# Patient Record
Sex: MALE | Race: BLACK OR AFRICAN AMERICAN | NOT HISPANIC OR LATINO | Employment: OTHER | URBAN - METROPOLITAN AREA
[De-identification: names, ages, dates, MRNs, and addresses within clinical notes are randomized per-mention and may not be internally consistent; named-entity substitution may affect disease eponyms.]

---

## 2017-05-30 ENCOUNTER — HISTORICAL (OUTPATIENT)
Dept: ADMINISTRATIVE | Facility: HOSPITAL | Age: 57
End: 2017-05-30

## 2017-06-13 ENCOUNTER — HISTORICAL (OUTPATIENT)
Dept: ADMINISTRATIVE | Facility: HOSPITAL | Age: 57
End: 2017-06-13

## 2017-11-09 ENCOUNTER — HOSPITAL ENCOUNTER (OUTPATIENT)
Dept: MEDSURG UNIT | Facility: HOSPITAL | Age: 57
End: 2017-11-10
Attending: INTERNAL MEDICINE | Admitting: INTERNAL MEDICINE

## 2017-11-28 ENCOUNTER — HISTORICAL (OUTPATIENT)
Dept: RADIOLOGY | Facility: HOSPITAL | Age: 57
End: 2017-11-28

## 2018-01-08 ENCOUNTER — HISTORICAL (OUTPATIENT)
Dept: ADMINISTRATIVE | Facility: HOSPITAL | Age: 58
End: 2018-01-08

## 2018-02-19 ENCOUNTER — HISTORICAL (OUTPATIENT)
Dept: ADMINISTRATIVE | Facility: HOSPITAL | Age: 58
End: 2018-02-19

## 2018-03-01 ENCOUNTER — HISTORICAL (OUTPATIENT)
Dept: ADMINISTRATIVE | Facility: HOSPITAL | Age: 58
End: 2018-03-01

## 2018-04-06 ENCOUNTER — HISTORICAL (OUTPATIENT)
Dept: ADMINISTRATIVE | Facility: HOSPITAL | Age: 58
End: 2018-04-06

## 2018-04-17 ENCOUNTER — HISTORICAL (OUTPATIENT)
Dept: ADMINISTRATIVE | Facility: HOSPITAL | Age: 58
End: 2018-04-17

## 2018-04-24 ENCOUNTER — HISTORICAL (OUTPATIENT)
Dept: ADMINISTRATIVE | Facility: HOSPITAL | Age: 58
End: 2018-04-24

## 2018-05-29 ENCOUNTER — HISTORICAL (OUTPATIENT)
Dept: ADMINISTRATIVE | Facility: HOSPITAL | Age: 58
End: 2018-05-29

## 2018-05-31 ENCOUNTER — HISTORICAL (OUTPATIENT)
Dept: ADMINISTRATIVE | Facility: HOSPITAL | Age: 58
End: 2018-05-31

## 2018-08-20 ENCOUNTER — HISTORICAL (OUTPATIENT)
Dept: ADMINISTRATIVE | Facility: HOSPITAL | Age: 58
End: 2018-08-20

## 2018-09-05 ENCOUNTER — HISTORICAL (OUTPATIENT)
Dept: ADMINISTRATIVE | Facility: HOSPITAL | Age: 58
End: 2018-09-05

## 2018-12-05 ENCOUNTER — HISTORICAL (OUTPATIENT)
Dept: ADMINISTRATIVE | Facility: HOSPITAL | Age: 58
End: 2018-12-05

## 2018-12-05 LAB
ABS NEUT (OLG): 6.27 X10(3)/MCL (ref 2.1–9.2)
ALBUMIN SERPL-MCNC: 3.6 GM/DL (ref 3.4–5)
ALBUMIN/GLOB SERPL: 0.7 RATIO (ref 1.1–2)
ALP SERPL-CCNC: 71 UNIT/L (ref 46–116)
ALT SERPL-CCNC: 8 UNIT/L (ref 12–78)
ANISOCYTOSIS BLD QL SMEAR: ABNORMAL
AST SERPL-CCNC: 24 UNIT/L (ref 15–37)
BILIRUB SERPL-MCNC: 0.6 MG/DL (ref 0.2–1)
BILIRUBIN DIRECT+TOT PNL SERPL-MCNC: 0.16 MG/DL (ref 0–0.2)
BILIRUBIN DIRECT+TOT PNL SERPL-MCNC: 0.44 MG/DL (ref 0–0.8)
BNP BLD-MCNC: ABNORMAL PG/ML (ref 0–125)
BUN SERPL-MCNC: 18 MG/DL (ref 7–18)
CALCIUM SERPL-MCNC: 9.1 MG/DL (ref 8.5–10.1)
CHLORIDE SERPL-SCNC: 103 MMOL/L (ref 98–107)
CO2 SERPL-SCNC: 26.6 MMOL/L (ref 21–32)
CREAT SERPL-MCNC: 0.94 MG/DL (ref 0.6–1.3)
EOSINOPHIL NFR BLD MANUAL: 1 % (ref 0–8)
ERYTHROCYTE [DISTWIDTH] IN BLOOD BY AUTOMATED COUNT: 15.8 % (ref 11.5–17)
FLUAV AG NPH QL IA: NEGATIVE
FLUBV AG NPH QL IA: NEGATIVE
GLOBULIN SER-MCNC: 5.2 GM/DL (ref 2.4–3.5)
GLUCOSE SERPL-MCNC: 106 MG/DL (ref 74–106)
HCT VFR BLD AUTO: 39.3 % (ref 42–52)
HGB BLD-MCNC: 12.2 GM/DL (ref 14–18)
LYMPHOCYTES NFR BLD MANUAL: 14 % (ref 13–40)
MCH RBC QN AUTO: 30 PG (ref 27–31)
MCHC RBC AUTO-ENTMCNC: 31 GM/DL (ref 33–36)
MCV RBC AUTO: 96.8 FL (ref 80–94)
MONOCYTES NFR BLD MANUAL: 3 % (ref 2–11)
MYELOCYTES NFR BLD MANUAL: 1 %
NEUTROPHILS NFR BLD MANUAL: 81 % (ref 47–80)
OVALOCYTES BLD QL SMEAR: ABNORMAL
PLATELET # BLD AUTO: 274 X10(3)/MCL (ref 130–400)
PLATELET # BLD EST: NORMAL 10*3/UL
PMV BLD AUTO: 10.3 FL (ref 9.4–12.4)
POIKILOCYTOSIS BLD QL SMEAR: ABNORMAL
POTASSIUM SERPL-SCNC: 4.1 MMOL/L (ref 3.5–5.1)
PROT SERPL-MCNC: 8.8 GM/DL (ref 6.4–8.2)
RBC # BLD AUTO: 4.06 X10(6)/MCL (ref 4.7–6.1)
RBC MORPH BLD: ABNORMAL
SODIUM SERPL-SCNC: 140 MMOL/L (ref 136–145)
WBC # SPEC AUTO: 8.4 X10(3)/MCL (ref 4.5–11.5)

## 2018-12-26 ENCOUNTER — HISTORICAL (OUTPATIENT)
Dept: ADMINISTRATIVE | Facility: HOSPITAL | Age: 58
End: 2018-12-26

## 2018-12-26 LAB
ABS NEUT (OLG): 4.49 X10(3)/MCL (ref 2.1–9.2)
ALBUMIN SERPL-MCNC: 3.4 GM/DL (ref 3.4–5)
ALBUMIN/GLOB SERPL: 1 RATIO (ref 1–2)
ALP SERPL-CCNC: 81 UNIT/L (ref 45–117)
ALT SERPL-CCNC: 13 UNIT/L (ref 12–78)
AMPHET UR QL SCN: NEGATIVE
APPEARANCE, UA: CLEAR
APTT PPP: 34.9 SECOND(S) (ref 23.3–37)
AST SERPL-CCNC: 18 UNIT/L (ref 15–37)
BACTERIA #/AREA URNS AUTO: ABNORMAL /[HPF]
BARBITURATE SCN PRESENT UR: NEGATIVE
BASOPHILS # BLD AUTO: 0.01 X10(3)/MCL
BASOPHILS NFR BLD AUTO: 0 %
BENZODIAZ UR QL SCN: NEGATIVE
BILIRUB SERPL-MCNC: 0.6 MG/DL (ref 0.2–1)
BILIRUB UR QL STRIP: NEGATIVE
BILIRUBIN DIRECT+TOT PNL SERPL-MCNC: 0.2 MG/DL
BILIRUBIN DIRECT+TOT PNL SERPL-MCNC: 0.4 MG/DL
BUN SERPL-MCNC: 23 MG/DL (ref 7–18)
CALCIUM SERPL-MCNC: 8.6 MG/DL (ref 8.5–10.1)
CANNABINOIDS UR QL SCN: NEGATIVE
CD3+CD4+ CELLS # SPEC: 306 UNIT/L (ref 589–1505)
CD3+CD4+ CELLS NFR BLD: 26.6 % (ref 31–59)
CHLORIDE SERPL-SCNC: 109 MMOL/L (ref 98–107)
CK MB SERPL-MCNC: 2 NG/ML (ref 1–3.6)
CK SERPL-CCNC: 95 UNIT/L (ref 39–308)
CO2 SERPL-SCNC: 26 MMOL/L (ref 21–32)
COCAINE UR QL SCN: POSITIVE
COLOR UR: NORMAL
CREAT SERPL-MCNC: 1 MG/DL (ref 0.6–1.3)
EOSINOPHIL # BLD AUTO: 0.12 X10(3)/MCL
EOSINOPHIL NFR BLD AUTO: 2 %
ERYTHROCYTE [DISTWIDTH] IN BLOOD BY AUTOMATED COUNT: 16.3 % (ref 11.5–14.5)
FERRITIN SERPL-MCNC: 60.8 NG/ML (ref 26–388)
GLOBULIN SER-MCNC: 4.3 GM/ML (ref 2.3–3.5)
GLUCOSE (UA): NORMAL
GLUCOSE SERPL-MCNC: 119 MG/DL (ref 74–106)
HCT VFR BLD AUTO: 33.3 % (ref 40–51)
HGB BLD-MCNC: 10.2 GM/DL (ref 13.5–17.5)
HGB UR QL STRIP: NEGATIVE
HYALINE CASTS #/AREA URNS LPF: ABNORMAL /[LPF]
IMM GRANULOCYTES # BLD AUTO: 0.02 10*3/UL
IMM GRANULOCYTES NFR BLD AUTO: 0 %
INR PPP: 1.19 (ref 0.9–1.2)
IRON SATN MFR SERPL: 7 % (ref 15–50)
IRON SERPL-MCNC: 25 MCG/DL (ref 65–175)
KETONES UR QL STRIP: NEGATIVE
LEUKOCYTE ESTERASE UR QL STRIP: NEGATIVE
LYMPHOCYTES # BLD AUTO: 1.12 X10(3)/MCL
LYMPHOCYTES # BLD AUTO: 1152 UNIT/L (ref 1260–5520)
LYMPHOCYTES NFR BLD AUTO: 18 % (ref 13–40)
LYMPHOCYTES NFR LN MANUAL: 18 % (ref 28–48)
LYMPHOMA - T-CELL MARKERS SPEC-IMP: ABNORMAL
MAGNESIUM SERPL-MCNC: 2.2 MG/DL (ref 1.8–2.4)
MAGNESIUM SERPL-MCNC: 2.4 MG/DL (ref 1.8–2.4)
MCH RBC QN AUTO: 29.3 PG (ref 26–34)
MCHC RBC AUTO-ENTMCNC: 30.6 GM/DL (ref 31–37)
MCV RBC AUTO: 95.7 FL (ref 80–100)
MONOCYTES # BLD AUTO: 0.61 X10(3)/MCL
MONOCYTES NFR BLD AUTO: 10 % (ref 4–12)
NEUTROPHILS # BLD AUTO: 4.49 X10(3)/MCL
NEUTROPHILS NFR BLD AUTO: 70 X10(3)/MCL
NITRITE UR QL STRIP: NEGATIVE
OPIATES UR QL SCN: NEGATIVE
PCP UR QL: NEGATIVE
PH UR STRIP.AUTO: 6 [PH] (ref 5–8)
PH UR STRIP: 6 [PH] (ref 4.5–8)
PHOSPHATE SERPL-MCNC: 3 MG/DL (ref 2.5–4.9)
PHOSPHATE SERPL-MCNC: 3.2 MG/DL (ref 2.5–4.9)
PLATELET # BLD AUTO: 249 X10(3)/MCL (ref 130–400)
PMV BLD AUTO: 10.2 FL (ref 7.4–10.4)
POTASSIUM SERPL-SCNC: 3.7 MMOL/L (ref 3.5–5.1)
PROT SERPL-MCNC: 7.7 GM/DL (ref 6.4–8.2)
PROT UR QL STRIP: NEGATIVE
PROTHROMBIN TIME: 15 SECOND(S) (ref 11.9–14.4)
RBC # BLD AUTO: 3.48 X10(6)/MCL (ref 4.5–5.9)
RBC #/AREA URNS AUTO: ABNORMAL /[HPF]
SODIUM SERPL-SCNC: 141 MMOL/L (ref 136–145)
SP GR UR STRIP: 1.01 (ref 1–1.03)
SQUAMOUS #/AREA URNS LPF: ABNORMAL /[LPF]
TEMPERATURE, URINE (OHS): 22.2 DEGC (ref 20–25)
TIBC SERPL-MCNC: 359 MCG/DL (ref 250–450)
TRANSFERRIN SERPL-MCNC: 282 MG/DL (ref 200–360)
TROPONIN I SERPL-MCNC: 0.09 NG/ML (ref 0–0.05)
TROPONIN I SERPL-MCNC: 0.1 NG/ML (ref 0–0.05)
TSH SERPL-ACNC: 0.88 MIU/L (ref 0.36–3.74)
UROBILINOGEN UR STRIP-ACNC: NORMAL
WBC # BLD AUTO: 6400 /MM3 (ref 4500–11500)
WBC # SPEC AUTO: 6.4 X10(3)/MCL (ref 4.5–11)
WBC #/AREA URNS AUTO: ABNORMAL /HPF

## 2018-12-27 LAB
ABS NEUT (OLG): 5.38 X10(3)/MCL (ref 2.1–9.2)
BASOPHILS # BLD AUTO: 0.02 X10(3)/MCL
BASOPHILS NFR BLD AUTO: 0 %
BUN SERPL-MCNC: 20 MG/DL (ref 7–18)
CALCIUM SERPL-MCNC: 8.6 MG/DL (ref 8.5–10.1)
CHLORIDE SERPL-SCNC: 109 MMOL/L (ref 98–107)
CO2 SERPL-SCNC: 26 MMOL/L (ref 21–32)
CREAT SERPL-MCNC: 1.1 MG/DL (ref 0.6–1.3)
CREAT/UREA NIT SERPL: 18
EOSINOPHIL # BLD AUTO: 0.18 X10(3)/MCL
EOSINOPHIL NFR BLD AUTO: 2 %
ERYTHROCYTE [DISTWIDTH] IN BLOOD BY AUTOMATED COUNT: 16 % (ref 11.5–14.5)
GLUCOSE SERPL-MCNC: 110 MG/DL (ref 74–106)
HCT VFR BLD AUTO: 34.2 % (ref 40–51)
HGB BLD-MCNC: 10.5 GM/DL (ref 13.5–17.5)
IMM GRANULOCYTES # BLD AUTO: 0.02 10*3/UL
IMM GRANULOCYTES NFR BLD AUTO: 0 %
LYMPHOCYTES # BLD AUTO: 1.22 X10(3)/MCL
LYMPHOCYTES NFR BLD AUTO: 16 % (ref 13–40)
MAGNESIUM SERPL-MCNC: 2.3 MG/DL (ref 1.8–2.4)
MCH RBC QN AUTO: 29.2 PG (ref 26–34)
MCHC RBC AUTO-ENTMCNC: 30.7 GM/DL (ref 31–37)
MCV RBC AUTO: 95.3 FL (ref 80–100)
MONOCYTES # BLD AUTO: 0.67 X10(3)/MCL
MONOCYTES NFR BLD AUTO: 9 % (ref 4–12)
NEUTROPHILS # BLD AUTO: 5.38 X10(3)/MCL
NEUTROPHILS NFR BLD AUTO: 72 X10(3)/MCL
PHOSPHATE SERPL-MCNC: 3.2 MG/DL (ref 2.5–4.9)
PLATELET # BLD AUTO: 248 X10(3)/MCL (ref 130–400)
PMV BLD AUTO: 10.5 FL (ref 7.4–10.4)
POTASSIUM SERPL-SCNC: 3.8 MMOL/L (ref 3.5–5.1)
RBC # BLD AUTO: 3.59 X10(6)/MCL (ref 4.5–5.9)
SODIUM SERPL-SCNC: 141 MMOL/L (ref 136–145)
TROPONIN I SERPL-MCNC: 0.19 NG/ML (ref 0–0.05)
WBC # SPEC AUTO: 7.5 X10(3)/MCL (ref 4.5–11)

## 2018-12-28 LAB
ABS NEUT (OLG): 4.77 X10(3)/MCL (ref 2.1–9.2)
BASOPHILS # BLD AUTO: 0.03 X10(3)/MCL
BASOPHILS NFR BLD AUTO: 0 %
BUN SERPL-MCNC: 19 MG/DL (ref 7–18)
CALCIUM SERPL-MCNC: 8.2 MG/DL (ref 8.5–10.1)
CHLORIDE SERPL-SCNC: 107 MMOL/L (ref 98–107)
CO2 SERPL-SCNC: 26 MMOL/L (ref 21–32)
CREAT SERPL-MCNC: 1.1 MG/DL (ref 0.6–1.3)
CREAT/UREA NIT SERPL: 17
EOSINOPHIL # BLD AUTO: 0.34 X10(3)/MCL
EOSINOPHIL NFR BLD AUTO: 5 %
ERYTHROCYTE [DISTWIDTH] IN BLOOD BY AUTOMATED COUNT: 16.1 % (ref 11.5–14.5)
GLUCOSE SERPL-MCNC: 98 MG/DL (ref 74–106)
HCT VFR BLD AUTO: 33 % (ref 40–51)
HGB BLD-MCNC: 10.2 GM/DL (ref 13.5–17.5)
IMM GRANULOCYTES # BLD AUTO: 0.02 10*3/UL
IMM GRANULOCYTES NFR BLD AUTO: 0 %
LYMPHOCYTES # BLD AUTO: 1.35 X10(3)/MCL
LYMPHOCYTES NFR BLD AUTO: 19 % (ref 13–40)
MAGNESIUM SERPL-MCNC: 2.1 MG/DL (ref 1.8–2.4)
MCH RBC QN AUTO: 29.1 PG (ref 26–34)
MCHC RBC AUTO-ENTMCNC: 30.9 GM/DL (ref 31–37)
MCV RBC AUTO: 94 FL (ref 80–100)
MONOCYTES # BLD AUTO: 0.64 X10(3)/MCL
MONOCYTES NFR BLD AUTO: 9 % (ref 4–12)
NEUTROPHILS # BLD AUTO: 4.77 X10(3)/MCL
NEUTROPHILS NFR BLD AUTO: 67 X10(3)/MCL
PHOSPHATE SERPL-MCNC: 3.7 MG/DL (ref 2.5–4.9)
PLATELET # BLD AUTO: 247 X10(3)/MCL (ref 130–400)
PMV BLD AUTO: 10.5 FL (ref 7.4–10.4)
POTASSIUM SERPL-SCNC: 3.5 MMOL/L (ref 3.5–5.1)
RBC # BLD AUTO: 3.51 X10(6)/MCL (ref 4.5–5.9)
SODIUM SERPL-SCNC: 142 MMOL/L (ref 136–145)
TROPONIN I SERPL-MCNC: 1.78 NG/ML (ref 0–0.05)
WBC # SPEC AUTO: 7.2 X10(3)/MCL (ref 4.5–11)

## 2019-02-03 ENCOUNTER — HISTORICAL (OUTPATIENT)
Dept: ADMINISTRATIVE | Facility: HOSPITAL | Age: 59
End: 2019-02-03

## 2019-02-09 ENCOUNTER — HISTORICAL (OUTPATIENT)
Dept: ADMINISTRATIVE | Facility: HOSPITAL | Age: 59
End: 2019-02-09

## 2019-03-18 ENCOUNTER — HISTORICAL (OUTPATIENT)
Dept: ADMINISTRATIVE | Facility: HOSPITAL | Age: 59
End: 2019-03-18

## 2019-04-02 ENCOUNTER — HISTORICAL (OUTPATIENT)
Dept: ADMINISTRATIVE | Facility: HOSPITAL | Age: 59
End: 2019-04-02

## 2019-04-21 ENCOUNTER — HISTORICAL (OUTPATIENT)
Dept: ADMINISTRATIVE | Facility: HOSPITAL | Age: 59
End: 2019-04-21

## 2019-09-06 ENCOUNTER — HISTORICAL (OUTPATIENT)
Dept: RADIOLOGY | Facility: HOSPITAL | Age: 59
End: 2019-09-06

## 2019-12-17 ENCOUNTER — HISTORICAL (OUTPATIENT)
Dept: ADMINISTRATIVE | Facility: HOSPITAL | Age: 59
End: 2019-12-17

## 2020-11-04 ENCOUNTER — HISTORICAL (OUTPATIENT)
Dept: ADMINISTRATIVE | Facility: HOSPITAL | Age: 60
End: 2020-11-04

## 2020-12-13 ENCOUNTER — HISTORICAL (OUTPATIENT)
Dept: ADMINISTRATIVE | Facility: HOSPITAL | Age: 60
End: 2020-12-13

## 2021-11-27 ENCOUNTER — HISTORICAL (OUTPATIENT)
Dept: ADMINISTRATIVE | Facility: HOSPITAL | Age: 61
End: 2021-11-27

## 2021-11-27 LAB
ABS NEUT (OLG): 3.36 X10(3)/MCL (ref 2.1–9.2)
ALBUMIN SERPL-MCNC: 3.2 GM/DL (ref 3.4–4.8)
ALBUMIN/GLOB SERPL: 0.7 RATIO (ref 1.1–2)
ALP SERPL-CCNC: 59 UNIT/L (ref 40–150)
ALT SERPL-CCNC: 12 UNIT/L (ref 0–55)
AMPHET UR QL SCN: NEGATIVE
AST SERPL-CCNC: 32 UNIT/L (ref 5–34)
BARBITURATE SCN PRESENT UR: NEGATIVE
BASOPHILS # BLD AUTO: 0 X10(3)/MCL (ref 0–0.2)
BASOPHILS NFR BLD AUTO: 0 %
BENZODIAZ UR QL SCN: NEGATIVE
BILIRUB SERPL-MCNC: 0.5 MG/DL
BILIRUBIN DIRECT+TOT PNL SERPL-MCNC: 0.2 MG/DL (ref 0–0.5)
BILIRUBIN DIRECT+TOT PNL SERPL-MCNC: 0.3 MG/DL (ref 0–0.8)
BNP BLD-MCNC: 3046.6 PG/ML
BUN SERPL-MCNC: 11.4 MG/DL (ref 8.4–25.7)
BUN SERPL-MCNC: 12.1 MG/DL (ref 8.4–25.7)
CALCIUM SERPL-MCNC: 10 MG/DL (ref 8.7–10.5)
CALCIUM SERPL-MCNC: 8.9 MG/DL (ref 8.7–10.5)
CANNABINOIDS UR QL SCN: NEGATIVE
CD3+CD4+ CELLS # SPEC: 138 UNIT/L (ref 589–1505)
CD3+CD4+ CELLS NFR BLD: 8.8 % (ref 31–59)
CHLORIDE SERPL-SCNC: 104 MMOL/L (ref 98–107)
CHLORIDE SERPL-SCNC: 108 MMOL/L (ref 98–107)
CO2 SERPL-SCNC: 24 MMOL/L (ref 23–31)
CO2 SERPL-SCNC: 24 MMOL/L (ref 23–31)
COCAINE UR QL SCN: POSITIVE
CREAT SERPL-MCNC: 0.96 MG/DL (ref 0.73–1.18)
CREAT SERPL-MCNC: 1.06 MG/DL (ref 0.73–1.18)
CREAT/UREA NIT SERPL: 11
D DIMER PPP IA.FEU-MCNC: 0.96 MCG/ML FEU
EOSINOPHIL # BLD AUTO: 0.1 X10(3)/MCL (ref 0–0.9)
EOSINOPHIL NFR BLD AUTO: 2 %
ERYTHROCYTE [DISTWIDTH] IN BLOOD BY AUTOMATED COUNT: 13.7 % (ref 11.5–14.5)
FENTANYL UR QL SCN: NEGATIVE
GLOBULIN SER-MCNC: 4.9 GM/DL (ref 2.4–3.5)
GLUCOSE SERPL-MCNC: 153 MG/DL (ref 82–115)
GLUCOSE SERPL-MCNC: 87 MG/DL (ref 82–115)
HCT VFR BLD AUTO: 43.4 % (ref 40–51)
HGB BLD-MCNC: 13.6 GM/DL (ref 13.5–17.5)
IMM GRANULOCYTES # BLD AUTO: 0.01 10*3/UL
IMM GRANULOCYTES NFR BLD AUTO: 0 %
LYMPHOCYTES # BLD AUTO: 1.7 X10(3)/MCL (ref 0.6–4.6)
LYMPHOCYTES # BLD AUTO: 1568 UNIT/L (ref 1260–5520)
LYMPHOCYTES NFR BLD AUTO: 29 %
LYMPHOCYTES NFR LN MANUAL: 28 % (ref 28–48)
LYMPHOMA - T-CELL MARKERS SPEC-IMP: ABNORMAL
MAGNESIUM SERPL-MCNC: 1.9 MG/DL (ref 1.6–2.6)
MCH RBC QN AUTO: 30.5 PG (ref 26–34)
MCHC RBC AUTO-ENTMCNC: 31.3 GM/DL (ref 31–37)
MCV RBC AUTO: 97.3 FL (ref 80–100)
MDMA UR QL SCN: NEGATIVE
MONOCYTES # BLD AUTO: 0.7 X10(3)/MCL (ref 0.1–1.3)
MONOCYTES NFR BLD AUTO: 11 %
NEUTROPHILS # BLD AUTO: 3.36 X10(3)/MCL (ref 2.1–9.2)
NEUTROPHILS NFR BLD AUTO: 57 %
NRBC BLD AUTO-RTO: 0 % (ref 0–0.2)
OPIATES UR QL SCN: NEGATIVE
PCP UR QL: NEGATIVE
PH UR STRIP.AUTO: 7 [PH] (ref 3–11)
PLATELET # BLD AUTO: 210 X10(3)/MCL (ref 130–400)
PMV BLD AUTO: 10.3 FL (ref 7.4–10.4)
POTASSIUM SERPL-SCNC: 3.9 MMOL/L (ref 3.5–5.1)
POTASSIUM SERPL-SCNC: 4.2 MMOL/L (ref 3.5–5.1)
PROT SERPL-MCNC: 8.1 GM/DL (ref 5.8–7.6)
RBC # BLD AUTO: 4.46 X10(6)/MCL (ref 4.5–5.9)
SARS-COV-2 AG RESP QL IA.RAPID: NEGATIVE
SODIUM SERPL-SCNC: 137 MMOL/L (ref 136–145)
SODIUM SERPL-SCNC: 138 MMOL/L (ref 136–145)
TROPONIN I SERPL-MCNC: 0.36 NG/ML (ref 0–0.04)
TROPONIN I SERPL-MCNC: 0.37 NG/ML (ref 0–0.04)
WBC # BLD AUTO: 5600 /MM3 (ref 4500–11500)
WBC # SPEC AUTO: 5.9 X10(3)/MCL (ref 4.5–11)

## 2021-11-28 LAB
ABS NEUT (OLG): 3.06 X10(3)/MCL (ref 2.1–9.2)
ALBUMIN SERPL-MCNC: 3 GM/DL (ref 3.4–4.8)
ALBUMIN/GLOB SERPL: 0.6 RATIO (ref 1.1–2)
ALP SERPL-CCNC: 63 UNIT/L (ref 40–150)
ALT SERPL-CCNC: 14 UNIT/L (ref 0–55)
AST SERPL-CCNC: 23 UNIT/L (ref 5–34)
BASOPHILS # BLD AUTO: 0 X10(3)/MCL (ref 0–0.2)
BASOPHILS NFR BLD AUTO: 0 %
BILIRUB SERPL-MCNC: 0.5 MG/DL
BILIRUBIN DIRECT+TOT PNL SERPL-MCNC: 0.2 MG/DL (ref 0–0.5)
BILIRUBIN DIRECT+TOT PNL SERPL-MCNC: 0.3 MG/DL (ref 0–0.8)
BUN SERPL-MCNC: 13.2 MG/DL (ref 8.4–25.7)
BUN SERPL-MCNC: 16.2 MG/DL (ref 8.4–25.7)
CALCIUM SERPL-MCNC: 9.3 MG/DL (ref 8.7–10.5)
CALCIUM SERPL-MCNC: 9.8 MG/DL (ref 8.7–10.5)
CHLORIDE SERPL-SCNC: 105 MMOL/L (ref 98–107)
CHLORIDE SERPL-SCNC: 106 MMOL/L (ref 98–107)
CO2 SERPL-SCNC: 22 MMOL/L (ref 23–31)
CO2 SERPL-SCNC: 24 MMOL/L (ref 23–31)
CREAT SERPL-MCNC: 1 MG/DL (ref 0.73–1.18)
CREAT SERPL-MCNC: 1.12 MG/DL (ref 0.73–1.18)
CREAT/UREA NIT SERPL: 14
EOSINOPHIL # BLD AUTO: 0.2 X10(3)/MCL (ref 0–0.9)
EOSINOPHIL NFR BLD AUTO: 3 %
ERYTHROCYTE [DISTWIDTH] IN BLOOD BY AUTOMATED COUNT: 13.5 % (ref 11.5–14.5)
EST CREAT CLEARANCE SER (OHS): 73.5 ML/MIN
GLOBULIN SER-MCNC: 4.9 GM/DL (ref 2.4–3.5)
GLUCOSE SERPL-MCNC: 104 MG/DL (ref 82–115)
GLUCOSE SERPL-MCNC: 88 MG/DL (ref 82–115)
HCT VFR BLD AUTO: 43.4 % (ref 40–51)
HGB BLD-MCNC: 14.1 GM/DL (ref 13.5–17.5)
IMM GRANULOCYTES # BLD AUTO: 0.01 10*3/UL
IMM GRANULOCYTES NFR BLD AUTO: 0 %
LYMPHOCYTES # BLD AUTO: 1.6 X10(3)/MCL (ref 0.6–4.6)
LYMPHOCYTES NFR BLD AUTO: 28 %
MAGNESIUM SERPL-MCNC: 1.9 MG/DL (ref 1.6–2.6)
MAGNESIUM SERPL-MCNC: 2.4 MG/DL (ref 1.6–2.6)
MCH RBC QN AUTO: 30.7 PG (ref 26–34)
MCHC RBC AUTO-ENTMCNC: 32.5 GM/DL (ref 31–37)
MCV RBC AUTO: 94.3 FL (ref 80–100)
MONOCYTES # BLD AUTO: 0.7 X10(3)/MCL (ref 0.1–1.3)
MONOCYTES NFR BLD AUTO: 13 %
NEUTROPHILS # BLD AUTO: 3.06 X10(3)/MCL (ref 2.1–9.2)
NEUTROPHILS NFR BLD AUTO: 55 %
NRBC BLD AUTO-RTO: 0 % (ref 0–0.2)
PLATELET # BLD AUTO: 223 X10(3)/MCL (ref 130–400)
PMV BLD AUTO: 10.2 FL (ref 7.4–10.4)
POTASSIUM SERPL-SCNC: 3.4 MMOL/L (ref 3.5–5.1)
POTASSIUM SERPL-SCNC: 4.8 MMOL/L (ref 3.5–5.1)
PROT SERPL-MCNC: 7.9 GM/DL (ref 5.8–7.6)
RBC # BLD AUTO: 4.6 X10(6)/MCL (ref 4.5–5.9)
SODIUM SERPL-SCNC: 136 MMOL/L (ref 136–145)
SODIUM SERPL-SCNC: 138 MMOL/L (ref 136–145)
TROPONIN I SERPL-MCNC: 0.22 NG/ML (ref 0–0.04)
WBC # SPEC AUTO: 5.6 X10(3)/MCL (ref 4.5–11)

## 2021-11-29 LAB
ABS NEUT (OLG): 3.34 X10(3)/MCL (ref 2.1–9.2)
BASOPHILS # BLD AUTO: 0 X10(3)/MCL (ref 0–0.2)
BASOPHILS NFR BLD AUTO: 1 %
EOSINOPHIL # BLD AUTO: 0.2 X10(3)/MCL (ref 0–0.9)
EOSINOPHIL NFR BLD AUTO: 3 %
ERYTHROCYTE [DISTWIDTH] IN BLOOD BY AUTOMATED COUNT: 13.6 % (ref 11.5–14.5)
HBV SURFACE AB SER-ACNC: 0 MIU/ML
HBV SURFACE AB SERPL IA-ACNC: NONREACTIVE M[IU]/ML
HBV SURFACE AG SERPL QL IA: REACTIVE
HBV SURFACE AG SERPLBLD QL IA.RAPID: ABNORMAL
HCT VFR BLD AUTO: 45.8 % (ref 40–51)
HGB BLD-MCNC: 14.9 GM/DL (ref 13.5–17.5)
IMM GRANULOCYTES # BLD AUTO: 0.01 10*3/UL
IMM GRANULOCYTES NFR BLD AUTO: 0 %
LYMPHOCYTES # BLD AUTO: 1.6 X10(3)/MCL (ref 0.6–4.6)
LYMPHOCYTES NFR BLD AUTO: 27 %
MCH RBC QN AUTO: 30.5 PG (ref 26–34)
MCHC RBC AUTO-ENTMCNC: 32.5 GM/DL (ref 31–37)
MCV RBC AUTO: 93.9 FL (ref 80–100)
MONOCYTES # BLD AUTO: 0.9 X10(3)/MCL (ref 0.1–1.3)
MONOCYTES NFR BLD AUTO: 14 %
NEUTROPHILS # BLD AUTO: 3.34 X10(3)/MCL (ref 2.1–9.2)
NEUTROPHILS NFR BLD AUTO: 55 %
NRBC BLD AUTO-RTO: 0 % (ref 0–0.2)
PLATELET # BLD AUTO: 218 X10(3)/MCL (ref 130–400)
PMV BLD AUTO: 10.7 FL (ref 7.4–10.4)
RBC # BLD AUTO: 4.88 X10(6)/MCL (ref 4.5–5.9)
WBC # SPEC AUTO: 6.1 X10(3)/MCL (ref 4.5–11)

## 2021-11-30 LAB
ABS NEUT (OLG): 3.72 X10(3)/MCL (ref 2.1–9.2)
BASOPHILS # BLD AUTO: 0 X10(3)/MCL (ref 0–0.2)
BASOPHILS NFR BLD AUTO: 0 %
EOSINOPHIL # BLD AUTO: 0.2 X10(3)/MCL (ref 0–0.9)
EOSINOPHIL NFR BLD AUTO: 4 %
ERYTHROCYTE [DISTWIDTH] IN BLOOD BY AUTOMATED COUNT: 13.6 % (ref 11.5–14.5)
HCT VFR BLD AUTO: 44.6 % (ref 40–51)
HGB BLD-MCNC: 14.4 GM/DL (ref 13.5–17.5)
IMM GRANULOCYTES # BLD AUTO: 0.01 10*3/UL
IMM GRANULOCYTES NFR BLD AUTO: 0 %
LYMPHOCYTES # BLD AUTO: 1.6 X10(3)/MCL (ref 0.6–4.6)
LYMPHOCYTES NFR BLD AUTO: 26 %
MCH RBC QN AUTO: 30.8 PG (ref 26–34)
MCHC RBC AUTO-ENTMCNC: 32.3 GM/DL (ref 31–37)
MCV RBC AUTO: 95.3 FL (ref 80–100)
MONOCYTES # BLD AUTO: 0.8 X10(3)/MCL (ref 0.1–1.3)
MONOCYTES NFR BLD AUTO: 12 %
NEUTROPHILS # BLD AUTO: 3.72 X10(3)/MCL (ref 2.1–9.2)
NEUTROPHILS NFR BLD AUTO: 58 %
NRBC BLD AUTO-RTO: 0 % (ref 0–0.2)
PLATELET # BLD AUTO: 221 X10(3)/MCL (ref 130–400)
PMV BLD AUTO: 10.3 FL (ref 7.4–10.4)
RBC # BLD AUTO: 4.68 X10(6)/MCL (ref 4.5–5.9)
WBC # SPEC AUTO: 6.4 X10(3)/MCL (ref 4.5–11)

## 2022-03-15 ENCOUNTER — HISTORICAL (OUTPATIENT)
Dept: ADMINISTRATIVE | Facility: HOSPITAL | Age: 62
End: 2022-03-15

## 2022-04-07 ENCOUNTER — HISTORICAL (OUTPATIENT)
Dept: ADMINISTRATIVE | Facility: HOSPITAL | Age: 62
End: 2022-04-07

## 2022-04-23 VITALS
BODY MASS INDEX: 28.7 KG/M2 | DIASTOLIC BLOOD PRESSURE: 69 MMHG | WEIGHT: 205 LBS | HEIGHT: 71 IN | OXYGEN SATURATION: 100 % | SYSTOLIC BLOOD PRESSURE: 102 MMHG

## 2022-04-30 NOTE — H&P
Patient:   Jason Perdue             MRN: [id number]            FIN: [id number]               Age:   58 years     Sex:  Male     :  1960   Associated Diagnoses:   None   Author:   Oleg Kendall DO      Chief Complaint   2018 17:29 CDT      In via ambulance for abd pain, awake and alert severe pain to lower abd deneis vomiting today states history of lymphoma andon chemo      History of Present Illness   57 yo M presents w/ diffuse abdominal pain x 1 day.  He said it was initially in the hypogastric but now in epigastric region. He admits one small BM today with hard stool. There are no aggrivating/alleviating factors. His pain is improved since arrival. He has been tolerating PO well, even ate lunch today. He denies all other complaints as per ROS. He does have a significant medical history as of Dec this past yr with multiple diagnoses of HIV, Diffuse large B-cell lymphoma, bleeding duodenal ulcer, and CAD s/p stenting. His current pain is similar to that which led to the aforementioned medical diagnoses.  He underwent CT for the same which revealed retroperitoneal lymphadenopathy.  He also had unworked up anemia, for these reasons he underwent EGD and CT scope.  Biopsies taken at that time were consistent with diffuse B-cell lymphoma and H. pylori infection.  He has since undergone 6 rounds of R-CHOP and is seen by oncology here [facility].  He has follow-up PET/CT for therapeutic response planned this Thursday [facility].  He has oncologic follow-up scheduled with [facility] next week.  He has taken all prescribed medicines today. Further history is obtained from chart review.  He is well followed by cardiology and ID clinics, recently seen beginning of this month with scheduled follow-up planned.      Review of Systems   Constitutional: no fevers, night sweats, chills or fatigue  HEENT: no acute vision changes or photophobia, no hearing loss  CVS: no chest pain, palpitations, or orthopnea  Resp: no SOB,  cough, or wheezing  GI: + abd pain, no nausea, vomiting or diarrhea  : no dysuria, urinary incontinence  Musculoskeletal: no joint stiffness, pain, swelling or weakness  Skin: no rashes, lesions  Neuro: no headaches, seizures, numbness or syncope  Psych: no depression or anxiety          Health Status   Allergies:    Allergic Reactions (Selected)  Severity Not Documented  ACE inhibitors- Angioedema.,    Allergies (1) Active Reaction  ACE inhibitors Angioedema     Problem list:    Active Problems (11)  Acute disease or injury-related malnutrition   Atrial fibrillation   Back pain   CAD - Coronary artery disease   Constipation   constipationAnxiety   HIV   Hyperlipidemia   Hypertension   Lymphoma   Tobacco user         Histories   Past Medical History:    Resolved  Kidney stone ([id number]):  Resolved.  Hypertension ([id number]):  Resolved.  HTN (hypertension) ([id number]):  Resolved.  Atrial fibrillation (62917649):  Resolved.  HIV (75415429):  Resolved.   Family History:    Primary malignant neoplasm of colon  Brother  Primary malignant neoplasm of lung  Father  Stroke  Sister  Hypertension.  Mother  Sister    Father  Mother     Social History        Social & Psychosocial Habits    Alcohol  2013 Risk Assessment: Denies Alcohol Use    2017  Use: Past    Type: Beer    Comment: over 20 years ag. - 2017 11:28 - Ida Williamson    Employment/School  2018  Status: Unemployed    Home/Environment  2018  Lives with: Siblings    Living situation: Home/Independent    Home equipment: Walker/Cane    Alcohol abuse in household: No    Substance abuse in household: No    Smoker in household: No    Feels unsafe at home: No    Safe place to go: Yes    Family/Friends available to help: Yes    Concern for family members at home: No    Major illness in household: No    Nutrition/Health  2018  Type of diet: Regular    Sleeping concerns:  Yes    Feels highly stressed: No    Substance Abuse  04/18/2013 Risk Assessment: Denies Substance Abuse    03/12/2018  Use: Past    Type: crack    Comment: 2 days - 11/18/2017 18:14 - Ramy SMA, Janessa DOBBINS; patient states quit in Jan. 2018 - 03/12/2018 14:20 - Alex MILES, Fior    Tobacco  09/27/2015 Risk Assessment: High Risk    12/26/2017  Use: Former smoker    Type: Cigarettes    Tobacco use per day: 20  .        Physical Examination      Vital Signs (last 24 hrs)_____  Last Charted___________  Temp Oral     36.8 DegC  (MAY 29 17:29)  Heart Rate Peripheral   67 bpm  (MAY 29 17:33)  Resp Rate         24 br/min  (MAY 29 17:33)  SBP      105 mmHg  (MAY 29 17:33)  DBP      73 mmHg  (MAY 29 17:33)  SpO2      100 %  (MAY 29 17:33)     General: AAOx3, NAD, alert and cooperative  HEENT: PERRLA, EOMI, normal conjunctiva  Neck: no LAD, no JVD, supple, no masses or thyromegaly  CVS: S1/S2 nml, RRR,+ IV systolic murmur  Resp: CTA B/L, no rhonchi, rales, or wheezing  GI: Not distended, not tender, BS+, no guarding  : no CVA tenderness  Skin: not jaundiced, warm, no rashes, diffuse small bite? lesions  Musculoskeletal: normal ROM, no joint tenderness, normal muscular development  Extremities: no peripheral edema, peripheral pulses intact  Neuro: CN II-XII grossly intact, strength and sensation symmetric and intact throughout, no focal neurological deficits       Health Maintenance      Review / Management        Results review:  All Results   5/29/2018 19:30 CDT      UA Appear                 Hazy                             UA Color                  YELLOW                             UA Spec Grav              1.028                             UA Bili                   0.5 mg/dL                             UA pH                     8.0                             UA Urobilinogen           3 mg/dL                             UA Blood                  Negative                             UA Glucose                Normal                              UA Ketones                Trace                             UA Protein                70 mg/dL                             UA Nitrite                Negative                             UA Leuk Est               Negative                             UA WBC Interp             3-5 /HPF                             UA RBC Interp             0-2                             UA Mucous                 Moderate                             UA Bact Interp            None Seen                             UA Squam Epi Interp       >100                             UA Hyal Cast Interp       0-2                             U Temp                    21.0 DegC                             U pH                      8.0                             U Amph Scr                Negative                             U Fiona Scr                Negative                             U Benzodia Scr            Negative                             U Cannab Scr              Negative                             U Cocaine Scr             Positive                             U Opiate Scr              Negative                             U Phencyclidine Scr       Negative                             Continuous Visual Observation             N/A                             Patient Location          Patient's room                             Patient Visitors          None                             Patient Safety Measures in Use            All items within reach                             Patient Position          Semi-Lockett's                             Elimination Assistance Offered            Independent    5/29/2018 18:30 CDT      WBC                       3.8 x10(3)/mcL  LOW                             RBC                       3.00 x10(6)/mcL  LOW                             Hgb                       9.2 gm/dL  LOW                             Hct                       29.2 %  LOW                             Platelet                   205 x10(3)/mcL                             MCV                       97.3 fL                             MCH                       30.7 pg                             MCHC                      31.5 gm/dL                             RDW                       15.0 %  HI                             MPV                       11.2 fL  HI                             Abs Neut                  2.40 x10(3)/mcL                             Segs Man                  61 %                             Band Man                  6 %                             Lymph Man                 9.0 %  LOW                             Monocyte Man              11 %  HI                             Eos Man                   6 %                             Basophil Man              2 %  HI                             Quinton Man                  3 %  HI                             Myelo Man                 1 %  NA                             Abn Lymph Man             1 %  HI                             Hypochrom                 1+                             Platelet Est              Adequate                             Anisocyte                 1+                             Poik                      1+                             Microcyte                 1+                             Polychrom                 1+                             RBC Morph                 Abnormal                             Schistocyte               1+                             Ovalocytes                1+                             PT                        13.5 second(s)                             INR                       1.10                             Sodium Lvl                143 mmol/L                             Potassium Lvl             3.7 mmol/L                             Chloride                  106 mmol/L                             CO2                       27 mmol/L                             Calcium Lvl               9.1 mg/dL                              Glucose Lvl               125 mg/dL  HI                             BUN                       13 mg/dL                             Creatinine                0.90 mg/dL                             eGFR-AA                   >105 mL/min                             eGFR-CLYDE                  92 mL/min                             Bili Total                0.2 mg/dL                             Bili Direct               <0.1 mg/dL                             Bili Indirect             unable to calc mg/dL                             AST                       24 unit/L                             ALT                       16 unit/L                             Alk Phos                  62 unit/L                             Total Protein             7.2 gm/dL                             Albumin Lvl               3.3 gm/dL  LOW                             Globulin                  3.90 gm/mL  HI                             A/G Ratio                 1 ratio                             Lipase Lvl                45 unit/L  LOW                             Total CK                  38 unit/L  LOW                             CK MB                     <1.0 ng/mL                             Continuous Visual Observation             N/A                             Patient Location          Patient's room                             Patient Visitors          None                             Patient Safety Measures in Use            All items within reach                             Patient Position          Semi-Lockett's                             Elimination Assistance Offered            Independent  .    Radiology results   Rad Results (ST)   Accession: [id number]  Order: CT Abdomen and Pelvis W Contrast  Report Dt/Tm: 05/29/2018 20:52  Report:      CT abdomen pelvis with contrast     Technique: Images of the abdomen and pelvis were obtained with  contrast.     Total DLP: 459.02 mGy cm      Indication: Abdominal  pain.     Comparison: CT abdomen pelvis 1/10/2018.     Findings:   The bilateral lung bases are clear. The heart is normal in size.     There are multiple hypodensities in the liver which are too small to  contrast, but likely represent simple cysts. These are unchanged from  the prior exam. The gallbladder is contracted. The spleen, pancreas,  and adrenal glands are normal. There is a simple cyst in the right  kidney. There is no hydronephrosis or nephrolithiasis.     The stomach and proximal small bowel are decompressed. There is short  segment of ileum which is dilated with fecalization. There is no  pneumatosis or perforation. The appendix is normal. The colon is  normal. The urinary bladder is normal. There is no pelvic or  retroperitoneal lymphadenopathy. There is no lytic or blastic osseous  lesion.     Impression:  Dilated short segment of ileum consistent with partial small bowel  obstruction versus ileus.            Impression and Plan   Abdominal pain  Diffuse large B-cell lymphoma  HIV disease  Anemia, iron deficiency  Atrial fibrillation  CAD s/p stenting  HTN  HLD  Cocaine use    Admit to telemetry for observation given his recent cardiac history.  He could be having recurrent lymphoma related pain versus constipation related.  His repeat imaging with complete response during chemotherapy argues against the former. Tonight's imaging does reveal significant stool burden throughout large and small bowel; labs/imaging not suggestive of infection. Will provide supportive care with analgesics, IVF, and bowel regimen.  We'll continue current home medications for other chronic medical conditions as listed in MAR with the exception of metoprolol given his cocaine positive UDS. Will notify ID & Oncology services of pt admit.

## 2022-04-30 NOTE — ED PROVIDER NOTES
Patient:   Jason Perude             MRN: [id number]            FIN: [id number]               Age:   58 years     Sex:  Male     :  1960   Associated Diagnoses:   Right ureteral stone; HOLLIS (acute kidney injury); Uncontrolled hypertension   Author:   Steven CHAWLA, Ibrahima WHIPPLE      Basic Information   Time seen: Immediately upon arrival.   History source: Patient.   Arrival mode: Private vehicle.   History limitation: None.   Additional information: Chief Complaint from Nursing Triage Note : Chief Complaint   2018 15:53 CDT       Chief Complaint           rt lower abd/groin pain, constant since 1400 hrs, vss, 100 fentanyl adm en route, 2 heart stents and hiv +.  .      History of Present Illness   The patient presents with abdominal pain.  The onset was 3  hours ago.  The course/duration of symptoms is constant.  The character of symptoms is achy.  The degree at onset was minimal.  The Location of pain at onset was right, lower and abdominal.  The degree at present is severe.  The Location of pain at present is right, lower and abdominal.  Radiating pain: none. The exacerbating factor is none.  The relieving factor is none.  Therapy today: emergency medical services and Fentanyl 100 mcg.  Risk factors consist of immunocompromised patient.  Associated symptoms: nausea, diarrhea, denies vomiting, denies fever and denies chills.  Additional history: sexual history: non-contributory.        Review of Systems   Constitutional symptoms:  No fever, no chills, no sweats, no weakness.    Skin symptoms:  No rash, no lesion.    Eye symptoms:  No recent vision problems,    Respiratory symptoms:  No shortness of breath, no orthopnea, no cough, no sputum production.    Cardiovascular symptoms:  No chest pain, no palpitations, no tachycardia, no syncope, no diaphoresis, no peripheral edema.    Gastrointestinal symptoms:  Negative except as documented in HPI, no vomiting, no diarrhea, no constipation, no rectal  bleeding.    Genitourinary symptoms:  No dysuria, no hematuria, no testicular pain.    Musculoskeletal symptoms:  No back pain, no Joint pain.    Neurologic symptoms:  No headache, no dizziness.              Additional review of systems information: All other systems reviewed and otherwise negative.      Health Status   Allergies:    Allergic Reactions (Selected)  Severity Not Documented  ACE inhibitors- Angioedema.,    Allergies (1) Active Reaction  ACE inhibitors Angioedema  .   Medications:  (Selected)   Inpatient Medications  Ordered  Heparin Flush 100 U/mL - 5 mL: 500 units, form: Injection, IV Push, Once-chemo, first dose 02/21/18 9:03:00 CST, stop date 02/21/18 9:03:00 CST, Days 1  Heparin Flush 100 U/mL - 5 mL: 500 units, form: Injection, IV Push, Once-chemo, first dose 04/26/18 9:44:00 CDT, stop date 04/26/18 9:44:00 CDT, Days 1  Lactated Ringers Infusion 1,000 mL: 1,000 mL, form: Infusion, IV, Once-NOW, Infuse over: 2 hr, first dose 09/05/18 16:04:00 CDT  Readi-Cat 2 (barium sulfate oral suspension): 450 mL, form: Susp, Oral, Once, first dose 09/05/18 16:05:00 CDT, stop date 09/05/18 16:05:00 CDT, STAT, Oral contrast administration for CT abdomen & pelvic exams.  Administer first bottle 1.5 hours prior to exam.  Administer second bottle 45 mins pr...  dexamethasone (for IVPB): 10 mg, IV Piggyback, Once-chemo, Infuse over: 20 minute(s), first dose 02/21/18 8:43:00 CST, stop date 02/21/18 8:43:00 CST, Days 1  morphine 2 mg/mL injectable solution: 4 mg, form: Soln, IV, Once-NOW, first dose 09/05/18 16:04:00 CDT, stop date 09/05/18 16:04:00 CDT, STAT  Future  Bicillin L-A 1,200,000 units/2 mL intramuscular suspension: 2.4 millionunits, form: Injection, IM, Once-Unscheduled, first dose 02/15/18 7:00:00 CST, Future Order  Prescriptions  Prescribed  Cardizem  mg/24 hours oral CAPsule, extended release: 360 mg = 1 cap(s), Oral, Daily, # 30 cap(s), 6 Refill(s), Pharmacy: [facility], [facility].  Descovy  200 mg-25 mg oral tablet: 1 tab(s), Oral, Daily, # 30 tab(s), 3 Refill(s), Pharmacy: [facility]  Ensure Plus: Ensure Plus, See Instructions, 3 cans oral daily  Chocolate & Strawberry flavors, # 96 EA, 3 Refill(s), Pharmacy: [facility]  Plavix 75 mg oral tablet: 75 mg = 1 tab(s), Oral, Daily, # 90 tab(s), 11 Refill(s)  Tivicay 50 mg oral tablet: 50 mg = 1 tab(s), Oral, Daily, # 30 tab(s), 3 Refill(s), Pharmacy: [facility]  aspirin 81 mg oral tablet, CHEWABLE: 81 mg = 1 tab(s), Oral, Daily, # 30 tab(s), 0 Refill(s)  atorvastatin 40 mg oral tablet: 40 mg = 1 tab(s), Oral, Daily, # 90 tab(s), 11 Refill(s)  metoprolol tartrate 50 mg oral tab: 50 mg = 1 tab(s), Oral, BID, # 180 tab(s), 11 Refill(s)  nitroglycerin 0.4 mg sublingual TAB: 0.4 mg = 1 tab(s), SL, q5min, PRN PRN for chest pain, # 1 bottle(s), 3 Refill(s)  Documented Medications  Documented  HYDROCODON-APAP 5-325:   HYDROXYZINE STACEY 50 MG CAP:   PREDNISONE 20 MG TAB:   docusate-senna 50 mg-8.6 mg oral tablet: 2 tab(s), Oral, Once a day (at bedtime), 0 Refill(s).      Past Medical/ Family/ Social History   Medical history:    Resolved  Kidney stone ([id number]):  Resolved.  Hypertension ([id number]):  Resolved.  HTN (hypertension) ([id number]):  Resolved.  Atrial fibrillation ([id number]):  Resolved.  HIV ([id number]):  Resolved..   Surgical history:    Catheter Insertion Mediport (None) on 3/5/2018 at 57 Years.  Comments:  3/5/2018 08:16 - Allie SAM, Dasia R.  auto-populated from documented surgical case  Biopsy Bone Marrow Aspiration (.) on 12/21/2017 at 57 Years.  Comments:  12/22/2017 13:03 - Juan MILES, Pao CAMEJO.  auto-populated from documented surgical case  Esophagogastroduodenoscopy on 12/15/2017 at 57 Years.  Comments:  12/15/2017 11:22 - Shashi SAM, Praveena CAMACHO  auto-populated from documented surgical case  Biopsy Gastrointestinal on 12/15/2017 at 57 Years.  Comments:  12/15/2017  11:22 - Praveena Danielle RN  auto-populated from documented surgical case  Colonoscopy on 12/15/2017 at 57 Years.  Comments:  12/15/2017 11:22 - Praveena Danielle RN.  auto-populated from documented surgical case  Colonoscopy ([id number]) on 12/15/2017 at 57 Years.  Comments:  3/20/2018 13:11 - Nay Hopkins MD  Performed by Dr. Mcadams while inpatient at [facility] in Dec. 2017. Next one due in 5 years per operative report.  Fracture ([id number]) in  at 20 Years.  left wrist repair.  cardiac stents placed..   Family history:    Primary malignant neoplasm of colon  Brother  Primary malignant neoplasm of lung  Father  Stroke  Sister  Hypertension.  Mother  Sister    Father  Mother  .   Social history: Alcohol use: Denies, Tobacco use: Regularly, Drug use: Denies.   Problem list:    Active Problems (11)  Acute disease or injury-related malnutrition   Atrial fibrillation   Back pain   CAD - Coronary artery disease   Constipation   constipationAnxiety   HIV   Hyperlipidemia   Hypertension   Lymphoma   Tobacco user   .      Physical Examination               Vital Signs   Vital Signs   2018 15:53 CDT       Temperature Oral          36.6 DegC                             Temperature Oral (calculated)             97.88 DegF                             Peripheral Pulse Rate     58 bpm  LOW                             Respiratory Rate          18 br/min                             SpO2                      100 %                             Oxygen Therapy            Room air                             Systolic Blood Pressure   170 mmHg  HI                             Diastolic Blood Pressure  101 mmHg  HI  .      Vital Signs (last 24 hrs)_____  Last Charted___________  Temp Oral     36.6 DegC  (SEP 05 15:53)  Heart Rate Peripheral   L 58bpm  (SEP 05 15:53)  Resp Rate         18 br/min  (SEP 05 15:53)  SBP      H 170mmHg  (SEP 05 15:53)  DBP      H 101mmHg  (SEP 05 15:53)  SpO2      100 %  (SEP 05  15:53)  Weight      76 kg  (SEP 05 15:53)  Height      180 cm  (SEP 05 15:53)  BMI      23.46  (SEP 05 15:53)  .   Measurements   9/5/2018 15:53 CDT       Weight Dosing             76 kg                             Weight Measured           76 kg                             Weight Measured and Calculated in Lbs     167.55 lb                             Height/Length Dosing      180 cm                             Height/Length Measured    180 cm                             Body Mass Index Measured  23.46 kg/m2  .   Basic Oxygen Information   9/5/2018 15:53 CDT       SpO2                      100 %                             Oxygen Therapy            Room air  .   General:  Alert, mild distress, anxious.    Skin:  Warm, dry, pink, intact, no pallor.    Head:  Normocephalic.   Neck:  Supple, no JVD.    Eye:  Pupils are equal, round and reactive to light, normal conjunctiva.    Ears, nose, mouth and throat:  Oral mucosa moist, no pharyngeal erythema or exudate.    Cardiovascular:  Regular rate and rhythm, No murmur, Normal peripheral perfusion, No edema.    Respiratory:  Lungs are clear to auscultation, respirations are non-labored, breath sounds are equal, Symmetrical chest wall expansion.    Gastrointestinal:  Soft, Non distended, Normal bowel sounds, No organomegaly, Tenderness: Moderate, right upper quadrant, right lower quadrant, Guarding: Minimal, voluntary, right lower quadrant, Rebound: Negative, Signs: McBurney's, Rovsing's, Psoas negative, Jain's negative.    Back:  Nontender, Normal range of motion.    Musculoskeletal:  Normal ROM, normal strength, no swelling.    Neurological:  Alert and oriented to person, place, time, and situation, No focal neurological deficit observed, normal motor observed, normal speech observed, normal coordination observed.    Psychiatric:  Cooperative, appropriate mood & affect.       Medical Decision Making   Results review:  Lab results : Lab View   9/5/2018 15:52 CDT       UA  Spec Grav              1.010                             UA pH                     8.0                             UA Nitrite                Negative                             UA Leuk Est               Negative                             UA RBC Interp             0-2                             UA Bact Interp            None Seen                             UA Squam Epi Interp                                    UA Amorph PO4             Few  , Lab results : Lab View   9/5/2018 16:33 CDT       CO2                       26 mmol/L                             Glucose Lvl               106 mg/dL                             BUN                       19 mg/dL  HI                             Creatinine                2.00 mg/dL  HI                             Alk Phos                  159 unit/L  HI                             WBC                       10.0 x10(3)/mcL                             Hgb                       13.7 gm/dL                             Hct                       43.1 %                             Platelet                  268 x10(3)/mcL  ,    No qualifying data available.    Radiology results:  Rad Results (ST)  < 12 hrs   Accession: [id number]  Order: CT Abdomen and Pelvis W/O Contrast  Report Dt/Tm: 09/05/2018 18:05  Report:   Clinical History:  Abdominal Pain     Technique:  Multidetector non-contrast axial CT images of the abdomen and pelvis  were obtained with coronal and sagittal reconstructions.      Automatic exposure control was utilized to reduce the patient's  radiation dose.     Comparison:  May 29, 2018     Findings:     01. HEPATOBILIARY: No focal hepatic lesion is identified, however  evaluation is limited secondary to lack of IV contrast. The  gallbladder is normal.      02. SPLEEN: Normal     03. PANCREAS: No focal masses or ductal dilatation.     04. ADRENALS: No adrenal nodules.     05. KIDNEYS: 6 mm right UVJ stone is noted with hydroureter and  mild-to-moderate  hydronephrosis. There is fluid surrounding the right  kidney anteriorly (series 2 image 40). Findings may be related to  inflammation, however renal pelvis perforation is not entirely  excluded.The left kidney demonstrates no stone, hydronephrosis, or  hydroureter. No focal mass identified.     06. LYMPHADENOPATHY/RETROPERITONEUM: There is no retroperitoneal  lymphadenopathy. The abdominal aorta is normal in course and caliber.      07. BOWEL: No acute bowel related abnormalities. No evidence of  appendiceal inflammation.     08. PELVIC VISCERA: The bladder wall is prominent. Cystitis is not  excluded. Correlate with urinalysis.     09. PELVIC LYMPH NODES: No lymphadenopathy.     10. PERITONEUM/ABDOMINAL WALL: No ascites or implant.     11. SKELETAL: No aggressive appearing lytic/blastic lesion. No acute  fractures, subluxations or dislocations.     12. LUNG BASES: The visualized lungs are unremarkable.     Impression  6 mm right UVJ stone is noted with hydroureter and mild-to-moderate  hydronephrosis. There is fluid surrounding the right kidney anteriorly  (series 2 image 40). Findings may be related to inflammation, however  renal pelvis perforation is not entirely excluded.     The bladder wall is prominent. Cystitis is not excluded. Correlate  with urinalysis.      .      Reexamination/ Reevaluation   Vital signs   Basic Oxygen Information   9/5/2018 15:53 CDT       SpO2                      100 %                             Oxygen Therapy            Room air        Impression and Plan   Diagnosis   Right ureteral stone ([id number] N20.1)   HOLLIS (acute kidney injury) (EBL99-FS N17.9)   Uncontrolled hypertension (EDN30-DP I10)   Plan   Condition: Stable.    Disposition: Transfer to other location: Time: 9/5/2018 18:35:00, Facility [facility]: [facility], Accepted by: Dr. Garcia, Pt Jason Cruzuton have a diagnosis of Rt obstructive ureteral stone with suspected renal pelvis perforation, HOLLIS, U-HTN requires evaluation  and management by urology services service. At this facility we do not have urology service capability for which will need to transfer to a facility with capability and capacity. Pt was informed about his condition and the recommendation of transfer for specialty care, Pt understood and agrees with transfer recommendation. This case was discuss with Dr. Garcia at [facility] in [place] who accepted the patient for transfer and assures capacity and capability for this emergency department case. Pt will be transfer by ([facility]) by (Ground Transportation) using (BLS Unit). Treatment in route will be NS at 200 ml/hr, cardiac monitor.  The risk of transfer are Motor Vehicle Accident, Respiratory Failure, Cardiac Dysrhythmias,  or Death.  The benefits of the transfer are adequate evaluation and management by a specialist in the area of urology.  At this time Pt is stable for transfer. .    Counseled: Patient, Regarding diagnosis, Regarding diagnostic results, Regarding treatment plan, Patient indicated understanding of instructions.

## 2022-04-30 NOTE — ED PROVIDER NOTES
Patient:   Jason Perdue             MRN: [id number]            FIN: [id number]               Age:   59 years     Sex:  Male     :  1960   Associated Diagnoses:   Chronic CHF; Pleural effusion on right   Author:   Jay Bazan MD      Basic Information   Time seen: Date & time 2019 04:51:00.   History source: Patient.   Arrival mode: Private vehicle.   History limitation: None.   Additional information: Chief Complaint from Nursing Triage Note : Chief Complaint   2019 4:45 CDT       Chief Complaint           cough/SOB x 3 days.  .   Provider/Visit info:   Time Seen:  Jay Bazan MD / 2019 04:43  .   History of Present Illness   The patient presents with difficulty breathing and cough.  The onset was 3  days ago.  The course/duration of symptoms is constant.  Degree at onset moderate.  Degree at present moderate.  The Exacerbating factors is none.  The Relieving factors is rest.  Risk factors consist of congestive heart failure and hypertension.  Prior episodes: occasional.  Therapy today: diuretic.  Associated symptoms: cough, denies chest pain, denies fever, denies nausea and denies vomiting.        Review of Systems   Constitutional symptoms:  No fever, no chills, no sweats, no weakness, no fatigue.    Skin symptoms:  No rash,    Eye symptoms:  No recent vision problems,    ENMT symptoms:  No sore throat,    Respiratory symptoms:  Negative except as documented in HPI.   Cardiovascular symptoms:  Negative except as documented in HPI.   Gastrointestinal symptoms:  No abdominal pain, no nausea, no vomiting.    Genitourinary symptoms:  No dysuria,    Musculoskeletal symptoms:  No back pain, no Muscle pain.    Neurologic symptoms:  No headache, no dizziness.              Additional review of systems information: All other systems reviewed and otherwise negative.      Health Status   Allergies:    Allergic Reactions (Selected)  Severity Not Documented  ACE inhibitors-  Angioedema.  Nitroglycerin Transdermal System- Itching.,    Allergies (2) Active Reaction  ACE inhibitors Angioedema  Nitroglycerin Transdermal System Itching.   Medications:  (Selected)   Inpatient Medications  Ordered  Heparin Flush 100 U/mL - 5 mL: 500 units, form: Injection, IV Push, Once-chemo, first dose 02/21/18 9:03:00 CST, stop date 02/21/18 9:03:00 CST, Days 1  Heparin Flush 100 U/mL - 5 mL: 500 units, form: Injection, IV Push, Once-chemo, first dose 04/26/18 9:44:00 CDT, stop date 04/26/18 9:44:00 CDT, Days 1  aspirin 81 mg oral tablet, CHEWABLE: 324 mg, form: Tab-Chew, Oral, Once, first dose 04/02/19 3:14:00 CDT, stop date 04/02/19 3:14:00 CDT, STAT, 4 chew tab = 5 grain ASA  dexamethasone (for IVPB): 10 mg, IV Piggyback, Once-chemo, Infuse over: 20 minute(s), first dose 02/21/18 8:43:00 CST, stop date 02/21/18 8:43:00 CST, Days 1  Prescriptions  Prescribed  Coreg 6.25 mg oral tablet: 6.25 mg = 1 tab(s), Oral, BID, # 60 tab(s), 3 Refill(s), Pharmacy: [facility] [employer], [place].  Descovy 200 mg-25 mg oral tablet: 1 tab(s), Oral, Daily, X 30 day(s), # 30 tab(s), 3 Refill(s), Pharmacy: [facility] [employer], [facility].  Lasix 20 mg oral tablet: 40 mg = 2 tab(s), Oral, Daily, # 60 tab(s), 4 Refill(s), Pharmacy: [facility] [employer], [facility].  Plavix 75 mg oral tablet: 75 mg = 1 tab(s), Oral, Daily, # 90 tab(s), 11 Refill(s)  Tivicay 50 mg oral tablet: 50 mg = 1 tab(s), Oral, Daily, X 30 day(s), # 30 tab(s), 3 Refill(s), [facility], [facility].  aspirin 81 mg oral tablet, CHEWABLE: 81 mg = 1 tab(s), Oral, Daily, # 30 tab(s), 6 Refill(s)  atorvastatin 20 mg oral tablet: 40 mg = 2 tab(s), Oral, Daily, # 60 tab(s), 0 Refill(s), Pharmacy: [facility], [facility].  spironolactone 25 mg oral tablet: 25 mg = 1 tab(s), Oral, Daily, # 30 tab(s), 3 Refill(s), Pharmacy: [facility], [facility].  Documented Medications  Documented  Vitamin C 500 mg oral tablet: 500 mg = 1 tab(s), Oral, Daily, # 30  tab(s), 0 Refill(s)  docusate-senna 50 mg-8.6 mg oral tablet: 2 tab(s), Oral, Once a day (at bedtime), 0 Refill(s).      Past Medical/ Family/ Social History   Medical history:    Resolved  Kidney stone ([id number]):  Resolved.  Hypertension ([id number]):  Resolved.  HTN (hypertension) ([id number]):  Resolved.  Atrial fibrillation ([id number]):  Resolved.  HIV ([id number]):  Resolved..   Surgical history:    Catheter Insertion Mediport (None) on 3/5/2018 at 57 Years.  Comments:  3/5/2018 08:16 - Dasia Kelley RN  auto-populated from documented surgical case  Biopsy Bone Marrow Aspiration (.) on 2017 at 57 Years.  Comments:  2017 13:03 - Pao Martinez LPN  auto-populated from documented surgical case  Esophagogastroduodenoscopy on 12/15/2017 at 57 Years.  Comments:  12/15/2017 11:22 - Praveena Danielle RN  auto-populated from documented surgical case  Biopsy Gastrointestinal on 12/15/2017 at 57 Years.  Comments:  12/15/2017 11:22 - Praveena Danielle RN  auto-populated from documented surgical case  Colonoscopy on 12/15/2017 at 57 Years.  Comments:  12/15/2017 11:22 - Praveena Danielle RN  auto-populated from documented surgical case  Colonoscopy ([id number]) on 12/15/2017 at 57 Years.  Comments:  3/20/2018 13:11 - Nay Hopkins MD  Performed by Dr. Mcadams while inpatient at [facility] in Dec. 2017. Next one due in 5 years per operative report.  Fracture ([id number]) in 1981 at 20 Years.  left wrist repair.  cardiac stents placed..   Family history:    Primary malignant neoplasm of colon  Brother  Primary malignant neoplasm of lung  Father  Stroke  Sister  Hypertension.  Mother  Sister    Father  Mother  .   Social history:    Social & Psychosocial Habits    Alcohol  2013 Risk Assessment: Denies Alcohol Use    2019  Use: Past    Type: Beer    Comment: over 20 years ag. - 2017 11:28 - Ida Williamson; quit over 20  years ago - 03/20/2019 14:52 - Jose MILESJo    04/21/2019  Use: Past    Employment/School  03/20/2018  Status: Unemployed    Home/Environment  03/20/2019  Lives with: Alone    Living situation: Home/Independent    Home equipment: Walker/Cane    Alcohol abuse in household: No    Substance abuse in household: No    Smoker in household: No    Feels unsafe at home: No    Safe place to go: Yes    Family/Friends available to help: Yes    Concern for family members at home: No    Major illness in household: No    Nutrition/Health  03/20/2018  Type of diet: Regular    Sleeping concerns: Yes    Feels highly stressed: No    Substance Abuse  04/18/2013 Risk Assessment: Denies Substance Abuse    03/20/2019  Use: Past    Type: Cocaine    Comment: REJI - 03/18/2019 13:40 - Roger Contreras RN    04/21/2019  Use: Current    Type: Cocaine    Frequency: 1-2 times per month    Tobacco  09/27/2015 Risk Assessment: High Risk    03/20/2019  Use: 10 or more cigarettes (1/    Type: Cigarettes    Patient Wants Consult For Cessation Counseling No    Ready to change: No    Smokeless tobacco use: Never    04/02/2019  Use: Former smoker, quit more    Patient Wants Consult For Cessation Counseling No    04/21/2019  Use: Former smoker, quit more    Type: Cigarettes    Patient Wants Consult For Cessation Counseling No.   Problem list:    Active Problems (13)  Acute disease or injury-related malnutrition   Atrial fibrillation   Back pain   CAD - Coronary artery disease   Constipation   constipationAnxiety   HIV   HIV disease   Hyperlipidemia   Hypertension   Impaired mobility   Lymphoma   Tobacco user .      Physical Examination               Vital Signs      Vital Signs (last 24 hrs)_____  Last Charted___________  Temp Oral     36.3 DegC  (APR 21 04:45)  Heart Rate Peripheral   91 bpm  (APR 21 04:45)  Resp Rate         18 br/min  (APR 21 04:45)  SBP      127 mmHg  (APR 21 04:45)  DBP      86 mmHg  (APR 21  04:45)  SpO2      100 %  (APR 21 04:45)  Weight      88.1 kg  (APR 21 04:45)  Height      180 cm  (APR 21 04:45)  BMI      27.19  (APR 21 04:45).   General:  Alert.   Skin:  Warm, dry.    Head:  Normocephalic.   Neck:  Supple, trachea midline, no tenderness, no JVD.    Eye:  Pupils are equal, round and reactive to light, extraocular movements are intact.    Ears, nose, mouth and throat:  Oral mucosa moist.   Cardiovascular:  Regular rate and rhythm, No murmur, Normal peripheral perfusion, No edema.    Respiratory:  Respirations are non-labored, breath sounds are equal, Symmetrical chest wall expansion, Breath sounds: Left, posterior, base(s), crackles present, Breath sounds: Right, posterior, middle lobe, base(s), diminished.    Chest wall:  No tenderness, right mediport.    Back:  Normal range of motion.   Musculoskeletal:  Normal ROM, no deformity.    Gastrointestinal:  Soft, Nontender, Non distended, Normal bowel sounds, No organomegaly.    Neurological:  Alert and oriented to person, place, time, and situation, No focal neurological deficit observed.       Medical Decision Making   Documents reviewed:  Emergency department nurses' notes.   Electrocardiogram:  Time 4/21/2019 04:54:00, rate 93, no ectopy, normal SD & QRS intervals, EP Interp, The Axis is rightward.  , STT segments Non specific changes.    Results review:  Lab results : Lab View   4/21/2019 5:10 CDT       Sodium Lvl                139 mmol/L                             Potassium Lvl             4.8 mmol/L                             Chloride                  111 mmol/L  HI                             CO2                       22 mmol/L                             Calcium Lvl               8.6 mg/dL                             Glucose Lvl               96 mg/dL                             BUN                       22 mg/dL  HI                             Creatinine                1.10 mg/dL                             eGFR-AA                   88  mL/min  LOW                             eGFR-CLYDE                  73 mL/min  LOW                             Bili Total                1.2 mg/dL  HI                             Bili Direct               0.4 mg/dL  HI                             Bili Indirect             0.8 mg/dL                             AST                       38 unit/L  HI                             ALT                       18 unit/L                             Alk Phos                  149 unit/L  HI                             Total Protein             8.1 gm/dL                             Albumin Lvl               3.3 gm/dL  LOW                             Globulin                  4.80 gm/mL  HI                             A/G Ratio                 0.7 ratio  LOW                             NT pro BNP.               6,622 pg/mL  HI                             Total CK                  98 unit/L                             CK MB                     2.1 ng/mL                             Troponin-I                0.042 ng/mL                             WBC                       7.7 x10(3)/mcL                             RBC                       4.27 x10(6)/mcL  LOW                             Hgb                       12.3 gm/dL  LOW                             Hct                       38.9 %  LOW                             Platelet                  244 x10(3)/mcL                             MCV                       91.1 fL                             MCH                       28.8 pg                             MCHC                      31.6 gm/dL                             RDW                       18.1 %  HI                             MPV                       10.9 fL  HI                             Abs Neut                  4.90 x10(3)/mcL                             Neutro Auto               64 x10(3)/mcL  NA                             Lymph Auto                20 %                             Mono Auto                 10 %                              Eos Auto                  6  NA                             Abs Eos                   0.48 x10(3)/mcL  NA                             Basophil Auto             0  NA                             Abs Neutro                4.90 x10(3)/mcL  NA                             Abs Lymph                 1.50 x10(3)/mcL  NA                             Abs Mono                  0.73 x10(3)/mcL  NA                             Abs Baso                  0.03 x10(3)/mcL  NA                             IG%                       0 %  NA                             IG#                       0.0200  NA  .   Radiology results:  04/21/2019 6:00; Beni CHAWLA, Jay CONTRERAS; Positive; moderate right pleural effusion.   Notes:  interval development of right pleural effusion.  will consult medicine team for admission..      Reexamination/ Reevaluation   Vital signs   results included from flowsheet : Vital Signs   4/21/2019 4:45 CDT       Temperature Oral          36.3 DegC                             Temperature Oral (calculated)             97.34 DegF                             Peripheral Pulse Rate     91 bpm                             Respiratory Rate          18 br/min                             SpO2                      100 %                             Oxygen Therapy            Room air                             Systolic Blood Pressure   127 mmHg                             Diastolic Blood Pressure  86 mmHg     Course: progressing as expected.   Pain status: decreased.   Assessment: exam unchanged.      Impression and Plan   Diagnosis   Chronic CHF ([id number] I50.9)   Pleural effusion on right ([id number])      Calls-Consults   -  4/21/2019 06:01:00 , Santo CHAWLA, Levar , consult.    Plan   Condition: Stable.    Disposition: Admit time  4/21/2019 06:01:00, Place in Observation Telemetry Unit.

## 2022-04-30 NOTE — ED PROVIDER NOTES
Patient:   Jason Perdue             MRN: [id number]            FIN: [id number]               Age:   59 years     Sex:  Male     :  1960   Associated Diagnoses:   None   Author:   Yaima Guzman      Basic Information   Time seen: Date & time 2019 01:15:00.   History source: Patient, medical records.   Arrival mode: Private vehicle, walking.   History limitation: None.   Additional information: Chief Complaint from Nursing Triage Note : Chief Complaint   2019 0:16 CDT        Chief Complaint           pt here for  cough, congestion and swollen feet x 2 weeks  .      History of Present Illness   The patient presents with cough and SOB.  The onset was 2-3 weeks ago.  The course/duration of symptoms is cough has been constant and fluctuating in intensity, SOB has been intermittent.  The degree at present is moderate.  There are exacerbating factors including exertion and lying flat.  The relieving factor is none.  Risk factors consist of hypertension, coronary artery disease, immunocompromised patient, recent hospitalization, HIV, recent pneumonia and CHF.  Prior episodes: frequent and multiple ED visits.  Therapy today: prescription medications and see nurses notes.  Associated symptoms: shortness of breath, productive cough clear, denies chest pain, denies nasal congestion, denies rhinorrhea, denies sore throat, denies ear ache, denies facial pain and denies headache.  Additional history: Pt is a 59-year-old male with PMHx of HTN, HLD, Afib not anticoagulated due to GI bleeding in , CHF with an EF of 20% in 2018, CAD s/p stents x2 in the left circumflex in 2017, HIV (last CD4 count was 306 in 2018), diffuse B-cell lymphoma in the setting of AIDS, and substance abuse; presents tonight with worsening cough over the past 2-3 weeks; states cough is only bothersome at night when he lays down; reports productive cough with clear to white sputum. Also notes intermittent SOB - no SOB  currently, though pt noted to be tachypnic. Pt denies any fever, chills, CP, wheezing, HA, dizziness, weakness, congestion, sore throat, sinus pain/pressure, ear pain. Pt also admitted on 3/18/19 for similar complaints, d/c'd home next day. Pt also reports some mild RLQ abd pain with dysuria for the past 2 days. No increased urinary frequency, N/V/D, hematuria, penile discharge, testicular pain or swelling.    .        Review of Systems   Constitutional symptoms:  No fever, no chills, no weakness, no fatigue.    Skin symptoms:  No rash, no breakdown.    Eye symptoms:  Vision unchanged, No pain,    ENMT symptoms:  No ear pain, no sore throat, no nasal congestion, no sinus pain.    Respiratory symptoms:  Shortness of breath, cough, no hemoptysis, no stridor, no wheezing, Sputum production: Clear, green.    Cardiovascular symptoms:  No chest pain, no palpitations.    Gastrointestinal symptoms:  Abdominal pain, no nausea, no vomiting.    Genitourinary symptoms:  Dysuria, no hematuria, no discharge, no testicular pain.    Musculoskeletal symptoms:  No bodyaches, No back pain,    Neurologic symptoms:  No headache, no dizziness, no numbness, no tingling, no weakness.       Health Status   Allergies:    Allergic Reactions (All)  Severity Not Documented  ACE inhibitors- Angioedema.  Nitroglycerin Transdermal System- Itching.  Canceled/Inactive Reactions (All)  No Known Allergies  No Known Medication Allergies,    Allergies (2) Active Reaction  ACE inhibitors Angioedema  Nitroglycerin Transdermal System Itching.   Medications:  (Selected)   Inpatient Medications  Ordered  Heparin Flush 100 U/mL - 5 mL: 500 units, form: Injection, IV Push, Once-chemo, first dose 02/21/18 9:03:00 CST, stop date 02/21/18 9:03:00 CST, Days 1  Heparin Flush 100 U/mL - 5 mL: 500 units, form: Injection, IV Push, Once-chemo, first dose 04/26/18 9:44:00 CDT, stop date 04/26/18 9:44:00 CDT, Days 1  Solumedrol IV push / IM: 125 mg, form: Injection, IM,  Once, first dose 04/02/19 1:20:00 CDT, stop date 04/02/19 1:20:00 CDT, STAT  albuterol 3 mg- ipratropium 0.5 mg/3 mL inhalation NEB solution: 3 mL, form: Soln, NEB, q15min, Order duration: 3 dose(s), first dose 04/02/19 1:20:00 CDT, stop date 04/02/19 2:04:00 CDT  dexamethasone (for IVPB): 10 mg, IV Piggyback, Once-chemo, Infuse over: 20 minute(s), first dose 02/21/18 8:43:00 CST, stop date 02/21/18 8:43:00 CST, Days 1  Prescriptions  Prescribed  Coreg 6.25 mg oral tablet: 6.25 mg = 1 tab(s), Oral, BID, # 60 tab(s), 3 Refill(s), Pharmacy: [facility], [facility].  Descovy 200 mg-25 mg oral tablet: 1 tab(s), Oral, Daily, X 30 day(s), # 30 tab(s), 3 Refill(s), Pharmacy: [facility], [facility].  Lasix 20 mg oral tablet: 40 mg = 2 tab(s), Oral, Daily, # 60 tab(s), 4 Refill(s), Pharmacy: [facility], [facility].  Plavix 75 mg oral tablet: 75 mg = 1 tab(s), Oral, Daily, # 90 tab(s), 11 Refill(s)  Tivicay 50 mg oral tablet: 50 mg = 1 tab(s), Oral, Daily, X 30 day(s), # 30 tab(s), 3 Refill(s), Pharmacy: [facility], [place].  aspirin 81 mg oral tablet, CHEWABLE: 81 mg = 1 tab(s), Oral, Daily, # 30 tab(s), 6 Refill(s)  atorvastatin 40 mg oral tablet: 40 mg = 1 tab(s), Oral, Daily, # 90 tab(s), 11 Refill(s)  spironolactone 25 mg oral tablet: 25 mg = 1 tab(s), Oral, Daily, # 30 tab(s), 3 Refill(s), Pharmacy: [facility], [facility].  Documented Medications  Documented  Vitamin C 500 mg oral tablet: 500 mg = 1 tab(s), Oral, Daily, # 30 tab(s), 0 Refill(s)  docusate-senna 50 mg-8.6 mg oral tablet: 2 tab(s), Oral, Once a day (at bedtime), 0 Refill(s).      Past Medical/ Family/ Social History   Medical history:    Resolved  Kidney stone ([id number]):  Resolved.  Hypertension (37096824):  Resolved.  HTN (hypertension) ([id number]):  Resolved.  Atrial fibrillation (15883688):  Resolved.  HIV ([id number]):  Resolved., Reviewed as documented in chart.    Surgical history:    Catheter Insertion Mediport (None) on 3/5/2018 at 57 Years.  Comments:  3/5/2018 08:16 - Allie SAM, Dasia WALKER  auto-populated from documented surgical case  Biopsy Bone Marrow Aspiration (.) on 2017 at 57 Years.  Comments:  2017 13:03 - Pao Martinez LPN  auto-populated from documented surgical case  Esophagogastroduodenoscopy on 12/15/2017 at 57 Years.  Comments:  12/15/2017 11:22 - Praveena Danielle RN  auto-populated from documented surgical case  Biopsy Gastrointestinal on 12/15/2017 at 57 Years.  Comments:  12/15/2017 11:22 - Praveena Danielle RN  auto-populated from documented surgical case  Colonoscopy on 12/15/2017 at 57 Years.  Comments:  12/15/2017 11:22 - Praveena Danielle RN  auto-populated from documented surgical case  Colonoscopy ([id number]) on 12/15/2017 at 57 Years.  Comments:  3/20/2018 13:11 - Nay Hopkins MD  Performed by Dr. Mcadams while inpatient at [facility] in Dec. 2017. Next one due in 5 years per operative report.  Fracture ([id number]) in 1981 at 20 Years.  left wrist repair.  cardiac stents placed., Reviewed as documented in chart.   Family history:    Primary malignant neoplasm of colon  Brother  Primary malignant neoplasm of lung  Father  Stroke  Sister  Hypertension.  Mother  Sister    Father  Mother  , Reviewed as documented in chart.   Social history:    Social & Psychosocial Habits    Alcohol  2013 Risk Assessment: Denies Alcohol Use    2019  Use: Past    Type: Beer    Comment: over 20 years ag. - 2017 11:28 - Ida Williamson; quit over 20 years ago - 2019 14:52 - Jo Garcia LPN    Employment/School  2018  Status: Unemployed    Home/Environment  2019  Lives with: Alone    Living situation: Home/Independent    Home equipment: Walker/Cane    Alcohol abuse in household: No    Substance abuse in household: No    Smoker in household: No    Feels unsafe at home: No    Safe  place to go: Yes    Family/Friends available to help: Yes    Concern for family members at home: No    Major illness in household: No    Nutrition/Health  03/20/2018  Type of diet: Regular    Sleeping concerns: Yes    Feels highly stressed: No    Substance Abuse  04/18/2013 Risk Assessment: Denies Substance Abuse    03/20/2019  Use: Past    Type: Cocaine    Comment: REJI - 03/18/2019 13:40 - Diane SAM, Noa' Jeff    Tobacco  09/27/2015 Risk Assessment: High Risk    03/20/2019  Use: 10 or more cigarettes (1/    Type: Cigarettes    Patient Wants Consult For Cessation Counseling No    Ready to change: No    Smokeless tobacco use: Never    04/02/2019  Use: Former smoker, quit more    Patient Wants Consult For Cessation Counseling No, Reviewed as documented in chart.   Problem list:    Active Problems (13)  Acute disease or injury-related malnutrition   Atrial fibrillation   Back pain   CAD - Coronary artery disease   Constipation   constipationAnxiety   HIV   HIV disease   Hyperlipidemia   Hypertension   Impaired mobility   Lymphoma   Tobacco user .      Physical Examination               Vital Signs   Vital Signs   4/2/2019 1:00 CDT        Peripheral Pulse Rate     93 bpm                             Respiratory Rate          40 br/min  HI                             SpO2                      99 %    4/2/2019 0:30 CDT        Peripheral Pulse Rate     98 bpm                             Respiratory Rate          26 br/min  HI                             Oxygen Therapy            Room air    4/2/2019 0:16 CDT        Temperature Oral          36.6 DegC                             Temperature Oral (calculated)             97.88 DegF                             Peripheral Pulse Rate     95 bpm                             Respiratory Rate          18 br/min                             SpO2                      100 %                             Oxygen Therapy            Room air                             Systolic Blood  Pressure   113 mmHg                             Diastolic Blood Pressure  87 mmHg  .      Vital Signs (last 24 hrs)_____  Last Charted___________  Temp Oral     36.6 DegC  (APR 02 00:16)  Heart Rate Peripheral   93 bpm  (APR 02 01:00)  Resp Rate         H 40br/min  (APR 02 01:00)  SBP      113 mmHg  (APR 02 00:16)  DBP      87 mmHg  (APR 02 00:16)  SpO2      99 %  (APR 02 01:00).   Basic Oxygen Information   4/2/2019 1:00 CDT        SpO2                      99 %    4/2/2019 0:30 CDT        Oxygen Therapy            Room air    4/2/2019 0:16 CDT        SpO2                      100 %                             Oxygen Therapy            Room air  .   General:  Alert, no acute distress, Non toxic appearing, though appears as if he is not feeling well.    Skin:  Warm, dry, intact, no pallor, no rash, normal for ethnicity.    Head:  Normocephalic, atraumatic.    Neck:  Supple, trachea midline.    Eye:  Extraocular movements are intact, normal conjunctiva, vision unchanged.    Ears, nose, mouth and throat:  Tympanic membranes clear, oral mucosa moist, Nose: Bilateral nares, normal, Sinus: Bilateral, frontal, maxillary, normal, no tenderness, Throat: mild pharyngeal erythema, no exudates, no petechiae, no tonsillar hypertrophy. Uvula midline.    Cardiovascular:  Regular rate and rhythm, No murmur, Normal peripheral perfusion, Edema: Bilateral, lower extremity, pedal, 1+, not pitting.    Respiratory:  Symmetrical chest wall expansion, Respirations: Tachypneic, no respiratory distress, though mildly labored respirations, Breath sounds: breath sounds are coarse, there is mild expiratory wheezing in the bilateral lung fields as well as crackles on the left , Cough: Moderate, Retractions: Mild, intercostal retractions.    Gastrointestinal:  Soft, Non distended, Normal bowel sounds, No organomegaly, Tenderness: Mild, right lower quadrant, Guarding: Negative, Rebound: Negative, Signs: McBurney's negative.    Back:  Nontender,  No CVA tenderness.    Musculoskeletal:  Normal ROM, normal strength.    Neurological:  Alert and oriented to person, place, time, and situation, No focal neurological deficit observed, CN II-XII intact, normal motor observed, normal speech observed.    Lymphatics:  No lymphadenopathy.   Psychiatric:  Cooperative, appropriate mood & affect.       Medical Decision Making   Differential Diagnosis:  Upper respiratory infection, viral syndrome, bronchitis, PNA, CHF exacerbation, opportunistic infection, UTI.    Documents reviewed:  Emergency department nurses' notes, prior records.    Orders  Launch Order Profile (Selected)   Inpatient Orders  Ordered  Aerosol Treatment: 19 1:20:00 CDT, 19 2:04:00 CDT, [id number]  Aerosol Treatment: 19 1:35:00 CDT, 19 1:35:00 CDT, [id number]  Cardiac Monitorin19 1:29:00 CDT, Constant Order  EKG Adult 12 Lead: 19 1:29:00 CDT, Stat, Once, 19 1:29:00 CDT, Ambulatory, Patient Has IV, Standard Precautions, -1, -1, 19 1:29:00 CDT  Peripheral IV Insertion: 19 1:38:00 CDT  Saline Lock Insert: 19 1:38:00 CDT, Stop date 19 1:38:00 CDT  albuterol 3 mg- ipratropium 0.5 mg/3 mL inhalation NEB solution: 3 mL, form: Soln, NEB, q15min, Order duration: 3 dose(s), first dose 19 1:20:00 CDT, stop date 19 2:04:00 CDT  Ordered (Exam Ordered)  XR Chest 2 Views: Stat, 19 1:20:00 CDT, Cough, None, Ambulatory, Rad Type, Not Scheduled, 19 1:20:00 CDT  Ordered (In-Lab)  BNP-Pro: Stat collect, 19 1:20:00 CDT, Blood, Stop date 19 1:20:00 CDT, Lab Collect, 19 1:20:00 CDT  CK MB: Stat collect, 19 1:29:00 CDT, Blood, Stop date 19 1:29:00 CDT, Lab Collect, 19 1:29:00 CDT  CK: Stat collect, 19 1:29:00 CDT, Blood, Stop date 19 1:29:00 CDT, Lab Collect, 19 1:29:00 CDT  CMP: Stat collect, 19 1:19:00 CDT, Blood, Once, Stop date 19 1:20:00 CDT, Lab Collect, Print  Label By Order Location, 04/02/19 1:19:00 CDT  Troponin-I: Stat collect, 04/02/19 1:29:00 CDT, Blood, Stop date 04/02/19 1:29:00 CDT, Lab Collect, 04/02/19 1:29:00 CDT  Completed  Automated Diff: Stat collect, 04/02/19 1:25:51 CDT, Blood, Collected, Stop date 04/02/19 1:25:00 CDT, Lab Collect, 04/02/19 1:19:00 CDT  CBC w/ Auto Diff: Stat collect, 04/02/19 1:19:00 CDT, Blood, Stop date 04/02/19 1:20:00 CDT, Lab Collect, 04/02/19 1:19:00 CDT  DuoNeb: 9 mL, form: Soln, NEB, Once, first dose 04/02/19 1:35:00 CDT, stop date 04/02/19 1:35:00 CDT, STAT  Solumedrol IV push / IM: 125 mg, form: Injection, IV Push, Now, first dose 04/02/19 1:40:00 CDT, stop date 04/02/19 1:40:00 CDT, STAT  Discontinued  Solumedrol IV push / IM: 125 mg, form: Injection, IM, Once, first dose 04/02/19 1:20:00 CDT, stop date 04/02/19 1:20:00 CDT, STAT.      Impression and Plan   Impression   Plan   Disposition: Patient care transitioned to: Time: 4/2/2019 02:00:00, Beni CHAWLA, Jay CONTRERAS, pending lab and imaging results, pending breathing tx.    Prescriptions   Orders

## 2022-04-30 NOTE — ED PROVIDER NOTES
"   Patient:   Jason Perdue             MRN: [id number]            FIN: [id number]               Age:   58 years     Sex:  Male     :  1960   Associated Diagnoses:   CHF exacerbation; Pneumonia involving right lung; Non-ST elevation MI (NSTEMI)   Author:   Jefferson Bustos MD      Basic Information   Time seen: Date & time 2018 07:19:00.   History source: Patient.   Arrival mode: Ambulance.   History limitation: None.   Additional information: Chief Complaint from Nursing Triage Note : Chief Complaint   2018 7:18 CST      Chief Complaint           Pt presents dry cough yesterday and reports SOB this AM. Was recently treated for Bronchitis. Bilateral leg swelling since monday.  .      History of Present Illness   The patient presents with difficulty breathing.  The onset was "this morning".  The course/duration of symptoms is constant.  Degree at onset moderate.  Degree at present mild.  The Exacerbating factors is lying flat.  The Relieving factors is none.  Risk factors consist of hypertension.  Associated symptoms: cough, Bilateral Leg Edema, denies chest pain, denies fever, denies chills, denies nausea, denies vomiting, denies abdominal pain and denies back pain.  Additional history: Patient complaining of dry cough and lower leg edema x2 days. States he woke up with SOB this morning.        Review of Systems   Constitutional symptoms:  No fever, no chills.    Skin symptoms:  No rash,    Eye symptoms:  Vision unchanged.   ENMT symptoms:  No nasal congestion,    Respiratory symptoms:  Shortness of breath, cough.    Cardiovascular symptoms:  Peripheral edema, No chest pain,    Gastrointestinal symptoms:  No abdominal pain,    Genitourinary symptoms:  No dysuria,    Musculoskeletal symptoms:  No back pain,    Neurologic symptoms:  No headache,    Endocrine symptoms:  No polyuria,    Allergy/immunologic symptoms:  No recurrent infections,       Health Status   Allergies:    Allergic Reactions " (Selected)  Severity Not Documented  ACE inhibitors- Angioedema..   Medications: Per nurse's notes.      Past Medical/ Family/ Social History   Medical history: Reviewed as documented in chart.   Surgical history: Reviewed as documented in chart.   Social history: Reviewed as documented in chart.      Physical Examination               Vital Signs   Vital Signs   12/26/2018 7:18 CST      Temperature Oral          36.7 DegC                             Temperature Oral (calculated)             98.06 DegF                             Peripheral Pulse Rate     92 bpm                             Respiratory Rate          20 br/min                             SpO2                      99 %                             Oxygen Therapy            Room air                             Systolic Blood Pressure   135 mmHg                             Diastolic Blood Pressure  108 mmHg  HI  .   General:  Alert, no acute distress.    Skin:  Warm, dry, intact, no rash.    Head:  Normocephalic, atraumatic.    Neck:  Supple, trachea midline, JVD present.    Eye:  Extraocular movements are intact.   Ears, nose, mouth and throat:  Oral mucosa moist.   Cardiovascular:  Regular rate and rhythm, Normal peripheral perfusion, Edema: Bilateral, lower extremity, 2+, pitting.    Respiratory:  Respirations are non-labored, Symmetrical chest wall expansion, Breath sounds: Crackles in bases bilaterally, Coarse breath sounds in RML/RLL.    Chest wall:  No tenderness.   Musculoskeletal:  Normal ROM, normal strength.    Gastrointestinal:  Soft, Nontender, Non distended, Normal bowel sounds, No organomegaly.    Neurological:  Alert and oriented to person, place, time, and situation, No focal neurological deficit observed, normal speech observed.    Psychiatric:  Cooperative, appropriate mood & affect.       Medical Decision Making   Documents reviewed:  Emergency department nurses' notes, emergency department records, prior records.    Orders  Launch  Order Profile (Selected)   Inpatient Orders  Ordered (In-Lab)  Blood Culture: 12/26/18 8:53:09 CST, STAT collect, BLOOD, AC R, Collected, Stop date 12/26/18 8:53:00 CST  Blood Culture: 12/26/18 9:02:42 CST, STAT collect, BLOOD, AC L, Collected, Stop date 12/26/18 9:02:00 CST  Completed  Lasix: 40 mg, form: Injection, IV Push, Once-NOW, first dose 12/26/18 8:32:00 CST, stop date 12/26/18 8:32:00 CST, STAT  levofloxacin IVPB ( Levaquin ): 750 mg, form: Infusion, IV Piggyback, Once-NOW, Infuse over: 1.5 hr, first dose 12/26/18 8:39:00 CST, stop date 12/26/18 8:39:00 CST, STAT  .   Electrocardiogram:  Time 12/26/2018 07:36:00, rate 86, EP Interp, Sinus rhythm, No axis deviation, T-wave inversions in V3-V6, No STEMI.    Results review:  Lab results : Lab View   12/26/2018 7:35 CST      Sodium Lvl                141 mmol/L                             Potassium Lvl             3.7 mmol/L                             Chloride                  109 mmol/L  HI                             CO2                       26 mmol/L                             Calcium Lvl               8.6 mg/dL                             Glucose Lvl               119 mg/dL  HI                             BUN                       23 mg/dL  HI                             Creatinine                1.00 mg/dL                             eGFR-AA                   99 mL/min                             eGFR-CLYDE                  82 mL/min  LOW                             Bili Total                0.6 mg/dL                             Bili Direct               0.2 mg/dL                             Bili Indirect             0.4 mg/dL                             AST                       18 unit/L                             ALT                       13 unit/L                             Alk Phos                  81 unit/L                             Total Protein             7.7 gm/dL                             Albumin Lvl               3.4 gm/dL                              Globulin                  4.30 gm/mL  HI                             A/G Ratio                 1 ratio                             NT pro BNP.               9,620 pg/mL  HI                             Total CK                  95 unit/L                             CK MB                     2.0 ng/mL                             Troponin-I                0.096 ng/mL  HI                             PT                        15.0 second(s)  HI                             INR                       1.19                             PTT                       34.9 second(s)                             WBC                       6.4 x10(3)/mcL                             RBC                       3.48 x10(6)/mcL  LOW                             Hgb                       10.2 gm/dL  LOW                             Hct                       33.3 %  LOW                             Platelet                  249 x10(3)/mcL                             MCV                       95.7 fL                             MCH                       29.3 pg                             MCHC                      30.6 gm/dL  LOW                             RDW                       16.3 %  HI                             MPV                       10.2 fL                             Abs Neut                  4.49 x10(3)/mcL                             Neutro Auto               70 x10(3)/mcL  NA                             Lymph Auto                18 %                             Mono Auto                 10 %                             Eos Auto                  2  NA                             Abs Eos                   0.12 x10(3)/mcL  NA                             Basophil Auto             0  NA                             Abs Neutro                4.49 x10(3)/mcL  NA                             Abs Lymph                 1.12 x10(3)/mcL  NA                             Abs Mono                  0.61 x10(3)/mcL  NA                              Abs Baso                  0.01 x10(3)/mcL  NA                             IG%                       0 %  NA                             IG#                       0.0200  NA    .     CXR  FINDINGS:  Right chest IJ port tip projects over the SVC.  Patchy right middle and lower lobe airspace opacities.  Otherwise no focal consolidations, pleural effusions or pneumothoraces.  Enlarged cardiac silhouette without overt decompensation. Tortuous thoracic aorta.  No acute bony pathology.  Soft tissues within normal limits.       IMPRESSION:  New right middle and lower lobe airspace opacities may be infectious/inflammatory. Consider follow-up chest radiograph 6-8 weeks post therapy to document clearing and exclude underlying pathology.      Reexamination/ Reevaluation   Time: 12/26/2018 08:15:00 .   Vital signs   results included from flowsheet : Vital Signs   12/26/2018 8:22 CST      Peripheral Pulse Rate     88 bpm                             Heart Rate Monitored      88 bpm                             Respiratory Rate          17 br/min                             SpO2                      98 %                             Oxygen Therapy            Room air                             Systolic Blood Pressure   135 mmHg                             Diastolic Blood Pressure  109 mmHg  HI                             Mean Arterial Pressure, Cuff              118 mmHg     Course: progressing as expected.   Assessment: exam unchanged.   Notes: Patient with no history of CHF. Last Echo in February 2018 w/ EF of 72%..      Procedure   Critical care note   Total time: 30 minutes spent engaged in work directly related to patient care and/ or available for direct patient care.   Critical condition(s) addressed for impending deterioration include: respiratory, cardiovascular.   Management: bedside assessment, supervision of care, Interpretation (chest x-ray, electrocardiogram, blood pressure), Interventions hemodynamic  management.   Performed by: self.      Impression and Plan   Diagnosis   CHF exacerbation (YYG24-LB I50.9)   Pneumonia involving right lung (ZQQ75-LM J18.9)   Non-ST elevation MI (NSTEMI) (AVL51-QA I21.4)      Calls-Consults   -  12/26/2018 08:28:00 , Reagan CHAWLA, HEIDE Vasquez On-Call, consult, Will come evaluate patient in ER.    Plan   Disposition: Admit to Inpatient Telemetry Unit.    Counseled: Patient, Regarding diagnosis, Regarding diagnostic results, Regarding treatment plan, Patient indicated understanding of instructions.

## 2022-04-30 NOTE — CONSULTS
"   Patient:   Jason Perdue             MRN: [id number]            FIN: [id number]               Age:   58 years     Sex:  Male     :  1960   Associated Diagnoses:   None   Author:   Jaja CHAWLA, Vitor WHIPPLE      Basic Information   Source of history:  Self.       Chief Complaint   Chest pain, Radiates to left neck      History of Present Illness   This is a 58-year-old male known to [facility] by [facility] cardiology.  He presents to the ER with complaints of chest pain with radiation to the neck, headache, and "my legs were hurting". Symptoms occured for 1 hour last night starting at 2230. His chest was tender to touch. He fell asleep without issues and this morning when he woke up he "felt great" so went to work. At work, at 1000, he again started having similar symptoms with legs hurting, HA, and chest pains which he said radiated into his left neck for 3-4 minutes, but have otherwise been present in the center of his chest without relief. He came to the ER at 1100 and continues to have chest pain at 3/10. He states that his chest wall is tender. He denies SOB. Denies palpitations. He completed chemo for lymphoma in . [facility] is been consulted for cardiac evaluation regarding chest pain.      Review of Systems   Constitutional:  Weakness, No fever.    Eye:  No recent visual problem, No blurring.    Ear/Nose/Mouth/Throat:  No decreased hearing, No nasal congestion.    Respiratory:  No shortness of breath, No cough.    Cardiovascular:  Chest pain, No palpitations.    Gastrointestinal:  No nausea, No vomiting.    Genitourinary:  No dysuria, No hematuria.    Hematology/Lymphatics:  No bruising tendency, No bleeding tendency.    Endocrine:  No excessive thirst, No excessive hunger.    Musculoskeletal:  No claudication, No trauma.    Integumentary:  No rash, No skin lesion.    Neurologic:  Alert and oriented X4, Headache.    Psychiatric:  No anxiety, No depression.    All other systems are negative      Health Status   Current " medications:    No qualifying data available        Histories   Past Medical History:    Resolved  Kidney stone ([id number]):  Resolved.  Hypertension (99816045):  Resolved.  HTN (hypertension) ([id number]):  Resolved.  Atrial fibrillation (62575053):  Resolved.  HIV (81645314):  Resolved., CAD s/p PCI for NSTEMI (2017), cocaine abuse hx   Family History:    Primary malignant neoplasm of colon  Brother  Primary malignant neoplasm of lung  Father  Stroke  Sister  Hypertension.  Mother  Sister    Father  Mother     Procedure history:    cardiac stents placed.   Social History        Social & Psychosocial Habits    Alcohol  2013 Risk Assessment: Denies Alcohol Use    2017  Use: Past    Type: Beer    Comment: over 20 years ag. - 2017 11:28 - Ida Williamson    Employment/School  2018  Status: Unemployed    Home/Environment  2018  Lives with: Siblings    Living situation: Home/Independent    Home equipment: Walker/Cane    Alcohol abuse in household: No    Substance abuse in household: No    Smoker in household: No    Feels unsafe at home: No    Safe place to go: Yes    Family/Friends available to help: Yes    Concern for family members at home: No    Major illness in household: No    Nutrition/Health  2018  Type of diet: Regular    Sleeping concerns: Yes    Feels highly stressed: No    Substance Abuse  2013 Risk Assessment: Denies Substance Abuse    2018  Use: Past    Type: crack    Comment: 2 days - 2017 18:14 - Janessa Mccann RN; patient states quit in 2018 - 2018 14:20 - Maldonado Johnson LPNetta    2018  Use: Current    Type: Cocaine    Comment: used 2 days ago - 2018 00:06 - Mya Hamm RN    Tobacco  2015 Risk Assessment: High Risk    2017  Use: Former smoker    Type: Cigarettes    Tobacco use per day: 20  .        Physical Examination   General:  Alert and  oriented, No acute distress.    Eye:  Extraocular movements are intact, Normal conjunctiva.    HENT:  Normocephalic, Oral mucosa is moist.    Neck:  Supple, No jugular venous distention, normal ROM.    Respiratory:  Lungs are clear to auscultation, Respirations are non-labored.    Cardiovascular:  Normal rate, Regular rhythm, No murmur, No edema.    Gastrointestinal:  Non-distended.       Vital Signs (last 24 hrs)_____  Last Charted___________  Temp Oral     36.8 DegC  (AUG 20 11:04)  Heart Rate Peripheral   60 bpm  (AUG 20 15:25)  Resp Rate         20 br/min  (AUG 20 15:35)  SBP      112 mmHg  (AUG 20 15:25)  DBP      72 mmHg  (AUG 20 15:25)  SpO2      98 %  (AUG 20 15:35)     Musculoskeletal:  No swelling, No deformity.    Integumentary:  Intact, No rash.    Neurologic:  Alert, Oriented.    Psychiatric:  Cooperative, Appropriate mood & affect.       Review / Management   Results review:                     AUG 20 11:22                     \   L 11.1    /             5.1  _______  295                     /   L 34.8    \.    Laboratory Results   Last 5 Days Lab Results : PowerNote Discrete Results   8/20/2018 13:56 CDT      POC Troponin              0.04 ng/mL    8/20/2018 11:23 CDT      POC Troponin              0.04 ng/mL    8/20/2018 11:22 CDT      WBC                       5.1 x10(3)/mcL                             RBC                       3.72 x10(6)/mcL  LOW                             Hgb                       11.1 gm/dL  LOW                             Hct                       34.8 %  LOW                             Platelet                  295 x10(3)/mcL                             MCV                       93.5 fL                             MCH                       29.8 pg                             MCHC                      31.9 gm/dL  LOW                             RDW                       14.5 %                             MPV                       9.5 fL                             Abs Neut                   2.58 x10(3)/mcL                             Neutro Auto               51 %  NA                             Lymph Auto                23 %  NA                             Mono Auto                 12 %  NA                             Eos Auto                  12 %  NA                             Abs Eos                   0.6 x10(3)/mcL                             Basophil Auto             1 %  NA                             Abs Neutro                2.58 x10(3)/mcL                             Abs Lymph                 1.2 x10(3)/mcL                             Abs Mono                  0.6 x10(3)/mcL                             Abs Baso                  0.0 x10(3)/mcL                             IG%                       2.400 %  HI                             IG#                       0.1200 %  HI                             PT                        10.3 second(s)                             INR                       0.98  LOW                             Sodium Lvl                141 mmol/L                             Potassium Lvl             4.0 mmol/L                             Chloride                  103 mmol/L                             CO2                       26.3 mmol/L                             Calcium Lvl               8.8 mg/dL                             Magnesium Lvl             2.3 mg/dL                             Glucose Lvl               108 mg/dL  HI                             BUN                       17.1 mg/dL                             Creatinine                1.21 mg/dL                             eGFR-AA                   >60 mL/min/1.73 m2  NA                             eGFR-CLYDE                  >60 mL/min/1.73 m2  NA                             Bili Total                0.2 mg/dL                             Bili Direct               0.04 mg/dL                             Bili Indirect             0.16 mg/dL                             AST                       23  unit/L                             ALT                       14 unit/L                             Alk Phos                  85 unit/L                             Total Protein             7.6 gm/dL                             Albumin Lvl               3.40 gm/dL                             Globulin                  4.20 gm/dL  HI                             A/G Ratio                 0.8 ratio  LOW                             Total CK                  78 unit/L                             CK MB                     1.4 ng/mL        Radiology results   Rad Results (ST)   Accession: [id number]  Order: XR Chest 1 View  Report Dt/Tm: 08/20/2018 11:54  Report:   PROCEDURE  XR Chest 1 View     INDICATION  Chest Pain     Comparison: May 29, 2018     FINDINGS  Lines/tubes/devices: Right chest wall port is in stable position.     The cardiomediastinal silhouette and central pulmonary vasculature are  unremarkable for AP projection.   The trachea is midline. There is no lobar consolidation, pleural  effusion, or pneumothorax.   No acute osseous abnormality is identified.     IMPRESSION  No acute or new focal cardiopulmonary process.      , Rad Results Last 7 days   Accession: [id number]  Order: XR Chest 1 View  Report Dt/Tm: 08/20/2018 11:54  Report:   PROCEDURE  XR Chest 1 View     INDICATION  Chest Pain     Comparison: May 29, 2018     FINDINGS  Lines/tubes/devices: Right chest wall port is in stable position.     The cardiomediastinal silhouette and central pulmonary vasculature are  unremarkable for AP projection.   The trachea is midline. There is no lobar consolidation, pleural  effusion, or pneumothorax.   No acute osseous abnormality is identified.     IMPRESSION  No acute or new focal cardiopulmonary process.      , Rad Results Last 10 Days   Accession: [id number]  Order: XR Chest 1 View  Report Dt/Tm: 08/20/2018 11:54  Report:   PROCEDURE  XR Chest 1 View     INDICATION  Chest Pain     Comparison: May 29,  2018     FINDINGS  Lines/tubes/devices: Right chest wall port is in stable position.     The cardiomediastinal silhouette and central pulmonary vasculature are  unremarkable for AP projection.   The trachea is midline. There is no lobar consolidation, pleural  effusion, or pneumothorax.   No acute osseous abnormality is identified.     IMPRESSION  No acute or new focal cardiopulmonary process.         Condition:  Stable.      Echo February 2018  EF 70%  Mild MR         Impression and Plan   Atypcal chest Pain   Abnormal ECG  HTN  CAD s/p PCI  Diffuse large B-cell lymphoma - chemo completed  HIV disease  AFIB -currently NSR   hx of Cocaine use     Recs:   Resume home medications including clopidogrel, aspirin, atorvastatin and beta blocker.  ACS has been excluded by cardiac enzymes. Initial ECG shows lateral TW inversions. TW have been flat previously. No repeat ECG available. Recommend repeating ECG and if stable then likely no acute intervention indicated especially given atypical, reprodicible chest pain.  Recommend urine drug screen given recent h/o cocaine use.  PE should be considered given h/o malignancy although exam and vitals not consistent.  If repeat ECG unchanged, then recommend f/u with cardiology at [facility] and consideration needs to be made for non-cardiac etiology of chest pain (musculoskeletal most likely given reproducibility).

## 2022-04-30 NOTE — ED PROVIDER NOTES
Patient:   Jason Perdue             MRN: [id number]            FIN: [id number]               Age:   57 years     Sex:  Male     :  1960   Associated Diagnoses:   Tendinitis of left rotator cuff   Author:   Julian Roche MD      Basic Information   Time seen: Date & time 2017 17:35:00, Immediately upon arrival.   History source: Patient.   Arrival mode: Private vehicle.   History limitation: None.   Additional information: Chief Complaint from Nursing Triage Note : Chief Complaint   2017 17:17 CDT      Chief Complaint           left arm pain started monday when he woke up shoulder and upper arm hurts more when he moves or touches it  .      History of Present Illness   The patient presents with left, shoulder pain, h/o left shoulder pain in past.  The onset was 2  days ago.  The course/duration of symptoms is constant.  Type of injury: none.  The character of symptoms is pain.  The degree of pain is moderate.  The degree of swelling is none.  The exacerbating factor is movement.  The relieving factor is none.  Risk factors consist of none.  Prior episodes: none.  Therapy today: none.  Associated symptoms: none.        Review of Systems   Constitutional symptoms:  Negative except as documented in HPI.   Skin symptoms:  Negative except as documented in HPI.   Eye symptoms:  Negative except as documented in HPI.   ENMT symptoms:  Negative except as documented in HPI.   Respiratory symptoms:  Negative except as documented in HPI.   Cardiovascular symptoms:  Negative except as documented in HPI.   Gastrointestinal symptoms:  Negative except as documented in HPI.   Musculoskeletal symptoms:  Negative except as documented in HPI.   Neurologic symptoms:  Negative except as documented in HPI.             Additional review of systems information: All other systems reviewed and otherwise negative.      Health Status   Allergies:    Allergic Reactions (Selected)  Severity Not Documented  ACE inhibitors-  Angioedema.,    Allergies (1) Active Reaction  ACE inhibitors Angioedema  .   Medications:  (Selected)   Prescriptions  Prescribed  amlodipine 5 mg oral tablet: 5 mg = 1 tab(s), Oral, Daily, # 30 tab(s), 1 Refill(s)  Documented Medications  Documented  CEPHALEXIN 500 MG CAPSULE: .      Past Medical/ Family/ Social History   Medical history:    Active  HTN (hypertension) ([id number])  Resolved  Kidney stone ([id number]):  Resolved..   Surgical history:    Fracture (SNOMED CT [id number]) in 1981 at 21 Years..   Family history:    No family history items have been selected or recorded..   Social history: Not significant.      Physical Examination               Vital Signs   Vital Signs   5/30/2017 17:17 CDT      Temperature Oral          37.0 DegC                             Peripheral Pulse Rate     94 bpm                             Respiratory Rate          20 br/min                             SpO2                      100 %                             Oxygen Therapy            Room air                             Systolic Blood Pressure   165 mmHg  HI                             Diastolic Blood Pressure  96 mmHg  HI  .   Measurements   5/30/2017 17:17 CDT      Weight Dosing             78 kg                             Weight Measured           78 kg                             Weight Measured and Calculated in Lbs     171.96 lb  .   Basic Oxygen Information   5/30/2017 17:17 CDT      SpO2                      100 %                             Oxygen Therapy            Room air  .   General:  Alert, no acute distress.    Skin:  Warm, dry, pink.    Head:  Normocephalic, atraumatic.    Neck:  Supple.   Eye:  Pupils are equal, round and reactive to light, extraocular movements are intact.    Cardiovascular:  Regular rate and rhythm, No murmur.    Respiratory:  Lungs are clear to auscultation.   Musculoskeletal:  Normal ROM, normal strength, Proximal upper extremity:  Left, shoulder, aligned, tenderness, range of motion (on abduction, on internal rotation, restricted by pain), no swelling, no erythema, no ecchymosis.    Chest wall   Gastrointestinal:  Non distended.   Neurological:  Alert and oriented to person, place, time, and situation, No focal neurological deficit observed, CN II-XII intact, normal sensory observed, normal motor observed, normal speech observed, normal coordination observed.    Psychiatric:  Cooperative.      Medical Decision Making   Radiology results:  X-ray, L shoulder, reveals no acute disease process.       Impression and Plan   Diagnosis   Tendinitis of left rotator cuff ([id number] M75.82)   Plan   Condition: Stable.    Disposition: Medically cleared, Discharged: Time  5/30/2017 18:05:00, to home.    Prescriptions: Launch prescriptions   Pharmacy:  nabumetone 750 mg oral tablet (Prescribe): 750 mg = 1 tab(s), Oral, BID, PRN PRN pain, X 10 day(s), # 20 tab(s), 0 Refill(s).    Patient was given the following educational materials: Shoulder Pain.    Follow up with: No No PCP; Anytime the conditions worsen, return to clinic or go to ED; Follow up with an Othopedic Surgeon , only if needed.    Counseled: Patient, Regarding diagnosis, Regarding diagnostic results, Regarding treatment plan, Regarding prescription, Patient indicated understanding of instructions.

## 2022-04-30 NOTE — ED PROVIDER NOTES
Patient:   Jason Perdue             MRN: [id number]            FIN: [id number]               Age:   60 years     Sex:  Male     :  1960   Associated Diagnoses:   None   Author:   Mercedez CHAWLA, Ralf Mckeon      Basic Information   Time seen: Date & time 2020 10:08:00.   History source: Patient, EMS.   Arrival mode: Ambulance.   History limitation: None.   Additional information: Chief Complaint from Nursing Triage Note : Chief Complaint   2020 9:27 CST      Chief Complaint           PT WEARING MASK W CO WEAKNESS, CHILLS W FEVER, BILAT LOWER LEG PAIN AND SWELLING X 2 DAYS.  DENIES SOB NO COUGH OR CP REPORTED.  PT HX OF LYMPHOMA, HIV AND CAD.  EKG OBTAINED.  .      History of Present Illness   The patient presents with lower extremity pain.  He has an underlying history of lymphoma in remission, HIV well controlled with medications, tobacco use, actual fibrillation, coronary disease, hypertension, others as listed.  Has been in stable state of health recently.  Right upper chest Mediport, not used or flushed in the last 3 years.  Onset overnight right greater than left lower leg pain, redness, tenderness, swelling, associated with chills and fever.  No cough, dyspnea, chest pain, urinary complaints, nausea, vomiting, diarrhea, abdominal pain, or other specific complaints.  Noted febrile on arrival..        Review of Systems   Constitutional symptoms:  Fever, chills, No weakness,    Skin symptoms:  Negative except as documented in HPI, R > L LE redness/ tenderness as outlined., no rash, no breakdown.    Eye symptoms:  Negative except as documented in HPI, no recent vision problems, no pain.    ENMT symptoms:  Negative except as documented in HPI, no ear pain, no sore throat.    Respiratory symptoms:  Negative except as documented in HPI, no shortness of breath, no cough, no wheezing.    Cardiovascular symptoms:  Negative except as documented in HPI, no chest pain, no palpitations, no syncope.     Gastrointestinal symptoms:  Negative except as documented in HPI, no abdominal pain, no nausea, no vomiting, no diarrhea.    Genitourinary symptoms:  Negative except as documented in HPI, no dysuria, no hematuria.    Musculoskeletal symptoms:  Negative except as documented in HPI, no Muscle pain, no Joint pain.    Neurologic symptoms:  Negative except as documented in HPI, no altered level of consciousness, no weakness.    Psychiatric symptoms:  Negative except as documented in HPI, no anxiety, no depression.    Endocrine symptoms:  Negative except as documented in HPI, no polyuria, no polydipsia.    Hematologic/Lymphatic symptoms:  Negative except as documented in HPI, bleeding tendency negative, bruising tendency negative.    Allergy/immunologic symptoms:  Negative except as documented in HPI, no recurrent infections, no impaired immunity.              Additional review of systems information: All other systems reviewed and otherwise negative, All systems reviewed as documented in chart.      Health Status   Allergies:    Allergic Reactions (Selected)  Severity Not Documented  ACE inhibitors- Angioedema.  Nitroglycerin Transdermal System- Itching.,    Allergies (2) Active Reaction  ACE inhibitors Angioedema  Nitroglycerin Transdermal System Itching  .   Medications:  (Selected)   Inpatient Medications  Ordered  Heparin Flush 100 U/mL - 5 mL: 500 units, form: Injection, IV Push, Once-chemo, first dose 02/21/18 9:03:00 CST, stop date 02/21/18 9:03:00 CST, Days 1  Heparin Flush 100 U/mL - 5 mL: 500 units, form: Injection, IV Push, Once-chemo, first dose 04/26/18 9:44:00 CDT, stop date 04/26/18 9:44:00 CDT, Days 1  aspirin 81 mg oral tablet, CHEWABLE: 324 mg, form: Tab-Chew, Oral, Once, first dose 04/02/19 3:14:00 CDT, stop date 04/02/19 3:14:00 CDT, STAT, 4 chew tab = 5 grain ASA  dexamethasone (for IVPB): 10 mg, IV Piggyback, Once-chemo, Infuse over: 20 minute(s), first dose 02/21/18 8:43:00 CST, stop date  02/21/18 8:43:00 CST, Days 1  Prescriptions  Prescribed  Bactrim  mg-160 mg oral tablet: 1 tab(s), Oral, Daily, # 30 tab(s), 2 Refill(s), [facility], cm, Height/Length Dosing, 11/18/20 12:41:00 CST, 88.2, kg, Weight Dosing, 11/18/20 12:41:00 CST  Biktarvy oral tablet: 1 tab(s), Oral, Daily, # 30 tab(s), 2 Refill(s), [place] [facility] [place], cm, Height/Length Dosing, 11/18/20 12:41:00 CST, 88.2, kg, Weight Dosing, 11/18/20 12:41:00 CST  Blood Pressure Cuff: Blood Pressure Cuff, See Instructions, Blood pressure cuff  HTN I10  Length of need: 99 years, # 1 units, 0 Refill(s)  Eliquis 5 mg oral tablet: 5 mg = 1 tab(s), Oral, BID, # 60 tab(s), 11 Refill(s), Pharmacy: [facility] 180.34, cm, Height/Length Dosing, 12/30/19 20:51:00 CST, 83.3, kg, Weight Dosing, 12/30/19 20:51:00 CST  Lasix 20 mg oral tablet: 60 mg = 3 tab(s), Oral, Daily, # 90 tab(s), 6 Refill(s), Pharmacy: [facility] 180.34, cm, Height/Length Dosing, 12/30/19 20:51:00 CST, 83.3, kg, Weight Dosing, 12/30/19 20:51:00 CST  Lotrisone 1%-0.05% topical cream: 1 almaz, TOP, BID, # 45 gm, 1 Refill(s), Pharmacy: [facility] 180, cm, Height/Length Dosing, 09/17/20 13:25:00 CDT, 87.3, kg, Weight Dosing, 09/17/20 13:25:00 CDT  Vitamin D3 2000 intl units (50 mcg) oral capsule: 2,000 IntUnit = 1 cap(s), Oral, Daily, # 90 cap(s), 3 Refill(s), [facility] [facility] [place], cm, Height/Length Dosing, 11/18/20 12:41:00 CST, 88.2, kg, Weight Dosing, 11/18/20 12:41:00 CST  aspirin 81 mg oral Delayed Release (EC) tablet: 81 mg = 1 tab(s), Oral, Daily, # 90 tab(s), 5 Refill(s), Pharmacy: [facility] [employer] 180.34, cm, Height/Length Dosing, 12/30/19 20:51:00 CST, 83.3, kg, Weight Dosing, 12/30/19 20:51:00 CST  atorvastatin 40 mg oral tablet: 40 mg = 1 tab(s), Oral, At Bedtime, # 30 tab(s), 6 Refill(s), Pharmacy: [facility] 180.34, cm,  Height/Length Dosing, 12/30/19 20:51:00 CST, 83.3, kg, Weight Dosing, 12/30/19 20:51:00 CST  carvedilol 12.5 mg oral tablet: 12.5 mg = 1 tab(s), Oral, BID, # 60 tab(s), 6 Refill(s), Pharmacy: [facility] 180.34, cm, Height/Length Dosing, 12/30/19 20:51:00 CST, 83.3, kg, Weight Dosing, 12/30/19 20:51:00 CST  hydrOXYzine hydrochloride 25 mg oral tablet: 25 mg = 1 tab(s), Oral, TID, PRN PRN for itching, # 90 tab(s), 1 Refill(s), Pharmacy: [facility] 180, cm, Height/Length Dosing, 09/17/20 13:25:00 CDT, 87.3, kg, Weight Dosing, 09/17/20 13:25:00 CDT  losartan 25 mg oral tablet: 25 mg = 1 tab(s), Oral, Daily, # 30 tab(s), 5 Refill(s), Pharmacy: [facility] 180.34, cm, Height/Length Dosing, 12/30/19 20:51:00 CST, 83.3, kg, Weight Dosing, 12/30/19 20:51:00 CST  spironolactone 25 mg oral tablet: 25 mg = 1 tab(s), Oral, Daily, # 30 tab(s), 6 Refill(s), Pharmacy: [facility] 180.34, cm, Height/Length Dosing, 12/30/19 20:51:00 CST, 83.3, kg, Weight Dosing, 12/30/19 20:51:00 CST  Documented Medications  Documented  amLODIPine 5 mg oral tablet: 5 mg = 1 tab(s), Oral, Daily.      Past Medical/ Family/ Social History   Medical history:    Resolved  Kidney stone ([id number]):  Resolved.  Hypertension (75740025):  Resolved.  HTN (hypertension) ([id number]):  Resolved.  Atrial fibrillation (44115507):  Resolved.  HIV (21149105):  Resolved.  HIV (50548234):  Resolved.  Lymphoma (81482976):  Resolved..   Surgical history:    Catheter Insertion Mediport (None) on 3/5/2018 at 57 Years.  Comments:  3/5/2018 8:16 CST - Allie SAM, Dasia WALKER  auto-populated from documented surgical case  Biopsy Bone Marrow Aspiration (.) on 12/21/2017 at 57 Years.  Comments:  12/22/2017 13:03 BERNARD - Pao Martinez LPN  auto-populated from documented surgical case  Esophagogastroduodenoscopy on 12/15/2017 at 57  Years.  Comments:  12/15/2017 11:22 Praveena Marr RN  auto-populated from documented surgical case  Biopsy Gastrointestinal on 12/15/2017 at 57 Years.  Comments:  12/15/2017 11:22 Praveena Marr RN  auto-populated from documented surgical case  Colonoscopy on 12/15/2017 at 57 Years.  Comments:  12/15/2017 11:22 Praveena Marr RN  auto-populated from documented surgical case  Colonoscopy ([id number]) on 12/15/2017 at 57 Years.  Comments:  3/20/2018 13:11 Nay Hitchcock MD  Performed by Dr. Mcadams while inpatient at [facility] in Dec. 2017. Next one due in 5 years per operative report.  Fracture ([id number]) in  at 20 Years.  left wrist repair.  cardiac stents placed..   Family history:    Primary malignant neoplasm of colon  Brother  Primary malignant neoplasm of lung  Father  Stroke  Sister  Hypertension.  Mother  Sister    Father  Mother  .   Social history:    Social & Psychosocial Habits    Alcohol  2013 Risk Assessment: Denies Alcohol Use    10/27/2020  Use: Past    Type: Liquor    Frequency: Daily    Employment/School  2020  Status: Disabled    Highest education: High school    Exercise  2020  Duration (average number of minutes): 30    Times per week: Daily    Exercise type: Walking    Home/Environment  2020  Lives with: Alone    Living situation: Home/Independent    Nutrition/Health  2020  Home Diet Regular    Appetite Good    Sexual  2019  What is your current gender identity? (Check all that apply) Identifies as male    Substance Use  2013 Risk Assessment: Denies Substance Abuse    2020  Type: Cocaine    Frequency: 1-2 times per month    Tobacco  2015 Risk Assessment: High Risk    2020  Use: 4 or less cigarettes(less    Patient Wants Consult For Cessation Counseling N/A    2020  Use: 4 or less cigarettes(less    Patient Wants Consult For Cessation Counseling No    Abuse/Neglect  2020   SHX Any signs of abuse or neglect No    Feels unsafe at home: No    Safe place to go: Yes    11/28/2020  SHX Any signs of abuse or neglect No    12/13/2020  SHX Any signs of abuse or neglect No    Feels unsafe at home: No    Safe place to go: Yes    Spiritual/Cultural  09/09/2020  Jewish Preference Judaism  .   Problem list:    Active Problems (13)  Acute disease or injury-related malnutrition   Atrial fibrillation   Back pain   CAD - Coronary artery disease   Constipation   constipationAnxiety   HIV disease   Hyperlipidemia   Hypertension   Impaired mobility   Impaired mobility   Lymphoma   Tobacco user   .      Physical Examination               Vital Signs   Vital Signs   12/13/2020 9:45 CST      Peripheral Pulse Rate     83 bpm                             Respiratory Rate          20 br/min                             SpO2                      99 %                             Oxygen Therapy            Room air                             Systolic Blood Pressure   112 mmHg                             Diastolic Blood Pressure  66 mmHg                             Mean Arterial Pressure, Cuff              81 mmHg    12/13/2020 9:27 CST      Temperature Oral          38.6 DegC  HI                             Temperature Oral (calculated)             101.48 DegF                             Peripheral Pulse Rate     86 bpm                             Respiratory Rate          18 br/min                             SpO2                      99 %                             Oxygen Therapy            Room air                             Systolic Blood Pressure   100 mmHg                             Diastolic Blood Pressure  71 mmHg  .      Vital Signs (last 24 hrs)_____  Last Charted___________  Temp Oral     H 38.6DegC  (DEC 13 09:27)  Heart Rate Peripheral   83 bpm  (DEC 13 09:45)  Resp Rate         20 br/min  (DEC 13 09:45)  SBP      112 mmHg  (DEC 13 09:45)  DBP      66 mmHg  (DEC 13 09:45)  SpO2      99 %  (DEC 13  09:45)  Height      180 cm  (DEC 13 09:27)  .   Measurements   12/13/2020 9:27 CST      Weight Dosing             90 kg                             Weight Measured and Calculated in Lbs     198.41 lb                             Weight Estimated          90 kg                             Height/Length Dosing      180 cm                             Height/Length Measured    180 cm  .   Basic Oxygen Information   12/13/2020 9:45 CST      SpO2                      99 %                             Oxygen Therapy            Room air    12/13/2020 9:27 CST      SpO2                      99 %                             Oxygen Therapy            Room air  .   General:  Alert, moderate distress, Febrile/ chills.    Skin:  Warm, dry, normal for ethnicity, Moderate confluent area of cellulitis involves much of the right lower leg, and does not involve foot, no lymphangitis.  Extends over most of the anterior surface of the lower leg and to a lesser degree on the medial and lateral surfaces, not circumferential or posterior.  A lesser degree of cellulitis is in a similar location on the left anterior lower leg.  Both are outlined in ink..    Head:  Normocephalic, atraumatic.    Neck:  Supple, trachea midline, no tenderness.    Eye:  Pupils are equal, round and reactive to light, extraocular movements are intact, normal conjunctiva.    Ears, nose, mouth and throat:  Oral mucosa moist, no pharyngeal erythema or exudate.    Cardiovascular:  Regular rate and rhythm, No murmur, Normal peripheral perfusion, No edema.    Respiratory:  Lungs are clear to auscultation, respirations are non-labored, breath sounds are equal, Symmetrical chest wall expansion.    Chest wall:  No tenderness, No deformity, Right upper chest MediPort site looks good, no sign of infection..    Back:  Nontender, Normal range of motion, Normal alignment.    Musculoskeletal:  Normal ROM, normal strength, no tenderness, no swelling, no deformity.     Gastrointestinal:  Soft, Nontender, Non distended, Normal bowel sounds.    Neurological:  Alert and oriented to person, place, time, and situation, No focal neurological deficit observed, CN II-XII intact, normal motor observed, normal speech observed.    Lymphatics:  No lymphadenopathy.   Psychiatric:  Cooperative, appropriate mood & affect, normal judgment.       Medical Decision Making   Documents reviewed:  Emergency department nurses' notes, emergency department records, prior records.    Orders  Launch Orders   Cardiology:  EKG (Order): 12/13/2020 10:16 CST, Stretcher, Patient Has IV, Standard Precautions, NOW, -1, -1, 12/13/2020 10:16 CST, Launch Orders   Laboratory:  Urine Culture and Sensitivity (Order): Stat collect, 12/13/2020 10:18 CST, Urine, Clean Catch, Nurse collect  Urinalysis with Micro UHC (Order): Stat collect, Urine, 12/13/2020 10:18 CST, Nurse collect  Blood Culture (Order): 12/13/2020 10:18 CST, Stat collect, Blood  Blood Culture (Order): 12/13/2020 10:18 CST, Stat collect, Blood  Flu A & B PCR (Order): Stat collect, Nasopharyngeal Swab, 12/13/2020 10:17 CST, Nurse collect, Print Label By Order Location  CBC w/ Auto Diff (Order): Now collect, 12/13/2020 10:17 CST, Blood, Lab Collect, 12/13/2020 10:17 CST  CMP (Order): Stat collect, 12/13/2020 10:17 CST, Blood, Lab Collect, 12/13/2020 10:17 CST  COVID-19 IDnow (Order): Stat collect, Nasopharyngeal Swab, 12/13/2020 10:17 CST, Nurse collect  Patient Care:  Saline Lock Insert (Order): 12/13/2020 10:18 CST  Pharmacy:  ibuprofen 600 mg oral tablet (Order): 600 mg, Oral, Once, first dose 12/13/2020 10:19 CST, stop date 12/13/2020 10:19 CST, STAT  acetaminophen (Order): 1,000 mg, form: Tab, Oral, Once, first dose 12/13/2020 10:19 CST, stop date 12/13/2020 10:19 CST, STAT  NS (0.9% Sodium Chloride) Infusion 1,000 mL (Order): 1,000 mL, 1,000 mL, IV, 150 mL/hr, start date 12/13/2020 10:19 CST, 2.1, m2  Ancef (Order): 1 gm, form: Infusion, IV Piggyback,  q8hr, Infuse over: 30 minute(s), Order duration: 3 dose(s), first dose 12/13/2020 10:18 CST, stop date 12/14/2020 10:17 CST, STAT  Radiology:  CXR 1 View (Order): Stat, 12/13/2020 10:19 CST, Dyspnea, None, Stretcher, Patient Has IV?, Rad Type, Not Scheduled.    Electrocardiogram:  Time 12/13/2020 09:28:00, rate 84, normal sinus rhythm, No ST-T changes, no ectopy, normal [place] & QRS intervals, EP Interp, Normal sinus rhythm at 84 bpm, low voltage QRS, old anteroseptal infarct, no acute change.    Results review:  Lab results : Lab View   12/13/2020 10:55 CST     WBC                       15.9 x10(3)/mcL  HI                             RBC                       4.27 x10(6)/mcL  LOW                             Hgb                       13.5 gm/dL                             Hct                       42.7 %                             Platelet                  211 x10(3)/mcL                             MCV                       100.0 fL                             MCH                       31.6 pg                             MCHC                      31.6 gm/dL                             RDW                       14.8 %  HI                             MPV                       9.3 fL                             Abs Neut                  12.86 x10(3)/mcL  HI                             Neutro Auto               81 %  NA                             Lymph Auto                12 %  NA                             Mono Auto                 6 %  NA                             Eos Auto                  1 %  NA                             Abs Eos                   0.1 x10(3)/mcL                             Basophil Auto             0 %  NA                             Abs Neutro                12.86 x10(3)/mcL  HI                             Abs Lymph                 1.9 x10(3)/mcL                             Abs Mono                  1.0 x10(3)/mcL                             Abs Baso                  0.0 x10(3)/mcL                              IG%                       0 %  NA                             IG#                       0.060  NA    12/13/2020 10:27 CST     UA Appear                 Clear                             UA Color                  YELLOW                             UA Spec Grav              1.009                             UA Bili                   Negative                             UA pH                     7.5                             UA Urobilinogen           4 mg/dL                             UA Blood                  Negative                             UA Glucose                Negative                             UA Ketones                Negative                             UA Protein                Negative                             UA Nitrite                Negative                             UA Leuk Est               Negative                             UA WBC Interp             0-2 /HPF                             UA RBC Interp             0-2 /HPF                             UA Bact Interp            None Seen /HPF                             UA Squam Epi Interp       2-20 /LPF                             UA Hyal Cast Interp       0-2 /LPF                             Influ A PCR               Negative                             Influ B PCR               Negative                             SARS-CoV-2 PCR            Not Detected  .      Reexamination/ Reevaluation   Time: 12/13/2020 11:58:00 .   Vital signs   results included from flowsheet : Vital Signs   12/13/2020 9:45 CST      Peripheral Pulse Rate     83 bpm                             Respiratory Rate          20 br/min                             SpO2                      99 %                             Oxygen Therapy            Room air                             Systolic Blood Pressure   112 mmHg                             Diastolic Blood Pressure  66 mmHg                             Mean Arterial Pressure, Cuff              81 mmHg      Course: improving.   Pain status: decreased.   Assessment: exam improved.   Notes: Stable/ counseled.      Impression and Plan      Calls-Consults   -  12/13/2020 11:59:00 , Discussed with Int. Med. - to see and admit.    Plan   Disposition: Admit time  12/13/2020 11:59:00, Place in Observation Unit, Int. Med..

## 2022-04-30 NOTE — CONSULTS
Patient:   Jason Perdue             MRN: [id number]            FIN: [id number]               Age:   57 years     Sex:  Male     :  1960   Associated Diagnoses:   None   Author:   Debora Villaseñor      Seen by ID.  Full consult to follow.    IMPRESSION:  1.  AIDS, newly diagnosed  2.  Lymphoma  3.  SBO  4.  PCM    PLAN:  Patient reports he was diagnosed with HIV approximately 3 years ago, however was lost to follow-up.  Currently presented with abdominal pain-found to have a bowel obstruction.  Recent CD4 count noted.  Patient was initiated on HAART per ID at [facility] with Descovy and Tivicay-continue.  Continue OI prophylaxis with daily Bactrim.  [place] plan for chemotherapy tomorrow noted.  Discussed plan with patient.  Thank you for the consultation.

## 2022-04-30 NOTE — ED PROVIDER NOTES
"   Patient:   Jason Perdue             MRN: [id number]            FIN: [id number]               Age:   57 years     Sex:  Male     :  1960   Associated Diagnoses:   Atrial fibrillation with rapid ventricular response   Author:   Ralf Benoit      Basic Information   Time seen: Date & time 2017 10:52:00.   History source: Patient.   Arrival mode: Private vehicle.   History limitation: None.   Additional information: Chief Complaint from Nursing Triage Note : Chief Complaint   2017 10:16 CST      Chief Complaint           pt. reports to the ed c/o of "intermittent" episodes of lightheadedness....vss, nadn  .      History of Present Illness   The patient presents with dizziness.  The onset was 2  days ago.  The course/duration of symptoms is fluctuating in intensity.  The character of symptoms is lightheaded.  The degree at present is minimal.  The relieving factor is none.  Risk factors consist of hypertension.  Prior episodes: occasional.  Therapy today: none.  Associated symptoms: none.  Additional history: Pt reports feeling "dizzy" for last few days. Denies chest pain, SOB, weakness, dizziness, fever, abdominal pain, nausea/vomiting. Denies striking head or LOC. .        Review of Systems   Constitutional symptoms:  Negative except as documented in HPI.   Skin symptoms:  Negative except as documented in HPI.   Eye symptoms:  Negative except as documented in HPI.   ENMT symptoms:  Negative except as documented in HPI.   Respiratory symptoms:  Negative except as documented in HPI.   Cardiovascular symptoms:  Negative except as documented in HPI.   Gastrointestinal symptoms:  Negative except as documented in HPI.   Genitourinary symptoms:  Negative except as documented in HPI.   Musculoskeletal symptoms:  Negative except as documented in HPI.   Neurologic symptoms:  Negative except as documented in HPI.   Psychiatric symptoms:  Negative except as documented in HPI.   Endocrine symptoms:  " Negative except as documented in HPI.   Hematologic/Lymphatic symptoms:  Negative except as documented in HPI.   Allergy/immunologic symptoms:  Negative except as documented in HPI.      Health Status   Allergies:    Allergic Reactions (Selected)  Severity Not Documented  ACE inhibitors- Angioedema..   Medications:  (Selected)   Prescriptions  Prescribed  amlodipine 5 mg oral tablet: 5 mg = 1 tab(s), Oral, Daily, # 30 tab(s), 1 Refill(s)  indomethacin 50 mg oral capsule: 50 mg = 1 cap(s), Oral, TID, PRN PRN for pain, # 15 cap(s), 0 Refill(s), per nurse's notes.   Immunizations: Per nurse's notes.      Past Medical/ Family/ Social History   Medical history:    Active  HTN (hypertension) ([id number])  Resolved  Kidney stone ([id number]):  Resolved.  Hypertension ([id number]):  Resolved., Reviewed as documented in chart.   Surgical history:    Fracture ([id number]) in 1981 at 21 Years.  left wrist repair., Reviewed as documented in chart.   Family history:    No family history items have been selected or recorded., Reviewed as documented in chart.   Social history: Alcohol use: Denies, Tobacco use: Regularly, Drug use: Denies.      Physical Examination               Vital Signs   Vital Signs   11/9/2017 10:16 CST      Temperature Oral          36.4 DegC                             Peripheral Pulse Rate     76 bpm                             Respiratory Rate          18 br/min                             SpO2                      100 %                             Oxygen Therapy            Room air                             Systolic Blood Pressure   109 mmHg                             Diastolic Blood Pressure  74 mmHg  .   Measurements   11/9/2017 10:16 CST      Weight Dosing             74.2 kg                             Weight Measured           74.2 kg                             Weight Measured and Calculated in Lbs     163.58 lb                              Height/Length Dosing      180.34 cm                             Height/Length Measured    180.34 cm                             Body Mass Index Measured  22.81 kg/m2  .   Basic Oxygen Information   11/9/2017 10:16 CST      SpO2                      100 %                             Oxygen Therapy            Room air  .   General:  Alert, mild distress.    Skin:  Warm, dry, pink, intact.    Head:  Normocephalic, atraumatic.    Neck:  Supple, trachea midline, no tenderness.    Eye:  Pupils are equal, round and reactive to light, extraocular movements are intact, normal conjunctiva, vision unchanged.    Ears, nose, mouth and throat:  Tympanic membranes clear, oral mucosa moist, no pharyngeal erythema or exudate.    Cardiovascular:  Regular rate and rhythm, No murmur, Normal peripheral perfusion, No edema, Tachycardia.    Respiratory:  Lungs are clear to auscultation, respirations are non-labored, breath sounds are equal, Symmetrical chest wall expansion.    Chest wall:  No tenderness, No deformity.    Back:  Nontender, Normal range of motion, Normal alignment, No costovertebral angle tenderness,    Musculoskeletal:  Normal ROM, normal strength, no tenderness, no swelling.    Gastrointestinal:  Soft, Nontender, Non distended, Normal bowel sounds, Guarding: Negative, Rebound: Negative, Organomegaly: Negative, Mass: Negative, Signs: McBurney's negative, Jain's negative.    Genitourinary:  No tenderness, no discharge, normal external genitalia, no lesions.    Neurological:  Alert and oriented to person, place, time, and situation, No focal neurological deficit observed, CN II-XII intact, normal sensory observed, normal motor observed, normal speech observed, normal coordination observed.    Lymphatics:  No lymphadenopathy.   Psychiatric:  Cooperative, appropriate mood & affect, normal judgment.       Medical Decision Making   Differential Diagnosis:  Dizziness.   Electrocardiogram:  Time 11/9/2017 11:03:00, rate 134,  Atrial fibrillation with rapid ventricular response, septal myocardial infarction.    Results review:  All Results   11/9/2017 11:41 CST      PT                        13.0 second(s)                             INR                       1.00                             PTT                       34.3 second(s)                             Total CK                  36 unit/L  LOW                             CK MB                     1.0 ng/mL    11/9/2017 11:01 CST      POC Troponin              0.02 ng/mL    11/9/2017 10:51 CST      WBC                       6.1 x10(3)/mcL                             RBC                       4.20 x10(6)/mcL  LOW                             Hgb                       12.2 gm/dL  LOW                             Hct                       38.4 %  LOW                             Platelet                  362 x10(3)/mcL                             MCV                       91.4 fL                             MCH                       29.0 pg                             MCHC                      31.8 gm/dL                             RDW                       13.5 %                             MPV                       9.8 fL                             Abs Neut                  4.19 x10(3)/mcL                             Neutro Auto               68 x10(3)/mcL  NA                             Lymph Auto                18 %                             Mono Auto                 10 %                             Eos Auto                  2 %                             Abs Eos                   0.10 x10(3)/mcL  NA                             Basophil Auto             0 %                             Abs Neutro                4.19 x10(3)/mcL  NA                             Abs Lymph                 1.13 x10(3)/mcL  NA                             Abs Mono                  0.59 x10(3)/mcL  NA                             Abs Baso                  0.03 x10(3)/mcL  NA                             IG%                        1 %  NA                             IG#                       0.0800  NA                             Sodium Lvl                138 mmol/L                             Potassium Lvl             4.2 mmol/L                             Chloride                  103 mmol/L                             CO2                       29 mmol/L                             Calcium Lvl               9.1 mg/dL                             Glucose Lvl               88 mg/dL                             BUN                       18 mg/dL                             Creatinine                1.30 mg/dL                             eGFR-AA                   73 mL/min  LOW                             eGFR-CLYDE                  61 mL/min  LOW  .   Radiology results:  Radiologist's interpretation: : Diagnostic Radiology   11/9/2017 12:36 CST      XR Chest 2 Views            (In Progress) .      Reexamination/ Reevaluation   Time: 11/9/2017 12:51:00 .   Course: improving.   Pain status: decreased.   Notes: Pt reassessed at this time. Rate down to 80-90's after completion of IVF. Pt reports resolution of dizziness of symptoms. Continues to deny chest pain or SOB. Discussed pt status with Dr. Styles, ED physician. Will contact [facility] IM On Call for further evaluation/admission of pt. .      Impression and Plan   Diagnosis   Atrial fibrillation with rapid ventricular response (CKG12-WN I48.91)      Calls-Consults   -  11/9/2017 12:47:00 , Cornelius CHAWLA, Kimberley Morris, IM, Discussed pt status with IM On Call Team 2. Pt to be seen in ED. .    Plan   Disposition: Admit time  11/9/2017 12:51:00, Kimberley Shields MD, Patient care transitioned to: Time: 11/9/2017 12:51:00, Wyatt Styles MD    Counseled: Patient, Regarding diagnosis, Regarding diagnostic results, Regarding treatment plan, Patient indicated understanding of instructions.

## 2022-04-30 NOTE — DISCHARGE SUMMARY
Patient:   Jason Perdue             MRN: [id number]            FIN: [id number]               Age:   58 years     Sex:  Male     :  1960   Associated Diagnoses:   None   Author:   Demetria Stokes MD      Discharge Summary  Admit Date: 2018  Discharge Date: 2018  Admit diagnosis:   Community-acquired pneumonia  NSTEMI  Discharge diagnosis:   Non-ST elevation (NSTEMI) myocardial infarction (I21.4)   Congestive heart failure with reduced ejection fraction  Abnormal levels of other serum enzymes (R74.8)   Community-acquired pneumonia  Human immunodeficiency virus    PMHx: HIV, h/o A fib, hyperlipidemia, hypertension, CAD s/p stents x2 to LCA ()  Allergies: ACE inhibitors (Angioedema)  Consultants: Cardiology, Physical and Occupational therapy  Attending: Dr. MORRO Hopkins MD  Team 3    Hospital Course  Admission info: 58-year-old -American male with significant past medical history of hypertension, hyperlipidemia, history of atrial fibrillation currently in sinus rhythm not on anticoagulation secondary to GI bleeding in , CAD status post stents x2 in the left circumflex in 2017, HIV (last CD4 count was 150 in 2018) and diffuse B-cell lymphoma in the setting of AIDS presented to the ED with worsening shortness of breath, bilateral lower extremity edema, and nonproductive dry cough for approximately 2 weeks ago. States that he was seen in the ED in [place] on  for similar symptoms and diagnosed with bronchitis/pneumonia and elevated BNP at greater than 10,000. Given Lasix 60mg daily for 2 days, which didn't provide much relief for his lower extremity edema, proAir HFA 90mcg/inh 2puff PRN wheezing, and Levaquin 500mg x 10 days. Patient completed his antibiotic therapy and continues to have a dry nonproductive cough. Denies any fever, chills, or respiratory sputum. His lower bilateral extremity swelling started worsening on Monday morning and is associated with dyspnea on  "exertion. Yesterday he was only able to walk approximately 150ft before becoming winded. His baseline is over 500ft. Patient also endorsed PND since Monday as morning but denies orthopnea. Denies any chest pain or palpitations.  Brief hospital course:    12/27/2018: Afebrile overnight, vital stable.  No acute complaints of chest pain, N/V/D.  Informed patient about need for nuclear stress test to further evaluate his recent diagnosis of heart failure as well as surgery consultation   for "gallbladder" pain.    12/28/2018: Seen and examined by staff, Dr. MORRO Hopkins this morning. Afebrile overnight, vital stable.  No acute complaints of chest pain, edema, N/V/D. Awaiting results of lexiscan from this morning. Explained to patient need to keep NPO until after the scan is read just in case he needs a heart cath today. Otherwise, feels better.      Patient overall stable for discharge home with follow-up in ID clinic and cardiology clinic regarding new-onset of congestive heart failure.  Lexiscan completed on 12/28/2018 showed no acute ischemia; cardiology recommended medical management at this time. Discharged on recommended beta blocker and spironolactone.  Patient was also found to have mild right upper quadrant tenderness however no surgical intervention required at this time.  Surgery recommend follow-up outpatient once heart failure is under control.  Discuss diagnosis and medications with patient prior to discharge home.      Physical Exam:   Vital Signs   12/28/2018 8:00 CST      Temperature Oral          36.5 DegC                             Temperature Oral (calculated)             97.70 DegF                             Peripheral Pulse Rate     75 bpm                             Respiratory Rate          16 br/min                             SpO2                      96 %                             Oxygen Therapy            Room air                             Systolic Blood Pressure   111 mmHg                  "            Diastolic Blood Pressure  79 mmHg                             Mean Arterial Pressure, Cuff              90 mmHg     General: AAO x3, well-developed, in no acute distress, sitting up in bed watching TV  Eye: PERRLA, EOMI, no scleral icterus  Respiratory: equal breath sounds B/L, clear to auscultation B/L, positive decreased breath sounds on the left mid/upper lobe, no wheezing, no rhonchi noted, no crackles noted  Cardiovascular: RRR without murmurs, gallops or rubs, 2+ peripheral pulses, no edema  Gastrointestinal: non-distended, +BS, soft, mild RUQ TTP, without masses to palpation  Musculoskeletal: normal active and passive ROM without pain, no edema  Integumentary: no rashes or skin lesions present, warm, dry, and normal tone for race  Neurologic: moving all four extremities spontaneously, speech clear and coherent    Procedures: ECHO and lexiscan-please see full medical chart for report    Discharge Plan  Discharge condition: Stable    Discharge medications:   Prescribed  amoxicillin-clavulanate (amoxicillin-clavulanate 875 mg-125 mg oral tablet) 1 tab(s), Oral, q12hr  carvedilol (Coreg 3.125 mg oral tablet) 3.125 mg, Oral, BID  doxycycline (doxycycline hyclate 100 mg oral tablet) 100 mg, Oral, BID  ferrous sulfate (ferrous sulfate 300 mg/5 mL oral liquid) 300 mg, Oral, BID  spironolactone (spironolactone 25 mg oral tablet) 25 mg, Oral, Daily  Continue  Misc Prescription (Ensure Plus) See Instructions  albuterol (ProAir HFA 90 mcg/inh inhalation aerosol with adapter) 2 puff(s), INH, q4hr, PRN for wheezing  aspirin (aspirin 81 mg oral tablet, CHEWABLE) 81 mg, Oral, Daily  atorvastatin (atorvastatin 40 mg oral tablet) 40 mg, Oral, Daily  clopidogrel (Plavix 75 mg oral tablet) 75 mg, Oral, Daily  docusate-senna (docusate-senna 50 mg-8.6 mg oral tablet) 2 tab(s), Oral, Once a day (at bedtime)  dolutegravir (Tivicay 50 mg oral tablet) 50 mg, Oral, Daily  emtricitabine-tenofovir (Descovy 200 mg-25 mg oral  tablet) 1 tab(s), Oral, Daily  furosemide (Lasix 20 mg oral tablet) 60 mg, Oral, Daily  hydrOXYzine (HYDROXYZINE STACEY 50 MG CAP)  ketorolac (KETOROLAC 10 MG TABLET) 10 mg, Oral, q6hr  ondansetron (ONDANSETRON 8 MG TBDP)  phenazopyridine (PHENAZOPYRIDINE 100 MG TAB)  Discontinue  acetaminophen-HYDROcodone (HYDROCODON APAP 10 325)  acetaminophen-HYDROcodone (HYDROCODON-APAP 5-325)  diltiazem (Cardizem  mg/24 hours oral CAPsule, extended release) 360 mg, Oral, Daily  levoFLOXacin (LEVOFLOXACIN 500 MG TABS) 500 mg, Oral, Daily  metoprolol (metoprolol tartrate 50 mg oral tab) 50 mg, Oral, BID  nitroglycerin (nitroglycerin 0.4 mg sublingual TAB) 0.4 mg, SL, q5min, PRN for chest pain  predniSONE (PREDNISONE 20 MG TAB)    Discharge instructions:   Diet: Recommend cardiac diet  Activity: No restrictions as tolerated    Follow-up plan:   [facility] ID clinic with Dr. Federico Hopkins, on 02/13/2019 at 1415, Keep scheduled appointment  Anytime the conditions worsen, return to clinic or go to ED  [facility] - Cardiology Clinic, within 3 to 5 days  Patient discharged to: Home    Demetria Stokes MD  [facility] resident, -1

## 2022-04-30 NOTE — DISCHARGE SUMMARY
Patient:   Jason Perdue             MRN: [id number]            FIN: [id number]               Age:   57 years     Sex:  Male     :  1960   Associated Diagnoses:   None   Author:   Dany Matthews MD      DISCHARGE SUMMARY:     Admission date: 2017   Discharge date: 11/10/2017  Admission diagnosis: New-onset atrial fibrillation   Discharge diagnosis: Same     Service: Telemetry     Referring physician: Emergency department     Admitting physician: Dilcia Campbell MD    Resident physicians: Dany POWELL, Anders ORTEZI, Noemí Nunez HOII, Aneta Willingham HOIII    Consults: None     HPI:  58 yo AAM with PMH of HTN presented to ED c/o dizziness. Patient reports sililar dizziness spell on Monday which resolved spontaneously after resting. Patient states that he felt dizzy while at work this morning, associated with heart racing and generalized weakness. Symptoms resolved upon arriving to hospital. In ED, EKG revealed afib. On monitor, HR ranges from  irregular pattern, other vital signs are normal. Labs are wnl.     Hospital course:  Patient was admited to telemetry for observation. HR is controlled with metoprolol 25mg BID, ranges in the 90s. Patient had no dizziness spell during hospitalization, was stable to discharge.     Procedures:  None     Imaging:   Order: XR Chest 2 Views  Report Dt/Tm: 2017 12:52  EXAM: XR Chest 2 Views     INDICATION: Dizziness     TECHNIQUE: Frontal and lateral views of the chest are obtained.     COMPARISON: 2017     IMPRESSION:   1. No acute cardiopulmonary process identified.  2. New focal prominence of the right hilar markings, which may be  artifactual. Short-term follow-up evaluation is suggested to ensure  resolution.  3. Chronic obstructive pulmonary changes.      Physical Examination: See today's Progress Note    Disposition:   Discharged to home  Regular diet  Resume normal activity as tolerated.     Discharged Medications:    Prescribed  aspirin (aspirin 81 mg oral tablet, CHEWABLE) 81 mg, Oral, Daily  metoprolol (metoprolol tartrate 25 mg oral tab) 25 mg, Oral, BID  nicotine (nicotine 21 mg/24 hr transdermal film, extended release), TD, Daily, PRN smoking cessation    Discontinue  amLODIPine (amlodipine 5 mg oral tablet) 5 mg, Oral, Daily    Instructions:   Patient was instructed to return to ED if condition persists or worsens     Follow up:   Dany Matthews, within 1 to 2 weeks  [facility] - Cardiology Clinic, within 1 to 2 weeks  Anytime the conditions worsen, return to clinic or go to ED

## 2022-04-30 NOTE — ED PROVIDER NOTES
"   Patient:   Jason Perdue             MRN: [id number]            FIN: [id number]               Age:   59 years     Sex:  Male     :  1960   Associated Diagnoses:   Cocaine abuse; Acute combined systolic (congestive) and diastolic (congestive) heart failure; SOB - Shortness of breath   Author:   Lina Spann MD      Basic Information   Time seen: Immediately upon arrival.   History source: Patient.   Arrival mode: Private vehicle.   History limitation: None.   Additional information: Chief Complaint from Nursing Triage Note : Chief Complaint   2019 23:51 CST     Chief Complaint           sob onset 3 days ago, reports worse on tonight. also c/o lle pain onset after ambulance picked him up.  .   Provider/Visit info:   Time Seen:  Lina Spann MD / 2019 00:03  .   History of Present Illness   The patient is a 59-year-old male with past medical history of HIV, chronic constipation, dyslipidemia, atrial fibrillation, chronic low back pain, lipoma, hypertension, and medical noncompliance who presents to the emergency department complaining of a three-day history of shortness of breath.  Patient states that ordinarily, he is able to conduct the activities of daily living without difficulty.  Over the past few days, he has been dyspneic with moderate exertion.  He also reports an associated dry cough.  The symptoms are worsened with smoking.  He states that he has been "out of all my medications" for the last several months.  He denies recent travel, lower show any edema, chest pain, hemoptysis, or weight loss.      Review of Systems   Constitutional symptoms:  No fever, no chills, no sweats, no fatigue.    Skin symptoms:  Rash, no abrasions, no burns, no petechiae.    Eye symptoms:  No pain,    ENMT symptoms:  No sore throat,    Respiratory symptoms:  Shortness of breath, cough, no orthopnea, no hemoptysis, no stridor, no wheezing.    Cardiovascular symptoms:  No chest pain,    Gastrointestinal " symptoms:  No abdominal pain, no nausea, no vomiting, no diarrhea, no constipation.    Genitourinary symptoms:  No hematuria,    Musculoskeletal symptoms:  No Joint pain,    Neurologic symptoms:  No headache, no dizziness.    Psychiatric symptoms:  No anxiety, no depression.              Additional review of systems information: All other systems reviewed and otherwise negative.      Health Status   Allergies:    Allergic Reactions (Selected)  Severity Not Documented  ACE inhibitors- Angioedema.  Nitroglycerin Transdermal System- Itching.,    Allergies (2) Active Reaction  ACE inhibitors Angioedema  Nitroglycerin Transdermal System Itching  .   Medications:  (Selected)   Inpatient Medications  Ordered  Heparin Flush 100 U/mL - 5 mL: 500 units, form: Injection, IV Push, Once-chemo, first dose 02/21/18 9:03:00 CST, stop date 02/21/18 9:03:00 CST, Days 1  Heparin Flush 100 U/mL - 5 mL: 500 units, form: Injection, IV Push, Once-chemo, first dose 04/26/18 9:44:00 CDT, stop date 04/26/18 9:44:00 CDT, Days 1  aspirin 81 mg oral tablet, CHEWABLE: 324 mg, form: Tab-Chew, Oral, Once, first dose 04/02/19 3:14:00 CDT, stop date 04/02/19 3:14:00 CDT, STAT, 4 chew tab = 5 grain ASA  dexamethasone (for IVPB): 10 mg, IV Piggyback, Once-chemo, Infuse over: 20 minute(s), first dose 02/21/18 8:43:00 CST, stop date 02/21/18 8:43:00 CST, Days 1  Prescriptions  Prescribed  Eliquis 5 mg oral tablet: 5 mg = 1 tab(s), Oral, BID, # 60 tab(s), 11 Refill(s), [facility] [place]  Lasix 20 mg oral tablet: 60 mg = 3 tab(s), Oral, Daily, # 90 tab(s), 3 Refill(s), Pharmacy: [facility] [employer] [facility], [place].  Plavix 75 mg oral tablet: 75 mg = 1 tab(s), Oral, Daily, # 90 tab(s), 11 Refill(s)  amlodipine 5 mg oral tablet: See Instructions, TAKE 1 TABLET BY MOUTH DAILY, # 30 tab(s), 1 Refill(s), eRx: [facility].  atorvastatin 40 mg oral tablet: 40 mg = 1 tab(s), Oral, At Bedtime, # 30 tab(s), 0 Refill(s), Pharmacy[place]  [place], [facility].  carvedilol 12.5 mg oral tablet: See Instructions, TAKE 1 TABLET BY MOUTH TWICE A DAY (FOR BLOOD PRESSURE), # 60 tab(s), 3 Refill(s), eRx: [place], [facility].  spironolactone 25 mg oral tablet: 25 mg = 1 tab(s), Oral, Daily, # 30 tab(s), 3 Refill(s), Pharmacy: [facility]  Documented Medications  Documented  Descovy 200 mg-25 mg oral tablet: 1 tab(s), Oral, Daily  Tivicay 50 mg oral tablet: 50 mg = 1 tab(s), Oral, Daily  Vitamin C 500 mg oral tablet: 500 mg = 1 tab(s), Oral, Daily, # 30 tab(s), 0 Refill(s)  alfuzosin 10 mg oral tablet, extended release: 10 mg = 1 tab(s), Oral, Daily  docusate-senna 50 mg-8.6 mg oral tablet: 2 tab(s), Oral, Once a day (at bedtime), 0 Refill(s)  ferrous sulfate 220 mg/5 mL (44 mg elemental iron) oral elixir:   prednisONE 20 mg oral tablet: 20 mg = 1 tab(s), Oral, BID.   Immunizations: Unknown.      Past Medical/ Family/ Social History   Medical history:    Resolved  Kidney stone ([id number]):  Resolved.  Hypertension (73472912):  Resolved.  HTN (hypertension) ([id number]):  Resolved.  Atrial fibrillation (61371211):  Resolved.  HIV (51584501):  Resolved..   Surgical history:    Catheter Insertion Mediport (None) on 3/5/2018 at 57 Years.  Comments:  3/5/2018 8:16 BERNARD - Dasia Kelley RN  auto-populated from documented surgical case  Biopsy Bone Marrow Aspiration (.) on 12/21/2017 at 57 Years.  Comments:  12/22/2017 13:03 Pao Drummond LPN  auto-populated from documented surgical case  Esophagogastroduodenoscopy on 12/15/2017 at 57 Years.  Comments:  12/15/2017 11:22 BERNARD Danielle RN, Praveena MAJANO.  auto-populated from documented surgical case  Biopsy Gastrointestinal on 12/15/2017 at 57 Years.  Comments:  12/15/2017 11:22 BERNARD Danielle RN, Praveena MAJANO.  auto-populated from documented surgical case  Colonoscopy on 12/15/2017 at 57 Years.  Comments:  12/15/2017 11:22  CST - Shashi SAM, Praveena CAMACHO  auto-populated from documented surgical case  Colonoscopy ([id number]) on 12/15/2017 at 57 Years.  Comments:  3/20/2018 13:11 JAVIER - Nay Hopkins MD  Performed by Dr. Mcadams while inpatient at [facility] in Dec. 2017. Next one due in 5 years per operative report.  Fracture ([id number]) in 1981 at 20 Years.  left wrist repair.  cardiac stents placed..   Family history:    Primary malignant neoplasm of colon  Brother  Primary malignant neoplasm of lung  Father  Stroke  Sister  Hypertension.  Mother  Sister    Father  Mother  .   Social history:    Social & Psychosocial Habits    Alcohol  2013 Risk Assessment: Denies Alcohol Use    2019  Use: Past    Type: Beer    Comment: over 20 years ag. - 2017 11:28 - Ida Williamson; quit over 20 years ago - 2019 14:52 - Jo Garcia LPN    2019  Use: Past    2019  Use: Past    Employment/School  2018  Status: Unemployed    Home/Environment  2019  Lives with: Alone    Living situation: Home/Independent    Home equipment: Walker/Cane    Alcohol abuse in household: No    Substance abuse in household: No    Smoker in household: No    Feels unsafe at home: No    Safe place to go: Yes    Family/Friends available to help: Yes    Concern for family members at home: No    Major illness in household: No    Nutrition/Health  2018  Type of diet: Regular    Sleeping concerns: Yes    Feels highly stressed: No    Sexual  2019  What is your current gender identity? (Check all that apply) Identifies as male    Substance Use  2013 Risk Assessment: Denies Substance Abuse    2019  Use: Past    Type: Cocaine    Comment: DENIES - 2019 13:40 - Roger Contreras RN    2019  Use: Current    Type: Cocaine    Frequency: 1-2 times per month    2019  Type: Cocaine    Frequency: 1-2 times per month    2019  Use: Current    Type: Cocaine    Frequency: 1-2  times per month    Tobacco  09/27/2015 Risk Assessment: High Risk    05/23/2019  Use: 4 or less cigarettes(less    Type: Cigarettes    Patient Wants Consult For Cessation Counseling No    07/05/2019  Use: 4 or less cigarettes(less    Type: Cigarettes    Patient Wants Consult For Cessation Counseling No    12/16/2019  Use: Former smoker, quit more    Patient Wants Consult For Cessation Counseling N/A    Abuse/Neglect  07/05/2019  SHX Any signs of abuse or neglect No    12/16/2019  SHX Any signs of abuse or neglect No    Feels unsafe at home: No  .   Problem list:    Active Problems (14)  Acute disease or injury-related malnutrition   Atrial fibrillation   Back pain   CAD - Coronary artery disease   Constipation   constipationAnxiety   HIV   HIV disease   Hyperlipidemia   Hypertension   Impaired mobility   Impaired mobility   Lymphoma   Tobacco user   .      Physical Examination               Vital Signs   Vital Signs   12/17/2019 0:18 CST      Peripheral Pulse Rate     100 bpm                             Respiratory Rate          33 br/min  HI                             SpO2                      98 %    12/16/2019 23:51 CST     Temperature Oral          36.7 DegC                             Temperature Oral (calculated)             98.06 DegF                             Peripheral Pulse Rate     95 bpm                             Respiratory Rate          20 br/min                             SpO2                      99 %                             Systolic Blood Pressure   147 mmHg  HI                             Diastolic Blood Pressure  106 mmHg  HI  .      Vital Signs (last 24 hrs)_____  Last Charted___________  Temp Oral     36.7 DegC  (DEC 16 23:51)  Heart Rate Peripheral   100 bpm  (DEC 17 00:18)  Resp Rate         H 33br/min  (DEC 17 00:18)  SBP      H 147mmHg  (DEC 16 23:51)  DBP      H 106mmHg  (DEC 16 23:51)  SpO2      98 %  (DEC 17 00:18)  Weight      88.9 kg  (DEC 16 23:51)  Height      180 cm  (DEC  16 23:51)  BMI      27.44  (DEC 16 23:51)  .   Measurements   12/16/2019 23:51 CST     Weight Dosing             88.9 kg                             Weight Measured           88.9 kg                             Weight Measured and Calculated in Lbs     195.99 lb                             Height/Length Dosing      180 cm                             Height/Length Measured    180 cm                             Body Mass Index Measured  27.44 kg/m2  .   Basic Oxygen Information   12/17/2019 0:18 CST      SpO2                      98 %    12/16/2019 23:51 CST     SpO2                      99 %  .   General:  Alert, no acute distress, Thin, unkempt, covered in glitter..    Skin:  Warm, pink, intact, Chronic appearing maculopapular rash noted to trunk, back, and all extremities.  No erythema, induration, or purulent drainage..    Head:  Normocephalic, atraumatic.    Neck:  Supple, trachea midline, no tenderness.    Eye:  Pupils are equal, round and reactive to light.   Ears, nose, mouth and throat:  Oral mucosa moist, no pharyngeal erythema or exudate.    Cardiovascular:  Regular rate and rhythm.   Respiratory:  Lungs are clear to auscultation.   Chest wall:  No tenderness, No deformity.    Back:  Normal range of motion, Tender to palpation over L3 through L5 with paraspinal muscular tenderness positive straight leg testing on the left..    Musculoskeletal:  No deformity.   Gastrointestinal:  Soft, Nontender, Non distended, Normal bowel sounds.    Genitourinary:  No tenderness.   Neurological:  Alert and oriented to person, place, time, and situation, CN II-XII intact, normal motor observed, Strength 5/5 bilaterally. Cerebellar: Finger to nose is intact. Sensation: Intact to light touch. , Gait intact.  No saddle anesthesia..    Lymphatics:  No lymphadenopathy.   Psychiatric:  Cooperative, appropriate mood & affect.       Medical Decision Making   Differential Diagnosis:  Pneumonia, bronchitis, congestive heart failure,  chronic obstructive pulmonary disease, asthma, pulmonary edema, pleural effusion.    Rationale:  The patient is a 59-year-old male with past medical history of HIV, constipation, dyslipidemia, A. fib, chronic back pain, lymphoma, and hypertension who presents to the emergency department with acute congestive heart failure exacerbation.   The patient will be admitted to rule out acute coronary syndrome and for gentle diuresis..   Documents reviewed:  Emergency department nurses' notes, emergency department records, prior records.    Electrocardiogram:  Time 12/17/2019 23:00:00, rate 92, normal sinus rhythm, No ST-T changes, normal [place] & QRS intervals, EP Interp.    Results review:  Lab results : [facility]   12/17/2019 6:53 CST      PT                        15.2 second(s)  HI                             INR                       1.21  HI    12/17/2019 3:00 CST      U pH                      5.5                             UA Appear                 Clear                             UA Color                  YELLOW                             UA Spec Grav              1.031  HI                             UA Bili                   0.5 mg/dL                             UA pH                     5.5                             UA Urobilinogen           2 mg/dL                             UA Blood                  Negative                             UA Glucose                Negative                             UA Ketones                Trace                             UA Protein                50 mg/dL                             UA Nitrite                Negative                             UA Leuk Est               Negative                             UA WBC Interp             0-2 /HPF                             UA RBC Interp             0-2 /HPF                             UA Bact Interp            None Seen /HPF                             UA Squam Epi Interp        /LPF                             UA Hyal  Cast Interp       3-5 /LPF                             U Temp                    24.0 DegC                             U Amph Scr                Negative                             U Fiona Scr                Negative                             U Benzodia Scr            Negative                             U Cannab Scr              Negative                             U Cocaine Scr             Positive                             U Opiate Scr              Negative                             U Phencyclidine Scr       Negative    12/17/2019 2:10 CST      Sodium Lvl                141 mmol/L                             Potassium Lvl             3.8 mmol/L                             Chloride                  110 mmol/L  HI                             CO2                       23 mmol/L                             Calcium Lvl               8.4 mg/dL  LOW                             Glucose Lvl               97 mg/dL                             BUN                       19 mg/dL  HI                             Creatinine                1.00 mg/dL                             eGFR-AA                   99 mL/min                             eGFR-CLYDE                  82 mL/min  LOW                             Bili Total                0.7 mg/dL                             Bili Direct               0.2 mg/dL                             Bili Indirect             0.5 mg/dL                             AST                       41 unit/L  HI                             ALT                       23 unit/L                             Alk Phos                  102 unit/L                             Total Protein             8.4 gm/dL  HI                             Albumin Lvl               2.8 gm/dL  LOW                             Globulin                  5.60 gm/mL  HI                             A/G Ratio                 0.5 ratio  LOW                             NT pro BNP.               13,128 pg/mL  HI                              Lipase Lvl                95 unit/L                             Total CK                  77 unit/L                             Troponin-I                0.054 ng/mL  HI                             WBC                       5.7 x10(3)/mcL                             RBC                       3.75 x10(6)/mcL  LOW                             Hgb                       11.4 gm/dL  LOW                             Hct                       36.4 %  LOW                             Platelet                  165 x10(3)/mcL                             MCV                       97.1 fL                             MCH                       30.4 pg                             MCHC                      31.3 gm/dL                             RDW                       14.4 %                             MPV                       11.1 fL  HI                             Abs Neut                  3.56 x10(3)/mcL                             Segs Man                  70 %                             Lymph Man                 16.0 %  LOW                             Monocyte Man              13 %  HI                             Eos Man                   1 %                             Basophil Man              0 %                             Hypochrom                 1+                             Platelet Est              Adequate                             Anisocyte                 1+                             Polychrom                 1+                             RBC Morph                 Abnormal  ,    No qualifying data available.    Radiology results:  Moderate pulmonary edema noted., I reviewed and interpreted the chest x-ray myself..       Reexamination/ Reevaluation   Vital signs   Basic Oxygen Information   12/17/2019 0:18 CST      SpO2                      98 %    12/16/2019 23:51 CST     SpO2                      99 %        Impression and Plan   Diagnosis   Cocaine abuse (YQI19-TT F14.10)   Acute combined systolic  (congestive) and diastolic (congestive) heart failure (VWY25-EM I50.41)   Cocaine abuse ([id number] F14.10)   SOB - Shortness of breath (PNED [id number])   Plan   Condition: Stable.    Disposition: Admit to Inpatient Telemetry Unit.    Counseled: Patient, Regarding diagnosis, Regarding diagnostic results, Regarding treatment plan, Regarding prescription, Patient indicated understanding of instructions.

## 2022-04-30 NOTE — ED PROVIDER NOTES
Patient:   Jason Perdue             MRN: [id number]            FIN: [id number]               Age:   58 years     Sex:  Male     :  1960   Associated Diagnoses:   Cough; HIV disease; History of CHF (congestive heart failure)   Author:   Shannan CHAWLA, Herbert      Basic Information   Addendum: Time of addendum:: 2/3/2019 07:00:00 , Assumed care from: Jefferson Bustos MD, Time  2/3/2019 07:00:00, Pertinent history: Follow up on lab results.     I, Dr. Campbell assuming care of patient at 0700. I performed my own history and physical.    59y/o BM with history of HIV, HTN, CAD presents with cough, onset 4 weeks ago. Pt also reports sob at times. Denies CP, ha, fever ,chills, dizziness, weakness, abdominal pain, N/V/D.        Medical Decision Making   Documents reviewed:  Emergency department nurses' notes, emergency department records, prior records.    Results review:  Lab results : Lab View   2/3/2019 6:50 CST        Sodium Lvl                142 mmol/L                             Potassium Lvl             4.2 mmol/L                             Chloride                  111 mmol/L  HI                             CO2                       24 mmol/L                             Calcium Lvl               8.9 mg/dL                             Glucose Lvl               108 mg/dL  HI                             BUN                       18 mg/dL                             Creatinine                0.90 mg/dL                             eGFR-AA                   >105 mL/min                             eGFR-CLYDE                  92 mL/min                             Bili Total                0.7 mg/dL                             Bili Direct               0.2 mg/dL                             Bili Indirect             0.5 mg/dL                             AST                       19 unit/L                             ALT                       12 unit/L                             Alk Phos                  74 unit/L                              Total Protein             7.6 gm/dL                             Albumin Lvl               3.4 gm/dL                             Globulin                  4.20 gm/mL  HI                             A/G Ratio                 0.8 ratio  LOW                             NT pro BNP.               8,797 pg/mL  HI                             Total CK                  51 unit/L                             CK MB                     1.4 ng/mL                             Troponin-I                0.051 ng/mL  HI                             WBC                       7.9 x10(3)/mcL                             RBC                       4.04 x10(6)/mcL  LOW                             Hgb                       11.9 gm/dL  LOW                             Hct                       38.4 %  LOW                             Platelet                  233 x10(3)/mcL                             MCV                       95.0 fL                             MCH                       29.5 pg                             MCHC                      31.0 gm/dL                             RDW                       15.9 %  HI                             MPV                       10.1 fL                             Abs Neut                  5.55 x10(3)/mcL                             Neutro Auto               70 x10(3)/mcL  NA                             Lymph Auto                19 %                             Mono Auto                 8 %                             Eos Auto                  2  NA                             Abs Eos                   0.14  NA                             Basophil Auto             0  NA                             Abs Neutro                5.55 x10(3)/mcL  NA                             Abs Lymph                 1.53 x10(3)/mcL  NA                             Abs Mono                  0.60 x10(3)/mcL  NA                             Abs Baso                  0.04 x10(3)/mcL  NA                              IG%                       0 %  NA                             IG#                       0.0300  NA  .   Radiology results:  Rad Results (ST)  < 12 hrs   Accession: [id number]  Order: XR Chest 2 Views  Report Dt/Tm: 02/03/2019 08:37  Report:   Indication: Cough, shortness of breath, chest pain     FINDINGS: Compared to 12/28/2018. Heart size is normal and lungs are  clear bilaterally. Pulmonary vasculature is normal. There is been  interval resolution of a small right pleural effusion. Pulmonary  vasculature is normal. A Mediport is again seen entering the right  internal jugular vein.     IMPRESSION:  1. No evidence of acute disease.  2. Resolution of a small right pleural effusion since 12/28/2018.    .   Notes:    Reviewing documentation-   02/2018- ECHO-EF 70%  12/2018- ECHO-EF 20%  12/28/18- Nuclear Medicine Scan- shows no new ischemia.    Pt was admitted for CAP in 12/2018-  troponins elevated to 1.78-cardiology was consulted and recommended outpatient workup.     On today labs- troponin elevated minimally. pt with no active CP- currently no physical symptoms of acute CHF.  .      Physical Examination               Vital Signs   Vital Signs   2/3/2019 8:00 CST        Peripheral Pulse Rate     93 bpm                             Respiratory Rate          26 br/min  HI                             SpO2                      98 %                             Oxygen Therapy            Room air                             Systolic Blood Pressure   124 mmHg                             Diastolic Blood Pressure  90 mmHg    2/3/2019 6:13 CST        Temperature Oral          36.5 DegC                             Temperature Oral (calculated)             97.70 DegF                             Peripheral Pulse Rate     94 bpm                             Respiratory Rate          20 br/min                             SpO2                      98 %                             Oxygen Therapy            Room air                              Systolic Blood Pressure   133 mmHg                             Diastolic Blood Pressure  103 mmHg  HI  .   General:  Alert, no acute distress.    Skin:  Normal for ethnicity.   Head:  Normocephalic.   Neck:  Supple, trachea midline, no tenderness.    Eye:  Pupils are equal, round and reactive to light, extraocular movements are intact, normal conjunctiva.    Cardiovascular:  Regular rate and rhythm, Normal peripheral perfusion, No edema.    Respiratory:  Lungs are clear to auscultation, respirations are non-labored, breath sounds are equal.    Gastrointestinal:  Soft, Nontender, Non distended, Normal bowel sounds.    Neurological:  Alert and oriented to person, place, time, and situation, No focal neurological deficit observed.    Psychiatric:  Cooperative.      Reexamination/ Reevaluation   Time: 2/3/2019 09:30:00 .   Vital signs   results included from flowsheet : Vital Signs   2/3/2019 9:41 CST        Temperature Oral          37.1 DegC                             Temperature Oral (calculated)             98.78 DegF                             Peripheral Pulse Rate     96 bpm                             Respiratory Rate          16 br/min                             SpO2                      100 %                             Oxygen Therapy            Room air                             Systolic Blood Pressure   147 mmHg  HI                             Diastolic Blood Pressure  92 mmHg  HI                             Mean Arterial Pressure, Cuff              110 mmHg    2/3/2019 9:30 CST        Peripheral Pulse Rate     89 bpm                             Respiratory Rate          31 br/min  HI                             SpO2                      97 %                             Oxygen Therapy            Room air                             Systolic Blood Pressure   115 mmHg                             Diastolic Blood Pressure  100 mmHg  HI    2/3/2019 8:00 CST        Peripheral Pulse Rate     93  bpm                             Respiratory Rate          26 br/min  HI                             SpO2                      98 %                             Oxygen Therapy            Room air                             Systolic Blood Pressure   124 mmHg                             Diastolic Blood Pressure  90 mmHg    2/3/2019 6:13 CST        Temperature Oral          36.5 DegC                             Temperature Oral (calculated)             97.70 DegF                             Peripheral Pulse Rate     94 bpm                             Respiratory Rate          20 br/min                             SpO2                      98 %                             Oxygen Therapy            Room air                             Systolic Blood Pressure   133 mmHg                             Diastolic Blood Pressure  103 mmHg  HI     Course: improving.   Pain status: decreased.   Assessment: exam improved, Pt NAD, VSS, pt not ill or toxic appearing, pt with no acute abdomen, no neuro defecits, no active CP or SOB. The patient is resting comfortably and in non acute distress. I personally discussed his test results and treatment plan. Specific conditions for return to the emergency department and importance of follow up with his primary care provided or the physician listed on the discharge instructions were also addressed with patient.  Patient voices understanding and agrees to the plan discussed. All patients questions have been answered at this time. He has remained hemodynamically stable throughout entire stay in ED and is stable for discharge home..    .      Impression and Plan   Diagnosis   Cough (ECJ46-XP R05)   HIV disease (YRC30-CJ B20)   History of CHF (congestive heart failure) (FUB86-PR Z86.79)   Plan   Condition: Stable.    Disposition: Medically cleared, Discharged: Time  2/3/2019 09:36:00, to home, Time 2/3/2019 09:36:00.    Prescriptions: Launch Meds List (Selected)    Prescriptions  Prescribed  Guaiatussin AC 10 mg-100 mg/5 mL oral syrup: 10 mL, Oral, q6hr, PRN PRN for cough, not to exceed 12 teaspoons/day, # 240 mL, 0 Refill(s).    Patient was given the following educational materials: Cough, Adult.    Follow up with: ; Anytime the conditions worsen, return to clinic or go to ED.    Counseled: Patient, Regarding diagnosis, Regarding diagnostic results, Regarding treatment plan, Regarding prescription.

## 2022-04-30 NOTE — ED PROVIDER NOTES
Patient:   Jason Perdue             MRN: [id number]            FIN: [id number]               Age:   58 years     Sex:  Male     :  1960   Associated Diagnoses:   Community acquired pneumonia; Bilateral pleural effusion; HIV disease   Author:   Steven CHAWLA, Ibrahima WHIPPLE      Basic Information   Time seen: Immediately upon arrival.   History source: Patient.   Arrival mode: Private vehicle.   History limitation: None.   Additional information: Chief Complaint from Nursing Triage Note : Chief Complaint   2019 14:33 CST       Chief Complaint           brought in by EMS, SOB, productive cough and weakness x 2 days, dx with pneumonia 1 month ago, also c/o intermittent lower leg swelling  .      History of Present Illness   The patient presents with difficulty breathing and cough.  The course/duration of symptoms is constant and worsening.  Degree at onset mild.  Degree at present moderate.  The Exacerbating factors is none.  The Relieving factors is none.  Risk factors consist of coronary artery disease and HIV.  Prior episodes: occasional.  Therapy today: none.  Associated symptoms: cough, weight gain, denies chest pain, denies fever, denies vomiting and denies hemoptysis.        Review of Systems   Constitutional symptoms:  No fever, no chills, no sweats, no weakness.    Skin symptoms:  No rash, no lesion.    Eye symptoms:  No recent vision problems,    Respiratory symptoms:  Negative except as documented in HPI, No orthopnea,    Cardiovascular symptoms:  Peripheral edema, no chest pain, no palpitations, no tachycardia, no syncope, no diaphoresis.    Gastrointestinal symptoms:  No abdominal pain, no nausea, no vomiting, no diarrhea, no constipation, no rectal bleeding.    Genitourinary symptoms:  No dysuria, no hematuria, no testicular pain.    Musculoskeletal symptoms:  No back pain, no Joint pain.    Neurologic symptoms:  No headache, no dizziness.              Additional review of systems  information: All other systems reviewed and otherwise negative.      Health Status   Allergies:    Allergic Reactions (Selected)  Severity Not Documented  ACE inhibitors- Angioedema.,    Allergies (1) Active Reaction  ACE inhibitors Angioedema.   Medications:  (Selected)   Inpatient Medications  Ordered  Heparin Flush 100 U/mL - 5 mL: 500 units, form: Injection, IV Push, Once-chemo, first dose 02/21/18 9:03:00 CST, stop date 02/21/18 9:03:00 CST, Days 1  Heparin Flush 100 U/mL - 5 mL: 500 units, form: Injection, IV Push, Once-chemo, first dose 04/26/18 9:44:00 CDT, stop date 04/26/18 9:44:00 CDT, Days 1  dexamethasone (for IVPB): 10 mg, IV Piggyback, Once-chemo, Infuse over: 20 minute(s), first dose 02/21/18 8:43:00 CST, stop date 02/21/18 8:43:00 CST, Days 1  Future  Bicillin L-A 1,200,000 units/2 mL intramuscular suspension: 2.4 millionunits, form: Injection, IM, Once-Unscheduled, first dose 02/15/18 7:00:00 CST, Future Order  Prescriptions  Prescribed  Coreg 3.125 mg oral tablet: 3.125 mg = 1 tab(s), Oral, BID, # 60 tab(s), 2 Refill(s), Pharmacy: [facility], [employer].  Descovy 200 mg-25 mg oral tablet: 1 tab(s), Oral, Daily, X 30 day(s), # 30 tab(s), 3 Refill(s), Pharmacy: [facility], [employer].  Ensure Plus: Ensure Plus, See Instructions, 3 cans oral daily  Chocolate & Strawberry flavors, # 96 EA, 3 Refill(s), [facility]  Guaiatussin AC 10 mg-100 mg/5 mL oral syrup: 10 mL, Oral, q6hr, PRN PRN for cough, not to exceed 12 teaspoons/day, # 240 mL, 0 Refill(s)  Lasix 20 mg oral tablet: 40 mg = 2 tab(s), Oral, Daily, # 60 tab(s), 2 Refill(s), Pharmacy: [facility], [facility].  Plavix 75 mg oral tablet: 75 mg = 1 tab(s), Oral, Daily, # 90 tab(s), 11 Refill(s)  ProAir HFA 90 mcg/inh inhalation aerosol with adapter: 2 puff(s), INH, q4hr, PRN PRN for wheezing, # 1 EA, 0 Refill(s), Pharmacy: [facility], [facility].  Tivicay 50 mg oral tablet: 50 mg = 1 tab(s), Oral, Daily, X 30  day(s), # 30 tab(s), 3 Refill(s), Pharmacy: [facility], [employer].  aspirin 81 mg oral tablet, CHEWABLE: 81 mg = 1 tab(s), Oral, Daily, # 30 tab(s), 0 Refill(s)  atorvastatin 40 mg oral tablet: 40 mg = 1 tab(s), Oral, Daily, # 90 tab(s), 11 Refill(s)  ferrous sulfate 300 mg/5 mL oral liquid: 300 mg = 5 mL, Oral, BID, X 30 day(s), # 300 mL, 2 Refill(s), [facility]  spironolactone 25 mg oral tablet: 25 mg = 1 tab(s), Oral, Daily, X 30 day(s), # 30 tab(s), 2 Refill(s), [facility]  Documented Medications  Documented  HYDROXYZINE STACEY 50 MG CAP:   KETOROLAC 10 MG TABLET: 10 mg = 1 tab(s), Oral, q6hr  ONDANSETRON 8 MG TBDP:   PHENAZOPYRIDINE 100 MG TAB:   docusate-senna 50 mg-8.6 mg oral tablet: 2 tab(s), Oral, Once a day (at bedtime), 0 Refill(s).      Past Medical/ Family/ Social History   Medical history:    Resolved  Kidney stone ([id number]):  Resolved.  Hypertension (27142849):  Resolved.  HTN (hypertension) ([id number]):  Resolved.  Atrial fibrillation (61810387):  Resolved.  HIV ([id number]):  Resolved..   Surgical history:    Catheter Insertion Mediport (None) on 3/5/2018 at 57 Years.  Comments:  3/5/2018 08:16 - Dasia Kelley RN  auto-populated from documented surgical case  Biopsy Bone Marrow Aspiration (.) on 12/21/2017 at 57 Years.  Comments:  12/22/2017 13:03 - Pao Martinez LPN  auto-populated from documented surgical case  Esophagogastroduodenoscopy on 12/15/2017 at 57 Years.  Comments:  12/15/2017 11:Daryl - Praveena Danielle RN.  auto-populated from documented surgical case  Biopsy Gastrointestinal on 12/15/2017 at 57 Years.  Comments:  12/15/2017 11:Daryl - Praveena Danielle RN.  auto-populated from documented surgical case  Colonoscopy on 12/15/2017 at 57 Years.  Comments:  12/15/2017 11:Daryl - Praveena Danielle RN.  auto-populated from documented surgical case  Colonoscopy  ([id number]) on 12/15/2017 at 57 Years.  Comments:  3/20/2018 13:11 - Nay Hopkins MD  Performed by Dr. Mcadams while inpatient at [facility] in Dec. 2017. Next one due in 5 years per operative report.  Fracture ([id number]) in 1981 at 20 Years.  left wrist repair.  cardiac stents placed..   Family history:    Primary malignant neoplasm of colon  Brother  Primary malignant neoplasm of lung  Father  Stroke  Sister  Hypertension.  Mother  Sister    Father  Mother  .   Social history: Alcohol use: Denies, Tobacco use: Occasionally, Drug use: Denies.   Problem list:    Active Problems (13)  Acute disease or injury-related malnutrition   Atrial fibrillation   Back pain   CAD - Coronary artery disease   Constipation   constipationAnxiety   HIV   HIV disease   Hyperlipidemia   Hypertension   Impaired mobility   Lymphoma   Tobacco user .      Physical Examination               Vital Signs   Vital Signs   2019 14:33 CST       Temperature Oral          36.4 DegC                             Temperature Oral (calculated)             97.52 DegF                             Peripheral Pulse Rate     99 bpm                             Respiratory Rate          18 br/min                             SpO2                      99 %                             Oxygen Therapy            Room air                             Systolic Blood Pressure   126 mmHg                             Diastolic Blood Pressure  105 mmHg  HI  .      Vital Signs (last 24 hrs)_____  Last Charted___________  Temp Oral     36.4 DegC  ( 14:33)  Heart Rate Peripheral   99 bpm  ( 14:33)  Resp Rate         18 br/min  ( 14:33)  SBP      126 mmHg  ( 14:33)  DBP      H 105mmHg  ( 14:33)  SpO2      99 %  ( 14:33).   Basic Oxygen Information   2019 14:33 CST       SpO2                      99 %                             Oxygen Therapy            Room air  .   General:  Alert, mild distress.    Skin:  Warm, dry, pink,  intact, no pallor, no rash.    Head:  Normocephalic, atraumatic.    Neck:  Supple, trachea midline, no tenderness, no JVD, no carotid bruit.    Eye:  Pupils are equal, round and reactive to light, extraocular movements are intact, normal conjunctiva.    Ears, nose, mouth and throat:  Oral mucosa moist, no pharyngeal erythema or exudate.    Cardiovascular:  Regular rate and rhythm, No murmur, Normal peripheral perfusion, No edema.    Respiratory:  Lungs are clear to auscultation, breath sounds are equal, Symmetrical chest wall expansion, Respirations: Tachypneic, respiratory distress mild.    Gastrointestinal:  Soft, Nontender, Non distended, Normal bowel sounds, No organomegaly.    Back:  Nontender, Normal range of motion.    Musculoskeletal:  Normal ROM, normal strength, no swelling.    Neurological:  Alert and oriented to person, place, time, and situation, No focal neurological deficit observed, normal motor observed, normal speech observed, normal coordination observed.    Psychiatric:  Cooperative, appropriate mood & affect.       Medical Decision Making   Rationale:  Pt finished outpatient levaquin Tx for pneumonia but no improvement. CXR and CT worsening. Will admit fr outpatient treatment failure.   Documents reviewed:  Emergency department nurses' notes, emergency department records, prior records.    Electrocardiogram:  Time 2/9/2019 14:38:00, rate 104, no ectopy, normal OR & QRS intervals, EP Interp, The Rhythm is sinus tachycardia.  , The Axis is normal.  , STT segments Non specific changes.    Results review:  Lab results : Lab View   2/9/2019 15:03 CST       WBC                       7.8 x10(3)/mcL                             Hgb                       12.0 gm/dL  LOW                             Hct                       37.9 %  LOW                             Platelet                  219 x10(3)/mcL                             Neutro Auto               65 x10(3)/mcL  NA                              Lymph Auto                24 %                             Mono Auto                 9 %  , Lab results : Lab View   2/9/2019 15:03 CST       Sodium Lvl                140 mmol/L                             Potassium Lvl             4.3 mmol/L                             CO2                       23 mmol/L                             Calcium Lvl               8.5 mg/dL                             Glucose Lvl               88 mg/dL                             BUN                       20 mg/dL  HI                             Creatinine                1.00 mg/dL                             Bili Total                0.6 mg/dL                             AST                       22 unit/L                             ALT                       19 unit/L                             Alk Phos                  88 unit/L                             NT pro BNP.               8,466 pg/mL  HI                             Total CK                  87 unit/L                             CK MB                     1.8 ng/mL                             Troponin-I                0.049 ng/mL                             WBC                       7.8 x10(3)/mcL                             Hgb                       12.0 gm/dL  LOW                             Hct                       37.9 %  LOW                             Platelet                  219 x10(3)/mcL                             Neutro Auto               65 x10(3)/mcL  NA                             Lymph Auto                24 %                             Mono Auto                 9 %  , Lab results : Lab View   2/9/2019 15:03 CST       LDH                       277 unit/L  HI  ,    No qualifying data available.    Radiology results:  Rad Results (ST)  < 12 hrs   Accession: [id number]  Order: XR Chest 2 Views  Report Dt/Tm: 02/09/2019 15:31  Report:   Clinical History  Chest Pain     Technique  2 views of the chest.     Comparison  February 3, 2019     Findings  Right lower  lobe opacification. Right-sided Mediport remains in place.  The cardiomediastinal silhouette is unchanged. No pneumothorax. No  acute osseous abnormality.        Impression  Right lower lobe opacification. Findings concerning for pneumonia.  Recommend follow to resolution.    .      Impression and Plan   Diagnosis   Community acquired pneumonia (MYS61-XA J18.9)   Bilateral pleural effusion (DNT55-BB J90)   HIV disease (LBB59-GY B20)      Calls-Consults   -  2/9/2019 16:52:00 , Medicine, consult.    Plan   Condition: Stable.    Disposition: Admit time  2/9/2019 16:52:00, Admit to Inpatient Unit.    Counseled: Patient, Regarding diagnosis, Regarding diagnostic results, Regarding treatment plan, Patient indicated understanding of instructions.

## 2022-04-30 NOTE — CONSULTS
"   Patient:   Jason Perdue             MRN: [id number]            FIN: [id number]               Age:   57 years     Sex:  Male     :  1960   Associated Diagnoses:   None   Author:   Miladys Barker      Chief Complaint   We have been consulted by Dr. Michelle to evaluate this patient for anemia, +FOBT.      History of Present Illness   Mr. Perdue is a 57 year old AAM who has been seen by our group in the recent past.  He has a past medical history of HIV/AIDS, large B cell lymphoma, a fib, HTN, and recent cardiac stent placement.  He presented to the ED c/o abdominal pain.  He describes a "few month" history of abdominal pain that worsened prior to presentation.  His pain is in his mid abdomen and is worse after eating.  He denies n/v.  He is feeling better this morning and was able to tolerate solid food for breakfast.  He has loose stools, about 5-6 BMs yesterday but none so far today, that are green in color.  He denies black tarry stools.      He was found to be anemic on admission with a hgb of 7.1 g/dL.  He received 2 units prbc, and hgb is up to 9.6 g/dL.  He was started on a protonix drip.  CT of the abd/pelvis this admission shows progressive adenopathy, stable splenic masses, and stable left adrenal mass.    He was recently admitted to the hospital and underwent EGD and colonoscopy on 12/15/17 with Dr. Brown due to melena and abnormal CT of the GI tract.  EGD found nodular mucosa in the duodenum, and pathology was c/w diffuse large B cell lymphoma and +H. pylori.  Colonoscopy found internal hemorrhoids.  He was treated with clarithromycin, Flagyl, and Augmentin per chart notes.  He is uncertain if he completed his abx.  He has an appointment with the oncology clinic at [facility] on 18.        Health Status   Allergies:    Allergic Reactions (All)  Severity Not Documented  ACE inhibitors- Angioedema.  Canceled/Inactive Reactions (All)  No Known Allergies  No Known Medication Allergies,  "   Allergies (1) Active Reaction  ACE inhibitors Angioedema     Current medications:  (Selected)   Inpatient Medications  Ordered  Bentyl: 20 mg, form: Injection, IM, q6hr PRN for spasm, first dose 18 18:17:00 CST, 30,022  Sodium Chloride 0.9% 100 mL + Pantoprazole additive for cont. infusion 80 m mL, 100 mL, IV, 10 mL/hr, start date 18 14:48:00 CST  Zofran 2 mg/mL injectable solution: 4 mg, form: Injection, IV Push, q6hr PRN for nausea, first dose 18 17:17:00 CST, 30,022  morphine 10 mg/mL preservative-free intravenous solution: 4 mg, form: Injection, IV Push, q4hr PRN for pain, first dose 18 17:16:00 CST, 26,051  Prescriptions  Prescribed  Descovy 200 mg-25 mg oral tablet: 1 tab(s), Oral, Daily, # 30 tab(s), 3 Refill(s)  Eliquis 5 mg oral tablet: 5 mg = 1 tab(s), Oral, BID, # 60 tab(s), 0 Refill(s)  Flagyl 500 mg oral tablet: 500 mg = 1 tab(s), Oral, q12hr, X 14 day(s), # 28 tab(s), 0 Refill(s)  Megace 40 mg/mL oral suspension: 800 mg = 20 mL, Oral, Daily, # 600 mL, 0 Refill(s)  Plavix 75 mg oral tablet: 75 mg = 1 tab(s), Oral, Daily, # 30 tab(s), 0 Refill(s)  Protonix 40 mg ORAL enteric coated tablet: 40 mg = 1 tab(s), Oral, BID, # 28 tab(s), 0 Refill(s)  Tivicay 50 mg oral tablet: 50 mg = 1 tab(s), Oral, Daily, # 30 tab(s), 3 Refill(s)  acetaminophen-oxycodone 325 mg-5 mg oral tablet: 1 tab(s), Oral, BID, X 14 day(s), # 28 tab(s), 0 Refill(s)  aspirin 81 mg oral tablet, CHEWABLE: 81 mg = 1 tab(s), Oral, Daily, # 30 tab(s), 0 Refill(s)  atorvastatin 40 mg oral tablet: 40 mg = 1 tab(s), Oral, Daily, # 30 tab(s), 0 Refill(s)  clarithromycin 500 mg oral tablet: 500 mg = 1 tab(s), Oral, q12hr, X 14 day(s), # 28 tab(s), 0 Refill(s)  metoprolol tartrate 25 mg oral tab: 25 mg = 1 tab(s), Oral, BID, # 60 tab(s), 0 Refill(s)  sulfamethoxazole-trimethoprim 800 mg-160 mg oral tablet: 1 tab(s), Oral, Daily, X 30 day(s), # 30 tab(s), 1 Refill(s)  Documented Medications  Documented  levofloxacin  750 mg oral tablet: 750 mg = 1 tab(s), Oral, Once, # 1 tab(s), 0 Refill(s)      Histories   Past Medical History:    Resolved  Kidney stone ([id number]):  Resolved.  Hypertension ([id number]):  Resolved.  HTN (hypertension) ([id number]):  Resolved.  Atrial fibrillation (45107600):  Resolved.  HIV ([id number]):  Resolved.   Procedure history:    Biopsy Bone Marrow Aspiration (.) on 2017 at 57 Years.  Comments:  2017 13:03 - Pao Martinez LPN  auto-populated from documented surgical case  Esophagogastroduodenoscopy on 12/15/2017 at 57 Years.  Comments:  12/15/2017 11:22 - Praveena Danielle RN  auto-populated from documented surgical case  Biopsy Gastrointestinal on 12/15/2017 at 57 Years.  Comments:  12/15/2017 11:22 - Praveena Danielle RN  auto-populated from documented surgical case  Colonoscopy on 12/15/2017 at 57 Years.  Comments:  12/15/2017 11:22 - Praveena Danielle RN  auto-populated from documented surgical case  Fracture ([id number]) in  at 21 Years.  left wrist repair.     PMHX: HIV/AIDS, large B cell lymphoma, a fib, HTN     PSHX: EGD, colonoscopy, cardiac stent, bone marrow bx, left wrist repair     SOCIAL HX:  He quit smoking cigarettes about one month ago and also quit using crack cocaine around that time.  He denies etoh use.    FAMILY HX:  Brother  of colon cancer.      Review of Systems   Constitutional:  No fever, No chills, No sweats, No weakness.    Respiratory:  No shortness of breath, No cough, No hemoptysis, No wheezing.    Cardiovascular:  No chest pain, No palpitations, No peripheral edema, No syncope.    Gastrointestinal:  See HPI .    Musculoskeletal:  No back pain, No muscle pain, No decreased range of motion.    Integumentary:  No rash, No pruritus, No skin lesion.    Neurologic:  No confusion, No numbness, No tingling.    All other systems are negative      Physical Examination   General:  Alert and oriented,  No acute distress.       Vital Signs (last 24 hrs)_____  Last Charted___________  Temp Oral     36.6 DegC  (JAN 04 07:23)  Heart Rate Peripheral   H 136bpm  (JAN 04 07:23)  Resp Rate         18 br/min  (JAN 04 07:23)  SBP      111 mmHg  (JAN 04 07:23)  DBP      79 mmHg  (JAN 04 07:23)  SpO2      100 %  (JAN 04 07:23)     Eye:  Normal conjunctiva, Sclerae are nonicteric.    HENT:  Normocephalic.    Respiratory:  Respirations are non-labored.    Cardiovascular:  irregularly irregular .    Gastrointestinal:  Soft, mild nonspecific ttp, no rebound or guarding, +bs.    Integumentary:  Warm, Dry, Pink, Intact.    Neurologic:  Alert, Oriented.    Psychiatric:  Cooperative, Appropriate mood & affect.       Review / Management   Results review:  All Results   1/4/2018 3:28 CST        WBC                       11.4 x10(3)/mcL                             RBC                       3.28 x10(6)/mcL  LOW                             Hgb                       9.6 gm/dL  LOW                             Hct                       30.8 %  LOW                             Platelet                  528 x10(3)/mcL  HI                             MCV                       93.9 fL                             MCH                       29.3 pg                             MCHC                      31.2 gm/dL  LOW                             RDW                       17.2 %  HI                             MPV                       9.6 fL                             Abs Neut                  8.32 x10(3)/mcL                             Neutro Auto               73 %  NA                             Lymph Auto                9 %  NA                             Mono Auto                 12 %  NA                             Eos Auto                  0 %  NA                             Abs Eos                   0.0 x10(3)/mcL                             Basophil Auto             0 %  NA                             Abs Neutro                8.32  x10(3)/mcL                             Abs Lymph                 1.0 x10(3)/mcL                             Abs Mono                  1.4 x10(3)/mcL  HI                             Abs Baso                  0.0 x10(3)/mcL                             Sodium Lvl                134 mmol/L  LOW                             Potassium Lvl             5.1 mmol/L                             Chloride                  104 mmol/L                             CO2                       23.0 mmol/L                             Calcium Lvl               7.7 mg/dL  LOW                             Glucose Lvl               112 mg/dL  HI                             BUN                       22.0 mg/dL  HI                             Creatinine                0.99 mg/dL                             eGFR-AA                   >60 mL/min/1.73 m2  NA                             eGFR-CLYDE                  >60 mL/min/1.73 m2  NA    1/3/2018 11:57 CST       Lactic Acid Lvl           1.9 mmol/L    1/3/2018 11:26 CST       Bili Total                0.2 mg/dL                             Bili Direct               0.10 mg/dL                             Bili Indirect             0.10 mg/dL                             AST                       30 unit/L                             ALT                       6 unit/L  LOW                             Alk Phos                  36 unit/L  LOW                             Total Protein             5.3 gm/dL  LOW                             Albumin Lvl               1.60 gm/dL  LOW                             Globulin                  3.70 gm/dL  HI                             A/G Ratio                 0.4 ratio  LOW                             Lipase Lvl                219 unit/L  .    Radiology results   Rad Results (ST)   * Final Report *    Reason For Exam  Abdominal Pain    Radiology Report     CT Abdomen and Pelvis W Contrast     REASON FOR STUDY: Abdominal Pain     Total  mGy*cm     COMPARISON:  12/14/2017 and 10/13/2017     FINDINGS:     There are several tiny, subcentimeter cysts scattered through the  liver. There is a stable 4.1 cm mass and 2 cm mass in the spleen.  Pancreas appears normal. Gallbladder is not enlarged. There is a  stable 1.6 cm left adrenal mass. Right adrenal gland is normal. There  are small bilateral renal cysts. There is no hydronephrosis. There is  para-aortic adenopathy. Nodes measure up to 1.8 cm x 1.4 cm. These  appear slightly larger. There is also mesenteric adenopathy with nodes  measuring up to 1.7 cm x 1.7 cm. There is no bowel obstruction or  ascites. Appendix is normal.     Impression:     1. Progressive adenopathy.     2. Stable splenic masses.     3. Stable left adrenal mass.         Impression and Plan   57 year old AAM who has been seen by our group in the recent past.  He has a past medical history of HIV/AIDS, large B cell lymphoma, a fib, HTN, and recent cardiac stent placement.  He presented to the ED c/o abdominal pain.    1.  Abdominal pain  2.  Anemia  3.  +FOBT   4.  Nodular duodenal mucosa with pathology showing diffuse large B-cell lymphoma    He denies melena; stools are green in color per patient.  Suspect anemia and +FOBT likely secondary to duodenal findings of large B-cell lymphoma.   Hgb with appropriate rise after transfusion with 2 units prbc.  Hgb now 9.6 g/dL.  His abdominal pain is improved this morning, and he tolerated a solid diet for breakfast.    - Continue PPI  - Monitor H/H and transfuse as needed  - Symptomatic treatment  - Diet as tolerated  - Follow up with oncology as scheduled at [facility] on 1/11/18      Professional Services   Thank you for this consult, please call if you have any questions or concerns.

## 2022-04-30 NOTE — ED PROVIDER NOTES
Patient:   Jason Perdue             MRN: [id number]            FIN: [id number]               Age:   58 years     Sex:  Male     :  1960   Associated Diagnoses:   Bronchitis; Pneumonia; Chronic CHF   Author:   Darius Hernandez MD      Basic Information   Time seen: Date & time 2018 06:05:00.   History source: Patient.   Arrival mode: Private vehicle.   History limitation: None.   Additional information: Chief Complaint from Nursing Triage Note : Chief Complaint   2018 6:02 CST       Chief Complaint           productive cough-onset last night arrive per aasi  .      History of Present Illness   The patient presents with cough.  The onset was 1  days ago.  The course/duration of symptoms is constant.  Character dry.  The degree at onset was minimal.  The degree at present is minimal.  The exacerbating factor is none.  The relieving factor is none.  Risk factors consist of smoking.  Prior episodes: occasional.  Therapy today: none.  Associated symptoms: nasal congestion and denies shortness of breath.        Review of Systems   Constitutional symptoms:  No fever, no chills.    Respiratory symptoms:  Cough, wheezing, No shortness of breath,    Cardiovascular symptoms:  No chest pain,              Additional review of systems information: All other systems reviewed and otherwise negative.      Health Status   Allergies:    Allergic Reactions (Selected)  Severity Not Documented  ACE inhibitors- Angioedema..   Medications:  (Selected)   Inpatient Medications  Ordered  Heparin Flush 100 U/mL - 5 mL: 500 units, form: Injection, IV Push, Once-chemo, first dose 18 9:03:00 CST, stop date 18 9:03:00 CST, Days 1  Heparin Flush 100 U/mL - 5 mL: 500 units, form: Injection, IV Push, Once-chemo, first dose 18 9:44:00 CDT, stop date 18 9:44:00 CDT, Days 1  IVF Normal Saline NS Bolus 1000ml 1,000 mL: 1,000 mL, 1,000 mL, IV, Bolus, start date 18 6:13:00 CST  dexamethasone (for IVPB):  10 mg, IV Piggyback, Once-chemo, Infuse over: 20 minute(s), first dose 02/21/18 8:43:00 CST, stop date 02/21/18 8:43:00 CST, Days 1  Future  Bicillin L-A 1,200,000 units/2 mL intramuscular suspension: 2.4 millionunits, form: Injection, IM, Once-Unscheduled, first dose 02/15/18 7:00:00 CST, Future Order  Prescriptions  Prescribed  Cardizem  mg/24 hours oral CAPsule, extended release: 360 mg = 1 cap(s), Oral, Daily, # 30 cap(s), 6 Refill(s), Pharmacy: [facility], [facility].  Descovy 200 mg-25 mg oral tablet: 1 tab(s), Oral, Daily, # 30 tab(s), 3 Refill(s), Pharmacy: [facility]  Ensure Plus: Ensure Plus, See Instructions, 3 cans oral daily  Chocolate & Strawberry flavors, # 96 EA, 3 Refill(s), Pharmacy: [facility]  Plavix 75 mg oral tablet: 75 mg = 1 tab(s), Oral, Daily, # 90 tab(s), 11 Refill(s)  Tivicay 50 mg oral tablet: 50 mg = 1 tab(s), Oral, Daily, # 30 tab(s), 3 Refill(s), Pharmacy: [facility]  aspirin 81 mg oral tablet, CHEWABLE: 81 mg = 1 tab(s), Oral, Daily, # 30 tab(s), 0 Refill(s)  atorvastatin 40 mg oral tablet: 40 mg = 1 tab(s), Oral, Daily, # 90 tab(s), 11 Refill(s)  metoprolol tartrate 50 mg oral tab: 50 mg = 1 tab(s), Oral, BID, # 180 tab(s), 11 Refill(s)  nitroglycerin 0.4 mg sublingual TAB: 0.4 mg = 1 tab(s), SL, q5min, PRN PRN for chest pain, # 1 bottle(s), 3 Refill(s)  Documented Medications  Documented  HYDROCODON APAP 10 325:   HYDROCODON-APAP 5-325:   HYDROXYZINE STACEY 50 MG CAP:   KETOROLAC 10 MG TABLET: 10 mg = 1 tab(s), Oral, q6hr  ONDANSETRON 8 MG TBDP:   PHENAZOPYRIDINE 100 MG TAB:   PREDNISONE 20 MG TAB:   TAMSULOSIN HCL 0 4 MG CAPS: 0.4 mg = 1 cap(s), Oral, Daily  docusate-senna 50 mg-8.6 mg oral tablet: 2 tab(s), Oral, Once a day (at bedtime), 0 Refill(s).      Past Medical/ Family/ Social History   Medical history:    Resolved  Kidney stone ([id number]):  Resolved.  Hypertension (22190129):  Resolved.  HTN (hypertension)  ([id number]):  Resolved.  Atrial fibrillation (17897120):  Resolved.  HIV ([id number]):  Resolved..   Surgical history:    Catheter Insertion Mediport (None) on 3/5/2018 at 57 Years.  Comments:  3/5/2018 08:16 - Allie SAM, Dasia WALKER  auto-populated from documented surgical case  Biopsy Bone Marrow Aspiration (.) on 2017 at 57 Years.  Comments:  2017 13:03 - Pao Martinez LPN  auto-populated from documented surgical case  Esophagogastroduodenoscopy on 12/15/2017 at 57 Years.  Comments:  12/15/2017 11:22 - Praveena Danielle RN  auto-populated from documented surgical case  Biopsy Gastrointestinal on 12/15/2017 at 57 Years.  Comments:  12/15/2017 11:22 - Praveena Danielle RN  auto-populated from documented surgical case  Colonoscopy on 12/15/2017 at 57 Years.  Comments:  12/15/2017 11:22 - Praveena Danielle RN  auto-populated from documented surgical case  Colonoscopy ([id number]) on 12/15/2017 at 57 Years.  Comments:  3/20/2018 13:11 - Nay Hopkins MD  Performed by Dr. Mcadams while inpatient at [facility] in Dec. 2017. Next one due in 5 years per operative report.  Fracture ([id number]) in  at 20 Years.  left wrist repair.  cardiac stents placed..   Family history:    Primary malignant neoplasm of colon  Brother  Primary malignant neoplasm of lung  Father  Stroke  Sister  Hypertension.  Mother  Sister    Father  Mother  .   Social history: Reviewed as documented in chart.      Physical Examination               Vital Signs   Vital Signs   2018 8:44 CST       Peripheral Pulse Rate     96 bpm                             SpO2                      95 %                             Oxygen Therapy            Room air                             Systolic Blood Pressure   125 mmHg                             Diastolic Blood Pressure  90 mmHg                             Mean Arterial Pressure, Cuff              102 mmHg    2018 8:42 CST       Oxygen Therapy             Room air    12/5/2018 6:55 CST       Peripheral Pulse Rate     107 bpm  HI                             SpO2                      97 %                             Oxygen Therapy            Room air                             Systolic Blood Pressure   117 mmHg                             Diastolic Blood Pressure  80 mmHg    12/5/2018 6:44 CST       Peripheral Pulse Rate     105 bpm  HI                             Respiratory Rate          22 br/min                             SpO2                      97 %                             Oxygen Therapy            Room air                             Systolic Blood Pressure   122 mmHg                             Diastolic Blood Pressure  100 mmHg  HI    12/5/2018 6:02 CST       Temperature Oral          36.7 DegC                             Temperature Oral (calculated)             98.06 DegF                             Peripheral Pulse Rate     110 bpm  HI                             Respiratory Rate          24 br/min                             SpO2                      97 %                             Oxygen Therapy            Room air                             Systolic Blood Pressure   142 mmHg  HI                             Diastolic Blood Pressure  111 mmHg  HI  .   Measurements   12/5/2018 6:02 CST       Weight Estimated          80 kg  .   Basic Oxygen Information   12/5/2018 8:44 CST       SpO2                      95 %                             Oxygen Therapy            Room air    12/5/2018 8:42 CST       Oxygen Therapy            Room air    12/5/2018 6:55 CST       SpO2                      97 %                             Oxygen Therapy            Room air    12/5/2018 6:44 CST       SpO2                      97 %                             Oxygen Therapy            Room air    12/5/2018 6:02 CST       SpO2                      97 %                             Oxygen Therapy            Room air  .   General:  Alert, no acute distress.     Skin:  Warm, pink, intact, no rash.    Eye:  Pupils are equal, round and reactive to light, extraocular movements are intact, normal conjunctiva.    Ears, nose, mouth and throat:  Oral mucosa moist, no pharyngeal erythema or exudate.    Neck:  Supple, trachea midline, no tenderness, no JVD.    Cardiovascular:  Regular rate and rhythm, No murmur, Normal peripheral perfusion, No edema.    Respiratory:  Breath sounds are equal, Symmetrical chest wall expansion, Respirations: Regular, Breath sounds: Rhonchi present, wheezes present mild, Retractions: None.    Musculoskeletal:  Normal ROM, normal strength, no tenderness, no swelling, no deformity.    Chest wall   Gastrointestinal:  Soft, Nontender, Non distended, Normal bowel sounds.    Neurological:  Alert and oriented to person, place, time, and situation, No focal neurological deficit observed, CN II-XII intact, normal sensory observed, normal motor observed, normal speech observed, normal coordination observed.    Lymphatics   Psychiatric:  Cooperative, appropriate mood & affect.       Medical Decision Making   Results review:  Lab results : Lab View   12/5/2018 6:30 CST       Influ A Ag                Negative                             Influ B Ag                Negative    12/5/2018 6:13 CST       Sodium Lvl                140 mmol/L                             Potassium Lvl             4.1 mmol/L                             Chloride                  103 mmol/L                             CO2                       26.6 mmol/L                             Calcium Lvl               9.1 mg/dL                             Glucose Lvl               106 mg/dL                             BUN                       18.0 mg/dL                             Creatinine                0.94 mg/dL                             eGFR-AA                   >60 mL/min/1.73 m2  NA                             eGFR-CLYDE                  >60 mL/min/1.73 m2  NA                             Bili  Total                0.6 mg/dL                             Bili Direct               0.16 mg/dL                             Bili Indirect             0.44 mg/dL                             AST                       24 unit/L                             ALT                       8 unit/L  LOW                             Alk Phos                  71 unit/L                             Total Protein             8.8 gm/dL  HI                             Albumin Lvl               3.60 gm/dL                             Globulin                  5.20 gm/dL  HI                             A/G Ratio                 0.7 ratio  LOW                             BNP                       10,402.0 pg/mL  HI                             WBC                       8.4 x10(3)/mcL                             RBC                       4.06 x10(6)/mcL  LOW                             Hgb                       12.2 gm/dL  LOW                             Hct                       39.3 %  LOW                             Platelet                  274 x10(3)/mcL                             MCV                       96.8 fL  HI                             MCH                       30.0 pg                             MCHC                      31.0 gm/dL  LOW                             RDW                       15.8 %                             MPV                       10.3 fL                             Abs Neut                  6.27 x10(3)/mcL                             Neut Man                  81 %  HI                             Lymph Man                 14 %                             Monocyte Man              3 %                             Eos Man                   1 %                             Myelo Man                 1 %  HI                             Platelet Est              Normal                             Anisocyte                 1+                             Poik                      1+                             RBC  Morph                 Abnormal                             Ovalocytes                1+  .   Radiology results:  Rad Results (ST)  < 12 hrs   Accession: [id number]  Order: XR Chest 2 Views  Report Dt/Tm: 12/05/2018 07:53  Report:   EXAMINATION  XR Chest 2 Views     INDICATION  Cough     Comparison: 20 August, 2018     FINDINGS  Lines/tubes/devices:  Power-injectable right chest wall subcutaneous port is in unchanged  position.     The cardiomediastinal silhouette and central pulmonary vasculature are  unremarkable.  Trachea is midline. There are subtle irregular scattered nodular  opacities through the right perihilar lung, which may represent  developing pneumonia. No organized consolidation is appreciated. Left  lung field is clear. There is no pleural effusion or pneumothorax.     The visualized extrathoracic soft tissues and upper abdomen are  without focal abnormality.  No acute osseous displacement is identified.     IMPRESSION  Irregular right perihilar scattered opacities may resent developing  pneumonia.     Follow-up 2-view chest radiographs are recommended in 6-8 weeks to  ensure resolution.    .      Reexamination/ Reevaluation   Time: 12/5/2018 08:48:00 .   Vital signs   per nurse's notes   Course: improving.   Assessment: exam improved, lungs clear  bp good  no increase work of breathing  .      Impression and Plan   Diagnosis   Bronchitis (WNW88-AC J40)   Pneumonia (QOH11-IQ J18.9)   Chronic CHF (ZKL53-IU I50.9)   Plan   Condition: Improved, Stable.    Disposition: Medically cleared, Discharged: Time  12/5/2018 08:49:00, to home.    Prescriptions: Launch prescriptions   Pharmacy:  Lasix 20 mg oral tablet (Prescribe): 60 mg = 3 tab(s), Oral, Daily, X 2 day(s), # 6 tab(s), 0 Refill(s), Pharmacy: [facility]  ProAir HFA 90 mcg/inh inhalation aerosol with adapter (Prescribe): 2 puff(s), INH, q4hr, PRN PRN for wheezing, # 1 EA, 0 Refill(s), Pharmacy: [facility]  Levaquin 500 mg oral tablet (Prescribe): 500 mg = 1 tab(s), Oral, q24hr, X 10 day(s), # 10 tab(s), 0 Refill(s), Pharmacy: [facility], [facility]..    Patient was given the following educational materials: Acute Bronchitis, Adult, Heart Failure, Heart Failure, Acute Bronchitis, Adult.    Limitations: Limited activity.    Follow up with: No No PCP; Follow-up with PCP in 3 to 5 days Follow-up with PCP in 3 to 5 days.    Counseled: Patient, Regarding diagnosis, Regarding diagnostic results, Regarding treatment plan, Regarding prescription, Patient indicated understanding of instructions.

## 2022-04-30 NOTE — CONSULTS
Patient:   Jason Perdue             MRN: [id number]            FIN: [id number]               Age:   57 years     Sex:  Male     :  1960   Associated Diagnoses:   None   Author:   Edward Rene      Consultation Information   Consultation:  SBO.       Basic Information   Admit information:  58yo BM with pmhx of HIV/AIDS (now on ARTs), B-cell Lymphoma with assc'd diffuse abdominal LAD, h/o GI bleed with anemia 1 month ago. Presents to [facility] ED with worsening abdominal pain for the last week.  He was discharged from our services on  after stabilization of his previous bleed.  He did have a EGD/colonoscopy on 12/15 that was only significant for duodenal nodules. He states the pain has continued and cramping getting worse over the last week.  He has also been having profuse dark watery stools 6 - 7x a days.  He did make his first ID clinic appointment during which they started him on ARTs and ran initial HIV blood work.  He was noted to be positive for syphilis, of which I informed the patient. In the ED he is FOBT positive again and they have already ordered two units of pRBCs.  The hospitalist group was asked to admit for further work up. .    Source of history:  Self, Medical record.    Present at bedside:  Family member, Medical personnel.    Referral source:  Emergency department.    History limitation:  None.       History of Present Illness   Admit for above. Overnight, loose BM that he had been having stopped and he began vomitting what was reported to be feculent appearring. Occult blood was +. NG tube was placed. GI was consulted and prtonix dripped was started.       Review of Systems   Constitutional:  Weakness, Fatigue, Decreased activity.    Respiratory:  Negative.    Cardiovascular:  Negative.    Gastrointestinal:  Nausea, Vomiting, Diarrhea, diarrhea has resolved and no BM since last night..    Genitourinary:  Negative.    Endocrine:  Negative.    Musculoskeletal:  Negative.     Integumentary:  Negative.       Health Status   Allergies:    Allergic Reactions (Selected)  Severity Not Documented  ACE inhibitors- Angioedema.,    Allergies (1) Active Reaction  ACE inhibitors Angioedema     Problem list:    All Problems  Tobacco user / SNOMED CT 010182559 / Probable  Acute disease or injury-related malnutrition / SNOMED CT 157993419314170 / Confirmed      Histories   Past Medical History:    Resolved  Kidney stone ([id number]):  Resolved.  Hypertension (47099036):  Resolved.  HTN (hypertension) ([id number]):  Resolved.  Atrial fibrillation (84885319):  Resolved.  HIV (99780019):  Resolved.   Family History:    Stroke  Sister  Hypertension.  Mother  Sister     Procedure history:    Biopsy Bone Marrow Aspiration (.) on 12/21/2017 at 57 Years.  Comments:  12/22/2017 13:03 - Pao Martinez LPN  auto-populated from documented surgical case  Esophagogastroduodenoscopy on 12/15/2017 at 57 Years.  Comments:  12/15/2017 11:22 - Praveena Danielle RN  auto-populated from documented surgical case  Biopsy Gastrointestinal on 12/15/2017 at 57 Years.  Comments:  12/15/2017 11:22 - Praveena Danielle RN  auto-populated from documented surgical case  Colonoscopy on 12/15/2017 at 57 Years.  Comments:  12/15/2017 11:22 - Praveena Danielle RN  auto-populated from documented surgical case  Fracture ([id number]) in 1981 at 21 Years.  left wrist repair.      Physical Examination   Vital Signs   1/10/2018 4:36 CST       Temperature Oral          36.8 DegC                             Temperature Oral (calculated)             98.24 DegF                             Peripheral Pulse Rate     87 bpm                             SpO2                      100 %                             Systolic Blood Pressure   128 mmHg                             Diastolic Blood Pressure  82 mmHg                             Mean Arterial Pressure, Cuff              97 mmHg     1/9/2018 23:30 CST       Temperature Oral          36.4 DegC                             Temperature Oral (calculated)             97.52 DegF                             Peripheral Pulse Rate     71 bpm                             SpO2                      100 %                             Systolic Blood Pressure   126 mmHg                             Diastolic Blood Pressure  81 mmHg                             Mean Arterial Pressure, Cuff              96 mmHg    1/9/2018 23:00 CST       Oxygen Therapy            Room air    1/9/2018 21:37 CST       Temperature Oral          37 DegC                             Temperature Oral (calculated)             98.60 DegF                             Peripheral Pulse Rate     90 bpm                             SpO2                      99 %                             Systolic Blood Pressure   130 mmHg                             Diastolic Blood Pressure  86 mmHg                             Mean Arterial Pressure, Cuff              100 mmHg    1/9/2018 21:00 CST       Peripheral Pulse Rate     83 bpm                             Oxygen Therapy            Room air    1/9/2018 18:59 CST       Temperature Oral          36.4 DegC                             Temperature Oral (calculated)             97.52 DegF                             Peripheral Pulse Rate     89 bpm                             Systolic Blood Pressure   122 mmHg                             Diastolic Blood Pressure  84 mmHg                             Mean Arterial Pressure, Cuff              97 mmHg    1/9/2018 18:56 CST       Temperature Oral          36.4 DegC                             Temperature Oral (calculated)             97.52 DegF                             Peripheral Pulse Rate     105 bpm  HI                             Systolic Blood Pressure   127 mmHg                             Diastolic Blood Pressure  84 mmHg                             Mean Arterial Pressure, Cuff              98 mmHg     1/9/2018 18:00 CST       Heart Rate Monitored      99 bpm                             Oxygen Therapy            Room air    1/9/2018 16:00 CST       Heart Rate Monitored      68 bpm                             Oxygen Therapy            Room air    1/9/2018 15:48 CST       Temperature Oral          37.6 DegC                             Temperature Oral (calculated)             99.68 DegF                             Peripheral Pulse Rate     95 bpm                             SpO2                      100 %                             Systolic Blood Pressure   121 mmHg                             Diastolic Blood Pressure  81 mmHg                             Mean Arterial Pressure, Cuff              95 mmHg    1/9/2018 11:09 CST       Temperature Oral          36.7 DegC                             Temperature Oral (calculated)             98.06 DegF                             Peripheral Pulse Rate     87 bpm                             SpO2                      99 %                             Systolic Blood Pressure   125 mmHg                             Diastolic Blood Pressure  84 mmHg                             Mean Arterial Pressure, Cuff              98 mmHg    1/9/2018 8:00 CST        Heart Rate Monitored      65 bpm                             Oxygen Therapy            Room air    1/9/2018 7:19 CST        Temperature Oral          36.5 DegC                             Temperature Oral (calculated)             97.70 DegF                             Peripheral Pulse Rate     64 bpm                             SpO2                      100 %                             Systolic Blood Pressure   114 mmHg                             Diastolic Blood Pressure  73 mmHg                             Mean Arterial Pressure, Cuff              87 mmHg    1/9/2018 5:00 CST        Temperature Oral          36.5 DegC                             Temperature Oral (calculated)             97.70 DegF                              Peripheral Pulse Rate     66 bpm                             Respiratory Rate          18 br/min                             SpO2                      100 %                             Oxygen Therapy            Room air                             Systolic Blood Pressure   111 mmHg                             Diastolic Blood Pressure  66 mmHg    1/9/2018 2:22 CST        Heart Rate Monitored      64 bpm    1/9/2018 0:00 CST        Heart Rate Monitored      58 bpm  LOW     General:  Alert and oriented, Mild distress.    Respiratory:  Normal, Lungs are clear to auscultation.    Cardiovascular:  Normal, Normal rate.    Gastrointestinal:  Soft, tender throughout. Not distended. , Ng with dark black material. .    Musculoskeletal:  Normal.    Cognition and Speech:  Oriented, Speech clear and coherent.       Review / Management   Results review:     Labs (Last four charted values)  Glucose              84 (WENDI 10) 89 (JAN 09) 104 (JAN 08) H 110 (JAN 07) .    Laboratory Results   Today's Lab Results : PowerNote Discrete Results   1/10/2018 3:50 CST       WBC                       11.4 x10(3)/mcL                             RBC                       3.67 x10(6)/mcL  LOW                             Hgb                       10.7 gm/dL  LOW                             Hct                       33.3 %  LOW                             Platelet                  481 x10(3)/mcL  HI                             MCV                       90.7 fL                             MCH                       29.2 pg                             MCHC                      32.1 gm/dL  LOW                             RDW                       18.7 %  HI                             MPV                       9.3 fL  LOW                             Sodium Lvl                132 mmol/L  LOW                             Potassium Lvl             5.3 mmol/L  HI                             Chloride                  105 mmol/L                             CO2                        14.0 mmol/L  LOW                             Calcium Lvl               8.2 mg/dL  LOW                             Glucose Lvl               84 mg/dL                             BUN                       27.0 mg/dL  HI                             Creatinine                0.91 mg/dL                             eGFR-AA                   >60 mL/min/1.73 m2  NA                             eGFR-CLYDE                  >60 mL/min/1.73 m2  NA    1/10/2018 3:35 CST       Occult Bld Ron           Positive                             pH Gastric                2.0  NA           Impression and Plan   gastric decompression for minimum of 48 hours. Consult GI. Agree with Q6 H/H and protonix. No plans for surgery at this time. We will continue to see patient daily.

## 2022-04-30 NOTE — DISCHARGE SUMMARY
Patient:   Jason Perdue             MRN: [id number]            FIN: [id number]               Age:   59 years     Sex:  Male     :  1960   Associated Diagnoses:   None   Author:   Demetria Stokes MD      Discharge Summary  Admit Date: 2019  Discharge Date: 2019  Attending physician: Dr. Jin  Resident physician: Dr. Stokes    Admit diagnosis: Same as discharge  Discharge diagnosis:   Heart failure, unspecified (I50.9)   Cocaine abuse (F14.1)   Pleural effusion, not elsewhere classified (J90)     PMHx: CHF (last EF 20% ), CAD status post 2 stents, hyperlipidemia, hypertension, atrial fibrillation, HIV (last CD4 ) on HAART therapy, history of B-cell lymphoma in remission last 2 years  Allergies: ACE inhibitors (Angioedema)  Nitroglycerin Transdermal System (Itching)    Consultants: none    Hospital Course  Presenting complaint: 59-year-old male with past medical history of CHF (last EF 20% ), CAD status post 2 stents, hyperlipidemia, hypertension, atrial fibrillation, HIV (last CD4 ) on HAART therapy, history of B-cell lymphoma in remission last 2 years, presented to the [facility] ED with complaints of shortness of breath for the last few days. Patient was recently discharged from [facility] on 4/3/19 after admission for CHF, appropriately diuresed 2 L, diagnostic/therapeutic thoracentesis was done on the right side for a pleural effusion which was present for last 4 months. On this admission patient complains of shortness of breath on exertion NYHA class III, orthopnea, PND, mild bilateral swelling of legs, feeling bloated in the abdomen. Patient states compliance with medication, complains of cough, productive white sputum, no fever, no chills, no chest pain. On previous admission thoracentesis results revealed transudate, serosanguineous, cytology was negative for any malignant cells, likely are reactive.  Brief hospital course: During his hospital course he put out  a total of 2350 L with resolution of shortness of breath symptoms.  Still complaining of dry cough that is been ongoing for several days prior to admission.  Patient hemodynamically stable for discharge home with close follow-up in cardiology and with post [facility] internal medicine clinic; instructed to keep scheduled appointments.  Additional interventions or changes in medications recommended at this time.  Discussed extensively the importance of discontinuation of cocaine use especially with current comorbid conditions.  Imaging:     XR Chest 1 View  CLINICAL: Pleural effusion.     COMPARISON: April 21, 2019.                       FINDINGS: Cardiopericardial silhouette is within normal limits. There  is similar size right pleural effusion. Right lower lung zone  atelectasis and/or infiltrates are again similar. The left lung and  the pleural space are clear. No pulmonary edema. No pneumothorax.  Right chest implanted portacatheter terminates within the proximal  superior vena cava.      IMPRESSION:  No significant interval change.    Procedures: none    Discharge Plan  Discharge condition: Stable    Discharge medications:  Prescribed  dextromethorphan-guaiFENesin (dextromethorphan-guaifenesin 10 mg-100 mg/5 mL oral liquid) 10 mL, Oral, q4hr, PRN cough  Continue  ascorbic acid (Vitamin C 500 mg oral tablet) 500 mg, Oral, Daily  aspirin (aspirin 81 mg oral tablet, CHEWABLE) 81 mg, Oral, Daily  atorvastatin (atorvastatin 20 mg oral tablet) 40 mg, Oral, Daily  atorvastatin (atorvastatin 40 mg oral tablet)  carvedilol (Coreg 6.25 mg oral tablet) 6.25 mg, Oral, BID  carvedilol (carvedilol 3.125 mg oral tablet) 3.125 mg, Oral, BID  clopidogrel (Plavix 75 mg oral tablet) 75 mg, Oral, Daily  codeine-guaifenesin (codeine-guaifenesin 10 mg-100 mg/5 mL oral syrup)  docusate-senna (docusate-senna 50 mg-8.6 mg oral tablet) 2 tab(s), Oral, Once a day (at bedtime)  dolutegravir (Tivicay 50 mg oral tablet) 50 mg, Oral,  Daily  emtricitabine-tenofovir (Descovy 200 mg-25 mg oral tablet) 1 tab(s), Oral, Daily  ferrous sulfate (ferrous sulfate 220 mg/5 mL (44 mg elemental iron) oral elixir)  furosemide (Lasix 20 mg oral tablet) 40 mg, Oral, Daily  spironolactone (spironolactone 25 mg oral tablet) 25 mg, Oral, Daily  Discontinue  alfuzosin (alfuzosin 10 mg oral tablet, extended release) 10 mg, Oral, Daily  amoxicillin-clavulanate (amoxicillin-clavulanate 875 mg-125 mg oral tablet) 1 tab(s), Oral, BID  levoFLOXacin (levofloxacin 750 mg oral tablet) 750 mg, Oral, Daily    Discharge instructions:   Diet: Regular diet as tolerated  Activity: No restrictions as tolerated  Recommend discontinue use of cocaine and any other illicit drugs due to comorbidities.    Follow-up plan:   Report to Emergency Department if symptoms return or worsen  [facility] - Medicine Clinic, on 04/30/2019, Keep scheduled appointment  [facility] - Infectious Disease Clinic, on 05/16/2019, Keep scheduled appointment  [facility] - Cardiology Clinic, on 05/23/2019, Keep scheduled appointment    Patient discharged to: Home    Demetria Stokes MD  [facility] resident, HO-1

## 2022-04-30 NOTE — ED PROVIDER NOTES
Patient:   Jason Perdue             MRN: [id number]            FIN: [id number]               Age:   57 years     Sex:  Male     :  1960   Associated Diagnoses:   GI bleed; Symptomatic anemia; B-cell lymphoma; Platelet inhibition due to Plavix; Current use of aspirin; On continuous oral anticoagulation; HIV positive   Author:   Kayla Joseph MD      Basic Information   Time seen: Date & time 1/3/2018 11:20:00.   History source: Patient, EMS.   Arrival mode: Ambulance.   History limitation: None.   Additional information: Chief Complaint from Nursing Triage Note : Chief Complaint   1/3/2018 11:13 CST       Chief Complaint           abd cramping and diarrhea x 1 week. hx intestinal tumors  .      History of Present Illness   The patient presents with 56 y/o AA male with history of duodenal mass, CAD, lymphoma, and HIV presents to ED via EMS c/o lower abdominal cramping onset 1 week ago. Patient reports diarrhea. Patient was discharged on 2017 after presenting to the ED for SOB but was found to have guaiac positive stool and was anemic. Patient denies black/bloody stool and fever..  The onset was 1  weeks ago.  The course/duration of symptoms is worsening.  The character of symptoms is crampy.  The degree at onset was moderate.  The Location of pain at onset was lower and abdominal.  The degree at present is moderate.  The Location of pain at present is lower and abdominal.  Radiating pain: none. The exacerbating factor is none.  The relieving factor is none.  Therapy today: emergency medical services.  Risk factors consist of coronary artery disease and hx of duodenal mass.  Associated symptoms: diarrhea.        Review of Systems   Constitutional symptoms:  No fever, no chills.    Skin symptoms:  No jaundice, no rash.    Eye symptoms:  Vision unchanged, No blurred vision,    Respiratory symptoms:  No shortness of breath, no orthopnea, no cough, no sputum production.    Cardiovascular symptoms:   No chest pain, no palpitations, no diaphoresis, no peripheral edema.    Gastrointestinal symptoms:  Abdominal pain, cramping, lower  , diarrhea, denies black/bloody stool, no nausea, no vomiting, no constipation.    Genitourinary symptoms:  No dysuria, no hematuria.    Neurologic symptoms:  No headache, no dizziness.    Hematologic/Lymphatic symptoms:  Bleeding tendency negative,    Allergy/immunologic symptoms:  No impaired immunity,              Additional review of systems information: All other systems reviewed and otherwise negative.      Health Status   Allergies:    Allergic Reactions (Selected)  Severity Not Documented  ACE inhibitors- Angioedema..   Medications:  (Selected)   Prescriptions  Prescribed  Descovy 200 mg-25 mg oral tablet: 1 tab(s), Oral, Daily, # 30 tab(s), 3 Refill(s)  Eliquis 5 mg oral tablet: 5 mg = 1 tab(s), Oral, BID, # 60 tab(s), 0 Refill(s)  Flagyl 500 mg oral tablet: 500 mg = 1 tab(s), Oral, q12hr, X 14 day(s), # 28 tab(s), 0 Refill(s)  Megace 40 mg/mL oral suspension: 800 mg = 20 mL, Oral, Daily, # 600 mL, 0 Refill(s)  Plavix 75 mg oral tablet: 75 mg = 1 tab(s), Oral, Daily, # 30 tab(s), 0 Refill(s)  Protonix 40 mg ORAL enteric coated tablet: 40 mg = 1 tab(s), Oral, BID, # 28 tab(s), 0 Refill(s)  Tivicay 50 mg oral tablet: 50 mg = 1 tab(s), Oral, Daily, # 30 tab(s), 3 Refill(s)  acetaminophen-oxycodone 325 mg-5 mg oral tablet: 1 tab(s), Oral, BID, X 14 day(s), # 28 tab(s), 0 Refill(s)  aspirin 81 mg oral tablet, CHEWABLE: 81 mg = 1 tab(s), Oral, Daily, # 30 tab(s), 0 Refill(s)  atorvastatin 40 mg oral tablet: 40 mg = 1 tab(s), Oral, Daily, # 30 tab(s), 0 Refill(s)  clarithromycin 500 mg oral tablet: 500 mg = 1 tab(s), Oral, q12hr, X 14 day(s), # 28 tab(s), 0 Refill(s)  metoprolol tartrate 25 mg oral tab: 25 mg = 1 tab(s), Oral, BID, # 60 tab(s), 0 Refill(s)  sulfamethoxazole-trimethoprim 800 mg-160 mg oral tablet: 1 tab(s), Oral, Daily, X 30 day(s), # 30 tab(s), 1  Refill(s)  Documented Medications  Documented  levofloxacin 750 mg oral tablet: 750 mg = 1 tab(s), Oral, Once, # 1 tab(s), 0 Refill(s).      Past Medical/ Family/ Social History   Medical history:    Resolved  Kidney stone ([id number]):  Resolved.  Hypertension ([id number]):  Resolved.  HTN (hypertension) ([id number]):  Resolved.  HIV ([id number]):  Resolved.  Atrial fibrillation ([id number]):  Resolved., Reviewed as documented in chart.   Surgical history:    Biopsy Bone Marrow Aspiration (.) on 12/21/2017 at 57 Years.  Comments:  12/22/2017 13:03 - Pao Martinez LPN  auto-populated from documented surgical case  Esophagogastroduodenoscopy on 12/15/2017 at 57 Years.  Comments:  12/15/2017 11:22 - Praveena Danielle RN  auto-populated from documented surgical case  Biopsy Gastrointestinal on 12/15/2017 at 57 Years.  Comments:  12/15/2017 11:22 - Praveena Danielle RN  auto-populated from documented surgical case  Colonoscopy on 12/15/2017 at 57 Years.  Comments:  12/15/2017 11:22 - Praveena Danielle RN  auto-populated from documented surgical case  Fracture ([id number]) in 1981 at 21 Years.  left wrist repair., Reviewed as documented in chart.   Family history:    Stroke  Sister  Hypertension.  Mother  Sister  , Reviewed as documented in chart.   Social history: Alcohol use: Denies, Tobacco use: Regularly, Drug use: Cocaine.   Problem list:    Active Problems (1)  Tobacco user   .      Physical Examination               Vital Signs   Vital Signs   1/3/2018 11:20 CST       Peripheral Pulse Rate     87 bpm                             SpO2                      100 %                             Oxygen Therapy            Room air                             Systolic Blood Pressure   103 mmHg  (Modified)                            Diastolic Blood Pressure  76 mmHg  (Modified)                            Mean Arterial Pressure, Cuff              85 mmHg  (Modified)    1/3/2018 11:13 CST       Temperature Temporal Artery               37.9 DegC  HI                             Peripheral Pulse Rate     91 bpm                             Respiratory Rate          18 br/min                             SpO2                      99 %                             Oxygen Therapy            Room air                             Systolic Blood Pressure   131 mmHg                             Diastolic Blood Pressure  74 mmHg  .   Measurements   1/3/2018 11:13 CST       Weight Dosing             72.5 kg                             Weight Measured and Calculated in Lbs     159.83 lb                             Weight Estimated          72.5 kg                             Height/Length Dosing      181 cm                             Height/Length Estimated   181 cm                             Body Mass Index Estimated 22.13 kg/m2  .   Basic Oxygen Information   1/3/2018 11:20 CST       SpO2                      100 %                             Oxygen Therapy            Room air    1/3/2018 11:13 CST       SpO2                      99 %                             Oxygen Therapy            Room air  .   General:  Alert, no acute distress.    Skin:  Warm, dry.    Head:  Normocephalic, atraumatic.    Neck:  Supple, trachea midline.    Eye:  Normal conjunctiva.   Ears, nose, mouth and throat:  Oral mucosa moist.   Cardiovascular:  Regular rate and rhythm, No murmur, Normal peripheral perfusion, No edema.    Respiratory:  Lungs are clear to auscultation, respirations are non-labored.    Gastrointestinal:  Soft, Non distended, Normal bowel sounds, Tenderness: Generalized, lower abdominal, Guarding: Negative, Rebound: Negative, Rectal exam: Stool color black, guaiac (positive,  OK).    Neurological:  Alert and oriented to person, place, time, and situation.   Psychiatric:  Cooperative.      Medical Decision Making   Documents reviewed:  Emergency department nurses' notes, emergency  "department records.    Orders  Launch Order Profile (Selected)   Inpatient Orders  Ordered  30 Day Readmission: 01/03/18 11:15:15 CST, Stop date 01/03/18 11:15:15 CST, "This patient has had an inpatient, observation, outpatient bedded or emergency visit within the last 30 days."  Admit as Inpatient: 01/03/18 14:21:00 CST, Miguel Angel CHAWLA, Mike CONTRERAS Remote Telemetry, No  Capacity Management Bed Request: 01/03/18 14:21:41 CST, Remote Telemetry  Discharge Planning Ongoing Assessment: 01/06/18 9:00:00 CST, q3day  Fall Risk Protocol: 01/03/18 11:31:03 CST, Constant Order  Peripheral IV Insertion: 01/03/18 11:26:00 CST  Transfusion Order RBC's: 01/03/18 14:32:00 CST, Now collect, Blood, Lab Collect, Packed RBC, To Give, 2, 01/03/18, Print Label By Order Location, 01/03/18 14:32:00 CST  Type and Crossmatch 1st order: 01/03/18 14:32:00 CST, Routine collect, Blood, Lab Collect, Packed RBC, To Give, 2, 01/03/18, Print Label By Order Location, 01/03/18 14:32:00 CST  Canceled  Automated Diff: Stat collect, 01/03/18 11:26:00 CST, Blood, Collected, Once, Stop date 01/03/18 11:26:00 CST, Lab Collect, Print Label By Order Location, 01/03/18 11:26:00 CST  Completed  CBC w/ Auto Diff: Stat collect, 01/03/18 11:26:00 CST, Blood, Once, Stop date 01/03/18 11:26:00 CST, Lab Collect, Print Label By Order Location, 01/03/18 11:26:00 CST  CMP: Stat collect, 01/03/18 11:26:00 CST, Blood, Once, Stop date 01/03/18 11:26:00 CST, Lab Collect, Print Label By Order Location, 01/03/18 11:26:00 CST  CT Abdomen and Pelvis W Contrast: Stat, 01/03/18 12:28:00 CST, Abdominal Pain, None, Wheelchair, Patient Has IV?, Rad Type, Schedule this test, [facility], 01/03/18 12:28:00 CST  Estimated Glomerular Filtration Rate: Stat collect, 01/03/18 11:26:00 CST, Blood, Collected, Once, Stop date 01/03/18 11:26:00 CST, Lab Collect, Print Label By Order Location, 01/03/18 11:26:00 CST  Lactic Acid: Stat collect, 01/03/18 11:26:00 CST, Blood, Stop date 01/03/18 " 11:26:00 CST, Lab Collect, Print Label By Order Location, 01/03/18 11:26:00 CST  Lipase Level: Stat collect, 01/03/18 11:26:00 CST, Blood, Once, Stop date 01/03/18 11:26:00 CST, Lab Collect, Print Label By Order Location, 01/03/18 11:26:00 CST  Magnesium Level: Stat collect, 01/03/18 11:26:00 CST, Blood, Once, Stop date 01/03/18 11:26:00 CST, Lab Collect, Print Label By Order Location, 01/03/18 11:26:00 CST  Manual Diff: Stat collect, 01/03/18 11:26:00 CST, Blood, Collected, Once, Stop date 01/03/18 11:26:00 CST, Lab Collect, Print Label By Order Location, 01/03/18 11:26:00 CST  Zofran 2 mg/mL injectable solution: 4 mg, form: Injection, IV Push, Once, first dose 01/03/18 12:28:00 CST, stop date 01/03/18 12:28:00 CST, STAT, 24  iopamidol: form: Soln, IV, AdHoc, first dose 01/03/18 13:29:01 CST, stop date 01/03/18 13:29:01 CST, [id number]  morphine 4 mg/mL preservative-free intravenous solution: 4 mg, form: Injection, IV Push, Once, first dose 01/03/18 12:28:00 CST, stop date 01/03/18 12:28:00 CST, STAT, 24.   Results review:  Lab results : Lab View   1/3/2018 11:57 CST       Lactic Acid Lvl           1.9 mmol/L    1/3/2018 11:26 CST       Sodium Lvl                132 mmol/L  LOW                             Potassium Lvl             4.9 mmol/L                             Chloride                  104 mmol/L                             CO2                       21.0 mmol/L                             Calcium Lvl               7.7 mg/dL  LOW                             Magnesium Lvl             2.0 mg/dL                             Glucose Lvl               100 mg/dL                             BUN                       18.0 mg/dL                             Creatinine                0.86 mg/dL                             eGFR-AA                   >60 mL/min/1.73 m2  NA                             eGFR-CLYDE                  >60 mL/min/1.73 m2  NA                             Bili Total                0.2 mg/dL                              Bili Direct               0.10 mg/dL                             Bili Indirect             0.10 mg/dL                             AST                       30 unit/L                             ALT                       6 unit/L  LOW                             Alk Phos                  36 unit/L  LOW                             Total Protein             5.3 gm/dL  LOW                             Albumin Lvl               1.60 gm/dL  LOW                             Globulin                  3.70 gm/dL  HI                             A/G Ratio                 0.4 ratio  LOW                             Lipase Lvl                219 unit/L                             WBC                       11.3 x10(3)/mcL                             RBC                       2.64 x10(6)/mcL  LOW                             Hgb                       7.1 gm/dL  LOW                             Hct                       26.1 %  LOW                             Platelet                  678 x10(3)/mcL  HI                             MCV                       98.9 fL  HI                             MCH                       26.9 pg  LOW                             MCHC                      27.2 gm/dL  LOW                             RDW                       20.0 %  HI                             MPV                       9.4 fL                             Abs Neut                  8.08 x10(3)/mcL                             Neut Man                  88 %  HI                             Lymph Man                 4 %  LOW                             Monocyte Man              4 %                             Whitman Man                  1 %  HI                             Myelo Man                 2 %  HI                             Abn Lymph Man             1 %  HI                             NRBC Man                  1 %  NA                             Hypochrom                 1  NA                             Platelet Est               Increased                             Anisocyte                 2  NA                             Poik                      1  NA                             Macrocyte                 2  NA                             Polychrom                 1  NA                             Target Cell               1  NA                             Mushtaq Cells                1  , Interpretation Abnormal results  Progressively worsening macrocytic anemia compared to prior labs from recent discharge.    Radiology results:  Reviewed radiologist's report, Rad Results (ST)  < 12 hrs   Accession: [id number]  Order: CT Abdomen and Pelvis W Contrast  Report Dt/Tm: 01/03/2018 14:27  Report:      CT Abdomen and Pelvis W Contrast     REASON FOR STUDY: Abdominal Pain     Total  mGy*cm     COMPARISON: 12/14/2017 and 10/13/2017     FINDINGS:     There are several tiny, subcentimeter cysts scattered through the  liver. There is a stable 4.1 cm mass and 2 cm mass in the spleen.  Pancreas appears normal. Gallbladder is not enlarged. There is a  stable 1.6 cm left adrenal mass. Right adrenal gland is normal. There  are small bilateral renal cysts. There is no hydronephrosis. There is  para-aortic adenopathy. Nodes measure up to 1.8 cm x 1.4 cm. These  appear slightly larger. There is also mesenteric adenopathy with nodes  measuring up to 1.7 cm x 1.7 cm. There is no bowel obstruction or  ascites. Appendix is normal.     Impression:     1. Progressive adenopathy.     2. Stable splenic masses.     3. Stable left adrenal mass.      .       Reexamination/ Reevaluation   Vital signs   results included from flowsheet : Vital Signs   1/3/2018 14:00 CST       Peripheral Pulse Rate     99 bpm                             SpO2                      98 %                             Oxygen Therapy            Room air                             Systolic Blood Pressure   115 mmHg                             Diastolic Blood Pressure  84 mmHg                              Mean Arterial Pressure, Cuff              94 mmHg     Course: improving.   Pain status: decreased.   Notes: Patient was drop in H&H compared to discharge laboratory values from a couple of weeks ago.  I reviewed the records, he had a colonoscopy on that hospitalization which demonstrated no colonic bleeding; however, has multiple Small intestine B-cell lymphomatous masses which are likely causing some ongoing slow GI bleeding.  He is also on multiple anticoagulant medications including Eliquis, Plavix, and aspirin.  CT scan obtained demonstrates no acute pathology.  Will admit for blood transfusion, Protonix drip to see if we can alleviate his pain as well as whole the bleeding so he does not require ongoing transfusions. Findings and plan discussed with the patient, and he is agreeable to admission at this time.   .      Impression and Plan   Diagnosis   GI bleed (QEQ48-OC K92.2)   Platelet inhibition due to Plavix (FQL17-MD D69.59)   On continuous oral anticoagulation (PHA92-FW Z79.01)   HIV positive (MYS19-QY Z21)   Current use of aspirin (WRQ40-YH Z79.82)   Symptomatic anemia (EZG39-OM D64.9)   B-cell lymphoma (RQW20-KB C85.10)   Plan   Condition: Stable.    Disposition: Admit time  1/3/2018 14:21:00, Admit to Inpatient Telemetry Unit, Miguel Angel CHAWLA, Mike CONTRERAS, Case discussed with Vicki Johnson NP.    Counseled: Patient, Regarding diagnosis, Regarding diagnostic results, Regarding treatment plan, Patient indicated understanding of instructions.    Notes: IJose, acted solely as a scribe for and in the presence of Dr. Joseph who performed the service.  , I, Kayla Joseph, have independently performed the history, physical, medical decision making and procedures as documented above and agree with the scribe's documentation. .

## 2022-04-30 NOTE — CONSULTS
Patient:   Jason Perdue             MRN: [id number]            FIN: [id number]               Age:   57 years     Sex:  Male     :  1960   Associated Diagnoses:   AIDS; Anemia; Atrial fibrillation with rapid ventricular response; CAD (coronary artery disease); Diffuse large B-cell lymphoma; Lung mass   Author:   Lynda Swanson MD      Admit Date: 1/3/18  Consult Date: 1/10/18    CC: SBO    HPI: 56 yo BM presented to the ED at [facility] in 2017 with reports of abdominal epigastric pain, black stools and weight loss for the past 2-3 months. He had been to the ED at [facility] on 17 and 17, as well as [facility] on 17. He has a PMH of HTN, atrial fibrillation--recently diagnosed, tobacco abuse and admits to cocaine abuse. On admission, patient was found to be anemic and EKG showed Afib with RVR. Cardiology was consulted and patient was initially on cardizem drip then converted to PO.  CT A/P done w/o contrast 17 which showed new right pleural effusion, RLL atelectasis, and nodular alveolar infiltrate as well as a small pericardial effusion, thickened and edematous jejunum and proximal ileum c/w ileitis/jejunitis vs. neoplasm, worsening mesenteric and retroperitoneal adenopathy, nodular enlargement left adrenal gland measures 4X2.5cm  CT chest w/ contrast done 17 also reveal a 2.8 cm RLL mass vs. consolidation and bilateral scattered nodules.   Upper/lower GI scopes on 12/15/17 revealed ulcerative duodenal nodules concerning for malignancy. Biopsies were taken at that time and pathology confirmed diffuse large B-cell lymphoma. Biopsies of stomach antrum showed moderate active chronic gastritis with H. pylori infection. Patient also was also found to be HIV+ with CD4 count of 131.  Pulmonary was consulted and on 17 patient had bedside thoracentesis. Pleural fluid was sent for cytology and was negative.  Patient also had LHC on 17 with stent placed in the right  circumflex artery. During the procedure patient went into [facility] and had to be cardioverted. He spent the evening in the ICU on an amiodarone drip and then transferred back to floor. Oncology was consulted for lymphoma. Bone marrow biopsy was done on 12/21/17 which showed no evidence for lymphoma.   Patient was eventually discharged o 12/22/17 and had outpatient follow-up in Oncology Clinic at [facility] on 1/11/18. He also was seen by ID clinic at [facility] and started on HIV medications. However, he presented to [facility] ED again on 1/3/18 with worsening abdominal pain. He was noted to be anemic again with Hgb 7.1g/dL and FOBT positive and was given 2 units PRBC. He was also noted to be in Afib with RVR and cardiology is managing. On the evening of 1/9/18, patient began having intractable vomiting. CT abdomen done 1/9/18 showed trace of right pleural effusion and considerable improvement right lower lung lobe atelectasis combined with infiltrates, right lung visualized nodules without interval change, extensive abdomen lymphadenopathy without significant interval change, distended stomach and proximal to mid small bowel loops suggest small bowel obstruction which probably is partial. NG tube was placed and aspirate is positive for occult blood. Surgery consulted and have recommended to continue NG tube for at least 48 hours. Oncology consulted again for evaluation and GI also consulted.  Patient has had confusion off and on.  Sister in room as well as aunts and cousins.  I had a long talk with patient and family and explained situation. I explained that the lymphoma appears to have worsened and now causing bowel obstruction. I explained that chemotherapy could make him more sick in his condition but if we don't treat his lymphoma he will die from disease. He understands the risks and has agreed to proceed forward with chemotherapy. He also does not want any CPR or intubation and a DNR order has been placed.      Review of Systems    Constitutional:  Weakness, Fatigue, No fever, No chills.    Eye:  No recent visual problem.    Ear/Nose/Mouth/Throat:  No decreased hearing, No nasal congestion, No sore throat.    Respiratory:  No shortness of breath, No cough, No wheezing.    Cardiovascular:  No chest pain, No palpitations, No peripheral edema.    Gastrointestinal:  Nausea, Vomiting, Abdominal pain, No diarrhea, No constipation.    Hematology/Lymphatics:  No bruising tendency, No bleeding tendency.    Musculoskeletal:  No back pain, No joint pain.    Integumentary:  No rash, No skin lesion.    Neurologic:  Confusion, No headache.    Psychiatric:  No anxiety, No depression.    All other systems are negative      Health Status   Allergies:    Allergies (1) Active Reaction  ACE inhibitors Angioedema     Current medications:    Medications (29) Active  Scheduled: (14)  aspirin 81 mg Chew tab  81 mg 1 tab(s), Oral, Daily  chlorpromazine 25 mg Tab UD (/5mg)  25 mg 1 tab(s), Oral, TID  clopidogrel 75 mg Tab UD  75 mg 1 tab(s), Oral, Daily  cyanocobalamin 1000 mcg/ml Inj  1,000 mcg 1 mL, Subcutaneous, Daily  Descovy  200-25mg  1 tab(s), Oral, Daily  diltiazem 240 mg/24 hours CD  UD  240 mg 1 cap(s), Oral, Daily  ergocalciferol 50,000 units Cap  50,000 units 1 cap(s), Oral, qSunday  megestrol 40 mg/mL Johnna UD (10mL)  800 mg 20 mL, Oral, Daily  metoprolol tartrate 50 mg Tab UD  50 mg 1 tab(s), Oral, BID  pantoprazole 40 mg EC Tab  40 mg 1 tab(s), Oral, BID  patiromer 8.4 g Powd  8.4 gm 1 packet(s), Oral, Daily  sodium bicarbonate 650 mg Tab  1,300 mg 2 tab(s), Oral, BID  sulfamethox-trimethop 800-160mg Tab  1 tab(s), Oral, Daily  Tivicay 50 mg oral tablet  1 tab, Oral, Daily  Continuous: (3)  D5W 100 mL + diltiazem 125 mg  100 mL, IV  sodium chloride 0.45% 1,000 mL + sodium bicarbonate 150 mEq  1,000 mL, IV, 125 mL/hr  sodium chloride 0.9% 100 mL + pantoprazole  Inj 80 mg  100 mL, IV, 10 mL/hr  PRN: (12)  dicyclomine 10 mg/ml inj  20 mg 2 mL, IM,  q6hr  diltiazem 5 mg/ml Inj  5 mg 1 mL, IV Push, q1hr  labetalol 5 mg/mL 20mL vial  20 mg 4 mL, IV Push, q2hr  loperamide 2 mg Cap UD  2 mg 1 cap(s), Oral, q6hr  LORazepam 1 mg Tab UD  1 mg 1 tab(s), Oral, q12hr  metoprolol 1 mg/ml Inj-5 ml  5 mg 5 mL, IV Push, q5min  metoprolol 1 mg/ml Inj-5 ml  5 mg 5 mL, IV Push, q1hr  morphine 10 mg/ml Inj - 1 mL  4 mg 0.4 mL, IV Push, q4hr  morphine 15 mg Tab (IR)  15 mg 1 tab(s), Oral, q6hr  ondansetron 2 mg/mL inj - 2mL  4 mg 2 mL, IV Push, q6hr  promethazine 25 mg/mL Inj  12.5 mg 0.5 mL, IV, q6hr  temazepam 15 mg Cap UD  15 mg 1 cap(s), Oral, Once a day (at bedtime)        Histories   Past Medical History:    Resolved  Kidney stone ([id number]):  Resolved.  Hypertension ([id number]):  Resolved.  HTN (hypertension) ([id number]):  Resolved.  Atrial fibrillation (91003513):  Resolved.  HIV ([id number]):  Resolved.   Family History:    Stroke  Sister  Hypertension.  Mother  Sister     Procedure history:    Biopsy Bone Marrow Aspiration (.) on 12/21/2017 at 57 Years.  Comments:  12/22/2017 13:03 - Pao Martinez LPN  auto-populated from documented surgical case  Esophagogastroduodenoscopy on 12/15/2017 at 57 Years.  Comments:  12/15/2017 11:22 - Praveena Danielle RN  auto-populated from documented surgical case  Biopsy Gastrointestinal on 12/15/2017 at 57 Years.  Comments:  12/15/2017 11:22 - Praveena Danielle RN  auto-populated from documented surgical case  Colonoscopy on 12/15/2017 at 57 Years.  Comments:  12/15/2017 11:22 - Praveena Danielle RN  auto-populated from documented surgical case  Fracture ([id number]) in 1981 at 21 Years.  left wrist repair.   Social History        Social & Psychosocial Habits    Alcohol  07/12/2016  Use: Never    04/18/2013 Risk Assessment: Denies Alcohol Use    11/09/2017  Use: Past    Type: Beer    Comment: over 20 years ag. - 11/09/2017 11:28 - Ida Williamson    Substance  Abuse  03/22/2015 Risk Assessment: Denies Substance Abuse    07/12/2016  Use: Never    04/18/2013 Risk Assessment: Denies Substance Abuse    06/13/2017  Use: Never    11/18/2017  Use: Current    Type: crack    Comment: 2 days - 11/18/2017 18:14 - Ramy SAM, Janessa DOBBINS    Tobacco  09/27/2015 Risk Assessment: High Risk    12/26/2017  Use: Former smoker    Type: Cigarettes    Tobacco use per day: 20  .        Physical Examination      Vital Signs (last 24 hrs)_____  Last Charted___________  Temp Oral     36.2 DegC  (WENDI 10 11:09)  Heart Rate Peripheral   H 102bpm  (WENDI 10 11:09)  SBP      H 144mmHg  (WENDI 10 11:09)  DBP      89 mmHg  (WENDI 10 11:09)  SpO2      98 %  (WENDI 10 11:09)     General:  Alert and oriented, No acute distress, chronically ill-appearing thin black male.    Eye:  Normal conjunctiva, Vision unchanged.    HENT:  Normocephalic, Normal hearing, Oral mucosa is moist, NG tube in place.    Neck:  Supple, No jugular venous distention, No lymphadenopathy, No thyromegaly.    Respiratory:  +decreased breath sounds in bases worse on right.    Cardiovascular:  Regular rhythm, No murmur, No gallop, Tachycardia, Normal peripheral perfusion, +trace edema bilateral LE.    Gastrointestinal:  No organomegaly, +abdominal distention, hypoactive bowel sounds.    Lymphatics:  No lymphadenopathy neck, axilla, groin.    Musculoskeletal:  No deformity, generalized weakness.    Integumentary:  Warm, Intact.    Neurologic:  Alert, Oriented, No focal deficits.    Psychiatric:  Cooperative, Appropriate mood & affect.       Review / Management   Results review:     Labs (Last four charted values)  Glucose              84 (WENDI 10) 89 (JAN 09) 104 (JAN 08) H 110 (JAN 07) .    Laboratory Results   Today's Lab Results : PowerNote Discrete Results   1/10/2018 3:50 CST       WBC                       11.4 x10(3)/mcL                             RBC                       3.67 x10(6)/mcL  LOW                             Hgb                        10.7 gm/dL  LOW                             Hct                       33.3 %  LOW                             Platelet                  481 x10(3)/mcL  HI                             MCV                       90.7 fL                             MCH                       29.2 pg                             MCHC                      32.1 gm/dL  LOW                             RDW                       18.7 %  HI                             MPV                       9.3 fL  LOW                             Sodium Lvl                132 mmol/L  LOW                             Potassium Lvl             5.3 mmol/L  HI                             Chloride                  105 mmol/L                             CO2                       14.0 mmol/L  LOW                             Calcium Lvl               8.2 mg/dL  LOW                             Glucose Lvl               84 mg/dL                             BUN                       27.0 mg/dL  HI                             Creatinine                0.91 mg/dL                             eGFR-AA                   >60 mL/min/1.73 m2  NA                             eGFR-CLYDE                  >60 mL/min/1.73 m2  NA    1/10/2018 3:35 CST       Occult Bld Ron           Positive                             pH Gastric                2.0  NA           Impression and Plan   Diagnosis     AIDS (GBH42-XA B20).     Anemia (FIT20-BG D64.9).     Atrial fibrillation with rapid ventricular response (TPW66-NU I48.91).     CAD (coronary artery disease) (MEU22-HR I25.10).     Diffuse large B-cell lymphoma (KOA89-XO C83.3).     Lung mass (OLU94-UD R91.8).     Plan:  Complicated patient with AIDS, with Diffuse large B-cell lymphoma--stage IVB with multiple lymph nodes and organ involvement.  Currently with bleeding from duodenal lesion and SBO as well from lymphoma.  2DECHO on 12/13/17 which showed EF 50-55%.    Emergent treatment for lymphoma with chemotherapy recommended. Patient  understands risks involved and that he could die from treatment but he knows he will die without treatment.  Patient is a DNR.    Transfer to Oncology floor.  Place PICC.  Plan to start cycle 1 R-CHOP tomorrow. Will give Rasburicase for tumor lysis prophylaxis.  Cathy, CMP in AM.    Lynda Swanson MD

## 2022-04-30 NOTE — ED PROVIDER NOTES
Patient:   Jason Perdue             MRN: [id number]            FIN: [id number]               Age:   58 years     Sex:  Male     :  1960   Associated Diagnoses:   Unstable angina; History of CHF (congestive heart failure); Pleural effusion on right; HIV disease; Cocaine abuse   Author:   Irvin Rios MD      Basic Information   Time seen: Date & time 3/18/2019 13:42:00.   History source: Patient.   Arrival mode: Private vehicle, wheelchair.   History limitation: None.   Additional information: Chief Complaint from Nursing Triage Note : Chief Complaint   3/18/2019 13:34 CDT      Chief Complaint           SOB, STARTED THIS AM WHILE AT HOME, HX CHF AND TAKING MED AS DIRECTED, DENIES PAIN, DID HAVE PNEUMONIA LAST MONTH  .      History of Present Illness   The patient presents with respiratory problem.  The onset was 2  hours ago.  The course/duration of symptoms is constant.  Degree at onset moderate.  Degree at present moderate.  The Exacerbating factors is exertion.  The Relieving factors is none.  Risk factors consist of coronary artery disease, congestive heart failure, hypertension, pneumonia and non-compliance.  Prior episodes: occasional.  Therapy today: none.  Associated symptoms: chest pain, denies fever, denies chills, denies cough, denies nausea, denies vomiting, denies abdominal pain, denies back pain, denies weight gain and denies hemoptysis.  Additional history: Patient admits to using cocaine approximately one week ago.  States that he was just fixing his lunch today when his chest pain started.  States he felt short of breath at the same time.  He was discharged from the [facility] with a LifeVest.  He does not have that on today..     Patient is a 58-year-old male with past medical history of hypertension, hyperlipidemia, history of atrial fibrillation not anticoagulated due to GI bleeding in , CAD status post stents x2 in the left circumflex in 2017, HIV (last CD4 count was 306 in  12/2018) diffuse B-cell lymphoma in the setting of AIDS, substance abuse, CHF EF 20% in 12/2018          Review of Systems   Constitutional symptoms:  No fever,    Respiratory symptoms:  Shortness of breath, orthopnea, No cough,    Cardiovascular symptoms:  Chest pain, No palpitations,    Gastrointestinal symptoms:  No abdominal pain,    Musculoskeletal symptoms:  No back pain,              Additional review of systems information: except as documented, all other systems reviewed and negative.      Health Status   Allergies:    Allergic Reactions (Selected)  Severity Not Documented  ACE inhibitors- Angioedema..   Medications:  (Selected)   Inpatient Medications  Ordered  Heparin Flush 100 U/mL - 5 mL: 500 units, form: Injection, IV Push, Once-chemo, first dose 02/21/18 9:03:00 CST, stop date 02/21/18 9:03:00 CST, Days 1  Heparin Flush 100 U/mL - 5 mL: 500 units, form: Injection, IV Push, Once-chemo, first dose 04/26/18 9:44:00 CDT, stop date 04/26/18 9:44:00 CDT, Days 1  Nitro-Bid 2% transdermal oint: 1 in, form: Ointment, TOP, Once, first dose 03/18/19 13:33:00 CDT, stop date 03/18/19 13:33:00 CDT, STAT  dexamethasone (for IVPB): 10 mg, IV Piggyback, Once-chemo, Infuse over: 20 minute(s), first dose 02/21/18 8:43:00 CST, stop date 02/21/18 8:43:00 CST, Days 1  Prescriptions  Prescribed  Coreg 3.125 mg oral tablet: 3.125 mg = 1 tab(s), Oral, BID, # 60 tab(s), 6 Refill(s)  Descovy 200 mg-25 mg oral tablet: 1 tab(s), Oral, Daily, X 30 day(s), # 30 tab(s), 3 Refill(s), Pharmacy: [facility], [facility].  Lasix 20 mg oral tablet: 40 mg = 2 tab(s), Oral, Daily, # 60 tab(s), 6 Refill(s)  Plavix 75 mg oral tablet: 75 mg = 1 tab(s), Oral, Daily, # 90 tab(s), 11 Refill(s)  Tivicay 50 mg oral tablet: 50 mg = 1 tab(s), Oral, Daily, X 30 day(s), # 30 tab(s), 3 Refill(s), Pharmacy: [facility], [facility].  aspirin 81 mg oral tablet, CHEWABLE: 81 mg = 1 tab(s), Oral, Daily, # 30 tab(s), 6 Refill(s)  atorvastatin 40 mg  oral tablet: 40 mg = 1 tab(s), Oral, Daily, # 90 tab(s), 11 Refill(s)  ferrous sulfate 300 mg/5 mL oral liquid: 300 mg = 5 mL, Oral, BID, X 30 day(s), # 300 mL, 2 Refill(s), Pharmacy: [facility], [facility].  spironolactone 25 mg oral tablet: 25 mg = 1 tab(s), Oral, Daily, X 30 day(s), # 30 tab(s), 6 Refill(s)  Documented Medications  Documented  B-12 500 mcg sublingual tablet: 500 mcg = 1 tab(s), Oral, Once, # 100 tab(s), 0 Refill(s)  HYDROXYZINE STACEY 50 MG CAP:   PHENAZOPYRIDINE 100 MG TAB:   Vitamin C 500 mg oral tablet: 500 mg = 1 tab(s), Oral, Daily, # 30 tab(s), 0 Refill(s)  docusate-senna 50 mg-8.6 mg oral tablet: 2 tab(s), Oral, Once a day (at bedtime), 0 Refill(s).      Past Medical/ Family/ Social History   Medical history:    Resolved  Kidney stone ([id number]):  Resolved.  Hypertension ([id number]):  Resolved.  HTN (hypertension) ([id number]):  Resolved.  Atrial fibrillation ([id number]):  Resolved.  HIV ([id number]):  Resolved..   Surgical history:    Catheter Insertion Mediport (None) on 3/5/2018 at 57 Years.  Comments:  3/5/2018 08:16 - Allie SAM, Dasia WALKER  auto-populated from documented surgical case  Biopsy Bone Marrow Aspiration (.) on 12/21/2017 at 57 Years.  Comments:  12/22/2017 13:03 - Pao Martinez LPN  auto-populated from documented surgical case  Esophagogastroduodenoscopy on 12/15/2017 at 57 Years.  Comments:  12/15/2017 11:22 - Praveena Danielle RN  auto-populated from documented surgical case  Biopsy Gastrointestinal on 12/15/2017 at 57 Years.  Comments:  12/15/2017 11:22 - Praveena Danielle RN.  auto-populated from documented surgical case  Colonoscopy on 12/15/2017 at 57 Years.  Comments:  12/15/2017 11:22 - Shashi SAM, Praveena MAJANO.  auto-populated from documented surgical case  Colonoscopy ([id number]) on 12/15/2017 at 57 Years.  Comments:  3/20/2018 13:11 - Nay Hopkins MD  Performed by Dr. Mcadams while inpatient at  [facility] in Dec. 2017. Next one due in 5 years per operative report.  Fracture ([id number]) in 1981 at 20 Years.  left wrist repair.  cardiac stents placed..   Family history:    Primary malignant neoplasm of colon  Brother  Primary malignant neoplasm of lung  Father  Stroke  Sister  Hypertension.  Mother  Sister    Father  Mother  .   Social history:    Social & Psychosocial Habits    Alcohol  2013 Risk Assessment: Denies Alcohol Use    2017  Use: Past    Type: Beer    Comment: over 20 years ag. - 2017 11:28 - Ida Williamson    Employment/School  2018  Status: Unemployed    Home/Environment  2018  Lives with: Siblings    Living situation: Home/Independent    Home equipment: Walker/Cane    Alcohol abuse in household: No    Substance abuse in household: No    Smoker in household: No    Feels unsafe at home: No    Safe place to go: Yes    Family/Friends available to help: Yes    Concern for family members at home: No    Major illness in household: No    Nutrition/Health  2018  Type of diet: Regular    Sleeping concerns: Yes    Feels highly stressed: No    Substance Abuse  2013 Risk Assessment: Denies Substance Abuse    2018  Use: Past    Type: crack    Comment: 2 days - 2017 18:14 - Ramy SAM, Janessa DOBBINS; patient states quit in 2018 - 2018 14:20 - Fior Johnson LPN    2018  Use: Current    Type: Cocaine    Comment: used 2 days ago - 2018 00:06 - Mya Hamm RN    2018  Use: Current    Frequency: 1-2 times per week      Comment: DENIES - 2019 13:40 - Diane SAM, Roger Aguilar    Tobacco  2015 Risk Assessment: High Risk    2017  Use: Former smoker    Type: Cigarettes    Tobacco use per day: 20    2018  Use: Current some day smoker    2018  Use: Current every day smoker    Type: Cigarettes    2018  Use: Former smoker    Patient Wants Consult For Cessation Counseling No    2019  Use:  4 or less cigarettes(less    Patient Wants Consult For Cessation Counseling No    03/18/2019  Use: 10 or more cigarettes (1/    Patient Wants Consult For Cessation Counseling No, Reviewed as documented in chart.   Problem list: Per nurse's notes.      Physical Examination               Vital Signs   Vital Signs   3/18/2019 13:34 CDT      Temperature Oral          36.6 DegC                             Temperature Oral (calculated)             97.88 DegF                             Peripheral Pulse Rate     90 bpm                             Respiratory Rate          18 br/min                             SpO2                      99 %                             Oxygen Therapy            Room air                             Systolic Blood Pressure   116 mmHg                             Diastolic Blood Pressure  94 mmHg  HI  .   Measurements   3/18/2019 13:34 CDT      Weight Dosing             82 kg                             Weight Measured and Calculated in Lbs     180.78 lb                             Weight Estimated          82 kg  .   Basic Oxygen Information   3/18/2019 13:34 CDT      SpO2                      99 %                             Oxygen Therapy            Room air  .   General:  Alert, no acute distress.    Skin:  Warm, dry, pink.    Eye:  Pupils are equal, round and reactive to light, extraocular movements are intact, normal conjunctiva.    Cardiovascular:  Regular rate and rhythm, No murmur.    Respiratory:  Lungs are clear to auscultation, respirations are non-labored.    Gastrointestinal:  Soft, Nontender, Non distended, Normal bowel sounds.    Musculoskeletal:  Normal ROM, normal strength.    Neurological:  Alert and oriented to person, place, time, and situation, No focal neurological deficit observed.    Psychiatric:  Cooperative, appropriate mood & affect, normal judgment, non-suicidal.       Medical Decision Making   Differential Diagnosis:  Pneumonia, congestive heart failure, pulmonary  embolism, pulmonary edema, pleural effusion, acute myocardial infarction.    Documents reviewed:  Emergency department nurses' notes, emergency department records, prior records.    Orders  Launch Orders   Laboratory:  Drug Screen, Urine (Order): Stat collect, Urine, 3/18/2019 13:45 CDT, Nurse collect, Print Label By Order Location, 3/18/2019 13:45 CDT, Launch Orders   Laboratory:  POC iSTAT ER Troponin request  [facility] (Order): Blood, Timed collect, Collected, 3/18/2019 15:50 CDT by Irvin Rios MD, Nurse collect  , Launch Orders   Radiology:  CT Chest Lungs W Contrast (Order): Routine, 3/18/2019 14:52 CDT, Chest Pain, r/o PE, None, Wheelchair, Patient Has IV?, Patient on Oxygen?, Rad Type, Schedule this test  , Launch Orders   Consults:  Consult to Telehealth Cardiology (Order): 3/18/2019 16:27 CDT, Stat, Unstable Angina  , launch Order Profile (Selected)   Inpatient Orders  Ordered  Cardiac Monitorin19 13:33:00 CDT, Constant Order  EKG Adult 12 Lead: 19 13:33:00 CDT, Stat, 19 13:33:00 CDT, Wheelchair, Patient Has IV, Patient on O2, Standard Precautions, -1, -1, 19 13:33:00 CDT  Nitro-Bid 2% transdermal oint: 1 in, form: Ointment, TOP, Once, first dose 19 13:33:00 CDT, stop date 19 13:33:00 CDT, STAT  Saline Lock Insert: 19 13:33:00 CDT, Stop date 19 13:33:00 CDT  Ordered (Dispatched)  BNP-Pro: Stat collect, 19 13:33:00 CDT, Blood, Stop date 19 13:34:00 CDT, Lab Collect, 19 13:33:00 CDT  BNP: Stat collect, 19 13:33:00 CDT, Blood, Stop date 19 13:34:00 CDT, Lab Collect, 19 13:33:00 CDT  CBC w/ Auto Diff: Now collect, 19 13:33:00 CDT, Blood, Stop date 19 13:34:00 CDT, Lab Collect, 19 13:33:00 CDT  CK MB: Stat collect, 19 13:33:00 CDT, Blood, Stop date 19 13:34:00 CDT, Lab Collect, 19 13:33:00 CDT  CK: Stat collect, 19 13:33:00 CDT, Blood, Stop date 19 13:34:00 CDT, Lab Collect,  03/18/19 13:33:00 CDT  CMP: Stat collect, 03/18/19 13:33:00 CDT, Blood, Stop date 03/18/19 13:34:00 CDT, Lab Collect, 03/18/19 13:33:00 CDT  INR - Protime: Stat collect, 03/18/19 13:33:00 CDT, Blood, Stop date 03/18/19 13:34:00 CDT, Lab Collect, 03/18/19 13:33:00 CDT  Magnesium Level: Stat collect, 03/18/19 13:33:00 CDT, Blood, Stop date 03/18/19 13:34:00 CDT, Lab Collect, 03/18/19 13:33:00 CDT  PTT: Stat collect, 03/18/19 13:33:00 CDT, Blood, Stop date 03/18/19 13:34:00 CDT, Lab Collect, 03/18/19 13:33:00 CDT  Ordered (Exam Ordered)  XR Chest 1 View: Stat, 03/18/19 13:33:00 CDT, Chest Pain, None, Wheelchair, Patient Has IV?, Patient on Oxygen?, Rad Type, Not Scheduled, Not Scheduled, 03/18/19 13:33:00 CDT  Ordered (In-Lab)  POC iSTAT ER Troponin request  [facility]: Blood, Stat collect, Collected, 03/18/19 13:33:00 CDT by Gabriel CHAWLA, Irvin LIGHT, Stop date 03/18/19 13:33:00 CDT, Nurse collect.    Electrocardiogram:  Time 3/18/2019 13:31:00, rate 95, normal sinus rhythm, The Axis is leftward.  , STT segments Non specific changes, T wave Normal, Ectopy Occasional, premature atrial contractions, , premature ventricular contractions, P wave and CT interval WNL, QT interval Prolonged 0.50 seconds, QRS interval WNL, Previous EKG available New changes, compared with 2/9/2019 14:37:00, , Inferior infarct findings and prolonged QT appear to be new, Interpretation by Emergency Physician Ischemic changes.    Results review:  Lab results : Lab View   3/18/2019 16:16 CDT      POC Troponin              0.06 ng/mL    3/18/2019 15:01 CDT      U pH                      6.0                             U Amph Scr                NEG.                             U Fiona Scr                NEG.                             U Benzodia Scr            NEG.                             U Cannab Scr              NEG.                             U Cocaine Scr             POS.                             U Opiate Scr              NEG.                              U Phencyclidine Scr       NEG.    3/18/2019 13:53 CDT      POC Troponin              0.05 ng/mL    3/18/2019 13:47 CDT      Sodium Lvl                140 mmol/L                             Potassium Lvl             3.9 mmol/L                             Chloride                  103 mmol/L                             CO2                       24.7 mmol/L                             Calcium Lvl               9.3 mg/dL                             Magnesium Lvl             2.1 mg/dL                             Glucose Lvl               97 mg/dL                             BUN                       24.0 mg/dL  HI                             Creatinine                1.23 mg/dL                             eGFR-AA                   >60 mL/min/1.73 m2  NA                             eGFR-CLYDE                  >60 mL/min/1.73 m2  NA                             Bili Total                1.0 mg/dL                             Bili Direct               0.39 mg/dL  HI                             Bili Indirect             0.61 mg/dL                             AST                       29 unit/L                             ALT                       18 unit/L                             Alk Phos                  132 unit/L  HI                             Total Protein             9.0 gm/dL  HI                             Albumin Lvl               3.80 gm/dL                             Globulin                  5.20 gm/dL  HI                             A/G Ratio                 0.7 ratio  LOW                             BNP                       7,030.0 pg/mL  HI                             Total CK                  93 unit/L                             CK MB                     2.1 ng/mL                             PT                        11.9 second(s)  HI                             INR                       1.14  LOW                             PTT                       25.9 second(s)                             WBC                        6.8 x10(3)/mcL                             RBC                       4.69 x10(6)/mcL  LOW                             Hgb                       13.7 gm/dL  LOW                             Hct                       43.0 %                             Platelet                  219 x10(3)/mcL                             MCV                       91.7 fL                             MCH                       29.2 pg                             MCHC                      31.9 gm/dL  LOW                             RDW                       16.6 %                             MPV                       9.9 fL                             Abs Neut                  4.65 x10(3)/mcL                             Neutro Auto               68 %  NA                             Lymph Auto                21 %  NA                             Mono Auto                 8 %  NA                             Eos Auto                  2 %  NA                             Abs Eos                   0.2 x10(3)/mcL                             Basophil Auto             0 %  NA                             Abs Neutro                4.65 x10(3)/mcL                             Abs Lymph                 1.4 x10(3)/mcL                             Abs Mono                  0.6 x10(3)/mcL                             Abs Baso                  0.0 x10(3)/mcL     Lab results : Lab View   3/18/2019 16:16 CDT      POC Troponin              0.06 ng/mL    3/18/2019 15:01 CDT      U pH                      6.0                             U Amph Scr                NEG.                             U Fiona Scr                NEG.                             U Benzodia Scr            NEG.                             U Cannab Scr              NEG.                             U Cocaine Scr             POS.                             U Opiate Scr              NEG.                             U Phencyclidine Scr       NEG.    3/18/2019 13:53 CDT      POC  Troponin              0.05 ng/mL    3/18/2019 13:47 CDT      Sodium Lvl                140 mmol/L                             Potassium Lvl             3.9 mmol/L                             Chloride                  103 mmol/L                             CO2                       24.7 mmol/L                             Calcium Lvl               9.3 mg/dL                             Magnesium Lvl             2.1 mg/dL                             Glucose Lvl               97 mg/dL                             BUN                       24.0 mg/dL  HI                             Creatinine                1.23 mg/dL                             eGFR-AA                   >60 mL/min/1.73 m2  NA                             eGFR-CLYDE                  >60 mL/min/1.73 m2  NA                             Bili Total                1.0 mg/dL                             Bili Direct               0.39 mg/dL  HI                             Bili Indirect             0.61 mg/dL                             AST                       29 unit/L                             ALT                       18 unit/L                             Alk Phos                  132 unit/L  HI                             Total Protein             9.0 gm/dL  HI                             Albumin Lvl               3.80 gm/dL                             Globulin                  5.20 gm/dL  HI                             A/G Ratio                 0.7 ratio  LOW                             Total CK                  93 unit/L                             CK MB                     2.1 ng/mL                             PT                        11.9 second(s)  HI                             INR                       1.14  LOW                             PTT                       25.9 second(s)                             WBC                       6.8 x10(3)/mcL                             RBC                       4.69 x10(6)/mcL  LOW                             Hgb                        13.7 gm/dL  LOW                             Hct                       43.0 %                             Platelet                  219 x10(3)/mcL                             MCV                       91.7 fL                             MCH                       29.2 pg                             MCHC                      31.9 gm/dL  LOW                             RDW                       16.6 %                             MPV                       9.9 fL                             Abs Neut                  4.65 x10(3)/mcL                             Neutro Auto               68 %  NA                             Lymph Auto                21 %  NA                             Mono Auto                 8 %  NA                             Eos Auto                  2 %  NA                             Abs Eos                   0.2 x10(3)/mcL                             Basophil Auto             0 %  NA                             Abs Neutro                4.65 x10(3)/mcL                             Abs Lymph                 1.4 x10(3)/mcL                             Abs Mono                  0.6 x10(3)/mcL                             Abs Baso                  0.0 x10(3)/mcL     .   Radiology results:  Rad Results (ST)  < 12 hrs   Accession: [id number]  Order: CT Thorax W Contrast  Report Dt/Tm: 03/18/2019 15:39  Report:      History  Short of breath.     Reference  9 February 2019.     Technique  Helical acquisition through the chest with IV contrast targeting the  pulmonary arteries. Multiplanar and 3D MIP reconstructed images were  provided for review.  mGycm. Automatic exposure control,  adjustment of mA/kV or iterative reconstruction technique was used to  reduce radiation.     Findings  There is good opacification of the pulmonary arterial tree. There is  no evidence of pulmonary thromboembolism.       There is no mediastinal, hilar or axillary lymphadenopathy by size  criteria. There  is gynecomastia. Heart is enlarged. No pericardial  effusion.     There is a moderate right pleural effusion. There is compressive  atelectasis of the right lung. Left lung is clear.      There are no acute findings in the imaged upper abdomen. No acute  osseous findings. Stable heterogeneous sclerotic appearance of T11.     Impression  1. Negative for pulmonary thromboembolic disease.  2. Moderate right pleural effusion with compressive atelectasis of the  right lung.      Accession: [id number]  Order: XR Chest 1 View  Report Dt/Tm: 03/18/2019 13:59  Report:   one view of the chest     CPT 11277     HISTORY:  Chest Pain     FINDINGS:  Examination reveals mediastinal silhouette to be within normal limits  cardiac silhouette is enlarged lung fields are clear and free of gross  infiltrates atelectases or effusions there is some elevation of the  right hemidiaphragm.     MediPort is identified tip at the junction of the jugular vein and  innominate vein on the right     IMPRESSION: No active pulmonary disease     Mild cardiomegaly      .      Reexamination/ Reevaluation   Time: 3/18/2019 16:47:00 .   Vital signs   Basic Oxygen Information   3/18/2019 13:34 CDT      SpO2                      99 %                             Oxygen Therapy            Room air     Course: resolved.   Pain status: resolved.   Assessment: exam improved, Chest pain and shortness of breath resolved after 1 inch of Nitropaste applied to chest wall.  Currently awaiting on tele-health cardiology consult by [facility].  1555: Spoke with Dr. Awan with [facility], recommends we admit patient for observation and get troponin every 8 hours.  Case discussed with Dr. Peter, accepts patient for admission at this hospital. Chest pain and shortness of breath resolved after 1 inch of Nitropaste applied to chest wall .      Impression and Plan   Diagnosis   Unstable angina ([id number] I20.0)   History of CHF (congestive heart failure) (AVE52-ZT Z86.79)   Pleural effusion  on right (VBK23-WM J90)   HIV disease (XSE37-IU B20)   Cocaine abuse (BWR61-SG F14.10)   Plan   Condition: Stable.    Disposition: Medically cleared, Admit time  3/18/2019 18:04:00, Place in Observation Telemetry Unit, Brittani CHAWLA, Vitor DOBBINS    Counseled: Patient, Regarding diagnosis, Regarding diagnostic results, Regarding treatment plan, Regarding prescription, Patient indicated understanding of instructions.

## 2022-04-30 NOTE — CONSULTS
Patient:   Jason Perdue             MRN: [id number]            FIN: [id number]               Age:   57 years     Sex:  Male     :  1960   Associated Diagnoses:   None   Author:   Debora Villaseñor      CONSULTATION REPORT      CONSULTATION DATE: 1/10/2018      ATTENDING PHYSICIAN: Dr. Te Dennis    CONSULTING PHYSICIAN: Dr. Grecia Vieira      REASON FOR CONSULTATION: Infectious disease evaluation and antibiotic management of a patient with AIDS and latent syphilis      HISTORY OF PRESENT ILLNESS: He is a 57-year-old American American male with a history of AIDS diagnosed approximately 3 years ago, HAART naïve, and diffuse large B-cell lymphoma who presented to the emergency room on 1/3/2018 with complaints of abdominal cramping and diarrhea ×1 week.  Prior to admit, he was seen in the ID clinic and was initiated on HAART with Descovy and Tivicy as well as OI prophylaxis with daily Bactrim.  Recent CD4 count from  was 117/13.9% and HIV VL from the same date was 522,000 copies/mL.  CT from admit showed patient to have a small bowel obstruction.  Surgical team, GI service, and oncology are following.  No surgical plans at this time.  Oncology planning to initiate chemotherapy in the morning.      ALLERGIES: ACE inhibitors      MEDICATIONS:   Reviewed in EMR    HAART with Descovy and Tivicay.  OI prophylaxis with daily Bactrim.    Home medications:  Reviewed       PAST MEDICAL HISTORY: Atrial fibrillation, HIV, hypertension, diffuse large B-cell lymphoma, GI bleed      PAST SURGICAL HISTORY:  Left wrist repair, EGD, colonoscopy, bone marrow biopsy      SOCIAL HISTORY: Former alcohol use.  Former smoker.  Recent illicit drug use with crack cocaine.      FAMILY HISTORY: Reviewed and noncontributory      REVIEW OF SYSTEMS:   CONSTITUTIONAL: Negative  RESPIRATORY: Negative  CARDIOVASCULAR: Negative  GASTROINTESTINAL: Abdominal pain  GENITOURINARY: Negative  MUSCULOSKELETAL:  Negative  NEUROLOGIC: Negative  All other systems negative.      PHYSICAL EXAMINATION:   VITAL SIGNS: Afebrile  GENERAL: Middle-aged male, cachectic; slightly tachypneic although in NAD; does not appear toxic or septic  SKIN: no rash  HEENT: sclera non-icteric; PERRL; NGT in place  NECK: supple; no LAD  CHEST: BBS CTA, diminished in bases; nonlabored, equal expansion; no adventitious BS  CARDIOVASCULAR: RRR, S1S2; no murmur; strong, equal peripheral pulses; no edema  ABDOMEN:  Hypoactive bowel sounds; abdomen somewhat firm, rounded, moderate generalized TTP  EXTREMITIES: no cyanosis or clubbing  NEURO: AAO x4  PSYCH: Mentation and affect appropriate        LABORATORY DATA:  Sodium 132, potassium 5.3, chloride 105, calcium 8.2, BUN 27, creatinine 0.91, WBC 11.4, hemoglobin 10.7, hematocrit 33.3, platelet 481    CD4 count from 12/28/2017: 117/13.9%    HIV VL from 12/28/2017: 522,000 copies/mL    Syphilis antibody from 12/28/2017: Reactive      RADIOLOGICAL DATA: CT abdomen and pelvis with contrast from 1/10/2018 as follows: Trace of right pleural effusion and considerable improvement right lower lung lobe atelectasis combined with infiltrates. Right lung visualized nodules without interval change. Extensive abdomen lymphadenopathy without significant interval change. Please correlate clinically.  Distended stomach and proximal to mid small bowel loops suggest small bowel obstruction which probably is partial.   Right abdomen trace of free fluid.    CT abdomen and pelvis with contrast from 1/3/2018 as follows: There are several tiny, subcentimeter cysts scattered through the liver. There is a stable 4.1 cm mass and 2 cm mass in the spleen.  Pancreas appears normal. Gallbladder is not enlarged. There is a stable 1.6 cm left adrenal mass. Right adrenal gland is normal. There are small bilateral renal cysts. There is no hydronephrosis. There is para-aortic adenopathy. Nodes measure up to 1.8 cm x 1.4 cm. These appear  slightly larger. There is also mesenteric adenopathy with nodes measuring up to 1.7 cm x 1.7 cm. There is no bowel obstruction or ascites. Appendix is normal.      IMPRESSION:   1.  AIDS  2.  Lymphoma  3.  SBO  4.  PCM  5.  Anemia      PLAN:  Patient was diagnosed with HIV several years ago, however was last follow-up.  He recently was seen at [facility] in the ID clinic in initiated on HAART with Descovy and Tivicay as well as on OI prophylaxis with daily Bactrim.  Currently, patient is hospitalized s/t small bowel obstruction and lymphoma.  We will continue HAART and OI prophylaxis.  Oncology plan for chemotherapy tomorrow noted.  Guarded prognosis.  Thank you for the consultation.

## 2022-04-30 NOTE — ED PROVIDER NOTES
Patient:   Jason Perdue             MRN: [id number]            FIN: [id number]               Age:   58 years     Sex:  Male     :  1960   Associated Diagnoses:   Cough   Author:   Jefferson Bustos MD      Basic Information   Time seen: Date 2/3/2019, Immediately upon arrival.   History source: Patient.   Arrival mode: Ambulance.   History limitation: None.   Additional information: Chief Complaint from Nursing Triage Note : Chief Complaint   2/3/2019 6:13 CST        Chief Complaint           Non prod cough and intermittent SOB x1 month  .      History of Present Illness   The patient presents with cough.  The onset was 4  weeks ago.  The course/duration of symptoms is fluctuating in intensity.  Character dry.  The degree at onset was minimal.  The degree at present is moderate.  The exacerbating factor is none.  The relieving factor is none.  Therapy today: see nurses notes.  Associated symptoms: shortness of breath, denies chest pain, denies sore throat, denies nasal congestion, denies fever and denies chills.  Additional history: Patient complaining of dry cough x4 weeks with intermittent SOB. Denies any other complaints.        Review of Systems   Constitutional symptoms:  No fever, no chills.    Skin symptoms:  No rash,    Eye symptoms:  Vision unchanged.   ENMT symptoms:  No nasal congestion,    Respiratory symptoms:  Shortness of breath, cough.    Cardiovascular symptoms:  No chest pain, no palpitations.    Gastrointestinal symptoms:  No abdominal pain, no nausea, no vomiting, no diarrhea, no constipation.    Genitourinary symptoms:  No dysuria,    Musculoskeletal symptoms:  No back pain,    Neurologic symptoms:  No headache,    Endocrine symptoms:  No polyuria,    Allergy/immunologic symptoms:  No recurrent infections,       Health Status   Allergies:    Allergic Reactions (Selected)  Severity Not Documented  ACE inhibitors- Angioedema..   Medications: Per nurse's notes.      Past Medical/  Family/ Social History   Medical history: Reviewed as documented in chart.   Surgical history: Reviewed as documented in chart.   Social history: Reviewed as documented in chart.      Physical Examination               Vital Signs   Vital Signs   2/3/2019 6:13 CST        Temperature Oral          36.5 DegC                             Temperature Oral (calculated)             97.70 DegF                             Peripheral Pulse Rate     94 bpm                             Respiratory Rate          20 br/min                             SpO2                      98 %                             Oxygen Therapy            Room air                             Systolic Blood Pressure   133 mmHg                             Diastolic Blood Pressure  103 mmHg  HI  .   General:  Alert, no acute distress.    Skin:  Warm, dry, intact, no rash.    Head:  Normocephalic.   Neck:  Supple, trachea midline.    Eye:  Extraocular movements are intact, normal conjunctiva.    Ears, nose, mouth and throat:  Oral mucosa moist, no pharyngeal erythema or exudate.    Cardiovascular:  Regular rate and rhythm, Normal peripheral perfusion, No edema.    Respiratory:  Lungs are clear to auscultation, respirations are non-labored, breath sounds are equal, Symmetrical chest wall expansion.    Chest wall:  No tenderness.   Musculoskeletal:  Normal ROM, normal strength.    Gastrointestinal:  Soft, Nontender, Non distended, Normal bowel sounds, No organomegaly.    Neurological:  Alert and oriented to person, place, time, and situation, No focal neurological deficit observed, normal speech observed.    Psychiatric:  Cooperative, appropriate mood & affect.       Medical Decision Making   Documents reviewed:  Emergency department nurses' notes.   Electrocardiogram:  Time 2/3/2019 06:21:00, rate 112, EP Interp, Sinus tachycardia, Left axis deviation, LBBB.       Impression and Plan   Diagnosis   Cough ([id number]-[id number] R05)   Plan   Disposition: Patient care  transitioned to: Time: 2/3/2019 07:00:00, Shannan CHAWLA, Herbert.

## 2022-04-30 NOTE — ED PROVIDER NOTES
"   Patient:   Jason Perdue             MRN: [id number]            FIN: [id number]               Age:   57 years     Sex:  Male     :  1960   Associated Diagnoses:   Atrial fibrillation with rapid ventricular response   Author:   Wyatt Styles MD      Basic Information   Time seen: Date & time 2017 10:52:00.   History source: Patient.   Arrival mode: Private vehicle.   History limitation: None.   Additional information: Chief Complaint from Nursing Triage Note : Chief Complaint   2017 10:16 CST      Chief Complaint           pt. reports to the ed c/o of "intermittent" episodes of lightheadedness....vss, nadn  .      History of Present Illness   The patient presents with dizziness.  The onset was 2  days ago.  The course/duration of symptoms is fluctuating in intensity.  The character of symptoms is lightheaded.  The degree at present is minimal.  The relieving factor is none.  Risk factors consist of hypertension.  Prior episodes: occasional.  Therapy today: none.  Associated symptoms: none.  Additional history: Pt reports feeling "dizzy" for last few days. Denies chest pain, SOB, weakness, dizziness, fever, abdominal pain, nausea/vomiting. Denies striking head or LOC. .        Review of Systems   Constitutional symptoms:  Negative except as documented in HPI.   Skin symptoms:  Negative except as documented in HPI.   Eye symptoms:  Negative except as documented in HPI.   ENMT symptoms:  Negative except as documented in HPI.   Respiratory symptoms:  Negative except as documented in HPI.   Cardiovascular symptoms:  Negative except as documented in HPI.   Gastrointestinal symptoms:  Negative except as documented in HPI.   Genitourinary symptoms:  Negative except as documented in HPI.   Musculoskeletal symptoms:  Negative except as documented in HPI.   Neurologic symptoms:  Negative except as documented in HPI.   Psychiatric symptoms:  Negative except as documented in HPI.   Endocrine symptoms:  " Negative except as documented in HPI.   Hematologic/Lymphatic symptoms:  Negative except as documented in HPI.   Allergy/immunologic symptoms:  Negative except as documented in HPI.      Health Status   Allergies:    Allergic Reactions (Selected)  Severity Not Documented  ACE inhibitors- Angioedema.,    Allergies (1) Active Reaction  ACE inhibitors Angioedema  .   Medications:  (Selected)   Inpatient Medications  Ordered  [id number] 1,000 mL: 1,000 mL, 1,000 mL, IV, 1,000 mL/hr, start date 11/09/17 11:34:00 CST  Prescriptions  Prescribed  amlodipine 5 mg oral tablet: 5 mg = 1 tab(s), Oral, Daily, # 30 tab(s), 1 Refill(s), per nurse's notes.   Immunizations: Per nurse's notes.      Past Medical/ Family/ Social History   Medical history:    Active  HTN (hypertension) ([id number])  Resolved  Kidney stone ([id number]):  Resolved.  Hypertension ([id number]):  Resolved., Reviewed as documented in chart.   Surgical history:    Fracture (SNOMED CT [id number]) in 1981 at 21 Years.  left wrist repair., Reviewed as documented in chart.   Family history:    Stroke  Sister  Hypertension.  Mother  Sister  , Reviewed as documented in chart.   Social history:    Social & Psychosocial Habits    Alcohol  03/22/2015 Risk Assessment: Denies Alcohol Use    07/12/2016  Use: Never    04/18/2013 Risk Assessment: Denies Alcohol Use    06/13/2017  Use: Never    11/09/2017  Use: Past    Type: Beer    Comment: over 20 years ag. - 11/09/2017 11:28 - Ida Williamson    Substance Abuse  03/22/2015 Risk Assessment: Denies Substance Abuse    07/12/2016  Use: Never    04/18/2013 Risk Assessment: Denies Substance Abuse    06/13/2017  Use: Never    Tobacco  06/14/2015  Use: Current every day smoker    Type: Cigarettes    Tobacco use per day: 20 09/27/2015 Risk Assessment: High Risk    04/18/2013  Use: Current    Type: Cigarettes    Tobacco use per day: 10    11/09/2017  Use: Current every day  smoker    Type: Cigarettes    Tobacco use per day: 20  , Alcohol use: Denies, Tobacco use: Regularly, Drug use: Denies.   Problem list:    Active Problems (3)  HTN (hypertension)   Hypertension   Tobacco user   .      Physical Examination               Vital Signs   Vital Signs   11/9/2017 11:29 CST      Heart Rate Monitored      131 bpm  HI                             Respiratory Rate          20 br/min                             SpO2                      99 %                             Oxygen Therapy            Room air                             Systolic Blood Pressure   131 mmHg                             Diastolic Blood Pressure  94 mmHg  HI    11/9/2017 10:16 CST      Temperature Oral          36.4 DegC                             Peripheral Pulse Rate     76 bpm                             Respiratory Rate          18 br/min                             SpO2                      100 %                             Oxygen Therapy            Room air                             Systolic Blood Pressure   109 mmHg                             Diastolic Blood Pressure  74 mmHg  .      Vital Signs (last 24 hrs)_____  Last Charted___________  Temp Oral     36.4 DegC  (NOV 09 10:16)  Heart Rate Peripheral   76 bpm  (NOV 09 10:16)  Resp Rate         20 br/min  (NOV 09 11:29)  SBP      131 mmHg  (NOV 09 11:29)  DBP      H 94mmHg  (NOV 09 11:29)  SpO2      99 %  (NOV 09 11:29)  Weight      74.2 kg  (NOV 09 10:16)  Height      180.34 cm  (NOV 09 10:16)  BMI      22.81  (NOV 09 10:16)  .   Measurements   11/9/2017 10:16 CST      Weight Dosing             74.2 kg                             Weight Measured           74.2 kg                             Weight Measured and Calculated in Lbs     163.58 lb                             Height/Length Dosing      180.34 cm                             Height/Length Measured    180.34 cm                             Body Mass Index Measured  22.81 kg/m2  .   Basic Oxygen Information    11/9/2017 11:29 CST      SpO2                      99 %                             Oxygen Therapy            Room air    11/9/2017 10:16 CST      SpO2                      100 %                             Oxygen Therapy            Room air  .   General:  Alert, mild distress.    Skin:  Warm, dry, pink, intact.    Head:  Normocephalic, atraumatic.    Neck:  Supple, trachea midline, no tenderness.    Eye:  Pupils are equal, round and reactive to light, extraocular movements are intact, normal conjunctiva, vision unchanged.    Ears, nose, mouth and throat:  Tympanic membranes clear, oral mucosa moist, no pharyngeal erythema or exudate.    Cardiovascular:  Regular rate and rhythm, No murmur, Normal peripheral perfusion, No edema, Tachycardia.    Respiratory:  Lungs are clear to auscultation, respirations are non-labored, breath sounds are equal, Symmetrical chest wall expansion.    Chest wall:  No tenderness, No deformity.    Back:  Nontender, Normal range of motion, Normal alignment, No costovertebral angle tenderness,    Musculoskeletal:  Normal ROM, normal strength, no tenderness, no swelling.    Gastrointestinal:  Soft, Nontender, Non distended, Normal bowel sounds, Guarding: Negative, Rebound: Negative, Organomegaly: Negative, Mass: Negative, Signs: McBurney's negative, Jain's negative.    Genitourinary:  No tenderness, no discharge, normal external genitalia, no lesions.    Neurological:  Alert and oriented to person, place, time, and situation, No focal neurological deficit observed, CN II-XII intact, normal sensory observed, normal motor observed, normal speech observed, normal coordination observed.    Lymphatics:  No lymphadenopathy.   Psychiatric:  Cooperative, appropriate mood & affect, normal judgment.       Medical Decision Making   Differential Diagnosis:  Dizziness.   Electrocardiogram:  Time 11/9/2017 11:03:00, rate 134, Atrial fibrillation with rapid ventricular response, septal myocardial  infarction.    Results review:     Labs (Last four charted values)  Glucose              88 (NOV 09)   PT                   13.0 (NOV 09)   INR                  1.00 (NOV 09)   PTT                  34.3 (NOV 09) .   Radiology results:  Rad Results (ST)  < 12 hrs   Accession: [id number]  Order: XR Chest 2 Views  Report Dt/Tm: 11/09/2017 12:52  Report:      EXAM: XR Chest 2 Views     INDICATION: Dizziness     TECHNIQUE: Frontal and lateral views of the chest are obtained.     COMPARISON: 6/13/2017     FINDINGS: The cardiomediastinal silhouette is normal in size and  contour. There is mild focal prominence of the right hilar markings,  which is new from the prior exam. No pleural effusion or pneumothorax  is identified. The lungs are hyperaerated with flattening of the  diaphragm. There are mild degenerative changes along the spine.        IMPRESSION:   1. No acute cardiopulmonary process identified.  2. New focal prominence of the right hilar markings, which may be  artifactual. Short-term follow-up evaluation is suggested to ensure  resolution.  3. Chronic obstructive pulmonary changes.      .      Reexamination/ Reevaluation   Time: 11/9/2017 12:51:00 .   Vital signs   Basic Oxygen Information   11/9/2017 11:29 CST      SpO2                      99 %                             Oxygen Therapy            Room air    11/9/2017 10:16 CST      SpO2                      100 %                             Oxygen Therapy            Room air     Course: improving.   Pain status: decreased.   Notes: Pt reassessed at this time. Rate down to 80-90's after completion of IVF. Pt reports resolution of dizziness of symptoms. Continues to deny chest pain or SOB. Discussed pt status with Dr. Styles, ED physician. Will contact [facility] IM On Call for further evaluation/admission of pt. .      Impression and Plan   Diagnosis   Atrial fibrillation with rapid ventricular response ([id number] I48.91)      Calls-Consults   -  11/9/2017  12:47:00 , Cornelius CHAWLA, Kimberley Morris, IM, Discussed pt status with IM On Call Team 2. Pt to be seen in ED. .    Plan   Disposition: Admit time  11/9/2017 12:51:00, Place in Observation Telemetry Unit, Cornelius CHAWLA, Kimberley Morris, Dispositioned by: Time: 11/9/2017 12:51:00, Jensen CHAWLA, Wyatt CAMACHO    Counseled: Patient, Regarding diagnosis, Regarding diagnostic results, Regarding treatment plan, Patient indicated understanding of instructions.

## 2022-04-30 NOTE — ED PROVIDER NOTES
Patient:   Jason Perdue             MRN: [id number]            FIN: [id number]               Age:   58 years     Sex:  Male     :  1960   Associated Diagnoses:   Abdominal pain in male   Author:   Wyatt Styles MD      Basic Information   Time seen: Immediately upon arrival.   History source: Patient.   Arrival mode: Private vehicle.   History limitation: None.   Additional information: Chief Complaint from Nursing Triage Note : Chief Complaint   2018 17:29 CDT      Chief Complaint           In via ambulance for abd pain, awake and alert severe pain to lower abd deneis vomiting today states history of lymphoma andon chemo  .      History of Present Illness   The patient presents with abdominal pain and gradual worsening of abdominal pain. hx of HIV lymphoma. finished chemo 2 weeks ago.  The onset was 1  days ago and chronic.  The course/duration of symptoms is constant.  The character of symptoms is crampy.  The degree at onset was minimal.  The Location of pain at onset was bilateral, lower, abdominal and mid epigastric.  The degree at present is minimal.  The Location of pain at present is bilateral, abdominal and mid epigastric.  Radiating pain: none. The exacerbating factor is none.  The relieving factor is rest.  Therapy today: none.  Risk factors consist of hypertension.  Associated symptoms: none.  Additional history: none.        Review of Systems   Constitutional symptoms:  No fever, no weakness, no fatigue, no decreased activity.    Skin symptoms:  No jaundice, no rash, no pruritus.    Eye symptoms:  No recent vision problems,    ENMT symptoms:  No sore throat, no nasal congestion.    Respiratory symptoms:  No orthopnea, no sputum production.    Cardiovascular symptoms:  No chest pain, no palpitations, no syncope, no diaphoresis.    Gastrointestinal symptoms:  Abdominal pain, mild, epigastric, sharp, no nausea, no vomiting, no diarrhea.    Genitourinary symptoms:  No dysuria,     Musculoskeletal symptoms:  No back pain, no Muscle pain, no Joint pain.    Neurologic symptoms:  No headache, no dizziness, no altered level of consciousness.    Psychiatric symptoms:  No anxiety, no depression, no sleeping problems, no substance abuse.    Endocrine symptoms:  No polyuria, no polydipsia, no polyphagia, no hyperglycemia.    Hematologic/Lymphatic symptoms:  Bleeding tendency negative, bruising tendency negative, no petechiae, no gum bleeding.    Allergy/immunologic symptoms:  No food allergies, no recurrent infections, no impaired immunity.              Additional review of systems information: All other systems reviewed and otherwise negative.      Health Status   Allergies:    Allergic Reactions (Selected)  Severity Not Documented  ACE inhibitors- Angioedema.,    Allergies (1) Active Reaction  ACE inhibitors Angioedema  .   Medications:  (Selected)   Inpatient Medications  Ordered  Heparin Flush 100 U/mL - 5 mL: 500 units, form: Injection, IV Push, Once-chemo, first dose 02/21/18 9:03:00 CST, stop date 02/21/18 9:03:00 CST, Days 1  Heparin Flush 100 U/mL - 5 mL: 500 units, form: Injection, IV Push, Once-chemo, first dose 04/26/18 9:44:00 CDT, stop date 04/26/18 9:44:00 CDT, Days 1  dexamethasone (for IVPB): 10 mg, IV Piggyback, Once-chemo, Infuse over: 20 minute(s), first dose 02/21/18 8:43:00 CST, stop date 02/21/18 8:43:00 CST, Days 1  Future  Bicillin L-A 1,200,000 units/2 mL intramuscular suspension: 2.4 millionunits, form: Injection, IM, Once-Unscheduled, first dose 02/15/18 7:00:00 CST, Future Order  Prescriptions  Prescribed  Cardizem  mg/24 hours oral CAPsule, extended release: 360 mg = 1 cap(s), Oral, Daily, # 30 cap(s), 6 Refill(s), Pharmacy: [facility], [facility].  Descovy 200 mg-25 mg oral tablet: 1 tab(s), Oral, Daily, # 30 tab(s), 3 Refill(s), Pharmacy: [facility] 10 mg-325 mg oral tablet: 1 tab(s), Oral, q6hr, PRN PRN as needed for pain, # 50 tab(s), 0  Refill(s), Pharmacy: [facility], [facility].  Plavix 75 mg oral tablet: 75 mg = 1 tab(s), Oral, Daily, # 90 tab(s), 11 Refill(s)  Tivicay 50 mg oral tablet: 50 mg = 1 tab(s), Oral, Daily, # 30 tab(s), 3 Refill(s), Pharmacy: [facility]  aspirin 81 mg oral tablet, CHEWABLE: 81 mg = 1 tab(s), Oral, Daily, # 30 tab(s), 0 Refill(s)  atorvastatin 40 mg oral tablet: 40 mg = 1 tab(s), Oral, Daily, # 90 tab(s), 11 Refill(s)  metoprolol tartrate 50 mg oral tab: 50 mg = 1 tab(s), Oral, BID, # 180 tab(s), 11 Refill(s)  Documented Medications  Documented  DILTIAZEM  MG TAB:   HYDROCODON-APAP 5-325:   HYDROCODON-APAP 7.5-325:   HYDROXYZINE STACEY 50 MG CAP:   KETOROLAC 10 MG TABLET: 10 mg = 1 tab(s), Oral, q6hr  MORPHINE SULF ER 15 MG TAB: 15 mg = 1 tab(s), Oral, BID  POLYETHYLENE GLYCOL 3350 PO:   PREDNISONE 20 MG TAB: .      Past Medical/ Family/ Social History   Medical history:    Resolved  Kidney stone ([id number]):  Resolved.  Hypertension (03180974):  Resolved.  HTN (hypertension) ([id number]):  Resolved.  Atrial fibrillation (25796439):  Resolved.  HIV ([id number]):  Resolved., Reviewed as documented in chart.   Surgical history:    Catheter Insertion Mediport (None) on 3/5/2018 at 57 Years.  Comments:  3/5/2018 08:16 - Allie SAM, Dasia WALKER  auto-populated from documented surgical case  Biopsy Bone Marrow Aspiration (.) on 12/21/2017 at 57 Years.  Comments:  12/22/2017 13:03 - Pao Martinez LPN  auto-populated from documented surgical case  Esophagogastroduodenoscopy on 12/15/2017 at 57 Years.  Comments:  12/15/2017 11:Daryl - Praveena Danielle RN.  auto-populated from documented surgical case  Biopsy Gastrointestinal on 12/15/2017 at 57 Years.  Comments:  12/15/2017 11:Daryl - Praveena Danielle RN  auto-populated from documented surgical case  Colonoscopy on 12/15/2017 at 57 Years.  Comments:  12/15/2017 11:Praveena Randolph RN  A.  auto-populated from documented surgical case  Colonoscopy (SNOMED CT [id number]) on 12/15/2017 at 57 Years.  Comments:  3/20/2018 13:11 - Nay Hopkins MD  Performed by Dr. Mcadams while inpatient at [facility] in Dec. 2017. Next one due in 5 years per operative report.  Fracture (SNOMED CT [id number]) in  at 20 Years.  left wrist repair.  cardiac stents placed., Reviewed as documented in chart.   Family history:    Primary malignant neoplasm of colon  Brother  Primary malignant neoplasm of lung  Father  Stroke  Sister  Hypertension.  Mother  Sister    Father  Mother  , Reviewed as documented in chart.   Social history: Reviewed as documented in chart.   Problem list:    Active Problems (11)  Acute disease or injury-related malnutrition   Atrial fibrillation   Back pain   CAD - Coronary artery disease   Constipation   constipationAnxiety   HIV   Hyperlipidemia   Hypertension   Lymphoma   Tobacco user   .      Physical Examination               Vital Signs             Time:  2018 18:03:00.   Vital Signs   2018 17:29 CDT      Temperature Oral          36.8 DegC                             Temperature Oral (calculated)             98.24 DegF                             Peripheral Pulse Rate     68 bpm                             Respiratory Rate          18 br/min                             SpO2                      99 %                             Oxygen Therapy            Room air                             Systolic Blood Pressure   105 mmHg                             Diastolic Blood Pressure  73 mmHg  .      Vital Signs (last 24 hrs)_____  Last Charted___________  Temp Oral     36.8 DegC  (MAY 29 17:29)  Heart Rate Peripheral   68 bpm  (MAY 29 17:29)  Resp Rate         18 br/min  (MAY 29 17:29)  SBP      105 mmHg  (MAY 29 17:29)  DBP      73 mmHg  (MAY 29 17:29)  SpO2      99 %  (MAY 29 17:29)  .   Measurements   2018 17:29 CDT      Weight Dosing             79 kg                              Weight Measured and Calculated in Lbs     174.16 lb                             Weight Estimated          79 kg                             Height/Length Dosing      175 cm                             Height/Length Estimated   175 cm                             Body Mass Index Estimated 25.8 kg/m2  .   Basic Oxygen Information   5/29/2018 17:29 CDT      SpO2                      99 %                             Oxygen Therapy            Room air  .   General:  Alert, no acute distress.    Skin:  Warm, pink, intact, moist, no pallor, no rash, normal for ethnicity.    Head:  Normocephalic, atraumatic.    Neck:  Supple, trachea midline, no tenderness, no JVD, no carotid bruit.    Eye:  Pupils are equal, round and reactive to light, extraocular movements are intact, normal conjunctiva.    Ears, nose, mouth and throat:  Tympanic membranes clear, oral mucosa moist, no pharyngeal erythema or exudate.    Cardiovascular:  Regular rate and rhythm, No murmur, Normal peripheral perfusion, No edema.    Respiratory:  Lungs are clear to auscultation, respirations are non-labored, breath sounds are equal, Symmetrical chest wall expansion.    Chest wall:  No tenderness, No deformity.    Back:  Nontender, Normal range of motion, Normal alignment, no step-offs.    Musculoskeletal:  Normal ROM, normal strength, no tenderness, no swelling.    Gastrointestinal:  Soft, Non distended, Tenderness: diffuse tenderness through out the abdominal, Guarding: Negative, Rebound: Negative, Bowel sounds: Normal, Organomegaly: Negative.    Genitourinary   Neurological:  Alert and oriented to person, place, time, and situation, No focal neurological deficit observed, CN II-XII intact, normal sensory observed, normal motor observed.    Lymphatics:  No lymphadenopathy.   Psychiatric:  Cooperative, appropriate mood & affect, normal judgment, non-suicidal.       Medical Decision Making   Electrocardiogram:  Time 5/29/2018 18:08:00, rate 67, normal  sinus rhythm, normal HI & QRS intervals, EP Interp, inferior ischemia.    Results review:     No qualifying data available.      Reexamination/ Reevaluation   Vital signs   Basic Oxygen Information   5/29/2018 17:29 CDT      SpO2                      99 %                             Oxygen Therapy            Room air        Impression and Plan   Diagnosis   Diagnosis   Abdominal pain in male (RKM22-IS R10.9)   Plan   Condition: Improved, Stable.    Disposition: Medically cleared, Patient care transitioned to: Time: 5/29/2018 19:00:00, [place] CHAWLA, Tej FREEDMAN    Prescriptions   Patient was given the following educational materials: Pt. education, Pt. education.    Follow up with: [facility].    Counseled: Patient, Regarding diagnosis, Regarding treatment plan, Patient indicated understanding of instructions.

## 2022-04-30 NOTE — DISCHARGE SUMMARY
Patient:   Jason Perdue             MRN: [id number]            FIN: [id number]               Age:   57 years     Sex:  Male     :  1960   Associated Diagnoses:   Afib; AIDS; Anemia; B-cell lymphoma; Diffuse large B-cell lymphoma; GI bleed; HIV positive; Late latent syphilis; Neutropenia; Physical deconditioning; Protein calorie malnutrition   Author:   Vee Coulter MD      Results Review   General results   Most recent results   Discrete results only   2018 3:26 CST       WBC                       12.2 x10(3)/mcL  HI                             RBC                       3.96 x10(6)/mcL  LOW                             Hgb                       11.6 gm/dL  LOW                             Hct                       34.8 %  LOW                             Platelet                  312 x10(3)/mcL                             MCV                       87.9 fL                             MCH                       29.3 pg                             MCHC                      33.3 gm/dL                             RDW                       15.5 %                             MPV                       10.7 fL                             Sodium Lvl                131 mmol/L  LOW                             Potassium Lvl             4.4 mmol/L                             Chloride                  103 mmol/L                             CO2                       20.0 mmol/L  LOW                             Calcium Lvl               7.3 mg/dL  LOW                             Glucose Lvl               95 mg/dL                             BUN                       17.0 mg/dL                             Creatinine                0.48 mg/dL  LOW                             eGFR-AA                   >60 mL/min/1.73 m2  NA                             eGFR-CLYDE                  >60 mL/min/1.73 m2  NA           Discharge Information      Discharge Summary Information   Admitted  1/3/2018   Discharged  2018    Admitting physician     Miguel Angel CHAWLA, Mike DOBBINS     Referring physician for admission     Opal CHAWLA, Kayla SHIPLEY     Consulting physician     Sky CHAWLA, Lynda LAURA.     Salvador CHAWLA, SOLE Shirley.     Dariel CHAWLA, Vital.     Discharge diagnosis     Afib (PAO78-ZD I48.91).     AIDS (TKV16-RS B20).     Anemia (OHZ32-FT D64.9).     B-cell lymphoma (JRM08-RF C85.10).     Diffuse large B-cell lymphoma (VPV83-BJ C83.3).     GI bleed (YLS99-JQ K92.2).     HIV positive (EJF70-FH Z21).     Late latent syphilis (WXY77-DD A52.8).     Neutropenia (VBZ79-OB D70).     Physical deconditioning (NXE71-OT R53.81).     Protein calorie malnutrition (FYE88-AC E46).        Physical Examination   Vital Signs   1/23/2018 11:19 CST      Temperature Oral          36.9 DegC                             Temperature Oral (calculated)             98.42 DegF                             Peripheral Pulse Rate     78 bpm                             Respiratory Rate          18 br/min                             SpO2                      100 %                             Systolic Blood Pressure   113 mmHg                             Diastolic Blood Pressure  77 mmHg                             Mean Arterial Pressure, Cuff              89 mmHg     General:  Alert and oriented, No acute distress.    Eye:  Pupils are equal, round and reactive to light.    HENT:  Normocephalic.    Neck:  Supple.    Respiratory:  Lungs are clear to auscultation.    Cardiovascular:  Normal rate, No edema.    Gastrointestinal:  Soft, Non-tender.    Neurologic:  Alert, Oriented, No focal deficits.    Psychiatric:  Cooperative.         Hospital Course   Hospital Course   Admitted from: from emergency department.     Admitting diagnosis: Diffuse large B-cell lymphoma (GGQ55-OZ C83.3), GI bleed (UUD20-EL K92.2), HIV positive (UZO97-IV Z21), Small bowel obstruction.     Admission disposition: admit to medical bed.     Length of stay: days  20.     Medical management.       57-year-old  male with significant past medical history of atrial fibrillation, HIV, now with AIDS, latent syphilis, HTN with a complicated medical history.  Patient had been noncompliant with HAART, recently restarted.  CT abdomen/pelvis done during the hospital stay with JANICE thompson with [facility] revealed findings concerning for possible neoplasm and worsening mesenteric/retroperitoneal adenopathy.  Mid December he also had an EGD/colonoscopy which was concerning for ulcerative blood in her nodules again concerning for malignancy.  Biopsies were consistent with diffuse large B-cell lymphoma.  However bone marrow biopsy showed no evidence of lymphoma.  Patient was discharged late December with outpatient follow-up in oncology clinic.  Patient then was followed up in [facility] ID clinic and was restarted on HAART.  Patient presented to the ED again on 1/3/2018 with complaints of worsening abdominal pain, doubt stools, weight loss.  CT then showed small bowel obstruction secondary to extensive abdominopelvic lymphadenopathy.  NG tube was placed and surgery/GI was consulted.  Patient was noted to have worsening anemia following which she was transfused.  Oncology consulted and he was initiated on  R CHOP.  Repeat small bowel series on 1/12 showed persistent partial mid distal SBO.  Since his symptoms improved, patient's diet was slowly advanced after removing the NG tube.  Continued to be significantly neutropenic secondary to chemotherapy which improved.  Hematology and oncology continues to be on board.  Patient's abdominal symptoms completely resolved.  He started tolerating oral diet-regular.  Appetite improved.  Patient had bowel movements with Colace.  Still continued to have poor functional status even though there was mild improvement with physical therapy.  However still not enough functional status to restart cycle 2 chemotherapy.  Patient will be reassessed in a few weeks for cycle 2 of chemotherapy as outpatient in [facility] hematology  oncology clinic.  Continue HIV medications.  Advised about compliance.  Bactrim for prophylaxis given his CD4 count. For chronic atrial fibrillation, rate controlled on metoprolol and Cardizem.  GI recommended holding oral anticoagulation given duodenal lesion which was bleeding.  Patient received a total of 6 units of PRBC since admission.  H&H remained stable.  No evidence of active bleeding for several days.  Blood pressure remained stable on calcium channel blockers and beta blockers.  Pancytopenia and neutropenia significantly improved and is back to within normal limits.  Empiric Levaquin was discontinued.  However for severe physical deconditioning patient will need outpatient physical therapy to regain functional status.  Since medically stable, cleared by oncology he is being discharged home with home health and physical therapy to regain functional status and will be reassessed in oncology clinic in [facility] about cycle 2 chemotherapy for his B-cell lymphoma.  Patient will also need to follow-up at [facility] GI, [facility] infectious disease and primary care clinic and cardiology clinic.  GI recommended resuming antiplatelet, but not oral anticoagulation.  Recommended  PPI twice daily for 30 days followed by once daily thereafter  All TREATMENT PLANS WERE DISCUSSED WITH THE PATIENT AND PATIENT voiced  UNDERSTANDING TO EVERYTHING EXPLAINED.  FAMILY AT BEDSIDE.  ALL QUESTIONS ANSWERED.  Encouraged oral intake, advised to cooperate with physical therapy.    DC home with home health and PT         Discharge Plan   Discharge Summary Plan   Discharge Status: improved.     Discharge instructions given: to patient, to family member.     Discharge disposition: discharge to home (into the care of family member, with home health care).     Prescriptions: continue same medications, reviewed (with patient, with caregiver), written and given to patient.     Course   Improving.     Progressing as expected.     Well controlled.      Education and Follow-up   Counseled: patient.     Discharge Planning: Potassium Content of Foods, Deconditioning, Malnutrition, HIV Infection and AIDS, [facility] GI clinic GI clinic will be calling you with apt date and time. If you do not hear  by 1/29 from them please call [phone], ask for Jackie as this is who is arranging appointment; [facility] Cardiology Clinic 1/25/2018 08:00:00 Keep scheduled appointment Jan 25 at 8 am with Cardiology clinic on 7th floor; [facility] medicine clinic 3/20/2018 13:00:00 Keep scheduled appointment with your Primary care physician Nay Hopkins March 20 1:00 at Internal med at [facility]; [facility] oncology clinic 2/14/2018 08:20:00 Keep scheduled appointment; [facility] SPECIALTY CLINIC 1/31/2018 12:50:00 Inf disease, Dr Federico Hopkins will be seeing you; [facility] home health nurse with physcial therapy.     DC TIME 36 MNTS

## 2022-04-30 NOTE — H&P
Patient:   Jason Perdue             MRN: [id number]            FIN: [id number]               Age:   59 years     Sex:  Male     :  1960   Associated Diagnoses:   None   Author:   Jeff CHAWLA, Miladys DOBBINS      Basic Information   Source of history:  Self.    Referral source:  Emergency department.    History limitation:  None.       Chief Complaint   2019 23:51 CST     sob onset 3 days ago, reports worse on tonight. also c/o lle pain onset after ambulance picked him up.        History of Present Illness   59-year-old male with PMHx of HFrEF (LVEF 23% ), hypertension, hyperlipidemia, A. fib on Eliquis, CAD s/p PCI with 2 stents, HIV on ART, diffuse B-cell lymphoma in remission presented to the ED on 19 with complaints of three-day history of shortness of breath.  He stated that about 3 days ago he began to feel short of breath and thought he may be getting sick, he tried to take over-the-counter medications with no relief.  He has had similar episodes in the past, most recently in 2019 when was hospitalized for CHF exacerbation.  Of note he ran out of all of his medications about 2 weeks ago, and as such has not taken them since then.  He stated he also has two-pillow orthopnea and PND along with occasional peripheral edema.  He denied fever, chills, chest pain, abdominal pain, nausea, vomiting, dysuria.    ED Course: On presentation he was hypertensive at 147/106, HR 85, afebrile, breathing at a rate of 20 breaths per minute, and satting 99% on RA.  He received Lasix 40 mg,  mg, and one DuoNeb treatment in the ED.  CXR was obtained.  CBC and BMP are both within normal limits.  NT proBNP was measured at 13,128.  Initial troponin was 0.054.  UDS was found to be positive for cocaine.  Medicine was consulted for admission for CHF exacerbation.     FamHx: Mother - diabetes mellitus, CVA; father - , diabetes mellitus; sister ×2CVA, diabetes mellitus  PSHx: PCI with stent  placement, Mediport insertion  Social Hx: + smoking - 1 PPD, No drinking, cocaine use 3 times per month  Allergies: Ace inhibitors, nitro      Review of Systems   Constitutional:  No fever, No chills.    Respiratory:  Shortness of breath, No cough.    Cardiovascular:  No chest pain, No peripheral edema.    Gastrointestinal:  Constipation, No nausea, No vomiting, No diarrhea, No abdominal pain.    Genitourinary:  No dysuria.       Health Status   Allergies:    Allergic Reactions (Selected)  Severity Not Documented  ACE inhibitors- Angioedema.  Nitroglycerin Transdermal System- Itching.,    Allergies (2) Active Reaction  ACE inhibitors Angioedema  Nitroglycerin Transdermal System Itching        Physical Examination      Vital Signs (last 24 hrs)_____  Last Charted___________  Temp Oral     36.7 DegC  (DEC 16 23:51)  Heart Rate Peripheral   96 bpm  (DEC 17 05:00)  Resp Rate         20 br/min  (DEC 17 05:00)  SBP      118 mmHg  (DEC 17 05:00)  DBP      H 96mmHg  (DEC 17 05:00)  SpO2      97 %  (DEC 17 05:00)  Weight      88.9 kg  (DEC 16 23:51)  Height      180 cm  (DEC 16 23:51)  BMI      27.44  (DEC 16 23:51)     Measurements from flowsheet : Measurements   12/16/2019 23:51 CST     Weight Dosing             88.9 kg                             Weight Measured           88.9 kg                             Weight Measured and Calculated in Lbs     195.99 lb                             Height/Length Dosing      180 cm                             Height/Length Measured    180 cm                             Body Mass Index Measured  27.44 kg/m2     General:  No acute distress, Lying in bed mildly short of breath, speaking in full sentences.    Eye:  Extraocular movements are intact, Normal conjunctiva.    HENT:  Normocephalic.    Neck:  Supple, Non-tender, No lymphadenopathy, No thyromegaly.    Respiratory:  Lungs are clear to auscultation, Respirations are non-labored, Breath sounds are equal, Symmetrical chest wall  expansion.    Cardiovascular:  Normal rate, Regular rhythm, No murmur, No gallop, No edema.    Gastrointestinal:  Soft, Non-tender, Non-distended, Normal bowel sounds, No organomegaly.    Musculoskeletal:  No swelling, No deformity.    Integumentary:  Warm, Dry, Intact, No pallor.    Neurologic:  Alert, Oriented, No focal deficits.    Cognition and Speech:  Oriented, Speech clear and coherent.    Psychiatric:  Cooperative, Appropriate mood & affect.       Review / Management   Results review:  All Results   12/17/2019 3:00 CST      UA Appear                 Clear                             UA Color                  YELLOW                             UA Spec Grav              1.031  HI                             UA Bili                   0.5 mg/dL                             UA pH                     5.5                             UA Urobilinogen           2 mg/dL                             UA Blood                  Negative                             UA Glucose                Negative                             UA Ketones                Trace                             UA Protein                50 mg/dL                             UA Nitrite                Negative                             UA Leuk Est               Negative                             UA WBC Interp             0-2 /HPF                             UA RBC Interp             0-2 /HPF                             UA Bact Interp            None Seen /HPF                             UA Squam Epi Interp        /LPF                             UA Hyal Cast Interp       3-5 /LPF                             U Temp                    24.0 DegC                             U pH                      5.5                             U Amph Scr                Negative                             U Fiona Scr                Negative                             U Benzodia Scr            Negative                             U Cannab Scr               Negative                             U Cocaine Scr             Positive                             U Opiate Scr              Negative                             U Phencyclidine Scr       Negative    12/17/2019 2:10 CST      WBC                       5.7 x10(3)/mcL                             RBC                       3.75 x10(6)/mcL  LOW                             Hgb                       11.4 gm/dL  LOW                             Hct                       36.4 %  LOW                             Platelet                  165 x10(3)/mcL                             MCV                       97.1 fL                             MCH                       30.4 pg                             MCHC                      31.3 gm/dL                             RDW                       14.4 %                             MPV                       11.1 fL  HI                             Abs Neut                  3.56 x10(3)/mcL                             Segs Man                  70 %                             Lymph Man                 16.0 %  LOW                             Monocyte Man              13 %  HI                             Eos Man                   1 %                             Basophil Man              0 %                             Hypochrom                 1+                             Platelet Est              Adequate                             Anisocyte                 1+                             Polychrom                 1+                             RBC Morph                 Abnormal                             Sodium Lvl                141 mmol/L                             Potassium Lvl             3.8 mmol/L                             Chloride                  110 mmol/L  HI                             CO2                       23 mmol/L                             Calcium Lvl               8.4 mg/dL  LOW                             Glucose Lvl               97 mg/dL                              BUN                       19 mg/dL  HI                             Creatinine                1.00 mg/dL                             eGFR-AA                   99 mL/min                             eGFR-CLYDE                  82 mL/min  LOW                             Bili Total                0.7 mg/dL                             Bili Direct               0.2 mg/dL                             Bili Indirect             0.5 mg/dL                             AST                       41 unit/L  HI                             ALT                       23 unit/L                             Alk Phos                  102 unit/L                             Total Protein             8.4 gm/dL  HI                             Albumin Lvl               2.8 gm/dL  LOW                             Globulin                  5.60 gm/mL  HI                             A/G Ratio                 0.5 ratio  LOW                             NT pro BNP.               13,128 pg/mL  HI                             Lipase Lvl                95 unit/L                             Total CK                  77 unit/L                             Troponin-I                0.054 ng/mL  HI  .       Impression and Plan   59-year-old male with PMHx of HFrEF (LVEF 23% 4/19), hypertension, hyperlipidemia, A. fib on Eliquis, CAD s/p PCI with 2 stents, HIV on ART, diffuse B-cell lymphoma in remission admitted for CHF exacerbation    1.  CHF exacerbation  -Admit to telemetry with monitor      -Echo: LVEF 23% 4/19  -BNP: 13,128 on admission  -Troponins: 0.054 on admission  -Lasix 40mg given in ED  -Holding B-blocker due to cocaine use and ACEi due to allergy   -Lipitor 40mg daily    2. Mildly elevated cardiac troponins  -No active chest pain - low suspicion for ACS  -Troponin 0.054 on admission  -EKG WNL in ED  -Trend Nathaniel q6h to resolution    3. HIV  -Has been out of meds for 2 weeks  -CD4 count 306 12/18  -VL undetectable 12/18  -Consult ID for med  recs    4. A-Fib  -CHADSVASc score = 3  -Continue home Eliquis    5. HTN  -Continue home amlodipine and spironolactone    6. CAD s/p PCI w/ 2 stents  -Continue home Plavix    Antibiotics: None  Diet: Cardiac  DVT Prophylaxis: Eliquis  GI Prophylaxis: None  Fluids: None        Disposition: Admit to tele. Monitor UOP and fluid status. F/u Nathaniel and EKG. Holding beta blocker due to positive cocaine on UDS. Will need all home meds refilled on D/C.

## 2022-04-30 NOTE — ED PROVIDER NOTES
Patient:   Jason Perdue             MRN: [id number]            FIN: [id number]               Age:   58 years     Sex:  Male     :  1960   Associated Diagnoses:   Abdominal pain in male; Ileus   Author:   Tej Osborne MD      Basic Information   Time seen: Immediately upon arrival.   History source: Patient.   Arrival mode: Private vehicle.   History limitation: None.   Additional information: Chief Complaint from Nursing Triage Note : Chief Complaint   2018 17:29 CDT      Chief Complaint           In via ambulance for abd pain, awake and alert severe pain to lower abd deneis vomiting today states history of lymphoma andon chemo  .      History of Present Illness   The patient presents with abdominal pain and gradual worsening of abdominal pain. hx of HIV lymphoma. finished chemo 2 weeks ago.  The onset was 1  days ago and chronic.  The course/duration of symptoms is constant.  The character of symptoms is crampy.  The degree at onset was minimal.  The Location of pain at onset was bilateral, lower, abdominal and mid epigastric.  The degree at present is minimal.  The Location of pain at present is bilateral, abdominal and mid epigastric.  Radiating pain: none. The exacerbating factor is none.  The relieving factor is rest.  Therapy today: none.  Risk factors consist of hypertension.  Associated symptoms: none.  Additional history: none.        Review of Systems   Constitutional symptoms:  No fever, no weakness, no fatigue, no decreased activity.    Skin symptoms:  No jaundice, no rash, no pruritus.    Eye symptoms:  No recent vision problems,    ENMT symptoms:  No sore throat, no nasal congestion.    Respiratory symptoms:  No orthopnea, no sputum production.    Cardiovascular symptoms:  No chest pain, no palpitations, no syncope, no diaphoresis.    Gastrointestinal symptoms:  Abdominal pain, mild, epigastric, sharp, no nausea, no vomiting, no diarrhea.    Genitourinary symptoms:  No  dysuria,    Musculoskeletal symptoms:  No back pain, no Muscle pain, no Joint pain.    Neurologic symptoms:  No headache, no dizziness, no altered level of consciousness.    Psychiatric symptoms:  No anxiety, no depression, no sleeping problems, no substance abuse.    Endocrine symptoms:  No polyuria, no polydipsia, no polyphagia, no hyperglycemia.    Hematologic/Lymphatic symptoms:  Bleeding tendency negative, bruising tendency negative, no petechiae, no gum bleeding.    Allergy/immunologic symptoms:  No food allergies, no recurrent infections, no impaired immunity.              Additional review of systems information: All other systems reviewed and otherwise negative.      Health Status   Allergies:    Allergic Reactions (Selected)  Severity Not Documented  ACE inhibitors- Angioedema.,    Allergies (1) Active Reaction  ACE inhibitors Angioedema  .   Medications:  (Selected)   Inpatient Medications  Ordered  Heparin Flush 100 U/mL - 5 mL: 500 units, form: Injection, IV Push, Once-chemo, first dose 02/21/18 9:03:00 CST, stop date 02/21/18 9:03:00 CST, Days 1  Heparin Flush 100 U/mL - 5 mL: 500 units, form: Injection, IV Push, Once-chemo, first dose 04/26/18 9:44:00 CDT, stop date 04/26/18 9:44:00 CDT, Days 1  dexamethasone (for IVPB): 10 mg, IV Piggyback, Once-chemo, Infuse over: 20 minute(s), first dose 02/21/18 8:43:00 CST, stop date 02/21/18 8:43:00 CST, Days 1  Future  Bicillin L-A 1,200,000 units/2 mL intramuscular suspension: 2.4 millionunits, form: Injection, IM, Once-Unscheduled, first dose 02/15/18 7:00:00 CST, Future Order  Prescriptions  Prescribed  Cardizem  mg/24 hours oral CAPsule, extended release: 360 mg = 1 cap(s), Oral, Daily, # 30 cap(s), 6 Refill(s), Pharmacy: [email or web address], [facility].  Descovy 200 mg-25 mg oral tablet: 1 tab(s), Oral, Daily, # 30 tab(s), 3 Refill(s), Pharmacy: [facility]  [place] 10 mg-325 mg oral tablet: 1 tab(s), Oral, q6hr, PRN PRN as needed for pain, # 50  tab(s), 0 Refill(s), Pharmacy: [facility], [facility].  Plavix 75 mg oral tablet: 75 mg = 1 tab(s), Oral, Daily, # 90 tab(s), 11 Refill(s)  Tivicay 50 mg oral tablet: 50 mg = 1 tab(s), Oral, Daily, # 30 tab(s), 3 Refill(s), Pharmacy: [facility]  aspirin 81 mg oral tablet, CHEWABLE: 81 mg = 1 tab(s), Oral, Daily, # 30 tab(s), 0 Refill(s)  atorvastatin 40 mg oral tablet: 40 mg = 1 tab(s), Oral, Daily, # 90 tab(s), 11 Refill(s)  metoprolol tartrate 50 mg oral tab: 50 mg = 1 tab(s), Oral, BID, # 180 tab(s), 11 Refill(s)  Documented Medications  Documented  DILTIAZEM  MG TAB:   HYDROCODON-APAP 5-325:   HYDROCODON-APAP 7.5-325:   HYDROXYZINE STACEY 50 MG CAP:   KETOROLAC 10 MG TABLET: 10 mg = 1 tab(s), Oral, q6hr  MORPHINE SULF ER 15 MG TAB: 15 mg = 1 tab(s), Oral, BID  POLYETHYLENE GLYCOL 3350 PO:   PREDNISONE 20 MG TAB: .      Past Medical/ Family/ Social History   Medical history:    Resolved  Kidney stone ([id number]):  Resolved.  Hypertension ([id number]):  Resolved.  HTN (hypertension) ([id number]):  Resolved.  Atrial fibrillation ([id number]):  Resolved.  HIV ([id number]):  Resolved., Reviewed as documented in chart.   Surgical history:    Catheter Insertion Mediport (None) on 3/5/2018 at 57 Years.  Comments:  3/5/2018 08:16 - Dasia Kelley RN  auto-populated from documented surgical case  Biopsy Bone Marrow Aspiration (.) on 12/21/2017 at 57 Years.  Comments:  12/22/2017 13:03 - Pao Martinez LPN  auto-populated from documented surgical case  Esophagogastroduodenoscopy on 12/15/2017 at 57 Years.  Comments:  12/15/2017 11:Daryl - Praveena Danielle RN.  auto-populated from documented surgical case  Biopsy Gastrointestinal on 12/15/2017 at 57 Years.  Comments:  12/15/2017 11:Daryl - Praveena Danielle RN.  auto-populated from documented surgical case  Colonoscopy on 12/15/2017 at 57 Years.  Comments:  12/15/2017 11:Praveena Randolph RN  A.  auto-populated from documented surgical case  Colonoscopy ([id number]) on 12/15/2017 at 57 Years.  Comments:  3/20/2018 13:11 - Nay Hopkins MD  Performed by Dr. Mcadams while inpatient at [facility] in Dec. 2017. Next one due in 5 years per operative report.  Fracture ([id number]) in  at 20 Years.  left wrist repair.  cardiac stents placed., Reviewed as documented in chart.   Family history:    Primary malignant neoplasm of colon  Brother  Primary malignant neoplasm of lung  Father  Stroke  Sister  Hypertension.  Mother  Sister    Father  Mother  , Reviewed as documented in chart.   Social history: Reviewed as documented in chart.   Problem list:    Active Problems (11)  Acute disease or injury-related malnutrition   Atrial fibrillation   Back pain   CAD - Coronary artery disease   Constipation   constipationAnxiety   HIV   Hyperlipidemia   Hypertension   Lymphoma   Tobacco user   .      Physical Examination               Vital Signs             Time:  2018 18:03:00.   Vital Signs   2018 17:33 CDT      Peripheral Pulse Rate     67 bpm                             Heart Rate Monitored      67 bpm                             Respiratory Rate          24 br/min                             SpO2                      100 %                             Systolic Blood Pressure   105 mmHg                             Diastolic Blood Pressure  73 mmHg                             Mean Arterial Pressure, Cuff              84 mmHg    2018 17:29 CDT      Temperature Oral          36.8 DegC                             Temperature Oral (calculated)             98.24 DegF                             Peripheral Pulse Rate     68 bpm                             Respiratory Rate          18 br/min                             SpO2                      99 %                             Oxygen Therapy            Room air                             Systolic Blood Pressure   105 mmHg                              Diastolic Blood Pressure  73 mmHg  .      Vital Signs (last 24 hrs)_____  Last Charted___________  Temp Oral     36.8 DegC  (MAY 29 17:29)  Heart Rate Peripheral   67 bpm  (MAY 29 17:33)  Resp Rate         24 br/min  (MAY 29 17:33)  SBP      105 mmHg  (MAY 29 17:33)  DBP      73 mmHg  (MAY 29 17:33)  SpO2      100 %  (MAY 29 17:33)  .   Measurements   5/29/2018 17:29 CDT      Weight Dosing             79 kg                             Weight Measured and Calculated in Lbs     174.16 lb                             Weight Estimated          79 kg                             Height/Length Dosing      175 cm                             Height/Length Estimated   175 cm                             Body Mass Index Estimated 25.8 kg/m2  .   Basic Oxygen Information   5/29/2018 17:33 CDT      SpO2                      100 %    5/29/2018 17:29 CDT      SpO2                      99 %                             Oxygen Therapy            Room air  .   General:  Alert, no acute distress.    Skin:  Warm, pink, intact, moist, no pallor, no rash, normal for ethnicity.    Head:  Normocephalic, atraumatic.    Neck:  Supple, trachea midline, no tenderness, no JVD, no carotid bruit.    Eye:  Pupils are equal, round and reactive to light, extraocular movements are intact, normal conjunctiva.    Ears, nose, mouth and throat:  Tympanic membranes clear, oral mucosa moist, no pharyngeal erythema or exudate.    Cardiovascular:  Regular rate and rhythm, No murmur, Normal peripheral perfusion, No edema.    Respiratory:  Lungs are clear to auscultation, respirations are non-labored, breath sounds are equal, Symmetrical chest wall expansion.    Chest wall:  No tenderness, No deformity.    Back:  Nontender, Normal range of motion, Normal alignment, no step-offs.    Musculoskeletal:  Normal ROM, normal strength, no tenderness, no swelling.    Gastrointestinal:  Soft, Non distended, Tenderness: diffuse tenderness through out the abdominal,  Guarding: Negative, Rebound: Negative, Bowel sounds: Normal, Organomegaly: Negative.    Genitourinary   Neurological:  Alert and oriented to person, place, time, and situation, No focal neurological deficit observed, CN II-XII intact, normal sensory observed, normal motor observed.    Lymphatics:  No lymphadenopathy.   Psychiatric:  Cooperative, appropriate mood & affect, normal judgment, non-suicidal.       Medical Decision Making   Documents reviewed:  Emergency department nurses' notes.   Electrocardiogram:  Time 5/29/2018 18:08:00, rate 67, normal sinus rhythm, normal SD & QRS intervals, EP Interp, inferior ischemia.    Results review:  Lab results : Lab View   5/29/2018 20:04 CDT      POC Troponin              0.05 ng/mL    5/29/2018 19:30 CDT      U pH                      8.0                             UA Appear                 Hazy                             UA Color                  YELLOW                             UA Spec Grav              1.028                             UA Bili                   0.5 mg/dL                             UA pH                     8.0                             UA Urobilinogen           3 mg/dL                             UA Blood                  Negative                             UA Glucose                Normal                             UA Ketones                Trace                             UA Protein                70 mg/dL                             UA Nitrite                Negative                             UA Leuk Est               Negative                             UA WBC Interp             3-5 /HPF                             UA RBC Interp             0-2                             UA Mucous                 Moderate                             UA Bact Interp            None Seen                             UA Squam Epi Interp       >100                             UA Hyal Cast Interp       0-2                             U Temp                     21.0 DegC                             U Amph Scr                Negative                             U Fiona Scr                Negative                             U Benzodia Scr            Negative                             U Cannab Scr              Negative                             U Cocaine Scr             Positive                             U Opiate Scr              Negative                             U Phencyclidine Scr       Negative    5/29/2018 18:30 CDT      Sodium Lvl                143 mmol/L                             Potassium Lvl             3.7 mmol/L                             Chloride                  106 mmol/L                             CO2                       27 mmol/L                             Calcium Lvl               9.1 mg/dL                             Glucose Lvl               125 mg/dL  HI                             BUN                       13 mg/dL                             Creatinine                0.90 mg/dL                             eGFR-AA                   >105 mL/min                             eGFR-CLYDE                  92 mL/min                             Bili Total                0.2 mg/dL                             Bili Direct               <0.1 mg/dL                             Bili Indirect             unable to calc mg/dL                             AST                       24 unit/L                             ALT                       16 unit/L                             Alk Phos                  62 unit/L                             Total Protein             7.2 gm/dL                             Albumin Lvl               3.3 gm/dL  LOW                             Globulin                  3.90 gm/mL  HI                             A/G Ratio                 1 ratio                             Lipase Lvl                45 unit/L  LOW                             Total CK                  38 unit/L  LOW                             CK MB                      <1.0 ng/mL                             PT                        13.5 second(s)                             INR                       1.10                             WBC                       3.8 x10(3)/mcL  LOW                             RBC                       3.00 x10(6)/mcL  LOW                             Hgb                       9.2 gm/dL  LOW                             Hct                       29.2 %  LOW                             Platelet                  205 x10(3)/mcL                             MCV                       97.3 fL                             MCH                       30.7 pg                             MCHC                      31.5 gm/dL                             RDW                       15.0 %  HI                             MPV                       11.2 fL  HI                             Abs Neut                  2.40 x10(3)/mcL                             Segs Man                  61 %                             Band Man                  6 %                             Lymph Man                 9.0 %  LOW                             Monocyte Man              11 %  HI                             Eos Man                   6 %                             Basophil Man              2 %  HI                             [place] Man                  3 %  HI                             Myelo Man                 1 %  NA                             Abn Lymph Man             1 %  HI                             Hypochrom                 1+                             Platelet Est              Adequate                             Anisocyte                 1+                             Poik                      1+                             Microcyte                 1+                             Polychrom                 1+                             RBC Morph                 Abnormal                             Schistocyte               1+                             Ovalocytes                 1+  .   Chest X-Ray:  Time reported 5/29/2018 20:33:00, no acute disease process, interpretation by Emergency Physician.    Radiology results:  Rad Results (ST)  < 12 hrs   Accession: [id number]  Order: CT Abdomen and Pelvis W Contrast  Report Dt/Tm: 05/29/2018 20:52  Report:      CT abdomen pelvis with contrast     Technique: Images of the abdomen and pelvis were obtained with  contrast.     Total DLP: 459.02 mGy cm      Indication: Abdominal pain.     Comparison: CT abdomen pelvis 1/10/2018.     Findings:   The bilateral lung bases are clear. The heart is normal in size.     There are multiple hypodensities in the liver which are too small to  contrast, but likely represent simple cysts. These are unchanged from  the prior exam. The gallbladder is contracted. The spleen, pancreas,  and adrenal glands are normal. There is a simple cyst in the right  kidney. There is no hydronephrosis or nephrolithiasis.     The stomach and proximal small bowel are decompressed. There is short  segment of ileum which is dilated with fecalization. There is no  pneumatosis or perforation. The appendix is normal. The colon is  normal. The urinary bladder is normal. There is no pelvic or  retroperitoneal lymphadenopathy. There is no lytic or blastic osseous  lesion.     Impression:  Dilated short segment of ileum consistent with partial small bowel  obstruction versus ileus.      .      Reexamination/ Reevaluation   Time: 5/29/2018 21:15:00 .   Notes:     Patient currently in no distress.  There is a short segment of ileum consistent with a partial small bowel obstruction versus ileus.  Patient reports having a bowel movement earlier today.  He does have nausea but has been able to eat without significant vomiting.  On reexamination, patient still has some mild generalized tenderness.  No acute signs of infection the patient does have 6% bands on laboratory evaluation.  At this point with the patient's symptoms being mild,  recommend observation with internal medicine for abdominal pain and what appears to be more likely an ileus.  Recommend beginning with a liquid diet for nothing by mouth and slowly advancing to ensure patient does not and upper small bowel obstruction.  May also consider panculturing for evaluation due to the bandemia..      Impression and Plan   Diagnosis   Diagnosis   Abdominal pain in male (HBD84-XF R10.9)   Ileus (FYG83-BQ K56.7)      Calls-Consults   -  5/29/2018 21:16:00 , Internal Medicine, consult.    Plan   Condition: Improved, Stable.    Disposition: Medically cleared, Admit time  5/29/2018 21:10:00, Place in Observation Unit, Patient care transitioned to: Time: 5/29/2018 19:00:00, [place] CHAWLA, Tej FREEDMAN    Prescriptions   Follow up with: [facility].    Counseled: Patient, Regarding diagnosis, Regarding diagnostic results, Regarding treatment plan, Regarding prescription, Patient indicated understanding of instructions.

## 2022-04-30 NOTE — H&P
"   Patient:   Jason Perdue             MRN: [id number]            FIN: [id number]               Age:   57 years     Sex:  Male     :  1960   Associated Diagnoses:   Tobacco user; HTN (hypertension); Atrial fibrillation with rapid ventricular response   Author:   Dany Matthews MD      Chief Complaint   2017 10:16 CST      pt. reports to the ed c/o of "intermittent" episodes of lightheadedness....vss, nadn      History of Present Illness   56 yo AAM with PMH of HTN presented to ED c/o dizziness. Patient reports sililar dizziness spell on Monday which resolved spontaneously after resting. Patient states that he felt dizzy while at work this morning, associated with heart racing and generalized weakness. Symptoms resolved upon arriving to hospital.   In ED, EKG revealed afib. On monitor, HR ranges from  irregular pattern, other vital signs are normal. Labs are wnl    Allergy: ACE (angioedema)   PMH: HTN on amlodipine 5mg daily (out of medication 2 days ago)   FH: mother- HTN and DM  Social history: He lives by himself, works in a restaurant's kitchen. Smokes cigarette 1ppd x 36 years, denies alcohol or illicit drug use.          Review of Systems   Constitutional:  No fever, No chills, No weakness, No decreased activity.    Eye:  No recent visual problem, No visual disturbances.    Ear/Nose/Mouth/Throat:  No nasal congestion, No sore throat.    Respiratory:  No shortness of breath, No cough, No sputum production, No wheezing.    Cardiovascular:  No chest pain, No peripheral edema, No syncope.    Gastrointestinal:  No nausea, No vomiting, No diarrhea, No constipation.    Genitourinary:  No dysuria, No hematuria.    Musculoskeletal:  No decreased range of motion, No trauma.    Integumentary:  No rash.    Neurologic:  No abnormal balance, No confusion, No numbness, No tingling, No headache.       Health Status   Allergies:    Allergic Reactions (Selected)  Severity Not Documented  ACE inhibitors- " Angioedema.      Histories   Past Medical History:    Active  HTN (hypertension) ([id number])  Resolved  Kidney stone ([id number]):  Resolved.  Hypertension ([id number]):  Resolved.   Family History:    Stroke  Sister  Hypertension.  Mother  Sister     Procedure history:    Fracture ([id number]) in 1981 at 21 Years.  left wrist repair.      Physical Examination   Vital Signs   11/9/2017 11:29 CST      Heart Rate Monitored      131 bpm  HI                             Respiratory Rate          20 br/min                             SpO2                      99 %                             Oxygen Therapy            Room air                             Systolic Blood Pressure   131 mmHg                             Diastolic Blood Pressure  94 mmHg  HI    11/9/2017 10:16 CST      Temperature Oral          36.4 DegC                             Peripheral Pulse Rate     76 bpm                             Respiratory Rate          18 br/min                             SpO2                      100 %                             Oxygen Therapy            Room air                             Systolic Blood Pressure   109 mmHg                             Diastolic Blood Pressure  74 mmHg     General:  Alert and oriented, No acute distress.    Eye:  Pupils are equal, round and reactive to light, Normal conjunctiva, Vision unchanged.    HENT:  Normocephalic, Normal hearing, Oral mucosa is moist, No pharyngeal erythema.    Neck:  Supple, Non-tender, No jugular venous distention.    Respiratory:  Lungs are clear to auscultation, Respirations are non-labored, Breath sounds are equal, Symmetrical chest wall expansion, No chest wall tenderness.    Cardiovascular:  No murmur, Good pulses equal in all extremities, Normal peripheral perfusion, No edema, irregular with rate .    Musculoskeletal:  Normal range of motion, Normal strength, No tenderness, No swelling, No deformity.     Integumentary:  Dry, Intact.    Feet:  Normal by visual exam.    Neurologic:  Alert, Oriented, Normal motor function, No focal deficits, Cranial Nerves II-XII are grossly intact.    Cognition and Speech:  Oriented, Speech clear and coherent.    Psychiatric:  Cooperative.       Review / Management   Results review:  Lab results   11/9/2017 11:41 CST      Total CK                  36 unit/L  LOW                             CK MB                     1.0 ng/mL                             PT                        13.0 second(s)                             INR                       1.00                             PTT                       34.3 second(s)    11/9/2017 11:01 CST      POC Troponin              0.02 ng/mL    11/9/2017 10:51 CST      Sodium Lvl                138 mmol/L                             Potassium Lvl             4.2 mmol/L                             Chloride                  103 mmol/L                             CO2                       29 mmol/L                             Calcium Lvl               9.1 mg/dL                             Glucose Lvl               88 mg/dL                             BUN                       18 mg/dL                             Creatinine                1.30 mg/dL                             eGFR-AA                   73 mL/min  LOW                             eGFR-CLYDE                  61 mL/min  LOW                             WBC                       6.1 x10(3)/mcL                             RBC                       4.20 x10(6)/mcL  LOW                             Hgb                       12.2 gm/dL  LOW                             Hct                       38.4 %  LOW                             Platelet                  362 x10(3)/mcL                             MCV                       91.4 fL                             MCH                       29.0 pg                             MCHC                      31.8 gm/dL                             RDW                        13.5 %                             MPV                       9.8 fL                             Abs Neut                  4.19 x10(3)/mcL                             Neutro Auto               68 x10(3)/mcL  NA                             Lymph Auto                18 %                             Mono Auto                 10 %                             Eos Auto                  2 %                             Abs Eos                   0.10 x10(3)/mcL  NA                             Basophil Auto             0 %                             Abs Neutro                4.19 x10(3)/mcL  NA                             Abs Lymph                 1.13 x10(3)/mcL  NA                             Abs Mono                  0.59 x10(3)/mcL  NA                             Abs Baso                  0.03 x10(3)/mcL  NA                             IG%                       1 %  NA                             IG#                       0.0800  NA  .       Impression and Plan   Diagnosis     Tobacco user (SFU74-BA Z72.0).     HTN (hypertension) ([id number]-[id number] I10).     Atrial fibrillation with rapid ventricular response (YGH48-AW I48.91).       58 yo AAF admited for observation for new onset afib    1. New-onset afib  - Symptomatic afib with 2 espisode of dizziness, rate 110-140  - Initiate metoprolol 25mg BID  - Continue to monitor VS closely    2.  HTN: stable  - Discontinue amlodipine     3. Tobacco user:   - Nicotine patch     PPx: Lovenox 40mg daily     Dispo: Patient is admited to telemetry for observation for new-onset afib. Low sodium diet. Likely to discharge tomorrow

## 2022-04-30 NOTE — ED PROVIDER NOTES
Patient:   Jason Perdue             MRN: [id number]            FIN: [id number]               Age:   58 years     Sex:  Male     :  1960   Associated Diagnoses:   Atypical chest pain   Author:   Darius Hernandez MD      Basic Information   Time seen: Date & time 2018 11:06:00.   History source: Patient.   Arrival mode: Private vehicle.   History limitation: None.   Additional information: Chief Complaint from Nursing Triage Note : Chief Complaint   2018 11:04 CDT      Chief Complaint           left cw pain, radiates up into left neck area, hx stents x 2, pain started at work this am, nitro sl x 2 and  1 in nitro paste in place, was diaphoretic at work, pain improved at present  .      History of Present Illness   The patient presents with chest pain.  The onset was 1  days ago.  The course/duration of symptoms is constant.  Location: generalized. Radiating pain: none. The character of symptoms is pressure.  The degree at onset was moderate.  The degree at maximum was moderate.  The degree at present is none.  The exacerbating factor is none.  The relieving factor is none.  Risk factors consist of coronary artery disease and hypertension.  Prior episodes: angina.  Therapy today Aspirin.  Associated symptoms: diaphoresis.        Review of Systems   Constitutional symptoms:  No fever, no chills.    Respiratory symptoms:  No shortness of breath, no cough.    Cardiovascular symptoms:  Chest pain, diaphoresis.    Gastrointestinal symptoms:  No abdominal pain,    Musculoskeletal symptoms:  No back pain,              Additional review of systems information: All other systems reviewed and otherwise negative.      Health Status   Allergies:    Allergic Reactions (Selected)  Severity Not Documented  ACE inhibitors- Angioedema..   Medications:  (Selected)   Inpatient Medications  Ordered  Heparin Flush 100 U/mL - 5 mL: 500 units, form: Injection, IV Push, Once-chemo, first dose 18 9:03:00 CST, stop  date 02/21/18 9:03:00 CST, Days 1  Heparin Flush 100 U/mL - 5 mL: 500 units, form: Injection, IV Push, Once-chemo, first dose 04/26/18 9:44:00 CDT, stop date 04/26/18 9:44:00 CDT, Days 1  dexamethasone (for IVPB): 10 mg, IV Piggyback, Once-chemo, Infuse over: 20 minute(s), first dose 02/21/18 8:43:00 CST, stop date 02/21/18 8:43:00 CST, Days 1  Future  Bicillin L-A 1,200,000 units/2 mL intramuscular suspension: 2.4 millionunits, form: Injection, IM, Once-Unscheduled, first dose 02/15/18 7:00:00 CST, Future Order  Prescriptions  Prescribed  Cardizem  mg/24 hours oral CAPsule, extended release: 360 mg = 1 cap(s), Oral, Daily, # 30 cap(s), 6 Refill(s), Pharmacy: [facility].  Descovy 200 mg-25 mg oral tablet: 1 tab(s), Oral, Daily, # 30 tab(s), 3 Refill(s), Pharmacy: [facility]  Ensure Plus: Ensure Plus, See Instructions, 3 cans oral daily  Chocolate & Strawberry flavors, # 96 EA, 3 Refill(s), Pharmacy: [facility]  Plavix 75 mg oral tablet: 75 mg = 1 tab(s), Oral, Daily, # 90 tab(s), 11 Refill(s)  Tivicay 50 mg oral tablet: 50 mg = 1 tab(s), Oral, Daily, # 30 tab(s), 3 Refill(s), Pharmacy: [facility]  aspirin 81 mg oral tablet, CHEWABLE: 81 mg = 1 tab(s), Oral, Daily, # 30 tab(s), 0 Refill(s)  atorvastatin 40 mg oral tablet: 40 mg = 1 tab(s), Oral, Daily, # 90 tab(s), 11 Refill(s)  metoprolol tartrate 50 mg oral tab: 50 mg = 1 tab(s), Oral, BID, # 180 tab(s), 11 Refill(s)  Documented Medications  Documented  HYDROCODON-APAP 5-325:   HYDROXYZINE STACEY 50 MG CAP:   PREDNISONE 20 MG TAB:   docusate-senna 50 mg-8.6 mg oral tablet: 2 tab(s), Oral, Once a day (at bedtime), 0 Refill(s).      Past Medical/ Family/ Social History   Medical history:    Resolved  Kidney stone ([id number]):  Resolved.  Hypertension ([id number]):  Resolved.  HTN (hypertension) ([id number]):  Resolved.  Atrial fibrillation (71485689):  Resolved.  HIV  ([id number]):  Resolved..   Surgical history:    Catheter Insertion Mediport (None) on 3/5/2018 at 57 Years.  Comments:  3/5/2018 08:16 - Allie SAM, Dasia WALKER  auto-populated from documented surgical case  Biopsy Bone Marrow Aspiration (.) on 2017 at 57 Years.  Comments:  2017 13:03 - Juan MILES, Pao LIGHT  auto-populated from documented surgical case  Esophagogastroduodenoscopy on 12/15/2017 at 57 Years.  Comments:  12/15/2017 11:22 - Praveena Danielle RN  auto-populated from documented surgical case  Biopsy Gastrointestinal on 12/15/2017 at 57 Years.  Comments:  12/15/2017 11:22 - Praveena Danielle RN  auto-populated from documented surgical case  Colonoscopy on 12/15/2017 at 57 Years.  Comments:  12/15/2017 11:22 - Praveena Danielle RN  auto-populated from documented surgical case  Colonoscopy ([id number]) on 12/15/2017 at 57 Years.  Comments:  3/20/2018 13:11 - Nay Hopkins MD  Performed by Dr. Mcadams while inpatient at [facility] in Dec. 2017. Next one due in 5 years per operative report.  Fracture ([id number]) in  at 20 Years.  left wrist repair.  cardiac stents placed..   Family history:    Primary malignant neoplasm of colon  Brother  Primary malignant neoplasm of lung  Father  Stroke  Sister  Hypertension.  Mother  Sister    Father  Mother  .   Social history: Reviewed as documented in chart.      Physical Examination               Vital Signs   Vital Signs   2018 11:41 CDT      Systolic Blood Pressure   100 mmHg                             Diastolic Blood Pressure  68 mmHg    2018 11:04 CDT      Temperature Oral          36.8 DegC                             Temperature Oral (calculated)             98.24 DegF                             Peripheral Pulse Rate     61 bpm                             Respiratory Rate          18 br/min                             SpO2                      100 %                             Oxygen Therapy            Room air                              Systolic Blood Pressure   100 mmHg                             Diastolic Blood Pressure  68 mmHg  .   Measurements   8/20/2018 11:04 CDT      Weight Dosing             79 kg                             Weight Measured and Calculated in Lbs     174.16 lb                             Weight Estimated          79 kg  .   Basic Oxygen Information   8/20/2018 11:04 CDT      SpO2                      100 %                             Oxygen Therapy            Room air  .   General:  Alert, no acute distress, not anxious, not ill-appearing.    Skin:  Warm, pink, intact, no rash.    Eye:  Pupils are equal, round and reactive to light, extraocular movements are intact, normal conjunctiva.    Ears, nose, mouth and throat:  Oral mucosa moist, no pharyngeal erythema or exudate.    Neck:  Supple, trachea midline, no tenderness, no JVD.    Cardiovascular:  Regular rate and rhythm, No murmur, Normal peripheral perfusion, No edema.    Respiratory:  Lungs are clear to auscultation, respirations are non-labored, breath sounds are equal, Symmetrical chest wall expansion.    Chest wall:  No tenderness, No deformity.    Musculoskeletal:  Normal ROM, normal strength, no tenderness, no swelling, no deformity.    Gastrointestinal:  Soft, Nontender, Non distended, Normal bowel sounds.    Neurological:  Alert and oriented to person, place, time, and situation, No focal neurological deficit observed, CN II-XII intact.    Lymphatics   Psychiatric:  Cooperative, appropriate mood & affect.       Medical Decision Making   Electrocardiogram:  Time 8/20/2018 11:10:00, rate 61, normal sinus rhythm, No ST-T changes, Normal old inferior infarct, nonspecific ST changes.  No ST elevations, no acute EKG changes noted..    Results review:  Lab results : Lab View   8/20/2018 11:23 CDT      POC Troponin              0.04 ng/mL    8/20/2018 11:22 CDT      Sodium Lvl                141 mmol/L                             Potassium Lvl              4.0 mmol/L                             Chloride                  103 mmol/L                             CO2                       26.3 mmol/L                             Calcium Lvl               8.8 mg/dL                             Magnesium Lvl             2.3 mg/dL                             Glucose Lvl               108 mg/dL  HI                             BUN                       17.1 mg/dL                             Creatinine                1.21 mg/dL                             eGFR-AA                   >60 mL/min/1.73 m2  NA                             eGFR-CLYDE                  >60 mL/min/1.73 m2  NA                             Bili Total                0.2 mg/dL                             Bili Direct               0.04 mg/dL                             Bili Indirect             0.16 mg/dL                             AST                       23 unit/L                             ALT                       14 unit/L                             Alk Phos                  85 unit/L                             Total Protein             7.6 gm/dL                             Albumin Lvl               3.40 gm/dL                             Globulin                  4.20 gm/dL  HI                             A/G Ratio                 0.8 ratio  LOW                             Total CK                  78 unit/L                             CK MB                     1.4 ng/mL                             PT                        10.3 second(s)                             INR                       0.98  LOW                             WBC                       5.1 x10(3)/mcL                             RBC                       3.72 x10(6)/mcL  LOW                             Hgb                       11.1 gm/dL  LOW                             Hct                       34.8 %  LOW                             Platelet                  295 x10(3)/mcL                             MCV                       93.5 fL                              MCH                       29.8 pg                             MCHC                      31.9 gm/dL  LOW                             RDW                       14.5 %                             MPV                       9.5 fL                             Abs Neut                  2.58 x10(3)/mcL                             Neutro Auto               51 %  NA                             Lymph Auto                23 %  NA                             Mono Auto                 12 %  NA                             Eos Auto                  12 %  NA                             Abs Eos                   0.6 x10(3)/mcL                             Basophil Auto             1 %  NA                             Abs Neutro                2.58 x10(3)/mcL                             Abs Lymph                 1.2 x10(3)/mcL                             Abs Mono                  0.6 x10(3)/mcL                             Abs Baso                  0.0 x10(3)/mcL                             IG%                       2.400 %  HI                             IG#                       0.1200 %  HI  .   Radiology results:  Rad Results (ST)  < 12 hrs   Accession: [id number]  Order: XR Chest 1 View  Report Dt/Tm: 08/20/2018 11:54  Report:   PROCEDURE  XR Chest 1 View     INDICATION  Chest Pain     Comparison: May 29, 2018     FINDINGS  Lines/tubes/devices: Right chest wall port is in stable position.     The cardiomediastinal silhouette and central pulmonary vasculature are  unremarkable for AP projection.   The trachea is midline. There is no lobar consolidation, pleural  effusion, or pneumothorax.   No acute osseous abnormality is identified.     IMPRESSION  No acute or new focal cardiopulmonary process.      .      Reexamination/ Reevaluation   Time: 8/20/2018 17:03:00 .   Vital signs   per nurse's notes   Course: resolved.   Pain status: resolved.   Assessment: exam improved, Reviewed all labs and x-rays and  cardiology consult with patient.  Cardiologist recommend getting a d-dimer to complete the workup.  Patient is refusing a d-dimer refusing to stay any longer.  I explained the patient we should follow cardiologist recommendation to get a d-dimer rule out possible PE explained the risks of missing a PE such as death.  Patient still refuses to get d-dimer drawn away for results says he'll come back if he has any problems.  States it is not hurting anymore and he feels fine..   Interventions: Repeat EKG shows no acute changes from the 1ST EKG done upon arrival.  Read at 1700 ventricular rate of 52 sinus bradycardia no STEMI changes.      Impression and Plan   Diagnosis   Atypical chest pain (SKU73-QK R07.89)   Plan   Condition: Improved, Stable.    Disposition: Time 8/20/2018 17:05:00, Left against medical advice.    Follow up with: Federico Hopkins; Follow up with Cardiologist Call for followup appointment, In: 2  day(s).    Counseled: Patient, Regarding diagnosis, Regarding diagnostic results, Regarding treatment plan, Regarding prescription, Patient indicated understanding of instructions.    Notes: All recommendations discussed with patient per cardiology.  Patient states is taking all medicines as indicated including his Plavix of beta blocker and aspirin.  Patient states he will follow-up with his primary care doctor this week and will return to the emergency room if pain returns.  Discussed patient the need for d-dimer again patient refuses and is leaving NETTIE Al RN was present in the room at time of discussion..

## 2022-04-30 NOTE — ED PROVIDER NOTES
Patient:   Jason Perdue             MRN: [id number]            FIN: [id number]               Age:   57 years     Sex:  Male     :  1960   Associated Diagnoses:   Uncontrolled hypertension; Muscular pain in left arm    Author:   Kita Flores      Basic Information   Time seen: Date & time 2017 11:25:00, Immediately upon arrival.   History source: Patient.   Arrival mode: Private vehicle.   History limitation: None.   Additional information: Chief Complaint from Nursing Triage Note : Chief Complaint   2017 11:19 CDT      Chief Complaint           Pt in with complaints of left sided arm pain x 2 days.  Pt states that he did not injure his arm.  Was seen at [facility] and sent home on pain meds but they habe not helped.  .      History of Present Illness   The patient presents with left, arm pain.  The onset was 2  days ago.  The course/duration of symptoms is constant.  Type of injury: none.  The location where the incident occurred was unknown.  Location: Left arm. Radiating pain: Left upper. The character of symptoms is pain.  The degree of pain is minimal.  The degree of swelling is none.  The exacerbating factor is none.  The relieving factor is none.  Risk factors consist of none.  The patient's dominant hand is the right hand.  Prior episodes: none.  Therapy today: see nurses notes.  Associated symptoms: denies tingling, denies numbness, denies suspected foreign body, denies fever, denies head injury, denies neck pain and denies back pain.  Additional history:     Patient presents today c/o left arm pain that started 2 days ago. States pain starts near left shoulder and radiates down entire left arm. Pain has been constant and sometimes radiates to left upper chest near clavicle. Pain is minimally increased with movement. Patient states he has similar episode 2 weeks ago. At that time he was seen at ED in [place], had imaging done and told he had rotator cuff tendonitis. Denies  injury/trauma, neck pain, shoulder joint pain, elbow joint pain, wrist pain, focal weakness, numbness/tingling, chest pain, sob, diaphoresis..        Review of Systems   Constitutional symptoms:  No fever, no chills, no weakness.    Skin symptoms:  Negative except as documented in HPI.   Eye symptoms:  Vision unchanged.   ENMT symptoms:  Negative except as documented in HPI.   Respiratory symptoms:  No shortness of breath,    Cardiovascular symptoms:  No chest pain,    Gastrointestinal symptoms:  No abdominal pain, no nausea, no vomiting.    Genitourinary symptoms:  Negative except as documented in HPI.   Musculoskeletal symptoms:  Muscle pain, Joint pain.    Neurologic symptoms:  Negative except as documented in HPI.             Additional review of systems information: All other systems reviewed and otherwise negative.      Health Status   Allergies:    Allergic Reactions (Selected)  No Known Allergies,    Allergies (1) Active Reaction  No Known Allergies None Documented  .   Medications:  (Selected)   Prescriptions  Prescribed  Norvasc 5 mg oral tablet: 5 mg = 1 tab(s), Oral, Daily, # 30 tab(s), 0 Refill(s)  Norvasc 5 mg oral tablet: 5 mg = 1 tab(s), Oral, Daily, # 30 tab(s), 1 Refill(s), per nurse's notes.   Immunizations: Per nurse's notes.      Past Medical/ Family/ Social History   Medical history:    Resolved  Hypertension ([id number]):  Resolved., Reviewed as documented in chart.   Surgical history:    left wrist repair., Reviewed as documented in chart.   Family history:    No family history items have been selected or recorded., Reviewed as documented in chart.   Social history: Reviewed as documented in chart, Alcohol use: Denies, Tobacco use: Denies, Drug use: Denies, Occupation: Unemployed, Family/social situation: Intact family.      Physical Examination               Vital Signs             Time:  6/13/2017 11:25:00.   Vital Signs   6/13/2017 11:19 CDT      Temperature Oral          36.6 DegC                              Peripheral Pulse Rate     76 bpm                             Respiratory Rate          14 br/min                             SpO2                      100 %                             Oxygen Therapy            Room air                             Systolic Blood Pressure   191 mmHg  HI                             Diastolic Blood Pressure  113 mmHg  HI  .      Vital Signs (last 24 hrs)_____  Last Charted___________  Temp Oral     36.6 DegC  (JUN 13 11:19)  Heart Rate Peripheral   76 bpm  (JUN 13 11:19)  Resp Rate         14 br/min  (JUN 13 11:19)  SBP      H 191mmHg  (JUN 13 11:19)  DBP      H 113mmHg  (JUN 13 11:19)  SpO2      100 %  (JUN 13 11:19)  Weight      79.5 kg  (JUN 13 11:19)  .   Measurements   6/13/2017 11:19 CDT      Weight Dosing             79.5 kg                             Weight Measured           79.5 kg                             Weight Measured and Calculated in Lbs     175.27 lb                             Height/Length Dosing      180.34 cm                             Height/Length Estimated   180.34 cm  .   Basic Oxygen Information   6/13/2017 11:19 CDT      SpO2                      100 %                             Oxygen Therapy            Room air  .   General:  Alert, no acute distress.    Skin:  Warm, dry, intact, normal for ethnicity.    Head:  Normocephalic, atraumatic.    Neck:  Supple, trachea midline, no tenderness, no JVD, full range of motion.    Eye:  Extraocular movements are intact, normal conjunctiva.    Ears, nose, mouth and throat:  Oral mucosa moist.   Cardiovascular:  Regular rate and rhythm, Normal peripheral perfusion, No edema, Systolic murmur: 3 /6, at right sternal border.    Respiratory:  Lungs are clear to auscultation, respirations are non-labored, breath sounds are equal, Symmetrical chest wall expansion.    Chest wall:  No tenderness, No deformity.    Back:  Normal range of motion.   Musculoskeletal:  All other adjacent joints normal, Proximal  upper extremity: Left shoulder and elbow non-tender to palpation. No swelling, erythema, ecchymosis, or obvious deformity. Full active ROM of left shoulder/elbow joint. NVI, 2+ radial/ulnar pulses., Distal upper extremity: Left wrist/hand non-tender to palpation. No swelling, erythema, or deformity. Full active ROM. NVI, 2+ radial/ulnar pulses..    Gastrointestinal:  Soft, Nontender, Non distended.    Neurological:  Alert and oriented to person, place, time, and situation, No focal neurological deficit observed, No normal speech observed,       Medical Decision Making   Differential Diagnosis:  Fracture, sprain, strain.    Documents reviewed:  Emergency department nurses' notes, emergency department records, prior records.    Electrocardiogram:  Time 6/13/2017 11:26:00, rate 69, normal sinus rhythm, No ST-T changes, no ectopy, normal MN & QRS intervals, EP Interp, The Axis is normal.  .    Results review:  Lab results : Lab View   6/13/2017 11:45 CDT      POC Troponin              0.01 ng/mL  ,    No qualifying data available.    Chest X-Ray:  Time reported 6/13/2017 12:14:00, no acute disease process, interpretation by Emergency Physician.    Radiology results:  Rad Results (ST)  < 12 hrs   Accession: [id number]  Order: XR Chest 2 Views  Report Dt/Tm: 06/13/2017 12:29  Report:   Clinical History  Left shoulder pain     Technique  2 views of the chest.     Comparison  X-ray dated October 24, 2016     Findings  Lungs are clear with no visualized focal airspace opacity.  The trachea appears midline.  The cardiomediastinal silhouette is within normal limits.  There is no evidence of pneumothorax or pleural effusion.  Visualized abdomen, soft tissues, and osseous structures are  unremarkable.     Impression  No acute cardiopulmonary process.      .      Reexamination/ Reevaluation   Time: 6/13/2017 12:16:00 .   Vital signs   results included from flowsheet : Vital Signs   6/13/2017 11:45 CDT      Peripheral Pulse Rate      73 bpm                             Systolic Blood Pressure   166 mmHg  HI                             Diastolic Blood Pressure  99 mmHg  HI     Basic Oxygen Information   6/13/2017 11:19 CDT      SpO2                      100 %                             Oxygen Therapy            Room air     Course: well controlled.   Notes:       VSS, in NAD. Afebrile and non-toxic appearing. Resting comfortably in exam room with no complaints. Benign msk exam. Incidental murmur heard at RSB. Labs, EKG, and CXR reviewed. Discussed dx and tx plan with patient. Patient does not have PCP, will refer to medicine clinic for further evaluation/management of uncontrolled HTN and murmur. All questions have been answered. Stable for discharge..      Impression and Plan   Diagnosis   Uncontrolled hypertension ([id number] I10)   Muscular pain in left arm  (KOG61-TT M79.602)   Plan   Condition: Improved, Stable.    Disposition: Medically cleared, Discharged: Time  6/13/2017 12:19:00, to home, Time 6/13/2017 12:19:00, Dispositioned by: Time: 6/13/2017 12:19:00, Kita Flores.    Prescriptions: Launch prescriptions   Pharmacy:  diclofenac sodium 75 mg oral delayed release tablet (Prescribe): 75 mg = 1 tab(s), Oral, BID, PRN PRN as needed for pain, with food, # 30 tab(s), 0 Refill(s)  cyclobenzaprine 10 mg oral tablet (Prescribe): 10 mg = 1 tab(s), Oral, TID, PRN PRN for spasm, # 30 tab(s), 0 Refill(s).    Patient was given the following educational materials: Musculoskeletal Pain, Hypertension, Easy-to-Read.    Follow up with: ; Anytime the conditions worsen, return to clinic or go to ED; [facility] on Thursday 6/29/2017 at 12:00pm.    Counseled: Patient, Regarding diagnosis, Regarding diagnostic results, Regarding treatment plan, Regarding prescription, Patient indicated understanding of instructions.    Orders: Launch Orders   Admit/Transfer/Discharge:  Discharge (Order): Home.

## 2022-04-30 NOTE — DISCHARGE SUMMARY
Patient:   Jason Perdue             MRN: [id number]            FIN: [id number]               Age:   59 years     Sex:  Male     :  1960   Associated Diagnoses:   None   Author:   Levar Blanchard MD      Discharge Information      Discharge Summary Information   Admit/Discharge Dates   Admit Date: 2019  Discharge Date: 2019   Physicians   Attending Physician - Davin CHAWLA, Favio  Attending Physician - Davin CHAWLA, Favio  Admitting Physician - Davin CHAWLA, Favio  Primary Care Physician - No PCP, No   Discharge Diagnosis   Other forms of acute ischemic heart disease (I24.8)   Pleural effusion, not elsewhere classified (J90)   Heart failure, unspecified (I50.9)   Acute bronchitis, unspecified (J20.9)    Immunizations   No immunizations recorded for this visit.   Discharge Medications   Prescribed  atorvastatin (atorvastatin 20 mg oral tablet) 40 mg, Oral, Daily  levoFLOXacin (Levaquin 750 mg oral tablet) 750 mg, Oral, q24hr  Continue  ascorbic acid (Vitamin C 500 mg oral tablet) 500 mg, Oral, Daily  aspirin (aspirin 81 mg oral tablet, CHEWABLE) 81 mg, Oral, Daily  carvedilol (Coreg 6.25 mg oral tablet) 6.25 mg, Oral, BID  carvedilol (Coreg 6.25 mg oral tablet) 6.25 mg, Oral, BID  clopidogrel (Plavix 75 mg oral tablet) 75 mg, Oral, Daily  docusate-senna (docusate-senna 50 mg-8.6 mg oral tablet) 2 tab(s), Oral, Once a day (at bedtime)  dolutegravir (Tivicay 50 mg oral tablet) 50 mg, Oral, Daily  emtricitabine-tenofovir (Descovy 200 mg-25 mg oral tablet) 1 tab(s), Oral, Daily  furosemide (Lasix 20 mg oral tablet) 40 mg, Oral, Daily  spironolactone (spironolactone 25 mg oral tablet) 25 mg, Oral, Daily  Discontinue  aspirin (aspirin 81 mg oral tablet, CHEWABLE) 324 mg, Oral, Once  atorvastatin (atorvastatin 40 mg oral tablet) 40 mg, Oral, Daily  furosemide (Lasix 20 mg oral tablet) 20 mg, Oral, Daily      Education   Heart Failure, Easy-to-Read  Hodgkin Lymphoma, Adult  Smoking  Cessation, Tips for Success (CUSTOM)  Pleural Effusion  Discharge - 04/03/19 12:57:00 CDT, Home       Followup   [facility], on 04/30/2019   For post cormier visit  Keep up with cardiology appointment in may, on 05/23/2019   Cardiology appointment 5/23/19 at 2:45 p.m.  [facility], on 05/16/2019      Hospital Course   Hospital Course   Time Spent on Discharge: 45 minutes.     Patient is a 59-year-old -American male with past medical history of CHF reduced EF (20%), CAD status post stent 2017, hyperlipidemia, hypertension, paroxysmal atrial fibrillation, HIV on heart last CD4 306 12/18, history of B-cell lymphoma in remission came to the ER on 4/2/2019 for complaints of cough, congestion, pedal edema, shortness of breath.  Patient was previously seen in [facility] 2 weeks ago for similar complaints, discharged and told to follow-up in [facility].  Since his discharge patient has not returned to baseline, complains of shortness of breath in which a class II associated with cough.  Patient is endorsing compliance with all medication including HIV and heart failure medication.  In the ER she was noted to be tachypneic, satting 100% on room air, limits troponin at 0.055 and a BNP of 6465.  UDS was also positive for cocaine.  Patient was admitted for heart failure exacerbation.  On routine chest x-ray revealed a right-sided pleural effusion, further investigation of previous imaging patient has been having chronic pleural effusion initially bilaterally and unilaterally on right side since last 4 months, never had a diagnostic or therapeutic thoracentesis.  Patient was given 1 mg of Bumex in the ER, basic was continued while inpatient, appropriately diuresed -2.2 L.  Diagnostic/therapeutic thoracentesis was done on 4/2, initial findings consistent with transudate.  Still pending cytology, Gram stain, culture, and flow cytometry.  The day of discharge patient was hemodynamically  stable, given instructions to continue compliance of using LifeVest, heart failure medication, HIV medication, and cessation of cocaine use.  Patient was instructed to keep appointment with cardiology on 5/23, post cormier follow-up visit as well as visit to ID clinic was also scheduled in the next month.  Because of questionable groundglass opacities on CT we'll continue Levaquin for 3 days as outpatient.  Patient was stable at the time of discharge      Physical Examination   Measurements from flowsheet : Measurements   4/3/2019 6:00 CDT        Weight Measured           88.8 kg    4/2/2019 13:50 CDT       Weight Estimated          88.1 kg                             Height/Length Estimated   181.6 cm                             Usual Weight              82.5 kg                             Body Mass Index Estimated 26.71 kg/m2                             Ideal Body Weight         78.2 kg    4/2/2019 6:00 CDT        Weight Measured           88.1 kg     General:  Alert and oriented, No acute distress.    Eye:  Pupils are equal, round and reactive to light, Normal conjunctiva.    HENT:  Normocephalic, Tympanic membranes are clear.    Neck:  Supple, Non-tender.    Respiratory:  Lungs are clear to auscultation, Respirations are non-labored, Breath sounds are equal.    Cardiovascular:  Normal rate, Regular rhythm, No murmur, No gallop.    Gastrointestinal:  Soft, Non-tender, Non-distended, Normal bowel sounds, No organomegaly.    Genitourinary:  No costovertebral angle tenderness.    Musculoskeletal:  Normal range of motion, Normal strength.    Neurologic:  Alert, Oriented, Normal sensory, Normal motor function.    Psychiatric:  Cooperative, Appropriate mood & affect.       Results Review   General results   Most recent results   All   4/3/2019 7:34 CDT        WBC                       9.4 x10(3)/mcL                             RBC                       4.00 x10(6)/mcL  LOW                             Hgb                        11.4 gm/dL  LOW                             Hct                       36.6 %  LOW                             Platelet                  197 x10(3)/mcL                             MCV                       91.5 fL                             MCH                       28.5 pg                             MCHC                      31.1 gm/dL                             RDW                       17.1 %  HI                             MPV                       11.3 fL  HI                             Abs Neut                  6.79 x10(3)/mcL                             Neutro Auto               72 x10(3)/mcL  NA                             Lymph Auto                17 %                             Mono Auto                 9 %                             Eos Auto                  1  NA                             Abs Eos                   0.11 x10(3)/mcL  NA                             Basophil Auto             0  NA                             Abs Neutro                6.79 x10(3)/mcL  NA                             Abs Lymph                 1.58 x10(3)/mcL  NA                             Abs Mono                  0.84 x10(3)/mcL  NA                             Abs Baso                  0.03 x10(3)/mcL  NA                             IG%                       0 %  NA                             IG#                       0.0300  NA                             Sodium Lvl                140 mmol/L                             Potassium Lvl             3.7 mmol/L                             Chloride                  107 mmol/L                             CO2                       26 mmol/L                             Calcium Lvl               8.6 mg/dL                             Glucose Lvl               99 mg/dL                             BUN                       22 mg/dL  HI                             Creatinine                1.00 mg/dL                             eGFR-AA                   99 mL/min                              eGFR-CLYDE                  82 mL/min  LOW                             Bili Total                0.9 mg/dL                             Bili Direct               0.4 mg/dL  HI                             Bili Indirect             0.5 mg/dL                             AST                       24 unit/L                             ALT                       18 unit/L                             Alk Phos                  107 unit/L                             Total Protein             7.5 gm/dL                             Albumin Lvl               3.0 gm/dL  LOW                             Globulin                  4.50 gm/mL  HI                             A/G Ratio                 0.7 ratio  LOW    4/2/2019 14:00 CDT       Cell Cnt BF Type          Pleural fluid                             Cell Cnt Body Site        Pleural fluid R                             WBC BF                    431 /mcL  NA                             Neut % BF                 23 %  NA                             Lymph BF                  20 %  NA                             Monocyte BF               57 %  NA                             Meso BF                   7 %  NA                             RBC BF                    23,000 /mcL  NA                             Gluc BF Type              Pleural fl R                             Glucose BF                185 mg/dL  NA                             LDH BF Type               Pleural fl R                             LDH BF                    84 unit/L  NA                             Prot BF Type              Pleural fl R                             Protein BF                2.2 gm/dL  NA    4/2/2019 7:11 CDT        WBC                       7.2 x10(3)/mcL    4/2/2019 2:09 CDT        U Amph Scr                Negative                             U Fiona Scr                Negative                             U Benzodia Scr            Negative                             U Cannab Scr               Negative                             U Cocaine Scr             Positive                             U Opiate Scr              Negative                             U Phencyclidine Scr       Negative

## 2022-05-01 NOTE — HISTORICAL [facility] CERNER
This is a historical note converted from [employer]. Formatting and pictures may have been removed.  Please reference [employer] for original formatting and attached multimedia. Chief Complaint  cough/SOB x 3 days.  History of Present Illness  Patient is a 59-year-old male with past medical history of CHF (last EF 20% 4/19), CAD status post 2 stents, hyperlipidemia, hypertension, atrial fibrillation, HIV (last CD4 03/06/12/18) on HAART therapy, history of B-cell lymphoma in remission last 2 years, presented to the [facility] ED with complaints of shortness of breath for the last few days.? Patient was recently discharged from [facility] on 4/3/19 after admission for CHF,?appropriately diuresed?2 L, diagnostic/therapeutic thoracentesis was done on the right side for a pleural effusion which was present for last 4 months.? On this admission patient complains of shortness of breath on exertion NYHA class III, orthopnea, PND, mild bilateral swelling of legs, feeling bloated in the abdomen.? Patient states compliance with medication, complains of cough, productive white sputum, no fever, no chills, no chest pain.? On previous admission thoracentesis results revealed transudate, serosanguineous, cytology was negative for any malignant cells, likely are reactive.  Review of Systems  Gen: AOx3, No Fever, No Weight Changes  Heart: No chest pains, No palpitations  Lungs:?+ SOB, No wheezing  GI: No abd pain, No nausea, No vomiting, No diarrhea  : No hematuria, No dysuria  MSK: No LE swelling  Integumentary: No rash, No pruritus  Physical Exam  Vitals & Measurements  T:?36.3? ?C (Oral)? HR:?100(Peripheral)? RR:?20? BP:?139/85? SpO2:?98%? WT:?88.1?kg?  General: Alert and oriented, No acute distress.  HEENT: PERRLA, EOMI, Normal hearing, Oral mucosa moist, No pharyngeal exudates  Respiratory: Decreased breath sounds on the right lower part of lung,?no crackles, no wheeze  Cardiovascular: Normal rate, Regular rhythm, No c/m/r/g  Gastrointestinal:  Soft, NT, ND  Musculoskeletal: Normal range of motion,?trace bilateral pedal edema, No deformity,  Integumentary: Warm, Dry, Intact.  Neurologic: Alert, Oriented, No focal deficits, CN II-XII grossly intact  Psychiatric: Cooperative, Appropriate mood & affect, Normal judgment.  Assessment/Plan  CHF exacerbation  Ischemic cardiomyopathy, last EF 20%  Currently on 20 mg Lasix, 6.25 twice a day, Aldactone 25 daily, patient has allergy to ACE inhibitors  40 mg of Lasix was given in the ER, will continue with 40 mg daily and assessed diuresis  After dose of Coreg currently 3.125 twice a day, consider going up to 12.5 on discharge  Continue to monitor Is and Os  Initial troponin EKG were negative and nonspecific respectively  UDS positive for cocaine, despite?him saying he hasnt used cocaine in a month  ?  Hyperlipidemia  Continue Lipitor 40 mg  ?  Paroxysmal?Atrial fibrillation  Patient on aspirin 81 and Plavix 75  Patient not on anticoagulation because of history of GI bleed, opted to not be on anticoagulation  ?  Right sided?Pleural effusion  Previously showed transudate of, no suspicion for malignancy  Reaccumulated in the right?side  At this point will continue diuretics, await for resolution  Repeat chest x-ray in the morning, if still not resolved and patient symptomatic may benefit from therapeutic thoracentesis  Etiology however of pleural effusion still unclear at this point  ?  HIV  Last CD4 count 306 12/18, endorses compliance  On descovy, Tivicay  Continue while inpatient  Patient has follow up ID appointment in May,  ?  CAD status post PCI (2017)  No active chest pain  Continue aspirin, statin, Plavix  ?  ?  Access:?Peripheral IV  Antibiotics:?None  Diet:?Cardiac  DVT Prophylaxis:?Lovenox 40  GI Prophylaxis:?None  ?   Problem List/Past Medical History  Ongoing  Acute disease or injury-related malnutrition  Atrial fibrillation  Back pain  CAD - Coronary artery  disease  Constipation  constipationAnxiety  HIV  HIV disease  Hyperlipidemia  Hypertension  Lymphoma  Tobacco user  Historical  Atrial fibrillation  HIV  HTN (hypertension)  Hypertension  Kidney stone  Procedure/Surgical History  Drainage of Right Pleural Cavity, Percutaneous Approach (04/02/2019)  Thoracentesis, needle or catheter, aspiration of the pleural space; without imaging guidance (04/02/2019)  Drainage of Spinal Canal, Percutaneous Approach (04/24/2018)  Spinal puncture, therapeutic, for drainage of cerebrospinal fluid (by needle or catheter) (04/24/2018)  Catheter Insertion Mediport (None) (03/05/2018)  Insertion of Infusion Device into Superior Vena Cava, Percutaneous Approach (03/05/2018)  Insertion of tunneled centrally inserted central venous access device, with subcutaneous port; age 5 years or older (03/05/2018)  Transfusion of Nonautologous Red Blood Cells into Peripheral Vein, Percutaneous Approach (01/20/2018)  Transfusion of Nonautologous Red Blood Cells into Peripheral Vein, Percutaneous Approach (01/17/2018)  Transfusion of Nonautologous Red Blood Cells into Peripheral Vein, Percutaneous Approach (01/14/2018)  Insertion of Infusion Device into Superior Vena Cava, Percutaneous Approach (01/10/2018)  Ultrasonography of Superior Vena Cava, Guidance (01/10/2018)  Biopsy Bone Marrow Aspiration (.) (12/21/2017)  Extraction of Iliac Bone Marrow, Percutaneous Approach, Diagnostic (12/21/2017)  Dilation of Coronary Artery, One Artery with Two Drug-eluting Intraluminal Devices, Percutaneous Approach (12/18/2017)  Drainage of Right Pleural Cavity, Percutaneous Approach (12/18/2017)  Extirpation of Matter from Coronary Artery, One Artery, Percutaneous Approach (12/18/2017)  Biopsy Gastrointestinal (12/15/2017)  Colonoscopy (12/15/2017)  Colonoscopy (12/15/2017)  Esophagogastroduodenoscopy (12/15/2017)  Excision of Duodenum, Via Natural or Artificial Opening Endoscopic, Diagnostic (12/15/2017)  Excision of  Stomach, Pylorus, Via Natural or Artificial Opening Endoscopic, Diagnostic (12/15/2017)  Inspection of Lower Intestinal Tract, Via Natural or Artificial Opening Endoscopic (12/15/2017)  Fluoroscopy of Left Heart using Low Osmolar Contrast (12/13/2017)  Fluoroscopy of Multiple Coronary Arteries using Low Osmolar Contrast (12/13/2017)  Measurement of Cardiac Sampling and Pressure, Left Heart, Percutaneous Approach (12/13/2017)  Restoration of Cardiac Rhythm, Single (12/13/2017)  Transfusion of Nonautologous Red Blood Cells into Peripheral Vein, Percutaneous Approach (12/13/2017)  Fracture (1981)  cardiac stents placed  left wrist repair   Medications  Inpatient  aspirin 81 mg oral tablet, CHEWABLE, 324 mg= 4 tab(s), Oral, Once  aspirin 81 mg oral tablet, CHEWABLE, 81 mg= 1 tab(s), Oral, Daily  atorvastatin 20 mg oral tablet, 40 mg= 2 tab(s), Oral, Daily  Coreg, 3.125 mg= 1 tab(s), Oral, BID  Descovy 200 mg-25 mg oral tablet, 1 tab(s), Oral, Daily  dexamethasone (for IVPB)  DuoNeb 0.5 mg-2.5 mg/3 mL inhalation solution, 3 mL, NEB, q6hr Resp  Heparin Flush 100 U/mL - 5 mL, 500 units= 5 mL, IV Push, Once-chemo  Heparin Flush 100 U/mL - 5 mL, 500 units= 5 mL, IV Push, Once-chemo  Lasix, 40 mg= 4 mL, IV Push, Once  Lovenox, 40 mg= 0.4 mL, Subcutaneous, Daily  Plavix 75 mg oral tablet, 75 mg= 1 tab(s), Oral, Daily  spironolactone 25 mg oral tablet, 25 mg= 1 tab(s), Oral, Daily  Tivicay 50 mg oral tablet, 50 mg= 1 tab(s), Oral, Daily  Home  alfuzosin 10 mg oral tablet, extended release, 10 mg= 1 tab(s), Oral, Daily,? ?Not Taking, Completed Rx  amoxicillin-clavulanate 875 mg-125 mg oral tablet, 1 tab(s), Oral, BID,? ?Not Taking, Completed Rx  aspirin 81 mg oral tablet, CHEWABLE, 81 mg= 1 tab(s), Oral, Daily, 6 refills  atorvastatin 20 mg oral tablet, 40 mg= 2 tab(s), Oral, Daily  atorvastatin 40 mg oral tablet  carvedilol 3.125 mg oral tablet, 3.125 mg= 1 tab(s), Oral, BID  codeine-guaifenesin 10 mg-100 mg/5 mL oral syrup,?  ?Not Taking, Completed Rx  Coreg 6.25 mg oral tablet, 6.25 mg= 1 tab(s), Oral, BID, 3 refills  Descovy 200 mg-25 mg oral tablet, 1 tab(s), Oral, Daily, 3 refills  docusate-senna 50 mg-8.6 mg oral tablet, 2 tab(s), Oral, Once a day (at bedtime),? ?Not taking  ferrous sulfate 220 mg/5 mL (44 mg elemental iron) oral elixir  Lasix 20 mg oral tablet, 40 mg= 2 tab(s), Oral, Daily, 4 refills  levofloxacin 750 mg oral tablet, 750 mg= 1 tab(s), Oral, Daily,? ?Not Taking, Completed Rx  Plavix 75 mg oral tablet, 75 mg= 1 tab(s), Oral, Daily, 11 refills  spironolactone 25 mg oral tablet, 25 mg= 1 tab(s), Oral, Daily, 3 refills  Tivicay 50 mg oral tablet, 50 mg= 1 tab(s), Oral, Daily, 3 refills  Vitamin C 500 mg oral tablet, 500 mg= 1 tab(s), Oral, Daily  Allergies  ACE inhibitors?(Angioedema)  Nitroglycerin Transdermal System?(Itching)  Social History  Alcohol - Denies Alcohol Use, 2017  Past, 2019  Past, Beer, 2019  Employment/School  Unemployed, 2018  Home/Environment  Lives with Alone. Living situation: Home/Independent. Home equipment: Walker/Cane. Alcohol abuse in household: No. Substance abuse in household: No. Smoker in household: No. Feels unsafe at home: No. Safe place to go: Yes. Family/Friends available for support: Yes. Concern for family members at home: No. Major illness in household: No., 2019  Nutrition/Health  Regular, Sleeping concerns: Yes. Feels highly stressed: No., 2018  Substance Abuse - Denies Substance Abuse, 2017  Current, Cocaine, 1-2 times per month, 2019  Past, Cocaine, 2019  Tobacco - High Risk, 2015  Former smoker, quit more than 30 days ago, Cigarettes, No, 2019  Former smoker, quit more than 30 days ago, No, 2019  10 or more cigarettes (1/2 pack or more)/day in last 30 days, Cigarettes, No, Ready to change: No. Smokeless Tobacco Use: Never., 2019  Family History  : Mother and Father.  Hypertension.: Mother and  Sister.  Primary malignant neoplasm of colon: Brother.  Primary malignant neoplasm of lung: Father.  Stroke: Sister.  Immunizations  Vaccine Date Status Comments   influenza virus vaccine, inactivated 12/28/2018 Given Patient Not Available/Off Unit   pneumococcal 13-valent conjugate vaccine 12/28/2017 Given    influenza virus vaccine, inactivated 11/10/2017 Given        PGY 2?addendum:  Saw and examined?patient?with . ?Agree with?assessment and plan above.  ?  Mr. Perdue is a 59-year-old -American man with past history is significant for HIV infection,?CAD with PCI ?2,?CHF with EF 20%, history of lymphoma?s/p chemotherapy,?and?paroxysmal A. fib?who presented to the [facility] ED?complaining of continued shortness of breath.? He was admitted earlier this month for CHF exacerbation and was also found to have right-sided pleural effusion, which was?drained.? Patient?reported that at discharge, he still has shortness of breath with orthopnea?and mild lower extremity swelling.? The?shortness of breath?occurs mainly on exertion, not at rest.? He reports that he could not sleep?for the past few nights?mainly because due to orthopnea.? Patient reported?full compliance to his medications.? He denies fevers, chills, chest pains, or abdominal pains.? In the ED?his BNP?was 6600,?not much different than?his BNP level?at discharge.? All other labs were similar to?results from?discharge.??Chest x-ray?showing interval decrease of right sided pleural effusion?with no pneumothorax. ?Internal medicine was consulted for?CHF exacerbation.? On exam, patient was?sitting up in bed in no acute distress?breathing comfortably on room air.? Cardiac exam was?normal.? Lung exam?demonstrated?decreased breath sounds on the right lower?lung field, but no wheezes or crackles heard.? Right-sided JVD noted, but?maybe, located by?presence of?Mediport.  ?  Assessment and Plan:  Fluid overload  Systolic heart failure?with EF 20%  Coronary  artery disease s/p PCI ?2  HIV infection?on HAART therapy  History of stage IVb?diffuse large B-cell lymphoma s/p R-CHOP  ?  Patient is a 59-year-old  man with?PMH?significant for systolic heart failure, CAD,?HIV infection, and?diffuse large B-cell lymphoma stage IVb?who presented to the [facility] ED?with symptoms of?fluid overload?likely secondary to?heart failure exacerbation.? He was previously?admitted and discharged for heart failure exacerbation,.? Because his symptoms never really resolved at previous discharge,?and?actually worsened a little,?he?came back to the ED.??Physical exam noted?JVD, but no?crackles?or significant?lower extremity edema.? He does have a moderate?unilateral right?pleural effusion, although improved from previous CXR.? Patient was noted to telemetry?under observation status. ?Patient will be dialyzed with Lasix?and?his Coreg?cut to?0.5 dose.? Home medicines will also be continued.? Strict I/O placed?with daily weights.? Monitor vital signs.? Expecting patients stay?1-2 nights?for diuresis and?working with PT.  ?  ?  ?   Reviewed notes, labs and imaging if any. Discussed assessment and plan with resident and agree with all the above findings.  Continue diuresis and follow clinically. Plan for thoracentesis in case of persistent resp distress.

## 2022-05-01 NOTE — HISTORICAL [facility] CERNER
This is a historical note converted from [employer]. Formatting and pictures may have been removed.  Please reference [employer] for original formatting and attached multimedia. Chief Complaint  PT WEARING MASK W CO WEAKNESS, CHILLS W FEVER, BILAT LOWER LEG PAIN AND SWELLING X 2 DAYS. ?DENIES SOB NO COUGH OR CP REPORTED. ?PT HX OF LYMPHOMA, HIV AND CAD. ?EKG OBTAINED.  History of Present Illness  Mr. Gomez is a 60-year-old?-American male?who presented to ER with chief complaint of?redness, swelling,?pain?in his bilateral?lower extremities?started a day ago.? Patient reports that it started all of a sudden in the morning?on?12/12/2020?progressively worsened during the day,?also had fevers last night.? Denies?any recent tick bite,?nail?prick,?working in the yard?in the recent times,?exposure to sick contacts,?recent travels.? Denies any chest pain, S OB, palpitations,?pedal edema,?N/V/abdominal pain,?recent changes in bowel and bladder habits.? Denies any changes in?weight and appetite.  Past medical history:?HIV on Biktarvy?and?Bactrim?for P ESTEPHANIA prophylaxis,?last viral load?less than 20 copies,?last CD4 count?149?compliant since past 3 months.  Hepatitis?B infection?with HBV viral load?191,000,?Hbs antigen,?hepatitis B core antibody,?hep B IgM?+ on 9/2020?  HFrEF?with EF 10 to 15%?in echo on 2019With moderate MR, moderate TR and mild to moderate AS and small apical/lateral thrombus visualized.  A. fib?with well-controlled rate,?on Eliquis?5 mg twice daily for anticoagulation.  History of treated?syphilis?and gonorrhea.  Social history:?Quit smoking 6?months ago,?had a long-term smoking history of?1 PPD for over 40 years, denies any alcohol abuse, denies any illicit drug abuse.  He has history of MSM?and primarily engages in receptive anal sex,?but has not been sexually active?in the past few months.  Review of Systems  Constitutional:?+ fever, fatigue, weakness  Respiratory:?no cough, no wheezing, no shortness of  breath  Cardiovascular:?no chest pain, no palpitations, no edema  Gastrointestinal:?no nausea, vomiting, or diarrhea. No abdominal pain  Genitourinary:?no dysuria, no urinary frequency or urgency, no hematuria  Hema/Lymph:?no abnormal bruising or bleeding  Endocrine:?no heat or cold intolerance, no excessive thirst or excessive urination  Musculoskeletal:?no muscle or joint pain, no joint swelling  Integumentary:?+ skin rash or abnormal lesion  Neurologic: no headache, no dizziness, no weakness or numbness  ?  Physical Exam  Vitals & Measurements  T:?37.1? ?C (Oral)? TMIN:?37.1? ?C (Oral)? TMAX:?38.6? ?C (Oral)? HR:?84(Peripheral)? HR:?85(Monitored)? RR:?20? BP:?95/66? SpO2:?99%? WT:?90?kg? BMI:?27.78?  Gen: well-nourished, well-developed?-American male in no acute distress  HEENT: Normocephalic, atraumatic.  Neck: No JVD or carotid bruits. No thyromegaly. No lymphadenopathy.  Heart: RRR, no murmurs, gallops, clicks or rubs.  Lungs: CTAB without rales, wheezes or rhonchi. Normal work of breathing. Chest rise symmetrical on inspiration.  Abd: Soft, non-tender, non-distended and without guarding. No organomegaly. No obvious masses. Bowel sounds present.  Extremities: Radial and pedal pulses 2+ bilaterally, no LE edema.  MSK: No obvious deformities. Moves all extremities purposefully.  Neuro: Responds well to commands.  Skin: Bilateral lower extremity?redness,?swelling,?tenderness noted.? Has a diffuse?hyperpigmented rash?on all the?extremities?likely from his HIV disease.  ?  Assessment/Plan  Bilateral lower extremity cellulitis.  SIRS?2 out of 4,?fever,?leukocytosis.  Sent?blood cultures x2?in ED.  Started on vancomycin and?Rocephin?1 g daily?day 1.  Unlikely DVT,?plan to get venous?ultrasound bilateral lower extremities?tomorrow in a.m.?if not improving?or worsening?with antibiotics? also to rule out any?abscess.  HIV disease.  Last CD4 count 149, last viral load less than 20 copies?in 11/2020.  Continue  Biktarvy?and Bactrim for P ESTEPHANIA prophylaxis.  ?  ?  HOLLIS.  Likely prerenal,BUN/creatinine is?20/1.61.  To give IVF?at 150 cc/h,?stop after?2 bags.  To hold diuretics for now,?to restart in a.m.  ?  HFrEF.  CAD status post PCI?with GERDA?LCx in 2017.  ?? -Last echo?in 2019?to repeat echocardiogram in a.m.  ?? - HF Status: NYHA Class?II  ?? - LVEF: 10 to 15%?% (echo in 2019?with moderate MR,?moderate TR,?mild to moderate AS,?left apical, lateral?thrombus)  ?? - GDMT: Coreg 12.5 twice daily,?losartan 25 mg daily,?Aldactone 25 mg daily,?Lasix 60 mg daily  ?????? - Holding home: None  ?? - Diuresis: Holding Lasix and Aldactone for now due to HOLLIS, restart in a.m.  ???? -Lexiscan in 2018?with no evidence of new ischemia but however fixated?inferolateral and?apical lateral?defects.  ?? -Sodium restriction to less than 2 g a day?and?fluid restriction to less than?1.5 L/day  ?? - Strict Is/Os and daily weights.  Continue aspirin,?atorvastatin?for CAD.  Has outpatient appointment with cardiology?on?Jan 4/20/2021.  ?   A. fib.  CHADsVASc scoring:3  Rate well controlled, on Eliquis for anticoagulation to continue for now.  ?  Hepatitis B infection.  HBS antigen,?hepatitis B core antibody, hep B?IgM positive on?9/2020.  On treatment, to continue to follow?ID outpatient.  ?  History of B-cell lymphoma.  -Follows up with [facility] oncology, under remission.  History of tobacco abuse.  ?   History of?treated?syphilis and gonorrhea.  ?   History of?high risk sexual behavior.  ?   History of?polysubstance abuse.  DVT PPx:?Eliquis 5?mg twice daily  GI PPx:?None  Antibiotics:?Vancomycin and Rocephin day 1  IVF:?IVF NS at 150 cc/h, to stop?after 2 bags.  O2:?RA  Code status:?Full code  PCP:?Dr. Keara Regalado.  Dispo:?HIV positive with?bilateral lower extremity cellulitis?on?Vanco and Rocephin for now.   Allergies  ACE inhibitors?(Angioedema)  Nitroglycerin Transdermal System?(Itching)  Immunizations  Vaccine Date Status Comments   influenza virus  vaccine, inactivated 09/17/2020 Given    influenza virus vaccine, inactivated 12/17/2019 Given    influenza virus vaccine, inactivated 12/28/2018 Given Patient Not Available/Off Unit   pneumococcal 13-valent conjugate vaccine 12/28/2017 Given    influenza virus vaccine, inactivated 11/10/2017 Given    Lab Results  Labs Last 24 Hours?  ?Chemistry? Hematology/Coagulation?   Sodium Lvl:?134 mmol/L?Low (12/13/20 10:55:00) WBC:?15.9 x10(3)/mcL?High (12/13/20 10:55:00)   Potassium Lvl: 3.8 mmol/L (12/13/20 10:55:00) RBC:?4.27 x10(6)/mcL?Low (12/13/20 10:55:00)   Chloride: 99 mmol/L (12/13/20 10:55:00) Hgb: 13.5 gm/dL (12/13/20 10:55:00)   CO2: 27 mmol/L (12/13/20 10:55:00) Hct: 42.7 % (12/13/20 10:55:00)   Calcium Lvl: 9.4 mg/dL (12/13/20 10:55:00) Platelet: 211 x10(3)/mcL (12/13/20 10:55:00)   Glucose Lvl: 82 mg/dL (12/13/20 10:55:00) MCV: 100 fL (12/13/20 10:55:00)   BUN: 10 mg/dL (12/13/20 10:55:00) MCH: 31.6 pg (12/13/20 10:55:00)   Creatinine: 1.09 mg/dL (12/13/20 10:55:00) MCHC: 31.6 gm/dL (12/13/20 10:55:00)   Est Creat Clearance Ser: 77.4 mL/min (12/13/20 13:07:15) RDW:?14.8 %?High (12/13/20 10:55:00)   eGFR-AA:?89?Low (12/13/20 10:55:00) MPV: 9.3 fL (12/13/20 10:55:00)   eGFR-CLYDE:?74 mL/min/1.73 m2?Low (12/13/20 10:55:00) Abs Neut:?12.86 x10(3)/mcL?High (12/13/20 10:55:00)   Bili Total: 1 mg/dL (12/13/20 10:55:00) Neutro Auto: 81 % (12/13/20 10:55:00)   Bili Direct: 0.4 mg/dL (12/13/20 10:55:00) Lymph Auto: 12 % (12/13/20 10:55:00)   Bili Indirect: 0.6 mg/dL (12/13/20 10:55:00) Mono Auto: 6 % (12/13/20 10:55:00)   AST:?126 unit/L?High (12/13/20 10:55:00) Eos Auto: 1 % (12/13/20 10:55:00)   ALT:?80 unit/L?High (12/13/20 10:55:00) Abs Eos: 0.1 x10(3)/mcL (12/13/20 10:55:00)   Alk Phos: 108 unit/L (12/13/20 10:55:00) Basophil Auto: 0 % (12/13/20 10:55:00)   Total Protein:?9.3 gm/dL?High (12/13/20 10:55:00) Abs Neutro:?12.86 x10(3)/mcL?High (12/13/20 10:55:00)   Albumin Lvl: 3.5 gm/dL (12/13/20 10:55:00) Abs Lymph:  1.9 x10(3)/mcL (12/13/20 10:55:00)   Globulin:?5.8 gm/dL?High (12/13/20 10:55:00) Abs Mono: 1 x10(3)/mcL (12/13/20 10:55:00)   A/G Ratio:?0.6 ratio?Low (12/13/20 10:55:00) Abs Baso: 0 x10(3)/mcL (12/13/20 10:55:00)    IG%: 0 % (12/13/20 10:55:00)    IG#: 0.06 (12/13/20 10:55:00)   Diagnostic Results  Accession:?[id number]  Order:?XR Chest 1 View  Report Dt/Tm:?12/13/2020 11:36  Report:?  XR Chest 1 View  ?  REASON FOR STUDY: Dyspnea  ?  COMPARISON: Radiographs 12/30/2019  ?  FINDINGS: Right-sided Mediport with catheter tip over the lower right  brachiocephalic vein, unchanged since 2019. Normal cardiac silhouette.  The lungs are well-inflated. No consolidation identified. No  significant pleural effusion or discernible pneumothorax.  ?  IMPRESSION: No acute pulmonary process identified.  ?  ?      To hold lasix for now, to continue Aldactone 25 mg /day.   HOIII addendum  Pt seen with Resident above and agree with A/P  ?  Follows up in ID clinic for HIV, has 10-15% EF, with well-controlled A. fib on Eliquis. ?Reported to the ER today for bilateral leg swelling right more than left tender to palpation warm no drainage, having a lot of bugs/insects in his yard along with some animals as well., fever at home, no travel history, compliant with?meds??Consulted for admission for cellulitis and with risk factors of HIV.  Patient was febrile on presentation, non-tachycardic, normal blood pressure, bilateral lower extremity shows erythematous warm tender non-purulent and no crepitus to palpation on the right lower extremity from the knee down, and the left lower extremity about the shin down, good range of motion in both the pulses palpable in both  ?  -Admitting for lower extremity cellulitis right sided>left sided, is already on Bactrim for PJP prophylaxis. ?Will start Rocephin and Vanc, blood cultures ordered, along the give 2 L of fluid in total because of history of heart failure  -Repeat echocardiogram to eval HF,  previous one was done more than a year ago which showed a lateral thrombi along with depressed EF.? We are holding the losartan and aldactone int the background of some slightly low blood pressures however?restart?when appropriate, consider?replacing with Entresto upon discharge  -Rate well controlled on Coreg, continue Eliquis  -Continue?Biktarvy and Bactrim, repeat CD4 and viral load  -If her symptoms of lower extremity cellulitis dont improve consider ultrasound of the a.m. but no obvious signs of fluid collection on our exam.??  ?   I was present with the resident during the history and exam.  ???  [x ] I discussed the case with the resident and agree with the findings and plan as documented in the residents note.  [ ] I discussed the case with the resident and agree with the findings and plan as documented in the residents note except:  See progress note

## 2022-05-01 NOTE — HISTORICAL [facility]ER
This is a historical note converted from [employer]. Formatting and pictures may have been removed.  Please reference [employer] for original formatting and attached multimedia. Chief Complaint  Pt c/o shortness of breathe worse with exertion x 3 days. Pt talking freely, skin w/d. ?Hx A-fib, CHF. ?He states out of fluid pills x 2 weeks and HiV meds x 3 weeks.  Reason for Consultation  for possible ischemic workup  History of Present Illness  This is a 61 year old male with a past medical history significant of HFrEF, with recovered EF (echo 2019: EF 10-15%, 2020: 50%, moderate?aortic stenosis, aortic regurgitation, tricuspid regurgitation, diastolic dysfunction) CAD s/p GERDA to LCx (12/18/17, HIV with medication noncompliance, untreated Hep , atrial fibrillation on Eliquis, hyperlipidemia, diffuse large B cell lymphoma who presented to [facility] ED on 11/27/21 with worsening shortness of breath for the past 3? days. Reported that he had not taken his spironalactone in 3 months or lasix in 1 week. He was admitted for management of CHF exacerbation/ other comorbidities. On the day of admission, received total of 80 mg IV lasix and was restarted on oral lasix 40 mg daily the following day. BNP was elevated at 3046. Troponin on admission elevated at 0.358->0.367->0.222  ?  Cardiology consulted for possible ischemic workup. Of note, patient had GERDA to LCx in 12/2017, and a frank sc an in 2018 that was negative for new ischemia  Review of Systems  10 point review of systems reviewed and unremarkable except as listed above  Physical Exam  Vitals & Measurements  T:?36.4? ?C (Oral)? TMIN:?36.4? ?C (Oral)? TMAX:?36.9? ?C (Oral)? HR:?76(Peripheral)? RR:?18? BP:?107/74? SpO2:?95%?  General:?well-developed well-nourished in no acute distress  Eye: no scleral icterus, EOMI  HENT:?normocephalic, atraumatic  Neck: full range of motion, no thyromegaly or lymphadenopathy  Respiratory:?clear to auscultation bilaterally  Cardiovascular:?regular rate  and rhythm (+) systolic murmur at apex, gallops or rubs; no peripheral edema  Gastrointestinal:?soft, non-tender, non-distended with normal bowel sounds, without masses to palpation  Genitourinary: no CVA tenderness to palpation  Musculoskeletal:?full range of motion of all extremities/spine without limitation or discomfort  Integumentary: no rashes or skin lesions present  Neurologic:? no signs of peripheral neurological deficit, motor/sensory function intact  ?  Assessment/Plan  HFrEF  CAD s/p GERDA to left circumflex  Troponinemia likely secondary to demand ischemia  -echo in 2019 showed EF 10-15% which improved to 50% in 2020  -on initial read of repeat echo today, EF appears to have dropped again, official read to follow  -continue aspirin 81 mg daily, atorvastatin 40 mg daily, coreg 12.5 mg BID, lasix 40 mg daily,  -recommend restarting home spironolactone  -recommend stopping home ?plavix, continue aspirin and eliquis  -recommend switching losartan to Entresto 24/26  -elevation in troponin likely secondary to demand, no indication for further ischemic workup at this time  -continue to emphasize complete abstinence from cocaine use as patient is on beta blocker  ?  Cardiology attending addendum  Patient seen and examined with Dr. Berna Means.?Agree with plan as outlined above. Patients ejection fraction unfortunately dropped again?to about 25%.?Patient is ready on GDMT but?recommend switching him to Entresto 24/26?mg?twice daily.?Continue?Coreg 12.5 twice daily?and restart spironolactone?as able.?Agree with switching to PO lasix.?His elevated troponin seems to be related to demand ischemia.   Problem List/Past Medical History  Ongoing  Atrial fibrillation  Back pain  CAD - Coronary artery disease  Constipation  constipationAnxiety  HIV disease  Hyperlipidemia  Hypertension  Lymphoma  Tobacco user  Historical  Acute disease or injury-related malnutrition  Atrial fibrillation  HIV  HIV  HTN  (hypertension)  Hypertension  Kidney stone  Lymphoma  Procedure/Surgical History  Excision of Left Lower Arm Subcutaneous Tissue and Fascia, Open Approach, Diagnostic (12/17/2019)  Punch biopsy of skin (including simple closure, when performed); single lesion (12/17/2019)  Drainage of Right Pleural Cavity, Percutaneous Approach (04/02/2019)  Thoracentesis, needle or catheter, aspiration of the pleural space; without imaging guidance (04/02/2019)  Drainage of Spinal Canal, Percutaneous Approach (04/24/2018)  Spinal puncture, therapeutic, for drainage of cerebrospinal fluid (by needle or catheter) (04/24/2018)  Catheter Insertion Mediport (None) (03/05/2018)  Insertion of Infusion Device into Superior Vena Cava, Percutaneous Approach (03/05/2018)  Insertion of tunneled centrally inserted central venous access device, with subcutaneous port; age 5 years or older (03/05/2018)  Transfusion of Nonautologous Red Blood Cells into Peripheral Vein, Percutaneous Approach (01/20/2018)  Transfusion of Nonautologous Red Blood Cells into Peripheral Vein, Percutaneous Approach (01/17/2018)  Transfusion of Nonautologous Red Blood Cells into Peripheral Vein, Percutaneous Approach (01/14/2018)  Insertion of Infusion Device into Superior Vena Cava, Percutaneous Approach (01/10/2018)  Ultrasonography of Superior Vena Cava, Guidance (01/10/2018)  Biopsy Bone Marrow Aspiration (.) (12/21/2017)  Extraction of Iliac Bone Marrow, Percutaneous Approach, Diagnostic (12/21/2017)  Dilation of Coronary Artery, One Artery with Two Drug-eluting Intraluminal Devices, Percutaneous Approach (12/18/2017)  Drainage of Right Pleural Cavity, Percutaneous Approach (12/18/2017)  Extirpation of Matter from Coronary Artery, One Artery, Percutaneous Approach (12/18/2017)  Biopsy Gastrointestinal (12/15/2017)  Colonoscopy (12/15/2017)  Colonoscopy (12/15/2017)  Esophagogastroduodenoscopy (12/15/2017)  Excision of Duodenum, Via Natural or Artificial Opening  Endoscopic, Diagnostic (12/15/2017)  Excision of Stomach, Pylorus, Via Natural or Artificial Opening Endoscopic, Diagnostic (12/15/2017)  Inspection of Lower Intestinal Tract, Via Natural or Artificial Opening Endoscopic (12/15/2017)  Fluoroscopy of Left Heart using Low Osmolar Contrast (12/13/2017)  Fluoroscopy of Multiple Coronary Arteries using Low Osmolar Contrast (12/13/2017)  Measurement of Cardiac Sampling and Pressure, Left Heart, Percutaneous Approach (12/13/2017)  Restoration of Cardiac Rhythm, Single (12/13/2017)  Transfusion of Nonautologous Red Blood Cells into Peripheral Vein, Percutaneous Approach (12/13/2017)  Fracture (1981)  cardiac stents placed  left wrist repair   Medications  Inpatient  apixaban, 5 mg= 2 tab(s), Oral, BID  aspirin 81 mg oral tablet, CHEWABLE, 324 mg= 4 tab(s), Oral, Once  aspirin 81 mg oral tablet, CHEWABLE, 81 mg= 1 tab(s), Oral, Daily  atorvastatin, 40 mg= 2 tab(s), Oral, Daily  azithromycin (Zithromax) for IVPB  Bactrim DS Tab. 800 mg - 160 mg, 1 tab(s), Oral, TID  Coreg 12.5 mg oral tablet, 12.5 mg= 1 tab(s), Oral, BID  dexamethasone (for IVPB)  furosemide 20 mg oral tablet, 40 mg= 2 tab(s), Oral, Daily  Heparin Flush 100 U/mL - 5 mL, 500 units= 5 mL, IV Push, Once-chemo  Heparin Flush 100 U/mL - 5 mL, 500 units= 5 mL, IV Push, Once-chemo  losartan 25 mg oral tablet, 50 mg= 1 tab(s), Oral, Daily  Plavix 75 mg oral tablet, 75 mg= 1 tab(s), Oral, Daily  potassium chloride (KCl) ORAL liquid, 40 mEq= 2 packet(s), Oral, With Breakfast  Rocephin (for IVPB)  Home  aspirin 81 mg oral Delayed Release (EC) tablet, 81 mg= 1 tab(s), Oral, Daily, 5 refills  atorvastatin 40 mg oral tablet, 40 mg= 1 tab(s), Oral, At Bedtime, 6 refills  Biktarvy oral tablet, 1 tab(s), Oral, Daily, 3 refills,? ?Not taking: out of med  Blood Pressure Cuff, See Instructions,? ?Not Taking, Completed Rx: supplies Last Dose Date/Time Unknown  carvedilol 12.5 mg oral tablet, 12.5 mg= 1 tab(s), Oral, BID, 6  refills  Eliquis 5 mg oral tablet, 5 mg= 1 tab(s), Oral, BID, 11 refills  Lasix 20 mg oral tablet, 60 mg= 3 tab(s), Oral, Daily, 6 refills,? ?Not taking: out of med  losartan 25 mg oral tablet, 25 mg= 1 tab(s), Oral, Daily, 5 refills  Plavix 75 mg oral tablet, 75 mg= 1 tab(s), Oral, Daily, 11 refills  spironolactone 25 mg oral tablet, See Instructions  Vitamin D3 2000 intl units (50 mcg) oral capsule, 2000 IntUnit= 1 cap(s), Oral, Daily, 3 refills  Allergies  ACE inhibitors?(Angioedema)  Nitroglycerin Transdermal System?(Itching)  Social History  Abuse/Neglect  No, No, Yes, 2021  Alcohol - Denies Alcohol Use, 2013  Past, Beer, 2021  Employment/School  Disabled, Highest education level: High school., 2020  Exercise  Exercise duration: 30. Exercise frequency: 3-4 times/week. Exercise type: Walking., 2021  Home/Environment  Lives with Alone. Living situation: Home/Independent., 2020  Nutrition/Health  Regular, Good, 2021  Sexual  Gender Identity Identifies as male., 2019  Spiritual/Cultural  Yazdanism, 2020  Substance Use - Denies Substance Abuse, 2013  Current, Cocaine, 2021  Never, 2021  Tobacco - High Risk, 2015  10 or more cigarettes (1/2 pack or more)/day in last 30 days, Cigarettes, No, 43 year(s). 19 Years (Age started)., 2021  Never (less than 100 in lifetime), No, 2021  Family History  : Mother and Father.  Hypertension.: Mother and Sister.  Primary malignant neoplasm of colon: Brother.  Primary malignant neoplasm of lung: Father.  Stroke: Sister.  Immunizations  Vaccine Date Status Comments   influenza virus vaccine, inactivated 2020 Given    influenza virus vaccine, inactivated 2019 Given    influenza virus vaccine, inactivated 2018 Given Patient Not Available/Off Unit   pneumococcal 13-valent conjugate vaccine 2017 Given    influenza virus vaccine, inactivated 11/10/2017 Given

## 2022-05-01 NOTE — HISTORICAL [facility] CERNER
This is a historical note converted from [employer]. Formatting and pictures may have been removed.  Please reference [employer]ne for original formatting and attached multimedia. Chief Complaint  Pt c/o shortness of breathe worse with exertion x 3 days. Pt talking freely, skin w/d. ?Hx A-fib, CHF. ?He states out of fluid pills x 2 weeks and HiV meds x 3 weeks.  History of Present Illness  Mr. Jason Perdue is a 61 year old male with a complex past medical history of HFrEF with recovered EF?(echo in 12/2019 showed EF of 10-15%, echo in 12/2020 showed recovered EF of 50% but multiple valvular?abnormalities including moderate AS with mild AR, mild TR, diastolic dysfunction), CAD s/p GERDA to LCx in 12/18/2017, HIV with medication noncompliance, Hepatitis B not on treatment, Atrial fibrillation on eliquis, hyperlipidemia, and Diffuse large B cell lymphoma stage IVB (lost to follow up, last seen by Margie in 12/7/2020, hx of syphilis that was treated (unknown time), and cocaine abuse.?  ?  He presents to the emergency department complaining of shortness of breath for the past 3 days.? He states that he is NYHA class II at baseline and can tolerate a moderate amount of activity, but has become more short of breath over the past few days.? He reports palpitations intermittently when he becomes short of breath but denies any chest pain.? Does have a cough, but no sputum production, no hemoptysis.? Of note he has questionable compliance with his medications.? Takes his coreg, but has been out of spironolactone for about 3 months now and has been out of lasix for the past 7 days.? ? Regarding his HIV, he is noncompliant with his medications and reports that he has not been able to go to his appointments due to transportation issues.? He denies any lightheadedness, dizziness, LOC.? He decided to report to the ED.? CXR was performed which showed pulmonary vascular congestion.? He was satting well on RA but slightly labored.? BNP >3,000.?  Internal medicine was consulted for CHF exacerbation.? Troponin x 2 was stable at 0.358-0.367.? EKG appeared unchanged from previous.? He denied chest pain.? He was admitted to IM for CHF exacerbation, given 40 IV lasix in the ED and an additional 40 by IM.  ?  Past Medical History  as above  ?  Past Surgical history  insertion of mediport  Bone marrow aspiration 12/21/2017  GERDA to LCX x 2 12/18/2017  ?  Social History:  Alcohol: denies  Tobacco: 0.5-1 pack per day since age 19  Illicits: cocaine use, last use was reported to be one week ago  ?  Cardiovascular studies:  TTE 12/14/2020  Mitral Valve  Mild thickening of leaflet of mitral valve.  Trace mitral regurgitation.  Aortic Valve  Moderate aortic stenosis. Peak velocity across the aortic valve is 3.3 m/sec. Peak gradient is 43.59 mm Hg. Mean gradient is 2.1 mm Hg.  Mild aortic regurgitation present.  Tricuspid Valve  Tricuspid valve is structurally normal.  There is mild tricuspid regurgitation with estimated RVSP of 44 mmHg.  Pulmonic Valve  The pulmonic valve was not well visualized  Left Atrium  Normal size left atrium.  Left Ventricle  Left ventricular ejection fraction is measured at approximately 50 %.  Inferoapical hypokinesis.  E/A flow reversal noted. Suggestive of diastolic dysfunction.  Right Atrium  Normal right atrial size  Right Ventricle  Normal right ventricular size with preserved RV function.  Miscellaneous  IVC normal  ?  Review of Systems  CONSTITUTIONAL: (+) generalized fatigue, No weight loss, subjective fever, chills  HEENT: Eyes: No visual loss, blurred vision, double vision or yellow sclerae. Ears, Nose, Throat: No hearing loss, sneezing, congestion, runny nose or sore throat.?  SKIN: No rash or itching.?  CARDIOVASCULAR: (+) palpitations, some edema.? No chest pain, chest pressure or chest discomfort.  RESPIRATORY: (+) shortness of breath with exertion, dry cough with no sputum production  GASTROINTESTINAL: No anorexia, nausea,  vomiting or diarrhea. No abdominal pain or blood.?  GENITOURINARY: No dysuria, hematuria, or incontinence  NEUROLOGICAL: No headache, dizziness, syncope, paralysis, ataxia, numbness or tingling in the extremities. No change in bowel or bladder control.?  MUSCULOSKELETAL: No muscle, back pain, joint pain or stiffness.?  HEMATOLOGIC: No anemia, bleeding or bruising.?  LYMPHATICS: No enlarged nodes.  PSYCHIATRIC: No history of depression or anxiety.?  ENDOCRINOLOGIC: No reports of sweating, cold or heat intolerance. No polyuria or polydipsia.?  ALLERGIES: No history of asthma, hives, eczema or rhinitis.  Physical Exam  Vitals & Measurements  T:?36.5? ?C (Temporal Artery)? HR:?85(Peripheral)? HR:?85(Monitored)? RR:?24? BP:?107/75? SpO2:?94%? WT:?94.1?kg? BMI:?29.37?  Gen: He is alert and oriented x3. Well developed, well-nourished, NAD.  Head: Normocephalic  Eyes: PERRL, EOMI, no conjunctival injection or pallor  Nose: No nasal discharge  Throat: No pharyngeal inflammation, erythema, discharge, mucous membranes moist  Neck: Supple. No carotid bruits.? No lymphadenopathy or thyromegaly.  Lungs: Clear to auscultation bilaterally, no crackles heard  CV: Holosystolic murmur heard around mitral area and LUSB,?Crescendo decrescendo, unable to assess JVP due to presence of mediport in IJ  Abdomen: Soft, NT/ND, bowel sounds present.? No hepatosplenomegaly  Ext: No cyanosis/clubbing/edema, pulses 2+  Neuro:?CN II-XII grossly intact, strength 5/5 throughout, normal sensation  Psych: Flat affect, but denies suicidal or homicidal ideations  Skin: No rashes, lesions, ulceration or induration  Assessment/Plan  Orders:  Basic Metabolic Panel, Timed collect, 11/27/21 17:00:00 CST, Blood, Stop date 11/27/21 17:00:00 CST, Lab Collect, 11/27/21 17:00:00 CST  Cardiac Meal Plan, 11/27/21 14:09:00 CST, Start Meal: Next Meal  CBC w/ Auto Diff, Routine collect, 11/28/21 5:00:00 CST, Blood, q24hr (timed only), Lab Collect, 11/28/21 5:00:00  CST  Comprehensive Metabolic Panel, Routine collect, 11/28/21 5:00:00 CST, Blood, Once, Stop date 11/28/21 5:00:00 CST, Lab Collect, 11/28/21 5:00:00 CST  Magnesium Level, Timed collect, 11/27/21 17:00:00 CST, Blood, Stop date 11/27/21 17:00:00 CST, Lab Collect, 11/27/21 17:00:00 CST  Notify Provider, 11/27/21 14:09:00 CST, of patient?s room number and as needed with any acute change or worsening condition  Notify Provider, 11/27/21 14:09:00 CST, Of chest pain [AFTER STAT: 1) EKG, 2) Pulse Ox, 3) Vital Signs]  Notify Provider Vital Signs, 11/27/21 14:09:00 CST, HR > 135, HR < 55, SBP > 190, SBP < 90, RR > 28, UO < 200mL in 8hr  Place in Outpatient Observation, 11/27/21 14:09:00 CST, Telemetry with Monitor Fab CHAWLA, Chantel Moody, List 1  Up ad Ivis, 11/27/21 14:09:00 CST, Constant Order  Vital Signs, 11/27/21 14:09:00 CST, q2hr  CHF Exacerbation  CAD s/p GERDA to LCX in 12/2017  -echo in 12/2019 showed EF of 10-15%, echo in 12/2020 showed recovered EF of 50% but multiple valvular?abnormalities including moderate AS with mild AR, mild TR, diastolic dysfunction, see report above  -Last ischemic workup appears to be in 2017 during [facility]  -will repeat echocardiogram  -BNP of >3,000 upon admission, weight appears 5kg above baseline  -likely attributable to medication noncompliance as well as cocaine use  -given 40 iv lasix in the ED, given another 40 iv lasix, monitor electrolytes closely with goal K>4.0, Mg> 2.0  -Monitor for signs of ACS  ?   HIV  Repeat viral load and CD4 given being out of medication for 8 months reportedly  PJP pending, treat empirically, 20 mg/kg/day orally in 3 doses of TMP SMX based on TMP dose  Opportunistic infections not ruled out, will treat empirically for CAP and PJP  ?   Hepatitis B  -previously had positive hep b testing with significant viral load, no treatment history  -will repeat HB DNA  -consider consultation of ID/ referral to ID upon discharge, defer primary  ?   Diffuse large B cell  lymphoma  lost to follow up with oncology  ?   Disposition:Complex past medical history with poor compliance with medications.? Appears to be CHF exacerbation but additional pathology given his immunocompromised state can not be ruled out.? Ordering CT PE given elevated D dimer and possible PJP.? Treating with gentle diuresis, will recheck electrolytes twice a day.  ?   Anton Singh MD  Internal Medicine PGY3   Problem List/Past Medical History  Ongoing  Atrial fibrillation  Back pain  CAD - Coronary artery disease  Constipation  constipationAnxiety  HIV disease  Hyperlipidemia  Hypertension  Lymphoma  Tobacco user  Historical  Acute disease or injury-related malnutrition  Atrial fibrillation  HIV  HIV  HTN (hypertension)  Hypertension  Kidney stone  Lymphoma  Procedure/Surgical History  Excision of Left Lower Arm Subcutaneous Tissue and Fascia, Open Approach, Diagnostic (12/17/2019)  Punch biopsy of skin (including simple closure, when performed); single lesion (12/17/2019)  Drainage of Right Pleural Cavity, Percutaneous Approach (04/02/2019)  Thoracentesis, needle or catheter, aspiration of the pleural space; without imaging guidance (04/02/2019)  Drainage of Spinal Canal, Percutaneous Approach (04/24/2018)  Spinal puncture, therapeutic, for drainage of cerebrospinal fluid (by needle or catheter) (04/24/2018)  Catheter Insertion Mediport (None) (03/05/2018)  Insertion of Infusion Device into Superior Vena Cava, Percutaneous Approach (03/05/2018)  Insertion of tunneled centrally inserted central venous access device, with subcutaneous port; age 5 years or older (03/05/2018)  Transfusion of Nonautologous Red Blood Cells into Peripheral Vein, Percutaneous Approach (01/20/2018)  Transfusion of Nonautologous Red Blood Cells into Peripheral Vein, Percutaneous Approach (01/17/2018)  Transfusion of Nonautologous Red Blood Cells into Peripheral Vein, Percutaneous Approach (01/14/2018)  Insertion of Infusion Device into  Superior Vena Cava, Percutaneous Approach (01/10/2018)  Ultrasonography of Superior Vena Cava, Guidance (01/10/2018)  Biopsy Bone Marrow Aspiration (.) (12/21/2017)  Extraction of Iliac Bone Marrow, Percutaneous Approach, Diagnostic (12/21/2017)  Dilation of Coronary Artery, One Artery with Two Drug-eluting Intraluminal Devices, Percutaneous Approach (12/18/2017)  Drainage of Right Pleural Cavity, Percutaneous Approach (12/18/2017)  Extirpation of Matter from Coronary Artery, One Artery, Percutaneous Approach (12/18/2017)  Biopsy Gastrointestinal (12/15/2017)  Colonoscopy (12/15/2017)  Colonoscopy (12/15/2017)  Esophagogastroduodenoscopy (12/15/2017)  Excision of Duodenum, Via Natural or Artificial Opening Endoscopic, Diagnostic (12/15/2017)  Excision of Stomach, Pylorus, Via Natural or Artificial Opening Endoscopic, Diagnostic (12/15/2017)  Inspection of Lower Intestinal Tract, Via Natural or Artificial Opening Endoscopic (12/15/2017)  Fluoroscopy of Left Heart using Low Osmolar Contrast (12/13/2017)  Fluoroscopy of Multiple Coronary Arteries using Low Osmolar Contrast (12/13/2017)  Measurement of Cardiac Sampling and Pressure, Left Heart, Percutaneous Approach (12/13/2017)  Restoration of Cardiac Rhythm, Single (12/13/2017)  Transfusion of Nonautologous Red Blood Cells into Peripheral Vein, Percutaneous Approach (12/13/2017)  Fracture (1981)  cardiac stents placed  left wrist repair   Medications  Inpatient  aspirin 81 mg oral tablet, CHEWABLE, 324 mg= 4 tab(s), Oral, Once  dexamethasone (for IVPB)  Heparin Flush 100 U/mL - 5 mL, 500 units= 5 mL, IV Push, Once-chemo  Heparin Flush 100 U/mL - 5 mL, 500 units= 5 mL, IV Push, Once-chemo  Home  aspirin 81 mg oral Delayed Release (EC) tablet, 81 mg= 1 tab(s), Oral, Daily, 5 refills  atorvastatin 40 mg oral tablet, 40 mg= 1 tab(s), Oral, At Bedtime, 6 refills  Biktarvy oral tablet, 1 tab(s), Oral, Daily, 3 refills,? ?Not taking: out of med  Blood Pressure Cuff, See  Instructions,? ?Not Taking, Completed Rx: supplies Last Dose Date/Time Unknown  carvedilol 12.5 mg oral tablet, 12.5 mg= 1 tab(s), Oral, BID, 6 refills  Eliquis 5 mg oral tablet, 5 mg= 1 tab(s), Oral, BID, 11 refills  Lasix 20 mg oral tablet, 60 mg= 3 tab(s), Oral, Daily, 6 refills,? ?Not taking: out of med  losartan 25 mg oral tablet, 25 mg= 1 tab(s), Oral, Daily, 5 refills  Plavix 75 mg oral tablet, 75 mg= 1 tab(s), Oral, Daily, 11 refills  spironolactone 25 mg oral tablet, See Instructions  Vitamin D3 2000 intl units (50 mcg) oral capsule, 2000 IntUnit= 1 cap(s), Oral, Daily, 3 refills  Allergies  ACE inhibitors?(Angioedema)  Nitroglycerin Transdermal System?(Itching)  Social History  Abuse/Neglect  No, No, Yes, 2021  Alcohol - Denies Alcohol Use, 2013  Past, Beer, 2021  Employment/School  Disabled, Highest education level: High school., 2020  Exercise  Exercise duration: 30. Exercise frequency: 3-4 times/week. Exercise type: Walking., 2021  Home/Environment  Lives with Alone. Living situation: Home/Independent., 2020  Nutrition/Health  Regular, Good, 2021  Sexual  Gender Identity Identifies as male., 2019  Spiritual/Cultural  Rastafarian, 2020  Substance Use - Denies Substance Abuse, 2013  Never, 2021  Tobacco - High Risk, 2015  Never (less than 100 in lifetime), No, 2021  Family History  : Mother and Father.  Hypertension.: Mother and Sister.  Primary malignant neoplasm of colon: Brother.  Primary malignant neoplasm of lung: Father.  Stroke: Sister.  Immunizations  Vaccine Date Status Comments   influenza virus vaccine, inactivated 2020 Given    influenza virus vaccine, inactivated 2019 Given    influenza virus vaccine, inactivated 2018 Given Patient Not Available/Off Unit   pneumococcal 13-valent conjugate vaccine 2017 Given    influenza virus vaccine, inactivated 11/10/2017 Given    Lab Results  Labs  Last 24 Hours?  ?Chemistry? Hematology/Coagulation?   Sodium Lvl: 138 mmol/L (11/27/21 08:56:00) WBC: 5.9 x10(3)/mcL (11/27/21 08:56:00)   Potassium Lvl: 4.2 mmol/L (11/27/21 08:56:00) RBC:?4.46 x10(6)/mcL?Low (11/27/21 08:56:00)   Chloride:?108 mmol/L?High (11/27/21 08:56:00) Hgb: 13.6 gm/dL (11/27/21 08:56:00)   CO2: 24 mmol/L (11/27/21 08:56:00) Hct: 43.4 % (11/27/21 08:56:00)   Calcium Lvl: 8.9 mg/dL (11/27/21 08:56:00) Platelet: 210 x10(3)/mcL (11/27/21 08:56:00)   Glucose Lvl: 87 mg/dL (11/27/21 08:56:00) MCV: 97.3 fL (11/27/21 08:56:00)   BUN: 12.1 mg/dL (11/27/21 08:56:00) MCH: 30.5 pg (11/27/21 08:56:00)   Creatinine: 0.96 mg/dL (11/27/21 08:56:00) MCHC: 31.3 gm/dL (11/27/21 08:56:00)   Est Creat Clearance Ser: 85.75 mL/min (11/27/21 09:36:59) RDW: 13.7 % (11/27/21 08:56:00)   eGFR-AA: 103 (11/27/21 08:56:00) MPV: 10.3 fL (11/27/21 08:56:00)   eGFR-CLYDE:?85 mL/min/1.73 m2?Low (11/27/21 08:56:00) Abs Neut: 3.36 x10(3)/mcL (11/27/21 08:56:00)   Bili Total: 0.5 mg/dL (11/27/21 08:56:00) Neutro Auto: 57 % (11/27/21 08:56:00)   Bili Direct: 0.2 mg/dL (11/27/21 08:56:00) Lymph Auto: 29 % (11/27/21 08:56:00)   Bili Indirect: 0.3 mg/dL (11/27/21 08:56:00) Mono Auto: 11 % (11/27/21 08:56:00)   AST: 32 unit/L (11/27/21 08:56:00) Eos Auto: 2 % (11/27/21 08:56:00)   ALT: 12 unit/L (11/27/21 08:56:00) Abs Eos: 0.1 x10(3)/mcL (11/27/21 08:56:00)   Alk Phos: 59 unit/L (11/27/21 08:56:00) Basophil Auto: 0 % (11/27/21 08:56:00)   Total Protein:?8.1 gm/dL?High (11/27/21 08:56:00) Abs Neutro: 3.36 x10(3)/mcL (11/27/21 08:56:00)   Albumin Lvl:?3.2 gm/dL?Low (11/27/21 08:56:00) Abs Lymph: 1.7 x10(3)/mcL (11/27/21 08:56:00)   Globulin:?4.9 gm/dL?High (11/27/21 08:56:00) Abs Mono: 0.7 x10(3)/mcL (11/27/21 08:56:00)   A/G Ratio:?0.7 ratio?Low (11/27/21 08:56:00) Abs Baso: 0 x10(3)/mcL (11/27/21 08:56:00)   BNP:?3046.6 pg/mL?High (11/27/21 08:56:00) NRBC%: 0.0 (11/27/21 08:56:00)   Troponin-I:?0.367 ng/mL?High (11/27/21 09:49:00)  IG%: 0 % (11/27/21 08:56:00)   U pH: 7 (11/27/21 10:20:00) IG#: 0.01 (11/27/21 08:56:00)    D-Dimer:?0.96 mcg/ml FEU?High (11/27/21 09:49:00)       Reviewed with resident, agree with assessment and plan.

## 2022-05-01 NOTE — HISTORICAL [facility] CERNER
This is a historical note converted from [employer]. Formatting and pictures may have been removed.  Please reference [employer] for original formatting and attached multimedia. Chief Complaint  abd cramping and diarrhea x 1 week. hx intestinal tumors  History of Present Illness  CC: diffuse abdominal pain  ?  HPI:  56yo BM with pmhx of HIV/AIDS (now on ARTs), B-cell Lymphoma with asscd diffuse abdominal LAD, h/o GI bleed with anemia 1 month ago. Presents to [facility] ED with worsening abdominal pain for the last week.? He was discharged from our services on 12/22 after stabilization of his previous bleed.??He did have a EGD/colonoscopy on 12/15 that was only significant for duodenal nodules. He states the pain has continued and cramping getting worse over the last week.? He has also been having profuse dark watery stools 6 - 7x a days.? He did make his first ID clinic appointment during which they started him on ARTs and ran initial HIV blood work.? He was noted to be positive for syphilis, of which I informed the patient.?In the ED he is FOBT positive again and they have?already ordered two units of pRBCs. ?The hospitalist group was asked to admit for further work up.  Review of Systems  Except as documented, all other systems reviewed and negative.  Physical Exam  Vitals & Measurements  T:?37.9? ?C ?(Temporal Artery)? HR:?99?(Peripheral)? RR:?18? BP:?115/84? SpO2:?98%? WT:?72.5?kg?  General: No acute distress, Awake, Alert, Oriented x3  HEENT: PERRL, EOMI, no scleral icterus, OP clear  Respiratory: CTA bilateral, no wheezes, rales, or rhonchi  Cardiovascular: Irr Irr, no murmurs, rubs or gallops, +s1/s2  Gastrointestinal: soft, nontender, nondistended, +BS  Musculoskeletal: Normal range of motion, no pertinent deformity  Integumentary: clean, warm, dry, intact  Neurologic: CN 2 - 12 grossly intact, no acute deficit noted  Assessment/Plan  1.?Essential hypertension  -continue home meds  -no acute issues  2.?Diffuse large B  cell lymphoma  -patient followed in [facility] oncology clinic  -has not yet begun CTX  3.?Afib  -On Eliquis, ASA, and Plavix  -likely will need to de-escalate if continues bleeding or cannot find source  GI bleed  -GI consulted  -prev EGD/c-scope negative  -may consider VCA??  HIV positive  -continue home ARTs  -followed by [facility] ID  -noted +syphilis, likely late latent  ???? -will defer to clinic for tx regimen  ???? -will need one PCN shot weekly for 3 weeks  Symptomatic anemia  -transfusing 2 units pRBCs  -H/H in the morning  ?  ????DVT Prophylaxis: hold OAC, SCDs  ????Code Status: Full  ????PCP:?[facility]  ?  Time to admission greater than 71 minutes   Problem List/Past Medical History  Ongoing  Tobacco user  Historical  Atrial fibrillation  HIV  HTN (hypertension)  Hypertension  Kidney stone  Procedure/Surgical History  Biopsy Bone Marrow Aspiration (.) (12/21/2017)  Extraction of Iliac Bone Marrow, Percutaneous Approach, Diagnostic (12/21/2017)  Dilation of Coronary Artery, One Artery with Two Drug-eluting Intraluminal Devices, Percutaneous Approach (12/18/2017)  Drainage of Right Pleural Cavity, Percutaneous Approach (12/18/2017)  Extirpation of Matter from Coronary Artery, One Artery, Percutaneous Approach (12/18/2017)  Biopsy Gastrointestinal (12/15/2017)  Colonoscopy (12/15/2017)  Esophagogastroduodenoscopy (12/15/2017)  Excision of Duodenum, Via Natural or Artificial Opening Endoscopic, Diagnostic (12/15/2017)  Excision of Stomach, Pylorus, Via Natural or Artificial Opening Endoscopic, Diagnostic (12/15/2017)  Inspection of Lower Intestinal Tract, Via Natural or Artificial Opening Endoscopic (12/15/2017)  Fluoroscopy of Left Heart using Low Osmolar Contrast (12/13/2017)  Fluoroscopy of Multiple Coronary Arteries using Low Osmolar Contrast (12/13/2017)  Measurement of Cardiac Sampling and Pressure, Left Heart, Percutaneous Approach (12/13/2017)  Restoration of Cardiac Rhythm, Single (12/13/2017)  Transfusion of  Nonautologous Red Blood Cells into Peripheral Vein, Percutaneous Approach (12/13/2017)  Fracture (1981)  left wrist repair  Medications  Inpatient  Sodium Chloride 0.9% 100 mL + Pantoprazole additive for cont. infusion 80 mg  Home  acetaminophen-oxycodone 325 mg-5 mg oral tablet, 1 tab(s), Oral, BID  aspirin 81 mg oral tablet, CHEWABLE, 81 mg= 1 tab(s), Oral, Daily  atorvastatin 40 mg oral tablet, 40 mg= 1 tab(s), Oral, Daily  clarithromycin 500 mg oral tablet, 500 mg= 1 tab(s), Oral, q12hr  Descovy 200 mg-25 mg oral tablet, 1 tab(s), Oral, Daily, 3 refills  Eliquis 5 mg oral tablet, 5 mg= 1 tab(s), Oral, BID  Flagyl 500 mg oral tablet, 500 mg= 1 tab(s), Oral, q12hr  Megace 40 mg/mL oral suspension, 800 mg= 20 mL, Oral, Daily  metoprolol tartrate 25 mg oral tab, 25 mg= 1 tab(s), Oral, BID  Plavix 75 mg oral tablet, 75 mg= 1 tab(s), Oral, Daily  Protonix 40 mg ORAL enteric coated tablet, 40 mg= 1 tab(s), Oral, BID  sulfamethoxazole-trimethoprim 800 mg-160 mg oral tablet, 1 tab(s), Oral, Daily, 1 refills,? ?Investigating  Tivicay 50 mg oral tablet, 50 mg= 1 tab(s), Oral, Daily, 3 refills  Allergies  ACE inhibitors?(Angioedema)  Social History  Alcohol - Denies Alcohol Use, 01/03/2018  Past, Beer  Never  Substance Abuse - Denies Substance Abuse, 01/03/2018  Current, crack  Never  Never  Tobacco - High Risk, 01/03/2018  Former smoker, Cigarettes, 20 per day.  Family History  Hypertension.: Mother and Sister.  Stroke: Sister.  Lab Results  Test Name Test Result Date/Time   Sodium Lvl 132 mmol/L (Low) 01/03/2018 11:26 CST   Potassium Lvl 4.9 mmol/L 01/03/2018 11:26 CST   Chloride 104 mmol/L 01/03/2018 11:26 CST   CO2 21.0 mmol/L 01/03/2018 11:26 CST   Glucose Lvl 100 mg/dL 01/03/2018 11:26 CST   BUN 18.0 mg/dL 01/03/2018 11:26 CST   Creatinine 0.86 mg/dL 01/03/2018 11:26 CST   WBC 11.3 x10(3)/mcL 01/03/2018 11:26 CST   Hgb 7.1 gm/dL (Low) 01/03/2018 11:26 CST   Hct 26.1 % (Low) 01/03/2018 11:26 CST   Platelet 678  x10(3)/mcL (High) 01/03/2018 11:26 CST

## 2022-05-01 NOTE — HISTORICAL [facility] CERNER
This is a historical note converted from [employer]. Formatting and pictures may have been removed.  Please reference [employer] for original formatting and attached multimedia. Chief Complaint  Pt presents dry cough yesterday and reports SOB this AM. Was recently treated for Bronchitis. Bilateral leg swelling since monday.  Reason for Consultation  RUQ tenderness with US finding concerning for cholecystitis  History of Present Illness  Mr. Perdue is a 59 y/o M who presented with SOB, chest pain, and abdominal tenderness. He has a hx of AFIB with no anticoagulation 2/2 GI bleed, HIV, CAD s/p stent placement in 2017, HTN, HLD, B cell lymphoma s/p chemotherapy. On monday 12/24 he began having worsening shortness of breath and swelling of the legs. He continued to have worsening of this SOB and swelling through out the week. He presented to ED and was admitted for echo, lasix, and found to have a right pleural effusion. Since he has had fluid off he reports that he has been feeling better. At the time of his admission he had RUQ tenderness on exam. This prompted abdominal US with pericholecystic fluid and gallbladder wall thickening. He denies any abdominal symptoms inlcuding postprandial nausea, vomiting, any abdominal pain (unless palpated). He had one episode of diarrhea on tuesday that resolved. Today he reports he is still having SOB, but his swelling has resolved. He denies fevers, chills, abdominal pain, nausea, vomiting, inability to tolerate PO.  Review of Systems  Negative unless otherwise stated  Physical Exam  Vitals & Measurements  T:?36.6? ?C (Oral)? TMIN:?36.4? ?C (Oral)? TMAX:?36.6? ?C (Oral)? HR:?77(Peripheral)? RR:?16? BP:?138/97? SpO2:?97%? WT:?82.9?kg?  NAD, ambulating in the room  RR  Nl effort  soft, TTP in the RUQ, hepatomegaly appreciated. No rebound  well perfused  Assessment/Plan  A: Mr Perdue is a 59 y/o M with HIV, AFIB, CAD, B cell lymphoma s/p chemo, and newly diagnosed CHF with EF 20% who has  signs of gallbladder edema and pericholecystic fluid  ?  P:  No acute surgical intervention at this time. Patient is not reporting symptomatology consistent with acute cholecystitis  Imaging does show signs of gallbladder wall thickening and edema of the gallbladder, this could be secondary to hepatic congestion from newly diagnosed CHF  Patient will need interval ultrasound after resolution/optimization of the newly diagnosed CHF, and resolution of fluid overload status.  Surgery remains available should any questions arise.   Problem List/Past Medical History  Ongoing  Acute disease or injury-related malnutrition  Atrial fibrillation  Back pain  CAD - Coronary artery disease  Constipation  constipationAnxiety  HIV  Hyperlipidemia  Hypertension  Lymphoma  Tobacco user  Historical  Atrial fibrillation  HIV  HTN (hypertension)  Hypertension  Kidney stone  Procedure/Surgical History  Catheter Insertion Mediport (None) (03/05/2018)  Biopsy Bone Marrow Aspiration (.) (12/21/2017)  Biopsy Gastrointestinal (12/15/2017)  Colonoscopy (12/15/2017)  Colonoscopy (12/15/2017)  Esophagogastroduodenoscopy (12/15/2017)  Fracture (1981)  cardiac stents placed  left wrist repair   Medications  Inpatient  acetaminophen, 1000 mg= 2 tab(s), Oral, q6hr, PRN  acetaminophen, 650 mg= 2 tab(s), Oral, q6hr, PRN  aspirin 81 mg oral tablet, CHEWABLE, 81 mg= 1 tab(s), Oral, Daily  atorvastatin 20 mg oral tablet, 40 mg= 2 tab(s), Oral, Daily  Cardizem  mg/24 hours oral CAPsule, extended release, 360 mg= 3 cap(s), Oral, Daily  Descovy 200 mg-25 mg oral tablet, 1 tab(s), Oral, Daily  dexamethasone (for IVPB)  docusate-senna 50 mg-8.6 mg oral tablet, 2 tab(s), Oral, Once a day (at bedtime)  DuoNeb 0.5 mg-2.5 mg/3 mL inhalation solution, 3 mL, NEB, q4hr Resp, PRN  ferrous sulfate 300 mg/5 mL oral liquid, 300 mg= 5 mL, Oral, BID  furosemide inj. (IV Push or IM) 10 mg/mL, 40 mg= 4 mL, IV Push, BID  Heparin Flush 100 U/mL - 5 mL, 500 units= 5 mL,  IV Push, Once-chemo  Heparin Flush 100 U/mL - 5 mL, 500 units= 5 mL, IV Push, Once-chemo  Influenza Virus Vaccine, Inactivated, 0.5 mL, IM, Daily  labetalol, 20 mg= 4 mL, IV Push, q2hr, PRN  levofloxacin IVPB ( Levaquin ), 750 mg= 150 mL, IV Piggyback, q24hr  losartan 25 mg oral tablet, 25 mg= 0.5 tab(s), Oral, Daily  Lovenox, 40 mg= 0.4 mL, Subcutaneous, Daily  metoprolol tartrate 50 mg oral tab, 50 mg= 1 tab(s), Oral, BID  Phenergan, 12.5 mg= 0.5 mL, IV Push, q4hr, PRN  Plavix 75 mg oral tablet, 75 mg= 1 tab(s), Oral, Daily  Tivicay 50 mg oral tablet, 50 mg= 1 tab(s), Oral, Daily  Zofran, 4 mg= 2 mL, IV Push, q4hr, PRN  Zosyn, 3.375 gm= 50 mL, IV Piggyback, q8hr  Home  aspirin 81 mg oral tablet, CHEWABLE, 81 mg= 1 tab(s), Oral, Daily  atorvastatin 40 mg oral tablet, 40 mg= 1 tab(s), Oral, Daily, 11 refills  Cardizem  mg/24 hours oral CAPsule, extended release, 360 mg= 1 cap(s), Oral, Daily, 6 refills  Descovy 200 mg-25 mg oral tablet, 1 tab(s), Oral, Daily, 3 refills  docusate-senna 50 mg-8.6 mg oral tablet, 2 tab(s), Oral, Once a day (at bedtime)  Ensure Plus, See Instructions, 3 refills  HYDROCODON APAP 10 325,? ?Not taking  HYDROCODON-APAP 5-325,? ?Not taking  HYDROXYZINE STACEY 50 MG CAP  KETOROLAC 10 MG TABLET, 10 mg= 1 tab(s), Oral, q6hr  Lasix 20 mg oral tablet, 60 mg= 3 tab(s), Oral, Daily,? ?Not taking  LEVOFLOXACIN 500 MG TABS, 500 mg= 1 tab(s), Oral, Daily,? ?Not Taking, Completed Rx  metoprolol tartrate 50 mg oral tab, 50 mg= 1 tab(s), Oral, BID, 11 refills  nitroglycerin 0.4 mg sublingual TAB, 0.4 mg= 1 tab(s), SL, q5min, PRN, 3 refills,? ?Not taking  ONDANSETRON 8 MG TBDP  PHENAZOPYRIDINE 100 MG TAB  Plavix 75 mg oral tablet, 75 mg= 1 tab(s), Oral, Daily, 11 refills  PREDNISONE 20 MG TAB,? ?Not taking  ProAir HFA 90 mcg/inh inhalation aerosol with adapter, 2 puff(s), INH, q4hr, PRN  Tivicay 50 mg oral tablet, 50 mg= 1 tab(s), Oral, Daily, 3 refills  Allergies  ACE inhibitors?(Angioedema)  Social  History  Alcohol - Denies Alcohol Use, 2017  Past, Beer, 2017  Employment/School  Unemployed, 2018  Home/Environment  Lives with Siblings. Living situation: Home/Independent. Home equipment: Walker/Cane. Alcohol abuse in household: No. Substance abuse in household: No. Smoker in household: No. Feels unsafe at home: No. Safe place to go: Yes. Family/Friends available for support: Yes. Concern for family members at home: No. Major illness in household: No., 2018  Nutrition/Health  Regular, Sleeping concerns: Yes. Feels highly stressed: No., 2018  Substance Abuse - Denies Substance Abuse, 2017  Current, 1-2 times per week, 2018  Current, Cocaine, 2018  Past, crack, 2018  Tobacco - High Risk, 2015  Former smoker, No, 2018  Current every day smoker Use:. Cigarettes Type:., 2018  Current some day smoker Use:., 2018  Former smoker, Cigarettes, 20 per day., 2017  Family History  : Mother and Father.  Hypertension.: Mother and Sister.  Primary malignant neoplasm of colon: Brother.  Primary malignant neoplasm of lung: Father.  Stroke: Sister.  Immunizations  Vaccine Date Status   pneumococcal 13-valent conjugate vaccine 2017 Given   influenza virus vaccine, inactivated 11/10/2017 Given       Above Hx and assessment reviewed and discussed with Resident.  Agree with plan of care.

## 2022-05-01 NOTE — HISTORICAL [facility]
This is a historical note converted from [employer]. Formatting and pictures may have been removed.  Please reference [employer] for original formatting and attached multimedia. Admit Date: 12/16/2019  Discharge Date: 12/20/2019  ?  Physicians  Attending Physician - ER, Physician  Admitting Physician - ER, Physician  Consulting Physician - Sandeep CHAWLA, Federico Bruno  Primary Care Physician - No PCP, No?  ?   REFERRING PHYSICIAN:  ?   CONDITION ON DISCHARGE:?Stable  ?  FINAL DIAGNOSIS:?1:HIV disease; 2:Rash, skin; 4:Cocaine abuse; Acute combined systolic (congestive) and diastolic (congestive) heart failure  ?  ?Procedures  ????No Procedures Documented  ?  HISTORY OF PRESENT ILLNESS:?For more information, please refer to the full Admission H&P Note  ?  PHYSICAL EXAM:  Temperature????????36.6 ???(08:00)  Systolic Blood Pressure????????126 ???(08:00)  Diastolic Blood Pressure????????91 ???(08:00)  Pulse????????70 ???(08:00)  SpO2????????94 ???(08:00)  Respiratory Rate????????20 ???(08:00)  ?  ?  ?  General: AAOx3, NAD, alert and cooperative  HEENT: PERRLA, EOMI  Neck: no LAD, no JVD, supple, no masses or thyromegaly  CVS: S1/S2 nml, RRR, no murmurs, rubs or gallops, no carotid bruits  Resp: CTA b/l, no wheezing  GI: Not distended, not tender, BS+, no guarding  Skin: not jaundiced, warm, no rashes  Musculoskeletal: normal ROM, no joint tenderness, normal muscular development  Extremities: No edema, peripheral pulses intact  Neuro: CN II-XII grossly intact, strength and sensation symmetric and intact throughout, no focal neurological deficits  ?  Labs Last 24 Hours?  No qualifying data available.  ?  Hospital course:  ?  59-year-old male with PMH HFrEF (EF 10-15%, 12/2019), HTN, paroxysmal afib on eliquis, cocaine abuse, HIV, CAD s/p stenting x2, h/o b-cell lymphoma in remission, medication noncompliance who was admitted on 12/17/2019 for CHF exacerbation. Patient had reported being off medications for 2 weeks including ART and  Eliquis. His main complaint was shortness of breath and orthopnea. He was diuresed and admitted to telemetry. ID was consulted who ordered multiple labs given patient has not been seen in clinic in a very long time. ?He remained on telemetry floor during hospitalization and had great response to 40 mg Lasix IV as he put out about 3 L each day. ?ID recommendations include discharged home with Biktarvy, doxycycline for 7 days, and follow-up ID clinic in 3 weeks. ?Patient will also need to be seen in cardiology clinic within 1 month for assessment for ICD placement given poorly controlled heart failure. ?He was also noted to have LV thrombus per echocardiogram, will discharge home with Eliquis 10 mg twice a day for 5 additional days and then plan to continue Eliquis 5 mg twice a day until seen by cardiology. ?All medications were refilled and sent to pharmacy in [place]. ?He was discharged home clinically and hemodynamic stable. Instructed not to combine beta blockers and cocaine, patient reports will he will quit cocaine usage.  ?  ?  Discharge Medications  Prescribed  apixaban (apixaban 5 mg oral tablet)?10 mg, Oral, BID  ascorbic acid (Vitamin C 500 mg oral tablet)?500 mg, Oral, Daily  bictegravir/emtricitabine/tenofovir (Biktarvy oral tablet)?1 tab(s), Oral, Daily  doxycycline (doxycycline hyclate 100 mg oral capsule)?100 mg, Oral, Daily  Continue  alfuzosin (alfuzosin 10 mg oral tablet, extended release)?10 mg, Oral, Daily  amLODIPine (amlodipine 5 mg oral tablet)?See Instructions  apixaban (Eliquis 5 mg oral tablet)?5 mg, Oral, BID  ascorbic acid (Vitamin C 500 mg oral tablet)?500 mg, Oral, Daily  atorvastatin (atorvastatin 40 mg oral tablet)?40 mg, Oral, At Bedtime  carvedilol (carvedilol 12.5 mg oral tablet)?See Instructions  clopidogrel (Plavix 75 mg oral tablet)?75 mg, Oral, Daily  docusate-senna (docusate-senna 50 mg-8.6 mg oral tablet)?2 tab(s), Oral, Once a day (at bedtime)  ferrous sulfate (ferrous  sulfate 220 mg/5 mL (44 mg elemental iron) oral elixir)  furosemide (Lasix 20 mg oral tablet)?60 mg, Oral, Daily  spironolactone (spironolactone 25 mg oral tablet)?25 mg, Oral, Daily  Discontinue  dolutegravir (Tivicay 50 mg oral tablet)?50 mg, Oral, Daily  emtricitabine-tenofovir (Descovy 200 mg-25 mg oral tablet)?1 tab(s), Oral, Daily  predniSONE (prednisONE 20 mg oral tablet)?20 mg, Oral, BID  ?  DISCHARGE INSTRUCTIONS:?Patient is going home. Patient is to take all medications as prescribed. Follow up with all the clinic appointments  DIET:?Cardiac Meal Plan -- 12/17/19 5:35:00 CST, Start Meal: Next Meal  ACTIVITY:?As tolerated  FOLLOW UP APPOINTMENTS  Report to Emergency Department if symptoms return or worsen  [facility], on 01/06/2020  ????Post cormier follow up with Dr. Carl, [facility]  Francis Carl  [facility] Disease [facility], within 1 month  ????Follow up with ID clinic in 3 weeks  ?  Education provided  Heart Failure  Discharge - 12/20/19 11:09:00 CST, Home discharge after meds to beds delivered?  Time Spent on Discharge >35 Minutes  ?   I was present with the Resident during discharge day management.  ?  [ ?] I discussed the case with the Resident and agree with the discharge plan as above.  [ x?] I discussed the case with the Resident and agree with the discharge plan as above?except:  ?Follow up with Cardiology and ID as outpatient.  ?   Time spent on discharge [ >30?] minutes  ?

## 2022-05-01 NOTE — HISTORICAL [facility]
This is a historical note converted from [employer]. Formatting and pictures may have been removed.  Please reference [employer] for original formatting and attached multimedia. Admit and Discharge Dates  Admit Date: 11/27/2021  Discharge Date: 11/30/2021  Physicians  Attending Physician - Fab CHAWLA, Ariel WHIPPLE  Admitting Physician - Fab CHAWLA, Ariel WHIPPLE  Consulting Physician - Sandeep CHAWLA, Federico Bruno  Consulting Physician - Roshni CHAWLA, Skinny CONTRERAS  Primary Care physician- Christie VALDES  Discharge Diagnosis  1.?HIV disease?B20  ?  1.?Unspecified viral hepatitis B without hepatic coma?B19.10  ?  2.?Chronic hepatitis B?B18.1  ?  3.?CHF exacerbation?I50.9  ?  4.?Shortness of breath?[id number]  ?  Admission Information  Pt?is a 61 year old male with PMH of HFrEF?(EF of 50%?with moderate AS with mild AR, mild TR, diastolic dysfunction), CAD s/p GERDA to LCx in 12/18/2017, HIV with medication noncompliance, Hepatitis B not on treatment, Atrial fibrillation on Eliquis, HLD, and Diffuse large B cell lymphoma stage IVB ([facility] 12/7/2020), hx of syphilis that was treated (unknown time), and cocaine abuse.?Presented to ED for SOB 3 days.? Troponin x 2 stable at 0.358 -> 0.367.? EKG unchanged from previous.? Previous ischemic workup with stent placement in 2017.??Admitted for CHF exacerbation.?  Hospital Course  Pt was admitted for CHF exacerbation.?Echocardiogram?showed?EF 50%. BNP of >3,000 at admission.?Pt received Lasix 40 mg daily.Troponin trended down. Patient was given aspirin 81 mg daily, atorvastatin 40 mg daily, Coreg 12.5 mg BID, Eliquis?and spironolactone. Shortness of breath continually improved. Discontinued Plavix during this admission.  Pt positive HepBsAg. U/S abdomen completed while inpatient. HIV non-compliant with medications. Pt evaluated by Infectious Disease. Will discharge with outpatient follow up, Bactrim DS 21 days and Biktarvy. Pt received 3 days Ceftriaxone, Azithromycin, and Tmp-SMX  while admitted.  Significant Findings  CT Angio Chest: IMPRESSION:No pulmonary embolus. Mild emphysematous changes are seen in the lungs bilaterally. There is a 6 mm subpleural soft tissue density nodule in the right upper  lobe (series 3 image 68). There also appears to be some septal thickening in some portions of the lungs with some dense opacity at the lung bases which likely reflects atelectasis related to the effusions but with the possibility of an infectious component not excluded with the septal thickening possibly reflecting an element of congestive failure.?  ?  Time Spent on discharge  > 30 mins  Objective  Vitals & Measurements  T:?36.5? ?C (Oral)? TMIN:?36.5? ?C (Oral)? TMAX:?36.7? ?C (Oral)? HR:?67(Peripheral)? RR:?22? BP:?115/73? SpO2:?97%?  Physical Exam  General:?Alert and oriented,?no acute distress  Respiratory:?Lungs clear to auscultation bilaterally,?No increased work of breathing  Cardiovascular:?S1-S2, regular rate, regular rhythm,?2+ radial pulses,?No lower extremity edema  Gastrointestinal:?Soft, nontender, nondistended  Musculoskeletal: Appropriate movement of extremities bilaterally  Integumentary: No rashes or breakdown noted  Neurologic: No focal motor defects noted  Psych: Calm, cooperative  Patient Discharge Condition  Stable  Discharge Disposition  Discharge to home with Oncology,Cardiology, ID, and PCP follow-up   Discharge Medication Reconciliation  Prescribed  sulfamethoxazole-trimethoprim (sulfamethoxazole-trimethoprim 800 mg-160 mg oral tablet)?2 tab(s), Oral, TID  Continue  Misc Prescription (Blood Pressure Cuff)?See Instructions  apixaban (Eliquis 5 mg oral tablet)?5 mg, Oral, BID  aspirin (aspirin 81 mg oral Delayed Release (EC) tablet)?81 mg, Oral, Daily  atorvastatin (atorvastatin 40 mg oral tablet)?40 mg, Oral, At Bedtime  bictegravir/emtricitabine/tenofovir (Biktarvy oral tablet)?1 tab(s), Oral, Daily  carvedilol (carvedilol 12.5 mg oral tablet)?12.5 mg, Oral,  BID  cholecalciferol (Vitamin D3 2000 intl units (50 mcg) oral capsule)?2,000 IntUnit, Oral, Daily  furosemide (Lasix 20 mg oral tablet)?60 mg, Oral, Daily  losartan (losartan 25 mg oral tablet)?25 mg, Oral, Daily  spironolactone (spironolactone 25 mg oral tablet)?See Instructions  Discontinue  clopidogrel (Plavix 75 mg oral tablet)?75 mg, Oral, Daily  Education and Orders Provided  Heart Failure  Discharge - 11/30/21 12:22:00 CST, Home, Meds sent to [facility]?  Follow up  [facility]  ????Office will call w/follow up appointment  [facility] Oncology [facility]  ????Office will call w/follow up appointment  [facility] Infectious Disease [facility], on 12/21/2021  ????Office will call w/follow up appointment  Follow-up with PCP in 3 to 5 days  ????Follow-up with PCP in 3 to 5 days

## 2022-05-01 NOTE — HISTORICAL [facility] CERNER
This is a historical note converted from Anjelica. Formatting and pictures may have been removed.  Please reference Anjelica for original formatting and attached multimedia. Admit and Discharge Dates  Admit Date: 02/09/2019  Discharge Date: 02/11/2019  ?  Physicians  Attending Physician - Steven CHAWLA, Keely  Admitting Physician - Steven CHAWLA, Keely  Consulting Physician - Steven CHAWLA, Keely  Primary Care Physician - No PCP, No  ?  Discharge Diagnosis  Bilateral pleural effusion?J90  Community acquired pneumonia?J18.9  HIV disease?B20  SOB - Shortness of breath?[id number]  Surgical Procedures  No procedures recorded for this visit.  Immunizations  No immunizations recorded for this visit.  ?  Admission Information  Patient is a 58-year-old male with past medical history of hypertension, hyperlipidemia, history of atrial fibrillation not anticoagulated due to GI bleeding in 2017, CAD status post stents x2 in the left circumflex in 12/2017, HIV (last CD4 count was 306 in 12/2018) diffuse B-cell lymphoma in the setting of AIDS, substance abuse, CHF EF 20% in 12/2018 presented to the ED with worsening shortness of breath x 2 days, Patient endorse orthopnea, weight gain, lower extremity edema. Also endorsed cough, non productive. Denied fever, chills , chest pain. Denied abdominal pain, diarrhea. Denied black and bloody stools. Reports medication compliance. Last hospitalized pneumonia and CHF exacerbation in 12/2018. ?On presentation to ED, patient was afebrile, /105, pulse 99, 100% on RA, RR 20. Labs significant for BNP> 8000, trop 0.04, WBC 7, H/H 12/37. EKG sinus tach, no ST changes, CT consistent with consolidation and bilateral pleural effusions. Medicine consulted for admission.?  ?  Hospital Course  Pt admitted to medicine service for acute-on-chronic HFrEF and possible CAP. Pt diuresed and symptoms improved. Exacerbation likely secondary to patient not being able to get his  medications. Pt to go home today with LifeVest. Pt to be seen by ID and Cardiology later this week. Pt also to go home with 5 day course of Levaquin for possible CAP. Pt was clinically and hemodynamically stable at discharge.  Significant Findings  CT Thorax W Contrast -?02/09/2019 17:05  FINDINGS  Images were reviewed in soft tissue, lung, and bone windows. The examination is limited by respiratory motion artifact.  PULMONARY ARTERY: No central or segmental filling defect. No evidence of right heart strain.  MEDIASTINUM/ANA PAULA: The mediastinal fat is of unremarkable appearance. Prominent scattered lymph nodes are similar to the prior.  HEART: The cardiac chambers are mildly prominent size. There is reflux of contrast into the upper IVC and right hepatic vein, suggestive of cardiac dysfunction. No pericardial effusion is evident.  CENTRAL AIRWAYS: Widely patent.  LUNGS: There is redemonstrated bronchial wall thickening through the lower lung zones, as well as groundglass attenuation of the right lower lobe. Focal consolidation abutting the posterior pleura likely  is secondary to rounded atelectasis.  PLEURA: Persistent bilateral pleural effusions are noted, notably similar volume and again worse on the right. No pneumothorax is evident.  THYROID: Unremarkable.  ABDOMEN: No acute or new focal upper abdominal abnormality is appreciated. Trace ascites is similar to the prior.  THORACIC WALL: Right chest wall subcutaneous port remains in place and of similar position.  BONY THORAX: No significant interval change appreciated.  IMPRESSION  1. ?No evidence of pulmonary thromboembolism or other new intrathoracic process.  2. ?Redemonstrated moderate bilateral pleural effusions, again greater volume on the right.  3. ?Remaining findings are similar.  ?  ?  XR Chest 2 Views?-?02/09/2019 15:31  Findings  Right lower lobe opacification. Right-sided Mediport remains in place. The cardiomediastinal silhouette is unchanged. No  pneumothorax. No acute osseous abnormality.  Impression  Right lower lobe opacification. Findings concerning for pneumonia. Recommend follow to resolution.  ?  ?  Time Spent on discharge  30 minutes  Objective  Vitals & Measurements  T:?36.9? ?C (Oral)? TMIN:?36.6? ?C (Oral)? TMAX:?37? ?C (Oral)? HR:?89(Monitored)? RR:?26? BP:?112/88? SpO2:?97%?  Physical Exam  Gen: well-nourished, well-developed?59yo AAM?in no acute distress  HEENT: Normocephalic, atraumatic.  Neck: No JVD or carotid bruits. No thyromegaly. No lymphadenopathy.  Heart: RRR, no murmurs, gallops, clicks or rubs.  Lungs: CTAB without rales, wheezes or rhonchi. Normal work of breathing. Chest rise symmetrical on inspiration.  Abd: Soft, non-tender, non-distended and without guarding. No organomegaly. No obvious masses. Bowel sounds present.  Extremities: Radial and pedal pulses 2+ bilaterally, no LE edema.  MSK: No obvious deformities. Moves all extremities purposefully.  Neuro: Responds well to commands.  Skin: Warm, dry and without rashes.  Patient Discharge Condition  Clinically and hemodynamically stable at discharge.  Discharge Disposition  Home.   Discharge Medication Reconciliation  Prescribed  levoFLOXacin (Levaquin 750 mg oral tablet)?750 mg, Oral, q24hr  Continue  ascorbic acid (Vitamin C 500 mg oral tablet)?500 mg, Oral, Daily  aspirin (aspirin 81 mg oral tablet, CHEWABLE)?81 mg, Oral, Daily  aspirin (aspirin 81 mg oral tablet, CHEWABLE)?81 mg, Oral, Daily  atorvastatin (atorvastatin 40 mg oral tablet)?40 mg, Oral, Daily  atorvastatin (atorvastatin 40 mg oral tablet)?40 mg, Oral, Daily  carvedilol (Coreg 3.125 mg oral tablet)?3.125 mg, Oral, BID  carvedilol (Coreg 3.125 mg oral tablet)?3.125 mg, Oral, BID  clopidogrel (Plavix 75 mg oral tablet)?75 mg, Oral, Daily  clopidogrel (Plavix 75 mg oral tablet)?75 mg, Oral, Daily  codeine-guaifenesin (Guaiatussin AC 10 mg-100 mg/5 mL oral syrup)?10 mL, Oral, q6hr, PRN for cough  cyanocobalamin (B-12  500 mcg sublingual tablet)?500 mcg, Oral, Once  docusate-senna (docusate-senna 50 mg-8.6 mg oral tablet)?2 tab(s), Oral, Once a day (at bedtime)  dolutegravir (Tivicay 50 mg oral tablet)?50 mg, Oral, Daily  emtricitabine-tenofovir (Descovy 200 mg-25 mg oral tablet)?1 tab(s), Oral, Daily  ferrous sulfate (ferrous sulfate 300 mg/5 mL oral liquid)?300 mg, Oral, BID  furosemide (Lasix 20 mg oral tablet)?40 mg, Oral, Daily  furosemide (Lasix 20 mg oral tablet)?40 mg, Oral, Daily  hydrOXYzine (HYDROXYZINE STACEY 50 MG CAP)  phenazopyridine (PHENAZOPYRIDINE 100 MG TAB)  spironolactone (spironolactone 25 mg oral tablet)?25 mg, Oral, Daily  spironolactone (spironolactone 25 mg oral tablet)?25 mg, Oral, Daily  ?  Education and Orders Provided  Heart Failure, Easy-to-Read  Life Vest (Custom)  ?  Follow up  Anytime the conditions worsen, return to clinic or go to ED  f/u with PCP and / or IM post-[facility] clinic in 2-3 weeks  [facility] Cardiology [facility], on 02/14/2019  [facility] - Infectious Disease Clinic, on 02/13/2019  ?      Patient seen and examined this am. Reviewed history, physical findings, labs and CT of the chest.? Stable for discharge.? Patient admits to being non-compliant with ID followup.? Will arrange outpatient followup prior to discharge.

## 2022-05-01 NOTE — HISTORICAL [facility] CERNER
This is a historical note converted from Anjelica. Formatting and pictures may have been removed.  Please reference Anjelica for original formatting and attached multimedia. Chief Complaint  Pt presents dry cough yesterday and reports SOB this AM. Was recently treated for Bronchitis. Bilateral leg swelling since monday.  History of Present Illness  ? 58-year-old -American male with significant past medical history of hypertension, hyperlipidemia, history of atrial fibrillation currently in sinus rhythm?not on anticoagulation secondary to?GI bleeding?in 2017,?CAD status post?stents x2?in the left circumflex?in?12/2017,?HIV (last CD4 count was 150 in 5/2018) and?diffuse B-cell lymphoma in the setting of AIDS presented to the ED?with worsening shortness of breath, bilateral?lower extremity edema,?and nonproductive?dry cough for approximately?2 weeks ago. States that he?was seen?in the ED?in [place]?on?12/5 for similar symptoms and diagnosed with bronchitis/pneumonia and?elevated BNP?at greater than?10,000. Given Lasix 60mg daily for 2 days, which didnt provide much relief?for his lower extremity edema, proAir HFA 90mcg/inh 2puff PRN wheezing, and Levaquin 500mg x 10 days.?Patient completed his antibiotic therapy and continues to have?a dry nonproductive cough.?Denies any fever, chills, or respiratory sputum.?His lower bilateral extremity swelling started worsening on Monday morning and is associated with dyspnea on exertion.?Yesterday he was only able to walk approximately 150ft before becoming winded. His baseline is over 500ft. Patient also endorsed PND since Monday as morning but denies orthopnea. Denies any chest pain or palpitations.  ?   Surgical History: Mediport Insertion (2018), PCI s/p stents x2 (2017)  Family History: Mother - HTN, Father - Lung Cancer, Brother - Colon Cancer, Sister - HTN, CVA  Social History: Denies any alcohol abuse. Used Cocaine approximately 1 week ago, 1-2x a month. Smokes  tobacco for the last 30 years, pack per day, states that he quit approximately 2 weeks ago  Review of Systems  Constitutional:?no fever, chills, or generalized fatigue/weakness  Respiratory:?(+) non productive dry cough, no wheezing (+) shortness of breath  Cardiovascular:?no chest pain or?palpitations, no orthopnea, (+) BLE edema, (+) LOVING (+)PND  Gastrointestinal:?no abdominal pain,?nausea, vomiting or diarrhea  Genitourinary:?no dysuria, urinary frequency, urgency, or hematuria  Musculoskeletal:?no muscle or joint pain, no joint swelling  Integumentary:?no skin rash or abnormal lesion  Neurologic: no headache, no dizziness, no focal weakness or numbness  Physical Exam  Vitals & Measurements  T:?36.7? ?C (Oral)? HR:?88(Peripheral)? HR:?88(Monitored)? RR:?16? BP:?126/102? SpO2:?98%? WT:?86.0?kg? WT:?86.0?kg?  General: AAO x3,?well-developed, in no acute distress, laying comfortably in bed  Eye: PERRLA, EOMI, clear conjunctiva, eyelids normal, no scleral icterus  Respiratory:? equal breath sounds B/L, clear to auscultation B/L, (+) decreased breath sounds on the left mid/upper lobe, no wheezing, no rhonchi noted, no crackles noted  Cardiovascular:?RRR without murmurs, gallops or rubs, normal S1/S1, normal peripheral pulses  Gastrointestinal:?non-distended, normal BS, soft, (+) mild RUQ/midepigastric region, without masses to palpation  Musculoskeletal:?normal active and passive ROM without pain, (+) nonpitting lower extremity edema  Integumentary: no rashes or skin lesions present, warm, dry, and normal tone for race  Neurologic: moving all four extremities spontaneously, speech clear and coherent  Assessment/Plan  1. Community Acquired Pneumonia  - Failed Outpatient Therapy, completed Levaquin 500mg x 10 days  - Continues to have a nonproductive dry cough, will order sputum culture anyway  - Initiated Azithromycin 500mg daily, and Rocephin 1g Daily, since already had therapy with fluoroquinolone?therapy  - Received  one dose of Levaquin in the ED  - Ordered CT Thorax W W/O Contrast  - Duonebs as needed?for wheezing  - Ordered blood cultures x2, currently?afebrile and not tachycardic at this time  ?  2. ?Elevated?proBNP,?no history?of heart failure  - most likely in the setting of PNA with extensive history of CAD  - Patient appears?not to be?fluid overloaded at this time, we will not initiate?Lasix  - Received 40 mg IV Lasix once in the ED  - Ordered echocardiogram  - Last Echo on 2/2018 showed Left ventricular ejection fraction estimated at 72%, and aortic valve leaflets without stenosis, trace aortic regurgitation, mild mitral regurgitation, normal ventricular sizes, structurally normal mitral valve  ?  3. Troponinemia  -?In the setting of possible heart strain secondary to PNA and history of CAD s/p stents x2  - No acute chest pain at this time, will continue to monitor  -?Admit to telemetry floor, continuous cardiac monitoring  - Ordered Echocardiogram, Troponin scheduled at 1400  ?  4. CAD s/p stents x2 in 2017  - Asymptomatic at this time, continue to monitor  - Will continue his?aspirin, Plavix, Atorvastatin, Metoprolol  - Slightly elevated troponin on admission, will repeat level on 1400  - Consider outpatient cardiology follow-up  - Admit to telemetry floor, continuous cardiac monitoring  ?  5. History of Atrial Fibrillation  - Sinus Rhythm on admission, continuous cardiac monitoring  - denies any palpitations at this time  - continue cardizem 360mg daily  - admit to telemetry floor  ?  6. Hypertension  - normotensive, 126/102  - continue Cardizem 360mg Daily, and metoprolol 50mg BID  ?  7. HIV Disease  - reports compliance, but per records history of noncompliance  - will order CD4 count and Viral load  - continue descovy 200mg-25mg daily, and Tivicay 50mg daily  ?  8. Diffuse B-cell Lymphoma  - in the setting of AIDs, diagnosed in 2017  - per patient completed his chemotherapy on 5/2018  - still has his mediport  -  oncology following as outpatient  ?  9.? Normocytic Anemia?of unknown?etiology ?  - suspect?anemia of chronic disease?due to his HIV  - Hemoglobin on admission was 10.2 and Hematocrit of 33.3  - ordered Iron Studies, Ferritin  - stable, but will continue to monitor  ?  DVT Prophylaxis: Lovenox  ?  Dispo: Admit to telemetry with continuous cardiac monitoring. Will initiate IV antibiotic therapy for CAP. Trend Troponin. Monitor for fluid overloaded state. Awaiting Echocardiogram.   Problem List/Past Medical History  Ongoing  Atrial fibrillation  CAD - Coronary artery disease  HIV  Hyperlipidemia  Hypertension  Lymphoma  Historical  Atrial fibrillation  HIV  HTN (hypertension)  Hypertension  Kidney stone  Procedure/Surgical History  Catheter Insertion Mediport (None) (03/05/2018)  Biopsy Bone Marrow Aspiration (.) (12/21/2017)  Biopsy Gastrointestinal (12/15/2017)  Colonoscopy (12/15/2017)  Colonoscopy (12/15/2017)  Esophagogastroduodenoscopy (12/15/2017)  Fracture (1981)  cardiac stents placed  left wrist repair   Medications  Inpatient  acetaminophen, 1000 mg= 2 tab(s), Oral, q6hr, PRN  acetaminophen, 650 mg= 2 tab(s), Oral, q6hr, PRN  aspirin 81 mg oral tablet, CHEWABLE, 324 mg= 4 tab(s), Oral, Once-NOW  azithromycin 500 mg/D5W 250 mL IVPB  dexamethasone (for IVPB)  DuoNeb 0.5 mg-2.5 mg/3 mL inhalation solution, 3 mL, NEB, q4hr Resp, PRN  Heparin Flush 100 U/mL - 5 mL, 500 units= 5 mL, IV Push, Once-chemo  Heparin Flush 100 U/mL - 5 mL, 500 units= 5 mL, IV Push, Once-chemo  IVF Normal Saline NS Infusion 1,000 mL, 1000 mL, IV  labetalol, 20 mg= 4 mL, IV Push, q2hr, PRN  Lovenox, 40 mg= 0.4 mL, Subcutaneous, Daily  Phenergan, 12.5 mg= 0.5 mL, IV Push, q4hr, PRN  Rocephin 1 g injection  Zofran, 4 mg= 2 mL, IV Push, q4hr, PRN  Home  aspirin 81 mg oral tablet, CHEWABLE, 81 mg= 1 tab(s), Oral, Daily  atorvastatin 40 mg oral tablet, 40 mg= 1 tab(s), Oral, Daily, 11 refills  Cardizem  mg/24 hours oral CAPsule,  extended release, 360 mg= 1 cap(s), Oral, Daily, 6 refills  Descovy 200 mg-25 mg oral tablet, 1 tab(s), Oral, Daily, 3 refills  docusate-senna 50 mg-8.6 mg oral tablet, 2 tab(s), Oral, Once a day (at bedtime)  Ensure Plus, See Instructions, 3 refills  HYDROXYZINE STACEY 50 MG CAP  KETOROLAC 10 MG TABLET, 10 mg= 1 tab(s), Oral, q6hr  metoprolol tartrate 50 mg oral tab, 50 mg= 1 tab(s), Oral, BID, 11 refills  ONDANSETRON 8 MG TBDP  PHENAZOPYRIDINE 100 MG TAB  Plavix 75 mg oral tablet, 75 mg= 1 tab(s), Oral, Daily, 11 refills  ProAir HFA 90 mcg/inh inhalation aerosol with adapter, 2 puff(s), INH, q4hr, PRN  Tivicay 50 mg oral tablet, 50 mg= 1 tab(s), Oral, Daily, 3 refills  Allergies  ACE inhibitors?(Angioedema)  Social History  Alcohol - Denies Alcohol Use, 2017  Past, Beer, 2017  Employment/School  Unemployed, 2018  Home/Environment  Lives with Siblings. Living situation: Home/Independent. Home equipment: Walker/Cane. Alcohol abuse in household: No. Substance abuse in household: No. Smoker in household: No. Feels unsafe at home: No. Safe place to go: Yes. Family/Friends available for support: Yes. Concern for family members at home: No. Major illness in household: No., 2018  Nutrition/Health  Regular, Sleeping concerns: Yes. Feels highly stressed: No., 2018  Substance Abuse - Denies Substance Abuse, 2017  Current, 1-2 times per week, 2018  Current, Cocaine, 2018  Past, crack, 2018  Tobacco - High Risk, 2015  Former smoker, No, 2018  Current every day smoker Use:. Cigarettes Type:., 2018  Current some day smoker Use:., 2018  Former smoker, Cigarettes, 20 per day., 2017  Family History  : Mother and Father.  Hypertension.: Mother and Sister.  Primary malignant neoplasm of colon: Brother.  Primary malignant neoplasm of lung: Father.  Stroke: Sister.  Immunizations  Vaccine Date Status   pneumococcal 13-valent conjugate vaccine  12/28/2017 Given   influenza virus vaccine, inactivated 11/10/2017 Given   Lab Results  Test Name Test Result Date/Time Comments   Sodium Lvl 141 mmol/L 12/26/2018 07:35 CST    Potassium Lvl 3.7 mmol/L 12/26/2018 07:35 CST    Chloride 109 mmol/L (High) 12/26/2018 07:35 CST    CO2 26 mmol/L 12/26/2018 07:35 CST    Calcium Lvl 8.6 mg/dL 12/26/2018 07:35 CST    Magnesium Lvl 2.4 mg/dL 12/26/2018 07:35 CST    BUN 23 mg/dL (High) 12/26/2018 07:35 CST    Creatinine 1.00 mg/dL 12/26/2018 07:35 CST    Bili Total 0.6 mg/dL 12/26/2018 07:35 CST    Bili Direct 0.2 mg/dL 12/26/2018 07:35 CST    Bili Indirect 0.4 mg/dL 12/26/2018 07:35 CST    AST 18 unit/L 12/26/2018 07:35 CST    ALT 13 unit/L 12/26/2018 07:35 CST    Alk Phos 81 unit/L 12/26/2018 07:35 CST    Total Protein 7.7 gm/dL 12/26/2018 07:35 CST    Albumin Lvl 3.4 gm/dL 12/26/2018 07:35 CST    Globulin 4.30 gm/mL (High) 12/26/2018 07:35 CST    Phosphorus 3.2 mg/dL 12/26/2018 07:35 CST    NT pro BNP. 9620 pg/mL (High) 12/26/2018 07:35 CST    Iron Lvl 25 mcg/dL (Low) 12/26/2018 07:35 CST    Transferrin 282.0 mg/dL 12/26/2018 07:35 CST    TIBC 359 mcg/dL 12/26/2018 07:35 CST    Iron Sat 7.0 % (Low) 12/26/2018 07:35 CST    Ferritin Lvl 60.8 ng/mL 12/26/2018 07:35 CST    Total CK 95 unit/L 12/26/2018 07:35 CST    CK MB 2.0 ng/mL 12/26/2018 07:35 CST    Troponin-I 0.096 ng/mL (High) 12/26/2018 07:35 CST 0.5 ng/mL is the accepted discriminant level for mycardial injury rather than a statistical normal limit for the general population.   TSH 0.881 mIU/L 12/26/2018 07:35 CST    PT 15.0 second(s) (High) 12/26/2018 07:35 CST Low intensity therapy<br/>PT:&nbsp;&nbsp; 17.9-22.2 seconds<br/>INR:&nbsp; 1.5- 2.0<br/><br/>Mod. intensity therapy<br/>PT:&nbsp;&nbsp; 22.2- 30.2 SECONDS<br/>INR:&nbsp;&nbsp; 2.0-3.0<br/><br/>High intensity therapy<br/>PT:&nbsp;&nbsp; 26.3 - 34 SEC<br/>INR:&nbsp;&nbsp; 2.5-3.5   INR 1.19 12/26/2018 07:35 CST    PTT 34.9 second(s) 12/26/2018 07:35 CST Note: new  reference range   WBC 6.4 x10(3)/mcL 12/26/2018 07:35 CST    RBC 3.48 x10(6)/mcL (Low) 12/26/2018 07:35 CST    Hgb 10.2 gm/dL (Low) 12/26/2018 07:35 CST    Hct 33.3 % (Low) 12/26/2018 07:35 CST    Platelet 249 x10(3)/mcL 12/26/2018 07:35 CST    Diagnostic Results  ?  Reason For Exam  Chest Pain  ?  Radiology Report  CHEST X-RAY, PA AND LATERAL VIEWS  ?  HISTORY: Chest Pain  ?  COMPARISON: 12/5/2018.  ?  FINDINGS:  Right chest IJ port tip projects over the SVC.  ?  Patchy right middle and lower lobe airspace opacities.  Otherwise no focal consolidations, pleural effusions or  pneumothoraces.  Enlarged cardiac silhouette without overt decompensation. Tortuous  thoracic aorta.  No acute bony pathology.  Soft tissues within normal limits.  ???  IMPRESSION:  ?  New right middle and lower lobe airspace opacities may be  infectious/inflammatory. Consider follow-up chest radiograph 6-8 weeks  post therapy to document clearing and exclude underlying pathology.  ?  ?  Signature Line  Electronically Signed By: Shannan CHAWLA, Bertha Cabrera  Date/Time Signed: 12/26/2018 08:22      58-year-old male with history of hypertension, hyperlipidemia, atrial fibrillation, CAD status post PCI??2, HIV, and diffuse large B cell lymphoma in the setting of AIDS presented to the ER today complaining of worsening shortness of breath, lower eczema edema and nonproductive cough states he symptoms been going on for about 2 weeks, but no sleep worsened with past several days. ?Presented to ER recently on 12/5/18, where he was diagnosed with bronchitis and was given a supply of Levaquin. ?States that today, he began having subjective fevers and chills, and felt that he was smothering in bed. ?denies any PND, denies orthopnea. ?Is any chest pain or palpitations. ?Denies any diarrhea, abdominal pain. ?So states, he has been compliant with his HIV medications, however has not followed with infectious disease clinic in approximately 6 months.  ?  In ER, he  is satting 98% on room air protecting his airway, did have some decreased breath sounds on the left side, but did not appear to have any evidence of volume overload.  ?  Labs significant for elevated proBNP, troponin of 0.096. ?He is positive for cocaine, and CD4 count today at 306.  Chest x-ray obtained in the ER, revealed a new right middle and lower lobe opacities that may be infectious/inflammatory. ?Also had a CT scan that was performed and revealed moderate right and small left pleural effusions, with some bronchial wall thickening lower lobes.  ?  Assessment/plan:  Community acquired pneumonia  New systolic heart failure, EF 20%-likely ICMO(CAD and cocaine use)  HIV/AIDS  History of diffuse B-cell lymphoma  Severe mitral regurgitation  ?  At this point, covering patient with azithromycin and Rocephin, awaiting blood cultures??2. ?The patient had received 40 mg of Lasix in the ED, and diuresed 1.5 L. ?We have just obtained the results of the echocardiogram late this afternoon, which do reveal the patient doesnt fact have a markedly reduced EF at 20%, which is a marked change as his previous EF in February of this year was 72%. ?At of reasons why we had a marked decrease, including his history of CAD, as well as ischemic cardiomyopathy in nature secondary to patients long history of cocaine abuse. ?We are now stopping IV fluids at this time, and we are getting diuresis with Lasix 40 mg IV twice a day. ?Were also going to initiate patient on losartan as he is allergic to ace inhibitors. ?Needs a cardiology consultation in the a.m.  Continue Cardiac monitoring, as well as replete electrolytes.  ?  ?  ?   Patient also exhibited RUQ tenderness with positive Jain sign on PE. Ordered US Abdomen Limited which showed gallbladder wall diffusely thickened, Pericholecystic fluid?present, and a?positive sonographic Jain sign was reported. Thesefindings are compatible with acute cholecystitis. Will consult surgery in  the AM.  ?   Repeat Echocardiogram showed an EF of 20% likely ischemic in the setting of CAD and chronic cocaine use. Will consult Cardiology in the AM.  ?   I have performed a history and physical examination of the patient and discussed the management with the resident.  ???  [ ] I reviewed the residents note and agree with the documented findings and plan of care.  [ x] I reviewed the residents note and agree with the documented findings and plan of care except:  ?   Reviewed Mr. Campbell labs, diagnostic studies as well as impressive new depressed EF.? Does have what appears to be likely newly diagnosed ischemic cardiomyopathy and presented with amanda volume overload as well as impressive? RUQ abdominal pain from acute cholecystitis.? Agree with planned cardiology consultation as well as need for surgical evaluation of his RUQ as he does have some significant tenderness this morning a   Plan:? d/c CCB, now has become relatively euvolemic, given such optimize beta blocker.? Repeat CXR in AM.? Cardiology consultation.? Surgical Eval.? Tele.? Continue antimicrobials though changing to Zosyn + Quinolone.  ?  MD Ok  ID Staff

## 2022-05-01 NOTE — HISTORICAL [facility] CERNER
This is a historical note converted from [employer]. Formatting and pictures may have been removed.  Please reference [employer] for original formatting and attached multimedia. Chief Complaint  Pt presents dry cough yesterday and reports SOB this AM. Was recently treated for Bronchitis. Bilateral leg swelling since monday.  Reason for Consultation  CHF  History of Present Illness  This is a 58 Y O M patient with past medical history of coronary artery disease s/p PCI to LCX in 12/2017, hypertension, HIV on HAART admitted with new diagnosis of heart failure with reduced ejection fraction. Patient says he developed shortness of breath 3 days that progressively worsened. He denies any limitation to his day to day activities prior to 3 days. denies any chest pain in the recent past.  Review of Systems  Negative except HPI  Physical Exam  Vitals & Measurements  T:?36.6? ?C (Oral)? TMIN:?36.4? ?C (Oral)? TMAX:?36.6? ?C (Oral)? HR:?77(Peripheral)? RR:?16? BP:?138/97? SpO2:?97%? WT:?82.9?kg?  General: Alert, awake, oriented x3, no acute distress  HEENT: PERRL, EOMI  Neck: JVP around 6 cm H2O  Heart: S1 S2 heard, RRR, no R/G/M  Lungs: bilateral air entry positive, clear  Abdomen: soft, bowel sounds heard, non tender  Extremities: All peripheral pulses felt, no edema  Neuro: No focal deficits  Assessment/Plan  CHF exacerbation?I50.9  New diagnosis of HFrEF  Warm and dry now, appears to have diuresed well.  ACC stage C, NYHA functional class IV on admission?and class II now.  Will need ischemic work up, recommend Lexiscan tomorrow  Noted?urine drug screen that is abnormal for cocaine metabolites.?  STOP CARDIZEM, contraindicated in reduced EF  Change metoprolol to carvedilol  Start Lisinopril 5 mg daily and spironolactone 25 mg daily.  Can also benefit from bidil but this can be done as an outpatient.  Likely non ischemic considering his risk factors and substance abuse.  Non-ST elevation MI (NSTEMI)?I21.4  Troponin?elevation likely  secondary to acute heart failure.  No clinical or electrocardiographic evidence for type I MI. OK to perform Lexiscan tomorrow.  If?Lexiscan shows any reversible defects then we will consider coronary angiogram. ?   Problem List/Past Medical History  Ongoing  Acute disease or injury-related malnutrition  Atrial fibrillation  Back pain  CAD - Coronary artery disease  Constipation  constipationAnxiety  HIV  Hyperlipidemia  Hypertension  Lymphoma  Tobacco user  Historical  Atrial fibrillation  HIV  HTN (hypertension)  Hypertension  Kidney stone  Procedure/Surgical History  Catheter Insertion Mediport (None) (03/05/2018)  Biopsy Bone Marrow Aspiration (.) (12/21/2017)  Biopsy Gastrointestinal (12/15/2017)  Colonoscopy (12/15/2017)  Colonoscopy (12/15/2017)  Esophagogastroduodenoscopy (12/15/2017)  Fracture (1981)  cardiac stents placed  left wrist repair   Medications  Inpatient  acetaminophen, 1000 mg= 2 tab(s), Oral, q6hr, PRN  acetaminophen, 650 mg= 2 tab(s), Oral, q6hr, PRN  aspirin 81 mg oral tablet, CHEWABLE, 81 mg= 1 tab(s), Oral, Daily  atorvastatin 20 mg oral tablet, 40 mg= 2 tab(s), Oral, Daily  Cardizem  mg/24 hours oral CAPsule, extended release, 360 mg= 3 cap(s), Oral, Daily  Descovy 200 mg-25 mg oral tablet, 1 tab(s), Oral, Daily  dexamethasone (for IVPB)  docusate-senna 50 mg-8.6 mg oral tablet, 2 tab(s), Oral, Once a day (at bedtime)  DuoNeb 0.5 mg-2.5 mg/3 mL inhalation solution, 3 mL, NEB, q4hr Resp, PRN  ferrous sulfate 300 mg/5 mL oral liquid, 300 mg= 5 mL, Oral, BID  furosemide inj. (IV Push or IM) 10 mg/mL, 40 mg= 4 mL, IV Push, BID  Heparin Flush 100 U/mL - 5 mL, 500 units= 5 mL, IV Push, Once-chemo  Heparin Flush 100 U/mL - 5 mL, 500 units= 5 mL, IV Push, Once-chemo  Influenza Virus Vaccine, Inactivated, 0.5 mL, IM, Daily  labetalol, 20 mg= 4 mL, IV Push, q2hr, PRN  levofloxacin IVPB ( Levaquin ), 750 mg= 150 mL, IV Piggyback, q24hr  losartan 25 mg oral tablet, 25 mg= 0.5 tab(s), Oral,  Daily  Lovenox, 40 mg= 0.4 mL, Subcutaneous, Daily  metoprolol tartrate 50 mg oral tab, 50 mg= 1 tab(s), Oral, BID  Phenergan, 12.5 mg= 0.5 mL, IV Push, q4hr, PRN  Plavix 75 mg oral tablet, 75 mg= 1 tab(s), Oral, Daily  Tivicay 50 mg oral tablet, 50 mg= 1 tab(s), Oral, Daily  Zofran, 4 mg= 2 mL, IV Push, q4hr, PRN  Zosyn, 3.375 gm= 50 mL, IV Piggyback, q8hr  Home  aspirin 81 mg oral tablet, CHEWABLE, 81 mg= 1 tab(s), Oral, Daily  atorvastatin 40 mg oral tablet, 40 mg= 1 tab(s), Oral, Daily, 11 refills  Cardizem  mg/24 hours oral CAPsule, extended release, 360 mg= 1 cap(s), Oral, Daily, 6 refills  Descovy 200 mg-25 mg oral tablet, 1 tab(s), Oral, Daily, 3 refills  docusate-senna 50 mg-8.6 mg oral tablet, 2 tab(s), Oral, Once a day (at bedtime)  Ensure Plus, See Instructions, 3 refills  HYDROCODON APAP 10 325,? ?Not taking  HYDROCODON-APAP 5-325,? ?Not taking  HYDROXYZINE STACEY 50 MG CAP  KETOROLAC 10 MG TABLET, 10 mg= 1 tab(s), Oral, q6hr  Lasix 20 mg oral tablet, 60 mg= 3 tab(s), Oral, Daily,? ?Not taking  LEVOFLOXACIN 500 MG TABS, 500 mg= 1 tab(s), Oral, Daily,? ?Not Taking, Completed Rx  metoprolol tartrate 50 mg oral tab, 50 mg= 1 tab(s), Oral, BID, 11 refills  nitroglycerin 0.4 mg sublingual TAB, 0.4 mg= 1 tab(s), SL, q5min, PRN, 3 refills,? ?Not taking  ONDANSETRON 8 MG TBDP  PHENAZOPYRIDINE 100 MG TAB  Plavix 75 mg oral tablet, 75 mg= 1 tab(s), Oral, Daily, 11 refills  PREDNISONE 20 MG TAB,? ?Not taking  ProAir HFA 90 mcg/inh inhalation aerosol with adapter, 2 puff(s), INH, q4hr, PRN  Tivicay 50 mg oral tablet, 50 mg= 1 tab(s), Oral, Daily, 3 refills  Allergies  ACE inhibitors?(Angioedema)  Social History  Alcohol - Denies Alcohol Use, 07/05/2017  Past, Beer, 11/09/2017  Employment/School  Unemployed, 03/20/2018  Home/Environment  Lives with Siblings. Living situation: Home/Independent. Home equipment: Walker/Cane. Alcohol abuse in household: No. Substance abuse in household: No. Smoker in household: No.  Feels unsafe at home: No. Safe place to go: Yes. Family/Friends available for support: Yes. Concern for family members at home: No. Major illness in household: No., 2018  Nutrition/Health  Regular, Sleeping concerns: Yes. Feels highly stressed: No., 2018  Substance Abuse - Denies Substance Abuse, 2017  Current, 1-2 times per week, 2018  Current, Cocaine, 2018  Past, crack, 2018  Tobacco - High Risk, 2015  Former smoker, No, 2018  Current every day smoker Use:. Cigarettes Type:., 2018  Current some day smoker Use:., 2018  Former smoker, Cigarettes, 20 per day., 2017  Family History  : Mother and Father.  Hypertension.: Mother and Sister.  Primary malignant neoplasm of colon: Brother.  Primary malignant neoplasm of lung: Father.  Stroke: Sister.  Immunizations  Vaccine Date Status   pneumococcal 13-valent conjugate vaccine 2017 Given   influenza virus vaccine, inactivated 11/10/2017 Given       I was present with the resident during the history and exam.  ? ?  [?x ] I discussed the case with the resident and agree with the findings and plan as documented in the resident?s note.  [ ] I discussed the case with the resident and agree with the findings and plan as documented in the resident?s note except: [ ]  ?

## 2022-05-01 NOTE — HISTORICAL [facility] CERNER
This is a historical note converted from Anjel[employer]. Formatting and pictures may have been removed.  Please reference Anjelica for original formatting and attached multimedia. Chief Complaint  Pt c/o shortness of breathe worse with exertion x 3 days. Pt talking freely, skin w/d. ?Hx A-fib, CHF. ?He states out of fluid pills x 2 weeks and HiV meds x 3 weeks.  Reason for Consultation  HIV / Hep B Co-infection  History of Present Illness  Mr Gomez is a 61 yr old BM with past medical history that is significant for HIV / Hepatitis B co-infection (Last CD4 236 with percentage of 11% and VL 20; Hep B VL 4230), HFrEF, CAD with history of GERDA to LCx in 12/19/2017, Atrial fibrillation, HLD, and diffuse large B cell lymphoma who was admitted with c/o SOB x 3 days from presentation.? BNP 3046 on admit.? Pt states ran out of Aldactone and SOB just became progressively worse.? + dry cough.? He has been off ART and Bactrim prophy for the past 8 months; states the pharmacy just stopped delivering the medicine.? ID has been consulted for HIV / Hepatitis B co-infection.? Pt states now feeling a lot better; no longer SOB.? Denies fever, chills, night sweats, rash, HA, vision changes, dizziness, CP/SOB, cough, N/V/D, abdominal pain, or urinary complaints.? Pt lives alone in [place], [place].? 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 No travel / no sick contacts.? Pets - dogs and cats.? Denies ETOH use.? Smokes 1/2 ?ppd.? Admits to crack cocaine use.  Review of Systems  Full 14 point ROS performed. ?All negative, except as mentioned in HPI?  Physical Exam  Vitals & Measurements  T:?36.4? ?C (Oral)? TMIN:?36.4? ?C (Oral)? TMAX:?36.8? ?C (Oral)? HR:?76(Peripheral)? RR:?18? BP:?107/74? SpO2:?95%?  General:?Well-developed well-nourished BM in no acute distress  Eye: PERRL, clear conjunctiva, eyelids normal  HENT:?oropharynx without erythema/exudate, oropharynx and nasal mucosal surfaces moist, no thrush, + poor dentition  Neck: supple, no JVD  noted  Respiratory:?Breathing unlabored. Lungs clear to auscultation bilaterally  Cardiovascular:?regular rate and rhythm without murmurs, gallops or rubs  Gastrointestinal:?soft, non-tender, non-distended with normal bowel sounds, without masses to palpation  Genitourinary: no CVA tenderness to palpation  Musculoskeletal:?full range of motion of all extremities/spine without limitation or discomfort  Integumentary: no rashes or skin lesions present  Neurologic: cranial nerves grossly intact, no signs of peripheral neurological deficit, motor/sensory function intact  Assessment/Plan  1.?HIV disease?B20  Followed by Kiara Qureshi NP at [facility]  3/15/21 CD4 236 (11%) with VL 20; HBV VL 4230  Home Regimen: Biktarvy 1 tab po daily (noncompliance) with Bactrim DS 1 tab po daily for prophy  11/29/21 CD4 138 (8.8%) with VL pending; HBV VL pending  Ordered:  Cryptococcal Antigen,Serum, Now collect, 11/29/21 15:00:00 CST, Blood, Stop date 11/29/21 15:02:00 CST, Lab Collect, Hepatitis B  HIV disease, 11/29/21 15:00:00 CST  US Abdomen Limited, Routine, 11/30/21 7:00:00 CST, Hepatitis, None, Wheelchair, Patient Has IV?, Rad Type, Hepatitis B  HIV disease, Schedule this test, 11/30/21 7:00:00 CST  ?  2.?Chronic hepatitis B?B18.1  ?  3.?CHF exacerbation?I50.9  ?  4.?Shortness of breath?[id number]  ?  Orders:  Hepatitis B Surface Antibody, NOW collect, 11/29/21 15:25:42 CST, BLOOD, Collected, Stop date 11/29/21 15:25:00 CST, Lab Collect  Hepatitis B Surface Antigen, NOW collect, 11/29/21 15:25:42 CST, BLOOD, Collected, Stop date 11/29/21 15:25:00 CST, Lab Collect  5. History of lymphoma (followed by Dr Rodriges)  ?  ?  HIV / Hep B co-infection with medication noncompliance.? Afebrile.? No leukocytosis.? Repeat CD4 138 (8.8%) with VL pending; HBV VL pending.? ID has also ordered cryptococcal Ag and Hep B studies (Hep Be Ag, Hep Be Ab, Hep B VL and limited abdominal ultrasound in AM).?  ?   Pt  reporting clinical improvement.? Medication compliance strongly stressed with pt.? Would increase Bactrim DS to 2 tabs po TID.??Noted PJP PCR and Fungitell are pending.? If cryptococcal Ag returns negative, then will likely restart ART.? Primary team / cardiology addressing CHF / SOB issues.  ?   Will continue to follow   Problem List/Past Medical History  Ongoing  Atrial fibrillation  Back pain  CAD - Coronary artery disease  Constipation  constipationAnxiety  HIV disease  Hyperlipidemia  Hypertension  Lymphoma  Tobacco user  Historical  Acute disease or injury-related malnutrition  Atrial fibrillation  HIV  HTN (hypertension)  Hypertension  Kidney stone  Lymphoma  Procedure/Surgical History  Excision of Left Lower Arm Subcutaneous Tissue and Fascia, Open Approach, Diagnostic (12/17/2019)  Punch biopsy of skin (including simple closure, when performed); single lesion (12/17/2019)  Drainage of Right Pleural Cavity, Percutaneous Approach (04/02/2019)  Thoracentesis, needle or catheter, aspiration of the pleural space; without imaging guidance (04/02/2019)  Drainage of Spinal Canal, Percutaneous Approach (04/24/2018)  Spinal puncture, therapeutic, for drainage of cerebrospinal fluid (by needle or catheter) (04/24/2018)  Catheter Insertion Mediport (None) (03/05/2018)  Insertion of Infusion Device into Superior Vena Cava, Percutaneous Approach (03/05/2018)  Insertion of tunneled centrally inserted central venous access device, with subcutaneous port; age 5 years or older (03/05/2018)  Transfusion of Nonautologous Red Blood Cells into Peripheral Vein, Percutaneous Approach (01/20/2018)  Transfusion of Nonautologous Red Blood Cells into Peripheral Vein, Percutaneous Approach (01/17/2018)  Transfusion of Nonautologous Red Blood Cells into Peripheral Vein, Percutaneous Approach (01/14/2018)  Insertion of Infusion Device into Superior Vena Cava, Percutaneous Approach (01/10/2018)  Ultrasonography of Superior Vena Cava,  Guidance (01/10/2018)  Biopsy Bone Marrow Aspiration (.) (12/21/2017)  Extraction of Iliac Bone Marrow, Percutaneous Approach, Diagnostic (12/21/2017)  Dilation of Coronary Artery, One Artery with Two Drug-eluting Intraluminal Devices, Percutaneous Approach (12/18/2017)  Drainage of Right Pleural Cavity, Percutaneous Approach (12/18/2017)  Extirpation of Matter from Coronary Artery, One Artery, Percutaneous Approach (12/18/2017)  Biopsy Gastrointestinal (12/15/2017)  Colonoscopy (12/15/2017)  Colonoscopy (12/15/2017)  Esophagogastroduodenoscopy (12/15/2017)  Excision of Duodenum, Via Natural or Artificial Opening Endoscopic, Diagnostic (12/15/2017)  Excision of Stomach, Pylorus, Via Natural or Artificial Opening Endoscopic, Diagnostic (12/15/2017)  Inspection of Lower Intestinal Tract, Via Natural or Artificial Opening Endoscopic (12/15/2017)  Fluoroscopy of Left Heart using Low Osmolar Contrast (12/13/2017)  Fluoroscopy of Multiple Coronary Arteries using Low Osmolar Contrast (12/13/2017)  Measurement of Cardiac Sampling and Pressure, Left Heart, Percutaneous Approach (12/13/2017)  Restoration of Cardiac Rhythm, Single (12/13/2017)  Transfusion of Nonautologous Red Blood Cells into Peripheral Vein, Percutaneous Approach (12/13/2017)  Fracture (1981)  cardiac stents placed  left wrist repair   Medications  Inpatient  apixaban, 5 mg= 2 tab(s), Oral, BID  aspirin 81 mg oral tablet, CHEWABLE, 324 mg= 4 tab(s), Oral, Once  aspirin 81 mg oral tablet, CHEWABLE, 81 mg= 1 tab(s), Oral, Daily  atorvastatin, 40 mg= 2 tab(s), Oral, Daily  azithromycin (Zithromax) for IVPB  Bactrim DS Tab. 800 mg - 160 mg, 1 tab(s), Oral, TID  Coreg 12.5 mg oral tablet, 12.5 mg= 1 tab(s), Oral, BID  dexamethasone (for IVPB)  furosemide 20 mg oral tablet, 40 mg= 2 tab(s), Oral, Daily  Heparin Flush 100 U/mL - 5 mL, 500 units= 5 mL, IV Push, Once-chemo  Heparin Flush 100 U/mL - 5 mL, 500 units= 5 mL, IV Push, Once-chemo  potassium chloride (KCl)  ORAL liquid, 40 mEq= 2 packet(s), Oral, With Breakfast  Rocephin (for IVPB)  spironolactone, 25 mg= 1 tab(s), Oral, Daily  Home  aspirin 81 mg oral Delayed Release (EC) tablet, 81 mg= 1 tab(s), Oral, Daily, 5 refills  atorvastatin 40 mg oral tablet, 40 mg= 1 tab(s), Oral, At Bedtime, 6 refills  Biktarvy oral tablet, 1 tab(s), Oral, Daily, 3 refills,? ?Not taking: out of med  Blood Pressure Cuff, See Instructions,? ?Not Taking, Completed Rx: supplies Last Dose Date/Time Unknown  carvedilol 12.5 mg oral tablet, 12.5 mg= 1 tab(s), Oral, BID, 6 refills  Eliquis 5 mg oral tablet, 5 mg= 1 tab(s), Oral, BID, 11 refills  Lasix 20 mg oral tablet, 60 mg= 3 tab(s), Oral, Daily, 6 refills,? ?Not taking: out of med  losartan 25 mg oral tablet, 25 mg= 1 tab(s), Oral, Daily, 5 refills  Plavix 75 mg oral tablet, 75 mg= 1 tab(s), Oral, Daily, 11 refills  spironolactone 25 mg oral tablet, See Instructions  Vitamin D3 2000 intl units (50 mcg) oral capsule, 2000 IntUnit= 1 cap(s), Oral, Daily, 3 refills  Allergies  ACE inhibitors?(Angioedema)  Nitroglycerin Transdermal System?(Itching)  Social History  Abuse/Neglect  No, No, Yes, 11/27/2021  Alcohol - Denies Alcohol Use, 04/18/2013  Past, Beer, 04/29/2021  Employment/School  Disabled, Highest education level: High school., 09/09/2020  Exercise  Exercise duration: 30. Exercise frequency: 3-4 times/week. Exercise type: Walking., 01/28/2021  Home/Environment  Lives with Alone. Living situation: Home/Independent., 09/09/2020  Nutrition/Health  Regular, Good, 01/28/2021  Sexual  Gender Identity Identifies as male., 05/23/2019  Spiritual/Cultural  Denominational, 09/09/2020  Substance Use - Denies Substance Abuse, 04/18/2013  Current, Cocaine, 11/27/2021  Never, 04/29/2021  Tobacco - High Risk, 09/27/2015  10 or more cigarettes (1/2 pack or more)/day in last 30 days, Cigarettes, No, 43 year(s). 19 Years (Age started)., 11/27/2021  Never (less than 100 in lifetime), No, 11/27/2021  Family  History  : Mother and Father.  Hypertension.: Mother and Sister.  Primary malignant neoplasm of colon: Brother.  Primary malignant neoplasm of lung: Father.  Stroke: Sister.  Immunizations  Vaccine Date Status Comments   influenza virus vaccine, inactivated 2020 Given    influenza virus vaccine, inactivated 2019 Given    influenza virus vaccine, inactivated 2018 Given Patient Not Available/Off Unit   pneumococcal 13-valent conjugate vaccine 2017 Given    influenza virus vaccine, inactivated 11/10/2017 Given    Lab Results  Test Name Test Result Date/Time Comments   Sodium Lvl 136 mmol/L 2021 17:59 CST    Potassium Lvl 4.8 mmol/L 2021 17:59 CST    Chloride 105 mmol/L 2021 17:59 CST    CO2 24 mmol/L 2021 17:59 CST    Calcium Lvl 9.8 mg/dL 2021 17:59 CST    Magnesium Lvl 2.40 mg/dL 2021 17:59 CST    Glucose Lvl 104 mg/dL 2021 17:59 CST    BUN 16.2 mg/dL 2021 17:59 CST    Creatinine 1.12 mg/dL 2021 17:59 CST    eGFR-AA 86 (Low) 2021 17:59 CST    Bili Total 0.5 mg/dL 2021 03:47 CST    AST 23 unit/L 2021 03:47 CST    ALT 14 unit/L 2021 03:47 CST    Alk Phos 63 unit/L 2021 03:47 CST    Albumin Lvl 3.0 gm/dL (Low) 2021 03:47 CST    WBC 6.1 x10(3)/mcL 2021 04:02 CST    Hgb 14.9 gm/dL 2021 04:02 CST    Hct 45.8 % 2021 04:02 CST    Platelet 218 x10(3)/mcL 2021 04:02 CST    G6PD Qnt- unit/10(12) RBC 2020 13:45 CDT ? ? ? ? ? ? ?**Please note reference interval change**  ?  When decreased, G-6-PD, Quant. values are associated with  acute hemolytic anemia when deficient individuals are  exposed to oxidative stress, such as with certain  medications (e.g., primaquine), infection, or ingestion of  christine beans. Caution: In patients with acute hemolysis  (e.g., abnormally low RBC values), testing for G-6-PD may  be falsely normal because older erythrocytes with a higher  enzyme  deficiency have been hemolyzed. Young erythrocytes  and reticulocytes have normal or near-normal enzyme  activity. Normal values of G-6-PD may be measured for  several weeks following a hemolytic event.  Performed At:  [facility] [address] [place], [place] [id number]  Oneil Grayson MD Ph:[phone]  Performed At: _0 [facility]  [address] [id number]  Griffin Jeff MD Ph:[phone]   CD4 Pct 8.8 % (Low) 11/27/2021 16:09 CST This test was developed and its performance characteristics determined by [facility]. ?It has not been approved or cleared by the US Food and Drug  Administration. ?The FDA does not require this test to go through premarket FDA review. ?This test is used for clinical purposes. It should not be regarded as investigational or for research. ?This laboratory is certified under the Clinical Laboratory Improvement Amendments (CLIA) as qualified to perform high complexity clinical laboratory testing.   CD4 Pct 11.5 % (Low) 03/15/2021 12:07 CDT    CD4 Absolute 138 unit/L (Low) 11/27/2021 16:09 CST    CD4 Absolute 236 unit/L (Low) 03/15/2021 12:07 CDT    Hep A IgG Nonreactive 11/18/2020 13:50 CST    Hep B Core IgM Reactive (Abnormal) 09/17/2020 13:45 CDT    Hep B Core Ab Reactive (Abnormal) 09/17/2020 13:45 CDT    Hep Bs Ab Nonreactive 09/17/2020 13:45 CDT    Hep Bs Ag Reactive (Abnormal) 09/17/2020 13:45 CDT    Hbsag Confirmation Confirmed Pos (Abnormal) 09/17/2020 13:45 CDT    Hep C Ab Nonreactive 09/17/2020 13:45 CDT Interpretive Information (HCV):  ? ? ? ? ? ? ? ?Nonreactive: ? ?Antibodies to HCV not detected.  ? ? ? ? ? ? ? ?Reactive: ? ? ? ?A reactive result should be followed by nucleic acid testing for  ? ? ? ? ? ? ? ? ?HCV RNA for identifying current hepatitis C virus(HCV) infection.  ? ? ? ? ? ? ? ?Equivocal: ? ? ?Another specimen should be obtained from patient for further testing.   HDV Ab-[facility] Negative 11/18/2020 13:50 CST No  antibody to Hepatitis Delta agent was detected. Order  anti-HDV testing only when Hepatitis B virus infection has  been confirmed.  INTERPRETIVE INFORMATION: Hepatitis Delta Ab  Test developed and characteristics determined by [facility]. See Compliance Statement D: Fannect/CS  Performed by [facility], ? ? ? ? ? ? ? ? ? ? ? ? ? ?  [address] [facility] [place] [address] [phone] ? ? ? ? ? ? ? ? ?  [email or web address], Stefanie Ray MD - Lab. Director   HIV-1 RNA by PCR-LC <20 cpy/mL 12/13/2020 14:49 CST HIV-1 RNA not detected  ?  The reportable range for this assay is 20 to 10,000,000  copies HIV-1 RNA/mL.   HBV IU/mL-LC 4230 IU/mL 03/15/2021 12:07 CDT    HBV log10 IU/mL-LC 3.626 LogIU/mL 03/15/2021 12:07 CDT    Cryptococcus Ag-LC Negative 09/17/2020 13:45 CDT Performed At: [address] [id number]  Oneil Grayson MD Ph:[phone]   HIV1 RNA PCR-LC 20 cpy/mL 03/15/2021 12:07 CDT ?  The reportable range for this assay is 20 to 10,000,000  copies HIV-1 RNA/mL.   log10 HIV-1 RNA -LC 1.301 LogCopies/mL 03/15/2021 12:07 CDT    COVID-19 Rapid NEGATIVE 11/27/2021 09:58 CST ID NOW is an automated ?assay that utilizes ?isothermal nucleic acid amplification technology intended for the qualitative detection SARS-Co-@ viral nucleic acids.   Diagnostic Results  (11/27/2021 08:47 CST XR Chest 1 View)  History:  Chest pain  ?  Comparison:  13 December 2020  ?  Findings:  Portable frontal view of the chest was obtained. Stable right-sided  Port-A-Cath. Similar cardiomegaly. Mild interstitial prominence. No  dense consolidation or pneumothorax.  ?  Impression:?  Mild interstitial prominence, question some pulmonary vascular  congestion or infection.  ?   (11/27/2021 18:37 CST CT Angio Chest PE W Contrast)  INDICATION: ? Other (please specify)  COMPARISON:?  Chest radiograph earlier the same day.  Chest/abdominal pelvic CT 11/16/2020.  ?  TECHNIQUE:?  CT scan of the chest ?WITH  intravenous contrast, using PE protocol.?  The chest was scanned utilizing a multidetector helical scanner from  the lung apex through the level of the adrenal glands after  administration of intravenous contrast. Thin section reconstructions  were obtained with special concentration on the pulmonary arteries.  Coronal, sagittal and MIP reformations were obtained.  Automatic exposure control was utilized to limit radiation dose.  ?  Radiation Dose:?  Total DLP: ?1187 mGy*cm  ?  DISCUSSION:?  ?  Lines and Tubes: Port-A-Cath is seen in the right chest wall with its  tip in the superior vena cava.  Mediastinum: Within normal limits.  Heart: Moderate coronary artery calcification is seen. Mild  cardiomegaly is seen.  Aorta: Mild aortic calcification is seen in the thoracic aorta. There  appears to be aberrant right subclavian artery which is markedly  hypoplastic (series 5 image 79).  Pulmonary Arteries: No filling defects are seen in the pulmonary  arteries to suggest pulmonary embolus.  Lungs: Mild emphysematous changes are seen in the lungs bilaterally.  There is a 6 mm subpleural soft tissue density nodule in the right  upper lobe (series 3 image 68). There also appears to be some septal  thickening in some portions of the lungs with some dense opacity at  the lung bases which likely reflects atelectasis related to the  effusions but with the possibility of an infectious component not  excluded with the septal thickening possibly reflecting an element of  congestive failure.  Bony Structures:?  Spine: Mild spondylolytic changes are seen in the thoracic spine.  Abdomen: There are a few scattered tiny hypodensities in the liver  which are too small to characterize. A tiny gallbladder stone is seen  in the otherwise unremarkable appearing gallbladder (series 5 image  326). A punctate nonobstructing left renal stone is seen (series 5  image 349).  ?  IMPRESSION:  ?  1. No pulmonary embolus.  ?  2. Mild emphysematous  changes are seen in the lungs bilaterally. There  is a 6 mm subpleural soft tissue density nodule in the right upper  lobe (series 3 image 68). There also appears to be some septal  thickening in some portions of the lungs with some dense opacity at  the lung bases which likely reflects atelectasis related to the  effusions but with the possibility of an infectious component not  excluded with the septal thickening possibly reflecting an element of  congestive failure.     [1] CT Angio Chest PE W Contrast; Shannan CHAWLA, Bertha Cabrera 11/27/2021 18:37 CST   Patient seen and examined with NP. ?Agree with documented physical exam. ?Treatment plan reviewed and discussed with nurse practitioner and is reasonable and appropriate.??  ?  HIV/HBV coinfection though noncompliant with ART for the last 8-9 months.?  Infiltrates likely volume related however agree with PJP treatment (though will increase dose to 15-20mg/kg/day based on tmp component which will be 2 DS tabs TID minimum) and ensure fungitell sent as well as PJP pcr if able to produce sputum  Sending cryptococcal antigen as well, if negative, will restart Biktarvy  Echo  ?  Will send HIV/HBV studies and order US liver for AM for HCC screening  ?  Ok CHAWLA  ID Staff

## 2022-05-01 NOTE — HISTORICAL [facility] CERNER
This is a historical note converted from Anjelica. Formatting and pictures may have been removed.  Please reference Anjelica for original formatting and attached multimedia. Chief Complaint  sob onset 3 days ago, reports worse on tonight. also c/o lle pain onset after ambulance picked him up.  Reason for Consultation  HIV disease  History of Present Illness  Mr Gomez is a 59 yr old HIV positive BM who presented to the ER for c/o SOB worsening over 3 days with occasional dry, nonproductive cough.? Pt has been off ART (Descovy and Tivicay) for about 1?month.? He has not followed up in ID clinic since last year; he is followed by Dr. AKOSUA Hopkins.? Last CD4 306 and was virally suppressed on 12/26/18.? Pt admits to crack cocaine use and smokes 1/2 ppd cigarettes.? He has noticed a rash over past couple months.? Otherwise no other complaints.? Pt looks good overall.? No sick contacts reported.? Only pets are dogs/cats.? No travel.? ID has been consulted for HIV management.  Review of Systems  Constitutional: no fever, fatigue, weakness  Eye: no vision loss, eye redness, drainage, or pain, no yellowing of the eyes  ENMT: no sore throat, ear pain, sinus pain/congestion, nasal congestion/drainage  Respiratory:?occasional dry?cough, no wheezing,?+ shortness of breath  Cardiovascular: no chest pain, no palpitations, no edema  Gastrointestinal: no nausea, vomiting, or diarrhea. No abdominal pain, no christianne colored stools  Genitourinary: no dysuria, no urinary frequency or urgency, no hematuria  Hema/Lymph: no abnormal bruising or bleeding  Endocrine: no heat or cold intolerance, no excessive thirst or excessive urination  Musculoskeletal: no muscle or joint pain, no joint swelling  Integumentary: + skin rash, no abnormal lesion, no jaundice  Neurologic: no headache, no dizziness, no weakness or numbness?  Physical Exam  Vitals & Measurements  T:?36.7? ?C (Oral)? HR:?88(Monitored)? RR:?17? BP:?118/96? SpO2:?94%? WT:?88.9?kg?  BMI:?27.44?  General:?Well-developed well-nourished BM?in no acute distress  Eye: PERRLA, EOMI, clear conjunctiva, eyelids normal  HENT:?oropharynx without erythema/exudate, oropharynx and nasal mucosal surfaces moist, no maxillary/frontal sinus tenderness to palpation, no thrush  Neck: full range of motion, no thyromegaly or lymphadenopathy  Respiratory:?Breathing unlabored. Rhonchi to auscultation bilaterally  Cardiovascular:?regular rate and rhythm without murmurs, gallops or rubs  Gastrointestinal:?soft, non-tender, non-distended with normal bowel sounds, without masses to palpation  Genitourinary: no CVA tenderness to palpation  Musculoskeletal:?full range of motion of all extremities/spine without limitation or discomfort  Integumentary: cystic area to back, rash to trunk and extremities dry and circular dark in nature  Neurologic: cranial nerves grossly intact  Assessment/Plan  1.?HIV disease?B20,?HIV disease?B20  Followed by Dr AKOSUA Hopkins at [facility].? Pt with no follow up since 12/2018.  12/26/18 Last CD4 306 and was virally suppressed  Noncompliant with ART (Descovy and Tivicay)  Recommendations: Pt will need?the following tests: CD4 with HIV VL, genosure prime, PhenoSense integrase, urine Histo Ag, T Spot, Respiratory PCR per nasal swab, Fungitell, CT chest, and echo.? Would start pt on Rocephin 2 grams IV daily, along with Doxycycline 100 mg po BID.? Will continue to follow.  Ordered:  RPR, Now collect, 12/17/19 10:00:00 CST, Blood, Collected, Stop date 12/17/19 10:00:00 CST, Lab Collect, HIV disease, 12/17/19 9:50:00 CST  ?  2.?Rash, skin?R21  Punch biopsy obtained and sent for analysis  ?  3.?SOB - Shortness of breath?[id number]  12/17/19 CXR 1 view - Changes suggestive of pulmonary vascular congestion and cardiac compensation.??Cardiomegaly.??Bilateral pleural effusions.??Increased left retrocardiac density with silhouetting of the left hemidiaphragm as  above  Improved  Elevated proBNP noted  Managed per primary  Needs CT chest  ?  4.?Cocaine abuse?F14.10  Positive UDS noted  Avoid drugs and ETOH reinforced.  ?   Problem List/Past Medical History  Ongoing  Acute disease or injury-related malnutrition  Atrial fibrillation  Back pain  CAD - Coronary artery disease  Constipation  constipationAnxiety  HIV  HIV disease  Hyperlipidemia  Hypertension  Lymphoma  Tobacco user  Historical  Atrial fibrillation  HIV  HTN (hypertension)  Hypertension  Kidney stone  Procedure/Surgical History  Drainage of Right Pleural Cavity, Percutaneous Approach (04/02/2019)  Thoracentesis, needle or catheter, aspiration of the pleural space; without imaging guidance (04/02/2019)  Drainage of Spinal Canal, Percutaneous Approach (04/24/2018)  Spinal puncture, therapeutic, for drainage of cerebrospinal fluid (by needle or catheter) (04/24/2018)  Catheter Insertion Mediport (None) (03/05/2018)  Insertion of Infusion Device into Superior Vena Cava, Percutaneous Approach (03/05/2018)  Insertion of tunneled centrally inserted central venous access device, with subcutaneous port; age 5 years or older (03/05/2018)  Transfusion of Nonautologous Red Blood Cells into Peripheral Vein, Percutaneous Approach (01/20/2018)  Transfusion of Nonautologous Red Blood Cells into Peripheral Vein, Percutaneous Approach (01/17/2018)  Transfusion of Nonautologous Red Blood Cells into Peripheral Vein, Percutaneous Approach (01/14/2018)  Insertion of Infusion Device into Superior Vena Cava, Percutaneous Approach (01/10/2018)  Ultrasonography of Superior Vena Cava, Guidance (01/10/2018)  Biopsy Bone Marrow Aspiration (.) (12/21/2017)  Extraction of Iliac Bone Marrow, Percutaneous Approach, Diagnostic (12/21/2017)  Dilation of Coronary Artery, One Artery with Two Drug-eluting Intraluminal Devices, Percutaneous Approach (12/18/2017)  Drainage of Right Pleural Cavity, Percutaneous Approach (12/18/2017)  Extirpation of  Matter from Coronary Artery, One Artery, Percutaneous Approach (12/18/2017)  Biopsy Gastrointestinal (12/15/2017)  Colonoscopy (12/15/2017)  Colonoscopy (12/15/2017)  Esophagogastroduodenoscopy (12/15/2017)  Excision of Duodenum, Via Natural or Artificial Opening Endoscopic, Diagnostic (12/15/2017)  Excision of Stomach, Pylorus, Via Natural or Artificial Opening Endoscopic, Diagnostic (12/15/2017)  Inspection of Lower Intestinal Tract, Via Natural or Artificial Opening Endoscopic (12/15/2017)  Fluoroscopy of Left Heart using Low Osmolar Contrast (12/13/2017)  Fluoroscopy of Multiple Coronary Arteries using Low Osmolar Contrast (12/13/2017)  Measurement of Cardiac Sampling and Pressure, Left Heart, Percutaneous Approach (12/13/2017)  Restoration of Cardiac Rhythm, Single (12/13/2017)  Transfusion of Nonautologous Red Blood Cells into Peripheral Vein, Percutaneous Approach (12/13/2017)  Fracture (1981)  cardiac stents placed  left wrist repair   Medications  Inpatient  acetaminophen, 1000 mg= 2 tab(s), Oral, q6hr, PRN  aspirin 81 mg oral tablet, CHEWABLE, 324 mg= 4 tab(s), Oral, Once  atorvastatin 20 mg oral tablet, 40 mg= 2 tab(s), Oral, At Bedtime  dexamethasone (for IVPB)  docusate-senna 50 mg-8.6 mg oral tablet, 2 tab(s), Oral, Once a day (at bedtime)  doxycycline 100 mg intravenous injection  DuoNeb, 3 mL, NEB, As Directed, PRN  Eliquis, 5 mg= 2 tab(s), Oral, BID  Heparin Flush 100 U/mL - 5 mL, 500 units= 5 mL, IV Push, Once-chemo  Heparin Flush 100 U/mL - 5 mL, 500 units= 5 mL, IV Push, Once-chemo  Influenza Virus Vaccine, Inactivated, 0.5 mL, IM, Daily  lidocaine 1% injectable solution, 20 mL, EPI, As Directed  Plavix 75 mg oral tablet, 75 mg= 1 tab(s), Oral, Daily  Rocephin 2 g injection  spironolactone 25 mg oral tablet, 25 mg= 1 tab(s), Oral, Daily  Vitamin C 500 mg oral tablet, 500 mg= 1 tab(s), Oral, Daily  Home  alfuzosin 10 mg oral tablet, extended release, 10 mg= 1 tab(s), Oral, Daily,? ?Not Taking,  Completed Rx  amlodipine 5 mg oral tablet, See Instructions, 1 refills,? ?Not taking  atorvastatin 40 mg oral tablet, 40 mg= 1 tab(s), Oral, At Bedtime,? ?Not taking  carvedilol 12.5 mg oral tablet, See Instructions, 3 refills,? ?Not taking  Descovy 200 mg-25 mg oral tablet, 1 tab(s), Oral, Daily  docusate-senna 50 mg-8.6 mg oral tablet, 2 tab(s), Oral, Once a day (at bedtime),? ?Not taking  Eliquis 5 mg oral tablet, 5 mg= 1 tab(s), Oral, BID, 11 refills,? ?Not taking  ferrous sulfate 220 mg/5 mL (44 mg elemental iron) oral elixir,? ?Not taking  Lasix 20 mg oral tablet, 60 mg= 3 tab(s), Oral, Daily, 3 refills,? ?Not taking  Plavix 75 mg oral tablet, 75 mg= 1 tab(s), Oral, Daily, 11 refills  prednisONE 20 mg oral tablet, 20 mg= 1 tab(s), Oral, BID,? ?Not taking  spironolactone 25 mg oral tablet, 25 mg= 1 tab(s), Oral, Daily, 3 refills  Tivicay 50 mg oral tablet, 50 mg= 1 tab(s), Oral, Daily  Vitamin C 500 mg oral tablet, 500 mg= 1 tab(s), Oral, Daily  Allergies  ACE inhibitors?(Angioedema)  Nitroglycerin Transdermal System?(Itching)  Social History  Abuse/Neglect  No, 12/17/2019  No, No, 12/16/2019  No, 07/05/2019  Alcohol - Denies Alcohol Use, 04/18/2013  Past, 12/16/2019  Past, 04/21/2019  Past, Beer, 03/20/2019  Employment/School  Unemployed, 03/20/2018  Home/Environment  Lives with Alone. Living situation: Home/Independent. Home equipment: Walker/Cane. Alcohol abuse in household: No. Substance abuse in household: No. Smoker in household: No. Feels unsafe at home: No. Safe place to go: Yes. Family/Friends available for support: Yes. Concern for family members at home: No. Major illness in household: No., 03/20/2019  Nutrition/Health  Regular, Sleeping concerns: Yes. Feels highly stressed: No., 03/20/2018  Sexual  Gender Identity Identifies as male., 05/23/2019  Substance Use - Denies Substance Abuse, 04/18/2013  Current, Cocaine, 1-2 times per month, 12/16/2019  Cocaine, 1-2 times per month, 07/05/2019  Current,  Cocaine, 1-2 times per month, 2019  Past, Cocaine, 2019  Tobacco - High Risk, 2015  4 or less cigarettes(less than 1/4 pack)/day in last 30 days, No, 2019  Former smoker, quit more than 30 days ago, N/A, 2019  4 or less cigarettes(less than 1/4 pack)/day in last 30 days, Cigarettes, No, 2019  4 or less cigarettes(less than 1/4 pack)/day in last 30 days, Cigarettes, No, 2019  Family History  : Mother and Father.  Hypertension.: Mother and Sister.  Primary malignant neoplasm of colon: Brother.  Primary malignant neoplasm of lung: Father.  Stroke: Sister.  Immunizations  Vaccine Date Status Comments   influenza virus vaccine, inactivated 2019 Given    influenza virus vaccine, inactivated 2018 Given Patient Not Available/Off Unit   pneumococcal 13-valent conjugate vaccine 2017 Given    influenza virus vaccine, inactivated 11/10/2017 Given    Lab Results  Test Name Test Result Date/Time Comments   BUN 19 mg/dL (High) 2019 02:10 CST    Creatinine 1.00 mg/dL 2019 02:10 CST    eGFR-AA 99 mL/min 2019 02:10 CST    Bili Total 0.7 mg/dL 2019 02:10 CST    AST 41 unit/L (High) 2019 02:10 CST    ALT 23 unit/L 2019 02:10 CST    Alk Phos 102 unit/L 2019 02:10 CST    Albumin Lvl 2.8 gm/dL (Low) 2019 02:10 CST    NT pro BNP. 49396 pg/mL (High) 2019 02:10 CST    Total CK 78 unit/L 2019 08:49 CST    CK MB 1.5 ng/mL 2019 08:49 CST    Troponin-I 0.050 ng/mL 2019 08:49 CST 0.5 ng/mL is the accepted discriminant level for mycardial injury rather than a statistical normal limit for the general population.   Troponin-I 0.054 ng/mL (High) 2019 02:10 CST 0.5 ng/mL is the accepted discriminant level for mycardial injury rather than a statistical normal limit for the general population.   PT 15.2 second(s) (High) 2019 06:53 CST Low intensity therapy  PT:?? 17.9-22.2 seconds  INR:? 1.5-  2.0  ?  Mod. intensity therapy  PT:?? 22.2- 30.2 SECONDS  INR:?? 2.0-3.0  ?  High intensity therapy  PT:?? 26.3 - 34 SEC  INR:?? 2.5-3.5   INR 1.21 (High) 12/17/2019 06:53 CST    WBC 5.7 x10(3)/mcL 12/17/2019 02:10 CST    Hgb 11.4 gm/dL (Low) 12/17/2019 02:10 CST    Hct 36.4 % (Low) 12/17/2019 02:10 CST    Platelet 165 x10(3)/mcL 12/17/2019 02:10 CST    U Cocaine Scr POS (Abnormal) 12/17/2019 03:00 CST Cut off concentration: ?Cocaine - 300 ng/ml  ?  Results are reported as unconfirmed.  Unconfirmed screening results are to be used only for medical purposes (treatment).  Confirmatory testing is available upon request.   CD4 Pct 26.6 % (Low) 12/26/2018 07:35 CST    CD4 Absolute 306 unit/L (Low) 12/26/2018 07:35 CST    HIV-1 RNA by PCR-LC <20 cpy/mL 12/26/2018 13:53 CST HIV-1 RNA detected  ?  The reportable range for this assay is 20 to 10,000,000  copies HIV-1 RNA/mL.   Diagnostic Results  (12/17/2019 02:19 CST XR Chest 1 View)  one view of the chest  ?  CPT 34009  ?  HISTORY:  Dyspnea  ?  FINDINGS:  Examination reveals mediastinal silhouette to be within normal limits  cardiac silhouette is mildly enlarged there is increase in  interstitial and pulmonary vascular markings which may indicate some  degree pulmonary vascular congestion and cardiac compensation.  ?  There is blunting of both costophrenic angles which might be related  to bilateral pleural effusions.  ?  In addition there is increased left retrocardiac density with  silhouetting of the left hemidiaphragm although this might be related  to pleural fluid infiltrate/atelectasis cannot be completely excluded  ?  IMPRESSION: Changes suggestive of pulmonary vascular congestion and  cardiac compensation.  ?  Cardiomegaly.  ?  Bilateral pleural effusions.  ?  Increased left retrocardiac density with silhouetting of the left  hemidiaphragm as above     [1]?XR Chest 1 View; Jordan Merlos MD 12/17/2019 02:19 CST   Patient seen and examined with NP. ?Agree with  documented physical exam. ?Treatment plan reviewed and discussed with nurse practitioner and is reasonable and appropriate. ?  ?  Plan for CT thorax, adding fungitell, hiv studies, holding ART as he has been noncompliant with same as an outpatient  Punch Bx of his skin lesion performed today  Resp PCR panel  Adding Rocephin and Doxy  Added Echo  Histo Ag and Tspot  ?  MD Ok  ID Staff

## 2022-05-01 NOTE — HISTORICAL [facility] CERNER
This is a historical note converted from Anjelica. Formatting and pictures may have been removed.  Please reference Anjelica for original formatting and attached multimedia. Admit and Discharge Dates  Admit Date: 12/13/2020  Discharge Date: 12/14/2020  Physicians  Attending Physician - Kenzie CHAWLA, Julian HOLM  Admitting Physician - Kenzie CHAWLA, Julian HOLM  Primary Care Physician - Keara Regalado DO  Discharge Diagnosis  Lower Extremity Cellulitis  HIV  HFrEF  CAD s/p PCI with GERDA to LCx in 2017  A. fib on Eliquis  Hepatitis B infection  History of B-cell lymphoma  History of tobacco abuse  History of polysubstance abuse  History of treated syphilis and gonorrhea  Admission Information  This?is a 60-year-old?-American male?who presented to ER with chief complaint of?redness, swelling,?pain?in his bilateral?lower extremities?started a day ago.? Patient reports that it started all of a sudden in the morning?on?12/12/2020?progressively worsened during the day,?also had fevers last night.? Denies?any recent tick bite,?nail?prick,?working in the yard?in the recent times,?exposure to sick contacts,?recent travels.? Denies any chest pain, S OB, palpitations,?pedal edema,?N/V/abdominal pain,?recent changes in bowel and bladder habits.? Denies any changes in?weight and appetite.  Hospital Course  Patient admitted for?lower extremity cellulitis?and received?vancomycin and Rocephin from the ED.? He received 2 days of vancomycin and Rocephin?and?then transitioned to p.o. doxycycline.? Patient remained afebrile?during stay?and vital signs were stable.? Lower extremity?redness and swelling improved?overnight and continued to improve that the day today?prior to discharge.? Patient stated?pain also?became better as he was able to walk around the nurses station?with no complaints.? Patient continued on?home Biktarvy as well as Bactrim for PJP prophylaxis.? We continued all other patients heart failure medications. NIVA ultrasound of the  lower extremity showed no deep or superficial vein thrombosis in bilateral lower extremities.? Echo showed EF approximate 50% with inferior apical hypokinesis, diastolic dysfunction, moderate aortic stenosis.  Time Spent on discharge  Over 35 minutes  Objective  Vitals & Measurements  T:?36.7? ?C (Oral)? TMIN:?36.4? ?C (Oral)? TMAX:?37? ?C (Oral)? HR:?74(Peripheral)? RR:?16? BP:?100/68? SpO2:?98%?  Physical Exam  General: well-nourished, well-developed in no acute distress  HEENT: Atraumatic, normocephalic.  Neck: No JVD or carotid bruits. No lymphadenopathy.  Heart: RRR, no murmurs, gallops, clicks or rubs. S1, S2 present.?  Lungs: CTAB without rales, wheezes or rhonchi. Normal work of breathing. Chest rise symmetrical on inspiration.  Abd: Soft, non-tender, non-distended and without guarding. Bowel sounds present.  Extremities: Radial and pedal pulses 2+ bilaterally, no LE edema.  MSK: Moves all extremities purposefully.  Skin: erythema RLE> LLE, RLE erythema?appears decreased from original markings, trace edema R>L,?dorsal pedal pulses palpated b/l, sensation equal in BLE, able to move toes, hyperpigmented rash  Patient Discharge Condition  Clinically and hemodynamically stable.  Discharge Disposition  Home  Follow up with PCP, Dr Keara Regalado, in post wards clinic on?12/28/2020  Keep?scheduled appointment with PCP, Dr. Keara Regalado,?on?1/28/2020  Follow-up with?ID clinic?at a scheduled appointment  ?  Medications:  Prescribe doxycycline 100 mg twice daily?x14 days   Discharge Medication Reconciliation  Prescribed  doxycycline (doxycycline monohydrate 100 mg oral capsule)?100 mg, Oral, BID  Continue  Misc Prescription (Blood Pressure Cuff)?See Instructions  amLODIPine (amLODIPine 5 mg oral tablet)?5 mg, Oral, Daily  apixaban (Eliquis 5 mg oral tablet)?5 mg, Oral, BID  aspirin (aspirin 81 mg oral Delayed Release (EC) tablet)?81 mg, Oral, Daily  atorvastatin (atorvastatin 40 mg oral tablet)?40 mg, Oral, At  Bedtime  betamethasone-clotrimazole topical (Lotrisone 1%-0.05% topical cream)?1 almaz, TOP, BID  bictegravir/emtricitabine/tenofovir (Biktarvy oral tablet)?1 tab(s), Oral, Daily  carvedilol (carvedilol 12.5 mg oral tablet)?12.5 mg, Oral, BID  cholecalciferol (Vitamin D3 2000 intl units (50 mcg) oral capsule)?2,000 IntUnit, Oral, Daily  furosemide (Lasix 20 mg oral tablet)?60 mg, Oral, Daily  hydrOXYzine (hydrOXYzine hydrochloride 25 mg oral tablet)?25 mg, Oral, TID, PRN for itching  losartan (losartan 25 mg oral tablet)?25 mg, Oral, Daily  spironolactone (spironolactone 25 mg oral tablet)?25 mg, Oral, Daily  sulfamethoxazole-trimethoprim (Bactrim  mg-160 mg oral tablet)?1 tab(s), Oral, Daily  Education and Orders Provided  Heart Failure  Follow up  Report to Emergency Department if symptoms return or worsen  Keara Regalado DO, Attending Physician  Keep scheduled appointment  Car Seat Challenge  No Qualifying Data      I was present with the Resident during discharge day management.  ???  [ x] I discussed the case with the Resident and agree with the discharge plan as above.  [ ] I discussed the case with the Resident and agree with the discharge plan as above except:  ???  Time spent on discharge [30 ] minutes

## 2022-05-12 DIAGNOSIS — B20 HUMAN IMMUNODEFICIENCY VIRUS (HIV) DISEASE: ICD-10-CM

## 2022-05-12 DIAGNOSIS — B18.1 CHRONIC TYPE B VIRAL HEPATITIS: Primary | ICD-10-CM

## 2022-05-24 DIAGNOSIS — Z72.0 TOBACCO USER: Primary | ICD-10-CM

## 2022-05-24 PROBLEM — F19.10 SUBSTANCE ABUSE: Status: ACTIVE | Noted: 2022-05-24

## 2022-05-24 PROBLEM — C83.30 DIFFUSE LARGE B-CELL LYMPHOMA: Status: ACTIVE | Noted: 2022-05-24

## 2022-05-24 PROBLEM — B19.10 HEPATITIS B: Status: ACTIVE | Noted: 2022-05-24

## 2022-05-24 PROBLEM — B20 HIV DISEASE: Status: ACTIVE | Noted: 2022-05-24

## 2022-05-24 PROBLEM — E53.8 B12 DEFICIENCY: Status: ACTIVE | Noted: 2022-05-24

## 2022-05-25 ENCOUNTER — OFFICE VISIT (OUTPATIENT)
Dept: HEMATOLOGY/ONCOLOGY | Facility: CLINIC | Age: 62
End: 2022-05-25
Payer: MEDICAID

## 2022-05-25 ENCOUNTER — TELEPHONE (OUTPATIENT)
Dept: HEMATOLOGY/ONCOLOGY | Facility: CLINIC | Age: 62
End: 2022-05-25
Payer: MEDICAID

## 2022-05-25 VITALS
HEART RATE: 77 BPM | TEMPERATURE: 98 F | HEIGHT: 71 IN | RESPIRATION RATE: 20 BRPM | OXYGEN SATURATION: 100 % | DIASTOLIC BLOOD PRESSURE: 87 MMHG | BODY MASS INDEX: 28.98 KG/M2 | WEIGHT: 207 LBS | SYSTOLIC BLOOD PRESSURE: 125 MMHG

## 2022-05-25 DIAGNOSIS — E53.8 B12 DEFICIENCY: ICD-10-CM

## 2022-05-25 DIAGNOSIS — C83.30 DIFFUSE LARGE B-CELL LYMPHOMA, UNSPECIFIED BODY REGION: ICD-10-CM

## 2022-05-25 DIAGNOSIS — C83.30 DIFFUSE LARGE B-CELL LYMPHOMA, UNSPECIFIED BODY REGION: Primary | ICD-10-CM

## 2022-05-25 DIAGNOSIS — F19.10 SUBSTANCE ABUSE: ICD-10-CM

## 2022-05-25 DIAGNOSIS — B20 HIV DISEASE: ICD-10-CM

## 2022-05-25 PROCEDURE — 4010F PR ACE/ARB THEARPY RXD/TAKEN: ICD-10-PCS | Mod: CPTII,,, | Performed by: INTERNAL MEDICINE

## 2022-05-25 PROCEDURE — 3008F PR BODY MASS INDEX (BMI) DOCUMENTED: ICD-10-PCS | Mod: CPTII,,, | Performed by: INTERNAL MEDICINE

## 2022-05-25 PROCEDURE — 99214 OFFICE O/P EST MOD 30 MIN: CPT | Mod: PBBFAC | Performed by: INTERNAL MEDICINE

## 2022-05-25 PROCEDURE — 99214 OFFICE O/P EST MOD 30 MIN: CPT | Mod: S$PBB,,, | Performed by: INTERNAL MEDICINE

## 2022-05-25 PROCEDURE — 3074F SYST BP LT 130 MM HG: CPT | Mod: CPTII,,, | Performed by: INTERNAL MEDICINE

## 2022-05-25 PROCEDURE — 3008F BODY MASS INDEX DOCD: CPT | Mod: CPTII,,, | Performed by: INTERNAL MEDICINE

## 2022-05-25 PROCEDURE — 85025 COMPLETE CBC W/AUTO DIFF WBC: CPT | Performed by: INTERNAL MEDICINE

## 2022-05-25 PROCEDURE — 3074F PR MOST RECENT SYSTOLIC BLOOD PRESSURE < 130 MM HG: ICD-10-PCS | Mod: CPTII,,, | Performed by: INTERNAL MEDICINE

## 2022-05-25 PROCEDURE — 1159F PR MEDICATION LIST DOCUMENTED IN MEDICAL RECORD: ICD-10-PCS | Mod: CPTII,,, | Performed by: INTERNAL MEDICINE

## 2022-05-25 PROCEDURE — 1160F PR REVIEW ALL MEDS BY PRESCRIBER/CLIN PHARMACIST DOCUMENTED: ICD-10-PCS | Mod: CPTII,,, | Performed by: INTERNAL MEDICINE

## 2022-05-25 PROCEDURE — 99214 PR OFFICE/OUTPT VISIT, EST, LEVL IV, 30-39 MIN: ICD-10-PCS | Mod: S$PBB,,, | Performed by: INTERNAL MEDICINE

## 2022-05-25 PROCEDURE — 1159F MED LIST DOCD IN RCRD: CPT | Mod: CPTII,,, | Performed by: INTERNAL MEDICINE

## 2022-05-25 PROCEDURE — 1160F RVW MEDS BY RX/DR IN RCRD: CPT | Mod: CPTII,,, | Performed by: INTERNAL MEDICINE

## 2022-05-25 PROCEDURE — 4010F ACE/ARB THERAPY RXD/TAKEN: CPT | Mod: CPTII,,, | Performed by: INTERNAL MEDICINE

## 2022-05-25 PROCEDURE — 3079F DIAST BP 80-89 MM HG: CPT | Mod: CPTII,,, | Performed by: INTERNAL MEDICINE

## 2022-05-25 PROCEDURE — 80053 COMPREHEN METABOLIC PANEL: CPT | Performed by: INTERNAL MEDICINE

## 2022-05-25 PROCEDURE — 3079F PR MOST RECENT DIASTOLIC BLOOD PRESSURE 80-89 MM HG: ICD-10-PCS | Mod: CPTII,,, | Performed by: INTERNAL MEDICINE

## 2022-05-25 RX ORDER — POLYETHYLENE GLYCOL 3350
POWDER (GRAM) MISCELLANEOUS
Status: ON HOLD | COMMUNITY
End: 2023-05-05 | Stop reason: HOSPADM

## 2022-05-25 RX ORDER — CLOPIDOGREL BISULFATE 75 MG/1
75 TABLET ORAL
Status: ON HOLD | COMMUNITY
End: 2023-01-26 | Stop reason: HOSPADM

## 2022-05-25 RX ORDER — ERGOCALCIFEROL 1.25 MG/1
50000 CAPSULE ORAL
COMMUNITY
Start: 2022-04-20 | End: 2023-03-23 | Stop reason: SDUPTHER

## 2022-05-25 RX ORDER — DILTIAZEM HYDROCHLORIDE 360 MG/1
360 CAPSULE, EXTENDED RELEASE ORAL
COMMUNITY
End: 2023-03-09

## 2022-05-25 RX ORDER — MEGESTROL ACETATE 40 MG/ML
200 SUSPENSION ORAL
COMMUNITY

## 2022-05-25 RX ORDER — ASPIRIN 81 MG/1
81 TABLET ORAL
Status: ON HOLD | COMMUNITY
Start: 2021-11-30 | End: 2023-01-26 | Stop reason: HOSPADM

## 2022-05-25 RX ORDER — SPIRONOLACTONE 25 MG/1
TABLET ORAL
Status: ON HOLD | COMMUNITY
Start: 2021-11-30 | End: 2023-01-26 | Stop reason: SDUPTHER

## 2022-05-25 RX ORDER — BICTEGRAVIR SODIUM, EMTRICITABINE, AND TENOFOVIR ALAFENAMIDE FUMARATE 50; 200; 25 MG/1; MG/1; MG/1
TABLET ORAL
COMMUNITY
Start: 2021-11-30 | End: 2023-03-13 | Stop reason: SDUPTHER

## 2022-05-25 RX ORDER — SULFAMETHOXAZOLE AND TRIMETHOPRIM 800; 160 MG/1; MG/1
1 TABLET ORAL DAILY
Status: ON HOLD | COMMUNITY
Start: 2022-04-19 | End: 2023-01-26 | Stop reason: SDUPTHER

## 2022-05-25 RX ORDER — FUROSEMIDE 20 MG/1
60 TABLET ORAL
Status: ON HOLD | COMMUNITY
Start: 2021-11-30 | End: 2023-01-26 | Stop reason: SDUPTHER

## 2022-05-25 RX ORDER — CARVEDILOL 12.5 MG/1
12.5 TABLET ORAL
Status: ON HOLD | COMMUNITY
Start: 2021-11-30 | End: 2023-01-26

## 2022-05-25 RX ORDER — LOSARTAN POTASSIUM 25 MG/1
TABLET ORAL
Status: ON HOLD | COMMUNITY
Start: 2022-01-18 | End: 2023-01-26 | Stop reason: HOSPADM

## 2022-05-25 RX ORDER — MORPHINE SULFATE 15 MG/1
15 TABLET ORAL
Status: ON HOLD | COMMUNITY
End: 2023-03-23 | Stop reason: HOSPADM

## 2022-05-25 NOTE — TELEPHONE ENCOUNTER
After Visit Summary   5/1/2018    Stefanie Velazquez    MRN: [id number]           Patient Information     Date Of Birth          1942        Visit Information        Provider Department      5/1/2018 9:00 AM Sandra Morales MD [facility]        Today's Diagnoses     Lumbar radiculopathy    -  1    Spinal stenosis of lumbar region without neurogenic claudication        DDD (degenerative disc disease), lumbar          Care Instructions    Increase the gabapentin to 2 at bedtime  We can increase that if you need to    Ok to take oxycodone if you are really bad    I recommend injections, are done in [place]. They should call you.     Physical therapy           Follow-ups after your visit        Additional Services     PAIN MANAGEMENT REFERRAL       Your provider has referred you to: [facility]: [facility] -    Reason for Referral: Procedure Order TBD by pain provider - Location: lumbar injections in [place] - document symptoms in note      What is your diagnosis for the patient's pain? DDD, lumbar radiculopathy      For any questions, contact the [facility] [place] at [phone].     **ANY DIAGNOSTIC TESTS THAT ARE NOT IN EPIC SHOULD BE SENT TO THE PAIN CENTER**    REGARDING OPIOID MEDICATIONS:  The discussion of opioids management, appropriateness of therapy, and dosing will be discussed in patients being seen for evaluation.  The pain management clinics are not long-term prescribing clinics, with transition of prescribing of medications ultimately going back to the referring provider/PCP.  If prescribing is taken over at the pain clinic, it is in actively involved patients whom are appropriate for opioids, urine drug screening is completed, and long-term prescribing plan has been determined.  Therefore, we will not be automatically taking over prescribing at the patient's first visit.  Is this agreeable to you? agrees.     Please be aware that coverage  Orders for today:    Check CBC, CMP, B12 level    Follow-up in 3 months with CBC, CMP, and LDH; to follow-up with NP.   "of these services is subject to the terms and limitations of your health insurance plan.  Call member services at your health plan with any benefit or coverage questions.      Please bring the following with you to your appointment:    (1) Any X-Rays, CTs or MRIs which have been performed.  Contact the facility where they were done to arrange for  prior to your scheduled appointment.    (2) List of current medications   (3) This referral request   (4) Any documents/labs given to you for this referral            PHYSICAL THERAPY REFERRAL       *This therapy referral will be filtered to a centralized scheduling office at [facility] and the patient will receive a call to schedule an appointment at a [place] location most convenient for them. *     [facility] provides Physical Therapy evaluation and treatment and many specialty services across the [place] system.  If requesting a specialty program, please choose from the list below.    If you have not heard from the scheduling office within 2 business days, please call [phone] for all locations, with the exception of [place], please call [phone] and [facility], please call [phone]  Treatment: Evaluation & Treatment  Special Instructions/Modalities:   Special Programs: None    Please be aware that coverage of these services is subject to the terms and limitations of your health insurance plan.  Call member services at your health plan with any benefit or coverage questions.      **Note to Provider:  If you are referring outside of [place] for the therapy appointment, please list the name of the location in the \"special instructions\" above, print the referral and give to the patient to schedule the appointment.                  Your next 10 appointments already scheduled     May 03, 2018  9:00 AM CDT   [facility]    [address] [facility] [address]   [phone]           Please do not eat 10-12 hours before your appointment if you are coming in fasting for labs on lipids, cholesterol, or glucose (sugar). This does not apply to pregnant women. Water, hot tea and black coffee (with nothing added) are okay. Do not drink other fluids, diet soda or chew gum.            May 07, 2018  2:00 PM CDT   Return Visit with Terence Del Cid MD   [facility] ([facility])    [address]  [facility] [address]   [phone]            Sep 21, 2018 11:00 AM CDT   LAB with [facility] LAB   [facility]    [address] [facility] [id number] [phone]           Please do not eat 10-12 hours before your appointment if you are coming in fasting for labs on lipids, cholesterol, or glucose (sugar). This does not apply to pregnant women. Water, hot tea and black coffee (with nothing added) are okay. Do not drink other fluids, diet soda or chew gum.            Sep 28, 2018 11:00 AM CDT   Return Visit with Apolinar Martinez MD   [facility] ([facility])    [facility]  [address]  [facility] [address]   [phone]              Who to contact     If you have questions or need follow up information about today's clinic visit or your schedule please contact [facility] directly at [phone].  Normal or non-critical lab and imaging results will be communicated to you by [employer], letter or phone within 4 business days after the clinic has received the results. If you do not hear from us within 7 days, please contact the clinic through [employer] or phone. If you have a critical or abnormal lab result, we will notify you by phone as soon as possible.  Submit refill requests through [employer] or call your pharmacy and they will forward the refill request to us. Please allow 3 business  days for your refill to be completed.          Additional Information About Your Visit        The [employer] Information     [employer] gives you secure access to your electronic health record. If you see a primary care provider, you can also send messages to your care team and make appointments. If you have questions, please call your primary care clinic.  If you do not have a primary care provider, please call [phone] and they will assist you.        Care EveryWhere ID     This is your Care EveryWhere ID. This could be used by other organizations to access your [place] medical records  [id number]        Your Vitals Were     Pulse Temperature Respirations Pulse Oximetry BMI (Body Mass Index)       74 99  F (37.2  C) (Tympanic) 20 95% 35.02 kg/m2        Blood Pressure from Last 3 Encounters:   05/01/18 138/78   04/23/18 122/80   04/20/18 146/66    Weight from Last 3 Encounters:   05/01/18 193 lb (87.5 kg)   04/23/18 193 lb (87.5 kg)   04/20/18 195 lb (88.5 kg)              We Performed the Following     PAIN MANAGEMENT REFERRAL     PHYSICAL THERAPY REFERRAL          Where to get your medicines      Some of these will need a paper prescription and others can be bought over the counter.  Ask your nurse if you have questions.     Bring a paper prescription for each of these medications     oxyCODONE IR 5 MG tablet         Information about OPIOIDS     PRESCRIPTION OPIOIDS: WHAT YOU NEED TO KNOW   You have a prescription for an opioid (narcotic) pain medicine. Opioids can cause addiction. If you have a history of chemical dependency of any type, you are at a higher risk of becoming addicted to opioids. Only take this medicine after all other options have been tried. Take it for as short a time and as few doses as possible.     Do not:    Drive. If you drive while taking these medicines, you could be arrested for driving under the influence (DUI).    Operate heavy machinery    Do any other dangerous activities while taking  these medicines.     Drink any alcohol while taking these medicines.      Take with any other medicines that contain acetaminophen. Read all labels carefully. Look for the word  acetaminophen  or  Tylenol.  Ask your pharmacist if you have questions or are unsure.    Store your pills in a secure place, locked if possible. We will not replace any lost or stolen medicine. If you don t finish your medicine, please throw away (dispose) as directed by your pharmacist. The [place] [employer] has more information about safe disposal: https://www.pca.[facility].[place].us/living-green/managing-unwanted-medications    All opioids tend to cause constipation. Drink plenty of water and eat foods that have a lot of fiber, such as fruits, vegetables, prune juice, apple juice and high-fiber cereal. Take a laxative (Miralax, milk of magnesia, Colace, Senna) if you don t move your bowels at least every other day.          Primary Care Provider Office Phone # Fax #    Sandra Morales [phone]       [facility] [address] [place] [id number]        Equal Access to Services     [facility] Center[facility] : Hadii aad ku hadasho Soomaali, waaxda luqadaha, qaybta kaalmada adeegyada, waxay bettie hayestiven rivera . So [facility] [phone].    ATENCIÓN: Si habla español, tiene a waite disposición servicios gratuitos de asistencia lingüística. Llame al [phone].    We comply with applicable federal civil rights laws and [place] laws. We do not discriminate on the basis of race, color, national origin, age, disability, sex, sexual orientation, or gender identity.            Thank you!     Thank you for choosing [facility]  for your care. Our goal is always to provide you with excellent care. Hearing back from our patients is one way we can continue to improve our services. Please take a few minutes to complete the written survey that you may receive in the mail after your visit with us. Thank you!              Your Updated Medication List - Protect others around you: Learn how to safely use, store and throw away your medicines at www.disposemymeds.org.          This list is accurate as of 5/1/18  9:55 AM.  Always use your most recent med list.                   Brand Name Dispense Instructions for use Diagnosis    ACCU-CHEK MILVIA Jeana     1 device    dispense one meter    Type II or unspecified type diabetes mellitus without mention of complication, uncontrolled       acetaminophen 650 MG CR tablet    TYLENOL    60 tablet    Take 1 tablet (650 mg) by mouth 3 times daily    Bilateral cellulitis of lower leg       aspirin 81 MG tablet     30 tablet    Take 1 tablet (81 mg) by mouth daily        blood glucose monitoring lancets     1 Box    Test BG 4 times daily    Type II or unspecified type diabetes mellitus without mention of complication, uncontrolled       blood glucose monitoring test strip    ACCU-CHEK MILVIA    360 each    Use to test blood sugars 4 times daily or as directed.    Type 2 diabetes mellitus with diabetic nephropathy, with long-term current use of insulin (H)       BUTT PASTE - REGULAR    DR LOVE POOP GOCHAUNCEY BUTT PASTE FORMULA     Apply topically every hour as needed for skin protection        chlorhexidine 0.12 % solution    PERIDEX          DEPEND EASY FIT UNDERGARMENTS Misc     300 each    1 Units every 2 hours X-large    Bowel and bladder incontinence, Rectal cancer (H)       diclofenac 50 MG EC tablet    VOLTAREN    21 tablet    Take 1 tablet (50 mg) by mouth 3 times daily as needed for moderate pain    Acute left-sided low back pain with left-sided sciatica       fluocinonide 0.05 % ointment    LIDEX    60 g    Apply sparingly to affected area twice daily as needed.  Do not apply to face.    Venous stasis dermatitis of both lower extremities       fluticasone 50 MCG/ACT spray    FLONASE     1 spray        gabapentin 300 MG capsule    NEURONTIN    60 capsule    Take 1 tablet (300 mg) at bedtime,  after 4 days may go to 2 if needed    Lumbar radiculopathy       GLUCERNA OS Liqd     24 each    One can two to three times a day    Poor dentition, Poor nutrition, Type 2 diabetes mellitus with diabetic nephropathy, with long-term current use of insulin (H), Rectal cancer (H), Chronic diarrhea, Radiation therapy complication, sequela       hypromellose 0.3 % Soln ophthalmic solution    GENTEAL     2 drops        insulin aspart 100 UNIT/ML injection    NovoLOG FLEXPEN    3 mL    Inject 15 Units Subcutaneous 3 times daily (with meals)    Type 2 diabetes mellitus with diabetic nephropathy, with long-term current use of insulin (H)       insulin detemir 100 UNIT/ML injection    LEVEMIR FLEXPEN/FLEXTOUCH    3 mL    Inject 25 Units Subcutaneous At Bedtime Fill when needed    Type 2 diabetes mellitus with diabetic nephropathy, with long-term current use of insulin (H)       insulin pen needle 32G X 4 MM    BD GABRIELLA U/F    120 each    Use 4 times per day or as directed.    Type 2 diabetes mellitus with diabetic nephropathy, with long-term current use of insulin (H)       LASIX 20 MG tablet   Generic drug:  furosemide     90 tablet    Take 1 tablet (20 mg) by mouth daily    Benign essential hypertension       levothyroxine 88 MCG tablet    SYNTHROID/LEVOTHROID    90 tablet    Take 1 tablet (88 mcg) by mouth daily        loperamide 2 MG capsule    IMODIUM    90 capsule    One capsule once a day, can use a second one if needed    Rectal cancer (H), Chronic diarrhea       losartan 100 MG tablet    COZAAR    90 tablet    Take 1 tablet (100 mg) by mouth daily    Benign essential hypertension       menthol-zinc oxide 0.44-20.625 % Oint ointment    CALMOSEPTINE     Apply topically 4 times daily as needed for skin protection    Rash and nonspecific skin eruption       metoprolol succinate 50 MG 24 hr tablet    TOPROL-XL    90 tablet    Take 1 tablet (50 mg) by mouth daily    Benign essential hypertension       mineral  oil-hydrophilic petrolatum      Apply topically as needed        OMEPRAZOLE PO      Take 20 mg by mouth daily as needed        oxyCODONE IR 5 MG tablet    ROXICODONE    20 tablet    Take 1 tablet (5 mg) by mouth every 6 hours as needed for severe pain maximum 6 tablet(s) per day    Lumbar radiculopathy       Potassium Gluconate 595 MG Caps      Take 1 tablet by mouth daily    Stasis edema of both lower extremities       rOPINIRole 1 MG tablet    REQUIP    270 tablet    TAKE ONE TABLET BY MOUTH THREE TIMES DAILY    RLS (restless legs syndrome)       simvastatin 40 MG tablet    ZOCOR    90 tablet    Take 1 tablet (40 mg) by mouth daily        syringe (disposable) 1 ML Misc     1 each    1 each every 30 days With 1 inch needle for Vit B12 injection    Vitamin B 12 deficiency       triamcinolone 0.1 % cream    KENALOG    80 g    Apply sparingly to affected area two times daily as needed    Stasis dermatitis of both legs

## 2022-05-25 NOTE — PROGRESS NOTES
Problem list   Stage IVB Diffuse large B-cell lymphoma, in the setting of AIDS      Past medical history:   HIV, diagnosed 2 years back which he did not follow-up upon.   Kidney stone.  Hypertension.  Cocaine abuse.   HFrEF (EF 10-15%, 2019).  Hypertension.  Paroxysmal atrial fibrillation (on Eliquis).  Cocaine abuse.  HIV disease.  CAD, s/p coronary stents x2.     Social history:   Single  Lives with his sister in [place], [place].  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  Smoked a pack of cigarettes daily for 30 years; quit in 2018.  Used to drink heavily; quit drinking many years back.  Used to smoke crack; clean since 2018.      Family history:   Brother  from colon cancer at age 22.   Father  from lung cancer in his 60s; used to smoke.      Health maintenance:   Colonoscopy when he was hospitalized with bleeding lymphomatous duodenal ulcer in 2018, unremarkable.      History of present illness:  60-year gentleman, with history of diffuse large B-cell lymphoma, stage IVB, in the setting of AIDS, treated with R-CHOP x6, completing May 2018, with complete remission, who was lost to follow-up after 2018.       Now, referred back to us from internal medicine for oncologic follow-up.    Past oncologic history:  Was admitted to [facility] in 2017 with 2-3 month history of epigastric pain, black stools, and weight loss.  Had CT scan of abdomen and pelvis performed on 2017 showing mesenteric and retroperitoneal lymphadenopathy which has worsened since the prior study dated 10/13/2017; atelectasis and nodular alveolar infiltrate, a right-sided pleural effusion, and and right lower lobe sub-segmental atelectasis as well as a small pericardial effusion since 2017; jejunum and proximal ileum thickened and edematous; nodular enlargement of the left adrenal gland which measures 4 cm x 2.5 cm, concerning for either primary or metastatic malignancy.        Contrast-enhanced CT scan of the chest on 12/14/2017 showed bilateral lung nodules with more nodular masslike density measuring 2.8 cm of the right lower lobe; associated consolidative change with pleural effusion.       Had EGD and colonoscopy performed showing ulcerative duodenal nodules.  Biopsy showed diffuse large B-cell lymphoma.  Biopsy of the gastric antrum showed active chronic H pylori gastritis.  Had 500 cc of serous pleural fluid drained from right pleural cavity; cytology was negative for malignant or atypical cells.       He was seen in consultation by Dr. Lynda Swanson from [facility].       His CD4 count was found to be 131, quite low, and HIV serology positive, leading to the diagnosis of AIDS.  He said that he was diagnosed with HIV positive status a couple of years back, but somehow, failed to follow-up on that.       On 12/18/2017, had a heart cath done and stent placed in the right circumflex artery.  The procedure was complicated by ventricular tachycardia for which he had to be converted.  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  He did have a blood transfusion on 12/13/2017, but anemia worsened subsequently.  He also had atrial fibrillation.       Bone marrow examination for staging of lymphoma was performed on 12/21/2017, with no evidence of lymphoma.       His LDH was elevated to 639.  Serum iron was 34, low; transferring suppressed to 135; TIBC suppressed to 170; transferrin saturation suppressed to 20%; ferritin 133.2.  Folic acid level 4.2, borderline normal.  Vitamin B12 level 166, severely low.  Hemoglobin 9.2 on 12/22/2017, down from 11.6 on 12/18/2017.       Syphilis antibody reactive on 12/18/2017.  Hepatitis B surface antibody reactive.  RPR nonreactive.  Hepatitis A IgM antibody negative.  Hepatitis B core IgM antibody negative.   Hepatitis B surface antigen negative.  Hepatitis B core antibody negative.  Thus, no serologic evidence of recent or past hepatitis A, B, or C infection.  Cryptosporidium antigen in stool negative.  Giardia antigen negative.  C. difficile PCR negative.  HIV 1 RNA PCR [id number] copies per mL HIV RNA log 10 result 5.718 log copies per mL.        CT scan of the abdomen and pelvis on 01/10/2018 showed trace right pleural effusion and considerable improvement in the right lower lobe lung atelectasis combined with infiltrates; right lung visualized nodules without interval change; extensive abdominal lymphadenopathy without significant interval change; distended stomach and proximal to mid small bowel loops suggesting small bowel obstruction which probably is partial; right abdomen trace free fluid.       Small bowel series on 01/20/2018 showed findings consistent with partial mid to distal small bowel obstruction; stomach was very much dilated.       On 01/31/2018, CD4 count 435/mm³.  HIV .  2.279 log 10.  Dramatically improved since 12/28/2017 when HIV RNA load was 522,000, 5.718 log.       Received vitamin B12 1000 mcg subcutaneously daily from 01/06/2018 through 01/23/2018.       Received the first cycle of chemotherapy with R-CHOP on 01/11/2018.       Venous duplex study of both lower extremities on 02/13/2018 showed no evidence of DVT.  He presented to the emergency room with bilateral lower extremity pain for 12 hours.       Following up with Dr. Federico Hopkins for HIV management.         Admitted to [facility] on 01/03/2018 with worsening abdominal pain.  Severe anemia.  Hemoglobin 7.1.  Fecal occult blood test positive.  Transfused 2 units of packed RBCs.  Had atrial fibrillation with RVR.  01/09/2018, at intractable vomiting.  Distended stomach and proximal to mid small bowel loops suggested small bowel obstruction, probably partial, on CT.  NG tube was placed.  Bowel obstruction was thought to be from worsening  intra-abdominal lymphoma.  He opted for DNR status.  Administered first cycle of R-CHOP on 01/11/2018.  Subsequently, over many days, felt better, started even eating, and started having bowel movements.  Neulasta was given 1 day after chemotherapy, on 01/12/2018.    Initially seen in hematology/medical College consultation on 02/14/2020.     Last followed up on 06/20/2018 when he seemed to be doing well.          Interval History    10/27/2020:  60-year gentleman, with history of diffuse large B-cell lymphoma, stage IVB, in the setting of AIDS, treated with R-CHOP x6, completing May 2018, with complete remission, who was lost to follow-up after June 2018. Now, referred back to us from internal medicine for oncologic follow-up.     Overall, doing great.  Endorses no particular symptoms of concern.   Labs consistent with acute hepatitis B exposure last month (summer 2020).  He denies problems with sexual activity or intravenous drug abuse.  Denies history of blood transfusion.  He is wondering how he got acute hepatitis B infection.   HIV viral load is rising.  However, the prophylaxis compliance with antiretroviral therapy.  Apparently, has follow-up with ID in near future.   Denies weakness, fatigue, malaise, recurrent fevers or chills, drenching night sweats, anorexia, unintentional weight loss, lumps or lymphadenopathy unusual headaches, loss of consciousness, seizures, strokelike symptoms, chest pain, cough, dyspnea, abdominal pain, nausea, vomiting, hematemesis, melena, hematochezia, change in bowel habits, urinary problems, or bone pains.   ECOG 0.   Past medical history: HFrEF (EF 10-15%, 12/2019).  Hypertension.  Paroxysmal atrial fibrillation (on Eliquis).  Cocaine abuse.  HIV disease.  CAD, s/p coronary stents x2.  Interim events reviewed:   -12/28/2018: Cardiac perfusion imaging (Lexiscan): Negative for new ischemia; fixed moderate-sized inferolateral and apical lateral defect; severely reduced LVEF of  20%; medical management recommended with aggressive risk factor management   -Hospitalized 12/26/2018-12/28/2018 ([facility]): Community-acquired pneumonia.  NSTEMI.  CHF with reduced ejection fraction.  Transaminitis.  HIV disease.   -04/02/2019: Right thoracentesis: 700 cc, serosanguineous fluid (reactive; no malignancy)   -Hospitalized 04/21/2019-04/22/2019 ([facility]): Congestive heart failure.  Cocaine abuse.  Pleural effusion.   -Hospitalized 12/16/2019-12/20/2019 ([facility]): HIV disease, skin rash, cocaine abuse, acute combined systolic and diastolic congestive heart failure.  Admitted with shortness of breath.  Diuresed.  Was noncompliant with ID follow-up for a long time.  Discharged home with Biktarvy, etc.  Echocardiogram showed LV thrombus; discharged on Eliquis.   -12/17/2019: Left upper extremity skin, punch biopsy: Superficial deep mixed perivascular dermatitis   -12/17/2019: CT chest without contrast (pleural effusion): Moderate right and small left dependent pleural effusions with suggested airspace opacity with volume loss/atelectasis and cardiomegaly   -09/17/2020: CD4 count 109/mm³  -09/17/2020: Hepatitis B core IgM reactive; hepatitis B core antibody reactive; hepatitis B surface antibody nonreactive; hepatitis B surface antigen reactive (confirmed positive).  Hepatitis A IgM negative.  Hepatitis C antibody negative.   -09/17/2020: HIV-1 RNA ,000 copies per mL (85,800 on 12/17/2019)    12/07/2020:   -11/04/2020: Limited abdominal ultrasound (history of otitis B): Questionable 3.1 cm structure involving the right kidney, most likely a normal anatomic variant; 5 mm adherent stone versus polyp in gallbladder   -11/9/2020: ID follow-up for HIV disease (on Biktarvy); history of hepatitis B   -1116 520: Surveillance CT C/A/P: Interval decrease in size of lymph nodes in left axilla since 12/17/2019; no new lymphadenopathy in C/A/P; hepatic cysts; tiny 3 mm nodule in the right lung   -10/27/2020: Sodium 132.   Creatinine 1.23.  AST 73.  ALT 57.  , normal.  B12 level 673, normal.  CBC unremarkable.   -11/18/2020: CBC unremarkable.  Hemoglobin 13.5.  AST 73.  HIV viral load <20 copies per mL.  Hepatitis B viral load 191,000 IU/mL.  Hepatitis D virus antibody negative.  Syphilis antibody positive.  CD4 count 149/mm³.  Rectal swab positive for gonorrhea.   Presents for follow-up visit.  Feeling great.  Endorses no symptoms of concern whatsoever.  No weakness, fatigue, malaise, fevers, night sweats, anorexia, abdominal pain, nausea, vomiting, lumps or lymphadenopathy, chest pain, cough, dyspnea, bleeding or bruising in any form, unusual headaches, focal neurological symptoms, visual disturbances, etc.  Started taking vitamin B12 tablets recently.    05/25/2022:  -12/14/2020: TTE: LVEF 50%  -11/29/2021: ZAKIA: LVEF 20-25%; calculated aortic valve area of 0.5 m²; consider AVR  -Hospitalized 11/27/2021-11/30/2021 ([facility]): CHF exacerbation; albuterol HIV medications for 8 months; CD4 count 138/mm³  -History of hepatitis B NIL-history of hepatitis B and HIV coinfection with medication noncompliance  -On Biktarvy under the direction of ID  -04/20/2022: Anal swab: Atypical squamous cells of undetermined significance; high risk HPV positive; high risk HPV 16, 18/45 negative  -03/15/2022: Labs reviewed; CMP without acute abnormality; CBC without acute abnormality  -03/17/2022: CD4 204.2/L; syphilis antibody positive; hepatitis B core IgM reactive; hepatitis B surface antigen reactive; hepatitis B surface antibody negative; hepatitis C antibody negative; CMV antibody positive; toxoplasma IgG antibody <3; HIV RNA quantitative 44 copies per mL  Presents for follow-up visit.  Looks and feels great.  Denies weakness, fatigue, recurrent fevers or chills, drenching night sweats, anorexia, unintentional weight loss, chest pain, cough, dyspnea, hemoptysis, unusual headaches, focal neurological symptoms, abdominal pain, nausea, vomiting, GI  bleeding, any urinary problems, myalgias, bone pains, etc..  Continues to smoke a pack of cigarettes daily; has been smoking for more than 40 years.  Smokes cocaine once a month.  No alcohol.  Strongly advised to quit tobacco and cocaine.  Also advised to remain compliant with medical instructions.  Occasional numbness and tingling in hands.  Occasional swelling in legs.  No calf pain.      Review of Systems:  12 point review of systems done in full with pertinent positives as described in interval history. Remainder of review of systems unremarkable.               Physical Examination:    VITAL SIGNS:  Reviewed.   GENERAL:  In no apparent distress.     HEAD:  No signs of head trauma.   EYES:  Pupils are equal.  Extraocular motions intact.     EARS:  Hearing grossly intact.   MOUTH:  Oropharynx is normal.    NECK:  No adenopathy, no JVD.      CHEST:  Chest with clear breath sounds bilaterally.  No wheezes, rales, or rhonchi.     CARDIAC:  Regular rate and rhythm.  S1 and S2, without murmurs, gallops, or rubs.   VASCULAR:  No Edema.  Peripheral pulses normal and equal in all extremities.   ABDOMEN:  Soft, without detectable tenderness.  No sign of distention.  No   rebound or guarding, and no masses palpated.   Bowel Sounds normal.   MUSCULOSKELETAL:  Good range of motion of all major joints. Extremities without clubbing, cyanosis or edema.     NEUROLOGIC EXAM:  Grossly nonfocal.  Normal gait.  Fully alert.   PSYCHIATRIC:  Mood normal.   SKIN:  No rash or lesions.  10/27/2020: No palpable lymphadenopathy or hepatosplenomegaly.  Lungs clear.  Heart sounds normal.  Abdomen benign.  05/25/2022:  Looks and feels great.  In no acute discomfort.  Abdomen nontender.  No palpable organomegaly.  Lungs clear.  No lymphadenopathy.        Assessment:  # Diffuse large B-cell lymphoma:  -diagnosed on endoscopic biopsy of ulcerated duodenal nodules (12/15/2017)  -stage IVB   -Presentation: [facility]: 01/2018: Upper GI bleeding, melena,  epigastric pain, weight loss, leading to the diagnosis of ARDS, diffuse large B-cell lymphoma, anemia, and CAD   -Mesenteric and retroperitoneal lymphadenopathy; nodular alveolar infiltrate of right lung; nodular enlargement of left adrenal gland; bilateral lung nodules with a 2.8 cm more nodular masslike density of the right lower lobe on CT scans (lymphoma versus opportunistic infection secondary to AIDS)   --> Stage IVB   -Bone marrow not involved with lymphoma   -Initial LDH elevated to 639   -He was not interested in fertility preservation   -First cycle of R-CHOP as inpatient (01/11/2018) when hospitalized with bowel obstruction, thought to be due to worsening intra-abdominal lymphomatous masses/adenopathy, prompting initiation of chemotherapy   -R-CHOP x6 (01/11/2018-05/17/2018)   -Complete response post R-CHOP x4 (PET/CT: 04/06/2018)   -Staging brain MRI (04/17/2018): No evidence of lymphomatous meningitis   -Lumbar puncture (04/24/2018): No cellular elements present for evaluation   -Complete response post R-CHOP x6 (PET/CT: 05/31/2018; Liz 1)   -Radiation oncology consultation (07/03/2018): Adjuvant RT not deemed necessary because of non-bulky nature of intra-abdominal disease which had improved significantly with chemotherapy   -Surveillance CT C/A/P (11/16/2020): No evidence of lymphoma       #AIDS, diagnosed during hospitalization in December 2017   (HIV was apparently diagnosed in 2015 but he never followed up on that)   CD4 count 131/mm³  HIV 1 RNA PCR: 522,000 copies per mL   Subsequent improvement with HAART   -09/17/2020: CD4 count 109/mm³  -09/17/2020: HIV-1 RNA ,000 copies per mL (was 85,800 on 12/17/2019)   -09/2020: Labs consistent with acute hepatitis B exposure (he does not know how)   -11/18/2020: HIV viral load <20 copies per mL.  Hepatitis B viral load 191,000 IU/mL.  Rectal swab positive for gonorrhea.  -history of hepatitis B and HIV coinfection with medication  noncompliance  -On Biktarvy under the direction of ID  -04/20/2022: Anal swab: Atypical squamous cells of undetermined significance; high risk HPV positive; high risk HPV 16, 18/45 negative  -03/17/2022:  CD4 204 mm3:  syphilis antibody positive; hepatitis B core IgM reactive; hepatitis B surface antigen reactive; hepatitis B surface antibody negative; hepatitis C antibody negative; CMV antibody positive; toxoplasma IgG antibody <3; HIV RNA quantitative 44 copies per mL       #History of anemia, multifactorial, secondary to bleeding from ulcerated duodenal nodules (DLBCL), chronic inflammatory state from underlying AIDS, probably functional iron deficiency, and severe vitamin B12 deficiency diagnosed 12/2017       #Severe vitamin B12 deficiency, diagnosed July 2017   -Intrinsic factor antibody negative   -Received >2 weeks of daily parenteral B12 therapy between January 6-23' 17 when inpatient at [facility]   -10/27/2020: B12 level 673       #History of CAD, coronary artery stent in December 2017; ventricular tachycardia, cardioverted; atrial fibrillation      Plan:  -history of noncompliance  -was lost to follow-up between 06/20/2020-10/27/2020  -subsequently, lost since 12/07/2020, until 05/25/2022    Surveillance for diffuse large B-cell lymphoma:   (Completed chemotherapy 05/2018)   -History and physical and labs every 3-6 months for 5 years (05/2018-05/2023), and then annually or as clinically indicated;  -CT scans of chest/abdomen/pelvis with contrast no more often than every 6 months for 2 years (05/2018-05/2020) after completion of treatment, then only as clinically indicated.    After 05/2020, no need of routine surveillance imaging studies.    History of B12 deficiency; resolved with oral B12 therapy; continues to take B12 orally     -Follow-up with ID for management of AIDS, syphilis, gonorrhea, and hepatitis B     -Follow-up with cardiology for CAD, status post coronary artery stents in January' 18      Follow-up in 3 months (end of August), with CBC, CMP, and LDH; earlier, if any concerns (to visit with NP)   Imaging studies only if any suspicious symptoms or signs of concern at any time    Today, check CBC, CMP, and B12 level.     Above discussed with him.  All questions answered.    Discussed labs and scans performed in the interim.  Compliance with medical instructions encouraged.   He understands and agrees this plan.    Addendum:  -05/25/2022:  CBC reviewed; hemoglobin 14.0, WBC 7.9, platelets 255 K, differential count normal   -----------------------      Surveillance for diffuse large B-cell lymphoma:   (Completed chemotherapy 05/2018)    History and physical and labs every 3-6 months for 5 years (05/2018-05/2023), and then annually or as clinically indicated;   CT scans of chest/abdomen/pelvis with contrast no more often than every 6 months for 2 years (05/2018-05/2020) after completion of treatment, then only as clinically indicated.

## 2022-09-11 ENCOUNTER — HOSPITAL ENCOUNTER (EMERGENCY)
Facility: HOSPITAL | Age: 62
Discharge: HOME | End: 2022-09-11
Attending: STUDENT IN AN ORGANIZED HEALTH CARE EDUCATION/TRAINING PROGRAM
Payer: MEDICAID

## 2022-09-11 VITALS
BODY MASS INDEX: 31.5 KG/M2 | WEIGHT: 225 LBS | SYSTOLIC BLOOD PRESSURE: 109 MMHG | DIASTOLIC BLOOD PRESSURE: 70 MMHG | HEART RATE: 72 BPM | TEMPERATURE: 98 F | RESPIRATION RATE: 16 BRPM | OXYGEN SATURATION: 99 %

## 2022-09-11 DIAGNOSIS — R10.13 EPIGASTRIC PAIN: Primary | ICD-10-CM

## 2022-09-11 LAB
ALBUMIN SERPL-MCNC: 3.8 GM/DL (ref 3.4–4.8)
ALBUMIN/GLOB SERPL: 0.9 RATIO (ref 1.1–2)
ALP SERPL-CCNC: 74 UNIT/L (ref 40–150)
ALT SERPL-CCNC: 28 UNIT/L (ref 0–55)
APPEARANCE UR: CLEAR
AST SERPL-CCNC: 28 UNIT/L (ref 5–34)
BACTERIA #/AREA URNS AUTO: ABNORMAL /HPF
BASOPHILS # BLD AUTO: 0.03 X10(3)/MCL (ref 0–0.2)
BASOPHILS NFR BLD AUTO: 0.3 %
BILIRUB UR QL STRIP.AUTO: NEGATIVE MG/DL
BILIRUBIN DIRECT+TOT PNL SERPL-MCNC: 0.3 MG/DL
BUN SERPL-MCNC: 18.7 MG/DL (ref 8.4–25.7)
CALCIUM SERPL-MCNC: 9.5 MG/DL (ref 8.8–10)
CHLORIDE SERPL-SCNC: 101 MMOL/L (ref 98–107)
CO2 SERPL-SCNC: 24 MMOL/L (ref 23–31)
COLOR UR AUTO: YELLOW
CREAT SERPL-MCNC: 1.17 MG/DL (ref 0.73–1.18)
EOSINOPHIL # BLD AUTO: 0.18 X10(3)/MCL (ref 0–0.9)
EOSINOPHIL NFR BLD AUTO: 1.7 %
ERYTHROCYTE [DISTWIDTH] IN BLOOD BY AUTOMATED COUNT: 13.8 % (ref 11.5–17)
GFR SERPLBLD CREATININE-BSD FMLA CKD-EPI: >60 MLS/MIN/1.73/M2
GLOBULIN SER-MCNC: 4.4 GM/DL (ref 2.4–3.5)
GLUCOSE SERPL-MCNC: 119 MG/DL (ref 82–115)
GLUCOSE UR QL STRIP.AUTO: NORMAL MG/DL
HCT VFR BLD AUTO: 49.9 % (ref 42–52)
HGB BLD-MCNC: 16.4 GM/DL (ref 14–18)
HYALINE CASTS #/AREA URNS LPF: ABNORMAL /LPF
IMM GRANULOCYTES # BLD AUTO: 0.04 X10(3)/MCL (ref 0–0.04)
IMM GRANULOCYTES NFR BLD AUTO: 0.4 %
KETONES UR QL STRIP.AUTO: NEGATIVE MG/DL
LACTATE SERPL-SCNC: 2.1 MMOL/L (ref 0.5–2.2)
LEUKOCYTE ESTERASE UR QL STRIP.AUTO: NEGATIVE UNIT/L
LIPASE SERPL-CCNC: 21 U/L
LYMPHOCYTES # BLD AUTO: 1.46 X10(3)/MCL (ref 0.6–4.6)
LYMPHOCYTES NFR BLD AUTO: 13.6 %
MCH RBC QN AUTO: 32.3 PG (ref 27–31)
MCHC RBC AUTO-ENTMCNC: 32.9 MG/DL (ref 33–36)
MCV RBC AUTO: 98.4 FL (ref 80–94)
MONOCYTES # BLD AUTO: 0.76 X10(3)/MCL (ref 0.1–1.3)
MONOCYTES NFR BLD AUTO: 7.1 %
MUCOUS THREADS URNS QL MICRO: ABNORMAL /LPF
NEUTROPHILS # BLD AUTO: 8.3 X10(3)/MCL (ref 2.1–9.2)
NEUTROPHILS NFR BLD AUTO: 76.9 %
NITRITE UR QL STRIP.AUTO: NEGATIVE
NRBC BLD AUTO-RTO: 0 %
PH UR STRIP.AUTO: 7 [PH]
PLATELET # BLD AUTO: 223 X10(3)/MCL (ref 130–400)
PMV BLD AUTO: 10.4 FL (ref 7.4–10.4)
POTASSIUM SERPL-SCNC: 4.7 MMOL/L (ref 3.5–5.1)
PROT SERPL-MCNC: 8.2 GM/DL (ref 5.8–7.6)
PROT UR QL STRIP.AUTO: ABNORMAL MG/DL
RBC # BLD AUTO: 5.07 X10(6)/MCL (ref 4.7–6.1)
RBC #/AREA URNS AUTO: ABNORMAL /HPF
RBC UR QL AUTO: NEGATIVE UNIT/L
SODIUM SERPL-SCNC: 136 MMOL/L (ref 136–145)
SP GR UR STRIP.AUTO: 1.03
SQUAMOUS #/AREA URNS LPF: ABNORMAL /HPF
UROBILINOGEN UR STRIP-ACNC: ABNORMAL MG/DL
WBC # SPEC AUTO: 10.8 X10(3)/MCL (ref 4.5–11.5)
WBC #/AREA URNS AUTO: ABNORMAL /HPF

## 2022-09-11 PROCEDURE — 96374 THER/PROPH/DIAG INJ IV PUSH: CPT

## 2022-09-11 PROCEDURE — 83605 ASSAY OF LACTIC ACID: CPT | Performed by: STUDENT IN AN ORGANIZED HEALTH CARE EDUCATION/TRAINING PROGRAM

## 2022-09-11 PROCEDURE — 99285 EMERGENCY DEPT VISIT HI MDM: CPT | Mod: 25

## 2022-09-11 PROCEDURE — 85025 COMPLETE CBC W/AUTO DIFF WBC: CPT | Performed by: STUDENT IN AN ORGANIZED HEALTH CARE EDUCATION/TRAINING PROGRAM

## 2022-09-11 PROCEDURE — 63600175 PHARM REV CODE 636 W HCPCS: Performed by: STUDENT IN AN ORGANIZED HEALTH CARE EDUCATION/TRAINING PROGRAM

## 2022-09-11 PROCEDURE — 93005 ELECTROCARDIOGRAM TRACING: CPT

## 2022-09-11 PROCEDURE — 81001 URINALYSIS AUTO W/SCOPE: CPT | Performed by: STUDENT IN AN ORGANIZED HEALTH CARE EDUCATION/TRAINING PROGRAM

## 2022-09-11 PROCEDURE — 80053 COMPREHEN METABOLIC PANEL: CPT | Performed by: STUDENT IN AN ORGANIZED HEALTH CARE EDUCATION/TRAINING PROGRAM

## 2022-09-11 PROCEDURE — 36415 COLL VENOUS BLD VENIPUNCTURE: CPT | Performed by: STUDENT IN AN ORGANIZED HEALTH CARE EDUCATION/TRAINING PROGRAM

## 2022-09-11 PROCEDURE — 83690 ASSAY OF LIPASE: CPT | Performed by: STUDENT IN AN ORGANIZED HEALTH CARE EDUCATION/TRAINING PROGRAM

## 2022-09-11 RX ORDER — SUCRALFATE 1 G/1
1 TABLET ORAL 2 TIMES DAILY
Qty: 60 TABLET | Refills: 0 | Status: SHIPPED | OUTPATIENT
Start: 2022-09-11 | End: 2022-10-11

## 2022-09-11 RX ORDER — KETOROLAC TROMETHAMINE 30 MG/ML
15 INJECTION, SOLUTION INTRAMUSCULAR; INTRAVENOUS
Status: COMPLETED | OUTPATIENT
Start: 2022-09-11 | End: 2022-09-11

## 2022-09-11 RX ORDER — FAMOTIDINE 20 MG/1
20 TABLET, FILM COATED ORAL 2 TIMES DAILY
Qty: 60 TABLET | Refills: 0 | Status: ON HOLD | OUTPATIENT
Start: 2022-09-11 | End: 2023-04-13 | Stop reason: CLARIF

## 2022-09-11 RX ADMIN — KETOROLAC TROMETHAMINE 15 MG: 30 INJECTION, SOLUTION INTRAMUSCULAR; INTRAVENOUS at 03:09

## 2022-09-12 NOTE — ED PROVIDER NOTES
Encounter Date: 9/11/2022       History     Chief Complaint   Patient presents with    Abdominal Pain     Pt. Arrived to Ed via [facility] with reports of abd pain x 1 day. States he has occasional nausea but denies vomiting. Denies any constipation or diarrhea.      62-year-old male presents to ED for abdominal pain.  States primarily in the epigastric region however it is intermittently diffuse all over.  has not had this before. not associated with food.  To me denies any nausea or vomiting.  reports normal recent urination and bowel output.  No flank discomfort or pressure.  No fevers or chills, no trauma. has no other complaints or concerns at this time.    Review of patient's allergies indicates:   Allergen Reactions    Ace inhibitors      Other reaction(s): Angioedema    Nitroglycerin Itching     No past medical history on file.  No past surgical history on file.  No family history on file.  Social History     Tobacco Use    Smoking status: Never    Smokeless tobacco: Never   Substance Use Topics    Alcohol use: Not Currently    Drug use: Never     Review of Systems   Constitutional:  Negative for chills and fever.   HENT:  Negative for congestion, rhinorrhea and sore throat.    Eyes:  Negative for pain, discharge and itching.   Respiratory:  Negative for chest tightness and shortness of breath.    Cardiovascular:  Negative for chest pain and palpitations.   Gastrointestinal:  Positive for abdominal pain. Negative for abdominal distention, anal bleeding, blood in stool, constipation, diarrhea, nausea, rectal pain and vomiting.   Genitourinary:  Negative for dysuria and hematuria.   Musculoskeletal:  Negative for myalgias and neck pain.   Skin:  Negative for color change and rash.   Neurological:  Negative for dizziness, weakness and headaches.   Psychiatric/Behavioral:  Negative for confusion. The patient is not hyperactive.      Physical Exam     Initial Vitals [09/11/22 0218]   BP Pulse Resp Temp SpO2    130/80 70 16 97.5 °F (36.4 °C) 100 %      MAP       --         Physical Exam    Constitutional: He appears well-developed and well-nourished. He is not diaphoretic. No distress.   HENT:   Head: Normocephalic and atraumatic.   Eyes: Conjunctivae and EOM are normal. Pupils are equal, round, and reactive to light.   Neck: Neck supple. No tracheal deviation present.   Normal range of motion.  Cardiovascular:  Normal rate, regular rhythm and normal heart sounds.           Pulmonary/Chest: Breath sounds normal. No respiratory distress.   Abdominal: Abdomen is soft. There is abdominal tenderness in the epigastric area.   No right CVA tenderness.  No left CVA tenderness. There is no rebound, no guarding, no tenderness at McBurney's point and negative Jain's sign. negative Rovsing's sign  Musculoskeletal:         General: No tenderness. Normal range of motion.      Cervical back: Normal range of motion and neck supple.     Neurological: He is alert and oriented to person, place, and time. He has normal strength. GCS score is 15. GCS eye subscore is 4. GCS verbal subscore is 5. GCS motor subscore is 6.   Skin: Skin is warm and dry. Capillary refill takes less than 2 seconds. No rash noted.   Psychiatric: He has a normal mood and affect. His behavior is normal. Judgment and thought content normal.       ED Course   Procedures  Labs Reviewed   COMPREHENSIVE METABOLIC PANEL - Abnormal; Notable for the following components:       Result Value    Glucose Level 119 (*)     Protein Total 8.2 (*)     Globulin 4.4 (*)     Albumin/Globulin Ratio 0.9 (*)     All other components within normal limits   URINALYSIS, REFLEX TO URINE CULTURE - Abnormal; Notable for the following components:    Protein, UA Trace (*)     Urobilinogen, UA 2+ (*)     Mucous, UA Trace (*)     All other components within normal limits   CBC WITH DIFFERENTIAL - Abnormal; Notable for the following components:    MCV 98.4 (*)     MCH 32.3 (*)     MCHC 32.9 (*)      All other components within normal limits   LIPASE - Normal   LACTIC ACID, PLASMA - Normal   CBC W/ AUTO DIFFERENTIAL    Narrative:     The following orders were created for panel order CBC W/ AUTO DIFFERENTIAL.  Procedure                               Abnormality         Status                     ---------                               -----------         ------                     CBC with Differential[[id number]]        Abnormal            Final result                 Please view results for these tests on the individual orders.   EXTRA TUBES    Narrative:     The following orders were created for panel order EXTRA TUBES.  Procedure                               Abnormality         Status                     ---------                               -----------         ------                     Gold Top Hold[[id number]]                                    In process                   Please view results for these tests on the individual orders.   GOLD TOP HOLD     EKG Readings: (Independently Interpreted)   Initial Reading: No STEMI. Rhythm: Normal Sinus Rhythm. Ectopy: No Ectopy. Conduction: Normal. Axis: Left Axis Deviation. Clinical Impression: Normal Sinus Rhythm   ECG Results              EKG 12-lead (Final result)  Result time 09/14/22 19:10:35      Final result by Interface, Lab In [facility] (09/14/22 19:10:35)                   Narrative:    Test Reason : R10.9,    Vent. Rate : 071 BPM     Atrial Rate : 071 BPM     P-R Int : 190 ms          QRS Dur : 086 ms      QT Int : 404 ms       P-R-T Axes : 058 -50 030 degrees     QTc Int : 439 ms    Normal sinus rhythm  Biatrial enlargement  Left axis deviation  Inferior infarct ,age undetermined  Cannot rule out Anterior infarct ,age undetermined  Abnormal ECG  No previous ECGs available  Confirmed by Skinny Barakat MD ([id number]) on 9/14/2022 7:10:27 PM    Referred By: [email or web address]   SELF           Confirmed By:Skinny Barakat MD                                     EKG  12-LEAD (Final result)  Result time 09/16/22 10:43:07      Final result by Unknown User (09/16/22 10:43:07)                                      Imaging Results              CT Abdomen Pelvis  Without Contrast (Final result)  Result time 09/11/22 07:37:57      Final result by Romulo Ayala MD (09/11/22 07:37:57)                   Impression:    Impression:    No acute intraabdominal or pelvic solid organ or bowel pathology identified. Details and other findings as discussed above.    No significant discrepancy with overnight report.      Electronically signed by: Romulo Ayala  Date:    09/11/2022  Time:    07:37               Narrative:      Technique:CT of the abdomen and pelvis was performed with axial images as well as sagittal and coronal reconstruction images without intravenous contrast.    Comparison: September 5, 2018    Clinical History:Abdominal pain, acute, nonlocalized.    Dosage Information:Automated Exposure Control was utilized.    Findings:    Lines and Tubes:None.    Thorax:    Lungs:There is mild nonspecific dependent change at the lung bases.    Abdomen:    Abdominal Wall:There subtle uncomplicated umbilical hernia which contains mesenteric fat.    Liver:A 6.1 mm low-attenuating nodule is seen in hepatic segment Bhanu (image 11 series 2). This probably represents a small hepatic cyst.    Within limitations of noncontrast, no acute findings of the liver, pancreas and the spleen identified.    Biliary System:No intrahepatic or extrahepatic biliary duct dilatation is seen.    Gallbladder:The gallbladder appears unremarkable with no stones wall thickening or pericholecystic inflammatory changes or fluid.    Adrenals:The adrenal glands appear unremarkable.    Kidneys:The kidneys appear unremarkable with no stones or hydronephrosis.    Aorta:There is minimal calcification of the abdominal aorta and its branches.    Bowel:    Esophagus:There is a moderate sized hiatal hernia.    Stomach:The stomach  appears distended. Clinical correlation is suggested to rule out gastroparesis.    Duodenum:Unremarkable appearing duodenum.    Small Bowel:The small bowel appears unremarkable.    Colon:There is moderate stool in the colon which could reflect an element of constipation.    Appendix:The appendix appears unremarkable and is seen on image 67, Series 2 through image 69, Series 2?.    Peritoneum:No intraperitoneal free air or ascites is seen.    Pelvis:    Bladder:The bladder appears unremarkable.    Male:    Prostate gland:The prostate gland appears unremarkable.    Inguinal Findings:    Inguinal Hernia:Incidental note is made of small uncomplicated mesenteric fat containing right inguinal hernia.    Bony structures:    Dorsal Spine:There is moderate spondylosis of the visualized dorsal spine. A grade 1 posterior listhesis of L5 over S1 is noted.    Bony Pelvis:The visualized bony structures of the pelvis appear unremarkable.                        Preliminary result by Romulo Ayala MD (09/11/22 05:13:15)                   Narrative:    START OF REPORT:  Technique: CT of the abdomen and pelvis was performed with axial images as well as sagittal and coronal reconstruction images without intravenous contrast.    Comparison: None available.    Clinical History: Abdominal pain, acute, nonlocalized.    Dosage Information: Automated Exposure Control was utilized.    Findings:  Lines and Tubes: None.  Thorax:  Lungs: There is mild nonspecific dependent change at the lung bases.  Heart: The heart size is within normal limits.  Abdomen:  Abdominal Wall: There subtle uncomplicated umbilical hernia which contains mesenteric fat.  Liver: A 6.1 mm low-attenuating nodule is seen in hepatic segment Bhanu (image 11 series 2). This probably represents a small hepatic cyst. The liver otherwise appears unremarkable.  Biliary System: No intrahepatic or extrahepatic biliary duct dilatation is seen.  Gallbladder: The gallbladder appears  unremarkable with no stones wall thickening or pericholecystic inflammatory changes or fluid.  Pancreas: The pancreas appears unremarkable.  Spleen: The spleen appears unremarkable.  Adrenals: The adrenal glands appear unremarkable.  Kidneys: The kidneys appear unremarkable with no stones cysts masses or hydronephrosis.  Aorta: There is minimal calcification of the abdominal aorta and its branches.  IVC: Unremarkable.  Bowel:  Esophagus: There is a moderate sized hiatal hernia.  Stomach: The stomach appears distended. Clinical correlation is suggested to rule out gastroparesis.  Duodenum: Unremarkable appearing duodenum.  Small Bowel: The small bowel appears unremarkable.  Colon: There is moderate stool in the colon which could reflect an element of constipation.  Appendix: The appendix appears unremarkable and is seen on âImage 67, Series 2â through âImage 69, Series 2â.  Peritoneum: No intraperitoneal free air or ascites is seen.    Pelvis:  Bladder: The bladder appears unremarkable.  Male:  Prostate gland: The prostate gland appears unremarkable.  Inguinal Findings:  Inguinal Hernia: Incidental note is made of small uncomplicated mesenteric fat containing right inguinal hernia.    Bony structures:  Dorsal Spine: There is moderate spondylosis of the visualized dorsal spine. A grade 1 posterior listhesis of L5 over S1 is noted.  Bony Pelvis: The visualized bony structures of the pelvis appear unremarkable.      Impression:  1. No acute intraabdominal or pelvic solid organ or bowel pathology identified. Details and other findings as discussed above.                          Preliminary result by Saad Murguia MD (09/11/22 05:13:15)                   Narrative:    START OF REPORT:  Technique: CT of the abdomen and pelvis was performed with axial images as well as sagittal and coronal reconstruction images without intravenous contrast.    Comparison: None available.    Clinical History: Abdominal pain,  acute, nonlocalized.    Dosage Information: Automated Exposure Control was utilized.    Findings:  Lines and Tubes: None.  Thorax:  Lungs: There is mild nonspecific dependent change at the lung bases.  Heart: The heart size is within normal limits.  Abdomen:  Abdominal Wall: There subtle uncomplicated umbilical hernia which contains mesenteric fat.  Liver: A 6.1 mm low-attenuating nodule is seen in hepatic segment Bhanu (image 11 series 2). This probably represents a small hepatic cyst. The liver otherwise appears unremarkable.  Biliary System: No intrahepatic or extrahepatic biliary duct dilatation is seen.  Gallbladder: The gallbladder appears unremarkable with no stones wall thickening or pericholecystic inflammatory changes or fluid.  Pancreas: The pancreas appears unremarkable.  Spleen: The spleen appears unremarkable.  Adrenals: The adrenal glands appear unremarkable.  Kidneys: The kidneys appear unremarkable with no stones cysts masses or hydronephrosis.  Aorta: There is minimal calcification of the abdominal aorta and its branches.  IVC: Unremarkable.  Bowel:  Esophagus: There is a moderate sized hiatal hernia.  Stomach: The stomach appears distended. Clinical correlation is suggested to rule out gastroparesis.  Duodenum: Unremarkable appearing duodenum.  Small Bowel: The small bowel appears unremarkable.  Colon: There is moderate stool in the colon which could reflect an element of constipation.  Appendix: The appendix appears unremarkable and is seen on âImage 67, Series 2â through âImage 69, Series 2â.  Peritoneum: No intraperitoneal free air or ascites is seen.    Pelvis:  Bladder: The bladder appears unremarkable.  Male:  Prostate gland: The prostate gland appears unremarkable.  Inguinal Findings:  Inguinal Hernia: Incidental note is made of small uncomplicated mesenteric fat containing right inguinal hernia.    Bony structures:  Dorsal Spine: There is moderate spondylosis of the visualized dorsal  spine. A grade 1 posterior listhesis of L5 over S1 is noted.  Bony Pelvis: The visualized bony structures of the pelvis appear unremarkable.      Impression:  1. No acute intraabdominal or pelvic solid organ or bowel pathology identified. Details and other findings as discussed above.                                         Medications   ketorolac injection 15 mg (15 mg Intravenous Given 9/11/22 0329)     Medical Decision Making:   Clinical Tests:   Lab Tests: Reviewed and Ordered  Radiological Study: Reviewed and Ordered  Medical Tests: Reviewed and Ordered  ED Management:  62-year-old male presents to ED for abdominal pain.  States primarily in the epigastric region but also diffuse all over.  No nausea vomiting, no fever, no trauma, no association with food.  On arrival he appeared intermittently uncomfortable secondary to abdominal pain.  Worsen in the epigastric region.  No rebound guarding or rigidity.  no CVA tenderness.  Negative Jain's negative McBurney's.  Vitals overall grossly stable.  He was provided pain medicine in department with significant relief.  His laboratory analysis was not convincing.  No significant leukocytosis, negative lactic and lipase, etc.  Secondary to his age and initial presentation scanned his abdomen that did not reveal any acute significant findings.  There was a delay in obtaining the CT read.  Upon final re-evaluation patient was sleeping comfortably yet awoke easily.  He was informed of all findings and grateful for care.  I did prescribe patient reflux medication coverage and recommended close outpatient follow-up as well as strict return precautions.  Voiced understanding and ultimately stable for discharge. (D[facility]pti)             ED Course as of 09/27/22 0044   Sun Sep 11, 2022   0600 Significant delay in CT read. Images didn't cross over [RZ]      ED Course User Index  [RZ] Zana Tatum MD             Clinical Impression:   Final diagnoses:  [R10.13] Epigastric pain  (Primary)        ED Disposition Condition    Discharge Stable          ED Prescriptions       Medication Sig Dispense Start Date End Date Auth. Provider    famotidine (PEPCID) 20 MG tablet Take 1 tablet (20 mg total) by mouth 2 (two) times daily. 60 tablet 9/11/2022 10/11/2022 Zana Tatum MD    sucralfate (CARAFATE) 1 gram tablet Take 1 tablet (1 g total) by mouth 2 (two) times daily. 60 tablet 9/11/2022 10/11/2022 Zana Tatum MD          Follow-up Information       Follow up With Specialties Details Why Contact Info    [facility] - Emergency Dept Emergency Medicine  As needed, If symptoms worsen [address] [facility] [id number] [phone]    PCP  Schedule an appointment as soon as possible for a visit in 3 days               Zana Tatum MD  09/27/22 0048

## 2023-01-03 ENCOUNTER — HOSPITAL ENCOUNTER (EMERGENCY)
Facility: HOSPITAL | Age: 63
Discharge: HOME | End: 2023-01-03
Attending: INTERNAL MEDICINE
Payer: MEDICAID

## 2023-01-03 VITALS
OXYGEN SATURATION: 95 % | TEMPERATURE: 99 F | DIASTOLIC BLOOD PRESSURE: 82 MMHG | SYSTOLIC BLOOD PRESSURE: 116 MMHG | HEIGHT: 71 IN | WEIGHT: 200 LBS | HEART RATE: 82 BPM | BODY MASS INDEX: 28 KG/M2 | RESPIRATION RATE: 18 BRPM

## 2023-01-03 DIAGNOSIS — R00.2 PALPITATION: ICD-10-CM

## 2023-01-03 DIAGNOSIS — R05.9 COUGH: ICD-10-CM

## 2023-01-03 DIAGNOSIS — I50.9 ACUTE ON CHRONIC CONGESTIVE HEART FAILURE, UNSPECIFIED HEART FAILURE TYPE: Primary | ICD-10-CM

## 2023-01-03 DIAGNOSIS — R00.0 RAPID HEART BEAT: ICD-10-CM

## 2023-01-03 LAB
ALBUMIN SERPL-MCNC: 3.2 G/DL (ref 3.4–4.8)
ALBUMIN/GLOB SERPL: 0.6 RATIO (ref 1.1–2)
ALP SERPL-CCNC: 52 UNIT/L (ref 40–150)
ALT SERPL-CCNC: 16 UNIT/L (ref 0–55)
AST SERPL-CCNC: 27 UNIT/L (ref 5–34)
BASOPHILS # BLD AUTO: 0.02 X10(3)/MCL (ref 0–0.2)
BASOPHILS NFR BLD AUTO: 0.3 %
BILIRUBIN DIRECT+TOT PNL SERPL-MCNC: 1.1 MG/DL
BNP BLD-MCNC: 2133.3 PG/ML
BUN SERPL-MCNC: 11.2 MG/DL (ref 8.4–25.7)
CALCIUM SERPL-MCNC: 9.3 MG/DL (ref 8.8–10)
CHLORIDE SERPL-SCNC: 108 MMOL/L (ref 98–107)
CO2 SERPL-SCNC: 22 MMOL/L (ref 23–31)
CREAT SERPL-MCNC: 0.96 MG/DL (ref 0.73–1.18)
D DIMER PPP IA.FEU-MCNC: 0.6 UG/ML FEU (ref 0–0.5)
EOSINOPHIL # BLD AUTO: 0.08 X10(3)/MCL (ref 0–0.9)
EOSINOPHIL NFR BLD AUTO: 1 %
ERYTHROCYTE [DISTWIDTH] IN BLOOD BY AUTOMATED COUNT: 13.8 % (ref 11.6–14.4)
FLUAV AG UPPER RESP QL IA.RAPID: NOT DETECTED
FLUBV AG UPPER RESP QL IA.RAPID: NOT DETECTED
GFR SERPLBLD CREATININE-BSD FMLA CKD-EPI: 90 MLS/MIN/1.73/M2
GLOBULIN SER-MCNC: 5.2 GM/DL (ref 2.4–3.5)
GLUCOSE SERPL-MCNC: 91 MG/DL (ref 82–115)
HCT VFR BLD AUTO: 48.7 % (ref 42–52)
HGB BLD-MCNC: 15.1 GM/DL (ref 14–18)
IMM GRANULOCYTES # BLD AUTO: 0.01 X10(3)/MCL (ref 0–0.04)
IMM GRANULOCYTES NFR BLD AUTO: 0.1 %
LYMPHOCYTES # BLD AUTO: 1.46 X10(3)/MCL (ref 0.6–4.6)
LYMPHOCYTES NFR BLD AUTO: 18.8 %
MCH RBC QN AUTO: 30.3 PG
MCHC RBC AUTO-ENTMCNC: 31 MG/DL (ref 33–36)
MCV RBC AUTO: 97.8 FL (ref 80–94)
MONOCYTES # BLD AUTO: 0.88 X10(3)/MCL (ref 0.1–1.3)
MONOCYTES NFR BLD AUTO: 11.3 %
NEUTROPHILS # BLD AUTO: 5.33 X10(3)/MCL (ref 2.1–9.2)
NEUTROPHILS NFR BLD AUTO: 68.5 %
NRBC BLD AUTO-RTO: 0 % (ref 0–1)
PLATELET # BLD AUTO: 179 X10(3)/MCL (ref 140–371)
PMV BLD AUTO: 12.2 FL (ref 9.4–12.4)
POTASSIUM SERPL-SCNC: 4.2 MMOL/L (ref 3.5–5.1)
PROT SERPL-MCNC: 8.4 GM/DL (ref 5.8–7.6)
RBC # BLD AUTO: 4.98 X10(6)/MCL (ref 4.7–6.1)
SARS-COV-2 RNA RESP QL NAA+PROBE: NOT DETECTED
SODIUM SERPL-SCNC: 138 MMOL/L (ref 136–145)
TROPONIN I SERPL-MCNC: 0.05 NG/ML (ref 0–0.04)
WBC # SPEC AUTO: 7.8 X10(3)/MCL (ref 4.5–11.5)

## 2023-01-03 PROCEDURE — 84484 ASSAY OF TROPONIN QUANT: CPT | Performed by: INTERNAL MEDICINE

## 2023-01-03 PROCEDURE — 80053 COMPREHEN METABOLIC PANEL: CPT | Performed by: INTERNAL MEDICINE

## 2023-01-03 PROCEDURE — 0240U COVID/FLU A&B PCR: CPT | Performed by: INTERNAL MEDICINE

## 2023-01-03 PROCEDURE — 25000003 PHARM REV CODE 250: Performed by: INTERNAL MEDICINE

## 2023-01-03 PROCEDURE — 83880 ASSAY OF NATRIURETIC PEPTIDE: CPT | Performed by: INTERNAL MEDICINE

## 2023-01-03 PROCEDURE — 85379 FIBRIN DEGRADATION QUANT: CPT | Performed by: INTERNAL MEDICINE

## 2023-01-03 PROCEDURE — 85025 COMPLETE CBC W/AUTO DIFF WBC: CPT | Performed by: INTERNAL MEDICINE

## 2023-01-03 PROCEDURE — 99285 EMERGENCY DEPT VISIT HI MDM: CPT | Mod: 25

## 2023-01-03 PROCEDURE — 93005 ELECTROCARDIOGRAM TRACING: CPT

## 2023-01-03 RX ORDER — FUROSEMIDE 20 MG/1
20 TABLET ORAL
Status: COMPLETED | OUTPATIENT
Start: 2023-01-03 | End: 2023-01-03

## 2023-01-03 RX ADMIN — FUROSEMIDE 20 MG: 20 TABLET ORAL at 04:01

## 2023-01-03 NOTE — ED PROVIDER NOTES
Encounter Date: 1/3/2023       History     Chief Complaint   Patient presents with    Palpitations    Cough     Pt in /[facility] W CO PAL, COUGH AND UPPER BACK PAIN X 2 DAYS.  PT HX OF HIV/CHF DENIES SOB, NO FEVER REPORTED. EKG OBTAINED.      Presents with cough and palpitations for few days. States Hx of HIV and HTN. Denies fever or sick contacts. Cough is dry.    The history is provided by the patient and the EMS personnel.   Review of patient's allergies indicates:   Allergen Reactions    Ace inhibitors      Other reaction(s): Angioedema    Nitroglycerin Itching     Past Medical History:   Diagnosis Date    Cancer     CHF (congestive heart failure)     Human immunodeficiency virus (HIV) disease     Hypertension      No past surgical history on file.  No family history on file.  Social History     Tobacco Use    Smoking status: Every Day     Types: Cigarettes    Smokeless tobacco: Never   Substance Use Topics    Alcohol use: Not Currently    Drug use: Never     Review of Systems   Constitutional:  Negative for fever.   HENT:  Negative for sore throat.    Respiratory:  Positive for cough. Negative for shortness of breath.    Cardiovascular:  Positive for palpitations. Negative for chest pain.   Gastrointestinal:  Negative for nausea.   Genitourinary:  Negative for dysuria.   Musculoskeletal:  Negative for back pain.   Skin:  Negative for rash.   Neurological:  Negative for weakness.   Hematological:  Does not bruise/bleed easily.   All other systems reviewed and are negative.    Physical Exam     Initial Vitals [01/03/23 1304]   BP Pulse Resp Temp SpO2   116/82 82 18 99.3 °F (37.4 °C) 95 %      MAP       --         Physical Exam    Nursing note and vitals reviewed.  Constitutional: He appears well-developed and well-nourished. No distress.   HENT:   Head: Normocephalic and atraumatic.   Eyes: Conjunctivae and EOM are normal. Pupils are equal, round, and reactive to light.   Neck: Neck supple.   Normal range of  motion.  Cardiovascular:  Normal rate, regular rhythm, normal heart sounds and intact distal pulses.           Pulmonary/Chest: Breath sounds normal. No respiratory distress.   Abdominal: Abdomen is soft. Bowel sounds are normal. He exhibits no distension. There is no abdominal tenderness. There is no rebound and no guarding.   Musculoskeletal:         General: No edema. Normal range of motion.      Cervical back: Normal range of motion and neck supple.     Neurological: He is alert and oriented to person, place, and time. He has normal strength. GCS score is 15. GCS eye subscore is 4. GCS verbal subscore is 5. GCS motor subscore is 6.   Skin: Skin is warm and dry. No rash noted.   Psychiatric: His behavior is normal.       ED Course   Procedures  Labs Reviewed   COMPREHENSIVE METABOLIC PANEL - Abnormal; Notable for the following components:       Result Value    Chloride 108 (*)     Carbon Dioxide 22 (*)     Protein Total 8.4 (*)     Albumin Level 3.2 (*)     Globulin 5.2 (*)     Albumin/Globulin Ratio 0.6 (*)     All other components within normal limits   B-TYPE NATRIURETIC PEPTIDE - Abnormal; Notable for the following components:    Natriuretic Peptide 2,133.3 (*)     All other components within normal limits   TROPONIN I - Abnormal; Notable for the following components:    Troponin-I 0.046 (*)     All other components within normal limits   D DIMER, QUANTITATIVE - Abnormal; Notable for the following components:    D-Dimer 0.60 (*)     All other components within normal limits   CBC WITH DIFFERENTIAL - Abnormal; Notable for the following components:    MCV 97.8 (*)     MCHC 31.0 (*)     All other components within normal limits   COVID/FLU A&B PCR - Normal    Narrative:     The Xpert Xpress SARS-CoV-2/FLU/RSV plus is a rapid, multiplexed real-time PCR test intended for the simultaneous qualitative detection and differentiation of SARS-CoV-2, Influenza A, Influenza B, and respiratory syncytial virus (RSV) viral  RNA in either nasopharyngeal swab or nasal swab specimens.         CBC W/ AUTO DIFFERENTIAL    Narrative:     The following orders were created for panel order CBC auto differential.  Procedure                               Abnormality         Status                     ---------                               -----------         ------                     CBC with Differential[[id number]]        Abnormal            Final result                 Please view results for these tests on the individual orders.   EXTRA TUBES    Narrative:     The following orders were created for panel order EXTRA TUBES.  Procedure                               Abnormality         Status                     ---------                               -----------         ------                     Lavender Top Hold[[id number]]                                In process                   Please view results for these tests on the individual orders.   LAVENDER TOP HOLD   TROPONIN I     EKG Readings: (Independently Interpreted)   Initial Reading: No STEMI. Rhythm: Normal Sinus Rhythm. Heart Rate: 82. Ectopy: No Ectopy. Conduction: Normal. ST Segments: Normal ST Segments. T Waves Flipped: AVL. Axis: Left Axis Deviation. Other Findings: Prolonged QT Interval. Clinical Impression: Normal Sinus Rhythm   ECG Results              EKG 12-lead (Rapid Heart Beat / Palpitations) Age > 50 (In process)  Result time 01/03/23 13:31:41      In process by Interface, Lab In [facility] (01/03/23 13:31:41)                   Narrative:    Test Reason : R00.0,    Vent. Rate : 083 BPM     Atrial Rate : 083 BPM     P-R Int : 180 ms          QRS Dur : 108 ms      QT Int : 412 ms       P-R-T Axes : 060 -39 055 degrees     QTc Int : 484 ms    Normal sinus rhythm  Left axis deviation  Inferior infarct (cited on or before 11-SEP-2022)  Anteroseptal infarct (cited on or before 11-SEP-2022)  Abnormal ECG  When compared with ECG of 11-SEP-2022 03:22,  QRS duration has  increased  Questionable change in initial forces of Anterior-septal leads  Nonspecific T wave abnormality, improved in Lateral leads    Referred By: [email or web address]   SELF           Confirmed By:                                   Imaging Results              X-Ray Chest PA And Lateral (Final result)  Result time 01/03/23 14:07:55      Final result by Sangeeta Wadsworth MD (01/03/23 14:07:55)                   Impression:      Enlarged cardiac silhouette with pulmonary vascular congestion.  Basilar opacities may be related to interstitial edema, atelectasis or atypical infection      Electronically signed by: Sangeeta Wadsworth  Date:    01/03/2023  Time:    14:07               Narrative:    EXAMINATION:  XR CHEST PA AND LATERAL    CLINICAL HISTORY:  Cough, unspecified    TECHNIQUE:  Two views of the chest    COMPARISON:  03/15/2022    FINDINGS:  LINES AND TUBES: Right IJ joss catheter tip projects over the SVC.    MEDIASTINUM AND ANA PAULA: Cardiac silhouette is enlarged.    LUNGS: Lung volumes are low with associated atelectatic change.  Vascular congestion.  Bibasilar opacities.    PLEURA:No pleural effusion. No pneumothorax.    BONES: No acute osseous abnormality.                                       Medications   furosemide tablet 20 mg (20 mg Oral Given 1/3/23 1641)                              Clinical Impression:   Final diagnoses:  [R00.0] Rapid heart beat  [R00.2] Palpitation  [R05.9] Cough  [I50.9] Acute on chronic congestive heart failure, unspecified heart failure type (Primary)        ED Disposition Condition    Eloped Stable                Ibrahima Wright MD  01/03/23 1935

## 2023-01-24 ENCOUNTER — HOSPITAL ENCOUNTER (OUTPATIENT)
Facility: HOSPITAL | Age: 63
Discharge: HOME | End: 2023-01-26
Attending: FAMILY MEDICINE | Admitting: STUDENT IN AN ORGANIZED HEALTH CARE EDUCATION/TRAINING PROGRAM
Payer: MEDICAID

## 2023-01-24 DIAGNOSIS — I35.0 AORTIC VALVE STENOSIS, ETIOLOGY OF CARDIAC VALVE DISEASE UNSPECIFIED: ICD-10-CM

## 2023-01-24 DIAGNOSIS — F19.10 SUBSTANCE ABUSE: ICD-10-CM

## 2023-01-24 DIAGNOSIS — J18.9 COMMUNITY ACQUIRED PNEUMONIA, UNSPECIFIED LATERALITY: ICD-10-CM

## 2023-01-24 DIAGNOSIS — I50.9 ACUTE ON CHRONIC CONGESTIVE HEART FAILURE, UNSPECIFIED HEART FAILURE TYPE: Primary | ICD-10-CM

## 2023-01-24 DIAGNOSIS — B20 HIV DISEASE: ICD-10-CM

## 2023-01-24 DIAGNOSIS — R07.9 CHEST PAIN: ICD-10-CM

## 2023-01-24 LAB
ALBUMIN SERPL-MCNC: 3.3 G/DL (ref 3.4–4.8)
ALBUMIN/GLOB SERPL: 0.6 RATIO (ref 1.1–2)
ALP SERPL-CCNC: 57 UNIT/L (ref 40–150)
ALT SERPL-CCNC: 17 UNIT/L (ref 0–55)
AMPHET UR QL SCN: NEGATIVE
AORTIC ROOT ANNULUS: 2.8 CM
AST SERPL-CCNC: 33 UNIT/L (ref 5–34)
AV INDEX (PROSTH): 0.14
AV MEAN GRADIENT: 26 MMHG
AV PEAK GRADIENT: 38 MMHG
AV VALVE AREA: 0.48 CM2
AV VELOCITY RATIO: 0.15
BARBITURATE SCN PRESENT UR: NEGATIVE
BASOPHILS # BLD AUTO: 0.03 X10(3)/MCL (ref 0–0.2)
BASOPHILS NFR BLD AUTO: 0.4 %
BENZODIAZ UR QL SCN: NEGATIVE
BILIRUBIN DIRECT+TOT PNL SERPL-MCNC: 1.2 MG/DL
BNP BLD-MCNC: 4309.1 PG/ML
BSA FOR ECHO PROCEDURE: 2.17 M2
BUN SERPL-MCNC: 14.8 MG/DL (ref 8.4–25.7)
CALCIUM SERPL-MCNC: 9.1 MG/DL (ref 8.8–10)
CANNABINOIDS UR QL SCN: NEGATIVE
CHLORIDE SERPL-SCNC: 106 MMOL/L (ref 98–107)
CHOLEST SERPL-MCNC: 168 MG/DL
CHOLEST/HDLC SERPL: 6 {RATIO} (ref 0–5)
CK MB SERPL-MCNC: 1 NG/ML
CK SERPL-CCNC: 245 U/L (ref 30–200)
CO2 SERPL-SCNC: 22 MMOL/L (ref 23–31)
COCAINE UR QL SCN: POSITIVE
CREAT SERPL-MCNC: 1.14 MG/DL (ref 0.73–1.18)
CV ECHO LV RWT: 0.29 CM
DOP CALC AO PEAK VEL: 3.09 M/S
DOP CALC AO VTI: 61.3 CM
DOP CALC LVOT AREA: 3.5 CM2
DOP CALC LVOT DIAMETER: 2.1 CM
DOP CALC LVOT PEAK VEL: 0.47 M/S
DOP CALC LVOT STROKE VOLUME: 29.43 CM3
DOP CALC MV VTI: 19.8 CM
DOP CALCLVOT PEAK VEL VTI: 8.5 CM
E WAVE DECELERATION TIME: 80.23 MSEC
E/A RATIO: 1.92
E/E' RATIO: 21.64 M/S
ECHO LV POSTERIOR WALL: 0.85 CM (ref 0.6–1.1)
EJECTION FRACTION: 12 %
EOSINOPHIL # BLD AUTO: 0.28 X10(3)/MCL (ref 0–0.9)
EOSINOPHIL NFR BLD AUTO: 3.5 %
ERYTHROCYTE [DISTWIDTH] IN BLOOD BY AUTOMATED COUNT: 13.6 % (ref 11.5–17)
FENTANYL UR QL SCN: NEGATIVE
FLUAV AG UPPER RESP QL IA.RAPID: NOT DETECTED
FLUBV AG UPPER RESP QL IA.RAPID: NOT DETECTED
FRACTIONAL SHORTENING: 4 % (ref 28–44)
GFR SERPLBLD CREATININE-BSD FMLA CKD-EPI: >60 MLS/MIN/1.73/M2
GLOBULIN SER-MCNC: 5.1 GM/DL (ref 2.4–3.5)
GLUCOSE SERPL-MCNC: 100 MG/DL (ref 82–115)
HCT VFR BLD AUTO: 41.2 % (ref 42–52)
HDLC SERPL-MCNC: 28 MG/DL (ref 35–60)
HGB BLD-MCNC: 12.8 GM/DL (ref 14–18)
IMM GRANULOCYTES # BLD AUTO: 0.02 X10(3)/MCL (ref 0–0.04)
IMM GRANULOCYTES NFR BLD AUTO: 0.2 %
INTERVENTRICULAR SEPTUM: 0.9 CM (ref 0.6–1.1)
LACTATE SERPL-SCNC: 1.9 MMOL/L (ref 0.5–2.2)
LACTATE SERPL-SCNC: 2.2 MMOL/L (ref 0.5–2.2)
LACTATE SERPL-SCNC: 2.6 MMOL/L (ref 0.5–2.2)
LACTATE SERPL-SCNC: 2.7 MMOL/L (ref 0.5–2.2)
LDLC SERPL CALC-MCNC: 120 MG/DL (ref 50–140)
LEFT ATRIUM SIZE: 3.84 CM
LEFT ATRIUM VOLUME INDEX MOD: 39.3 ML/M2
LEFT ATRIUM VOLUME MOD: 84 CM3
LEFT INTERNAL DIMENSION IN SYSTOLE: 5.64 CM (ref 2.1–4)
LEFT VENTRICLE DIASTOLIC VOLUME INDEX: 84.11 ML/M2
LEFT VENTRICLE DIASTOLIC VOLUME: 180 ML
LEFT VENTRICLE MASS INDEX: 94 G/M2
LEFT VENTRICLE SYSTOLIC VOLUME INDEX: 73.8 ML/M2
LEFT VENTRICLE SYSTOLIC VOLUME: 158 ML
LEFT VENTRICULAR INTERNAL DIMENSION IN DIASTOLE: 5.89 CM (ref 3.5–6)
LEFT VENTRICULAR MASS: 201.62 G
LV LATERAL E/E' RATIO: 17 M/S
LV SEPTAL E/E' RATIO: 29.75 M/S
LVOT MG: 0.51 MMHG
LVOT MV: 0.33 CM/S
LYMPHOCYTES # BLD AUTO: 1.5 X10(3)/MCL (ref 0.6–4.6)
LYMPHOCYTES NFR BLD AUTO: 18.7 %
MAGNESIUM SERPL-MCNC: 2.2 MG/DL (ref 1.6–2.6)
MCH RBC QN AUTO: 29.6 PG
MCHC RBC AUTO-ENTMCNC: 31.1 MG/DL (ref 33–36)
MCV RBC AUTO: 95.4 FL (ref 80–94)
MDMA UR QL SCN: NEGATIVE
MONOCYTES # BLD AUTO: 0.75 X10(3)/MCL (ref 0.1–1.3)
MONOCYTES NFR BLD AUTO: 9.3 %
MV MEAN GRADIENT: 2 MMHG
MV PEAK A VEL: 0.62 M/S
MV PEAK E VEL: 1.19 M/S
MV PEAK GRADIENT: 6 MMHG
MV STENOSIS PRESSURE HALF TIME: 23.27 MS
MV VALVE AREA BY CONTINUITY EQUATION: 1.49 CM2
MV VALVE AREA P 1/2 METHOD: 9.45 CM2
NEUTROPHILS # BLD AUTO: 5.45 X10(3)/MCL (ref 2.1–9.2)
NEUTROPHILS NFR BLD AUTO: 67.9 %
NRBC BLD AUTO-RTO: 0 %
OPIATES UR QL SCN: NEGATIVE
PCP UR QL: NEGATIVE
PH UR: 7 [PH] (ref 3–11)
PHOSPHATE SERPL-MCNC: 3.3 MG/DL (ref 2.3–4.7)
PISA TR MAX VEL: 3.24 M/S
PLATELET # BLD AUTO: 209 X10(3)/MCL (ref 130–400)
PMV BLD AUTO: 10.7 FL (ref 7.4–10.4)
POTASSIUM SERPL-SCNC: 4.1 MMOL/L (ref 3.5–5.1)
PROT SERPL-MCNC: 8.4 GM/DL (ref 5.8–7.6)
RA PRESSURE: 15 MMHG
RBC # BLD AUTO: 4.32 X10(6)/MCL (ref 4.7–6.1)
RIGHT VENTRICULAR END-DIASTOLIC DIMENSION: 3.85 CM
SARS-COV-2 RNA RESP QL NAA+PROBE: NOT DETECTED
SODIUM SERPL-SCNC: 137 MMOL/L (ref 136–145)
TDI LATERAL: 0.07 M/S
TDI SEPTAL: 0.04 M/S
TDI: 0.06 M/S
TR MAX PG: 42 MMHG
TRIGL SERPL-MCNC: 101 MG/DL (ref 34–140)
TROPONIN I SERPL-MCNC: 0.07 NG/ML (ref 0–0.04)
TROPONIN I SERPL-MCNC: 0.08 NG/ML (ref 0–0.04)
TROPONIN I SERPL-MCNC: 0.08 NG/ML (ref 0–0.04)
TROPONIN I SERPL-MCNC: 0.09 NG/ML (ref 0–0.04)
TV REST PULMONARY ARTERY PRESSURE: 57 MMHG
VLDLC SERPL CALC-MCNC: 20 MG/DL
WBC # SPEC AUTO: 8 X10(3)/MCL (ref 4.5–11.5)

## 2023-01-24 PROCEDURE — 82550 ASSAY OF CK (CPK): CPT | Performed by: FAMILY MEDICINE

## 2023-01-24 PROCEDURE — 84100 ASSAY OF PHOSPHORUS: CPT

## 2023-01-24 PROCEDURE — 93005 ELECTROCARDIOGRAM TRACING: CPT

## 2023-01-24 PROCEDURE — 99285 EMERGENCY DEPT VISIT HI MDM: CPT | Mod: 25

## 2023-01-24 PROCEDURE — 82553 CREATINE MB FRACTION: CPT | Performed by: FAMILY MEDICINE

## 2023-01-24 PROCEDURE — 25000003 PHARM REV CODE 250

## 2023-01-24 PROCEDURE — G0378 HOSPITAL OBSERVATION PER HR: HCPCS

## 2023-01-24 PROCEDURE — 80061 LIPID PANEL: CPT

## 2023-01-24 PROCEDURE — 84484 ASSAY OF TROPONIN QUANT: CPT

## 2023-01-24 PROCEDURE — 86361 T CELL ABSOLUTE COUNT: CPT

## 2023-01-24 PROCEDURE — 36415 COLL VENOUS BLD VENIPUNCTURE: CPT

## 2023-01-24 PROCEDURE — 96376 TX/PRO/DX INJ SAME DRUG ADON: CPT | Mod: 59

## 2023-01-24 PROCEDURE — 63600175 PHARM REV CODE 636 W HCPCS: Performed by: FAMILY MEDICINE

## 2023-01-24 PROCEDURE — 96375 TX/PRO/DX INJ NEW DRUG ADDON: CPT | Mod: 59

## 2023-01-24 PROCEDURE — 25000003 PHARM REV CODE 250: Performed by: FAMILY MEDICINE

## 2023-01-24 PROCEDURE — 83880 ASSAY OF NATRIURETIC PEPTIDE: CPT | Performed by: FAMILY MEDICINE

## 2023-01-24 PROCEDURE — 80053 COMPREHEN METABOLIC PANEL: CPT | Performed by: FAMILY MEDICINE

## 2023-01-24 PROCEDURE — 25000242 PHARM REV CODE 250 ALT 637 W/ HCPCS: Performed by: FAMILY MEDICINE

## 2023-01-24 PROCEDURE — 63600175 PHARM REV CODE 636 W HCPCS

## 2023-01-24 PROCEDURE — 87040 BLOOD CULTURE FOR BACTERIA: CPT | Performed by: FAMILY MEDICINE

## 2023-01-24 PROCEDURE — 96365 THER/PROPH/DIAG IV INF INIT: CPT | Mod: 59

## 2023-01-24 PROCEDURE — 0240U COVID/FLU A&B PCR: CPT | Performed by: FAMILY MEDICINE

## 2023-01-24 PROCEDURE — 84484 ASSAY OF TROPONIN QUANT: CPT | Performed by: FAMILY MEDICINE

## 2023-01-24 PROCEDURE — 83735 ASSAY OF MAGNESIUM: CPT

## 2023-01-24 PROCEDURE — 87536 HIV-1 QUANT&REVRSE TRNSCRPJ: CPT

## 2023-01-24 PROCEDURE — 94640 AIRWAY INHALATION TREATMENT: CPT

## 2023-01-24 PROCEDURE — 94761 N-INVAS EAR/PLS OXIMETRY MLT: CPT

## 2023-01-24 PROCEDURE — 80307 DRUG TEST PRSMV CHEM ANLYZR: CPT

## 2023-01-24 PROCEDURE — 83605 ASSAY OF LACTIC ACID: CPT | Performed by: FAMILY MEDICINE

## 2023-01-24 PROCEDURE — 85025 COMPLETE CBC W/AUTO DIFF WBC: CPT | Performed by: FAMILY MEDICINE

## 2023-01-24 RX ORDER — METOPROLOL TARTRATE 1 MG/ML
5 INJECTION, SOLUTION INTRAVENOUS EVERY 5 MIN PRN
Status: DISCONTINUED | OUTPATIENT
Start: 2023-01-24 | End: 2023-01-24

## 2023-01-24 RX ORDER — IBUPROFEN 200 MG
24 TABLET ORAL
Status: DISCONTINUED | OUTPATIENT
Start: 2023-01-24 | End: 2023-01-26 | Stop reason: HOSPADM

## 2023-01-24 RX ORDER — CARVEDILOL 12.5 MG/1
12.5 TABLET ORAL 2 TIMES DAILY
Status: DISCONTINUED | OUTPATIENT
Start: 2023-01-24 | End: 2023-01-24

## 2023-01-24 RX ORDER — ASPIRIN 81 MG/1
81 TABLET ORAL DAILY
Status: DISCONTINUED | OUTPATIENT
Start: 2023-01-24 | End: 2023-01-26 | Stop reason: HOSPADM

## 2023-01-24 RX ORDER — FUROSEMIDE 10 MG/ML
60 INJECTION INTRAMUSCULAR; INTRAVENOUS
Status: DISCONTINUED | OUTPATIENT
Start: 2023-01-25 | End: 2023-01-25

## 2023-01-24 RX ORDER — FUROSEMIDE 10 MG/ML
60 INJECTION INTRAMUSCULAR; INTRAVENOUS ONCE
Status: COMPLETED | OUTPATIENT
Start: 2023-01-24 | End: 2023-01-24

## 2023-01-24 RX ORDER — NALOXONE HCL 0.4 MG/ML
0.02 VIAL (ML) INJECTION
Status: DISCONTINUED | OUTPATIENT
Start: 2023-01-24 | End: 2023-01-26 | Stop reason: HOSPADM

## 2023-01-24 RX ORDER — IPRATROPIUM BROMIDE AND ALBUTEROL SULFATE 2.5; .5 MG/3ML; MG/3ML
3 SOLUTION RESPIRATORY (INHALATION)
Status: COMPLETED | OUTPATIENT
Start: 2023-01-24 | End: 2023-01-24

## 2023-01-24 RX ORDER — IBUPROFEN 200 MG
16 TABLET ORAL
Status: DISCONTINUED | OUTPATIENT
Start: 2023-01-24 | End: 2023-01-26 | Stop reason: HOSPADM

## 2023-01-24 RX ORDER — SULFAMETHOXAZOLE AND TRIMETHOPRIM 800; 160 MG/1; MG/1
1 TABLET ORAL DAILY
Status: DISCONTINUED | OUTPATIENT
Start: 2023-01-24 | End: 2023-01-26 | Stop reason: HOSPADM

## 2023-01-24 RX ORDER — GLUCAGON 1 MG
1 KIT INJECTION
Status: DISCONTINUED | OUTPATIENT
Start: 2023-01-24 | End: 2023-01-26 | Stop reason: HOSPADM

## 2023-01-24 RX ORDER — DEXTROSE MONOHYDRATE 100 MG/ML
12.5 INJECTION, SOLUTION INTRAVENOUS
Status: DISCONTINUED | OUTPATIENT
Start: 2023-01-24 | End: 2023-01-26 | Stop reason: HOSPADM

## 2023-01-24 RX ORDER — SODIUM CHLORIDE 0.9 % (FLUSH) 0.9 %
10 SYRINGE (ML) INJECTION EVERY 12 HOURS PRN
Status: DISCONTINUED | OUTPATIENT
Start: 2023-01-24 | End: 2023-01-26 | Stop reason: HOSPADM

## 2023-01-24 RX ORDER — DEXTROSE MONOHYDRATE 100 MG/ML
25 INJECTION, SOLUTION INTRAVENOUS
Status: DISCONTINUED | OUTPATIENT
Start: 2023-01-24 | End: 2023-01-26 | Stop reason: HOSPADM

## 2023-01-24 RX ORDER — FUROSEMIDE 10 MG/ML
40 INJECTION INTRAMUSCULAR; INTRAVENOUS
Status: COMPLETED | OUTPATIENT
Start: 2023-01-24 | End: 2023-01-24

## 2023-01-24 RX ORDER — CARVEDILOL 3.12 MG/1
6.25 TABLET ORAL 2 TIMES DAILY
Status: DISCONTINUED | OUTPATIENT
Start: 2023-01-24 | End: 2023-01-26 | Stop reason: HOSPADM

## 2023-01-24 RX ADMIN — CARVEDILOL 6.25 MG: 3.12 TABLET, FILM COATED ORAL at 09:01

## 2023-01-24 RX ADMIN — SULFAMETHOXAZOLE AND TRIMETHOPRIM 1 TABLET: 800; 160 TABLET ORAL at 11:01

## 2023-01-24 RX ADMIN — IPRATROPIUM BROMIDE AND ALBUTEROL SULFATE 3 ML: 2.5; .5 SOLUTION RESPIRATORY (INHALATION) at 05:01

## 2023-01-24 RX ADMIN — APIXABAN 5 MG: 2.5 TABLET, FILM COATED ORAL at 09:01

## 2023-01-24 RX ADMIN — APIXABAN 5 MG: 2.5 TABLET, FILM COATED ORAL at 11:01

## 2023-01-24 RX ADMIN — FUROSEMIDE 60 MG: 10 INJECTION INTRAMUSCULAR; INTRAVENOUS at 05:01

## 2023-01-24 RX ADMIN — CARVEDILOL 6.25 MG: 3.12 TABLET, FILM COATED ORAL at 11:01

## 2023-01-24 RX ADMIN — CEFTRIAXONE SODIUM 1 G: 1 INJECTION, POWDER, FOR SOLUTION INTRAMUSCULAR; INTRAVENOUS at 09:01

## 2023-01-24 RX ADMIN — FUROSEMIDE 40 MG: 10 INJECTION, SOLUTION INTRAMUSCULAR; INTRAVENOUS at 08:01

## 2023-01-24 RX ADMIN — ASPIRIN 81 MG: 81 TABLET, COATED ORAL at 11:01

## 2023-01-24 NOTE — CONSULTS
[facility]  Cardiology Inpatient Consultation    1/24/2023  5:08 PM    Attending Cardiologist: Dr. 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 Patient ran out of most of his heart failure medications 2 months ago. He is still doing cocaine. He states for 2 days he has been very short of breath with dyspnea. He has some appetite loss. He has only been taking lasix 60mg daily and eliquis with poor UOP. He states he has no chest pain or leg swelling or palpitations. Denies fevers or chills. He is coughing. He is normotensive with sinus tachycardia. His BNP is >4000. Troponin consistent with demand ischemia. CXR with diffuse interstitial infiltrates and a RLL opacity. Cardiology consulted for ADHF and hx of AS.     Past Medical History:  Past Medical History:   Diagnosis Date    Cancer     CHF (congestive heart failure)     Human immunodeficiency virus (HIV) disease     Hypertension        Past Surgical History:  History reviewed. No pertinent surgical history.    Family History:  History reviewed. No pertinent family history.    Social History:  Social History     Socioeconomic History    Marital status: Single   Tobacco Use    Smoking status: Every Day     Types: Cigarettes    Smokeless tobacco: Never   Substance and Sexual Activity    Alcohol use: Not Currently    Drug use: Never     Social Determinants of Health     Financial Resource Strain: Low Risk     Difficulty of Paying Living Expenses: Not very hard   Food Insecurity: No Food Insecurity    Worried About Running Out of Food in the Last Year: Never true    Ran Out of Food in the Last Year: Never true   Transportation Needs: No  Transportation Needs    Lack of Transportation (Medical): No    Lack of Transportation (Non-Medical): No   Physical Activity: Insufficiently Active    Days of Exercise per Week: 4 days    Minutes of Exercise per Session: 30 min   Stress: No Stress Concern Present    Feeling of Stress : Not at all   Social Connections: Socially Isolated    Frequency of Communication with Friends and Family: Twice a week    Frequency of Social Gatherings with Friends and Family: Once a week    Attends [employer] Services: Never    Active Member of Clubs or Organizations: No    Attends Club or Organization Meetings: Never    Marital Status: Never    Housing Stability: Low Risk     Unable to Pay for Housing in the Last Year: No    Number of Places Lived in the Last Year: 1    Unstable Housing in the Last Year: No       Allergies:   Review of patient's allergies indicates:   Allergen Reactions    Ace inhibitors      Other reaction(s): Angioedema    Nitroglycerin Itching       Hospital Medications:  Prior to Admission medications    Medication Sig Start Date End Date Taking? Authorizing Provider   apixaban (ELIQUIS) 5 mg Tab Take 5 mg by mouth. 11/30/21   Historical Provider   aspirin (ECOTRIN) 81 MG EC tablet Take 81 mg by mouth. 11/30/21   Historical Provider   acuqlspjk-txeksbkl-gcokdez ala (BIKTARVY) -25 mg (25 kg or greater) Take by mouth. 11/30/21   Historical Provider   carvediloL (COREG) 12.5 MG tablet Take 12.5 mg by mouth. 11/30/21 6/28/22  Historical Provider   clopidogreL (PLAVIX) 75 mg tablet Take 75 mg by mouth.    Historical Provider   diltiaZEM (TIAZAC) 360 MG Cs24 Take 360 mg by mouth.    Historical Provider   dolutegravir (TIVICAY) 50 mg Tab Take 50 mg by mouth.    Historical Provider   emtricitabine-tenofovir alafen (DESCOVY) 200-25 mg Tab Take 1 tablet by mouth once daily.    Historical Provider   famotidine (PEPCID) 20 MG tablet Take 1 tablet (20 mg total) by mouth 2 (two) times daily. 9/11/22 10/11/22   "Zana Tatum MD   food supplemt, lactose-reduced Liqd Take 1 Bottle by mouth.    Historical Provider   furosemide (LASIX) 20 MG tablet Take 60 mg by mouth. 11/30/21   Historical Provider   losartan (COZAAR) 25 MG tablet   See Instructions, TAKE ONE TABLET BY MOUTH DAILY, # 30 tab(s), 5 Refill(s), Pharmacy: [facility], 179, cm, Height/Length Dosing, 11/27/21 15:53:00 CST, 94.1, kg, Weight Dosing, 11/27/21 15:53:00 CST 1/18/22   Historical Provider   megestroL (MEGACE) 400 mg/10 mL (40 mg/mL) Susp Take 200 mg by mouth.    Historical Provider   morphine (MSIR) 15 MG tablet Take 15 mg by mouth.    Historical Provider   polyethylene glycol 3350,bulk, Powd by Other route.    Historical Provider   spironolactone (ALDACTONE) 25 MG tablet   See Instructions, TAKE 1 TABLET BY MOUTH DAILY, # 30 tab(s), 6 Refill(s), Pharmacy: [facility] 179, cm, Height/Length Dosing, 11/27/21 15:53:00 CST, 94.1, kg, Weight Dosing, 11/27/21 15:53:00 CST 11/30/21   Historical Provider   sulfamethoxazole-trimethoprim 800-160mg (BACTRIM DS) 800-160 mg Tab Take 1 tablet by mouth once daily. 4/19/22   Historical Provider   VITAMIN D2 1,250 mcg (50,000 unit) capsule Take 50,000 Units by mouth every 7 days. 4/20/22   Historical Provider       Review of Systems:  Up to 10 point review of systems is negative unless noted in HPI or overall note    PHYSICAL EXAM:  BP 99/78   Pulse 94   Temp 98.4 °F (36.9 °C) (Tympanic)   Resp (!) 30   Ht 5' 10.98" (1.803 m)   Wt 94.3 kg (208 lb)   SpO2 97%   BMI 29.02 kg/m²   General: Alert and oriented. No acute distress. Speaking in full sentences.   HEENT: Normocephalic. Atraumatic  Neck: Supple. JVD to jaw  Heart: RRR. S1 and S2 heard. No murmurs heard. No pitting edema BLE.  Lungs: Diffuse posterior crackles L >R. Mildly labored respirations. No supplemental O2  GI: No tenderness or distention  : No ballesteros  Skin: No venous stasis changes in BLE.   MSK: No deformities  Neuro: " Alert    CARDIOVASCULAR STUDIES:  Pertinent cardiovascular studies including labs, imaging, cath reports, echo reports, CT, cMRI, etc were independently reviewed in this patients care and reviewed below as needed.       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 He has an EF <20 with global hypokinesis and G2DD. He also has aortic stenosis which is possible low flow low gradient with prior valve area of 0.5cm2. There is a minimal troponin elevation most consistent with demand ischemia in ADHF and not acute plaque rupture.     PLAN/RECOMMENDATIONS:    - Continue aspirin 81mg daily and atorvastatin 40mg daily for LDL goal <70  - Continue eliquis 5mg BID for afib  - Continue carvedilol 6.25mg BID  - Lasix 60mg IV BID. Goal diuresis at least net negative 2-3L for today. K >4 and Mag >2  - Add entresto 24-26mg BID either tomorrow or Thursday based on volume state.   - Plan for possible inpatient dobutamine stress echo (aortic valve protocol) to evaluate low flow low gradient AS. Would avoid aggressive uptitration of carvedilol until this is done though it can be held the morning of the test if uptitrated.   - TTE read pending  - Evaluate for possible infectious causes of chest imaging findings   - Eventual discussion about ICD though uncontrolled HIV/AIDS and prior immunologic malignancy put both ICD and possible future valve replacement (if needed) at risk of infection.  - Will arrange outpatient followup at [facility]    Moy Granados MD  [employer] Cardiology Fellow

## 2023-01-24 NOTE — ED PROVIDER NOTES
Encounter Date: 1/24/2023       History     Chief Complaint   Patient presents with    Shortness of Breath     States cough with worsening SOB last 3 days.  States cough causing chest discomfort.      Cough     Patient is a 63 y/o male presenting to the ER with complaints of cough, shortness of breath, and productive sputum x 3 days and associated chest pain with coughing.  No fever or chills.  No body aches.  When symptoms did not improve, patient presented to the ER for evaluation.  Patient reports history of hypertension, CHF, HIV.  Patient currently smokes daily.    The history is provided by the patient.   Review of patient's allergies indicates:   Allergen Reactions    Ace inhibitors      Other reaction(s): Angioedema    Nitroglycerin Itching     Past Medical History:   Diagnosis Date    Cancer     CHF (congestive heart failure)     Human immunodeficiency virus (HIV) disease     Hypertension      History reviewed. No pertinent surgical history.  History reviewed. No pertinent family history.  Social History     Tobacco Use    Smoking status: Every Day     Types: Cigarettes    Smokeless tobacco: Never   Substance Use Topics    Alcohol use: Not Currently    Drug use: Never     Review of Systems   Constitutional:  Negative for chills, fatigue and fever.   HENT:  Negative for ear pain, rhinorrhea and sore throat.    Eyes:  Negative for photophobia and pain.   Respiratory:  Positive for cough and shortness of breath. Negative for wheezing.    Cardiovascular:  Positive for chest pain.   Gastrointestinal:  Negative for abdominal pain, diarrhea, nausea and vomiting.   Genitourinary:  Negative for dysuria.   Neurological:  Negative for dizziness, weakness and headaches.   All other systems reviewed and are negative.    Physical Exam     Initial Vitals [01/24/23 0442]   BP Pulse Resp Temp SpO2   119/81 109 (!) 21 98.4 °F (36.9 °C) 97 %      MAP       --         Physical Exam    Nursing note and vitals  reviewed.  Constitutional: He appears well-developed and well-nourished.   HENT:   Head: Normocephalic and atraumatic.   Eyes: EOM are normal. Pupils are equal, round, and reactive to light.   Neck: Neck supple.   Normal range of motion.  Cardiovascular:  Regular rhythm, normal heart sounds and intact distal pulses.     Exam reveals no gallop and no friction rub.       No murmur heard.  Tachycardia, regular rhythm   Pulmonary/Chest: No respiratory distress. He has wheezes. He has rhonchi. He has rales. He exhibits no tenderness.   Abdominal: Abdomen is soft. Bowel sounds are normal. He exhibits no distension. There is no abdominal tenderness.   Musculoskeletal:         General: Normal range of motion.      Cervical back: Normal range of motion and neck supple.     Neurological: He is alert and oriented to person, place, and time. He has normal strength.   Skin: Skin is warm and dry.   Psychiatric: He has a normal mood and affect. His behavior is normal. Judgment and thought content normal.       ED Course   Procedures  Labs Reviewed   COMPREHENSIVE METABOLIC PANEL - Abnormal; Notable for the following components:       Result Value    Carbon Dioxide 22 (*)     Protein Total 8.4 (*)     Albumin Level 3.3 (*)     Globulin 5.1 (*)     Albumin/Globulin Ratio 0.6 (*)     All other components within normal limits   CK - Abnormal; Notable for the following components:    Creatine Kinase 245 (*)     All other components within normal limits   TROPONIN I - Abnormal; Notable for the following components:    Troponin-I 0.075 (*)     All other components within normal limits   B-TYPE NATRIURETIC PEPTIDE - Abnormal; Notable for the following components:    Natriuretic Peptide 4,309.1 (*)     All other components within normal limits   CBC WITH DIFFERENTIAL - Abnormal; Notable for the following components:    RBC 4.32 (*)     Hgb 12.8 (*)     Hct 41.2 (*)     MCV 95.4 (*)     MCHC 31.1 (*)     MPV 10.7 (*)     All other  components within normal limits   CK-MB - Normal   BLOOD CULTURE [facility]   BLOOD CULTURE [facility]   CBC W/ AUTO DIFFERENTIAL    Narrative:     The following orders were created for panel order CBC Auto Differential.  Procedure                               Abnormality         Status                     ---------                               -----------         ------                     CBC with Differential[[id number]]        Abnormal            Final result                 Please view results for these tests on the individual orders.   URINALYSIS, REFLEX TO URINE CULTURE   COVID/FLU A&B PCR   LACTIC ACID, PLASMA     EKG Readings: (Independently Interpreted)   Initial Reading: No STEMI. Previous EKG: Compared with most recent EKG Previous EKG Date: 01/03/2023.   My Independent EKG Interpretation  01/24/2023 5:41 AM  Rate: 109 bpm  Rhythm: Sinus  Axis: Leftward  Intervals: Normal  ST Changes: Nonspecific  Impression: Normal sinus rhythm with nonspecific ST changes.       ECG Results              EKG 12-lead (In process)  Result time 01/24/23 06:35:01      In process by Interface, Lab In [facility] (01/24/23 06:35:01)                   Narrative:    Test Reason : R07.9,    Vent. Rate : 109 BPM     Atrial Rate : 109 BPM     P-R Int : 164 ms          QRS Dur : 110 ms      QT Int : 358 ms       P-R-T Axes : 055 -58 080 degrees     QTc Int : 482 ms    Sinus tachycardia with Fusion complexes  Possible Left atrial enlargement  Left axis deviation  Inferior infarct (cited on or before 11-SEP-2022)  Anteroseptal infarct (cited on or before 11-SEP-2022)  Abnormal ECG  When compared with ECG of 03-JAN-2023 13:19,  Fusion complexes are now Present    Referred By: [email or web address]   SELF           Confirmed By:                                   Imaging Results              X-Ray Chest 1 View (Final result)  Result time 01/24/23 06:21:08      Final result by Nae Burden MD (01/24/23 06:21:08)                   Impression:      Increasing  interstitial opacities in the right lower lung, may be infectious or inflammatory.      Electronically signed by: Nae Burden  Date:    01/24/2023  Time:    06:21               Narrative:    EXAMINATION:  XR CHEST 1 VIEW    CLINICAL HISTORY:  Dyspnea;    TECHNIQUE:  AP chest    COMPARISON:  Chest x-ray dated 01/03/2023    FINDINGS:  Right chest wall port catheter is unchanged.  The heart is stable in size.  There are increased interstitial opacities in the right lower lung.  There is no pleural effusion or visible pneumothorax.                        ED Interpretation by Tej Osborne MD (01/24/23 05:50:23, [facility], Emergency Medicine) Flagged as Abnormal    Right lower lobe infiltrate.                                     Medications   furosemide injection 40 mg (has no administration in time range)   cefTRIAXone (ROCEPHIN) 1 g in dextrose 5 % in water (D5W) 5 % 50 mL IVPB (MB+) (has no administration in time range)   albuterol-ipratropium 2.5 mg-0.5 mg/3 mL nebulizer solution 3 mL (3 mLs Nebulization Given 1/24/23 0523)                 ED Course as of 01/24/23 0738   Tue Jan 24, 2023   0541 WBC: 8.0 [MW]   0547 Hemoglobin(!): 12.8 [MW]   0547 Hematocrit(!): 41.2 [MW]   0547 Platelets: 209 [MW]   0548 CPK(!): 245 [MW]   0548 Sodium: 137 [MW]   0548 Potassium: 4.1 [MW]   0548 Chloride: 106 [MW]   0548 CO2(!): 22 [MW]   0548 Glucose: 100 [MW]   0548 BUN: 14.8 [MW]   0548 Creatinine: 1.14 [MW]   0548 Calcium: 9.1 [MW]   0548 PROTEIN TOTAL(!): 8.4 [MW]   0548 Albumin(!): 3.3 [MW]   0548 Globulin, Total(!): 5.1 [MW]   0641 On re-evaluation the patient, patient noted to be tachycardic and remains hypertensive.  Patient has elevated BNP concerning for CHF.  On x-ray evaluation, noted to have infiltrate in the right lower lobe.  Discussed with patient, and will admit for CHF exacerbation as well as potential right lower lobe community-acquired pneumonia.  Internal medicine consulted  for admission. [MW]      ED Course User Index  [MW] Tej Osborne MD                 Clinical Impression:   Final diagnoses:  [R07.9] Chest pain  [J18.9] Community acquired pneumonia, unspecified laterality (Primary)  [I50.9] Acute on chronic congestive heart failure, unspecified heart failure type        ED Disposition Condition    Observation Stable                Tej Osborne MD  01/24/23 0788

## 2023-01-24 NOTE — PLAN OF CARE
PGY2 ADDENDUM:      Patient was seen in conjunction with intern, agree with assessment and plan on initial IM H&P with included inclusions and addendum. Of note, the patient is a 62 y.o. male with PMH of HFrEF, VHD, Atrial Fibrillation on Eliquis, CAD s/p GERDA to LCX (2017), HIV (on Biktarvy and Bactrim), Chronic Hepatitis B, and Diffuse Large B-cell lymphoma in remission.  He presented to [facility] ED on 1/24/2023 with a primary complaint of SOB for 2-3 days. Has reduced exercise capacity. Unable to lie flat for past 2 days due to orthopnea. Has been out of all medications except for lasix and eliquis for 2 months now.     Physical Exam  Vitals and nursing note reviewed.   Constitutional:       General: He is not in acute distress.     Appearance: Normal appearance. He is not ill-appearing, toxic-appearing or diaphoretic.   HENT:      Head: Normocephalic and atraumatic.      Mouth/Throat:      Mouth: Mucous membranes are moist.      Pharynx: Oropharynx is clear.   Eyes:      Extraocular Movements: Extraocular movements intact.      Pupils: Pupils are equal, round, and reactive to light.   Cardiovascular:      Rate and Rhythm: Normal rate and regular rhythm.   Pulmonary:      Effort: Pulmonary effort is normal. No respiratory distress.      Breath sounds: Normal breath sounds. No stridor. No wheezing, rhonchi or rales.   Abdominal:      Palpations: Abdomen is soft.   Musculoskeletal:         General: No swelling.      Right lower leg: No edema.      Left lower leg: No edema.   Skin:     General: Skin is warm and dry.   Neurological:      General: No focal deficit present.      Mental Status: He is alert and oriented to person, place, and time.        Assessment & Plan:     Acute on chronic CHF  VHD (AS)  History of CAD s/p GERDA to Lcx 2017  Atrial Fibrillation on Eliquis  HIV  Hepatitis B  Diffuse large B-cell lymphoma stage IVB in remission  Cocaine user    Will get Updated ECHO  Continue Aspirin and Eliquis  On Coreg  12.5, Diltiazem 360, Aldactone 25, and losartan 25 at home. Start Coreg 6.25 for now. Resume others tomorrow  Trend Troponin with EKG q6  Lasix 60 IV BID. Strict I/O w/ daily weight.  HIV labs. Hold Biktarvy as he has not taken for 2 months likely resistant at this point. Can continue OI ppx      Michael Ramesh, DO  [employer] [facility]

## 2023-01-24 NOTE — H&P
[facility] Medicine Wards History & Physical Note     Date of Admit: 2023    Chief Complaint     Shortness of breath    Subjective:      History of Present Illness:  Jason Perdue is a 62 y.o. male who with a history of atrial fibrillation on Eliquis, CHF (21 LVEF 20-25%), HIV (Biktarvy, Tivicay, Descovy, Bactrim), HTN, CAD s/p PCI 2019, Diffuse large B cell lymphoma in remission,  who presented to [facility] ED on 2023 with a primary complaint of 2 day history of worsening SOB, LOVING, and orthopnea.    Patient's is ambulatory, 1-2 miles, and does not have any orthopnea at baseline. Over the past 2 days, he has gained 5 pounds and has been reporting orthopnea and LOVING. Can only walk 100 feet. Also reports white sputum production. Has been out of his meds for 2 months, and is currently only taking lasix 60 and eliquis. Reports non-radiating chest pain from coughing, and it is worse with coughing. Denies fevers and chills, abdominal pain, nausea/vomiting/diarrhea.     In the ED, patient was resting uncomfortably in bed. Vitals Tmax 98.4F, , /81, RR 21, 97% on RA. Physical examination showed irregularly irregular rhythm, no murmurs, rubs, or gallops appreciated, no crackles appreciated, JVD, no lower extremity edema noted. Labs significant for BNP 4300, , trop of 0.075. CXR showed increasing interstitial opacities in RLL.     Medicine consulted for admission for CHF exacerbation.     PMHx: as listed above  PSHx: Hx of kidney stones, CAD s/p PCI 2019  FH: T2DM in both parents, Stroke and HTN in mother. Brother  of colon CA 25-30 years ago  SH: Smokes 0.5 ppd for 20 years, denies alcohol usage, admits to cocaine usage. Lives alone but sister lives very close.    Past Medical History:  Past Medical History:   Diagnosis Date    Cancer     CHF (congestive heart failure)     Human immunodeficiency virus (HIV) disease     Hypertension        Past Surgical History:  History reviewed. No pertinent surgical  history.    Family History:  History reviewed. No pertinent family history.    Social History:  Social History     Tobacco Use    Smoking status: Every Day     Types: Cigarettes    Smokeless tobacco: Never   Substance Use Topics    Alcohol use: Not Currently    Drug use: Never       Allergies:  Review of patient's allergies indicates:   Allergen Reactions    Ace inhibitors      Other reaction(s): Angioedema    Nitroglycerin Itching       Home Medications:  Prior to Admission medications    Medication Sig Start Date End Date Taking? Authorizing Provider   apixaban (ELIQUIS) 5 mg Tab Take 5 mg by mouth. 11/30/21   Historical Provider   aspirin (ECOTRIN) 81 MG EC tablet Take 81 mg by mouth. 11/30/21   Historical Provider   lkmamtpll-bboncnli-okhcgsh ala (BIKTARVY) -25 mg (25 kg or greater) Take by mouth. 11/30/21   Historical Provider   carvediloL (COREG) 12.5 MG tablet Take 12.5 mg by mouth. 11/30/21 6/28/22  Historical Provider   clopidogreL (PLAVIX) 75 mg tablet Take 75 mg by mouth.    Historical Provider   diltiaZEM (TIAZAC) 360 MG Cs24 Take 360 mg by mouth.    Historical Provider   dolutegravir (TIVICAY) 50 mg Tab Take 50 mg by mouth.    Historical Provider   emtricitabine-tenofovir alafen (DESCOVY) 200-25 mg Tab Take 1 tablet by mouth once daily.    Historical Provider   famotidine (PEPCID) 20 MG tablet Take 1 tablet (20 mg total) by mouth 2 (two) times daily. 9/11/22 10/11/22  Zana Tatum MD   food supplemt, lactose-reduced Liqd Take 1 Bottle by mouth.    Historical Provider   furosemide (LASIX) 20 MG tablet Take 60 mg by mouth. 11/30/21   Historical Provider   losartan (COZAAR) 25 MG tablet   See Instructions, TAKE ONE TABLET BY MOUTH DAILY, # 30 tab(s), 5 Refill(s), Pharmacy: [facility], 179, cm, Height/Length Dosing, 11/27/21 15:53:00 CST, 94.1, kg, Weight Dosing, 11/27/21 15:53:00 CST 1/18/22   Historical Provider   megestroL (MEGACE) 400 mg/10 mL (40 mg/mL) Susp Take 200 mg by mouth.     "Historical Provider   morphine (MSIR) 15 MG tablet Take 15 mg by mouth.    Historical Provider   polyethylene glycol 3350,bulk, Powd by Other route.    Historical Provider   spironolactone (ALDACTONE) 25 MG tablet   See Instructions, TAKE 1 TABLET BY MOUTH DAILY, # 30 tab(s), 6 Refill(s), Pharmacy: [facility], 179, cm, Height/Length Dosing, 11/27/21 15:53:00 CST, 94.1, kg, Weight Dosing, 11/27/21 15:53:00 CST 11/30/21   Historical Provider   sulfamethoxazole-trimethoprim 800-160mg (BACTRIM DS) 800-160 mg Tab Take 1 tablet by mouth once daily. 4/19/22   Historical Provider   VITAMIN D2 1,250 mcg (50,000 unit) capsule Take 50,000 Units by mouth every 7 days. 4/20/22   Historical Provider         Review of Systems:  Gen: No fever, chills. + gained 5 lbs over last 2 days  Eye: No blindness or vision changes  Heart: +chest pain with coughing, no palpitations or diaphoresis  Lungs: + shortness of breath, cough with white sputum production, or wheezing  GI: No abdominal pain, nausea, vomiting, or diarrhea  : No hematuria, No dysuria  Musk: No lower extremity swelling  Integumentary: No rash or itching  Neuro: Normal speech, no focal weakness or headache       Objective:   Last 24 Hour Vital Signs:  Blood pressure 106/81, pulse 108, temperature 98.4 °F (36.9 °C), temperature source Tympanic, resp. rate (!) 30, height 5' 11" (1.803 m), weight 94.5 kg (208 lb 5.4 oz), SpO2 97 %.  Body mass index is 29.06 kg/m².    Physical Examination:    General: NAD, appearing uncomfortable, short of breath in bed.  Eye: PERRL, EOMI  HENT: Normocephalic, atraumatic, moist mucous membranes  Neck: Supple, nontender  Respiratory: CTAB, no crackles appreciated  Cardiovascular: Irregularly irregular, no murmurs, rubs, or gallops appreciated  Gastrointestinal: soft, nontender, non-distended  Musculoskeletal: Non-edematous lower extremities  Integumentary: warm, dry  Neurologic: grossly intact  Psychiatric: Appropriate mood " and affect    Laboratory:  Most Recent Data:  Admission on 01/24/2023   Component Date Value    Sodium Level 01/24/2023 137     Potassium Level 01/24/2023 4.1     Chloride 01/24/2023 106     Carbon Dioxide 01/24/2023 22 (L)     Glucose Level 01/24/2023 100     Blood Urea Nitrogen 01/24/2023 14.8     Creatinine 01/24/2023 1.14     Calcium Level Total 01/24/2023 9.1     Protein Total 01/24/2023 8.4 (H)     Albumin Level 01/24/2023 3.3 (L)     Globulin 01/24/2023 5.1 (H)     Albumin/Globulin Ratio 01/24/2023 0.6 (L)     Bilirubin Total 01/24/2023 1.2     Alkaline Phosphatase 01/24/2023 57     Alanine Aminotransferase 01/24/2023 17     Aspartate Aminotransfera* 01/24/2023 33     eGFR 01/24/2023 >60     Creatine Kinase 01/24/2023 245 (H)     Creatine Kinase MB 01/24/2023 1.0     Troponin-I 01/24/2023 0.075 (H)     Natriuretic Peptide 01/24/2023 4,309.1 (H)     WBC 01/24/2023 8.0     RBC 01/24/2023 4.32 (L)     Hgb 01/24/2023 12.8 (L)     Hct 01/24/2023 41.2 (L)     MCV 01/24/2023 95.4 (H)     MCH 01/24/2023 29.6     MCHC 01/24/2023 31.1 (L)     RDW 01/24/2023 13.6     Platelet 01/24/2023 209     MPV 01/24/2023 10.7 (H)     Neut % 01/24/2023 67.9     Lymph % 01/24/2023 18.7     Mono % 01/24/2023 9.3     Eos % 01/24/2023 3.5     Basophil % 01/24/2023 0.4     Lymph # 01/24/2023 1.50     Neut # 01/24/2023 5.45     Mono # 01/24/2023 0.75     Eos # 01/24/2023 0.28     Baso # 01/24/2023 0.03     IG# 01/24/2023 0.02     IG% 01/24/2023 0.2     NRBC% 01/24/2023 0.0     Influenza A PCR 01/24/2023 Not Detected     Influenza B PCR 01/24/2023 Not Detected     SARS-CoV-2 PCR 01/24/2023 Not Detected     Lactic Acid Level 01/24/2023 2.6 (H)     Lactic Acid Level 01/24/2023 2.7 (H)    Admission on 01/03/2023, Discharged on 01/03/2023   Component Date Value    Influenza A PCR 01/03/2023 Not Detected     Influenza B PCR 01/03/2023 Not Detected     SARS-CoV-2 PCR 01/03/2023 Not Detected     Sodium Level 01/03/2023 138     Potassium Level  01/03/2023 4.2     Chloride 01/03/2023 108 (H)     Carbon Dioxide 01/03/2023 22 (L)     Glucose Level 01/03/2023 91     Blood Urea Nitrogen 01/03/2023 11.2     Creatinine 01/03/2023 0.96     Calcium Level Total 01/03/2023 9.3     Protein Total 01/03/2023 8.4 (H)     Albumin Level 01/03/2023 3.2 (L)     Globulin 01/03/2023 5.2 (H)     Albumin/Globulin Ratio 01/03/2023 0.6 (L)     Bilirubin Total 01/03/2023 1.1     Alkaline Phosphatase 01/03/2023 52     Alanine Aminotransferase 01/03/2023 16     Aspartate Aminotransfera* 01/03/2023 27     eGFR 01/03/2023 90     Natriuretic Peptide 01/03/2023 2,133.3 (H)     Troponin-I 01/03/2023 0.046 (H)     D-Dimer 01/03/2023 0.60 (H)     WBC 01/03/2023 7.8     RBC 01/03/2023 4.98     Hgb 01/03/2023 15.1     Hct 01/03/2023 48.7     MCV 01/03/2023 97.8 (H)     MCH 01/03/2023 30.3     MCHC 01/03/2023 31.0 (L)     RDW 01/03/2023 13.8     Platelet 01/03/2023 179     MPV 01/03/2023 12.2     Neut % 01/03/2023 68.5     Lymph % 01/03/2023 18.8     Mono % 01/03/2023 11.3     Eos % 01/03/2023 1.0     Basophil % 01/03/2023 0.3     Lymph # 01/03/2023 1.46     Neut # 01/03/2023 5.33     Mono # 01/03/2023 0.88     Eos # 01/03/2023 0.08     Baso # 01/03/2023 0.02     IG# 01/03/2023 0.01     IG% 01/03/2023 0.1     NRBC% 01/03/2023 0.0        Radiology:  Imaging Results              X-Ray Chest 1 View (Final result)  Result time 01/24/23 06:21:08      Final result by Nae Burden MD (01/24/23 06:21:08)                   Impression:      Increasing interstitial opacities in the right lower lung, may be infectious or inflammatory.      Electronically signed by: Nae Burden  Date:    01/24/2023  Time:    06:21               Narrative:    EXAMINATION:  XR CHEST 1 VIEW    CLINICAL HISTORY:  Dyspnea;    TECHNIQUE:  AP chest    COMPARISON:  Chest x-ray dated 01/03/2023    FINDINGS:  Right chest wall port catheter is unchanged.  The heart is stable in size.  There are increased interstitial  opacities in the right lower lung.  There is no pleural effusion or visible pneumothorax.                        ED Interpretation by Tej Osborne MD (01/24/23 05:50:23, [facility], Emergency Medicine) Flagged as Abnormal    Right lower lobe infiltrate.                                       Assessment & Plan:     1) CHF exacerbation  -11/19/2021 echo showed LVEF 20-25%  -Has been out of medications for 2 months, only taking Lasix 60 qd PO  -Right interstitial opacity in RLL, denies fevers/chills, however has sputum production  -BNP 4300, troponins elevated at 0.075  -Repeat echocardiogram  -Initial troponin 0.046, repeat 0.075, trend troponins and EKGs, q6h  -Restarted coreg at half home dose, will restart losartan half dose tomorrow  -will get ischemic work up, consult cardiology pending echo  -Diet low sodium, fluid <1500  -Strict I/O, daily weights  -IV Lasix 60 mg BID    2) HIV  -Has not taken medications for 2 months  -On Biktarvy, Tivicay, and Descovy. bactrim for PJP prophylaxis  -Ordered HIV RNA Quant and CD4 counts, unknown status right now  -Continued home bactrim until CD4 status is known  -Will hold antiretrovirals due to unknown genotype/resistance status of HIV    3) Atrial fibrillation  -In the ED, HR ranging from 100-130s, asymptomatic  -On eliquis 5 BID  -Will be receiving coreg 6.25  -Contact medicine team if HR > 130s and sustained for >3 min with symptoms    4) CAD S/P pci in 2019  -Received stents in 2019  -Ordered lipid panel  -Will start statins as necessary  -Hold home plavix    Antibiotics: Bactrim for PJP prophylaxis  Diet: Low sodium, cardiac, < 1500mL fluid intake  DVT Prophylaxis: Eliquis  GI Prophylaxis: None  Fluids: None      Disposition: Day 0 of admission for CHF exacerbation. Will receive diuresis and restarting home medications.       David Posey, DO  [facility] Internal Medicine HO-1

## 2023-01-24 NOTE — PLAN OF CARE
01/24/23 1321   Discharge Assessment   Assessment Type Discharge Planning Assessment   Confirmed/corrected address, phone number and insurance Yes   Confirmed Demographics Correct on Facesheet   Source of Information patient   Reason For Admission Community acquired pneumonia   People in Home alone   Facility Arrived From: Home   Do you expect to return to your current living situation? Yes   Do you have help at home or someone to help you manage your care at home? No   Prior to hospitilization cognitive status: Alert/Oriented   Current cognitive status: Alert/Oriented   Equipment Currently Used at Home none   Readmission within 30 days? No   Patient currently being followed by outpatient case management? No   Do you currently have service(s) that help you manage your care at home? No   Do you take prescription medications? Yes  ([facility])   Do you have prescription coverage? Yes   Coverage Healthy Blue M/D   Do you have any problems affording any of your prescribed medications? No   Is the patient taking medications as prescribed? yes   Who is going to help you get home at discharge? M/D Transport   How do you get to doctors appointments? health plan transportation   Are you on dialysis? No   Discharge Plan A Home   DME Needed Upon Discharge  none   Discharge Plan discussed with: Patient   Discharge Barriers Identified None   Physical Activity   On average, how many days per week do you engage in moderate to strenuous exercise (like a brisk walk)? 4 days   On average, how many minutes do you engage in exercise at this level? 30 min   Financial Resource Strain   How hard is it for you to pay for the very basics like food, housing, medical care, and heating? Not very   Housing Stability   In the last 12 months, was there a time when you were not able to pay the mortgage or rent on time? N   In the last 12 months, how many places have you lived? 1   In the last 12 months, was there a time  when you did not have a steady place to sleep or slept in a shelter (including now)? N   Transportation Needs   In the past 12 months, has lack of transportation kept you from medical appointments or from getting medications? no   In the past 12 months, has lack of transportation kept you from meetings, work, or from getting things needed for daily living? No   Food Insecurity   Within the past 12 months, you worried that your food would run out before you got the money to buy more. Never true   Within the past 12 months, the food you bought just didn't last and you didn't have money to get more. Never true   Stress   Do you feel stress - tense, restless, nervous, or anxious, or unable to sleep at night because your mind is troubled all the time - these days? Not at all   Social Connections   In a typical week, how many times do you talk on the phone with family, friends, or neighbors? Twice a week   How often do you get together with friends or relatives? Once   How often do you attend [employer] or [employer] services? Never  (Synagogue)   Do you belong to any clubs or organizations such as [employer]ar[employer] groups, unions, fraternal or athletic groups, or school groups? No   How often do you attend meetings of the clubs or organizations you belong to? Never   Are you , , , , never , or living with a partner? Never marrie   Alcohol Use   Q1: How often do you have a drink containing alcohol? Never   Q2: How many drinks containing alcohol do you have on a typical day when you are drinking? None   Q3: How often do you have six or more drinks on one occasion? Never     Pt resides alone. Emergency contact: Sister, Stephani Perdue ([phone]). Pt receives SS Disability & SNAP benefits. Updated home address in [facility]. Will follow for d/c planning needs.

## 2023-01-24 NOTE — Clinical Note
Diagnosis: Community acquired pneumonia, unspecified laterality [[id number]]   Future Attending Provider: JORGE HERNANDEZ [[id number]]   Admitting Provider:: JORGE HERNANDEZ [[id number]]

## 2023-01-25 LAB
AGE: 61
ALBUMIN SERPL-MCNC: 2.9 G/DL (ref 3.4–4.8)
ALBUMIN/GLOB SERPL: 0.6 RATIO (ref 1.1–2)
ALP SERPL-CCNC: 52 UNIT/L (ref 40–150)
ALT SERPL-CCNC: 17 UNIT/L (ref 0–55)
AST SERPL-CCNC: 36 UNIT/L (ref 5–34)
AV INDEX (PROSTH): 0.15
AV MEAN GRADIENT: 33 MMHG
AV PEAK GRADIENT: 55 MMHG
AV VALVE AREA: 0.47 CM2
AV VELOCITY RATIO: 0.15
BASOPHILS # BLD AUTO: 0.03 X10(3)/MCL (ref 0–0.2)
BASOPHILS NFR BLD AUTO: 0.5 %
BILIRUBIN DIRECT+TOT PNL SERPL-MCNC: 0.8 MG/DL
BSA FOR ECHO PROCEDURE: 2.15 M2
BUN SERPL-MCNC: 18 MG/DL (ref 8.4–25.7)
CALCIUM SERPL-MCNC: 8.9 MG/DL (ref 8.8–10)
CD3+CD4+ CELLS # BLD: 19 % (ref 28–48)
CD3+CD4+ CELLS # SPEC: 234.08 UNIT/L (ref 589–1505)
CD3+CD4+ CELLS NFR BLD: 15.4 %
CHLORIDE SERPL-SCNC: 103 MMOL/L (ref 98–107)
CO2 SERPL-SCNC: 24 MMOL/L (ref 23–31)
CREAT SERPL-MCNC: 1.27 MG/DL (ref 0.73–1.18)
CV STRESS BASE HR: 77 BPM
DIASTOLIC BLOOD PRESSURE: 82 MMHG
DOP CALC AO PEAK VEL: 3.7 M/S
DOP CALC AO VTI: 84.9 CM
DOP CALC LVOT AREA: 3.2 CM2
DOP CALC LVOT DIAMETER: 2.01 CM
DOP CALC LVOT PEAK VEL: 0.57 M/S
DOP CALC LVOT STROKE VOLUME: 39.96 CM3
DOP CALCLVOT PEAK VEL VTI: 12.6 CM
EOSINOPHIL # BLD AUTO: 0.26 X10(3)/MCL (ref 0–0.9)
EOSINOPHIL NFR BLD AUTO: 4.5 %
ERYTHROCYTE [DISTWIDTH] IN BLOOD BY AUTOMATED COUNT: 13.8 % (ref 11.5–17)
GFR SERPLBLD CREATININE-BSD FMLA CKD-EPI: >60 MLS/MIN/1.73/M2
GLOBULIN SER-MCNC: 4.9 GM/DL (ref 2.4–3.5)
GLUCOSE SERPL-MCNC: 120 MG/DL (ref 82–115)
HCT VFR BLD AUTO: 38.9 % (ref 42–52)
HGB BLD-MCNC: 12.1 GM/DL (ref 14–18)
HIV1 RNA # PLAS NAA DL=20: ABNORMAL COPIES/ML
IMM GRANULOCYTES # BLD AUTO: 0.01 X10(3)/MCL (ref 0–0.04)
IMM GRANULOCYTES NFR BLD AUTO: 0.2 %
LVOT MG: 0.7 MMHG
LVOT MV: 0.38 CM/S
LYMPHOCYTES # BLD AUTO: 1.14 X10(3)/MCL (ref 0.6–4.6)
LYMPHOCYTES # BLD AUTO: 1520 X10(3)/MCL (ref 1260–5520)
LYMPHOCYTES NFR BLD AUTO: 19.5 %
LYMPHOMA - T-CELL MARKERS SPEC-IMP: ABNORMAL
MAGNESIUM SERPL-MCNC: 2.2 MG/DL (ref 1.6–2.6)
MCH RBC QN AUTO: 29.6 PG
MCHC RBC AUTO-ENTMCNC: 31.1 MG/DL (ref 33–36)
MCV RBC AUTO: 95.1 FL (ref 80–94)
MONOCYTES # BLD AUTO: 0.67 X10(3)/MCL (ref 0.1–1.3)
MONOCYTES NFR BLD AUTO: 11.5 %
NEUTROPHILS # BLD AUTO: 3.73 X10(3)/MCL (ref 2.1–9.2)
NEUTROPHILS NFR BLD AUTO: 63.8 %
NRBC BLD AUTO-RTO: 0 %
OHS CV CPX 85 PERCENT MAX PREDICTED HEART RATE MALE: 135
OHS CV CPX MAX PREDICTED HEART RATE: 159
OHS CV CPX PATIENT IS FEMALE: 0
OHS CV CPX PATIENT IS MALE: 1
OHS CV CPX PEAK DIASTOLIC BLOOD PRESSURE: 82 MMHG
OHS CV CPX PEAK HEAR RATE: 88 BPM
OHS CV CPX PEAK RATE PRESSURE PRODUCT: 9768
OHS CV CPX PEAK SYSTOLIC BLOOD PRESSURE: 111 MMHG
OHS CV CPX PERCENT MAX PREDICTED HEART RATE ACHIEVED: 55
OHS CV CPX RATE PRESSURE PRODUCT PRESENTING: 8547
PHOSPHATE SERPL-MCNC: 3 MG/DL (ref 2.3–4.7)
PLATELET # BLD AUTO: 179 X10(3)/MCL (ref 130–400)
PMV BLD AUTO: 11.1 FL (ref 7.4–10.4)
POTASSIUM SERPL-SCNC: 3.7 MMOL/L (ref 3.5–5.1)
PROT SERPL-MCNC: 7.8 GM/DL (ref 5.8–7.6)
RBC # BLD AUTO: 4.09 X10(6)/MCL (ref 4.7–6.1)
SODIUM SERPL-SCNC: 136 MMOL/L (ref 136–145)
STRESS ECHO POST EXERCISE DUR MIN: 16 MINUTES
STRESS ECHO POST EXERCISE DUR SEC: 22 SECONDS
SYSTOLIC BLOOD PRESSURE: 111 MMHG
WBC # BLD AUTO: 8000 /MM3 (ref 4500–11500)
WBC # SPEC AUTO: 5.8 X10(3)/MCL (ref 4.5–11.5)

## 2023-01-25 PROCEDURE — 80053 COMPREHEN METABOLIC PANEL: CPT

## 2023-01-25 PROCEDURE — 84100 ASSAY OF PHOSPHORUS: CPT

## 2023-01-25 PROCEDURE — 63600150 PHARM REV CODE 636

## 2023-01-25 PROCEDURE — 85025 COMPLETE CBC W/AUTO DIFF WBC: CPT

## 2023-01-25 PROCEDURE — 25000003 PHARM REV CODE 250

## 2023-01-25 PROCEDURE — 63600175 PHARM REV CODE 636 W HCPCS

## 2023-01-25 PROCEDURE — G0378 HOSPITAL OBSERVATION PER HR: HCPCS

## 2023-01-25 PROCEDURE — 36415 COLL VENOUS BLD VENIPUNCTURE: CPT

## 2023-01-25 PROCEDURE — 83735 ASSAY OF MAGNESIUM: CPT

## 2023-01-25 RX ORDER — DOBUTAMINE HYDROCHLORIDE 400 MG/100ML
20 INJECTION INTRAVENOUS CONTINUOUS
Status: DISCONTINUED | OUTPATIENT
Start: 2023-01-25 | End: 2023-01-26 | Stop reason: HOSPADM

## 2023-01-25 RX ORDER — ATROPINE SULFATE 0.4 MG/ML
0.4 INJECTION, SOLUTION ENDOTRACHEAL; INTRAMEDULLARY; INTRAMUSCULAR; INTRAVENOUS; SUBCUTANEOUS ONCE
Status: DISCONTINUED | OUTPATIENT
Start: 2023-01-25 | End: 2023-01-26 | Stop reason: HOSPADM

## 2023-01-25 RX ORDER — METOPROLOL TARTRATE 1 MG/ML
5 INJECTION, SOLUTION INTRAVENOUS
Status: DISCONTINUED | OUTPATIENT
Start: 2023-01-25 | End: 2023-01-26 | Stop reason: HOSPADM

## 2023-01-25 RX ORDER — ATROPINE SULFATE 0.4 MG/ML
0.4 INJECTION, SOLUTION ENDOTRACHEAL; INTRAMEDULLARY; INTRAMUSCULAR; INTRAVENOUS; SUBCUTANEOUS
Status: DISCONTINUED | OUTPATIENT
Start: 2023-01-25 | End: 2023-01-26 | Stop reason: HOSPADM

## 2023-01-25 RX ORDER — ATORVASTATIN CALCIUM 40 MG/1
40 TABLET, FILM COATED ORAL NIGHTLY
Status: DISCONTINUED | OUTPATIENT
Start: 2023-01-25 | End: 2023-01-26 | Stop reason: HOSPADM

## 2023-01-25 RX ORDER — POTASSIUM CHLORIDE 750 MG/1
30 CAPSULE, EXTENDED RELEASE ORAL ONCE
Status: COMPLETED | OUTPATIENT
Start: 2023-01-25 | End: 2023-01-25

## 2023-01-25 RX ORDER — FUROSEMIDE 10 MG/ML
60 INJECTION INTRAMUSCULAR; INTRAVENOUS DAILY
Status: DISCONTINUED | OUTPATIENT
Start: 2023-01-26 | End: 2023-01-26 | Stop reason: HOSPADM

## 2023-01-25 RX ORDER — DOBUTAMINE HYDROCHLORIDE 400 MG/100ML
5 INJECTION INTRAVENOUS CONTINUOUS
Status: DISCONTINUED | OUTPATIENT
Start: 2023-01-25 | End: 2023-01-26 | Stop reason: HOSPADM

## 2023-01-25 RX ADMIN — APIXABAN 5 MG: 2.5 TABLET, FILM COATED ORAL at 08:01

## 2023-01-25 RX ADMIN — POTASSIUM CHLORIDE 30 MEQ: 10 CAPSULE, COATED, EXTENDED RELEASE ORAL at 05:01

## 2023-01-25 RX ADMIN — SACUBITRIL AND VALSARTAN 1 TABLET: 24; 26 TABLET, FILM COATED ORAL at 10:01

## 2023-01-25 RX ADMIN — ASPIRIN 81 MG: 81 TABLET, COATED ORAL at 08:01

## 2023-01-25 RX ADMIN — ATORVASTATIN CALCIUM 40 MG: 40 TABLET, FILM COATED ORAL at 08:01

## 2023-01-25 RX ADMIN — SULFAMETHOXAZOLE AND TRIMETHOPRIM 1 TABLET: 800; 160 TABLET ORAL at 08:01

## 2023-01-25 RX ADMIN — FUROSEMIDE 60 MG: 10 INJECTION INTRAMUSCULAR; INTRAVENOUS at 08:01

## 2023-01-25 RX ADMIN — CARVEDILOL 6.25 MG: 3.12 TABLET, FILM COATED ORAL at 08:01

## 2023-01-25 NOTE — PROGRESS NOTES
[facility] Medicine Wards   Progress Note     Resident Team: [facility] Medicine List 1  Attending Physician: Keely Harris MD  Resident: Jonny Ramesh  Intern: Joaquim Posey [facility] Length of Stay: 1 days    Subjective:      Brief HPI:  Jason Perdue is a 62 y.o. male who with a history of atrial fibrillation on Eliquis, CHF (11/29/21 LVEF 20-25%), HIV (Biktarvy, Tivicay, Descovy, Bactrim), HTN, CAD s/p PCI 2019, Diffuse large B cell lymphoma in remission,  who presented to [facility] ED on 1/24/2023 with a primary complaint of 2 day history of worsening SOB, LOVING, and orthopnea.     Patient's is ambulatory, 1-2 miles, and does not have any orthopnea at baseline. Over the past 2 days, he has gained 5 pounds and has been reporting orthopnea and LOVING. Can only walk 100 feet. Also reports white sputum production. Has been out of his meds for 2 months, and is currently only taking lasix 60 and eliquis. Reports non-radiating chest pain from coughing, and it is worse with coughing. Denies fevers and chills, abdominal pain, nausea/vomiting/diarrhea.      In the ED, patient was resting uncomfortably in bed. Vitals Tmax 98.4F, , /81, RR 21, 97% on RA. Physical examination showed irregularly irregular rhythm, no murmurs, rubs, or gallops appreciated, no crackles appreciated, JVD, no lower extremity edema noted. Labs significant for BNP 4300, , trop of 0.075. CXR showed increasing interstitial opacities in RLL.     Interval History: NAEON. Symptoms much improved today, able to lie flat. Shortness of breath much improved. No other complaints, denies fevers/chills, chest pain/palpitations, abdominal pain, n/v/d. Cardiology consulted, will get dobutamine stress echo today.       Review of Systems:  Pertinent items are noted in HPI.     Objective:     Vital Signs (Most Recent):  Temp: 97.6 °F (36.4 °C) (01/25/23 0745)  Pulse: 85 (01/25/23 0840)  Resp: (!) 28 (01/25/23 0400)  BP: (!) 120/92 (01/25/23 1049)  SpO2: 97 %  (01/25/23 0400)   Vital Signs (24h Range):  Temp:  [97.6 °F (36.4 °C)-98 °F (36.7 °C)] 97.6 °F (36.4 °C)  Pulse:  [] 85  Resp:  [15-28] 28  SpO2:  [91 %-98 %] 97 %  BP: ()/(78-95) 120/92       Physical Examination:  General appearance: alert, appears stated age, and cooperative  Neck: no adenopathy, no carotid bruit, no JVD, supple, symmetrical, trachea midline, and thyroid not enlarged, symmetric, no tenderness/mass/nodules  Lungs: clear to auscultation bilaterally  Heart: regular rate and rhythm, S1, S2 normal, no murmur, click, rub or gallop  Abdomen: soft, non-tender; bowel sounds normal; no masses,  no organomegaly  Extremities: extremities normal, atraumatic, no cyanosis or edema  Pulses: 2+ and symmetric  Skin: Skin color, texture, turgor normal. No rashes or lesions    Laboratory:  Most Recent Data:  CBC:   Recent Labs   Lab 01/24/23  0517 01/25/23  0327   WBC 8.0 5.8   HGB 12.8* 12.1*   HCT 41.2* 38.9*    179     CMP:   Recent Labs   Lab 01/25/23  0327   CALCIUM 8.9   ALBUMIN 2.9*      K 3.7   CO2 24   BUN 18.0   CREATININE 1.27*   ALKPHOS 52   ALT 17   AST 36*   BILITOT 0.8         Microbiology Data Reviewed: no  Pertinent Findings:  Pending HIV RNA quant  Pending CD4 lymphocytes    Other Results:  EKG (my interpretation):     Radiology:  Imaging Results              X-Ray Chest 1 View (Final result)  Result time 01/24/23 06:21:08      Final result by Nae Burden MD (01/24/23 06:21:08)                   Impression:      Increasing interstitial opacities in the right lower lung, may be infectious or inflammatory.      Electronically signed by: Nae Burden  Date:    01/24/2023  Time:    06:21               Narrative:    EXAMINATION:  XR CHEST 1 VIEW    CLINICAL HISTORY:  Dyspnea;    TECHNIQUE:  AP chest    COMPARISON:  Chest x-ray dated 01/03/2023    FINDINGS:  Right chest wall port catheter is unchanged.  The heart is stable in size.  There are increased interstitial  opacities in the right lower lung.  There is no pleural effusion or visible pneumothorax.                        ED Interpretation by Tej Osborne MD (01/24/23 05:50:23, [facility], Emergency Medicine) Flagged as Abnormal    Right lower lobe infiltrate.                                    Current Medications:     Infusions:       Scheduled:   apixaban  5 mg Oral BID    aspirin  81 mg Oral Daily    atorvastatin  40 mg Oral QHS    carvediloL  6.25 mg Oral BID    [START ON 1/26/2023] furosemide (LASIX) injection  60 mg Intravenous Daily    sacubitriL-valsartan  1 tablet Oral BID    sulfamethoxazole-trimethoprim 800-160mg  1 tablet Oral Daily        PRN:  dextrose 10 % in water (D10W), dextrose 10 % in water (D10W), glucagon (human recombinant), glucose, glucose, naloxone, sodium chloride 0.9%    Antibiotics and Day Number of Therapy:  Bactrim 800, PJP prophylaxis, 1/24/23-present      Intake/Output Summary (Last 24 hours) at 1/25/2023 1121  Last data filed at 1/25/2023 0555  Gross per 24 hour   Intake --   Output 3500 ml   Net -3500 ml       Lines/Drains/Airways       None                     Assessment & Plan:     1) CHF exacerbation  -11/19/2021 echo showed LVEF 20-25%  -Has been out of medications for 2 months, only taking Lasix 60 qd PO  -Right interstitial opacity in RLL, denies fevers/chills, however has sputum production  -BNP 4300, troponins elevated at 0.075  -Repeat echocardiogram  -Trops peaked at 0.092 and subsequently decreased, no EKG changes, likely just demand ischemia.  -Continue Coreg 6.25  -will get ischemic work up, consult cardiology pending echo  -Diet low sodium, fluid <1500  -Strict I/O, daily weights, 1/25/23 UOP 3300 mL  -Due to increased creatinine, decreased Lasix 60 mg to once daily.  -Cards consulted, appreciate recommendations  -Start entresto 24/26 today  -Plan for dobutamine stress echo (aortic valve protocol) today     2) HIV  -Has not taken medications  for 2 months  -On Biktarvy, Tivicay, and Descovy. bactrim for PJP prophylaxis  -Ordered HIV RNA Quant and CD4 counts, unknown status right now  -Continued home bactrim until CD4 status is known  -Will hold antiretrovirals due to unknown genotype/resistance status of HIV     3) Atrial fibrillation  -In the ED, HR ranging from 100-130s, asymptomatic  -On eliquis 5 BID  -Will be receiving coreg 6.25  -Contact medicine team if HR > 130s and sustained for >3 min with symptoms     4) CAD S/P pci in 2019  -Received stents in 2019  -Continue atorvastatin 40 mg  -Hold home plavix     Antibiotics: Bactrim for PJP prophylaxis  Diet: Low sodium, cardiac, < 1500mL fluid intake  DVT Prophylaxis: Eliquis  GI Prophylaxis: None  Fluids: None      Disposition: Day 1 of admission for CHF exacerbation. Continuing diuresis and restarting home medications. Pending dobutamine stress echo today.

## 2023-01-25 NOTE — PROGRESS NOTES
"Inpatient Nutrition Evaluation    Admit Date: 2023   Total duration of encounter: 1 day    Nutrition Recommendation/Prescription     Continue Low Na /1500ml fluid rest diet  Pt education on diet /complete  MVI/fe  Biweekly wt  Will monitor nutrition status     Nutrition Assessment     Chart Review    Reason Seen: continuous nutrition monitoring    Malnutrition Screening Tool Results   Have you recently lost weight without trying?: No  Have you been eating poorly because of a decreased appetite?: No   MST Score: 0     Diagnosis:  CHF, HIV, Afib, CAD     Relevant Medical History: CHF, Aortic Stenosis, Afib, GIB, lyphoma, HIV     Nutrition-Related Medications: ASA, atorvastatin, furosemide, sulfamethoxazole trimethoprim     Nutrition-Related Labs:  () H/H 12.1/38.9 Gluc 120 Bun 18 Cr 1.2     Diet Order: Diet Low Sodium, 2gm Fluid - 1500mL  Oral Supplement Order: none  Appetite/Oral Intake: good/% of meals  Factors Affecting Nutritional Intake: shortness of breath  Food/Spiritism/Cultural Preferences: none reported  Food Allergies: none reported       Wound(s):   none reported     Comments    () Pt reported --SOB improved; no N/V; family assists with meal prep; appetite good ; wt fluctuates--up/down 2 fluid retention; pt on Low Na/fluid restriction diet; discussed diet tx; copy of diet provided for reference.     Anthropometrics    Height: 5' 10" (177.8 cm) Height Method: Stated  Last Weight: 93.2 kg (205 lb 7.5 oz) (23 1145) Weight Method: Bed Scale  BMI (Calculated): 29.5  BMI Classification: overweight (BMI 25-29.9)        Ideal Body Weight (IBW), Male: 166 lb     % Ideal Body Weight, Male (lb): 123.78 %                 Usual Body Weight (UBW), k.2 kg (pt reported wt fluctuates 2 fluid /hx CHF)  % Usual Body Weight: 100.21     Usual Weight Provided By: patient and EMR weight history    Wt Readings from Last 3 Encounters:   23 1145 93.2 kg (205 lb 7.5 oz)   23 0600 93.2 kg " (205 lb 7.5 oz)   01/24/23 1138 94.3 kg (208 lb)   01/24/23 0442 94.5 kg (208 lb 5.4 oz)   01/03/23 1304 90.7 kg (200 lb)   09/11/22 0218 102.1 kg (225 lb)      Weight Change(s) Since Admission:  Admit Weight: 94.5 kg (208 lb 5.4 oz) (01/24/23 0442)  Wt fluctuates with fluid     Patient Education    Education Provided: heart healthy diet  Teaching Method: explanation and printed materials  Comprehension: verbalizes understanding  Barriers to Learning: none evident  Expected Compliance: fair  Comments: All questions were answered and dietitian's contact information was provided.     Monitoring & Evaluation     Dietitian will monitor food and beverage intake and weight.  Nutrition Risk/Follow-Up: low (follow-up in 5-7 days)  Patients assigned 'low nutrition risk' status do not qualify for a full nutritional assessment but will be monitored and re-evaluated in a 5-7 day time period. Please consult if re-evaluation needed sooner.

## 2023-01-25 NOTE — NURSING NOTE
Patient came in for low-dose viability dobutamine stress test. Dobutamine 50mg in 50ml given at a starting rate of 5mcg/kg/min, then increased to 10mcg/kg/min, then finally to 20mcg/kg/min. Tolerated well. No Atropine or Metoprolol was used during this testing..

## 2023-01-26 VITALS
TEMPERATURE: 98 F | DIASTOLIC BLOOD PRESSURE: 63 MMHG | WEIGHT: 213.88 LBS | SYSTOLIC BLOOD PRESSURE: 81 MMHG | HEIGHT: 70 IN | RESPIRATION RATE: 22 BRPM | HEART RATE: 76 BPM | BODY MASS INDEX: 30.62 KG/M2 | OXYGEN SATURATION: 95 %

## 2023-01-26 PROBLEM — I50.9 ACUTE ON CHRONIC CONGESTIVE HEART FAILURE: Status: ACTIVE | Noted: 2023-01-26

## 2023-01-26 PROBLEM — R07.9 CHEST PAIN: Status: ACTIVE | Noted: 2023-01-26

## 2023-01-26 LAB
ALBUMIN SERPL-MCNC: 2.7 G/DL (ref 3.4–4.8)
ALBUMIN/GLOB SERPL: 0.6 RATIO (ref 1.1–2)
ALP SERPL-CCNC: 52 UNIT/L (ref 40–150)
ALT SERPL-CCNC: 16 UNIT/L (ref 0–55)
AST SERPL-CCNC: 27 UNIT/L (ref 5–34)
BASOPHILS # BLD AUTO: 0.04 X10(3)/MCL (ref 0–0.2)
BASOPHILS NFR BLD AUTO: 0.6 %
BILIRUBIN DIRECT+TOT PNL SERPL-MCNC: 0.6 MG/DL
BUN SERPL-MCNC: 19 MG/DL (ref 8.4–25.7)
CALCIUM SERPL-MCNC: 8.8 MG/DL (ref 8.8–10)
CHLORIDE SERPL-SCNC: 103 MMOL/L (ref 98–107)
CO2 SERPL-SCNC: 25 MMOL/L (ref 23–31)
CREAT SERPL-MCNC: 1.23 MG/DL (ref 0.73–1.18)
EOSINOPHIL # BLD AUTO: 0.38 X10(3)/MCL (ref 0–0.9)
EOSINOPHIL NFR BLD AUTO: 5.4 %
ERYTHROCYTE [DISTWIDTH] IN BLOOD BY AUTOMATED COUNT: 13.5 % (ref 11.5–17)
GFR SERPLBLD CREATININE-BSD FMLA CKD-EPI: >60 MLS/MIN/1.73/M2
GLOBULIN SER-MCNC: 4.5 GM/DL (ref 2.4–3.5)
GLUCOSE SERPL-MCNC: 111 MG/DL (ref 82–115)
HCT VFR BLD AUTO: 41.1 % (ref 42–52)
HGB BLD-MCNC: 12.9 GM/DL (ref 14–18)
IMM GRANULOCYTES # BLD AUTO: 0.02 X10(3)/MCL (ref 0–0.04)
IMM GRANULOCYTES NFR BLD AUTO: 0.3 %
LYMPHOCYTES # BLD AUTO: 1.33 X10(3)/MCL (ref 0.6–4.6)
LYMPHOCYTES NFR BLD AUTO: 18.9 %
MAGNESIUM SERPL-MCNC: 2.2 MG/DL (ref 1.6–2.6)
MCH RBC QN AUTO: 29.7 PG
MCHC RBC AUTO-ENTMCNC: 31.4 MG/DL (ref 33–36)
MCV RBC AUTO: 94.5 FL (ref 80–94)
MONOCYTES # BLD AUTO: 0.84 X10(3)/MCL (ref 0.1–1.3)
MONOCYTES NFR BLD AUTO: 11.9 %
NEUTROPHILS # BLD AUTO: 4.42 X10(3)/MCL (ref 2.1–9.2)
NEUTROPHILS NFR BLD AUTO: 62.9 %
NRBC BLD AUTO-RTO: 0 %
PHOSPHATE SERPL-MCNC: 3.8 MG/DL (ref 2.3–4.7)
PLATELET # BLD AUTO: 201 X10(3)/MCL (ref 130–400)
PMV BLD AUTO: 11.4 FL (ref 7.4–10.4)
POTASSIUM SERPL-SCNC: 3.9 MMOL/L (ref 3.5–5.1)
PROT SERPL-MCNC: 7.2 GM/DL (ref 5.8–7.6)
RBC # BLD AUTO: 4.35 X10(6)/MCL (ref 4.7–6.1)
SODIUM SERPL-SCNC: 135 MMOL/L (ref 136–145)
WBC # SPEC AUTO: 7 X10(3)/MCL (ref 4.5–11.5)

## 2023-01-26 PROCEDURE — 84100 ASSAY OF PHOSPHORUS: CPT

## 2023-01-26 PROCEDURE — 25000003 PHARM REV CODE 250

## 2023-01-26 PROCEDURE — 83735 ASSAY OF MAGNESIUM: CPT

## 2023-01-26 PROCEDURE — 80053 COMPREHEN METABOLIC PANEL: CPT

## 2023-01-26 PROCEDURE — G0378 HOSPITAL OBSERVATION PER HR: HCPCS

## 2023-01-26 PROCEDURE — 94761 N-INVAS EAR/PLS OXIMETRY MLT: CPT

## 2023-01-26 PROCEDURE — 85025 COMPLETE CBC W/AUTO DIFF WBC: CPT

## 2023-01-26 PROCEDURE — 63600175 PHARM REV CODE 636 W HCPCS

## 2023-01-26 RX ORDER — SPIRONOLACTONE 25 MG/1
25 TABLET ORAL DAILY
Qty: 90 TABLET | Refills: 3 | Status: SHIPPED | OUTPATIENT
Start: 2023-01-26 | End: 2023-03-09

## 2023-01-26 RX ORDER — SULFAMETHOXAZOLE AND TRIMETHOPRIM 800; 160 MG/1; MG/1
1 TABLET ORAL DAILY
Qty: 90 TABLET | Refills: 3 | Status: ON HOLD | OUTPATIENT
Start: 2023-01-26 | End: 2023-03-23 | Stop reason: HOSPADM

## 2023-01-26 RX ORDER — ATORVASTATIN CALCIUM 40 MG/1
40 TABLET, FILM COATED ORAL NIGHTLY
Qty: 90 TABLET | Refills: 3 | Status: SHIPPED | OUTPATIENT
Start: 2023-01-26 | End: 2024-01-26

## 2023-01-26 RX ORDER — FUROSEMIDE 20 MG/1
60 TABLET ORAL DAILY
Qty: 90 TABLET | Refills: 11 | Status: ON HOLD | OUTPATIENT
Start: 2023-01-26 | End: 2023-05-05 | Stop reason: HOSPADM

## 2023-01-26 RX ORDER — ASPIRIN 81 MG/1
81 TABLET ORAL DAILY
Qty: 90 TABLET | Refills: 3 | Status: SHIPPED | OUTPATIENT
Start: 2023-01-27 | End: 2024-01-27

## 2023-01-26 RX ORDER — CARVEDILOL 12.5 MG/1
12.5 TABLET ORAL 2 TIMES DAILY
Qty: 180 TABLET | Refills: 3 | Status: ON HOLD | OUTPATIENT
Start: 2023-01-26 | End: 2023-04-11 | Stop reason: HOSPADM

## 2023-01-26 RX ADMIN — SACUBITRIL AND VALSARTAN 1 TABLET: 24; 26 TABLET, FILM COATED ORAL at 09:01

## 2023-01-26 RX ADMIN — FUROSEMIDE 60 MG: 10 INJECTION INTRAMUSCULAR; INTRAVENOUS at 09:01

## 2023-01-26 RX ADMIN — ASPIRIN 81 MG: 81 TABLET, COATED ORAL at 09:01

## 2023-01-26 RX ADMIN — SULFAMETHOXAZOLE AND TRIMETHOPRIM 1 TABLET: 800; 160 TABLET ORAL at 09:01

## 2023-01-26 RX ADMIN — CARVEDILOL 6.25 MG: 3.12 TABLET, FILM COATED ORAL at 09:01

## 2023-01-26 RX ADMIN — APIXABAN 5 MG: 2.5 TABLET, FILM COATED ORAL at 09:01

## 2023-01-26 NOTE — PROGRESS NOTES
[facility]   Progress Note     Resident Team: [facility] List 1  Attending Physician: No att. providers found  Resident: Jonny Ramesh  Intern: Joaquim Posey [facility]   [facility] Length of Stay: 1 days    Subjective:      Brief HPI:  Jason Perdue is a 62 y.o. male who with a history of atrial fibrillation on Eliquis, CHF (11/29/21 LVEF 20-25%), HIV (Biktarvy, Tivicay, Descovy, Bactrim), HTN, CAD s/p PCI 2019, Diffuse large B cell lymphoma in remission,  who presented to [facility] ED on 1/24/2023 with a primary complaint of 2 day history of worsening SOB, LOVING, and orthopnea.     Patient's is ambulatory, 1-2 miles, and does not have any orthopnea at baseline. Over the past 2 days, he has gained 5 pounds and has been reporting orthopnea and LOVING. Can only walk 100 feet. Also reports white sputum production. Has been out of his meds for 2 months, and is currently only taking lasix 60 and eliquis. Reports non-radiating chest pain from coughing, and it is worse with coughing. Denies fevers and chills, abdominal pain, nausea/vomiting/diarrhea.      In the ED, patient was resting uncomfortably in bed. Vitals Tmax 98.4F, , /81, RR 21, 97% on RA. Physical examination showed irregularly irregular rhythm, no murmurs, rubs, or gallops appreciated, no crackles appreciated, JVD, no lower extremity edema noted. Labs significant for BNP 4300, , trop of 0.075. CXR showed increasing interstitial opacities in RLL.     Interval History: NAEON. Symptoms much improved today, able to lie flat. Shortness of breath much improved. No other complaints, denies fevers/chills, chest pain/palpitations, abdominal pain, n/v/d. Cardiology consulted, will get dobutamine stress echo today.       Review of Systems:  Pertinent items are noted in HPI.     Objective:     Vital Signs (Most Recent):  Temp: 97.9 °F (36.6 °C) (01/26/23 1145)  Pulse: 76 (01/26/23 1515)  Resp: (!) 22 (01/26/23 1515)  BP: (!) 81/63 (01/26/23 1515)  SpO2: 95  % (01/26/23 1515)   Vital Signs (24h Range):  Temp:  [97.8 °F (36.6 °C)-98.5 °F (36.9 °C)] 97.9 °F (36.6 °C)  Pulse:  [] 76  Resp:  [15-34] 22  SpO2:  [88 %-96 %] 95 %  BP: ()/(58-74) 81/63       Physical Examination:  General appearance: alert, appears stated age, and cooperative  Neck: no adenopathy, no carotid bruit, no JVD, supple, symmetrical, trachea midline, and thyroid not enlarged, symmetric, no tenderness/mass/nodules  Lungs: clear to auscultation bilaterally  Heart: regular rate and rhythm, S1, S2 normal, no murmur, click, rub or gallop  Abdomen: soft, non-tender; bowel sounds normal; no masses,  no organomegaly  Extremities: extremities normal, atraumatic, no cyanosis or edema  Pulses: 2+ and symmetric  Skin: Skin color, texture, turgor normal. No rashes or lesions    Laboratory:  Most Recent Data:  CBC:   Recent Labs   Lab 01/25/23  0327 01/26/23  0335   WBC 5.8 7.0   HGB 12.1* 12.9*   HCT 38.9* 41.1*    201     CMP:   Recent Labs   Lab 01/26/23  0335   CALCIUM 8.8   ALBUMIN 2.7*   *   K 3.9   CO2 25   BUN 19.0   CREATININE 1.23*   ALKPHOS 52   ALT 16   AST 27   BILITOT 0.6         Microbiology Data Reviewed: no  Pertinent Findings:  Pending HIV RNA quant  Pending CD4 lymphocytes    Other Results:  EKG (my interpretation):     Radiology:  Imaging Results              X-Ray Chest 1 View (Final result)  Result time 01/24/23 06:21:08      Final result by Nae Burden MD (01/24/23 06:21:08)                   Impression:      Increasing interstitial opacities in the right lower lung, may be infectious or inflammatory.      Electronically signed by: Nae Burden  Date:    01/24/2023  Time:    06:21               Narrative:    EXAMINATION:  XR CHEST 1 VIEW    CLINICAL HISTORY:  Dyspnea;    TECHNIQUE:  AP chest    COMPARISON:  Chest x-ray dated 01/03/2023    FINDINGS:  Right chest wall port catheter is unchanged.  The heart is stable in size.  There are increased interstitial  opacities in the right lower lung.  There is no pleural effusion or visible pneumothorax.                        ED Interpretation by Tej Osborne MD (01/24/23 05:50:23, [facility], Emergency Medicine) Flagged as Abnormal    Right lower lobe infiltrate.                                    Current Medications:     Infusions:       Scheduled:   apixaban  5 mg Oral BID    aspirin  81 mg Oral Daily    atorvastatin  40 mg Oral QHS    carvediloL  6.25 mg Oral BID    furosemide (LASIX) injection  60 mg Intravenous Daily    sacubitriL-valsartan  1 tablet Oral BID    sulfamethoxazole-trimethoprim 800-160mg  1 tablet Oral Daily        PRN:  dextrose 10 % in water (D10W), dextrose 10 % in water (D10W), glucagon (human recombinant), glucose, glucose, naloxone, sodium chloride 0.9%    Antibiotics and Day Number of Therapy:  Bactrim 800, PJP prophylaxis, 1/24/23-present      Intake/Output Summary (Last 24 hours) at 1/26/2023 1750  Last data filed at 1/26/2023 0509  Gross per 24 hour   Intake 1380 ml   Output 1300 ml   Net 80 ml       Lines/Drains/Airways       None                     Assessment & Plan:     CHF exacerbation  Aortic valve stenosis  -11/19/2021 echo showed LVEF 20-25%  -Has been out of medications for 2 months, only taking Lasix 60 qd PO  -Right interstitial opacity in RLL, denies fevers/chills, however has sputum production  -BNP 4300, troponins elevated at 0.075  -Trops peaked at 0.092 and subsequently decreased, no EKG changes, likely just demand ischemia.  -Continue Coreg 6.25  -Diet low sodium, fluid <1500  -Strict I/O, daily weights, 1/25/23 UOP 3300 mL  -Due to increased creatinine, decreased Lasix 60 mg to once daily.  - stress echo demonstrating EF 10%, negative for ischemia, and aortic stenosis.  - will follow outpatient with cardiology for evaluation forTAVR  -entresto 24/26 BID        HIV  -Has not taken medications for 2 months  -On Biktarvy, Tivicay, and Descovy.  bactrim for PJP prophylaxis  -Ordered HIV RNA Quant and CD4 counts, unknown status right now  -Continued home bactrim until CD4 status is known  -Will hold antiretrovirals due to unknown genotype/resistance status of HIV  - need for control prior to TAVR      Atrial fibrillation  -In the ED, HR ranging from 100-130s, asymptomatic  -On eliquis 5 BID  -Will be receiving coreg 6.25  -Contact medicine team if HR > 130s and sustained for >3 min with symptoms     CAD S/P pci in 2019  -Received stents in 2019  -Continue atorvastatin 40 mg  -Hold home plavix     Antibiotics: Bactrim for PJP prophylaxis  Diet: Low sodium, cardiac, < 1500mL fluid intake  DVT Prophylaxis: Eliquis  GI Prophylaxis: None  Fluids: None      Disposition: Day 2 of admission for CHF exacerbation.       Monet Fuller M.D.  [facility] -2

## 2023-01-27 ENCOUNTER — TELEPHONE (OUTPATIENT)
Dept: INFECTIOUS DISEASES | Facility: CLINIC | Age: 63
End: 2023-01-27
Payer: MEDICAID

## 2023-01-27 NOTE — TELEPHONE ENCOUNTER
Received referral from Dr. Fuller from for HIV management. Attempted to contact patient. No answer. Voicemail left to call the clinic back.     DANIEL Craig 04/20/2022    Patient does not need intake at this time. He just needs to be scheduled for an appt with Kiara.

## 2023-01-29 LAB
BACTERIA BLD CULT: NORMAL
BACTERIA BLD CULT: NORMAL

## 2023-03-09 ENCOUNTER — OFFICE VISIT (OUTPATIENT)
Dept: CARDIOLOGY | Facility: CLINIC | Age: 63
End: 2023-03-09
Payer: MEDICAID

## 2023-03-09 VITALS
HEIGHT: 71 IN | WEIGHT: 204.38 LBS | OXYGEN SATURATION: 100 % | RESPIRATION RATE: 20 BRPM | BODY MASS INDEX: 28.61 KG/M2 | HEART RATE: 76 BPM | SYSTOLIC BLOOD PRESSURE: 95 MMHG | DIASTOLIC BLOOD PRESSURE: 70 MMHG | TEMPERATURE: 98 F

## 2023-03-09 DIAGNOSIS — I50.9 ACUTE ON CHRONIC CONGESTIVE HEART FAILURE, UNSPECIFIED HEART FAILURE TYPE: ICD-10-CM

## 2023-03-09 DIAGNOSIS — I50.20 HFREF (HEART FAILURE WITH REDUCED EJECTION FRACTION): ICD-10-CM

## 2023-03-09 DIAGNOSIS — I35.0 SEVERE AORTIC STENOSIS: ICD-10-CM

## 2023-03-09 DIAGNOSIS — I35.0 AORTIC VALVE STENOSIS, ETIOLOGY OF CARDIAC VALVE DISEASE UNSPECIFIED: ICD-10-CM

## 2023-03-09 DIAGNOSIS — I48.0 PAROXYSMAL ATRIAL FIBRILLATION: ICD-10-CM

## 2023-03-09 DIAGNOSIS — F19.10 SUBSTANCE ABUSE: Primary | ICD-10-CM

## 2023-03-09 PROBLEM — R07.9 CHEST PAIN: Status: RESOLVED | Noted: 2023-01-26 | Resolved: 2023-03-09

## 2023-03-09 PROCEDURE — 99215 OFFICE O/P EST HI 40 MIN: CPT | Mod: PBBFAC | Performed by: INTERNAL MEDICINE

## 2023-03-09 NOTE — PATIENT INSTRUCTIONS
STOP taking spironolactone and diltiazem    START taking  sacubitril-valsartan(Entresto) 24/26mg twice a day    Get labs in 2 weeks - Entrance 2    Follow up in 2 months

## 2023-03-09 NOTE — PROGRESS NOTES
[facility]  Cariology Clinic - Follow Up     Cardiology Attending: Dr. Barakat  Date of Visit: 3/9/2023  Reason for Visit/Chief Complaint:   Chief Complaint    referral, aortic stenosis, heart disease, ; Chest Pain; Shortness of Breath          Subjective:      Jason Perdue is a 62 y.o. 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  Was recently admitted for decompensated heart failure requiring IV diuretics.  He has since had redevelopment of symptoms including LOVING and orthopnea which he says is manageable at this time.  Has not tried taking extra lasix. Patient not on aldactone or entresto as specified in the chart.  He reports last cocaine use was over 1 month ago.  He underwent an outpatient dobutamine stress echo indicating severe aortic stenosis with PV 4.02, YVON 0.52, MG 42.  His EF is 10%.    Medications:     Current Outpatient Medications   Medication Sig Dispense Refill    apixaban (ELIQUIS) 5 mg Tab Take 1 tablet (5 mg total) by mouth 2 (two) times daily. 60 tablet 11    aspirin (ECOTRIN) 81 MG EC tablet Take 1 tablet (81 mg total) by mouth once daily. 90 tablet 3    atorvastatin (LIPITOR) 40 MG tablet Take 1 tablet (40 mg total) by mouth every evening. 90 tablet 3    carvediloL (COREG) 12.5 MG tablet Take 1 tablet (12.5 mg total) by mouth 2 (two) times daily. 180 tablet 3    furosemide (LASIX) 20 MG tablet Take 3 tablets (60 mg total) by mouth once daily. 90 tablet 11    yvvulkgne-dkstiojp-vmldmpi ala (BIKTARVY) -25 mg (25 kg or greater) Take by mouth.      diltiaZEM (TIAZAC) 360 MG Cs24 Take 360 mg by mouth.      dolutegravir (TIVICAY) 50 mg Tab Take 50 mg by mouth.      emtricitabine-tenofovir alafen (DESCOVY) 200-25 mg Tab Take 1  tablet by mouth once daily.      famotidine (PEPCID) 20 MG tablet Take 1 tablet (20 mg total) by mouth 2 (two) times daily. 60 tablet 0    food supplemt, lactose-reduced Liqd Take 1 Bottle by mouth.      megestroL (MEGACE) 400 mg/10 mL (40 mg/mL) Susp Take 200 mg by mouth.      morphine (MSIR) 15 MG tablet Take 15 mg by mouth.      polyethylene glycol 3350,bulk, Powd by Other route.      sacubitriL-valsartan (ENTRESTO) 24-26 mg per tablet Take 1 tablet by mouth 2 (two) times daily. (Patient not taking: Reported on 3/9/2023) 180 tablet 3    spironolactone (ALDACTONE) 25 MG tablet Take 1 tablet (25 mg total) by mouth once daily. (Patient not taking: Reported on 3/9/2023) 90 tablet 3    sulfamethoxazole-trimethoprim 800-160mg (BACTRIM DS) 800-160 mg Tab Take 1 tablet by mouth once daily. (Patient not taking: Reported on 3/9/2023) 90 tablet 3    VITAMIN D2 1,250 mcg (50,000 unit) capsule Take 50,000 Units by mouth every 7 days.       No current facility-administered medications for this visit.       I have reviewed and updated the patient's medications, allergies, past medical history, surgical history, social history and family history as needed.    Review of Systems:     Review of Systems   Constitutional:  Negative for chills, fever and malaise/fatigue.   HENT:  Negative for hearing loss and sore throat.    Eyes:  Negative for blurred vision, pain and redness.   Respiratory:  Positive for shortness of breath. Negative for cough and wheezing.    Cardiovascular:  Positive for orthopnea and leg swelling. Negative for chest pain, palpitations and PND.   Gastrointestinal:  Negative for abdominal pain, constipation, diarrhea, nausea and vomiting.   Musculoskeletal:  Negative for back pain, joint pain and myalgias.   Neurological:  Negative for dizziness, sensory change and focal weakness.   Objective:     Wt Readings from Last 3 Encounters:   03/09/23 92.7 kg (204 lb 5.9 oz)   01/26/23 97 kg (213 lb 13.5 oz)   01/03/23  "90.7 kg (200 lb)     Temp Readings from Last 3 Encounters:   03/09/23 98.4 °F (36.9 °C) (Oral)   01/26/23 97.9 °F (36.6 °C)   01/03/23 99.3 °F (37.4 °C) (Tympanic)     BP Readings from Last 3 Encounters:   03/09/23 95/70   01/26/23 (!) 81/63   01/03/23 116/82     Pulse Readings from Last 3 Encounters:   03/09/23 76   01/26/23 76   01/03/23 82       Vitals:    03/09/23 0959   BP: 95/70   BP Location: Left arm   Patient Position: Sitting   BP Method: X-Large (Automatic)   Pulse: 76   Resp: 20   Temp: 98.4 °F (36.9 °C)   TempSrc: Oral   SpO2: 100%   Weight: 92.7 kg (204 lb 5.9 oz)   Height: 5' 11" (1.803 m)     Body mass index is 28.5 kg/m².    Physical Exam  Vitals reviewed.   Constitutional:       Appearance: Normal appearance.   HENT:      Head: Normocephalic and atraumatic.      Mouth/Throat:      Mouth: Mucous membranes are moist.      Pharynx: Oropharynx is clear. No oropharyngeal exudate.   Neck:      Vascular: No carotid bruit.   Cardiovascular:      Rate and Rhythm: Normal rate and regular rhythm.      Heart sounds: Murmur (systolic ejection mumur) heard.   Pulmonary:      Effort: No respiratory distress.      Breath sounds: Rales present. No wheezing.   Musculoskeletal:         General: No swelling.      Right lower leg: Edema (trace) present.      Left lower leg: Edema (trace) present.   Neurological:      General: No focal deficit present.      Mental Status: He is alert and oriented to person, place, and time.      Labs:   I have reviewed the following labs below:      Lab Results   Component Value Date    WBC 7.0 01/26/2023    HGB 12.9 (L) 01/26/2023    HCT 41.1 (L) 01/26/2023     01/26/2023    MCV 94.5 (H) 01/26/2023    RDW 13.5 01/26/2023     Lab Results   Component Value Date     (L) 01/26/2023    K 3.9 01/26/2023    CO2 25 01/26/2023    BUN 19.0 01/26/2023    CALCIUM 8.8 01/26/2023    MG 2.20 01/26/2023    PHOS 3.8 01/26/2023     Lab Results   Component Value Date    ALBUMIN 2.7 (L) " 01/26/2023    BILITOT 0.6 01/26/2023    AST 27 01/26/2023    ALKPHOS 52 01/26/2023    ALT 16 01/26/2023     Cholesterol Total   Date Value Ref Range Status   01/24/2023 168 <=200 mg/dL Final     HDL Cholesterol   Date Value Ref Range Status   01/24/2023 28 (L) 35 - 60 mg/dL Final     LDL Cholesterol   Date Value Ref Range Status   01/24/2023 120.00 50.00 - 140.00 mg/dL Final     Triglyceride   Date Value Ref Range Status   01/24/2023 101 34 - 140 mg/dL Final     Lab Results   Component Value Date    CREATININE 1.23 (H) 01/26/2023     Lab Results   Component Value Date    TSH 0.881 12/26/2018     Lab Results   Component Value Date    IRON 25 (L) 12/26/2018    TIBC 359 12/26/2018    FERRITIN 60.8 12/26/2018     Lab Results   Component Value Date    INR 1.19 12/26/2018    PROTIME 15.0 (H) 12/26/2018      Lab Results   Component Value Date    TROPONINI 0.082 (H) 01/24/2023    TROPONINI 0.092 (H) 01/24/2023    TROPONINI 0.079 (H) 01/24/2023    TROPONINI 0.075 (H) 01/24/2023    TROPONINI 0.046 (H) 01/03/2023    BNP 4,309.1 (H) 01/24/2023    BNP 2,133.3 (H) 01/03/2023    BNP 3,046.6 (H) 11/27/2021     Cardiovascular Studies:   I have reviewed the following studies below:      Stress TTE (1/25/23):   Patient with baseline significantly reduced ejection fraction with estimated EF 10%.  Mild improvement in contractility is noted with dobutamine (especially at dose of 20 mcg/kg/min).  The ECG portion of this study is negative for myocardial ischemia.     Dobutamine stress echocardiogram obtained for further evaluation of low flow, low gradient aortic stenosis.     - At baseline, stroke volume index (SVI) is 12.4 mL/m2, peak velocity across the aortic valve (PV) is 3.14 m/s, Mean pressure gradient across the aortic valve (MG) is 22 mmHg and aortic valve area (YVON) is 0.4 cm2.  - At dobutamine 5 mcg/kg/min, SVI is 17.2 mL/m2, PV is 3.5 m/s, MG is 29.5 mmHg and YVON is 0.44 cm2.  - At dobutamine 10 mcg/kg/min, SVI is 21 mL/m2, PV  is 3.8 m/s, MG is 35 mmHg and YVON is 0.5 cm2.  - At dobutamine 20 mcg/kg/min, SVI is 25 mL/m2, PV is 4.09 m/s, MG is 42 mmHg and YVON is 0.52 cm2.    TTE 1/24/23  The left ventricle is mildly enlarged with severely decreased systolic function.  The estimated ejection fraction is 12%.  Grade II left ventricular diastolic dysfunction.  There is severe left ventricular global hypokinesis.  Mild right ventricular enlargement with mildly reduced right ventricular systolic function.  Moderate left atrial enlargement.  Moderate right atrial enlargement.  There is severe aortic valve stenosis.  Aortic valve area is 0.48 cm2; peak velocity is 3.09 m/s; mean gradient is 26 mmHg.  Dimensionless index is 0.15 (severe).  Due to low stroke volume, pressure gradient across the aortic valve may remain low in the presence of significant aortic stenosis.  Moderate mitral regurgitation.  Mild to moderate tricuspid regurgitation.  There is moderate pulmonary hypertension.  The estimated PA systolic pressure is 57 mmHg.  IVC is dilated and collapses < 50% with inspiration.  Elevated central venous pressure (15 mmHg).      Assessment/Plan:   62 y.o. male with the following medical problems:    #HFrEF, NICM, LVEF 10%  #Severe aortic stenosis  #moderate MR  #NSTEMI in 2017 with GERDA to OM  #AIDS  #paroxysmal afib  #large B cell lymphoma  #cocaine abuse      Plan:  -continue asa and lipitor  -continue eliquis for CVA prevention  -in terms of GDMT he needs to pickup Rx for Entresto and start taking the drug, we will stop aldactone (patient not taking anyway) and stop diltiazem (unclear why patient prescribed this).  In the future we will consider changing coreg to bisoprolol to allow further GDMT drugs to be introduced in the setting of hypotension.    - continue lasix 60mg daily and take 1 extra dose per day as needed  -BMP in 2 weeks to monitor kidney function/electrolytes  -refer to CTS for severe aortic stenosis to establish care and  further evaluate  -repeat echo after 3 months of stable GDMT to assess need for defibrillator  -needs follow up with HIV provider  -strict cocaine avoidance    Return to clinic in 2 months.    Anders Freed MD  [facility] Cardiology Fellow, PGY-4  03/09/2023 10:05 AM

## 2023-03-09 NOTE — PROGRESS NOTES
Cardiology Attending    I have discussed Jason Perdue including the patient's symptoms, findings, and management plan with the cardiology fellow.  Please see the Cardiology Note for details.

## 2023-03-13 ENCOUNTER — TELEPHONE (OUTPATIENT)
Dept: INFECTIOUS DISEASES | Facility: CLINIC | Age: 63
End: 2023-03-13
Payer: MEDICAID

## 2023-03-13 ENCOUNTER — HOSPITAL ENCOUNTER (OUTPATIENT)
Facility: HOSPITAL | Age: 63
Discharge: HOME | End: 2023-03-15
Attending: FAMILY MEDICINE | Admitting: INTERNAL MEDICINE
Payer: MEDICAID

## 2023-03-13 DIAGNOSIS — R76.8 LOW CD4 CELL COUNT DETERMINED BY FLOW CYTOMETRY: ICD-10-CM

## 2023-03-13 DIAGNOSIS — J90 PLEURAL EFFUSION, RIGHT: ICD-10-CM

## 2023-03-13 DIAGNOSIS — R07.9 CHEST PAIN, UNSPECIFIED TYPE: ICD-10-CM

## 2023-03-13 DIAGNOSIS — R06.02 SOB (SHORTNESS OF BREATH): ICD-10-CM

## 2023-03-13 DIAGNOSIS — J18.9 COMMUNITY ACQUIRED PNEUMONIA OF RIGHT LOWER LOBE OF LUNG: ICD-10-CM

## 2023-03-13 DIAGNOSIS — R60.9 SWELLING: ICD-10-CM

## 2023-03-13 DIAGNOSIS — I50.9 ACUTE ON CHRONIC CONGESTIVE HEART FAILURE, UNSPECIFIED HEART FAILURE TYPE: Primary | ICD-10-CM

## 2023-03-13 DIAGNOSIS — Z79.899 HIGH RISK MEDICATION USE: ICD-10-CM

## 2023-03-13 DIAGNOSIS — B20 HUMAN IMMUNODEFICIENCY VIRUS (HIV) DISEASE: Primary | ICD-10-CM

## 2023-03-13 DIAGNOSIS — A41.9 SEPSIS, DUE TO UNSPECIFIED ORGANISM, UNSPECIFIED WHETHER ACUTE ORGAN DYSFUNCTION PRESENT: ICD-10-CM

## 2023-03-13 LAB
ALBUMIN SERPL-MCNC: 3.3 G/DL (ref 3.4–4.8)
ALBUMIN/GLOB SERPL: 0.6 RATIO (ref 1.1–2)
ALP SERPL-CCNC: 69 UNIT/L (ref 40–150)
ALT SERPL-CCNC: 17 UNIT/L (ref 0–55)
AST SERPL-CCNC: 30 UNIT/L (ref 5–34)
BASOPHILS # BLD AUTO: 0.03 X10(3)/MCL (ref 0–0.2)
BASOPHILS NFR BLD AUTO: 0.4 %
BILIRUBIN DIRECT+TOT PNL SERPL-MCNC: 1.5 MG/DL
BNP BLD-MCNC: 7621 PG/ML
BUN SERPL-MCNC: 25.5 MG/DL (ref 8.4–25.7)
CALCIUM SERPL-MCNC: 9.2 MG/DL (ref 8.8–10)
CHLORIDE SERPL-SCNC: 105 MMOL/L (ref 98–107)
CO2 SERPL-SCNC: 24 MMOL/L (ref 23–31)
CREAT SERPL-MCNC: 1.29 MG/DL (ref 0.73–1.18)
D DIMER PPP IA.FEU-MCNC: 3.87 UG/ML FEU (ref 0–0.5)
EOSINOPHIL # BLD AUTO: 0.07 X10(3)/MCL (ref 0–0.9)
EOSINOPHIL NFR BLD AUTO: 1 %
ERYTHROCYTE [DISTWIDTH] IN BLOOD BY AUTOMATED COUNT: 15.8 % (ref 11.5–17)
FLUAV AG UPPER RESP QL IA.RAPID: NOT DETECTED
FLUBV AG UPPER RESP QL IA.RAPID: NOT DETECTED
GFR SERPLBLD CREATININE-BSD FMLA CKD-EPI: >60 MLS/MIN/1.73/M2
GLOBULIN SER-MCNC: 5.3 GM/DL (ref 2.4–3.5)
GLUCOSE SERPL-MCNC: 89 MG/DL (ref 82–115)
HCT VFR BLD AUTO: 40.6 % (ref 42–52)
HGB BLD-MCNC: 12.3 G/DL (ref 14–18)
IMM GRANULOCYTES # BLD AUTO: 0.04 X10(3)/MCL (ref 0–0.04)
IMM GRANULOCYTES NFR BLD AUTO: 0.6 %
LACTATE SERPL-SCNC: 0.9 MMOL/L (ref 0.5–2.2)
LYMPHOCYTES # BLD AUTO: 1.64 X10(3)/MCL (ref 0.6–4.6)
LYMPHOCYTES NFR BLD AUTO: 23.3 %
MAGNESIUM SERPL-MCNC: 2.2 MG/DL (ref 1.6–2.6)
MCH RBC QN AUTO: 27.8 PG
MCHC RBC AUTO-ENTMCNC: 30.3 G/DL (ref 33–36)
MCV RBC AUTO: 91.6 FL (ref 80–94)
MONOCYTES # BLD AUTO: 0.7 X10(3)/MCL (ref 0.1–1.3)
MONOCYTES NFR BLD AUTO: 9.9 %
NEUTROPHILS # BLD AUTO: 4.57 X10(3)/MCL (ref 2.1–9.2)
NEUTROPHILS NFR BLD AUTO: 64.8 %
NRBC BLD AUTO-RTO: 0 %
PLATELET # BLD AUTO: 182 X10(3)/MCL (ref 130–400)
PMV BLD AUTO: 11.8 FL (ref 7.4–10.4)
POTASSIUM SERPL-SCNC: 4.3 MMOL/L (ref 3.5–5.1)
PROT SERPL-MCNC: 8.6 GM/DL (ref 5.8–7.6)
RBC # BLD AUTO: 4.43 X10(6)/MCL (ref 4.7–6.1)
SARS-COV-2 RNA RESP QL NAA+PROBE: NOT DETECTED
SODIUM SERPL-SCNC: 138 MMOL/L (ref 136–145)
TROPONIN I SERPL-MCNC: 0.04 NG/ML (ref 0–0.04)
TSH SERPL-ACNC: 2.89 UIU/ML (ref 0.35–4.94)
WBC # SPEC AUTO: 7.1 X10(3)/MCL (ref 4.5–11.5)

## 2023-03-13 PROCEDURE — 25000003 PHARM REV CODE 250: Performed by: PHYSICIAN ASSISTANT

## 2023-03-13 PROCEDURE — 86359 T CELLS TOTAL COUNT: CPT | Mod: 90

## 2023-03-13 PROCEDURE — 96376 TX/PRO/DX INJ SAME DRUG ADON: CPT

## 2023-03-13 PROCEDURE — G0378 HOSPITAL OBSERVATION PER HR: HCPCS

## 2023-03-13 PROCEDURE — 84484 ASSAY OF TROPONIN QUANT: CPT | Performed by: PHYSICIAN ASSISTANT

## 2023-03-13 PROCEDURE — 25000003 PHARM REV CODE 250

## 2023-03-13 PROCEDURE — 96367 TX/PROPH/DG ADDL SEQ IV INF: CPT

## 2023-03-13 PROCEDURE — 80053 COMPREHEN METABOLIC PANEL: CPT | Performed by: PHYSICIAN ASSISTANT

## 2023-03-13 PROCEDURE — 85025 COMPLETE CBC W/AUTO DIFF WBC: CPT | Performed by: PHYSICIAN ASSISTANT

## 2023-03-13 PROCEDURE — 63600175 PHARM REV CODE 636 W HCPCS: Performed by: PHYSICIAN ASSISTANT

## 2023-03-13 PROCEDURE — 96375 TX/PRO/DX INJ NEW DRUG ADDON: CPT

## 2023-03-13 PROCEDURE — 86360 T CELL ABSOLUTE COUNT/RATIO: CPT | Mod: 90

## 2023-03-13 PROCEDURE — 25500020 PHARM REV CODE 255

## 2023-03-13 PROCEDURE — 96365 THER/PROPH/DIAG IV INF INIT: CPT

## 2023-03-13 PROCEDURE — 89051 BODY FLUID CELL COUNT: CPT

## 2023-03-13 PROCEDURE — 99285 EMERGENCY DEPT VISIT HI MDM: CPT | Mod: 25

## 2023-03-13 PROCEDURE — 0240U COVID/FLU A&B PCR: CPT | Performed by: PHYSICIAN ASSISTANT

## 2023-03-13 PROCEDURE — 93005 ELECTROCARDIOGRAM TRACING: CPT

## 2023-03-13 PROCEDURE — 63600175 PHARM REV CODE 636 W HCPCS

## 2023-03-13 PROCEDURE — 87040 BLOOD CULTURE FOR BACTERIA: CPT | Performed by: PHYSICIAN ASSISTANT

## 2023-03-13 PROCEDURE — 85379 FIBRIN DEGRADATION QUANT: CPT | Performed by: FAMILY MEDICINE

## 2023-03-13 PROCEDURE — 84443 ASSAY THYROID STIM HORMONE: CPT | Performed by: PHYSICIAN ASSISTANT

## 2023-03-13 PROCEDURE — 83880 ASSAY OF NATRIURETIC PEPTIDE: CPT | Performed by: PHYSICIAN ASSISTANT

## 2023-03-13 PROCEDURE — 83605 ASSAY OF LACTIC ACID: CPT | Performed by: PHYSICIAN ASSISTANT

## 2023-03-13 PROCEDURE — 83735 ASSAY OF MAGNESIUM: CPT | Performed by: PHYSICIAN ASSISTANT

## 2023-03-13 RX ORDER — CARVEDILOL 12.5 MG/1
12.5 TABLET ORAL 2 TIMES DAILY
Status: DISCONTINUED | OUTPATIENT
Start: 2023-03-14 | End: 2023-03-15 | Stop reason: HOSPADM

## 2023-03-13 RX ORDER — FAMOTIDINE 20 MG/1
20 TABLET, FILM COATED ORAL 2 TIMES DAILY
Status: DISCONTINUED | OUTPATIENT
Start: 2023-03-13 | End: 2023-03-15 | Stop reason: HOSPADM

## 2023-03-13 RX ORDER — FUROSEMIDE 10 MG/ML
40 INJECTION INTRAMUSCULAR; INTRAVENOUS ONCE
Status: COMPLETED | OUTPATIENT
Start: 2023-03-13 | End: 2023-03-13

## 2023-03-13 RX ORDER — SODIUM CHLORIDE 0.9 % (FLUSH) 0.9 %
10 SYRINGE (ML) INJECTION
Status: DISCONTINUED | OUTPATIENT
Start: 2023-03-13 | End: 2023-03-15 | Stop reason: HOSPADM

## 2023-03-13 RX ORDER — FUROSEMIDE 20 MG/1
60 TABLET ORAL DAILY
Qty: 90 TABLET | Refills: 11 | OUTPATIENT
Start: 2023-03-13 | End: 2024-03-12

## 2023-03-13 RX ORDER — FUROSEMIDE 10 MG/ML
60 INJECTION INTRAMUSCULAR; INTRAVENOUS
Status: DISCONTINUED | OUTPATIENT
Start: 2023-03-14 | End: 2023-03-15 | Stop reason: HOSPADM

## 2023-03-13 RX ORDER — ATORVASTATIN CALCIUM 40 MG/1
40 TABLET, FILM COATED ORAL NIGHTLY
Qty: 90 TABLET | Refills: 3 | OUTPATIENT
Start: 2023-03-13 | End: 2024-03-12

## 2023-03-13 RX ORDER — FUROSEMIDE 10 MG/ML
40 INJECTION INTRAMUSCULAR; INTRAVENOUS
Status: COMPLETED | OUTPATIENT
Start: 2023-03-13 | End: 2023-03-13

## 2023-03-13 RX ORDER — ATORVASTATIN CALCIUM 40 MG/1
40 TABLET, FILM COATED ORAL NIGHTLY
Status: DISCONTINUED | OUTPATIENT
Start: 2023-03-13 | End: 2023-03-15 | Stop reason: HOSPADM

## 2023-03-13 RX ORDER — CARVEDILOL 12.5 MG/1
12.5 TABLET ORAL 2 TIMES DAILY
Qty: 180 TABLET | Refills: 3 | OUTPATIENT
Start: 2023-03-13 | End: 2024-03-12

## 2023-03-13 RX ORDER — ASPIRIN 81 MG/1
81 TABLET ORAL DAILY
Status: DISCONTINUED | OUTPATIENT
Start: 2023-03-14 | End: 2023-03-15 | Stop reason: HOSPADM

## 2023-03-13 RX ORDER — TALC
6 POWDER (GRAM) TOPICAL NIGHTLY PRN
Status: DISCONTINUED | OUTPATIENT
Start: 2023-03-13 | End: 2023-03-15 | Stop reason: HOSPADM

## 2023-03-13 RX ORDER — ASPIRIN 81 MG/1
81 TABLET ORAL DAILY
Qty: 90 TABLET | Refills: 3 | OUTPATIENT
Start: 2023-03-13 | End: 2024-03-12

## 2023-03-13 RX ORDER — METRONIDAZOLE 500 MG/100ML
500 INJECTION, SOLUTION INTRAVENOUS
Status: DISCONTINUED | OUTPATIENT
Start: 2023-03-14 | End: 2023-03-14

## 2023-03-13 RX ORDER — BICTEGRAVIR SODIUM, EMTRICITABINE, AND TENOFOVIR ALAFENAMIDE FUMARATE 50; 200; 25 MG/1; MG/1; MG/1
1 TABLET ORAL DAILY
Qty: 30 TABLET | Refills: 0 | Status: SHIPPED | OUTPATIENT
Start: 2023-03-13 | End: 2023-03-23 | Stop reason: SDUPTHER

## 2023-03-13 RX ADMIN — APIXABAN 10 MG: 2.5 TABLET, FILM COATED ORAL at 11:03

## 2023-03-13 RX ADMIN — PIPERACILLIN AND TAZOBACTAM 4.5 G: 4; .5 INJECTION, POWDER, LYOPHILIZED, FOR SOLUTION INTRAVENOUS; PARENTERAL at 08:03

## 2023-03-13 RX ADMIN — FUROSEMIDE 40 MG: 10 INJECTION, SOLUTION INTRAMUSCULAR; INTRAVENOUS at 08:03

## 2023-03-13 RX ADMIN — VANCOMYCIN HYDROCHLORIDE 1500 MG: 1 INJECTION, POWDER, LYOPHILIZED, FOR SOLUTION INTRAVENOUS at 09:03

## 2023-03-13 RX ADMIN — FUROSEMIDE 40 MG: 10 INJECTION, SOLUTION INTRAMUSCULAR; INTRAVENOUS at 11:03

## 2023-03-13 RX ADMIN — IOHEXOL 100 ML: 350 INJECTION, SOLUTION INTRAVENOUS at 11:03

## 2023-03-13 RX ADMIN — ATORVASTATIN CALCIUM 40 MG: 40 TABLET, FILM COATED ORAL at 11:03

## 2023-03-13 NOTE — ED PROVIDER NOTES
"Encounter Date: 3/13/2023       History     Chief Complaint   Patient presents with    Shortness of Breath     Here via [facility] with c/o SOB starting Friday, cough, weakness x 2-3 weeks, denies chest pain     63 yo M w/ PMHx significant for CHF, pAF, severe aortic stenosis, HTN, HIV, HBV, large B-cell lymphoma, polysubstance abuse & smoking presents to ED c/o 3 day hx of SOB, dry cough, orthopnea & generalized weakness. Reports SOB present both at rest & w/ exertion. Patient was seen in [facility] cardiology clinic & reports several of his meds were changed & he was unable to fill entresto. Reports compliance w/ lasix (60 mg qD) & eliquis. Denies worsening from baseline LE edema. Denies CP, palpitations, diaphoresis, presyncope, syncope, productive cough, hemoptysis, wheezing, congestion, rhinorrhea, sore throat, abdominal pain, abdominal distension, N/V/D, constipation, blood in stool, back pain, F/C, unexplained weight loss, night sweats, appetite changes, HA, dizziness, AMS. VSS on arrival, patient in NAD.    Review of patient's allergies indicates:   Allergen Reactions    Ace inhibitors      Other reaction(s): Angioedema    Nitroglycerin Itching     Past Medical History:   Diagnosis Date    Cancer     CHF (congestive heart failure)     Human immunodeficiency virus (HIV) disease     Hypertension      No past surgical history on file.  No family history on file.  Social History     Tobacco Use    Smoking status: Every Day     Packs/day: 0.25     Types: Cigarettes    Smokeless tobacco: Never   Substance Use Topics    Alcohol use: Not Currently    Drug use: Yes     Types: "Crack" cocaine     Comment: last taken 1 month     Review of Systems   All other systems reviewed and are negative.    Physical Exam     Initial Vitals [03/13/23 1642]   BP Pulse Resp Temp SpO2   115/82 80 20 98.8 °F (37.1 °C) 99 %      MAP       --         Physical Exam    Nursing note and vitals reviewed.  Constitutional: He appears well-developed and " well-nourished. He is not diaphoretic. No distress.   HENT:   Head: Normocephalic and atraumatic.   Nose: Nose normal.   Mouth/Throat: Oropharynx is clear and moist. No oropharyngeal exudate.   Eyes: Conjunctivae and EOM are normal. Pupils are equal, round, and reactive to light. No scleral icterus.   Neck: Neck supple. No thyromegaly present. No tracheal deviation present. JVD present.   Normal range of motion.  Cardiovascular:  Normal rate, regular rhythm, normal heart sounds and intact distal pulses.     Exam reveals no gallop and no friction rub.       No murmur heard.  Pulmonary/Chest: No accessory muscle usage or stridor. No tachypnea. No respiratory distress. He has no wheezes. He has no rhonchi. He has rales in the right middle field and the right lower field. He exhibits no tenderness.   Abdominal: Abdomen is soft. Bowel sounds are normal. He exhibits no distension, no fluid wave, no abdominal bruit and no pulsatile midline mass. There is no abdominal tenderness. There is no rebound and no guarding.   Musculoskeletal:         General: No tenderness. Normal range of motion.      Cervical back: Normal range of motion and neck supple.      Right lower le+ Pitting Edema present.      Left lower le+ Pitting Edema present.     Lymphadenopathy:     He has no cervical adenopathy.   Neurological: He is alert and oriented to person, place, and time. He has normal strength. No cranial nerve deficit or sensory deficit.   Skin: Skin is warm and dry. Capillary refill takes less than 2 seconds. No rash noted. No erythema. No pallor.   Psychiatric: He has a normal mood and affect.       ED Course   Procedures  Labs Reviewed   COMPREHENSIVE METABOLIC PANEL - Abnormal; Notable for the following components:       Result Value    Creatinine 1.29 (*)     Protein Total 8.6 (*)     Albumin Level 3.3 (*)     Globulin 5.3 (*)     Albumin/Globulin Ratio 0.6 (*)     All other components within normal limits   B-TYPE  NATRIURETIC PEPTIDE - Abnormal; Notable for the following components:    Natriuretic Peptide 7,621.0 (*)     All other components within normal limits   CBC WITH DIFFERENTIAL - Abnormal; Notable for the following components:    RBC 4.43 (*)     Hgb 12.3 (*)     Hct 40.6 (*)     MCHC 30.3 (*)     MPV 11.8 (*)     All other components within normal limits   MAGNESIUM - Normal   TROPONIN I - Normal   COVID/FLU A&B PCR - Normal    Narrative:     The Xpert Xpress SARS-CoV-2/FLU/RSV plus is a rapid, multiplexed real-time PCR test intended for the simultaneous qualitative detection and differentiation of SARS-CoV-2, Influenza A, Influenza B, and respiratory syncytial virus (RSV) viral RNA in either nasopharyngeal swab or nasal swab specimens.         TSH - Normal   BLOOD CULTURE [facility]LG   BLOOD CULTURE OLG   CBC W/ AUTO DIFFERENTIAL    Narrative:     The following orders were created for panel order CBC Auto Differential.  Procedure                               Abnormality         Status                     ---------                               -----------         ------                     CBC with Differential[[id number]]        Abnormal            Final result                 Please view results for these tests on the individual orders.   EXTRA TUBES    Narrative:     The following orders were created for panel order EXTRA TUBES.  Procedure                               Abnormality         Status                     ---------                               -----------         ------                     Light Blue Top Hold[[id number]]                              In process                   Please view results for these tests on the individual orders.   LIGHT BLUE TOP HOLD   LACTIC ACID, PLASMA   CD4 LYMPHOCYTES (OLG)     EKG Readings: (Independently Interpreted)   Initial Reading: No STEMI. Previous EKG: Compared with most recent EKG Previous EKG Date: 1/25/23. Rhythm: Normal Sinus Rhythm. Heart Rate: 84. Ectopy: Rare  PVCs. Conduction: LPFB. ST Segments: Normal ST Segments. Axis: Right Axis Deviation. Clinical Impression: Normal Sinus Rhythm with PVCs   ECG Results              EKG 12-lead (In process)  Result time 03/13/23 17:28:21      In process by Interface, Lab In [facility] (03/13/23 17:28:21)                   Narrative:    Test Reason : R06.02,    Vent. Rate : 084 BPM     Atrial Rate : 084 BPM     P-R Int : 198 ms          QRS Dur : 102 ms      QT Int : 420 ms       P-R-T Axes : 050 150 034 degrees     QTc Int : 496 ms    Sinus rhythm with occasional Premature ventricular complexes  Left posterior fascicular block  Anteroseptal infarct (cited on or before 11-SEP-2022)  Abnormal ECG  When compared with ECG of 25-JAN-2023 11:29,  Premature ventricular complexes are now Present  Left posterior fascicular block is now Present  Questionable change in initial forces of Septal leads    Referred By: [email or web address]   SELF           Confirmed By:                                   Imaging Results              X-Ray Chest PA And Lateral (Final result)  Result time 03/13/23 18:58:01      Final result by Sanjana Otero MD (03/13/23 18:58:01)                   Impression:      Worsening right lung infiltrate and pleural effusion      Electronically signed by: Sanjana Otero  Date:    03/13/2023  Time:    18:58               Narrative:    EXAMINATION:  XR CHEST PA AND LATERAL    CLINICAL HISTORY:  SOB, cough;    TECHNIQUE:  PA and lateral views of the chest were performed.    COMPARISON:  01/24/2023    FINDINGS:  There is a interstitial pneumonia in the right lower lobe.  Infiltrate is also seen in the right middle lobe.  There is a right-sided pleural effusion.  The heart is enlarged in size.  There is a port in the right anterior chest wall.                                       Medications   furosemide injection 40 mg (has no administration in time range)   vancomycin (VANCOCIN) 1,500 mg in sodium chloride 0.9% 250 mL IVPB (has no  administration in time range)   piperacillin-tazobactam (ZOSYN) 4.5 g in sodium chloride 0.9 % 100 mL IVPB (MB+) (has no administration in time range)     Medical Decision Making:   Clinical Tests:   Lab Tests: Ordered and Reviewed  Radiological Study: Ordered and Reviewed  Medical Tests: Ordered and Reviewed  Other:   I have discussed this case with another health care provider.       <> Summary of the Discussion: Case discussed w/ Dr. Shoemaker & he reviewed all diagnostic information & performed face-to-face evaluations at bedside. All of his recommendations followed.           ED Course as of 03/13/23 1943   Mon Mar 13, 2023   1940 IM consulted for admission for CHF exacerbation & R sided CAP in setting of decreased CD4 count. Broad-spectrum abx ordered. Patient does not currently meet SIRS criteria for sepsis, but have still ordered blood cultures & lactic acid. Will hold fluids at this time due to CHF history & signs of fluid overload. [NB]      ED Course User Index  [NB] ELLIS Heath                 Clinical Impression:   Final diagnoses:  [R06.02] SOB (shortness of breath)  [I50.9] Acute on chronic congestive heart failure, unspecified heart failure type (Primary)  [J18.9] Community acquired pneumonia of right lower lobe of lung  [R76.8] Low CD4 cell count determined by flow cytometry  [J90] Pleural effusion, right        ED Disposition Condition    Admit Stable                ELLIS Heath  03/13/23 1943

## 2023-03-13 NOTE — TELEPHONE ENCOUNTER
----- Message from Adilia Rivers sent at 3/13/2023  8:28 AM CDT -----  Regarding: Rx  SHELBY/KENNY    Pt out of Biktarvy, requesting refill.

## 2023-03-13 NOTE — TELEPHONE ENCOUNTER
Lab results 1/2023 reviewed. Will authorize one refill but he must attend scheduled appt for additional refills.

## 2023-03-13 NOTE — TELEPHONE ENCOUNTER
Last visit with Kiara Qureshi, APRN:   05/22/2022, Last visit in [facility] INFECTIOUS DISEASE: Visit date 05/22/2022    Patient's next visit in [facility] INFECTIOUS DISEASE: 3/23/2023     Last labs done with hospitalization    Please advise on med refill

## 2023-03-14 LAB
ALBUMIN SERPL-MCNC: 2.9 G/DL (ref 3.4–4.8)
ALBUMIN/GLOB SERPL: 0.7 RATIO (ref 1.1–2)
ALP SERPL-CCNC: 63 UNIT/L (ref 40–150)
ALT SERPL-CCNC: 15 UNIT/L (ref 0–55)
AMPHET UR QL SCN: NEGATIVE
AST SERPL-CCNC: 26 UNIT/L (ref 5–34)
BARBITURATE SCN PRESENT UR: NEGATIVE
BASOPHILS # BLD AUTO: 0.05 X10(3)/MCL (ref 0–0.2)
BASOPHILS NFR BLD AUTO: 0.8 %
BENZODIAZ UR QL SCN: NEGATIVE
BILIRUBIN DIRECT+TOT PNL SERPL-MCNC: 1.2 MG/DL
BUN SERPL-MCNC: 25.3 MG/DL (ref 8.4–25.7)
CALCIUM SERPL-MCNC: 8.6 MG/DL (ref 8.8–10)
CANNABINOIDS UR QL SCN: NEGATIVE
CHLORIDE SERPL-SCNC: 105 MMOL/L (ref 98–107)
CLARITY BODY FLUID (OHS): CLEAR
CO2 SERPL-SCNC: 20 MMOL/L (ref 23–31)
COCAINE UR QL SCN: POSITIVE
COLOR BODY FLUID (OHS): YELLOW
CREAT SERPL-MCNC: 1.21 MG/DL (ref 0.73–1.18)
EOSINOPHIL # BLD AUTO: 0.06 X10(3)/MCL (ref 0–0.9)
EOSINOPHIL NFR BLD AUTO: 1 %
ERYTHROCYTE [DISTWIDTH] IN BLOOD BY AUTOMATED COUNT: 15.7 % (ref 11.5–17)
FENTANYL UR QL SCN: NEGATIVE
GFR SERPLBLD CREATININE-BSD FMLA CKD-EPI: >60 MLS/MIN/1.73/M2
GLOBULIN SER-MCNC: 4.4 GM/DL (ref 2.4–3.5)
GLUCOSE SERPL-MCNC: 107 MG/DL (ref 82–115)
GRAM STN SPEC: NORMAL
GRAM STN SPEC: NORMAL
HCT VFR BLD AUTO: 35.3 % (ref 42–52)
HGB BLD-MCNC: 11.2 G/DL (ref 14–18)
IMM GRANULOCYTES # BLD AUTO: 0.02 X10(3)/MCL (ref 0–0.04)
IMM GRANULOCYTES NFR BLD AUTO: 0.3 %
LDH FLD-CCNC: 96 U/L
LDH SERPL-CCNC: 292 U/L (ref 125–220)
LYMPHOCYTE MANUAL BF (OHS): 57 %
LYMPHOCYTES # BLD AUTO: 1.36 X10(3)/MCL (ref 0.6–4.6)
LYMPHOCYTES NFR BLD AUTO: 22.2 %
MACROPHAGES, FLUID MAN COUNT (OHS): 258
MAGNESIUM SERPL-MCNC: 2.1 MG/DL (ref 1.6–2.6)
MCH RBC QN AUTO: 28.3 PG
MCHC RBC AUTO-ENTMCNC: 31.7 G/DL (ref 33–36)
MCV RBC AUTO: 89.1 FL (ref 80–94)
MDMA UR QL SCN: NEGATIVE
MESOTHELIAL CELLS, FLUID MAN COUNT (OHS): 15
MONOCYTES # BLD AUTO: 0.81 X10(3)/MCL (ref 0.1–1.3)
MONOCYTES NFR BLD AUTO: 13.2 %
MRSA PCR SCRN (OHS): NOT DETECTED
NEUTROPHILS # BLD AUTO: 3.82 X10(3)/MCL (ref 2.1–9.2)
NEUTROPHILS MAN BF (OHS): 43 %
NEUTROPHILS NFR BLD AUTO: 62.5 %
NRBC BLD AUTO-RTO: 0 %
OPIATES UR QL SCN: NEGATIVE
PCP UR QL: NEGATIVE
PH FLD: 7.66 [PH]
PH UR: 5.5 [PH] (ref 3–11)
PHOSPHATE SERPL-MCNC: 3 MG/DL (ref 2.3–4.7)
PLATELET # BLD AUTO: 183 X10(3)/MCL (ref 130–400)
PMV BLD AUTO: 11.6 FL (ref 7.4–10.4)
POCT GLUCOSE: 149 MG/DL (ref 70–110)
POTASSIUM SERPL-SCNC: 3.3 MMOL/L (ref 3.5–5.1)
PROT FLD-MCNC: 1.5 GM/DL
PROT SERPL-MCNC: 7.3 GM/DL (ref 5.8–7.6)
RBC # BLD AUTO: 3.96 X10(6)/MCL (ref 4.7–6.1)
RBC COUNT BODY FLUID (OHS): 1000 /UL
SODIUM SERPL-SCNC: 134 MMOL/L (ref 136–145)
SPECIFIC GRAVITY, URINE AUTO (.000) (OHS): 1.02 (ref 1–1.03)
WBC # FLD AUTO: 102 /UL
WBC # SPEC AUTO: 6.1 X10(3)/MCL (ref 4.5–11.5)

## 2023-03-14 PROCEDURE — 25000003 PHARM REV CODE 250

## 2023-03-14 PROCEDURE — G0378 HOSPITAL OBSERVATION PER HR: HCPCS

## 2023-03-14 PROCEDURE — 32555 ASPIRATE PLEURA W/ IMAGING: CPT

## 2023-03-14 PROCEDURE — 83986 ASSAY PH BODY FLUID NOS: CPT | Mod: 59

## 2023-03-14 PROCEDURE — 80307 DRUG TEST PRSMV CHEM ANLYZR: CPT

## 2023-03-14 PROCEDURE — 96376 TX/PRO/DX INJ SAME DRUG ADON: CPT | Mod: 59

## 2023-03-14 PROCEDURE — 87641 MR-STAPH DNA AMP PROBE: CPT

## 2023-03-14 PROCEDURE — 63600175 PHARM REV CODE 636 W HCPCS

## 2023-03-14 PROCEDURE — 96366 THER/PROPH/DIAG IV INF ADDON: CPT

## 2023-03-14 PROCEDURE — S0030 INJECTION, METRONIDAZOLE: HCPCS

## 2023-03-14 PROCEDURE — 83615 LACTATE (LD) (LDH) ENZYME: CPT

## 2023-03-14 PROCEDURE — 84157 ASSAY OF PROTEIN OTHER: CPT

## 2023-03-14 PROCEDURE — 87385 HISTOPLASMA CAPSUL AG IA: CPT | Mod: 90 | Performed by: FAMILY MEDICINE

## 2023-03-14 PROCEDURE — 87449 NOS EACH ORGANISM AG IA: CPT | Mod: 90 | Performed by: FAMILY MEDICINE

## 2023-03-14 PROCEDURE — 86612 BLASTOMYCES ANTIBODY: CPT | Mod: 90 | Performed by: FAMILY MEDICINE

## 2023-03-14 PROCEDURE — 87116 MYCOBACTERIA CULTURE: CPT | Mod: 90

## 2023-03-14 PROCEDURE — 80053 COMPREHEN METABOLIC PANEL: CPT

## 2023-03-14 PROCEDURE — 94761 N-INVAS EAR/PLS OXIMETRY MLT: CPT

## 2023-03-14 PROCEDURE — 96367 TX/PROPH/DG ADDL SEQ IV INF: CPT

## 2023-03-14 PROCEDURE — 84311 SPECTROPHOTOMETRY: CPT | Mod: 90

## 2023-03-14 PROCEDURE — 87070 CULTURE OTHR SPECIMN AEROBIC: CPT

## 2023-03-14 PROCEDURE — 83735 ASSAY OF MAGNESIUM: CPT

## 2023-03-14 PROCEDURE — 87205 SMEAR GRAM STAIN: CPT

## 2023-03-14 PROCEDURE — 84100 ASSAY OF PHOSPHORUS: CPT

## 2023-03-14 PROCEDURE — 30000890 MAYO GENERIC ORDERABLE: Mod: 90

## 2023-03-14 PROCEDURE — 85025 COMPLETE CBC W/AUTO DIFF WBC: CPT

## 2023-03-14 RX ADMIN — CARVEDILOL 12.5 MG: 12.5 TABLET, FILM COATED ORAL at 09:03

## 2023-03-14 RX ADMIN — VANCOMYCIN HYDROCHLORIDE 1000 MG: 1 INJECTION, POWDER, LYOPHILIZED, FOR SOLUTION INTRAVENOUS at 09:03

## 2023-03-14 RX ADMIN — APIXABAN 10 MG: 2.5 TABLET, FILM COATED ORAL at 09:03

## 2023-03-14 RX ADMIN — METRONIDAZOLE 500 MG: 5 INJECTION, SOLUTION INTRAVENOUS at 05:03

## 2023-03-14 RX ADMIN — ATORVASTATIN CALCIUM 40 MG: 40 TABLET, FILM COATED ORAL at 09:03

## 2023-03-14 RX ADMIN — METRONIDAZOLE 500 MG: 5 INJECTION, SOLUTION INTRAVENOUS at 01:03

## 2023-03-14 RX ADMIN — CEFEPIME 2 G: 2 INJECTION, POWDER, FOR SOLUTION INTRAVENOUS at 01:03

## 2023-03-14 RX ADMIN — FUROSEMIDE 60 MG: 10 INJECTION, SOLUTION INTRAMUSCULAR; INTRAVENOUS at 05:03

## 2023-03-14 RX ADMIN — FAMOTIDINE 20 MG: 20 TABLET, FILM COATED ORAL at 09:03

## 2023-03-14 RX ADMIN — CEFEPIME 2 G: 2 INJECTION, POWDER, FOR SOLUTION INTRAVENOUS at 04:03

## 2023-03-14 RX ADMIN — CEFEPIME 2 G: 2 INJECTION, POWDER, FOR SOLUTION INTRAVENOUS at 09:03

## 2023-03-14 RX ADMIN — BICTEGRAVIR SODIUM, EMTRICITABINE, AND TENOFOVIR ALAFENAMIDE FUMARATE 1 TABLET: 50; 200; 25 TABLET ORAL at 09:03

## 2023-03-14 RX ADMIN — ASPIRIN 81 MG: 81 TABLET, COATED ORAL at 09:03

## 2023-03-14 RX ADMIN — POTASSIUM BICARBONATE 50 MEQ: 978 TABLET, EFFERVESCENT ORAL at 09:03

## 2023-03-14 NOTE — PROGRESS NOTES
"Inpatient Nutrition Evaluation    Admit Date: 3/13/2023   Total duration of encounter: 1 day    Nutrition Recommendation/Prescription     Continue Heart Healthy diet  Monitor Weights daily     Nutrition Assessment     Chart Review    Reason Seen: continuous nutrition monitoring    Malnutrition Screening Tool Results   Have you recently lost weight without trying?: No  Have you been eating poorly because of a decreased appetite?: No   MST Score: 0     Diagnosis:  CHF exacerbation, CAP, Elevated D-Dimer, Pleural Effusion, Paroxysmal Afib, HIV    Relevant Medical History: NICM, HFrEF, CAD, NSTEMI, Severe aortic stenosis, PAF, HIV/AIDS, Large B Lymphoma, cocaine abuse    Nutrition-Related Medications: ASA, Atorvastatin, Lasix, Potassium Bicarb, Vanc    Nutrition-Related Labs:  3/14/23 -- Glu 107, Na 134 L, K 3.3 L, BUN 25.3, Cr 1.2    Diet Order: Diet heart healthy  Oral Supplement Order: none  Appetite/Oral Intake: good/% of meals  Factors Affecting Nutritional Intake: none identified  Food/Jainism/Cultural Preferences: none reported  Food Allergies: none reported       Wound(s):   skin intact     Comments    3/14/23 -- Pt reports good appetite, denies difficulty eating; wt currently elevated, 1-2+ edema present to RLE -- encouraged Low Na diet; no n/v; LBM 3/13    Anthropometrics    Height: 5' 11" (180.3 cm) Height Method: Stated  Last Weight: 95.1 kg (209 lb 10.5 oz) (03/14/23 0408) Weight Method: Bed Scale  BMI (Calculated): 29.3  BMI Classification: overweight (BMI 25-29.9)        Ideal Body Weight (IBW), Male: 172 lb     % Ideal Body Weight, Male (lb): 121.9 %                          Usual Weight Provided By: patient denies unintentional weight loss    Wt Readings from Last 3 Encounters:   03/14/23 0408 95.1 kg (209 lb 10.5 oz)   03/14/23 0055 95.1 kg (209 lb 10.5 oz)   03/13/23 1644 92.5 kg (204 lb)   03/09/23 0959 92.7 kg (204 lb 5.9 oz)   01/26/23 0509 97 kg (213 lb 13.5 oz)   01/25/23 1145 93.2 kg " (205 lb 7.5 oz)   01/25/23 0600 93.2 kg (205 lb 7.5 oz)   01/24/23 1138 94.3 kg (208 lb)   01/24/23 0442 94.5 kg (208 lb 5.4 oz)      Weight Change(s) Since Admission:  Admit Weight: 92.5 kg (204 lb) (03/13/23 1644)      Patient Education    Not applicable.    Monitoring & Evaluation     Dietitian will monitor food and beverage intake, weight change, electrolyte/renal panel, and food/nutrition knowledge skill.  Nutrition Risk/Follow-Up: low (follow-up in 5-7 days)  Patients assigned 'low nutrition risk' status do not qualify for a full nutritional assessment but will be monitored and re-evaluated in a 5-7 day time period. Please consult if re-evaluation needed sooner.

## 2023-03-14 NOTE — PLAN OF CARE
03/14/23 1354   Discharge Assessment   Assessment Type Discharge Planning Assessment   Confirmed/corrected address, phone number and insurance Yes   Confirmed Demographics Correct on Facesheet   Source of Information patient   When was your last doctors appointment?   (PCP: Earl Aguilar)   Does patient/caregiver understand observation status Yes   Communicated VIDAL with patient/caregiver Date not available/Unable to determine   Reason For Admission SOB, Right pleural effusion, CAP, sepsis   People in Home alone   Facility Arrived From: Home   Do you expect to return to your current living situation? Yes   Do you have help at home or someone to help you manage your care at home? No   Prior to hospitilization cognitive status: Alert/Oriented;No Deficits   Current cognitive status: Alert/Oriented;No Deficits   Home Layout Able to live on 1st floor   Equipment Currently Used at Home none   Readmission within 30 days? No   Patient currently being followed by outpatient case management? No   Do you currently have service(s) that help you manage your care at home? No   Do you take prescription medications? Yes  ([facility] in [place])   Do you have prescription coverage? Yes   Coverage [employer]   Do you have any problems affording any of your prescribed medications? No   Is the patient taking medications as prescribed? yes   Who is going to help you get home at discharge? Family   How do you get to doctors appointments? health plan transportation   Are you on dialysis? No   Do you take coumadin? No   Discharge Plan A Home   DME Needed Upon Discharge    (TBD)   Discharge Plan discussed with: Patient   Discharge Barriers Identified None     Emergency contact is Stephani Perdue, sister, P: [phone]. Patient receives SS disability and SNAP benefits. Will follow for DC needs.

## 2023-03-14 NOTE — H&P
History and Physical     Patient Name: Jason Perdue  MRN: [id number]  Admission Date: 3/13/2023  Hospital Length of Stay: 0 days  Code Status: Full Code  Attending Provider: Isela Mukherjee MD  Primary Care Provider: Earl Aguilar MD     Chief Complaint:   Shortness of Breath (Here via [facility] with c/o SOB starting Friday, cough, weakness x 2-3 weeks, denies chest pain)      Subjective:      Brief HPI:  Mr. Jason Perdue is a 62 y.o. 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's ED on 3/13/23 for worsening shortness of breath of 2 days. Patient is endorsing lethargy and leg swelling (RT>LT) for 2 weeks and SOB, dry cough, orthopnea and LOVING for 2 days. He states prior to 2 days ago he was able to ambulate without SOB but now is getting SOB with roughly 50 steps. He is endorsing noncompliance with his Lipitor and Biktarvy (due to running out of meds) for the last 2 months but states he has been taking the ASA, Eliquis and Coreg. Patient was recently stopped on Aldactone at his Cardiology clinic visit on 3/9/23, but was not taking this medication anyway (per chart). Cardiology has also been attempting to start patient on Entresto but patient is unable to get medication due to the pharmacy not carrying Entresto. He denies any CP, productive cough, fevers, night sweats, weight loss, urinary disturbances, nausea, vomiting, diarrhea or constipation.     Social Hx: Current smoker, 0.5 packs daily for about 3 months, prior was 2 packs daily since age of 20 (42 pack/yr hx). Denies any alcohol use or past use (per chart review, patient has hx of heavy alcohol use). Endorses chronic crack cocaine use with complete cessation for last 3 weeks.     Family Hx:  - Brother  from colon  "cancer at age 22.  - Father  from lung cancer in his 60s; used to smoke.      Past Medical History:   Diagnosis Date    Cancer     CHF (congestive heart failure)     Human immunodeficiency virus (HIV) disease     Hypertension        No past surgical history on file.    Review of patient's allergies indicates:   Allergen Reactions    Ace inhibitors      Other reaction(s): Angioedema    Nitroglycerin Itching       Current Outpatient Medications   Medication Instructions    apixaban (ELIQUIS) 5 mg, Oral, 2 times daily    aspirin (ECOTRIN) 81 mg, Oral, Daily    atorvastatin (LIPITOR) 40 mg, Oral, Nightly    sxmazbhux-lfzfjxtd-bkrcver ala (BIKTARVY) -25 mg (25 kg or greater) 1 tablet, Oral, Daily    carvediloL (COREG) 12.5 mg, Oral, 2 times daily    famotidine (PEPCID) 20 mg, Oral, 2 times daily    food supplemt, lactose-reduced Liqd 1 Bottle, Oral    furosemide (LASIX) 60 mg, Oral, Daily    megestroL (MEGACE) 200 mg, Oral    morphine (MSIR) 15 mg, Oral    polyethylene glycol 3350,bulk, Powd Other    sacubitriL-valsartan (ENTRESTO) 24-26 mg per tablet 1 tablet, Oral, 2 times daily    sulfamethoxazole-trimethoprim 800-160mg (BACTRIM DS) 800-160 mg Tab 1 tablet, Oral, Daily    VITAMIN D2 50,000 Units, Oral, Every 7 days          Social History:     Social History     Socioeconomic History    Marital status: Single   Tobacco Use    Smoking status: Every Day     Packs/day: 0.25     Types: Cigarettes    Smokeless tobacco: Never   Substance and Sexual Activity    Alcohol use: Not Currently    Drug use: Yes     Types: "Crack" cocaine     Comment: last taken 1 month     Social Determinants of Health     Financial Resource Strain: Low Risk     Difficulty of Paying Living Expenses: Not very hard   Food Insecurity: No Food Insecurity    Worried About Running Out of Food in the Last Year: Never true    Ran Out of Food in the Last Year: Never true   Transportation Needs: No Transportation Needs    Lack of Transportation " (Medical): No    Lack of Transportation (Non-Medical): No   Physical Activity: Insufficiently Active    Days of Exercise per Week: 4 days    Minutes of Exercise per Session: 30 min   Stress: No Stress Concern Present    Feeling of Stress : Not at all   Social Connections: Socially Isolated    Frequency of Communication with Friends and Family: Twice a week    Frequency of Social Gatherings with Friends and Family: Once a week    Attends Islam Services: Never    Active Member of Clubs or Organizations: No    Attends Club or Organization Meetings: Never    Marital Status: Never    Housing Stability: Low Risk     Unable to Pay for Housing in the Last Year: No    Number of Places Lived in the Last Year: 1    Unstable Housing in the Last Year: No       No family history on file.    Review of Systems:  Review of Systems   Constitutional:  Negative for chills, diaphoresis and fever.   HENT:  Negative for sore throat.    Eyes:  Negative for double vision.   Respiratory:  Positive for cough and shortness of breath. Negative for sputum production and wheezing.    Cardiovascular:  Positive for orthopnea and leg swelling. Negative for chest pain, palpitations and PND.   Gastrointestinal:  Negative for blood in stool, constipation, diarrhea, melena, nausea and vomiting.   Genitourinary:  Negative for dysuria, frequency and urgency.   Skin:  Negative for rash.   Neurological:  Negative for dizziness, seizures, loss of consciousness, weakness and headaches.        Objective:     Vital Signs:  Vital Signs (Most Recent):  Temp: 97.3 °F (36.3 °C) (03/14/23 0022)  Pulse: 89 (03/14/23 0022)  Resp: 19 (03/14/23 0022)  BP: 114/88 (03/14/23 0022)  SpO2: 98 % (03/14/23 0022)  Body mass index is 28.45 kg/m².  Weight: 92.5 kg (204 lb) Vital Signs (24h Range):  Temp:  [97.3 °F (36.3 °C)-98.8 °F (37.1 °C)] 97.3 °F (36.3 °C)  Pulse:  [80-89] 89  Resp:  [19-20] 19  SpO2:  [98 %-99 %] 98 %  BP: ()/(74-88) 114/88        Input/output:     Intake/Output Summary (Last 24 hours) at 3/14/2023 0029  Last data filed at 3/13/2023 2342  Gross per 24 hour   Intake 350 ml   Output 1450 ml   Net -1100 ml       Physical Examination:  Physical Exam  Vitals and nursing note reviewed.   Constitutional:       Appearance: Normal appearance.   HENT:      Head: Normocephalic.      Mouth/Throat:      Mouth: Mucous membranes are moist.   Eyes:      General: No scleral icterus.     Extraocular Movements: Extraocular movements intact.      Pupils: Pupils are equal, round, and reactive to light.   Cardiovascular:      Rate and Rhythm: Normal rate and regular rhythm.      Pulses: Normal pulses.      Heart sounds: Normal heart sounds.   Pulmonary:      Effort: Pulmonary effort is normal. No respiratory distress.      Breath sounds: Rales (Bibasilar crackles noted.) present. No wheezing.   Abdominal:      General: Abdomen is flat. There is no distension.      Palpations: Abdomen is soft.      Tenderness: There is no abdominal tenderness. There is no guarding.   Musculoskeletal:         General: Normal range of motion.      Cervical back: Normal range of motion.      Right lower leg: Edema (Trace edema RLE, edema noticeably worse on RLE; No erythema or pain with palpation.) present.      Left lower leg: Edema present.   Skin:     General: Skin is warm.      Capillary Refill: Capillary refill takes less than 2 seconds.      Coloration: Skin is not jaundiced or pale.      Findings: No erythema, lesion or rash.   Neurological:      General: No focal deficit present.      Mental Status: He is alert and oriented to person, place, and time. Mental status is at baseline.        Lines/Drains/Airways       None                    Laboratory:    Recent Labs   Lab 03/13/23  1807   WBC 7.1   HGB 12.3*   HCT 40.6*      MCV 91.6   RDW 15.8     Recent Labs   Lab 03/13/23  1807   TROPONINI 0.035   BNP 7,621.0*     Recent Labs   Lab 03/13/23  1807   TROPONINI  0.035   BNP 7,621.0*     No results for input(s): CHOL, HDL, LDLCALC, TRIG, CHOLHDL in the last 168 hours. Recent Labs   Lab 03/13/23  1807      K 4.3   CHLORIDE 105   CO2 24   BUN 25.5   CREATININE 1.29*   CALCIUM 9.2   MG 2.20     Recent Labs   Lab 03/13/23  1807   ALBUMIN 3.3*   BILITOT 1.5   AST 30   ALKPHOS 69   ALT 17     No results for input(s): IRON, TIBC, FERRITIN, SATURATEDIRO, CMYPWOHN92, FOLATE in the last 168 hours.  Recent Labs   Lab 03/13/23  1807   TSH 2.893              Other Results:    Current Medications:     Infusions:        Scheduled:   apixaban  10 mg Oral BID    aspirin  81 mg Oral Daily    atorvastatin  40 mg Oral QHS    qbkxiuvhy-vvrjrwtx-ervwwfx ala  1 tablet Oral Daily    carvediloL  12.5 mg Oral BID    ceFEPime (MAXIPIME) IVPB  2 g Intravenous Q8H    famotidine  20 mg Oral BID    furosemide (LASIX) injection  60 mg Intravenous Q12H    metronidazole  500 mg Intravenous Q8H         PRN:   melatonin    sodium chloride 0.9%        Microbiology Data:  Microbiology Results (last 7 days)       Procedure Component Value Units Date/Time    Gram Stain [[id number]]     Order Status: Sent Specimen: Body Fluid from Pleural Fluid     Mycobacteria and Nocardia Culture [[id number]]     Order Status: Sent Specimen: Body Fluid     Body Fluid Culture [[id number]]     Order Status: Sent Specimen: Body Fluid from Thoracentesis Fluid     Blood Culture #2 **CANNOT BE ORDERED STAT** [[id number]] Collected: 03/13/23 2100    Order Status: Sent Specimen: Blood from Antecubital, Left Updated: 03/13/23 2126    Blood Culture #1 **CANNOT BE ORDERED STAT** [[id number]] Collected: 03/13/23 2102    Order Status: Sent Specimen: Blood from Hand, Right Updated: 03/13/23 2125             Antibiotics and Day Number of Therapy:  Antibiotics (From admission, onward)      Start     Stop Route Frequency Ordered    03/14/23 0500  ceFEPIme (MAXIPIME) 2 g in sodium chloride 0.9 % 50 mL IVPB (MB+)         03/21 0459 IV Every 8  hours (non-standard times) 03/13/23 2329 03/14/23 0500  metronidazole IVPB 500 mg         -- IV Every 8 hours (non-standard times) 03/13/23 2329             Imaging:  CT Chest Without Contrast  START OF REPORT:  Technique: CT Scan of the chest was performed without intravenous contrast with direct axial as well as sagittal and, coronal, reconstruction images.    Dosage Information: Automated Exposure Control was utilized 220.37 mGy.cm.    Comparison: In correlation with the chest xray study dated â2023-03-13.    Clinical History: Sepsis; Respiratory illness, nondiagnostic xray.    Findings:  Soft Tissues: Unremarkable.  Lines and Tubes: A portachatheter is seen traversing the anterior chest wall with its tip at the distal SVC.  Axilla: A few mildly prominent lymph nodes are seen in the axilla.  Neck: The visualized soft tissues of the neck appear unremarkable.  Mediastinum: A few enlarged mediastinal lymph nodes are seen pretracheal precarinal and subcarinal spaces . The largest is in subcarinal space and measures â1.6 cmâ in least dimension. This suggests reactive lymphadenopathy. Correlate with clinical and laboratory findings as regards further evaluation and follow up.  Heart: Mild to moderate cardiomegaly is seen. Moderate coronary artery calcification is seen.  Aorta: No aortic dissection or aneurysm is seen. Mild aortic calcification is seen in the arch and descending thoracic aorta. Calcification is also seen in the aortic annulus.  Lungs: There is a moderate sized right pleural effusion and there is some associated dense opacity at the medial dependent portion of the right lung which may reflect atelectasis however there are significant areas of air bronchograms and some associated ground-glass opacities suggesting this may be an infectious process with the possibility of a neoplastic component also not entirely excluded. There is a small left pleural effusion with the left lung otherwise appearing  unremarkable.  Bony Structures:  Spine: Moderate spondylolytic changes are seen in the thoracic spine.  Ribs: No rib fractures are identified.  Abdomen: There is minimal perihepatic ascites. The pancreas is mildly atrophic.    Impression:  1. There is a moderate sized right pleural effusion and there is some associated dense opacity at the medial dependent portion of the right lung which may reflect atelectasis however there are significant areas of air bronchograms and some associated ground-glass opacities suggesting this may be an infectious process with the possibility of a neoplastic component also not entirely excluded. There is a small left pleural effusion with the left lung otherwise appearing unremarkable. Correlate with clinical and laboratory findings as regards additional evaluation and follow-up to resolution as indicated.  2. Details and other findings as discussed above.  X-Ray Chest PA And Lateral  Narrative: EXAMINATION:  XR CHEST PA AND LATERAL    CLINICAL HISTORY:  SOB, cough;    TECHNIQUE:  PA and lateral views of the chest were performed.    COMPARISON:  01/24/2023    FINDINGS:  There is a interstitial pneumonia in the right lower lobe.  Infiltrate is also seen in the right middle lobe.  There is a right-sided pleural effusion.  The heart is enlarged in size.  There is a port in the right anterior chest wall.  Impression: Worsening right lung infiltrate and pleural effusion    Electronically signed by: Sanjana Otero  Date:    03/13/2023  Time:    18:58        2D ECHO Results    No results found in the last 24 hours.       Pulmonary Functions Testing Results:    No results found for: FEV1, FVC, AIE5QRH, TLC, DLCO    Assessment & Plan:     CHF Exacerbation  CAD; Hx of NSTEMI s/p OM1 Thrombectomy + GERDA x2  HFrEF (EF 10%)  NICM  Severe Aortic Stenosis  - Patient endorsing SOB, dry cough, LOVING with about 50 steps of ambulation for 3 days, fatigue and leg swelling for 2 weeks; denies CP  - BNP  "7621, Troponin 0.035, Lactate 0.9  - Received Lasix 40 mg IV in ED, roughly 400 cc urine output in 3 hours; Ordered a second 40 mg dose for tonight, with Lasix 60 mg IV BID starting tomorrow  - Continue home meds: ASA 81 mg, Lipitor 40 mg daily, Coreg 12.5 mg BID (started tomorrow AM)  - Patient was prescribed Entresto 24-26 mg BID, but has been unsuccessful in starting med due to "pharmacy running out"  - DELLA 1/25/23 revealed EF 10%, no evidence of myocardial ischemia  - TTE 1/24/23 revealed Grade II LVDD, severe LV global hypokineses, mild RV enlargement, mod LA and RA enlargement, severe AS, mod MR, mild-mod TR and mod Pulm HTN (PA systolic 57 mmHg)    CAP  - CT Chest obtained revealed moderate sized right pleural effusion with some areas of ground-glass opacities concerning for infectious process  - SIRS 1/4 on admit (RR 20)  - Blood cultures drawn x2; Given Zosyn, Zithromax and Vanc in ED  - Started on Cefepime 2 g q8hr, Flagyl 500 mg q8hr and Vanc  - CXR s/p thoracentesis revealing resolution of pleural effusions; unclear if PNA is still of concern  - CTPA ordered (elevated D-dimer), will follow for clinical correlation    Elevated D-Dimer  - Concern by ED physician given patient has asymmetrical RLE edema; non-pitting, no pain to palpation or erythema  - D-dimer 3.87  - Ordered CPTA, will follow    Pleural Effusion, Bilateral  - Thoracentesis performed at bedside; removed roughly 700 cc, straw colored, mildly cloudy, no gross blood  - Ordered Pleural fluid studies (Cell count, Flow Cytometry, LDH, pH, ADA, protein, Mycobacteria and Nocardia culture, gram stain)    Paroxysmal Atrial Fibrillation  - Currently stable  - Patient endorsing compliance with 3 of his home meds: Eliquis 5 mg BID, Coreg 12.5 mg BID and ASA 81 mg  - Due to patient's hx of noncompliance, started Eliquis 10 mg bid for 7 days (loading dose --> return to 5 mg BID thereafter)    HIV  - CD4 count 234 1/24/23; Ordered new CD4 count  - " Endorsing noncompliance with Biktarvy for last 2-3 months due to running out of medication; emphasized to patient importance of medication compliance  - Continue Biktarvy -25 mg daily  - Next Infectious Disease apt scheduled for 3/23/23    Hx of Large B Cell Lymphoma  - Completed chemotherapy 5/2018; No need of routine surveillance imaging studies; Patient should be following up with oncologist (supposed to be in August 2022, no clinic visit notes since 5/25/22)  - Colonoscopy when he was hospitalized with bleeding lymphomatous duodenal ulcer in January 2018, unremarkable.    Hx of Cocaine Abuse  - Patient endorsing complete cessation of crack cocaine for 3 weeks  - Ordered UDS      CODE STATUS: Full Code   Access: PIV  Antibiotics: Cefepime, Vanc, Flagyl (1 dose of Vanc, Zosyn and Levaquin given in ED)  Diet: Diet heart healthy  DVT Prophylaxis: lovenox  GI Prophylaxis: none  Fluids: None      Disposition: Patient admitted on 3/13/23 for CHF exacerbation and concern for PNA. Hx of noncompliance. Endorsing noncompliance of Biktarvy and Lipitor for last 2 months. Thoracentesis performed, studies pending. CD4 pending. On broad spectrum for possible CAP. Diuresis with strict Is/Os. Further dispo planning pending.      Marco A Salgado MD  [facility], [facility]

## 2023-03-14 NOTE — PROGRESS NOTES
Pharmacokinetic Initial Assessment: IV Vancomycin    Assessment/Plan:    Initiate intravenous vancomycin with loading dose of 1500 mg once followed by a maintenance dose of vancomycin 1000mg IV every 12 hours  Desired empiric serum trough concentration is 10 to 20 mcg/mL  Draw vancomycin trough level 60 min prior to fourth dose on 03/15/23 at approximately 0800  Pharmacy will continue to follow and monitor vancomycin.      Please contact pharmacy at extension [phone] with any questions regarding this assessment.     Thank you for the consult,   Fatou Bangura       Patient brief summary:  Jason Perdue is a 62 y.o. male initiated on antimicrobial therapy with IV Vancomycin for treatment of suspected  PNA    Drug Allergies:   Review of patient's allergies indicates:   Allergen Reactions    Ace inhibitors      Other reaction(s): Angioedema    Nitroglycerin Itching       Actual Body Weight:   95.1 kg    Renal Function:   Estimated Creatinine Clearance: 70.8 mL/min (A) (based on SCr of 1.29 mg/dL (H)).,     Dialysis Method (if applicable):  N/A    CBC (last 72 hours):  Recent Labs   Lab Result Units 03/13/23  1807   WBC x10(3)/mcL 7.1   Hgb g/dL 12.3*   Hct % 40.6*   Platelet x10(3)/mcL 182   Mono % % 9.9   Eos % % 1.0   Basophil % % 0.4       Metabolic Panel (last 72 hours):  Recent Labs   Lab Result Units 03/13/23  1807   Sodium Level mmol/L 138   Potassium Level mmol/L 4.3   Chloride mmol/L 105   Carbon Dioxide mmol/L 24   Glucose Level mg/dL 89   Blood Urea Nitrogen mg/dL 25.5   Creatinine mg/dL 1.29*   Albumin Level g/dL 3.3*   Bilirubin Total mg/dL 1.5   Alkaline Phosphatase unit/L 69   Aspartate Aminotransferase unit/L 30   Alanine Aminotransferase unit/L 17   Magnesium Level mg/dL 2.20       Drug levels (last 3 results):  No results for input(s): VANCOMYCINRA, VANCORANDOM, VANCOMYCINPE, VANCOPEAK, VANCOMYCINTR, VANCOTROUGH in the last 72 hours.    Microbiologic Results:  Microbiology Results (last 7 days)        Procedure Component Value Units Date/Time    Gram Stain [[id number]]     Order Status: Sent Specimen: Body Fluid from Pleural Fluid     Mycobacteria and Nocardia Culture [[id number]]     Order Status: Sent Specimen: Body Fluid     Body Fluid Culture [[id number]]     Order Status: Sent Specimen: Body Fluid from Thoracentesis Fluid     Blood Culture #2 **CANNOT BE ORDERED STAT** [[id number]] Collected: 03/13/23 2100    Order Status: Sent Specimen: Blood from Antecubital, Left Updated: 03/13/23 2126    Blood Culture #1 **CANNOT BE ORDERED STAT** [[id number]] Collected: 03/13/23 2102    Order Status: Sent Specimen: Blood from Hand, Right Updated: 03/13/23 2125

## 2023-03-14 NOTE — PROGRESS NOTES
[facility] Wards   Progress Note     Resident Team: [facility] Medicine List 1  Attending Physician: Isela Mukherjee MD  Hospital Length of Stay: 0 days    Subjective:      Brief HPI:  Mr. Jason Perdue is a 62 y.o. 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 Patient is endorsing lethargy and leg swelling (RT>LT) for 2 weeks and SOB, dry cough, orthopnea and LOVING for 2 days. He states prior to 2 days ago he was able to ambulate without SOB but now is getting SOB with roughly 50 steps. He is endorsing noncompliance with his Lipitor and Biktarvy (due to running out of meds) for the last 2 months but states he has been taking the ASA, Eliquis and Coreg. Patient was recently stopped on Aldactone at his Cardiology clinic visit on 3/9/23, but was not taking this medication anyway (per chart). Cardiology has also been attempting to start patient on Entresto but patient is unable to get medication due to the pharmacy not carrying Entresto. He denies any CP, productive cough, fevers, night sweats, weight loss, urinary disturbances, nausea, vomiting, diarrhea or constipation.     Interval History: Thoracentesis was done overnight with 700cc of fluid removed. Transudative in nature. Improving from a respiratory standpoint.       Review of Systems:  Review of Systems   Constitutional:  Positive for malaise/fatigue. Negative for chills and fever.   HENT:  Negative for congestion and hearing loss.    Eyes:  Negative for blurred vision.   Respiratory:  Positive for cough and shortness of breath.    Cardiovascular:  Negative for leg swelling.   Gastrointestinal:  Negative for heartburn.   Musculoskeletal:  Negative for myalgias.   Neurological:  Negative for dizziness,  seizures and loss of consciousness.   Psychiatric/Behavioral:  Negative for depression and hallucinations.         Objective:     Vital Signs (Most Recent):  Temp: (!) 95.5 °F (35.3 °C) (taken in axillary; could not get oral reading, patient had cold drink) (03/14/23 0740)  Pulse: 83 (03/14/23 1117)  Resp: 20 (03/14/23 1117)  BP: 104/75 (03/14/23 1117)  SpO2: 99 % (03/14/23 1117)   Vital Signs (24h Range):  Temp:  [95.5 °F (35.3 °C)-98.8 °F (37.1 °C)] 95.5 °F (35.3 °C)  Pulse:  [74-89] 83  Resp:  [16-20] 20  SpO2:  [96 %-99 %] 99 %  BP: ()/(74-88) 104/75       Physical Examination:  Physical Exam  Vitals and nursing note reviewed.   Constitutional:       Appearance: Normal appearance.   HENT:      Head: Normocephalic.      Mouth/Throat:      Mouth: Mucous membranes are moist.   Eyes:      General: No scleral icterus.     Extraocular Movements: Extraocular movements intact.      Pupils: Pupils are equal, round, and reactive to light.   Cardiovascular:      Rate and Rhythm: Normal rate and regular rhythm.      Pulses: Normal pulses.      Heart sounds: Normal heart sounds.   Pulmonary:      Effort: Pulmonary effort is normal. No respiratory distress.      Breath sounds: improved, no rales present. No wheezing.   Abdominal:      General: Abdomen is flat. There is no distension.      Palpations: Abdomen is soft.      Tenderness: There is no abdominal tenderness. There is no guarding.   Musculoskeletal:         General: Normal range of motion.      Cervical back: Normal range of motion.      Right lower leg: Edema improved though present.      Left lower leg: Edema improved though present.   Skin:     General: Skin is warm.      Capillary Refill: Capillary refill takes less than 2 seconds.      Coloration: Skin is not jaundiced or pale.      Findings: No erythema, lesion or rash.   Neurological:      General: No focal deficit present.      Mental Status: He is alert and oriented to person, place, and time. Mental  status is at baseline.    Laboratory:  Most Recent Data:  CBC:   Lab Results   Component Value Date    WBC 6.1 03/14/2023    HGB 11.2 (L) 03/14/2023    HCT 35.3 (L) 03/14/2023     03/14/2023    MCV 89.1 03/14/2023    RDW 15.7 03/14/2023     BMP:   Lab Results   Component Value Date     (L) 03/14/2023    K 3.3 (L) 03/14/2023    CHLORIDE 105 03/14/2023    CO2 20 (L) 03/14/2023    BUN 25.3 03/14/2023    CREATININE 1.21 (H) 03/14/2023    GLUCOSE 107 03/14/2023    CALCIUM 8.6 (L) 03/14/2023     LFTs:   Lab Results   Component Value Date    ALBUMIN 2.9 (L) 03/14/2023    BILITOT 1.2 03/14/2023    BILIDIR 0.3 03/15/2022    IBILI 0.30 03/15/2022    AST 26 03/14/2023    ALKPHOS 63 03/14/2023    ALT 15 03/14/2023     Coags:   Lab Results   Component Value Date    INR 1.19 12/26/2018    PROTIME 15.0 (H) 12/26/2018    PTT 34.9 12/26/2018     FLP:   Lab Results   Component Value Date    CHOL 168 01/24/2023    HDL 28 (L) 01/24/2023    .00 01/24/2023    TRIG 101 01/24/2023     DM:   Lab Results   Component Value Date    CREATININE 1.21 (H) 03/14/2023     Thyroid:   Lab Results   Component Value Date    TSH 2.893 03/13/2023     Anemia:   Lab Results   Component Value Date    IRON 25 (L) 12/26/2018    FERRITIN 60.8 12/26/2018    TRANS 282.0 12/26/2018    TIBC 359 12/26/2018     Cardiac:   Lab Results   Component Value Date    TROPONINI 0.035 03/13/2023     (H) 01/24/2023    CPKMB 1.0 01/24/2023    BNP 7,621.0 (H) 03/13/2023     Urinalysis:   Lab Results   Component Value Date    COLORU LIGHT YELLOW 12/26/2018    PHUA 7.0 09/11/2022    NITRITE Negative 12/26/2018    KETONESU Negative 12/26/2018    UROBILINOGEN 2+ (A) 09/11/2022    WBCUA 0-5 09/11/2022       Trended Cardiac Data:  Recent Labs   Lab 03/13/23  1807   TROPONINI 0.035   BNP 7,621.0*         Microbiology Data Reviewed: yes  Pertinent Findings:  Microbiology Results (last 7 days)       Procedure Component Value Units Date/Time    Gram Stain  [[id number]] Collected: 03/14/23 0711    Order Status: Completed Specimen: Body Fluid from Pleural Fluid Updated: 03/14/23 1207     GRAM STAIN Few WBC observed      No bacteria seen    AFB Smear [[id number]] Updated: 03/14/23 0809    Order Status: Sent Specimen: Sputum, Expectorated     Mycobacteria and Nocardia Culture [[id number]] Updated: 03/14/23 0809    Order Status: Sent Specimen: Sputum, Expectorated     Body Fluid Culture [[id number]] Collected: 03/14/23 0710    Order Status: Resulted Specimen: Body Fluid from Thoracentesis Fluid Updated: 03/14/23 0710    Mycobacteria and Nocardia Culture [[id number]] Collected: 03/14/23 0710    Order Status: Sent Specimen: Body Fluid Updated: 03/14/23 0710    Blood Culture #2 **CANNOT BE ORDERED STAT** [[id number]] Collected: 03/13/23 2100    Order Status: Resulted Specimen: Blood from Antecubital, Left Updated: 03/13/23 2126    Blood Culture #1 **CANNOT BE ORDERED STAT** [[id number]] Collected: 03/13/23 2102    Order Status: Resulted Specimen: Blood from Hand, Right Updated: 03/13/23 2125                Radiology:  Imaging Results              CTA Chest Non-Coronary (PE Studies) (Final result)  Result time 03/14/23 07:46:17      Final result by Romulo Ayala MD (03/14/23 07:46:17)                   Impression:    Impression:    1. No filling defects are seen in the pulmonary arteries to suggest pulmonary embolus.    2. Bilateral small pleural effusions are again seen with mild right fissural extension and basilar compressive atelectasis. The right pleural effusion has reduced since the prior examination.    3. Mild emphysematous changes are seen in the lungs.    4. Again noted is mild ascites. A 1.4 cm right renal cortical cyst in the mid pole.    5. Mild cardiomegaly is again seen. Coronary artery calcification is seen. Note is made of reflux of contrast into the IVC suggesting an element of right heart dysfunction. Correlate with clinical and laboratory findings.    6.  Details and other findings as discussed above.    List addendum    No significant discrepancy with overnight report.      Electronically signed by: Romulo Floresahim  Date:    03/14/2023  Time:    07:46               Narrative:      Technique:CT Scan of the chest was performed with intravenous contrast with direct axial images as well as sagittal and coronal reconstruction images pulmonary embolus protocol.    Dosage Information:Automated Exposure Control was utilized 302.55 mGy.cm.    Comparison:Comparison is with noncontrast CT study dated 2023-03-13 20:05:42.    Clinical History:Pe suspected.    Findings:    Contrast:Only minimal contrast is seen in the thoracic aorta.    Soft Tissues:Bilateral gynecomastia is seen.    Lines and Tubes:Again noted is a Port-A-Cath in the right chest wall with its tip in the superior vena cava.    Neck:The thyroid gland appear unremarkable.    Mediastinum:The mediastinal structures are within normal limits.    Heart:Mild cardiomegaly is again seen. Coronary artery calcification is seen. Note is made of reflux of contrast into the IVC suggesting an element of right heart dysfunction.    Aorta:No aortic dissection or aneurysm is seen.    Pulmonary Arteries:No filling defects are seen in the pulmonary arteries to suggest pulmonary embolus.    Lungs:Mild emphysematous changes are seen in the lungs. No focal infiltrate or consolidation is seen.    Pleura:Bilateral small pleural effusions are again seen with mild right fissural extension and basilar compressive atelectasis. The right pleural effusion has reduced since the prior examination. No pneumothorax is seen.    Bony Structures:    Spine:Mild spondylolytic changes are seen in the thoracic spine. There is a hemangioma within the body of T11 vertebra.    Abdomen:Again noted is mild ascites. A 1.4 cm right renal cortical cyst in the mid pole. The rest of the visualized upper abdominal organs appear unremarkable.                         Preliminary result by Romulo Ayala MD (03/14/23 00:21:41)                   Narrative:    START OF REPORT:  Technique: CT Scan of the chest was performed with intravenous contrast with direct axial images as well as sagittal and coronal reconstruction images pulmonary embolus protocol.    Dosage Information: Automated Exposure Control was utilized 302.55 mGy.cm.    Comparison: Comparison is with noncontrast CT study dated 2023-03-13 20:05:42.    Clinical History: Pe suspected.    Findings:  Contrast: Only minimal contrast is seen in the thoracic aorta.  Soft Tissues: Bilateral gynecomastia is seen.  Lines and Tubes: Again noted is a Port-A-Cath in the right chest wall with its tip in the superior vena cava.  Neck: The thyroid gland appear unremarkable.  Mediastinum: The mediastinal structures are within normal limits.  Heart: Mild cardiomegaly is again seen. Severe coronary artery calcification is seen. Note is made of reflux of contrast into the IVC suggesting an element of right heart dysfunction.  Aorta: No aortic dissection or aneurysm is seen.  Pulmonary Arteries: No filling defects are seen in the pulmonary arteries to suggest pulmonary embolus.  Lungs: Mild emphysematous changes are seen in the lungs. No focal infiltrate or consolidation is seen.  Pleura: Bilateral small pleural effusions are again seen with mild right fissural extension and basilar compressive atelectasis. The right pleural effusion has reduced since the prior examination. No pneumothorax is seen.  Bony Structures:  Spine: Mild spondylolytic changes are seen in the thoracic spine. There is a hemangioma within the body of T11 vertebra.  Abdomen: Again noted is mild ascites. A 1.4 cm right renal cortical cyst in the mid pole. The rest of the visualized upper abdominal organs appear unremarkable.      Impression:  1. No filling defects are seen in the pulmonary arteries to suggest pulmonary embolus.  2. Bilateral small pleural effusions are  again seen with mild right fissural extension and basilar compressive atelectasis. The right pleural effusion has reduced since the prior examination.  3. Mild emphysematous changes are seen in the lungs.  4. Again noted is mild ascites. A 1.4 cm right renal cortical cyst in the mid pole.  5. Mild cardiomegaly is again seen. Severe coronary artery calcification is seen. Note is made of reflux of contrast into the IVC suggesting an element of right heart dysfunction. Correlate with clinical and laboratory findings.  6. Details and other findings as discussed above.                          Preliminary result by Saad Murguia MD (03/14/23 00:21:41)                   Narrative:    START OF REPORT:  Technique: CT Scan of the chest was performed with intravenous contrast with direct axial images as well as sagittal and coronal reconstruction images pulmonary embolus protocol.    Dosage Information: Automated Exposure Control was utilized 302.55 mGy.cm.    Comparison: Comparison is with noncontrast CT study dated 2023-03-13 20:05:42.    Clinical History: Pe suspected.    Findings:  Contrast: Only minimal contrast is seen in the thoracic aorta.  Soft Tissues: Bilateral gynecomastia is seen.  Lines and Tubes: Again noted is a Port-A-Cath in the right chest wall with its tip in the superior vena cava.  Neck: The thyroid gland appear unremarkable.  Mediastinum: The mediastinal structures are within normal limits.  Heart: Mild cardiomegaly is again seen. Severe coronary artery calcification is seen. Note is made of reflux of contrast into the IVC suggesting an element of right heart dysfunction.  Aorta: No aortic dissection or aneurysm is seen.  Pulmonary Arteries: No filling defects are seen in the pulmonary arteries to suggest pulmonary embolus.  Lungs: Mild emphysematous changes are seen in the lungs. No focal infiltrate or consolidation is seen.  Pleura: Bilateral small pleural effusions are again seen with mild right  fissural extension and basilar compressive atelectasis. The right pleural effusion has reduced since the prior examination. No pneumothorax is seen.  Bony Structures:  Spine: Mild spondylolytic changes are seen in the thoracic spine. There is a hemangioma within the body of T11 vertebra.  Abdomen: Again noted is mild ascites. A 1.4 cm right renal cortical cyst in the mid pole. The rest of the visualized upper abdominal organs appear unremarkable.      Impression:  1. No filling defects are seen in the pulmonary arteries to suggest pulmonary embolus.  2. Bilateral small pleural effusions are again seen with mild right fissural extension and basilar compressive atelectasis. The right pleural effusion has reduced since the prior examination.  3. Mild emphysematous changes are seen in the lungs.  4. Again noted is mild ascites. A 1.4 cm right renal cortical cyst in the mid pole.  5. Mild cardiomegaly is again seen. Severe coronary artery calcification is seen. Note is made of reflux of contrast into the IVC suggesting an element of right heart dysfunction. Correlate with clinical and laboratory findings.  6. Details and other findings as discussed above.                                         X-Ray Chest 1 View (Final result)  Result time 03/14/23 06:01:27      Final result by Sangeeta Wadsworth MD (03/14/23 06:01:27)                   Impression:      Improved right pleural effusion.  No appreciable pneumothorax.    Persistent right infrahilar opacity with overall improved aeration compared to prior      Electronically signed by: Sangeeta Wadsworth  Date:    03/14/2023  Time:    06:01               Narrative:    EXAMINATION:  XR CHEST 1 VIEW    CLINICAL HISTORY:  Recent thoracentesis; Heart failure, unspecified    TECHNIQUE:  Single frontal view of the chest was performed.    COMPARISON:  03/13/2023    FINDINGS:  LINES AND TUBES: Right IJ CVC tip projects over the proximal SVC.    MEDIASTINUM AND ANA PAULA: Cardiac  silhouette is enlarged.    LUNGS: Improved aeration at the right lung base with some persistent infrahilar opacity.    PLEURA:Improved right pleural effusion.  Trace residual right pleural fluid.No pneumothorax.    OTHER: No acute osseous abnormality.                                       CT Chest Without Contrast (Final result)  Result time 03/14/23 13:12:42      Final result by Sanjana Otero MD (03/14/23 13:12:42)                   Impression:    Impression:    1. There is a moderate sized right pleural effusion and there is some associated dense opacity at the medial dependent portion of the right lung which may reflect atelectasis however there are significant areas of air bronchograms and some associated ground-glass opacities suggesting this may be an infectious process with the possibility of a neoplastic component also not entirely excluded. There is a small left pleural effusion with the left lung otherwise appearing unremarkable. Correlate with clinical and laboratory findings as regards additional evaluation and follow-up to resolution as indicated.    2. Details and other findings as discussed above.    I concur with the preliminary report      Electronically signed by: Sanjana Otero  Date:    03/14/2023  Time:    13:12               Narrative:    EXAMINATION:  CT CHEST WITHOUT CONTRAST    Technique: CT Scan of the chest was performed without intravenous contrast with direct axial as well as sagittal and, coronal, reconstruction images.    Dosage Information: Automated Exposure Control was utilized 220.37 mGy.cm.    Comparison: In correlation with the chest xray study dated 2023-03-13.    Clinical History: Sepsis; Respiratory illness, nondiagnostic xray.    Findings:    Soft Tissues: Unremarkable.    Lines and Tubes: A port catheter is seen traversing the anterior chest wall with its tip at the distal SVC.    Axilla: A few mildly prominent lymph nodes are seen in the axilla.    Neck: The  visualized soft tissues of the neck appear unremarkable.    Mediastinum: A few enlarged mediastinal lymph nodes are seen pretracheal precarinal and subcarinal spaces . The largest is in subcarinal space and measures 1.6 cm in least dimension. This suggests reactive lymphadenopathy. Correlate with clinical and laboratory findings as regards further evaluation and follow up.    Heart: Mild to moderate cardiomegaly is seen. Moderate coronary artery calcification is seen.    Aorta: No aortic dissection or aneurysm is seen. Mild aortic calcification is seen in the arch and descending thoracic aorta. Calcification is also seen in the aortic annulus.    Lungs: There is a moderate sized right pleural effusion and there is some associated dense opacity at the medial dependent portion of the right lung which may reflect atelectasis however there are significant areas of air bronchograms and some associated ground-glass opacities suggesting this may be an infectious process with the possibility of a neoplastic component also not entirely excluded. There is a small left pleural effusion with the left lung otherwise appearing unremarkable.    Bony Structures:    Spine: Moderate spondylolytic changes are seen in the thoracic spine.  There is a hemangioma in a lower thoracic vertebral body with stable since multiple previous examinations    Ribs: No rib fractures are identified.    Abdomen: There is minimal perihepatic ascites. The pancreas is mildly atrophic.                        Preliminary result by Sanjana Otero MD (03/13/23 20:04:27)                   Narrative:    START OF REPORT:  Technique: CT Scan of the chest was performed without intravenous contrast with direct axial as well as sagittal and, coronal, reconstruction images.    Dosage Information: Automated Exposure Control was utilized 220.37 mGy.cm.    Comparison: In correlation with the chest xray study dated â2023-03-13.    Clinical History: Sepsis;  Respiratory illness, nondiagnostic xray.    Findings:  Soft Tissues: Unremarkable.  Lines and Tubes: A portachatheter is seen traversing the anterior chest wall with its tip at the distal SVC.  Axilla: A few mildly prominent lymph nodes are seen in the axilla.  Neck: The visualized soft tissues of the neck appear unremarkable.  Mediastinum: A few enlarged mediastinal lymph nodes are seen pretracheal precarinal and subcarinal spaces . The largest is in subcarinal space and measures â1.6 cmâ in least dimension. This suggests reactive lymphadenopathy. Correlate with clinical and laboratory findings as regards further evaluation and follow up.  Heart: Mild to moderate cardiomegaly is seen. Moderate coronary artery calcification is seen.  Aorta: No aortic dissection or aneurysm is seen. Mild aortic calcification is seen in the arch and descending thoracic aorta. Calcification is also seen in the aortic annulus.  Lungs: There is a moderate sized right pleural effusion and there is some associated dense opacity at the medial dependent portion of the right lung which may reflect atelectasis however there are significant areas of air bronchograms and some associated ground-glass opacities suggesting this may be an infectious process with the possibility of a neoplastic component also not entirely excluded. There is a small left pleural effusion with the left lung otherwise appearing unremarkable.  Bony Structures:  Spine: Moderate spondylolytic changes are seen in the thoracic spine.  Ribs: No rib fractures are identified.  Abdomen: There is minimal perihepatic ascites. The pancreas is mildly atrophic.      Impression:  1. There is a moderate sized right pleural effusion and there is some associated dense opacity at the medial dependent portion of the right lung which may reflect atelectasis however there are significant areas of air bronchograms and some associated ground-glass opacities suggesting this may be an  infectious process with the possibility of a neoplastic component also not entirely excluded. There is a small left pleural effusion with the left lung otherwise appearing unremarkable. Correlate with clinical and laboratory findings as regards additional evaluation and follow-up to resolution as indicated.  2. Details and other findings as discussed above.                          Preliminary result by Saad Murguia MD (03/13/23 20:04:27)                   Narrative:    START OF REPORT:  Technique: CT Scan of the chest was performed without intravenous contrast with direct axial as well as sagittal and, coronal, reconstruction images.    Dosage Information: Automated Exposure Control was utilized 220.37 mGy.cm.    Comparison: In correlation with the chest xray study dated â2023-03-13.    Clinical History: Sepsis; Respiratory illness, nondiagnostic xray.    Findings:  Soft Tissues: Unremarkable.  Lines and Tubes: A portachatheter is seen traversing the anterior chest wall with its tip at the distal SVC.  Axilla: A few mildly prominent lymph nodes are seen in the axilla.  Neck: The visualized soft tissues of the neck appear unremarkable.  Mediastinum: A few enlarged mediastinal lymph nodes are seen pretracheal precarinal and subcarinal spaces . The largest is in subcarinal space and measures â1.6 cmâ in least dimension. This suggests reactive lymphadenopathy. Correlate with clinical and laboratory findings as regards further evaluation and follow up.  Heart: Mild to moderate cardiomegaly is seen. Moderate coronary artery calcification is seen.  Aorta: No aortic dissection or aneurysm is seen. Mild aortic calcification is seen in the arch and descending thoracic aorta. Calcification is also seen in the aortic annulus.  Lungs: There is a moderate sized right pleural effusion and there is some associated dense opacity at the medial dependent portion of the right lung which may reflect atelectasis however there  are significant areas of air bronchograms and some associated ground-glass opacities suggesting this may be an infectious process with the possibility of a neoplastic component also not entirely excluded. There is a small left pleural effusion with the left lung otherwise appearing unremarkable.  Bony Structures:  Spine: Moderate spondylolytic changes are seen in the thoracic spine.  Ribs: No rib fractures are identified.  Abdomen: There is minimal perihepatic ascites. The pancreas is mildly atrophic.      Impression:  1. There is a moderate sized right pleural effusion and there is some associated dense opacity at the medial dependent portion of the right lung which may reflect atelectasis however there are significant areas of air bronchograms and some associated ground-glass opacities suggesting this may be an infectious process with the possibility of a neoplastic component also not entirely excluded. There is a small left pleural effusion with the left lung otherwise appearing unremarkable. Correlate with clinical and laboratory findings as regards additional evaluation and follow-up to resolution as indicated.  2. Details and other findings as discussed above.                                         X-Ray Chest PA And Lateral (Final result)  Result time 03/13/23 18:58:01      Final result by Sanjana Otero MD (03/13/23 18:58:01)                   Impression:      Worsening right lung infiltrate and pleural effusion      Electronically signed by: Sanjana Otero  Date:    03/13/2023  Time:    18:58               Narrative:    EXAMINATION:  XR CHEST PA AND LATERAL    CLINICAL HISTORY:  SOB, cough;    TECHNIQUE:  PA and lateral views of the chest were performed.    COMPARISON:  01/24/2023    FINDINGS:  There is a interstitial pneumonia in the right lower lobe.  Infiltrate is also seen in the right middle lobe.  There is a right-sided pleural effusion.  The heart is enlarged in size.  There is a port in  the right anterior chest wall.                                      Current Medications:     Infusions:       Scheduled:   apixaban  10 mg Oral BID    aspirin  81 mg Oral Daily    atorvastatin  40 mg Oral QHS    vdajteong-wepmhiit-hhwilqg ala  1 tablet Oral Daily    carvediloL  12.5 mg Oral BID    ceFEPime (MAXIPIME) IVPB  2 g Intravenous Q8H    famotidine  20 mg Oral BID    furosemide (LASIX) injection  60 mg Intravenous Q12H    metronidazole  500 mg Intravenous Q8H    potassium bicarbonate  50 mEq Oral BID    sacubitriL-valsartan  1 tablet Oral BID    vancomycin (VANCOCIN) IVPB  1,000 mg Intravenous Q12H        PRN:  melatonin, sodium chloride 0.9%    Antibiotics and Day Number of Therapy:  Antibiotics (From admission, onward)      Start     Stop Route Frequency Ordered    03/14/23 0900  vancomycin (VANCOCIN) 1,000 mg in sodium chloride 0.9% 250 mL IVPB         -- IV Every 12 hours (non-standard times) 03/14/23 0439    03/14/23 0500  ceFEPIme (MAXIPIME) 2 g in sodium chloride 0.9 % 50 mL IVPB (MB+)         03/21 0459 IV Every 8 hours (non-standard times) 03/13/23 2329          Day 2      Intake/Output Summary (Last 24 hours) at 3/14/2023 1354  Last data filed at 3/14/2023 0616  Gross per 24 hour   Intake 350 ml   Output 2250 ml   Net -1900 ml       Lines/Drains/Airways       Peripheral Intravenous Line  Duration                  Peripheral IV - Single Lumen 03/13/23 20 G Left;Posterior Hand 1 day         Peripheral IV - Single Lumen 03/13/23 2344 20 G Left;Posterior;Proximal Forearm <1 day                      Assessment & Plan:     CHF Exacerbation  CAD; Hx of NSTEMI s/p OM1 Thrombectomy + GERDA x2  HFrEF (EF 10%)  NICM  Severe Aortic Stenosis  - Patient endorsing SOB, dry cough, LOVING with about 50 steps of ambulation for 3 days, fatigue and leg swelling for 2 weeks; denies CP  - BNP 7621, Troponin 0.035, Lactate 0.9  - Received Lasix 40 mg IV in ED, roughly 400 cc urine output in 3 hours; Ordered a second 40 mg  "dose for tonight, with Lasix 60 mg IV BID   - To continue Diuresis  - Continue home meds: ASA 81 mg, Lipitor 40 mg daily, Coreg 12.5 mg BID (started tomorrow AM)  - Patient was prescribed Entresto 24-26 mg BID, but has been unsuccessful in starting med due to "pharmacy running out", will hold Entresto at this time  - DELLA 1/25/23 revealed EF 10%, no evidence of myocardial ischemia  - TTE 1/24/23 revealed Grade II LVDD, severe LV global hypokineses, mild RV enlargement, mod LA and RA enlargement, severe AS, mod MR, mild-mod TR and mod Pulm HTN (PA systolic 57 mmHg)     CAP  - CT Chest obtained revealed moderate sized right pleural effusion with some areas of ground-glass opacities concerning for infectious process  - SIRS 1/4 on admit (RR 20)  - Blood cultures drawn x2; Given Zosyn, Zithromax and Vanc in ED  - Continue Cefepime and Vancomycin     Elevated D-Dimer  - Concern by ED physician given patient has asymmetrical RLE edema; non-pitting, no pain to palpation or erythema  - D-dimer 3.87  - CT PE was negative     Pleural Effusion, Bilateral  - Thoracentesis performed at bedside; removed roughly 700 cc, straw colored, mildly cloudy, no gross blood  - Ordered Pleural fluid studies (Pending ADA, protein, Mycobacteria and Nocardia culture, gram stain)  - Transudative fluid based on Fluid,Serum LDH     Paroxysmal Atrial Fibrillation  - Currently stable  - Patient endorsing compliance with 3 of his home meds: Eliquis 5 mg BID, Coreg 12.5 mg BID and ASA 81 mg  - Due to patient's hx of noncompliance, started Eliquis 10 mg bid for 7 days (loading dose --> return to 5 mg BID thereafter)     HIV  - CD4 count 234 1/24/23; Ordered new CD4 count  - Endorsing noncompliance with Biktarvy for last 2-3 months due to running out of medication; emphasized to patient importance of medication compliance  - Continue Biktarvy -25 mg daily  - Next Infectious Disease apt scheduled for 3/23/23     Hx of Large B Cell Lymphoma  - " Completed chemotherapy 5/2018; No need of routine surveillance imaging studies; Patient should be following up with oncologist (supposed to be in August 2022, no clinic visit notes since 5/25/22)  - Colonoscopy when he was hospitalized with bleeding lymphomatous duodenal ulcer in January 2018, unremarkable.     Hx of Cocaine Abuse  - Patient endorsing complete cessation of crack cocaine for 3 weeks  - UDS: Cocaine Positive        CODE STATUS: Full Code   Access: PIV  Antibiotics: Cefepime, Vanc, (1 dose of Vanc, Zosyn and Levaquin given in ED)  Diet: Diet heart healthy  DVT Prophylaxis: lovenox  GI Prophylaxis: none  Fluids: None      Disposition: day 1 of admission for CHF exacerbation and concern for PNA. Hx of noncompliance. Continue diuresis, antibiotics. Likely not a candidate for SAVR given cocaine use. May be amenable to discharge once antibiotics stepped down    Sky Mulligan MD  [facility] PGY-1

## 2023-03-14 NOTE — NURSING
Pt arrived to the unit via wheelchair. NAD noted. Oriented to the room. Instructed to call for any assistance. Pt verbalized understanding. Call bell within reach, bed in lowest position, sr up x2. Plan of care will continue.

## 2023-03-14 NOTE — PLAN OF CARE
Problem: Adult Inpatient Plan of Care  Goal: Plan of Care Review  Outcome: Ongoing, Progressing  Goal: Patient-Specific Goal (Individualized)  Outcome: Ongoing, Progressing  Goal: Absence of Hospital-Acquired Illness or Injury  Outcome: Ongoing, Progressing  Goal: Optimal Comfort and Wellbeing  Outcome: Ongoing, Progressing  Goal: Readiness for Transition of Care  Outcome: Ongoing, Progressing     Problem: Adjustment to Illness (Heart Failure)  Goal: Optimal Coping  Outcome: Ongoing, Progressing     Problem: Cardiac Output Decreased (Heart Failure)  Goal: Optimal Cardiac Output  Outcome: Ongoing, Progressing     Problem: Dysrhythmia (Heart Failure)  Goal: Stable Heart Rate and Rhythm  Outcome: Ongoing, Progressing     Problem: Fluid Imbalance (Heart Failure)  Goal: Fluid Balance  Outcome: Ongoing, Progressing     Problem: Functional Ability Impaired (Heart Failure)  Goal: Optimal Functional Ability  Outcome: Ongoing, Progressing     Problem: Oral Intake Inadequate (Heart Failure)  Goal: Optimal Nutrition Intake  Outcome: Ongoing, Progressing     Problem: Respiratory Compromise (Heart Failure)  Goal: Effective Oxygenation and Ventilation  Outcome: Ongoing, Progressing     Problem: Sleep Disordered Breathing (Heart Failure)  Goal: Effective Breathing Pattern During Sleep  Outcome: Ongoing, Progressing     Problem: Fluid Imbalance (Pneumonia)  Goal: Fluid Balance  Outcome: Ongoing, Progressing     Problem: Infection (Pneumonia)  Goal: Resolution of Infection Signs and Symptoms  Outcome: Ongoing, Progressing     Problem: Respiratory Compromise (Pneumonia)  Goal: Effective Oxygenation and Ventilation  Outcome: Ongoing, Progressing     Problem: Infection  Goal: Absence of Infection Signs and Symptoms  Outcome: Ongoing, Progressing

## 2023-03-14 NOTE — PLAN OF CARE
Problem: Adult Inpatient Plan of Care  Goal: Plan of Care Review  Outcome: Ongoing, Progressing  Goal: Patient-Specific Goal (Individualized)  Outcome: Ongoing, Progressing  Goal: Absence of Hospital-Acquired Illness or Injury  Outcome: Ongoing, Progressing  Goal: Optimal Comfort and Wellbeing  Outcome: Ongoing, Progressing  Goal: Readiness for Transition of Care  Outcome: Ongoing, Progressing     Problem: Adjustment to Illness (Heart Failure)  Goal: Optimal Coping  Outcome: Ongoing, Progressing     Problem: Cardiac Output Decreased (Heart Failure)  Goal: Optimal Cardiac Output  Outcome: Ongoing, Progressing     Problem: Dysrhythmia (Heart Failure)  Goal: Stable Heart Rate and Rhythm  Outcome: Ongoing, Progressing     Problem: Fluid Imbalance (Heart Failure)  Goal: Fluid Balance  Outcome: Ongoing, Progressing     Problem: Functional Ability Impaired (Heart Failure)  Goal: Optimal Functional Ability  Outcome: Ongoing, Progressing     Problem: Oral Intake Inadequate (Heart Failure)  Goal: Optimal Nutrition Intake  Outcome: Ongoing, Progressing     Problem: Respiratory Compromise (Heart Failure)  Goal: Effective Oxygenation and Ventilation  Outcome: Ongoing, Progressing     Problem: Sleep Disordered Breathing (Heart Failure)  Goal: Effective Breathing Pattern During Sleep  Outcome: Ongoing, Progressing     Problem: Fluid Imbalance (Pneumonia)  Goal: Fluid Balance  Outcome: Ongoing, Progressing     Problem: Infection (Pneumonia)  Goal: Resolution of Infection Signs and Symptoms  Outcome: Ongoing, Progressing     Problem: Respiratory Compromise (Pneumonia)  Goal: Effective Oxygenation and Ventilation  Outcome: Ongoing, Progressing

## 2023-03-14 NOTE — PROCEDURES
"Thoracentesis    Date/Time: 3/14/2023 3:19 AM  Location procedure was performed: [facility] MEDICINE  Performed by: Marco A Salgado MD  Authorized by: Marco A Salgado MD   Assisting provider: Amarjit Meadows MD  Pre-operative diagnosis: Pleural Effusion  Consent Done: Yes  Consent: Written consent obtained.  Risks and benefits: risks, benefits and alternatives were discussed  Consent given by: patient  Patient understanding: patient states understanding of the procedure being performed  Patient consent: the patient's understanding of the procedure matches consent given  Procedure consent: procedure consent matches procedure scheduled  Relevant documents: relevant documents present and verified  Test results: test results available and properly labeled  Site marked: the operative site was marked  Imaging studies: imaging studies available  Required items: required blood products, implants, devices, and special equipment available  Patient identity confirmed: , MRN, name and verbally with patient  Time out: Immediately prior to procedure a "time out" was called to verify the correct patient, procedure, equipment, support staff and site/side marked as required.  Procedure purpose: diagnostic and therapeutic  Indications: pleural effusion  Preparation: Patient was prepped and draped in the usual sterile fashion.  Local anesthesia used: yes  Anesthesia: local infiltration    Anesthesia:  Local anesthesia used: yes  Local Anesthetic: lidocaine 1% without epinephrine  Anesthetic total: 10 mL    Patient sedated: no  Description of findings: Staw colored, slightly cloud, no gross blood   Preparation: skin prepped with ChloraPrep  Patient position: sitting  Ultrasound guidance: yes  Location: right posterior  Intercostal space: 7th  Puncture method: through-the-needle catheter  Needle size: 18  Catheter size: 16 gauge  Number of attempts: 1  Drainage amount: 700 ml  Drainage characteristics: serous and " cloudy  Patient tolerance: Patient tolerated the procedure well with no immediate complications  Chest x-ray performed: yes  Chest x-ray interpreted by radiologist and me.  Chest x-ray findings: normal findings  Technical procedures used: Bedside US  Complications: No  Estimated blood loss (mL): 0  Specimens: No  Implants: No  Drainage Tube: removed      Marco A Salgado MD  [employer] [facility], -1

## 2023-03-15 VITALS
DIASTOLIC BLOOD PRESSURE: 80 MMHG | OXYGEN SATURATION: 97 % | HEART RATE: 71 BPM | TEMPERATURE: 97 F | SYSTOLIC BLOOD PRESSURE: 152 MMHG | WEIGHT: 203 LBS | HEIGHT: 71 IN | RESPIRATION RATE: 18 BRPM | BODY MASS INDEX: 28.42 KG/M2

## 2023-03-15 PROBLEM — J90 PLEURAL EFFUSION, RIGHT: Status: RESOLVED | Noted: 2023-03-15 | Resolved: 2023-03-15

## 2023-03-15 PROBLEM — R06.02 SOB (SHORTNESS OF BREATH): Status: ACTIVE | Noted: 2023-03-15

## 2023-03-15 PROBLEM — R06.02 SOB (SHORTNESS OF BREATH): Status: RESOLVED | Noted: 2023-03-15 | Resolved: 2023-03-15

## 2023-03-15 PROBLEM — J90 PLEURAL EFFUSION, RIGHT: Status: ACTIVE | Noted: 2023-03-15

## 2023-03-15 PROBLEM — J18.9 COMMUNITY ACQUIRED PNEUMONIA OF RIGHT LOWER LOBE OF LUNG: Status: ACTIVE | Noted: 2023-03-15

## 2023-03-15 LAB
1,3 BETA GLUCAN SER QL: NEGATIVE
1,3 BETA GLUCAN SER-MCNC: <31 PG/ML
ALBUMIN SERPL-MCNC: 3 G/DL (ref 3.4–4.8)
ALBUMIN/GLOB SERPL: 0.6 RATIO (ref 1.1–2)
ALP SERPL-CCNC: 70 UNIT/L (ref 40–150)
ALT SERPL-CCNC: 15 UNIT/L (ref 0–55)
AST SERPL-CCNC: 32 UNIT/L (ref 5–34)
B DERMAT AB SER QL: NEGATIVE
BASOPHILS # BLD AUTO: 0.03 X10(3)/MCL (ref 0–0.2)
BASOPHILS NFR BLD AUTO: 0.5 %
BILIRUBIN DIRECT+TOT PNL SERPL-MCNC: 1.5 MG/DL
BUN SERPL-MCNC: 26.5 MG/DL (ref 8.4–25.7)
CALCIUM SERPL-MCNC: 8.9 MG/DL (ref 8.8–10)
CHLORIDE SERPL-SCNC: 103 MMOL/L (ref 98–107)
CO2 SERPL-SCNC: 25 MMOL/L (ref 23–31)
CREAT SERPL-MCNC: 1.56 MG/DL (ref 0.73–1.18)
EOSINOPHIL # BLD AUTO: 0.06 X10(3)/MCL (ref 0–0.9)
EOSINOPHIL NFR BLD AUTO: 1.1 %
ERYTHROCYTE [DISTWIDTH] IN BLOOD BY AUTOMATED COUNT: 15.9 % (ref 11.5–17)
ESTROGEN SERPL-MCNC: NORMAL PG/ML
GFR SERPLBLD CREATININE-BSD FMLA CKD-EPI: 50 MLS/MIN/1.73/M2
GLOBULIN SER-MCNC: 4.8 GM/DL (ref 2.4–3.5)
GLUCOSE SERPL-MCNC: 103 MG/DL (ref 82–115)
HCT VFR BLD AUTO: 37 % (ref 42–52)
HGB BLD-MCNC: 11.6 G/DL (ref 14–18)
IMM GRANULOCYTES # BLD AUTO: 0.03 X10(3)/MCL (ref 0–0.04)
IMM GRANULOCYTES NFR BLD AUTO: 0.5 %
INSULIN SERPL-ACNC: NORMAL U[IU]/ML
LAB AP CLINICAL INFORMATION: NORMAL
LAB AP GROSS DESCRIPTION: NORMAL
LYMPHOCYTES # BLD AUTO: 1.09 X10(3)/MCL (ref 0.6–4.6)
LYMPHOCYTES NFR BLD AUTO: 19.8 %
MAGNESIUM SERPL-MCNC: 2.2 MG/DL (ref 1.6–2.6)
MCH RBC QN AUTO: 27.6 PG
MCHC RBC AUTO-ENTMCNC: 31.4 G/DL (ref 33–36)
MCV RBC AUTO: 88.1 FL (ref 80–94)
MONOCYTES # BLD AUTO: 0.61 X10(3)/MCL (ref 0.1–1.3)
MONOCYTES NFR BLD AUTO: 11.1 %
NEUTROPHILS # BLD AUTO: 3.68 X10(3)/MCL (ref 2.1–9.2)
NEUTROPHILS NFR BLD AUTO: 67 %
NRBC BLD AUTO-RTO: 0 %
PHOSPHATE SERPL-MCNC: 3.8 MG/DL (ref 2.3–4.7)
PLATELET # BLD AUTO: 150 X10(3)/MCL (ref 130–400)
PMV BLD AUTO: 11.5 FL (ref 7.4–10.4)
POCT GLUCOSE: 140 MG/DL (ref 70–110)
POTASSIUM SERPL-SCNC: 4.4 MMOL/L (ref 3.5–5.1)
PROT SERPL-MCNC: 7.8 GM/DL (ref 5.8–7.6)
RBC # BLD AUTO: 4.2 X10(6)/MCL (ref 4.7–6.1)
SODIUM SERPL-SCNC: 135 MMOL/L (ref 136–145)
VANCOMYCIN TROUGH SERPL-MCNC: 26.6 UG/ML (ref 15–20)
WBC # SPEC AUTO: 5.5 X10(3)/MCL (ref 4.5–11.5)

## 2023-03-15 PROCEDURE — G0378 HOSPITAL OBSERVATION PER HR: HCPCS

## 2023-03-15 PROCEDURE — 83735 ASSAY OF MAGNESIUM: CPT

## 2023-03-15 PROCEDURE — 96366 THER/PROPH/DIAG IV INF ADDON: CPT

## 2023-03-15 PROCEDURE — 85025 COMPLETE CBC W/AUTO DIFF WBC: CPT

## 2023-03-15 PROCEDURE — 63600175 PHARM REV CODE 636 W HCPCS

## 2023-03-15 PROCEDURE — 96376 TX/PRO/DX INJ SAME DRUG ADON: CPT

## 2023-03-15 PROCEDURE — 25000003 PHARM REV CODE 250: Performed by: STUDENT IN AN ORGANIZED HEALTH CARE EDUCATION/TRAINING PROGRAM

## 2023-03-15 PROCEDURE — 25000003 PHARM REV CODE 250

## 2023-03-15 PROCEDURE — 80202 ASSAY OF VANCOMYCIN: CPT

## 2023-03-15 PROCEDURE — 93005 ELECTROCARDIOGRAM TRACING: CPT

## 2023-03-15 PROCEDURE — 84100 ASSAY OF PHOSPHORUS: CPT

## 2023-03-15 PROCEDURE — 80053 COMPREHEN METABOLIC PANEL: CPT

## 2023-03-15 PROCEDURE — S4991 NICOTINE PATCH NONLEGEND: HCPCS | Performed by: STUDENT IN AN ORGANIZED HEALTH CARE EDUCATION/TRAINING PROGRAM

## 2023-03-15 PROCEDURE — 94761 N-INVAS EAR/PLS OXIMETRY MLT: CPT

## 2023-03-15 PROCEDURE — 97162 PT EVAL MOD COMPLEX 30 MIN: CPT

## 2023-03-15 RX ORDER — DOXYCYCLINE HYCLATE 100 MG
100 TABLET ORAL 2 TIMES DAILY
Qty: 10 TABLET | Refills: 0 | Status: SHIPPED | OUTPATIENT
Start: 2023-03-15 | End: 2023-03-23

## 2023-03-15 RX ORDER — NICOTINE 7MG/24HR
1 PATCH, TRANSDERMAL 24 HOURS TRANSDERMAL DAILY
Status: DISCONTINUED | OUTPATIENT
Start: 2023-03-15 | End: 2023-03-15 | Stop reason: HOSPADM

## 2023-03-15 RX ADMIN — CARVEDILOL 12.5 MG: 12.5 TABLET, FILM COATED ORAL at 09:03

## 2023-03-15 RX ADMIN — NICOTINE 1 PATCH: 7 PATCH, EXTENDED RELEASE TRANSDERMAL at 09:03

## 2023-03-15 RX ADMIN — FUROSEMIDE 60 MG: 10 INJECTION, SOLUTION INTRAMUSCULAR; INTRAVENOUS at 05:03

## 2023-03-15 RX ADMIN — CEFEPIME 2 G: 2 INJECTION, POWDER, FOR SOLUTION INTRAVENOUS at 05:03

## 2023-03-15 RX ADMIN — FAMOTIDINE 20 MG: 20 TABLET, FILM COATED ORAL at 09:03

## 2023-03-15 RX ADMIN — APIXABAN 10 MG: 2.5 TABLET, FILM COATED ORAL at 09:03

## 2023-03-15 RX ADMIN — ASPIRIN 81 MG: 81 TABLET, COATED ORAL at 09:03

## 2023-03-15 RX ADMIN — BICTEGRAVIR SODIUM, EMTRICITABINE, AND TENOFOVIR ALAFENAMIDE FUMARATE 1 TABLET: 50; 200; 25 TABLET ORAL at 09:03

## 2023-03-15 NOTE — PT/OT/SLP DISCHARGE
Physical Therapy Discharge Summary    Name: Jason Perdue  MRN: [id number]   Principal Problem: Community acquired pneumonia of right lower lobe of lung     Patient Discharged from acute Physical Therapy on 03/15/23.  Please refer to prior PT noted date on 03/15/23 for functional status.     Assessment:     Patient was discharged unexpectedly.  Information required to complete an accurate discharge summary is unknown.  Refer to therapy initial evaluation and last progress note for initial and most recent functional status and goal achievement.  Recommendations made may be found in medical record.    Objective:     GOALS:   Multidisciplinary Problems       Physical Therapy Goals       Not on file              Multidisciplinary Problems (Resolved)          Problem: Physical Therapy    Goal Priority Disciplines Outcome Goal Variances Interventions   Physical Therapy Goal   (Resolved)     PT, PT/OT Met     Description: Goals to be met by: Discharge     Patient will increase functional independence with mobility by performing:    -. Supine to sit with Tate  -. Sit to supine with Tate  -. Sit to stand transfer with Modified Tate  -. Bed to chair transfer with Modified Tate using Least restrictive Assistive Device  -. Gait  x 260 feet with Modified Tate using Least restrictive Assistive Device.                          Reasons for Discontinuation of Therapy Services  Transfer to alternate level of care.      Plan:     Patient Discharged to: Home with Home Health Service.      3/15/2023

## 2023-03-15 NOTE — PT/OT/SLP EVAL
"Physical Therapy Evaluation    Patient Name:  Jason Perdue   MRN:  [id number]    Recommendations:     Discharge Recommendations: home with home health   Discharge Equipment Recommendations: walker, rolling   Barriers to discharge:  fall risk, airborn precautions     Assessment:     Jason Perdue is a 62 y.o. male admitted with a medical diagnosis of:     Patient Active Problem List   Diagnosis    Diffuse large B-cell lymphoma    HIV disease    Hepatitis B    B12 deficiency    Polysubstance abuse    Severe aortic stenosis    HFrEF (heart failure with reduced ejection fraction)    Paroxysmal atrial fibrillation      He presents with the following impairments/functional limitations: weakness, gait instability, impaired balance. Pt tolerated session well and performed session within room due to airborn precautions. Pt displayed good safety awareness with movement around room with use of RW and displayed mild increased weakness in B LE's. RN in room upon entry and took pt's vitals.    Rehab Prognosis: Good; patient would benefit from acute skilled PT services to address these deficits and reach maximum level of function.    Recent Surgery: * No surgery found *      Plan:     During this hospitalization, patient to be seen  (3-5 times per week) to address the identified rehab impairments via gait training, therapeutic activities, therapeutic exercises, neuromuscular re-education and progress toward the following goals:    Plan of Care Expires:  04/12/23    Subjective     Chief Complaint: "My legs feel a little weak."  Patient/Family Comments/goals: "to get back to walking again."  Pain/Comfort:  Pain Rating 1: 0/10  Pain Rating Post-Intervention 1: 0/10    Patients cultural, spiritual, Zoroastrianism conflicts given the current situation: no    Living Environment:  Pt reports that he lives alone in single story home with no steps to enter. He notes that his sister lives next and that his niece drives him to get groceries. "   Prior to admission, patients level of function was Modified independent.  Equipment used at home: none.  DME owned (not currently used): rolling walker.  Upon discharge, patient will have assistance from Sister and pt's niece.    Objective:     Communicated with RN prior to session.  Patient found HOB elevated with peripheral IV, telemetry  upon PT entry to room.    General Precautions: Standard, fall, contact precautions  Orthopedic Precautions:N/A   Braces: N/A  Respiratory Status: Room air    Exams:  Cognitive Exam:  Patient is oriented to Person, Place, Time, and Situation  Gross Motor Coordination:  WFL  Sensation:    -       Intact  light/touch B LE's grossly  RLE ROM: WFL  RLE Strength: Deficits: 4/5 MMT strength  LLE ROM: WFL  LLE Strength: Deficits: 4/5 MMT strength    Functional Mobility:  Bed Mobility:     Supine to Sit: modified independence  Transfers:     Sit to Stand:  stand by assistance with rolling walker  Gait: 60ft with RW in room with SBA (decreased gait speed, good safety awareness, no LOB, step through gait pattern, mild increased fatigue in B LE's at end of gait)  Balance: Sitting balance: Good +  Static and dynamic standing balance: (Fair+)    Treatment & Education:  Pt educated about sitting up on EOB and or in chair throughout the day in order to improve exercise tolerance. Pt educated about performing movement around room without assistance from RN staff for safety.    Patient left sitting edge of bed with all lines intact, call button in reach, and RN notified.    Vitals   Vitals at Rest  BP  109/83   HR  77   O2 Sat     Pain             GOALS:   Multidisciplinary Problems       Physical Therapy Goals          Problem: Physical Therapy    Goal Priority Disciplines Outcome Goal Variances Interventions   Physical Therapy Goal     PT, PT/OT Ongoing, Progressing     Description: Goals to be met by: Discharge     Patient will increase functional independence with mobility by  performing:    -. Supine to sit with Chazy  -. Sit to supine with [place]  -. Sit to stand transfer with Modified [place]  -. Bed to chair transfer with Modified [place] using Least restrictive Assistive Device  -. Gait  x 260 feet with Modified [place] using Least restrictive Assistive Device.                          History:     Past Medical History:   Diagnosis Date    Cancer     CHF (congestive heart failure)     Human immunodeficiency virus (HIV) disease     Hypertension        No past surgical history on file.    Time Tracking:     PT Received On: 03/15/23  PT Start Time: 0755     PT Stop Time: 0810  PT Total Time (min): 15 min     Billable Minutes: Evaluation 15      03/15/2023

## 2023-03-15 NOTE — PLAN OF CARE
Problem: Adult Inpatient Plan of Care  Goal: Plan of Care Review  Outcome: Ongoing, Progressing  Goal: Patient-Specific Goal (Individualized)  Outcome: Ongoing, Progressing  Goal: Absence of Hospital-Acquired Illness or Injury  Outcome: Ongoing, Progressing  Goal: Optimal Comfort and Wellbeing  Outcome: Ongoing, Progressing  Goal: Readiness for Transition of Care  Outcome: Ongoing, Progressing     Problem: Adjustment to Illness (Heart Failure)  Goal: Optimal Coping  Outcome: Ongoing, Progressing     Problem: Cardiac Output Decreased (Heart Failure)  Goal: Optimal Cardiac Output  Outcome: Ongoing, Progressing     Problem: Dysrhythmia (Heart Failure)  Goal: Stable Heart Rate and Rhythm  Outcome: Ongoing, Progressing     Problem: Fluid Imbalance (Heart Failure)  Goal: Fluid Balance  Outcome: Ongoing, Progressing     Problem: Functional Ability Impaired (Heart Failure)  Goal: Optimal Functional Ability  Outcome: Ongoing, Progressing     Problem: Oral Intake Inadequate (Heart Failure)  Goal: Optimal Nutrition Intake  Outcome: Ongoing, Progressing     Problem: Respiratory Compromise (Heart Failure)  Goal: Effective Oxygenation and Ventilation  Outcome: Ongoing, Progressing     Problem: Sleep Disordered Breathing (Heart Failure)  Goal: Effective Breathing Pattern During Sleep  Outcome: Ongoing, Progressing     Problem: Fluid Imbalance (Pneumonia)  Goal: Fluid Balance  Outcome: Ongoing, Progressing     Problem: Infection (Pneumonia)  Goal: Resolution of Infection Signs and Symptoms  Outcome: Ongoing, Progressing     Problem: Respiratory Compromise (Pneumonia)  Goal: Effective Oxygenation and Ventilation  Outcome: Ongoing, Progressing     Problem: Infection  Goal: Absence of Infection Signs and Symptoms  Outcome: Ongoing, Progressing     Problem: Fall Injury Risk  Goal: Absence of Fall and Fall-Related Injury  Outcome: Ongoing, Progressing

## 2023-03-15 NOTE — PLAN OF CARE
Problem: Adult Inpatient Plan of Care  Goal: Plan of Care Review  3/15/2023 1151 by Idalmis Pena RN  Outcome: Met  3/15/2023 1101 by Idalmis Pena RN  Outcome: Ongoing, Progressing  Goal: Patient-Specific Goal (Individualized)  3/15/2023 1151 by Idalmis Pena RN  Outcome: Met  3/15/2023 1101 by Idalmis Pena RN  Outcome: Ongoing, Progressing  Goal: Absence of Hospital-Acquired Illness or Injury  3/15/2023 1151 by Idalmis Pena RN  Outcome: Met  3/15/2023 1101 by Idalmis Pena RN  Outcome: Ongoing, Progressing  Goal: Optimal Comfort and Wellbeing  3/15/2023 1151 by Idalmis Pena RN  Outcome: Met  3/15/2023 1101 by Idalmis Pena RN  Outcome: Ongoing, Progressing  Goal: Readiness for Transition of Care  3/15/2023 1151 by Idalmis Pena RN  Outcome: Met  3/15/2023 1101 by Idalmis Pena RN  Outcome: Ongoing, Progressing     Problem: Adjustment to Illness (Heart Failure)  Goal: Optimal Coping  3/15/2023 1151 by Idalmis Pena RN  Outcome: Met  3/15/2023 1101 by Idalmis Pena RN  Outcome: Ongoing, Progressing     Problem: Cardiac Output Decreased (Heart Failure)  Goal: Optimal Cardiac Output  3/15/2023 1151 by Idalmis Pena RN  Outcome: Met  3/15/2023 1101 by Idalmis Pena RN  Outcome: Ongoing, Progressing     Problem: Dysrhythmia (Heart Failure)  Goal: Stable Heart Rate and Rhythm  3/15/2023 1151 by Idalmis Pena RN  Outcome: Met  3/15/2023 1101 by Idalmis Pena RN  Outcome: Ongoing, Progressing     Problem: Fluid Imbalance (Heart Failure)  Goal: Fluid Balance  3/15/2023 1151 by Idalmis Pena RN  Outcome: Met  3/15/2023 1101 by Idalmis Pena RN  Outcome: Ongoing, Progressing     Problem: Functional Ability Impaired (Heart Failure)  Goal: Optimal Functional Ability  3/15/2023 1151 by Idalmis Pena RN  Outcome: Met  3/15/2023 1101 by Idalmis Pena RN  Outcome: Ongoing, Progressing     Problem: Oral Intake Inadequate (Heart Failure)  Goal:  Optimal Nutrition Intake  3/15/2023 1151 by Idalmis Pena RN  Outcome: Met  3/15/2023 1101 by Idalmis Pena RN  Outcome: Ongoing, Progressing     Problem: Respiratory Compromise (Heart Failure)  Goal: Effective Oxygenation and Ventilation  3/15/2023 1151 by Idalmis Pena RN  Outcome: Met  3/15/2023 1101 by Idalmis Pena RN  Outcome: Ongoing, Progressing     Problem: Sleep Disordered Breathing (Heart Failure)  Goal: Effective Breathing Pattern During Sleep  3/15/2023 1151 by Idalmis Pena RN  Outcome: Met  3/15/2023 1101 by Idalmis Pena RN  Outcome: Ongoing, Progressing     Problem: Fluid Imbalance (Pneumonia)  Goal: Fluid Balance  3/15/2023 1151 by Idalmis Pena RN  Outcome: Met  3/15/2023 1101 by Idalmis Pena RN  Outcome: Ongoing, Progressing     Problem: Infection (Pneumonia)  Goal: Resolution of Infection Signs and Symptoms  3/15/2023 1151 by Idalmis Pena RN  Outcome: Met  3/15/2023 1101 by Idalmis Pena RN  Outcome: Ongoing, Progressing     Problem: Respiratory Compromise (Pneumonia)  Goal: Effective Oxygenation and Ventilation  3/15/2023 1151 by Idalmis Pena RN  Outcome: Met  3/15/2023 1101 by Idalmis Pena RN  Outcome: Ongoing, Progressing     Problem: Infection  Goal: Absence of Infection Signs and Symptoms  3/15/2023 1151 by Idalmis Pena RN  Outcome: Met  3/15/2023 1101 by Idalmis Pena RN  Outcome: Ongoing, Progressing     Problem: Fall Injury Risk  Goal: Absence of Fall and Fall-Related Injury  3/15/2023 1151 by Idalmis Pena RN  Outcome: Met  3/15/2023 1101 by Idalmis Pena RN  Outcome: Ongoing, Progressing     Problem: Physical Therapy  Goal: Physical Therapy Goal  Description: Goals to be met by: Discharge     Patient will increase functional independence with mobility by performing:    -. Supine to sit with Hamblen  -. Sit to supine with Hamblen  -. Sit to stand transfer with Modified Hamblen  -. Bed to chair transfer  with Modified Guthrie using Least restrictive Assistive Device  -. Gait  x 260 feet with Modified Guthrie using Least restrictive Assistive Device.     Outcome: Met

## 2023-03-16 LAB
CD3 CELLS # BLD: 398 CELLS/MCL (ref 550–2202)
CD3 CELLS NFR BLD: 72 % (ref 58–86)
CD3+CD4+ CELLS # BLD: 59 CELLS/MCL (ref 365–1437)
CD3+CD4+ CELLS NFR BLD: 11 % (ref 32–64)
CD3+CD4+ CELLS/CD3+CD8+ CLL BLD: 0.2 %
CD3+CD8+ CELLS # BLD: 331 CELLS/MCL (ref 80–846)
CD3+CD8+ CELLS NFR BLD: 60 % (ref 8–40)
CD45 CELLS # BLD: 0.55 THOU/MCL (ref 0.82–2.84)

## 2023-03-16 NOTE — DISCHARGE SUMMARY
[facility] Discharge Summary    Admitting Physician: Isela Mukherjee MD  Attending Physician: Jose Maria Narayanan MD  Date of Admit: 3/13/2023  Date of Discharge: 3/15/2023    Condition: Stable  Outcome: Condition has improved and patient is now ready for discharge.  DISPOSITION: Home or Self Care    Discharge Diagnoses     Patient Active Problem List   Diagnosis    Diffuse large B-cell lymphoma    HIV disease    Hepatitis B    B12 deficiency    Polysubstance abuse    Severe aortic stenosis    HFrEF (heart failure with reduced ejection fraction)    Paroxysmal atrial fibrillation    Community acquired pneumonia of right lower lobe of lung     Principal Problem:  Community acquired pneumonia of right lower lobe of lung    Consultants and Procedures     Consultants:  None    Procedures:   * No surgery found *     Brief Admission History      Jason Perdue is a 62 y.o. 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 Patient is endorsing lethargy and leg swelling (RT>LT) for 2 weeks and SOB, dry cough, orthopnea and LOVING for 2 days. He states prior to 2 days ago he was able to ambulate without SOB but now is getting SOB with roughly 50 steps. He is endorsing noncompliance with his Lipitor and Biktarvy (due to running out of meds) for the last 2 months but states he has been taking the ASA, Eliquis and Coreg. Patient was recently stopped on Aldactone at his Cardiology clinic visit on 3/9/23, but was not taking this medication anyway (per chart). Cardiology has also been attempting to start patient on Entresto but patient is unable to get medication due to the pharmacy not carrying Entresto. He denies any CP, productive cough, fevers, night sweats, weight  "loss, urinary disturbances, nausea, vomiting, diarrhea or constipation.    Hospital Course with Pertinent Findings     Patient was admitted for acute on chronic CHF exacerbation and RLL pneumonia. He underwent a thoracentesis removing 700 cc of transudative fluid. He reported non-adherence to medication regimen due to difficulties acquiring medications from his pharmacy. Pulmonary infectious workup was initiated due to his dry cough and hx of HIV however patient was unable to provide sputum to complete all studies. His most recent CD4 count was 234 on 1/24/23; a repeat was ordered during this admission but has yet to result. He was treated with IV antimicrobials and given IV Lasix to reduce his fluid burden. He diuresed a net 3.3 L. He reported difficulty acquiring some of his medications so was not taking his home regimen as prescribed including Entresto and Eliquis. CT PE studies were negative. He was restarted on GDMT and discharge home with 5 days of doxycycline 100 mg. Doxy along with Entresto and Eliquis were delivered to his bedside prior to discharge. He has an appointment with infectious disease on 3/23/23. He was instructed to call his PCP office and schedule a follow-up appointment within one week. Return to ED precautions were discussed at bedside.     Discharge physical exam:  Vitals  BP: (!) 152/80  Temp: 97.4 °F (36.3 °C)  Temp Source: Oral  Pulse: 71  Resp: 18  SpO2: 97 %  Height: 5' 11" (180.3 cm)  Weight: 92.1 kg (203 lb)    General: Awake, alert, & oriented to person, place & time. No acute distress  Psychiatric: Mood and affect normal  HEENT: Normocephalic, atraumatic. Moist mucous membranes.  Cardiovascular: RRR, no murmurs, rubs, or gallups.   Pulmonary: CTAB, no wheezes, crackles, rales, or rhonchi. Breathing comfortably on room air.   Abdominal: Soft, non tender, non distended. Normoactive bowel sounds.  Extremities: moves all extremities purposefully and equally. Trace LE edema " bilaterally.  Skin:  Exposed skin is warm & dry.    TIME SPENT ON DISCHARGE: 60 minutes    Discharge Medications        Medication List        START taking these medications      doxycycline 100 MG tablet  Commonly known as: VIBRA-TABS  Take 1 tablet (100 mg total) by mouth 2 (two) times daily.            CONTINUE taking these medications      apixaban 5 mg Tab  Commonly known as: ELIQUIS  Take 1 tablet (5 mg total) by mouth 2 (two) times daily.     aspirin 81 MG EC tablet  Commonly known as: ECOTRIN  Take 1 tablet (81 mg total) by mouth once daily.     atorvastatin 40 MG tablet  Commonly known as: LIPITOR  Take 1 tablet (40 mg total) by mouth every evening.     BIKTARVY -25 mg (25 kg or greater)  Generic drug: atfexmfnm-wtczhkuh-dripmmg ala  Take 1 tablet by mouth once daily.     carvediloL 12.5 MG tablet  Commonly known as: COREG  Take 1 tablet (12.5 mg total) by mouth 2 (two) times daily.     famotidine 20 MG tablet  Commonly known as: PEPCID  Take 1 tablet (20 mg total) by mouth 2 (two) times daily.     food supplemt, lactose-reduced Liqd     furosemide 20 MG tablet  Commonly known as: LASIX  Take 3 tablets (60 mg total) by mouth once daily.     megestroL 400 mg/10 mL (40 mg/mL) Susp  Commonly known as: MEGACE     polyethylene glycol 3350(bulk) Powd     sacubitriL-valsartan 24-26 mg per tablet  Commonly known as: ENTRESTO  Take 1 tablet by mouth 2 (two) times daily.     VITAMIN D2 50,000 unit Cap  Generic drug: ergocalciferol            STOP taking these medications      morphine 15 MG tablet  Commonly known as: MSIR     sulfamethoxazole-trimethoprim 800-160mg 800-160 mg Tab  Commonly known as: BACTRIM DS               Where to Get Your Medications        These medications were sent to [facility] [address] [place], [place] [address] [facility] [address]      Phone: [phone]   apixaban 5 mg Tab  doxycycline 100 MG tablet  sacubitriL-valsartan 24-26 mg per  tablet       Discharge Information:      Follow-up Information       Earl Aguilar MD. Schedule an appointment as soon as possible for a visit in 1 week(s).    Specialty: Internal Medicine  Contact information:  [address] [facility]  [facility] [address] [phone]                           - Take all medications as prescribed  - Eliquis starter pack, doxycycline 100 mg BID x5 days, and Entresto 24-26 mg delivered to bedside  - Follow up with your PCP within the next 7 days    Darius Zhou MD  [facility] - PGY-1

## 2023-03-17 LAB
CLINICAL CYTOGENETICIST REVIEW: NEGATIVE
H CAPSUL AG SER IA-MCNC: NORMAL NG/ML
MAYO GENERIC ORDERABLE RESULT: NORMAL

## 2023-03-19 LAB
BACTERIA BLD CULT: NORMAL
BACTERIA BLD CULT: NORMAL
BACTERIA FLD CULT: NORMAL

## 2023-03-20 NOTE — PROGRESS NOTES
Heart Failure Follow Up Call:  Upon discharge:      EF 10%      /80      HR 71    Discharge Medications:  START taking these medications       doxycycline 100 MG tablet  Commonly known as: VIBRA-TABS  Take 1 tablet (100 mg total) by mouth 2 (two) times daily.       CONTINUE taking these medications       apixaban 5 mg Tab  Commonly known as: ELIQUIS  Take 1 tablet (5 mg total) by mouth 2 (two) times daily.    aspirin 81 MG EC tablet  Commonly known as: ECOTRIN  Take 1 tablet (81 mg total) by mouth once daily.    atorvastatin 40 MG tablet  Commonly known as: LIPITOR  Take 1 tablet (40 mg total) by mouth every evening.    BIKTARVY -25 mg (25 kg or greater)  Generic drug: lbkuklvgz-vwnnanik-dgvqbji ala  Take 1 tablet by mouth once daily.    carvediloL 12.5 MG tablet  Commonly known as: COREG  Take 1 tablet (12.5 mg total) by mouth 2 (two) times daily.    famotidine 20 MG tablet  Commonly known as: PEPCID  Take 1 tablet (20 mg total) by mouth 2 (two) times daily.    food supplemt, lactose-reduced Liqd    furosemide 20 MG tablet  Commonly known as: LASIX  Take 3 tablets (60 mg total) by mouth once daily.    megestroL 400 mg/10 mL (40 mg/mL) Susp  Commonly known as: MEGACE    polyethylene glycol 3350(bulk) Powd    sacubitriL-valsartan 24-26 mg per tablet  Commonly known as: ENTRESTO  Take 1 tablet by mouth 2 (two) times daily.    VITAMIN D2 50,000 unit Cap  Generic drug: ergocalciferol     Discussed patients discharge medications.    Patient is taking all medications as prescribed except Biktarvy ( ID appt to access,   Famotidine, food supplement, megestrol, polyethylene glycol, vitamin D2. Patient stated  Not taking all these medications on the list anymore.   No side effects from medications were noted.    Discussed fluid pills.  Discussed with patient the importance of taking and recording daily weights. Patient stated   He does have a scale and will start weighing self daily and recording the  weight.  Instructed patient to weigh self each morning.   Instructed patient to reach out to physician or the ER if gain or loose 2-3 lbs in one day or 5lbs   In one week.   Discussed briefly how patient is doing with their heart healthy diet.  Reminded patient of upcoming appointment on  3/23/23 infection control.  Instructed if symptoms worsen to return to the emergency room.

## 2023-03-23 ENCOUNTER — PROCEDURE VISIT (OUTPATIENT)
Dept: INFECTIOUS DISEASES | Facility: CLINIC | Age: 63
End: 2023-03-23
Payer: MEDICAID

## 2023-03-23 ENCOUNTER — OFFICE VISIT (OUTPATIENT)
Dept: INFECTIOUS DISEASES | Facility: CLINIC | Age: 63
End: 2023-03-23
Payer: MEDICAID

## 2023-03-23 VITALS
DIASTOLIC BLOOD PRESSURE: 71 MMHG | HEART RATE: 82 BPM | HEIGHT: 71 IN | WEIGHT: 216 LBS | BODY MASS INDEX: 30.24 KG/M2 | TEMPERATURE: 98 F | SYSTOLIC BLOOD PRESSURE: 101 MMHG | RESPIRATION RATE: 16 BRPM

## 2023-03-23 DIAGNOSIS — B18.1 CHRONIC HEPATITIS B: Primary | ICD-10-CM

## 2023-03-23 DIAGNOSIS — B18.1 CHRONIC HEPATITIS B: ICD-10-CM

## 2023-03-23 DIAGNOSIS — E55.9 VITAMIN D DEFICIENCY: ICD-10-CM

## 2023-03-23 DIAGNOSIS — B20 HIV DISEASE: Primary | ICD-10-CM

## 2023-03-23 DIAGNOSIS — B20 HUMAN IMMUNODEFICIENCY VIRUS (HIV) DISEASE: ICD-10-CM

## 2023-03-23 LAB — T PALLIDUM AB SER QL: REACTIVE

## 2023-03-23 PROCEDURE — 99214 OFFICE O/P EST MOD 30 MIN: CPT | Mod: PBBFAC | Performed by: NURSE PRACTITIONER

## 2023-03-23 PROCEDURE — 87517 HEPATITIS B DNA QUANT: CPT | Mod: 90 | Performed by: NURSE PRACTITIONER

## 2023-03-23 PROCEDURE — 3074F PR MOST RECENT SYSTOLIC BLOOD PRESSURE < 130 MM HG: ICD-10-PCS | Mod: CPTII,,, | Performed by: NURSE PRACTITIONER

## 2023-03-23 PROCEDURE — 99214 PR OFFICE/OUTPT VISIT, EST, LEVL IV, 30-39 MIN: ICD-10-PCS | Mod: S$PBB,,, | Performed by: NURSE PRACTITIONER

## 2023-03-23 PROCEDURE — 86780 TREPONEMA PALLIDUM: CPT | Mod: 90 | Performed by: NURSE PRACTITIONER

## 2023-03-23 PROCEDURE — 1160F RVW MEDS BY RX/DR IN RCRD: CPT | Mod: CPTII,,, | Performed by: NURSE PRACTITIONER

## 2023-03-23 PROCEDURE — 3008F BODY MASS INDEX DOCD: CPT | Mod: CPTII,,, | Performed by: NURSE PRACTITIONER

## 2023-03-23 PROCEDURE — 3074F SYST BP LT 130 MM HG: CPT | Mod: CPTII,,, | Performed by: NURSE PRACTITIONER

## 2023-03-23 PROCEDURE — 4010F PR ACE/ARB THEARPY RXD/TAKEN: ICD-10-PCS | Mod: CPTII,,, | Performed by: NURSE PRACTITIONER

## 2023-03-23 PROCEDURE — 86480 TB TEST CELL IMMUN MEASURE: CPT | Mod: 90 | Performed by: NURSE PRACTITIONER

## 2023-03-23 PROCEDURE — 1160F PR REVIEW ALL MEDS BY PRESCRIBER/CLIN PHARMACIST DOCUMENTED: ICD-10-PCS | Mod: CPTII,,, | Performed by: NURSE PRACTITIONER

## 2023-03-23 PROCEDURE — 3078F PR MOST RECENT DIASTOLIC BLOOD PRESSURE < 80 MM HG: ICD-10-PCS | Mod: CPTII,,, | Performed by: NURSE PRACTITIONER

## 2023-03-23 PROCEDURE — 87536 HIV-1 QUANT&REVRSE TRNSCRPJ: CPT | Mod: 90 | Performed by: NURSE PRACTITIONER

## 2023-03-23 PROCEDURE — 3008F PR BODY MASS INDEX (BMI) DOCUMENTED: ICD-10-PCS | Mod: CPTII,,, | Performed by: NURSE PRACTITIONER

## 2023-03-23 PROCEDURE — 86592 SYPHILIS TEST NON-TREP QUAL: CPT | Performed by: NURSE PRACTITIONER

## 2023-03-23 PROCEDURE — 1159F PR MEDICATION LIST DOCUMENTED IN MEDICAL RECORD: ICD-10-PCS | Mod: CPTII,,, | Performed by: NURSE PRACTITIONER

## 2023-03-23 PROCEDURE — 86780 TREPONEMA PALLIDUM: CPT | Performed by: NURSE PRACTITIONER

## 2023-03-23 PROCEDURE — 99214 OFFICE O/P EST MOD 30 MIN: CPT | Mod: S$PBB,,, | Performed by: NURSE PRACTITIONER

## 2023-03-23 PROCEDURE — 3078F DIAST BP <80 MM HG: CPT | Mod: CPTII,,, | Performed by: NURSE PRACTITIONER

## 2023-03-23 PROCEDURE — 4010F ACE/ARB THERAPY RXD/TAKEN: CPT | Mod: CPTII,,, | Performed by: NURSE PRACTITIONER

## 2023-03-23 PROCEDURE — 36415 COLL VENOUS BLD VENIPUNCTURE: CPT | Performed by: NURSE PRACTITIONER

## 2023-03-23 PROCEDURE — 1159F MED LIST DOCD IN RCRD: CPT | Mod: CPTII,,, | Performed by: NURSE PRACTITIONER

## 2023-03-23 PROCEDURE — 91200 LIVER ELASTOGRAPHY: CPT | Mod: PBBFAC | Performed by: INTERNAL MEDICINE

## 2023-03-23 RX ORDER — ATOVAQUONE 750 MG/5ML
1500 SUSPENSION ORAL DAILY
Qty: 900 ML | Refills: 1 | Status: SHIPPED | OUTPATIENT
Start: 2023-03-23

## 2023-03-23 RX ORDER — BICTEGRAVIR SODIUM, EMTRICITABINE, AND TENOFOVIR ALAFENAMIDE FUMARATE 50; 200; 25 MG/1; MG/1; MG/1
1 TABLET ORAL DAILY
Qty: 90 TABLET | Refills: 1 | Status: SHIPPED | OUTPATIENT
Start: 2023-03-23

## 2023-03-23 RX ORDER — ERGOCALCIFEROL 1.25 MG/1
50000 CAPSULE ORAL
Qty: 12 CAPSULE | Refills: 1 | Status: ON HOLD | OUTPATIENT
Start: 2023-03-23 | End: 2023-05-05 | Stop reason: HOSPADM

## 2023-03-23 NOTE — PROGRESS NOTES
Subjective:       Patient ID: Jason Perdue is a 62 y.o. male.    Chief Complaint: Followup HIV (States in hospital recently for a lung infection and heart failure)    3/23/23  Jason is a 63 yo AAM here today for HIV/HBV return visit, last seen in IDC 4/22.  He has been admitted 1/2023 for Afib, CHF, CAD, and HTN; then again 3/23 with CHF exacerbation, effusion, and CAP.  He also has a history significant for smoking crack cocaine, states that he has not used since hospital admission 1/2023. He has been off Biktarvy for some months now, states that it was not prescribed at hospital discharges and he was told to f/u with us.  He was previously prescribed Bactrim DS for OI prophy, but has been having some renal compromise in recent months as well, will revise to atovaquone daily. Voiced understanding. Denies any sexual encounters since last visit.  Last abd imaging CT abd w/o contrast 9/22.  Will order RUQ abd u/s for HCC screening and FibroScan today. Will also plan to repeat CT of chest in approximately 3 months or so.  History significant for large B-cell lymphoma in remission as well.  All questions answered & concerns addressed.    4/20/22  Jason is a 63 yo AAM presenting today for HIV/HBV return to care visit, last seen in IDC 3/21.  He tells me that he has been taking Biktarvy daily until he ran out about 2 weeks ago.  He tolerates it well without any noted side effects.  Labs collected 3/17/22 HIV VL 44, Cd4 204.  HBV DNA not collected, will collect next labs.  He is taking Bactrim DS daily as well for OI prophy.  He states that [facility] did not ship Biktarvy, even though it was renewed 2 weeks ago.  Phoned [facility], states that his insurance was not approving the fill at that time.  Appears that he was switching from [facility] due to poor access to medication, but then it must have been filled by [facility] in the interim. Will stay will [facility] to avoid this from happening again.  Voiced understanding &  appreciation, will resume Biktarvy today. Vitamin D level 10.9.  Will initiate treatment with Ergocalciferol weekly and order DEXA for screening.  Anal pap collected last visit 3/21, resulted ASCUS with HR HPV.  Will repeat today.  Appreciates STI screenings as well. History of large B-cell lymphoma in remission.  Last seen by Onc 12/20 with 3 month f/u ordered. He tells me that transportation has been a problem in the past, but this is now resolved.  Referral sent to onc for f/u.  He also needs PCP f/u, previously seen by Dr. Regalado.  Order placed for same.  In regards to HBV infection, last FibroScan completed 3/21, resulted S1, F0/1.  Repeat today.  RUQ abd u/s 11/21 revealed steatosis, will continue to follow every 6 months.  Last colonoscopy completed inpatient at [facility] 2017, due for 5 year f/u.  Referral to [facility] GI procedure lab ordered.  Smokes 2-3 cigarettes per day, trying to quit.  Last eye exam 2021 at [facility], will call for f/u visit. Appreciates recommended vaccinations, due for Hep A #1 & PPSV 23 today.  All questions answered & concerns addressed.     3/15/21  Jason is a 61 yo BM presenting today for HIV/HBV f/u visit today.   He reports 100% adherent to Biktarvy, tolerates well with HIV viral suppression.  Will repeat HIV & HBV viral load today.  He denies any type of sexual activity since last visit & completed treatment for gonorrhea as prescribed.  Will repeat anal ct/gc today, as well as anal pap.  He is taking Bactrim DS daily as prescribed.  Remains in care with onc for lymphoma, states that he is in remission & has f/u appt later this month after a repeat scan. Last CT abd 11/20.  He appreciates Fibroscan today.  Vaccinations deferred pending Cd4 restoration.  He has a sebaceous cyst to mid back, non-draining.  Appreciates excision, will place referral.  He has cut back on cigarettes from 1 ppd to 2 cig/day & tells me that he has not done cocaine in some time now. PCP Dr. Regalado, remains in care  with her.    Review of Systems   Constitutional: Negative.    HENT: Negative.     Respiratory: Negative.     Cardiovascular: Negative.    Gastrointestinal: Negative.    Genitourinary: Negative.    Integumentary:  Negative.   Neurological: Negative.    Hematological: Negative.    Psychiatric/Behavioral: Negative.         Objective:      Physical Exam  Vitals reviewed.   Constitutional:       General: He is not in acute distress.     Appearance: Normal appearance. He is not toxic-appearing.   HENT:      Mouth/Throat:      Mouth: Mucous membranes are moist.      Pharynx: Oropharynx is clear.   Eyes:      Conjunctiva/sclera: Conjunctivae normal.   Cardiovascular:      Rate and Rhythm: Normal rate and regular rhythm.   Pulmonary:      Effort: Pulmonary effort is normal. No respiratory distress.      Breath sounds: Normal breath sounds.   Abdominal:      General: Abdomen is flat. Bowel sounds are normal.      Palpations: Abdomen is soft.   Musculoskeletal:         General: Normal range of motion.      Cervical back: Normal range of motion.   Lymphadenopathy:      Cervical: No cervical adenopathy.   Skin:     General: Skin is warm and dry.   Neurological:      General: No focal deficit present.      Mental Status: He is alert and oriented to person, place, and time. Mental status is at baseline.   Psychiatric:         Mood and Affect: Mood normal.         Behavior: Behavior normal.       Assessment:       Problem List Items Addressed This Visit          ID    HIV disease         Plan:           HIV disease  -     Ambulatory referral/consult to Infectious Disease  -     sivtbmmfq-jqvivffd-thxguzv ala (BIKTARVY) -25 mg (25 kg or greater); Take 1 tablet by mouth once daily.  Dispense: 90 tablet; Refill: 1  -     atovaquone (MEPRON) 750 mg/5 mL Susp oral liquid; Take 10 mLs (1,500 mg total) by mouth once daily.  Dispense: 900 mL; Refill: 1  -     HIV-1 RNA, Quantitative, PCR with Reflex to Genotype; Future; Expected  date: 03/23/2023  -     SYPHILIS ANTIBODY (WITH REFLEX RPR); Future; Expected date: 03/23/2023  -     Quantiferon Gold TB; Future; Expected date: 03/23/2023  Adherence and sexual health counseling done.  Use condoms for all sexual encounters.  Blood precautions.   Resume Biktarvy 1 po daily as prescribed.  Start Atovaquone 10 ml daily.   Labs today.  RTC 6 weeks with Kiara.    HIV Wellness:  Anal pap: 3/21 ASCUS HR HPV +  Oral CT/GC: 11/20 Neg  Anal CT/GC: 3/21 Neg  Urine CT/GC: 3/22 Neg  RPR: 3/22 NR  Ophth: 2021  DEXA:  Colonoscopy: 2017    Chronic hepatitis B  -     pctvmywwq-cuzokolq-vljegfv ala (BIKTARVY) -25 mg (25 kg or greater); Take 1 tablet by mouth once daily.  Dispense: 90 tablet; Refill: 1  -     Hepatitis B DNA, Quant; Future; Expected date: 03/23/2023  -     US Abdomen Limited; Future; Expected date: 04/06/2023  Resume Biktarvy 1 po daily.   Labs today.   RUQ abd u/s asap.   FibroScan today.      Vitamin D deficiency  -     VITAMIN D2 1,250 mcg (50,000 unit) capsule; Take 1 capsule (50,000 Units total) by mouth every 7 days.  Dispense: 12 capsule; Refill: 1  Vitamin D2 66777 units weekly.   Check level today.

## 2023-03-24 ENCOUNTER — HOSPITAL ENCOUNTER (OUTPATIENT)
Facility: HOSPITAL | Age: 63
Discharge: HOME | End: 2023-03-26
Attending: INTERNAL MEDICINE | Admitting: INTERNAL MEDICINE
Payer: MEDICAID

## 2023-03-24 DIAGNOSIS — R06.02 SHORTNESS OF BREATH: ICD-10-CM

## 2023-03-24 DIAGNOSIS — R07.9 CHEST PAIN: ICD-10-CM

## 2023-03-24 DIAGNOSIS — I50.9 ACUTE ON CHRONIC CONGESTIVE HEART FAILURE, UNSPECIFIED HEART FAILURE TYPE: Primary | ICD-10-CM

## 2023-03-24 DIAGNOSIS — N17.9 AKI (ACUTE KIDNEY INJURY): ICD-10-CM

## 2023-03-24 LAB
ALBUMIN SERPL-MCNC: 3 G/DL (ref 3.4–4.8)
ALBUMIN/GLOB SERPL: 0.6 RATIO (ref 1.1–2)
ALP SERPL-CCNC: 96 UNIT/L (ref 40–150)
ALT SERPL-CCNC: 16 UNIT/L (ref 0–55)
AMPHET UR QL SCN: NEGATIVE
ANION GAP SERPL CALC-SCNC: 8 MEQ/L
AST SERPL-CCNC: 38 UNIT/L (ref 5–34)
BARBITURATE SCN PRESENT UR: NEGATIVE
BASOPHILS # BLD AUTO: 0.03 X10(3)/MCL (ref 0–0.2)
BASOPHILS NFR BLD AUTO: 0.4 %
BENZODIAZ UR QL SCN: NEGATIVE
BILIRUBIN DIRECT+TOT PNL SERPL-MCNC: 1.2 MG/DL
BNP BLD-MCNC: 8996.4 PG/ML
BUN SERPL-MCNC: 37.2 MG/DL (ref 8.4–25.7)
BUN SERPL-MCNC: 37.7 MG/DL (ref 8.4–25.7)
CALCIUM SERPL-MCNC: 9.2 MG/DL (ref 8.8–10)
CALCIUM SERPL-MCNC: 9.2 MG/DL (ref 8.8–10)
CANNABINOIDS UR QL SCN: NEGATIVE
CHLORIDE SERPL-SCNC: 103 MMOL/L (ref 98–107)
CHLORIDE SERPL-SCNC: 104 MMOL/L (ref 98–107)
CO2 SERPL-SCNC: 21 MMOL/L (ref 23–31)
CO2 SERPL-SCNC: 25 MMOL/L (ref 23–31)
COCAINE UR QL SCN: POSITIVE
CREAT SERPL-MCNC: 1.5 MG/DL (ref 0.73–1.18)
CREAT SERPL-MCNC: 1.67 MG/DL (ref 0.73–1.18)
CREAT/UREA NIT SERPL: 25
CRYPTOC AG SER QL IA.RAPID: NEGATIVE
CRYPTOC AG TITR CSF IA: NORMAL {TITER}
EOSINOPHIL # BLD AUTO: 0.06 X10(3)/MCL (ref 0–0.9)
EOSINOPHIL NFR BLD AUTO: 0.9 %
ERYTHROCYTE [DISTWIDTH] IN BLOOD BY AUTOMATED COUNT: 16.1 % (ref 11.5–17)
FENTANYL UR QL SCN: NEGATIVE
GFR SERPLBLD CREATININE-BSD FMLA CKD-EPI: 46 MLS/MIN/1.73/M2
GFR SERPLBLD CREATININE-BSD FMLA CKD-EPI: 53 MLS/MIN/1.73/M2
GLOBULIN SER-MCNC: 4.8 GM/DL (ref 2.4–3.5)
GLUCOSE SERPL-MCNC: 110 MG/DL (ref 82–115)
GLUCOSE SERPL-MCNC: 137 MG/DL (ref 82–115)
HBV DNA SERPL NAA+PROBE-ACNC: ABNORMAL IU/ML
HCT VFR BLD AUTO: 33.8 % (ref 42–52)
HGB BLD-MCNC: 10.9 G/DL (ref 14–18)
IMM GRANULOCYTES # BLD AUTO: 0.02 X10(3)/MCL (ref 0–0.04)
IMM GRANULOCYTES NFR BLD AUTO: 0.3 %
LYMPHOCYTES # BLD AUTO: 1.25 X10(3)/MCL (ref 0.6–4.6)
LYMPHOCYTES NFR BLD AUTO: 18 %
MAGNESIUM SERPL-MCNC: 2 MG/DL (ref 1.6–2.6)
MCH RBC QN AUTO: 27.5 PG (ref 27–31)
MCHC RBC AUTO-ENTMCNC: 32.2 G/DL (ref 33–36)
MCV RBC AUTO: 85.4 FL (ref 80–94)
MDMA UR QL SCN: NEGATIVE
MONOCYTES # BLD AUTO: 0.77 X10(3)/MCL (ref 0.1–1.3)
MONOCYTES NFR BLD AUTO: 11.1 %
NEUTROPHILS # BLD AUTO: 4.81 X10(3)/MCL (ref 2.1–9.2)
NEUTROPHILS NFR BLD AUTO: 69.3 %
NRBC BLD AUTO-RTO: 0 %
OPIATES UR QL SCN: NEGATIVE
PCP UR QL: NEGATIVE
PH UR: 5.5 [PH] (ref 3–11)
PHOSPHATE SERPL-MCNC: 4.1 MG/DL (ref 2.3–4.7)
PLATELET # BLD AUTO: 169 X10(3)/MCL (ref 130–400)
PMV BLD AUTO: 11.6 FL (ref 7.4–10.4)
POTASSIUM SERPL-SCNC: 3.3 MMOL/L (ref 3.5–5.1)
POTASSIUM SERPL-SCNC: 4 MMOL/L (ref 3.5–5.1)
PROT SERPL-MCNC: 7.8 GM/DL (ref 5.8–7.6)
RBC # BLD AUTO: 3.96 X10(6)/MCL (ref 4.7–6.1)
RPR SER QL: NORMAL
RPR SER-TITR: NORMAL {TITER}
SODIUM SERPL-SCNC: 136 MMOL/L (ref 136–145)
SODIUM SERPL-SCNC: 136 MMOL/L (ref 136–145)
TROPONIN I SERPL-MCNC: 0.04 NG/ML (ref 0–0.04)
WBC # SPEC AUTO: 6.9 X10(3)/MCL (ref 4.5–11.5)

## 2023-03-24 PROCEDURE — G0378 HOSPITAL OBSERVATION PER HR: HCPCS

## 2023-03-24 PROCEDURE — 87899 AGENT NOS ASSAY W/OPTIC: CPT | Performed by: STUDENT IN AN ORGANIZED HEALTH CARE EDUCATION/TRAINING PROGRAM

## 2023-03-24 PROCEDURE — 87556 M.TUBERCULO DNA AMP PROBE: CPT | Performed by: STUDENT IN AN ORGANIZED HEALTH CARE EDUCATION/TRAINING PROGRAM

## 2023-03-24 PROCEDURE — 83735 ASSAY OF MAGNESIUM: CPT | Performed by: INTERNAL MEDICINE

## 2023-03-24 PROCEDURE — 63600175 PHARM REV CODE 636 W HCPCS: Performed by: INTERNAL MEDICINE

## 2023-03-24 PROCEDURE — 83880 ASSAY OF NATRIURETIC PEPTIDE: CPT | Performed by: INTERNAL MEDICINE

## 2023-03-24 PROCEDURE — 80053 COMPREHEN METABOLIC PANEL: CPT | Performed by: INTERNAL MEDICINE

## 2023-03-24 PROCEDURE — 84100 ASSAY OF PHOSPHORUS: CPT | Performed by: INTERNAL MEDICINE

## 2023-03-24 PROCEDURE — C1751 CATH, INF, PER/CENT/MIDLINE: HCPCS

## 2023-03-24 PROCEDURE — 84484 ASSAY OF TROPONIN QUANT: CPT | Performed by: INTERNAL MEDICINE

## 2023-03-24 PROCEDURE — 36573 INSJ PICC RS&I 5 YR+: CPT

## 2023-03-24 PROCEDURE — 36410 VNPNXR 3YR/> PHY/QHP DX/THER: CPT

## 2023-03-24 PROCEDURE — 99285 EMERGENCY DEPT VISIT HI MDM: CPT | Mod: 25

## 2023-03-24 PROCEDURE — 63600175 PHARM REV CODE 636 W HCPCS: Performed by: STUDENT IN AN ORGANIZED HEALTH CARE EDUCATION/TRAINING PROGRAM

## 2023-03-24 PROCEDURE — 87116 MYCOBACTERIA CULTURE: CPT | Mod: 90 | Performed by: STUDENT IN AN ORGANIZED HEALTH CARE EDUCATION/TRAINING PROGRAM

## 2023-03-24 PROCEDURE — 25000003 PHARM REV CODE 250: Performed by: STUDENT IN AN ORGANIZED HEALTH CARE EDUCATION/TRAINING PROGRAM

## 2023-03-24 PROCEDURE — 96375 TX/PRO/DX INJ NEW DRUG ADDON: CPT

## 2023-03-24 PROCEDURE — 86777 TOXOPLASMA ANTIBODY: CPT | Mod: 90 | Performed by: STUDENT IN AN ORGANIZED HEALTH CARE EDUCATION/TRAINING PROGRAM

## 2023-03-24 PROCEDURE — 87206 SMEAR FLUORESCENT/ACID STAI: CPT | Mod: 90 | Performed by: STUDENT IN AN ORGANIZED HEALTH CARE EDUCATION/TRAINING PROGRAM

## 2023-03-24 PROCEDURE — 25000003 PHARM REV CODE 250: Performed by: INTERNAL MEDICINE

## 2023-03-24 PROCEDURE — 80307 DRUG TEST PRSMV CHEM ANLYZR: CPT | Performed by: STUDENT IN AN ORGANIZED HEALTH CARE EDUCATION/TRAINING PROGRAM

## 2023-03-24 PROCEDURE — 85025 COMPLETE CBC W/AUTO DIFF WBC: CPT | Performed by: INTERNAL MEDICINE

## 2023-03-24 PROCEDURE — 86778 TOXOPLASMA ANTIBODY IGM: CPT | Mod: 90 | Performed by: STUDENT IN AN ORGANIZED HEALTH CARE EDUCATION/TRAINING PROGRAM

## 2023-03-24 PROCEDURE — 93005 ELECTROCARDIOGRAM TRACING: CPT

## 2023-03-24 PROCEDURE — A4216 STERILE WATER/SALINE, 10 ML: HCPCS | Performed by: INTERNAL MEDICINE

## 2023-03-24 PROCEDURE — 96374 THER/PROPH/DIAG INJ IV PUSH: CPT

## 2023-03-24 RX ORDER — ATOVAQUONE 750 MG/5ML
1500 SUSPENSION ORAL DAILY
Status: DISCONTINUED | OUTPATIENT
Start: 2023-03-24 | End: 2023-03-26 | Stop reason: HOSPADM

## 2023-03-24 RX ORDER — SODIUM CHLORIDE 0.9 % (FLUSH) 0.9 %
10 SYRINGE (ML) INJECTION EVERY 6 HOURS
Status: DISCONTINUED | OUTPATIENT
Start: 2023-03-24 | End: 2023-03-26 | Stop reason: HOSPADM

## 2023-03-24 RX ORDER — FUROSEMIDE 10 MG/ML
20 INJECTION INTRAMUSCULAR; INTRAVENOUS
Status: COMPLETED | OUTPATIENT
Start: 2023-03-24 | End: 2023-03-24

## 2023-03-24 RX ORDER — DEXTROSE MONOHYDRATE 100 MG/ML
12.5 INJECTION, SOLUTION INTRAVENOUS
Status: DISCONTINUED | OUTPATIENT
Start: 2023-03-24 | End: 2023-03-26 | Stop reason: HOSPADM

## 2023-03-24 RX ORDER — ATORVASTATIN CALCIUM 40 MG/1
40 TABLET, FILM COATED ORAL NIGHTLY
Status: DISCONTINUED | OUTPATIENT
Start: 2023-03-24 | End: 2023-03-26 | Stop reason: HOSPADM

## 2023-03-24 RX ORDER — GLUCAGON 1 MG
1 KIT INJECTION
Status: DISCONTINUED | OUTPATIENT
Start: 2023-03-24 | End: 2023-03-26 | Stop reason: HOSPADM

## 2023-03-24 RX ORDER — FUROSEMIDE 10 MG/ML
80 INJECTION INTRAMUSCULAR; INTRAVENOUS ONCE
Status: COMPLETED | OUTPATIENT
Start: 2023-03-24 | End: 2023-03-24

## 2023-03-24 RX ORDER — POTASSIUM CHLORIDE 20 MEQ/1
40 TABLET, EXTENDED RELEASE ORAL ONCE
Status: COMPLETED | OUTPATIENT
Start: 2023-03-24 | End: 2023-03-24

## 2023-03-24 RX ORDER — DEXTROSE MONOHYDRATE 100 MG/ML
25 INJECTION, SOLUTION INTRAVENOUS
Status: DISCONTINUED | OUTPATIENT
Start: 2023-03-24 | End: 2023-03-26 | Stop reason: HOSPADM

## 2023-03-24 RX ORDER — SODIUM CHLORIDE 0.9 % (FLUSH) 0.9 %
10 SYRINGE (ML) INJECTION EVERY 12 HOURS PRN
Status: DISCONTINUED | OUTPATIENT
Start: 2023-03-24 | End: 2023-03-26 | Stop reason: HOSPADM

## 2023-03-24 RX ORDER — FUROSEMIDE 10 MG/ML
60 INJECTION INTRAMUSCULAR; INTRAVENOUS ONCE
Status: COMPLETED | OUTPATIENT
Start: 2023-03-24 | End: 2023-03-24

## 2023-03-24 RX ORDER — SODIUM CHLORIDE 0.9 % (FLUSH) 0.9 %
10 SYRINGE (ML) INJECTION
Status: DISCONTINUED | OUTPATIENT
Start: 2023-03-24 | End: 2023-03-26 | Stop reason: HOSPADM

## 2023-03-24 RX ORDER — NALOXONE HCL 0.4 MG/ML
0.02 VIAL (ML) INJECTION
Status: DISCONTINUED | OUTPATIENT
Start: 2023-03-24 | End: 2023-03-26 | Stop reason: HOSPADM

## 2023-03-24 RX ORDER — IBUPROFEN 200 MG
16 TABLET ORAL
Status: DISCONTINUED | OUTPATIENT
Start: 2023-03-24 | End: 2023-03-26 | Stop reason: HOSPADM

## 2023-03-24 RX ORDER — IBUPROFEN 200 MG
24 TABLET ORAL
Status: DISCONTINUED | OUTPATIENT
Start: 2023-03-24 | End: 2023-03-26 | Stop reason: HOSPADM

## 2023-03-24 RX ORDER — ASPIRIN 81 MG/1
81 TABLET ORAL DAILY
Status: DISCONTINUED | OUTPATIENT
Start: 2023-03-24 | End: 2023-03-26 | Stop reason: HOSPADM

## 2023-03-24 RX ORDER — ERGOCALCIFEROL 1.25 MG/1
50000 CAPSULE ORAL
Status: DISCONTINUED | OUTPATIENT
Start: 2023-03-24 | End: 2023-03-26 | Stop reason: HOSPADM

## 2023-03-24 RX ADMIN — BICTEGRAVIR SODIUM, EMTRICITABINE, AND TENOFOVIR ALAFENAMIDE FUMARATE 1 TABLET: 50; 200; 25 TABLET ORAL at 01:03

## 2023-03-24 RX ADMIN — APIXABAN 5 MG: 2.5 TABLET, FILM COATED ORAL at 09:03

## 2023-03-24 RX ADMIN — ERGOCALCIFEROL 50000 UNITS: 1.25 CAPSULE, LIQUID FILLED ORAL at 01:03

## 2023-03-24 RX ADMIN — ATOVAQUONE 1500 MG: 750 SUSPENSION ORAL at 01:03

## 2023-03-24 RX ADMIN — POTASSIUM CHLORIDE 40 MEQ: 1500 TABLET, EXTENDED RELEASE ORAL at 09:03

## 2023-03-24 RX ADMIN — ASPIRIN 81 MG: 81 TABLET, COATED ORAL at 12:03

## 2023-03-24 RX ADMIN — Medication 10 ML: at 11:03

## 2023-03-24 RX ADMIN — ATORVASTATIN CALCIUM 40 MG: 40 TABLET, FILM COATED ORAL at 09:03

## 2023-03-24 RX ADMIN — APIXABAN 5 MG: 2.5 TABLET, FILM COATED ORAL at 12:03

## 2023-03-24 RX ADMIN — FUROSEMIDE 20 MG: 10 INJECTION, SOLUTION INTRAMUSCULAR; INTRAVENOUS at 06:03

## 2023-03-24 RX ADMIN — FUROSEMIDE 80 MG: 10 INJECTION, SOLUTION INTRAMUSCULAR; INTRAVENOUS at 10:03

## 2023-03-24 RX ADMIN — FUROSEMIDE 60 MG: 10 INJECTION, SOLUTION INTRAMUSCULAR; INTRAVENOUS at 12:03

## 2023-03-24 NOTE — ED PROVIDER NOTES
ED US Guided Misc Procedure    Date/Time: 3/24/2023 12:06 PM  Performed by: Jarod Shoemaker MD  Authorized by: Jarod Shoemaker MD     Procedure:  Ultrasound-guided peripheral venous cannulation  Indication:  Failed or difficult IV access   Left   Antecubital  Procedure:  Dynamic ultrasound guidance used, Candidate vein examined with linear probe - confirmed collapsibility, lack of pulsatility, and proper anatomic location. and Using aseptic technique, IV catheter inserted with flash of blood noted, flow of venous blood confirmed.  Catheter gauge:  20   Flushes easily and without pain. Patient tolerated procedure well.  Complications:  None  Charge?:  Yes       Jarod Shoemaker MD  03/24/23 1206       Jarod Shoemaker MD  03/24/23 1200

## 2023-03-24 NOTE — PROCEDURES
"Jason Perdue is a 62 y.o. male patient.    Temp: 97.5 °F (36.4 °C) (03/24/23 1423)  Pulse: 81 (03/24/23 1510)  Resp: 20 (03/24/23 1510)  BP: 128/78 (03/24/23 1510)  SpO2: 95 % (03/24/23 1510)  Weight: 101.2 kg (223 lb) (03/24/23 1423)  Height: 5' 11" (180.3 cm) (03/24/23 1423)    PICC  Date/Time: 3/24/2023 4:37 PM  Performed by: Marco A Sheffield RN  Consent Done: Yes  Time out: Immediately prior to procedure a time out was called to verify the correct patient, procedure, equipment, support staff and site/side marked as required  Indications: med administration  Anesthesia: local infiltration  Local anesthetic: lidocaine 1% without epinephrine  Anesthetic Total (mL): 4  Preparation: skin prepped with ChloraPrep  Skin prep agent dried: skin prep agent completely dried prior to procedure  Sterile barriers: all five maximum sterile barriers used - cap, mask, sterile gown, sterile gloves, and large sterile sheet  Hand hygiene: hand hygiene performed prior to central venous catheter insertion  Location details: right brachial  Catheter type: single lumen  Catheter size: 4 Fr  Catheter Length: 15cm    Ultrasound guidance: yes  Vessel Caliber: medium and patentNeedle advanced into vessel with real time Ultrasound guidance.  Guidewire confirmed in vessel.  Sterile sheath used.  Number of attempts: 1          Marco A Sheffield Rn  3/24/2023    "

## 2023-03-24 NOTE — ED PROVIDER NOTES
"Encounter Date: 3/24/2023       History     Chief Complaint   Patient presents with    Shortness of Breath    Edema     SOB and BLE edema      Presents with bilateral lower leg swelling and shortness of breath. States Hx of CHF, recently has medication changes. Denies fever, vomiting, chest pain or hemoptysis    The history is provided by the patient.   Review of patient's allergies indicates:   Allergen Reactions    Ace inhibitors      Other reaction(s): Angioedema    Nitroglycerin Itching     Past Medical History:   Diagnosis Date    Cancer     CHF (congestive heart failure)     Human immunodeficiency virus (HIV) disease     Hypertension      No past surgical history on file.  No family history on file.  Social History     Tobacco Use    Smoking status: Every Day     Packs/day: 0.50     Types: Cigarettes    Smokeless tobacco: Never   Substance Use Topics    Alcohol use: Not Currently    Drug use: Yes     Types: "Crack" cocaine     Comment: last taken 1 month and a half     Review of Systems   Constitutional:  Negative for fever.   HENT:  Negative for sore throat.    Respiratory:  Positive for cough and shortness of breath.    Cardiovascular:  Positive for leg swelling. Negative for chest pain.   Gastrointestinal:  Negative for nausea.   Genitourinary:  Negative for dysuria.   Musculoskeletal:  Negative for back pain.   Skin:  Negative for rash.   Neurological:  Negative for weakness.   Hematological:  Does not bruise/bleed easily.   All other systems reviewed and are negative.    Physical Exam     Initial Vitals [03/24/23 0453]   BP Pulse Resp Temp SpO2   (!) 85/67 82 20 96.8 °F (36 °C) 100 %      MAP       --         Physical Exam    Nursing note and vitals reviewed.  Constitutional: He appears well-developed and well-nourished. He appears distressed (Mild, tachypneic).   HENT:   Head: Normocephalic and atraumatic.   Mouth/Throat: Oropharynx is clear and moist.   Eyes: Conjunctivae are normal. Pupils are equal, " round, and reactive to light.   Neck: Neck supple. No thyromegaly present. No tracheal deviation present. JVD present.   Normal range of motion.  Cardiovascular:  Regular rhythm, normal heart sounds and intact distal pulses.           Pulmonary/Chest: No stridor. No respiratory distress. He has rales (Rt side).   Abdominal: Abdomen is soft. Bowel sounds are normal. He exhibits no distension. There is no abdominal tenderness. There is no rebound and no guarding.   Musculoskeletal:         General: Tenderness and edema (+ 2 pitting B/L legs) present. Normal range of motion.      Cervical back: Normal range of motion and neck supple.     Neurological: He is alert and oriented to person, place, and time. He has normal strength. GCS score is 15. GCS eye subscore is 4. GCS verbal subscore is 5. GCS motor subscore is 6.   Skin: Skin is warm and dry. No rash noted.   Psychiatric: His behavior is normal.       ED Course   Critical Care    Date/Time: 3/24/2023 6:51 AM  Performed by: Ibrahima Wright MD  Authorized by: Ibrahima Wright MD   Direct patient critical care time: 10 minutes  Additional history critical care time: 5 minutes  Ordering / reviewing critical care time: 12 minutes  Documentation critical care time: 8 minutes  Consulting other physicians critical care time: 5 minutes  Total critical care time (exclusive of procedural time) : 40 minutes  Critical care was necessary to treat or prevent imminent or life-threatening deterioration of the following conditions: cardiac failure.  Critical care was time spent personally by me on the following activities: discussions with consultants, development of treatment plan with patient or surrogate, interpretation of cardiac output measurements, evaluation of patient's response to treatment, examination of patient, obtaining history from patient or surrogate, ordering and performing treatments and interventions, ordering and review of laboratory  studies, pulse oximetry, re-evaluation of patient's condition, ordering and review of radiographic studies and review of old charts.      Labs Reviewed   COMPREHENSIVE METABOLIC PANEL - Abnormal; Notable for the following components:       Result Value    Carbon Dioxide 21 (*)     Glucose Level 137 (*)     Blood Urea Nitrogen 37.7 (*)     Creatinine 1.67 (*)     Protein Total 7.8 (*)     Albumin Level 3.0 (*)     Globulin 4.8 (*)     Albumin/Globulin Ratio 0.6 (*)     Aspartate Aminotransferase 38 (*)     All other components within normal limits   B-TYPE NATRIURETIC PEPTIDE - Abnormal; Notable for the following components:    Natriuretic Peptide 8,996.4 (*)     All other components within normal limits   CBC WITH DIFFERENTIAL - Abnormal; Notable for the following components:    RBC 3.96 (*)     Hgb 10.9 (*)     Hct 33.8 (*)     MCHC 32.2 (*)     MPV 11.6 (*)     All other components within normal limits   TROPONIN I - Normal   MAGNESIUM - Normal   PHOSPHORUS - Normal   CBC W/ AUTO DIFFERENTIAL    Narrative:     The following orders were created for panel order CBC auto differential.  Procedure                               Abnormality         Status                     ---------                               -----------         ------                     CBC with Differential[[id number]]        Abnormal            Final result                 Please view results for these tests on the individual orders.   EXTRA TUBES    Narrative:     The following orders were created for panel order EXTRA TUBES.  Procedure                               Abnormality         Status                     ---------                               -----------         ------                     Light Blue Top Hold[[id number]]                              In process                 Red Top Hold[[id number]]                                     In process                   Please view results for these tests on the individual orders.   LIGHT BLUE  TOP HOLD   RED TOP HOLD     EKG Readings: (Independently Interpreted)   Initial Reading: No STEMI. Rhythm: Normal Sinus Rhythm. Heart Rate: 76. Ectopy: No Ectopy. Conduction: Normal. ST Segments: Normal ST Segments. T Waves: Normal. Axis: Indeterminate. Q Waves: II, AVF, V1 and V2. Clinical Impression: Normal Sinus Rhythm Other Impression: New inferior Q waves as compared to prior   ECG Results              EKG 12-lead (In process)  Result time 03/24/23 06:48:54      In process by Interface, Lab In [facility] (03/24/23 06:48:54)                   Narrative:    Test Reason : R06.02,    Vent. Rate : 077 BPM     Atrial Rate : 077 BPM     P-R Int : 204 ms          QRS Dur : 110 ms      QT Int : 438 ms       P-R-T Axes : 076 251 045 degrees     QTc Int : 495 ms    Normal sinus rhythm  Inferior infarct ,age undetermined  Anterolateral infarct (cited on or before 11-SEP-2022)  Abnormal ECG  When compared with ECG of 15-MAR-2023 08:57,  Left posterior fascicular block is no longer Present  Inferior infarct is now Present  Questionable change in initial forces of Lateral leads    Referred By: [email or web address]   SELF           Confirmed By:                                   Imaging Results              X-Ray Chest 1 View (Final result)  Result time 03/24/23 06:09:03      Final result by Nae Burden MD (03/24/23 06:09:03)                   Impression:      Trace right pleural effusions with findings suggestive of mild pulmonary edema.      Electronically signed by: Nae Burden  Date:    03/24/2023  Time:    06:09               Narrative:    EXAMINATION:  XR CHEST 1 VIEW    CLINICAL HISTORY:  shortness of breath;    TECHNIQUE:  AP chest    COMPARISON:  Chest x-ray dated 03/13/2023    FINDINGS:  Right chest wall port catheter remains in place.  There is stable cardiomegaly.  There is trace right pleural effusion.  There are increased interstitial markings without focal consolidation.  There is no visible pneumothorax                                     X-Rays:   Independently Interpreted Readings:   Chest X-Ray: Cardiomegaly present.  Increased vascular markings consistent with CHF are present.   Medications   furosemide injection 20 mg (20 mg Intravenous Given 3/24/23 0643)     Medical Decision Making:   Differential Diagnosis:   Pneumonia, Asthma exacerbation, COPD exacerbation, Pericardial Effusion, Hear Failure, Pulmonary Emboli, Pleural effusion, malignancy, among others    Independently Interpreted Test(s):   I have ordered and independently interpreted X-rays - see prior notes.  I have ordered and independently interpreted EKG Reading(s) - see prior notes  Clinical Tests:   Lab Tests: Ordered  Radiological Study: Ordered  Medical Tests: Ordered                        Clinical Impression:   Final diagnoses:  [R06.02] Shortness of breath  [I50.9] Acute on chronic congestive heart failure, unspecified heart failure type (Primary)  [N17.9] HOLLIS (acute kidney injury)        ED Disposition Condition    Observation Stable                Ibrahima Wright MD  03/24/23 0652

## 2023-03-24 NOTE — H&P
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  Patient stated that he was not able to ambulate due to shortness of breath during the last couple of days.  He stated that he was noncompliant before with his medications but has been taking his medicines since his last discharge on 03/13/2023 where he was restarted on Entresto, Coreg.  He is compliant with his HIV medicines.  Patient stated that he smokes half a pack daily since he was 19 and takes cocaine.  His last cocaine use was 2 weeks ago.  Patient also has history of heavy alcohol use in the past but denies any alcohol use now.  Patient denies any orthopnea, proximal nocturnal dyspnea, chest pain, fever, chills, nausea, vomiting, abdominal pain, weakness, dizziness, lightheadedness, hematuria, hematuria, dysuria at this time.    The ED labs significant for a BNP of 8000, creatinine of 1.6 with chest x-ray showing pulmonary edema.  Received 20 units of Lasix.  Internal Medicine consulted for CHF exacerbation.    Past Medical History:  Past Medical History:   Diagnosis Date    Cancer     CHF (congestive heart failure)     Human immunodeficiency virus (HIV) disease     Hypertension        Past Surgical History:  No past surgical history on file.    Family History:  No family history on file.    Social  "History:  Social History     Tobacco Use    Smoking status: Every Day     Packs/day: 0.50     Types: Cigarettes    Smokeless tobacco: Never   Substance Use Topics    Alcohol use: Not Currently    Drug use: Yes     Types: "Crack" cocaine     Comment: last taken 1 month and a half       Allergies:  Review of patient's allergies indicates:   Allergen Reactions    Ace inhibitors      Other reaction(s): Angioedema    Nitroglycerin Itching       Home Medications:  Prior to Admission medications    Medication Sig Start Date End Date Taking? Authorizing Provider   apixaban (ELIQUIS) 5 mg Tab Take 1 tablet (5 mg total) by mouth 2 (two) times daily. 3/15/23 3/14/24  Darius Zhou MD   aspirin (ECOTRIN) 81 MG EC tablet Take 1 tablet (81 mg total) by mouth once daily. 1/27/23 1/27/24  David Posey DO   atorvastatin (LIPITOR) 40 MG tablet Take 1 tablet (40 mg total) by mouth every evening. 1/26/23 1/26/24  David Posey DO   atovaquone (MEPRON) 750 mg/5 mL Susp oral liquid Take 10 mLs (1,500 mg total) by mouth once daily. 3/23/23   SAVAGE Dockery   ouuottgpr-iqfpalke-xxrqnkn ala (BIKTARVY) -25 mg (25 kg or greater) Take 1 tablet by mouth once daily. 3/23/23   SAVGAE Dockery   carvediloL (COREG) 12.5 MG tablet Take 1 tablet (12.5 mg total) by mouth 2 (two) times daily. 1/26/23 1/26/24  David Posey DO   famotidine (PEPCID) 20 MG tablet Take 1 tablet (20 mg total) by mouth 2 (two) times daily. 9/11/22 10/11/22  Zana Tatum MD   food supplemt, lactose-reduced Liqd Take 1 Bottle by mouth.    Historical Provider   furosemide (LASIX) 20 MG tablet Take 3 tablets (60 mg total) by mouth once daily. 1/26/23 1/26/24  David Posey DO   megestroL (MEGACE) 400 mg/10 mL (40 mg/mL) Susp Take 200 mg by mouth.    Historical Provider   polyethylene glycol 3350,bulk, Powd by Other route.    Historical Provider   sacubitriL-valsartan (ENTRESTO) 24-26 mg per tablet Take 1 tablet by mouth 2 (two) times daily. 3/15/23 " "3/14/24  Darius Zhou MD   VITAMIN D2 1,250 mcg (50,000 unit) capsule Take 1 capsule (50,000 Units total) by mouth every 7 days. 3/23/23   SAVAGE Dockery         Review of Systems:  Constitutional: no fever/chills  EENT: no sore throat, ear pain, sinus pain/congestion, nasal congestion/drainage  Respiratory: no cough, no wheezing, +shortness of breath on exertion   Cardiovascular: no chest pain, no palpitations, +bl leg  edema  Gastrointestinal: no nausea, vomiting, or diarrhea. No abdominal pain  Genitourinary: no dysuria, no urinary frequency or urgency, no hematuria  Integumentary: no skin rash or abnormal lesion  Neurologic: no headache, no dizziness, no weakness or numbness          Objective:   Last 24 Hour Vital Signs:  BP  Min: 85/67  Max: 116/90  Temp  Av.8 °F (36 °C)  Min: 96.8 °F (36 °C)  Max: 96.8 °F (36 °C)  Pulse  Av.1  Min: 72  Max: 82  Resp  Av.4  Min: 10  Max: 21  SpO2  Av %  Min: 98 %  Max: 100 %  Height  Av' 11" (180.3 cm)  Min: 5' 11" (180.3 cm)  Max: 5' 11" (180.3 cm)  Weight  Av.2 kg (223 lb)  Min: 101.2 kg (223 lb)  Max: 101.2 kg (223 lb)  Body mass index is 31.1 kg/m².  No intake/output data recorded.    Physical Examination:  General: Nontoxic-appearing  man lying in bed in no acute distress  Eye: PERRL, EOMI  HENT: Normocephalic, atraumatic, moist mucous membranes  Neck: Supple, nontender,+JVD bl   Respiratory: Clear to auscultation bilaterally, no wheezes, rales, or rhonchi. Normal work of breathing  Cardiovascular: Regular rate and rhythm, systolic murmur present, rubs, or gallops. Bl peripheral edema, non pitting  2+ radial pulses  Gastrointestinal: Soft, nontender, nondistended abdomen   Musculoskeletal: Moves all extremities purposefully. No gross deformities. No calf tenderness  Integumentary: Warm, dry, intact. No rashes  Neurologic: Alert and oriented x3. Normal speech. No gross deficits  Psychiatric: Appropriate mood and affect       "     Laboratory:  Most Recent Data:  CBC:   Lab Results   Component Value Date    WBC 6.9 03/24/2023    HGB 10.9 (L) 03/24/2023    HCT 33.8 (L) 03/24/2023     03/24/2023    MCV 85.4 03/24/2023    RDW 16.1 03/24/2023     WBC Differential:   Recent Labs   Lab 03/24/23  0516   WBC 6.9   HGB 10.9*   HCT 33.8*      MCV 85.4     BMP:   Lab Results   Component Value Date     03/24/2023    K 4.0 03/24/2023    CO2 21 (L) 03/24/2023    BUN 37.7 (H) 03/24/2023    CREATININE 1.67 (H) 03/24/2023    CALCIUM 9.2 03/24/2023    MG 2.00 03/24/2023    PHOS 4.1 03/24/2023     LFTs:   Lab Results   Component Value Date    ALBUMIN 3.0 (L) 03/24/2023    BILITOT 1.2 03/24/2023    AST 38 (H) 03/24/2023    ALKPHOS 96 03/24/2023    ALT 16 03/24/2023     Coags:   Lab Results   Component Value Date    INR 1.19 12/26/2018    PROTIME 15.0 (H) 12/26/2018    PTT 34.9 12/26/2018     FLP:   Lab Results   Component Value Date    CHOL 168 01/24/2023    HDL 28 (L) 01/24/2023    TRIG 101 01/24/2023     DM:   Lab Results   Component Value Date    CREATININE 1.67 (H) 03/24/2023     Thyroid:   Lab Results   Component Value Date    TSH 2.893 03/13/2023     Anemia:   Lab Results   Component Value Date    IRON 25 (L) 12/26/2018    TIBC 359 12/26/2018    FERRITIN 60.8 12/26/2018     Cardiac:   Lab Results   Component Value Date    TROPONINI 0.045 03/24/2023    BNP 8,996.4 (H) 03/24/2023     Urinalysis:   Lab Results   Component Value Date    COLORU LIGHT YELLOW 12/26/2018    PHUA 7.0 09/11/2022    NITRITE Negative 12/26/2018    KETONESU Negative 12/26/2018    UROBILINOGEN 2+ (A) 09/11/2022    WBCUA 0-5 09/11/2022       Trended Lab Data:  Recent Labs   Lab 03/23/23  0759 03/24/23  0516   WBC  --  6.9   HGB  --  10.9*   HCT  --  33.8*   PLT  --  169   MCV  --  85.4   RDW  --  16.1    136   K 4.1 4.0   CO2 23 21*   BUN 33.4* 37.7*   CREATININE 1.46* 1.67*   ALBUMIN  --  3.0*   BILITOT  --  1.2   AST  --  38*   ALKPHOS  --  96   ALT  --  16        Trended Cardiac Data:  Recent Labs   Lab 03/24/23  0516   TROPONINI 0.045   BNP 8,996.4*       Radiology:  Imaging Results              X-Ray Chest 1 View (Final result)  Result time 03/24/23 06:09:03      Final result by Nae Burden MD (03/24/23 06:09:03)                   Impression:      Trace right pleural effusions with findings suggestive of mild pulmonary edema.      Electronically signed by: Nae Burden  Date:    03/24/2023  Time:    06:09               Narrative:    EXAMINATION:  XR CHEST 1 VIEW    CLINICAL HISTORY:  shortness of breath;    TECHNIQUE:  AP chest    COMPARISON:  Chest x-ray dated 03/13/2023    FINDINGS:  Right chest wall port catheter remains in place.  There is stable cardiomegaly.  There is trace right pleural effusion.  There are increased interstitial markings without focal consolidation.  There is no visible pneumothorax                                           Assessment & Plan:   HFrEF  CAD; Hx of NSTEMI s/p OM1 Thrombectomy + GERDA x2 in 2007  HFrEF (EF 10%)  NICM  HOLLIS likely prerenal   Severe Aortic Stenosis  - Patient endorsing SOB, dry cough, LOVING and leg swelling   - BNP  in 8000s worse than prior   - Troponin  of 0.045; Denies chest pain   - Received Lasix 20 mg IV in ED, will give another 60 units of lasix today and repeat BMP at 4pm   - Continue home meds: ASA 81 mg, Lipitor 40 mg daily  -will hold Entresto at this time give HOLLIS and soft BP  - Holding coreg given pt is a cocaine user; will check UDS and will restart if neg for cocaine   - DELLA 1/25/23 revealed EF 10%, no evidence of myocardial ischemia  - TTE 1/24/23 revealed Grade II LVDD, severe LV global hypokineses, mild RV enlargement, mod LA and RA enlargement, severe AS, mod MR, mild-mod TR and mod Pulm HTN (PA systolic 57 mmHg)  - Monitor electrolytes while on diuretics      Paroxysmal Atrial Fibrillation  - Currently stable  - continue Eliquis 5 mg BID  - Holding Coreg 12.5 mg BID    HIV  Chronic  hepatitis-B  - CD4 count 234 1/24/23; new CD4 count on 3/14/23 of 55, new VL pending; ordered toxoplasmosis antibody   - hepatitis-B virus DNA count of [id number] 3/23/23  - Continue Biktarvy -25 mg daily  -continue atovaquone daily for PCP prophylaxis  -QuantiFERON gold TB pending  -Follow with ID clinic; last seen 03/23/2023  -ultrasound abdomen and fibrous  scan ordered by the ID clinic to outpatient      Hx of Large B Cell Lymphoma  - Completed chemotherapy 5/2018; No need of routine surveillance imaging studies; Patient should be following up with oncologist (supposed to be in August 2022, no clinic visit notes since 5/25/22)  - Colonoscopy when he was hospitalized with bleeding lymphomatous duodenal ulcer in January 2018, unremarkable.     Hx of Cocaine Abuse  - Patient endorsing complete cessation of crack cocaine  - Last done 2 weeks ago reported by patient  - UDS pending       CODE STATUS:full  Access: piv  Antibiotics: None   Diet: cardiac   DVT Prophylaxis: eliquis 5 bid   GI Prophylaxis: None   Fluids: None       Disposition:  Admission for heart failure exacerbation          Lupillo Burnham DO  [employer] [facility] HO-1

## 2023-03-25 LAB
ALBUMIN SERPL-MCNC: 3 G/DL (ref 3.4–4.8)
ALBUMIN/GLOB SERPL: 0.6 RATIO (ref 1.1–2)
ALP SERPL-CCNC: 114 UNIT/L (ref 40–150)
ALT SERPL-CCNC: 16 UNIT/L (ref 0–55)
ANION GAP SERPL CALC-SCNC: 8 MEQ/L
AST SERPL-CCNC: 39 UNIT/L (ref 5–34)
BASOPHILS # BLD AUTO: 0.02 X10(3)/MCL (ref 0–0.2)
BASOPHILS NFR BLD AUTO: 0.3 %
BILIRUBIN DIRECT+TOT PNL SERPL-MCNC: 1.2 MG/DL
BUN SERPL-MCNC: 36.6 MG/DL (ref 8.4–25.7)
BUN SERPL-MCNC: 37 MG/DL (ref 8.4–25.7)
CALCIUM SERPL-MCNC: 9.3 MG/DL (ref 8.8–10)
CALCIUM SERPL-MCNC: 9.5 MG/DL (ref 8.8–10)
CHLORIDE SERPL-SCNC: 106 MMOL/L (ref 98–107)
CHLORIDE SERPL-SCNC: 107 MMOL/L (ref 98–107)
CO2 SERPL-SCNC: 21 MMOL/L (ref 23–31)
CO2 SERPL-SCNC: 23 MMOL/L (ref 23–31)
CREAT SERPL-MCNC: 1.41 MG/DL (ref 0.73–1.18)
CREAT SERPL-MCNC: 1.51 MG/DL (ref 0.73–1.18)
CREAT/UREA NIT SERPL: 26
EOSINOPHIL # BLD AUTO: 0.04 X10(3)/MCL (ref 0–0.9)
EOSINOPHIL NFR BLD AUTO: 0.7 %
ERYTHROCYTE [DISTWIDTH] IN BLOOD BY AUTOMATED COUNT: 16.2 % (ref 11.5–17)
GFR SERPLBLD CREATININE-BSD FMLA CKD-EPI: 52 MLS/MIN/1.73/M2
GFR SERPLBLD CREATININE-BSD FMLA CKD-EPI: 57 MLS/MIN/1.73/M2
GLOBULIN SER-MCNC: 4.8 GM/DL (ref 2.4–3.5)
GLUCOSE SERPL-MCNC: 141 MG/DL (ref 82–115)
GLUCOSE SERPL-MCNC: 97 MG/DL (ref 82–115)
HCT VFR BLD AUTO: 35.9 % (ref 42–52)
HGB BLD-MCNC: 11.7 G/DL (ref 14–18)
HIV1 RNA # PLAS NAA DL=20: 258 COPIES/ML
IMM GRANULOCYTES # BLD AUTO: 0.01 X10(3)/MCL (ref 0–0.04)
IMM GRANULOCYTES NFR BLD AUTO: 0.2 %
LYMPHOCYTES # BLD AUTO: 1.4 X10(3)/MCL (ref 0.6–4.6)
LYMPHOCYTES NFR BLD AUTO: 23.5 %
MAGNESIUM SERPL-MCNC: 1.9 MG/DL (ref 1.6–2.6)
MAGNESIUM SERPL-MCNC: 2.3 MG/DL (ref 1.6–2.6)
MCH RBC QN AUTO: 27.5 PG (ref 27–31)
MCHC RBC AUTO-ENTMCNC: 32.6 G/DL (ref 33–36)
MCV RBC AUTO: 84.5 FL (ref 80–94)
MONOCYTES # BLD AUTO: 0.97 X10(3)/MCL (ref 0.1–1.3)
MONOCYTES NFR BLD AUTO: 16.3 %
MTB AND RIF RESISTANCE PNL ISLT/SPM: NOT DETECTED
NEUTROPHILS # BLD AUTO: 3.52 X10(3)/MCL (ref 2.1–9.2)
NEUTROPHILS NFR BLD AUTO: 59 %
NRBC BLD AUTO-RTO: 0 %
PHOSPHATE SERPL-MCNC: 4.2 MG/DL (ref 2.3–4.7)
PLATELET # BLD AUTO: 167 X10(3)/MCL (ref 130–400)
PMV BLD AUTO: 11 FL (ref 7.4–10.4)
POTASSIUM SERPL-SCNC: 3.7 MMOL/L (ref 3.5–5.1)
POTASSIUM SERPL-SCNC: 4 MMOL/L (ref 3.5–5.1)
PROT SERPL-MCNC: 7.8 GM/DL (ref 5.8–7.6)
RBC # BLD AUTO: 4.25 X10(6)/MCL (ref 4.7–6.1)
SODIUM SERPL-SCNC: 138 MMOL/L (ref 136–145)
SODIUM SERPL-SCNC: 138 MMOL/L (ref 136–145)
WBC # SPEC AUTO: 6 X10(3)/MCL (ref 4.5–11.5)

## 2023-03-25 PROCEDURE — 25000003 PHARM REV CODE 250

## 2023-03-25 PROCEDURE — 85025 COMPLETE CBC W/AUTO DIFF WBC: CPT | Performed by: STUDENT IN AN ORGANIZED HEALTH CARE EDUCATION/TRAINING PROGRAM

## 2023-03-25 PROCEDURE — 96365 THER/PROPH/DIAG IV INF INIT: CPT

## 2023-03-25 PROCEDURE — 94761 N-INVAS EAR/PLS OXIMETRY MLT: CPT

## 2023-03-25 PROCEDURE — 97162 PT EVAL MOD COMPLEX 30 MIN: CPT

## 2023-03-25 PROCEDURE — 80053 COMPREHEN METABOLIC PANEL: CPT | Performed by: STUDENT IN AN ORGANIZED HEALTH CARE EDUCATION/TRAINING PROGRAM

## 2023-03-25 PROCEDURE — 84100 ASSAY OF PHOSPHORUS: CPT | Performed by: STUDENT IN AN ORGANIZED HEALTH CARE EDUCATION/TRAINING PROGRAM

## 2023-03-25 PROCEDURE — 96375 TX/PRO/DX INJ NEW DRUG ADDON: CPT

## 2023-03-25 PROCEDURE — 96376 TX/PRO/DX INJ SAME DRUG ADON: CPT

## 2023-03-25 PROCEDURE — 25000003 PHARM REV CODE 250: Performed by: STUDENT IN AN ORGANIZED HEALTH CARE EDUCATION/TRAINING PROGRAM

## 2023-03-25 PROCEDURE — 25000003 PHARM REV CODE 250: Performed by: INTERNAL MEDICINE

## 2023-03-25 PROCEDURE — 63600175 PHARM REV CODE 636 W HCPCS: Performed by: STUDENT IN AN ORGANIZED HEALTH CARE EDUCATION/TRAINING PROGRAM

## 2023-03-25 PROCEDURE — 83735 ASSAY OF MAGNESIUM: CPT | Performed by: STUDENT IN AN ORGANIZED HEALTH CARE EDUCATION/TRAINING PROGRAM

## 2023-03-25 PROCEDURE — A4216 STERILE WATER/SALINE, 10 ML: HCPCS | Performed by: INTERNAL MEDICINE

## 2023-03-25 PROCEDURE — 83735 ASSAY OF MAGNESIUM: CPT | Mod: 91 | Performed by: STUDENT IN AN ORGANIZED HEALTH CARE EDUCATION/TRAINING PROGRAM

## 2023-03-25 PROCEDURE — G0378 HOSPITAL OBSERVATION PER HR: HCPCS

## 2023-03-25 RX ORDER — ACETAMINOPHEN 500 MG
1000 TABLET ORAL ONCE
Status: COMPLETED | OUTPATIENT
Start: 2023-03-25 | End: 2023-03-25

## 2023-03-25 RX ORDER — BUMETANIDE 0.25 MG/ML
2 INJECTION INTRAMUSCULAR; INTRAVENOUS ONCE
Status: COMPLETED | OUTPATIENT
Start: 2023-03-25 | End: 2023-03-25

## 2023-03-25 RX ORDER — METOLAZONE 2.5 MG/1
5 TABLET ORAL DAILY
Status: DISCONTINUED | OUTPATIENT
Start: 2023-03-25 | End: 2023-03-25

## 2023-03-25 RX ORDER — METOLAZONE 2.5 MG/1
5 TABLET ORAL ONCE
Status: COMPLETED | OUTPATIENT
Start: 2023-03-25 | End: 2023-03-25

## 2023-03-25 RX ORDER — MAGNESIUM SULFATE 1 G/100ML
1 INJECTION INTRAVENOUS ONCE
Status: COMPLETED | OUTPATIENT
Start: 2023-03-25 | End: 2023-03-25

## 2023-03-25 RX ADMIN — ATOVAQUONE 1500 MG: 750 SUSPENSION ORAL at 09:03

## 2023-03-25 RX ADMIN — ASPIRIN 81 MG: 81 TABLET, COATED ORAL at 09:03

## 2023-03-25 RX ADMIN — BICTEGRAVIR SODIUM, EMTRICITABINE, AND TENOFOVIR ALAFENAMIDE FUMARATE 1 TABLET: 50; 200; 25 TABLET ORAL at 09:03

## 2023-03-25 RX ADMIN — MAGNESIUM SULFATE HEPTAHYDRATE 1 G: 1 INJECTION, SOLUTION INTRAVENOUS at 11:03

## 2023-03-25 RX ADMIN — BUMETANIDE 2 MG: 0.25 INJECTION INTRAMUSCULAR; INTRAVENOUS at 09:03

## 2023-03-25 RX ADMIN — Medication 10 ML: at 06:03

## 2023-03-25 RX ADMIN — APIXABAN 5 MG: 2.5 TABLET, FILM COATED ORAL at 09:03

## 2023-03-25 RX ADMIN — Medication 10 ML: at 11:03

## 2023-03-25 RX ADMIN — ATORVASTATIN CALCIUM 40 MG: 40 TABLET, FILM COATED ORAL at 09:03

## 2023-03-25 RX ADMIN — ACETAMINOPHEN 1000 MG: 500 TABLET ORAL at 12:03

## 2023-03-25 RX ADMIN — METOLAZONE 5 MG: 2.5 TABLET ORAL at 09:03

## 2023-03-25 NOTE — PLAN OF CARE
03/25/23 1057   Discharge Assessment   Assessment Type Discharge Planning Assessment   Confirmed/corrected address, phone number and insurance Yes   Confirmed Demographics Correct on Facesheet   Source of Information health record   When was your last doctors appointment?   (PCP: Earl Aguilar)   Communicated VIDAL with patient/caregiver Date not available/Unable to determine   Reason For Admission CHF, HOLLIS   People in Home alone   Facility Arrived From: Home   Do you expect to return to your current living situation? Yes   Do you have help at home or someone to help you manage your care at home? No   Prior to hospitilization cognitive status: Alert/Oriented;No Deficits   Current cognitive status: Alert/Oriented;No Deficits   Home Accessibility wheelchair accessible   Home Layout Able to live on 1st floor   Equipment Currently Used at Home none   Readmission within 30 days? Yes   Patient currently being followed by outpatient case management? No   Do you currently have service(s) that help you manage your care at home? No   Do you take prescription medications? Yes  [facility] [place]   Do you have prescription coverage? Yes   Coverage [employer]   Do you have any problems affording any of your prescribed medications? No   Is the patient taking medications as prescribed? yes   Who is going to help you get home at discharge? Health plan transportation   How do you get to doctors appointments? health plan transportation   Are you on dialysis? No   Do you take coumadin? No   Discharge Plan A Home   DME Needed Upon Discharge    (TBD)   Discharge Plan discussed with: Patient   Discharge Barriers Identified No family/friends to help;Substance Abuse;Transportation     Will follow for DC planning needs.

## 2023-03-25 NOTE — PLAN OF CARE
Problem: Adult Inpatient Plan of Care  Goal: Plan of Care Review  3/25/2023 0611 by Renetta South LPN  Outcome: Ongoing, Progressing  3/25/2023 0611 by Renetta South LPN  Outcome: Ongoing, Progressing  Goal: Patient-Specific Goal (Individualized)  3/25/2023 0611 by Renetta South LPN  Outcome: Ongoing, Progressing  3/25/2023 0611 by Renetta South LPN  Outcome: Ongoing, Progressing  Goal: Absence of Hospital-Acquired Illness or Injury  3/25/2023 0611 by Renetta South LPN  Outcome: Ongoing, Progressing  3/25/2023 0611 by Renetta South LPN  Outcome: Ongoing, Progressing  Goal: Optimal Comfort and Wellbeing  3/25/2023 0611 by Renetta South LPN  Outcome: Ongoing, Progressing  3/25/2023 0611 by Renetta South LPN  Outcome: Ongoing, Progressing  Goal: Readiness for Transition of Care  3/25/2023 0611 by Renetta South LPN  Outcome: Ongoing, Progressing  3/25/2023 0611 by Renetta South LPN  Outcome: Ongoing, Progressing     Problem: Fluid Imbalance (Pneumonia)  Goal: Fluid Balance  3/25/2023 0611 by Renetta South LPN  Outcome: Ongoing, Progressing  3/25/2023 0611 by Renetta South LPN  Outcome: Ongoing, Progressing     Problem: Infection (Pneumonia)  Goal: Resolution of Infection Signs and Symptoms  3/25/2023 0611 by Renetta South LPN  Outcome: Ongoing, Progressing  3/25/2023 0611 by Renetta South LPN  Outcome: Ongoing, Progressing     Problem: Respiratory Compromise (Pneumonia)  Goal: Effective Oxygenation and Ventilation  3/25/2023 0611 by Renetta South LPN  Outcome: Ongoing, Progressing  3/25/2023 0611 by Renetta South LPN  Outcome: Ongoing, Progressing     Problem: Infection  Goal: Absence of Infection Signs and Symptoms  3/25/2023 0611 by Renetta South LPN  Outcome: Ongoing, Progressing  3/25/2023 0611 by Renetta South LPN  Outcome: Ongoing, Progressing

## 2023-03-25 NOTE — PT/OT/SLP EVAL
Physical Therapy Evaluation    Patient Name:  Jason Perdue   MRN:  [id number]    Recommendations:     Discharge Recommendations: home   Discharge Equipment Recommendations: walker, rolling   Barriers to discharge:  severity of deficits    Assessment:     Jason Perdue is a 62 y.o. male admitted with a medical diagnosis of <principal problem not specified>.  He presents with the following impairments/functional limitations: weakness, impaired endurance, impaired functional mobility, gait instability, edema, impaired cardiopulmonary response to activity.    Rehab Prognosis: Good; patient would benefit from acute skilled PT services to address these deficits and reach maximum level of function.    Recent Surgery: * No surgery found *      Plan:     During this hospitalization, patient to be seen  (3-5x/wk) to address the identified rehab impairments via gait training, therapeutic activities, therapeutic exercises and progress toward the following goals:    Plan of Care Expires:  04/25/23    Subjective     Chief Complaint: SOB with exetion; weakness in B LE's  Patient/Family Comments/goals: return home to [place]  Pain/Comfort:  Pain Rating 1: 0/10  Pain Addressed 1: Nurse notified  Pain Rating Post-Intervention 1: 0/10    Patients cultural, spiritual, Roman Catholic conflicts given the current situation: no    Living Environment:  Lives alone in a single level home with no steps. Pt lives next door to his sister. He has a walk-in shower with a shower chair.  Prior to admission, patients level of function was independent.  Equipment used at home: shower chair.  DME owned (not currently used): standard walker.  Upon discharge, patient will have assistance from family and friends.    Objective:     Communicated with nurse Isabel prior to session.  Patient found supine with telemetry, peripheral IV  upon PT entry to room.    General Precautions: Standard, fall, airborne  Orthopedic Precautions:N/A   Braces: N/A  Respiratory  Status: Room air    Exams:  Cognitive Exam:  Patient is oriented to Person, Place, Time, and Situation  Fine Motor Coordination:    -       Intact  Gross Motor Coordination:  WFL  Sensation:    -       Intact  Skin Integrity/Edema:      -       Edema: Moderate with L > R  RUE ROM: WFL  RUE Strength: WFL  LUE ROM: WFL  LUE Strength: WFL  RLE ROM: WFL  RLE Strength: WFL  LLE ROM: WFL  LLE Strength: WFL    Functional Mobility:  Bed Mobility:     Rolling Left:  modified independence  Supine to Sit: modified independence  Sit to Supine: modified independence  Transfers:     Sit to Stand:  modified independence with no AD  Gait: 85' with no AD in room due to airborne restrictions (r/o TB); limited space to ambulate; pt c/o SOB and LE fatigue after 85' around room  Balance: Fair +      AM-PAC 6 CLICK MOBILITY  Total Score:24       Treatment & Education:  Bed mobility  Transfers  Gait in room    Patient left sitting edge of bed with all lines intact, call button in reach, and nurse Maame notified.    GOALS:   Multidisciplinary Problems       Physical Therapy Goals          Problem: Physical Therapy    Goal Priority Disciplines Outcome Goal Variances Interventions   Physical Therapy Goal     PT, PT/OT      Description: Goals to be met by: Discharge     Patient will increase functional independence with mobility by performin. Gait  x 260 feet with modified independence using RW                          History:     Past Medical History:   Diagnosis Date    Cancer     CHF (congestive heart failure)     Human immunodeficiency virus (HIV) disease     Hypertension        No past surgical history on file.    Time Tracking:     PT Received On: 23  PT Start Time: 1223     PT Stop Time: 1246  PT Total Time (min): 23 min     Billable Minutes: Evaluation 23 mins      2023

## 2023-03-25 NOTE — PROGRESS NOTES
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  Patient stated that he was not able to ambulate due to shortness of breath during the last couple of days.  He stated that he was noncompliant before with his medications but has been taking his medicines since his last discharge on 03/13/2023 where he was restarted on Entresto, Coreg.  He is compliant with his HIV medicines.  Patient stated that he smokes half a pack daily since he was 19 and takes cocaine.  His last cocaine use was 2 weeks ago.  Patient also has history of heavy alcohol use in the past but denies any alcohol use now.  Patient denies any orthopnea, proximal nocturnal dyspnea, chest pain, fever, chills, nausea, vomiting, abdominal pain, weakness, dizziness, lightheadedness, hematuria, hematuria, dysuria at this time.The ED labs significant for a BNP of 8000, creatinine of 1.6 with chest x-ray showing pulmonary edema.  Received 20 units of Lasix.  Internal Medicine consulted for CHF exacerbation.    Interval Hx: No acute events over night. He stated his SOB is improved. Leg swelling is  better with diuresis. Continue to monitor.     Past Medical History:  Past Medical History:   Diagnosis Date    Cancer     CHF (congestive heart failure)     Human immunodeficiency virus (HIV) disease      "Hypertension        Past Surgical History:  No past surgical history on file.    Family History:  No family history on file.    Social History:  Social History     Tobacco Use    Smoking status: Every Day     Packs/day: 0.50     Types: Cigarettes    Smokeless tobacco: Never   Substance Use Topics    Alcohol use: Not Currently    Drug use: Yes     Types: "Crack" cocaine     Comment: last taken 1 month and a half       Allergies:  Review of patient's allergies indicates:   Allergen Reactions    Ace inhibitors      Other reaction(s): Angioedema    Nitroglycerin Itching       Home Medications:  Prior to Admission medications    Medication Sig Start Date End Date Taking? Authorizing Provider   apixaban (ELIQUIS) 5 mg Tab Take 1 tablet (5 mg total) by mouth 2 (two) times daily. 3/15/23 3/14/24  Darius Zhou MD   aspirin (ECOTRIN) 81 MG EC tablet Take 1 tablet (81 mg total) by mouth once daily. 1/27/23 1/27/24  David Posey DO   atorvastatin (LIPITOR) 40 MG tablet Take 1 tablet (40 mg total) by mouth every evening. 1/26/23 1/26/24  David Posey DO   atovaquone (MEPRON) 750 mg/5 mL Susp oral liquid Take 10 mLs (1,500 mg total) by mouth once daily. 3/23/23   SAVAGE Dockery   mqbreyynn-egnfkpiw-eriqxij ala (BIKTARVY) -25 mg (25 kg or greater) Take 1 tablet by mouth once daily. 3/23/23   SAVAGE Dockery   carvediloL (COREG) 12.5 MG tablet Take 1 tablet (12.5 mg total) by mouth 2 (two) times daily. 1/26/23 1/26/24  David Posey DO   famotidine (PEPCID) 20 MG tablet Take 1 tablet (20 mg total) by mouth 2 (two) times daily. 9/11/22 10/11/22  Zana Tatum MD   food supplemt, lactose-reduced Liqd Take 1 Bottle by mouth.    Historical Provider   furosemide (LASIX) 20 MG tablet Take 3 tablets (60 mg total) by mouth once daily. 1/26/23 1/26/24  David Posey DO   megestroL (MEGACE) 400 mg/10 mL (40 mg/mL) Susp Take 200 mg by mouth.    Historical Provider   polyethylene glycol 3350,bulk, Powd by Other " "route.    Historical Provider   sacubitriL-valsartan (ENTRESTO) 24-26 mg per tablet Take 1 tablet by mouth 2 (two) times daily. 3/15/23 3/14/24  Darius Zhou MD   VITAMIN D2 1,250 mcg (50,000 unit) capsule Take 1 capsule (50,000 Units total) by mouth every 7 days. 3/23/23   SAVAGE Dockery         Review of Systems:  Constitutional: no fever/chills  EENT: no sore throat, ear pain, sinus pain/congestion, nasal congestion/drainage  Respiratory: no cough, no wheezing, no shortness of breath on exertion   Cardiovascular: no chest pain, no palpitations, +bl leg edema  Gastrointestinal: no nausea, vomiting, or diarrhea. No abdominal pain  Genitourinary: no dysuria, no urinary frequency or urgency, no hematuria  Integumentary: no skin rash or abnormal lesion  Neurologic: no headache, no dizziness, no weakness or numbness          Objective:   Last 24 Hour Vital Signs:  BP  Min: 94/71  Max: 128/78  Temp  Av.6 °F (36.4 °C)  Min: 97.4 °F (36.3 °C)  Max: 98 °F (36.7 °C)  Pulse  Av.5  Min: 72  Max: 87  Resp  Av.3  Min: 15  Max: 20  SpO2  Av.3 %  Min: 95 %  Max: 99 %  Height  Av' 11" (180.3 cm)  Min: 5' 11" (180.3 cm)  Max: 5' 11" (180.3 cm)  Weight  Av.2 kg (223 lb)  Min: 101.2 kg (223 lb)  Max: 101.2 kg (223 lb)  Body mass index is 31.1 kg/m².  I/O last 3 completed shifts:  In: -   Out: 1025 [Urine:1025]    Physical Examination:  General: Nontoxic-appearing  man lying in bed in no acute distress  Eye: PERRL, EOMI  HENT: Normocephalic, atraumatic, moist mucous membranes  Neck: Supple, nontender,+JVD bl   Respiratory: Clear to auscultation bilaterally, no wheezes, rales, or rhonchi. Normal work of breathing  Cardiovascular: Regular rate and rhythm, systolic murmur present, rubs, or gallops. Bl peripheral edema, non pitting  2+ radial pulses  Gastrointestinal: Soft, nontender, nondistended abdomen   Musculoskeletal: Moves all extremities purposefully. No gross deformities. No calf " tenderness  Integumentary: Warm, dry, intact. No rashes  Neurologic: Alert and oriented x3. Normal speech. No gross deficits  Psychiatric: Appropriate mood and affect           Laboratory:  Most Recent Data:  CBC:   Lab Results   Component Value Date    WBC 6.0 03/25/2023    HGB 11.7 (L) 03/25/2023    HCT 35.9 (L) 03/25/2023     03/25/2023    MCV 84.5 03/25/2023    RDW 16.2 03/25/2023     WBC Differential:   Recent Labs   Lab 03/24/23  0516 03/25/23  0140   WBC 6.9 6.0   HGB 10.9* 11.7*   HCT 33.8* 35.9*    167   MCV 85.4 84.5     BMP:   Lab Results   Component Value Date     03/25/2023    K 4.0 03/25/2023    CO2 21 (L) 03/25/2023    BUN 37.0 (H) 03/25/2023    CREATININE 1.51 (H) 03/25/2023    CALCIUM 9.5 03/25/2023    MG 1.90 03/25/2023    PHOS 4.2 03/25/2023     LFTs:   Lab Results   Component Value Date    ALBUMIN 3.0 (L) 03/25/2023    BILITOT 1.2 03/25/2023    AST 39 (H) 03/25/2023    ALKPHOS 114 03/25/2023    ALT 16 03/25/2023     Coags:   Lab Results   Component Value Date    INR 1.19 12/26/2018    PROTIME 15.0 (H) 12/26/2018    PTT 34.9 12/26/2018     FLP:   Lab Results   Component Value Date    CHOL 168 01/24/2023    HDL 28 (L) 01/24/2023    TRIG 101 01/24/2023     DM:   Lab Results   Component Value Date    CREATININE 1.51 (H) 03/25/2023     Thyroid:   Lab Results   Component Value Date    TSH 2.893 03/13/2023     Anemia:   Lab Results   Component Value Date    IRON 25 (L) 12/26/2018    TIBC 359 12/26/2018    FERRITIN 60.8 12/26/2018     Cardiac:   Lab Results   Component Value Date    TROPONINI 0.045 03/24/2023    BNP 8,996.4 (H) 03/24/2023     Urinalysis:   Lab Results   Component Value Date    COLORU LIGHT YELLOW 12/26/2018    PHUA 7.0 09/11/2022    NITRITE Negative 12/26/2018    KETONESU Negative 12/26/2018    UROBILINOGEN 2+ (A) 09/11/2022    WBCUA 0-5 09/11/2022       Trended Lab Data:  Recent Labs   Lab 03/24/23  0516 03/24/23  1720 03/25/23  0140   WBC 6.9  --  6.0   HGB 10.9*   --  11.7*   HCT 33.8*  --  35.9*     --  167   MCV 85.4  --  84.5   RDW 16.1  --  16.2    136 138   K 4.0 3.3* 4.0   CO2 21* 25 21*   BUN 37.7* 37.2* 37.0*   CREATININE 1.67* 1.50* 1.51*   ALBUMIN 3.0*  --  3.0*   BILITOT 1.2  --  1.2   AST 38*  --  39*   ALKPHOS 96  --  114   ALT 16  --  16       Trended Cardiac Data:  Recent Labs   Lab 03/24/23  0516   TROPONINI 0.045   BNP 8,996.4*       Radiology:  Imaging Results              X-Ray Chest 1 View (Final result)  Result time 03/24/23 06:09:03      Final result by Nae Burden MD (03/24/23 06:09:03)                   Impression:      Trace right pleural effusions with findings suggestive of mild pulmonary edema.      Electronically signed by: Nae Burden  Date:    03/24/2023  Time:    06:09               Narrative:    EXAMINATION:  XR CHEST 1 VIEW    CLINICAL HISTORY:  shortness of breath;    TECHNIQUE:  AP chest    COMPARISON:  Chest x-ray dated 03/13/2023    FINDINGS:  Right chest wall port catheter remains in place.  There is stable cardiomegaly.  There is trace right pleural effusion.  There are increased interstitial markings without focal consolidation.  There is no visible pneumothorax                                           Assessment & Plan:   HFrEF  CAD; Hx of NSTEMI s/p OM1 Thrombectomy + GERDA x2 in 2007  HFrEF (EF 10%)  NICM  HOLLIS likely prerenal   Severe Aortic Stenosis  - Patient endorsing SOB, dry cough, LOVING and leg swelling   - BNP  in 8000s this admission;  worse than prior   - Troponin  of 0.045; Denies chest pain   - Received Lasix 20 mg IV in ED; continuing diuresis with lasix 80 BID   - Continue home meds: ASA 81 mg, Lipitor 40 mg daily  -will hold Entresto at this time give HOLLIS and soft BP  - Holding coreg given pt is a cocaine user and non complaint   - DELLA 1/25/23 revealed EF 10%, no evidence of myocardial ischemia  - TTE 1/24/23 revealed Grade II LVDD, severe LV global hypokineses, mild RV enlargement, mod LA and  RA enlargement, severe AS, mod MR, mild-mod TR and mod Pulm HTN (PA systolic 57 mmHg)  - Monitor electrolytes while on diuretics      Paroxysmal Atrial Fibrillation  - Currently stable  - continue Eliquis 5 mg BID  - Holding Coreg 12.5 mg BID d/t cocaine abuse and non complaint     HIV  Chronic hepatitis-B  - CD4 count 234 1/24/23; new CD4 count on 3/14/23 of 55, new VL pending; ordered toxoplasmosis antibody   - hepatitis-B virus DNA count of [id number] 3/23/23  - Continue Biktarvy -25 mg daily  -continue atovaquone daily for PCP prophylaxis  -QuantiFERON gold TB pending  -Follow with ID clinic; last seen 03/23/2023  -ultrasound abdomen and fibrous  scan ordered by the ID clinic to outpatient      Hx of Large B Cell Lymphoma  - Completed chemotherapy 5/2018; No need of routine surveillance imaging studies; Patient should be following up with oncologist (supposed to be in August 2022, no clinic visit notes since 5/25/22)  - Colonoscopy when he was hospitalized with bleeding lymphomatous duodenal ulcer in January 2018, unremarkable.     Hx of Cocaine Abuse  - Patient endorsing complete cessation of crack cocaine  - Last done 2 weeks ago reported by patient  - UDS positive for cocaine this admission       CODE STATUS:full  Access: piv  Antibiotics: None   Diet: cardiac   DVT Prophylaxis: eliquis 5 bid   GI Prophylaxis: None   Fluids: None       Disposition:  Admission for heart failure exacerbation, Continue diuresis, Monitor electrolytes          Lupillo Burnham DO  [facility] HO-1

## 2023-03-26 VITALS
OXYGEN SATURATION: 95 % | DIASTOLIC BLOOD PRESSURE: 75 MMHG | HEIGHT: 71 IN | BODY MASS INDEX: 30.03 KG/M2 | WEIGHT: 214.5 LBS | HEART RATE: 78 BPM | TEMPERATURE: 98 F | SYSTOLIC BLOOD PRESSURE: 104 MMHG | RESPIRATION RATE: 21 BRPM

## 2023-03-26 PROBLEM — I50.9 ACUTE ON CHRONIC CONGESTIVE HEART FAILURE: Status: ACTIVE | Noted: 2023-03-26

## 2023-03-26 LAB
ALBUMIN SERPL-MCNC: 2.8 G/DL (ref 3.4–4.8)
ALBUMIN/GLOB SERPL: 0.6 RATIO (ref 1.1–2)
ALP SERPL-CCNC: 107 UNIT/L (ref 40–150)
ALT SERPL-CCNC: 15 UNIT/L (ref 0–55)
AST SERPL-CCNC: 32 UNIT/L (ref 5–34)
BASOPHILS # BLD AUTO: 0.02 X10(3)/MCL (ref 0–0.2)
BASOPHILS NFR BLD AUTO: 0.4 %
BILIRUBIN DIRECT+TOT PNL SERPL-MCNC: 1.4 MG/DL
BUN SERPL-MCNC: 37.2 MG/DL (ref 8.4–25.7)
CALCIUM SERPL-MCNC: 9.5 MG/DL (ref 8.8–10)
CHLORIDE SERPL-SCNC: 106 MMOL/L (ref 98–107)
CO2 SERPL-SCNC: 23 MMOL/L (ref 23–31)
CREAT SERPL-MCNC: 1.36 MG/DL (ref 0.73–1.18)
EOSINOPHIL # BLD AUTO: 0.05 X10(3)/MCL (ref 0–0.9)
EOSINOPHIL NFR BLD AUTO: 1 %
ERYTHROCYTE [DISTWIDTH] IN BLOOD BY AUTOMATED COUNT: 16.2 % (ref 11.5–17)
GFR SERPLBLD CREATININE-BSD FMLA CKD-EPI: 59 MLS/MIN/1.73/M2
GLOBULIN SER-MCNC: 4.7 GM/DL (ref 2.4–3.5)
GLUCOSE SERPL-MCNC: 81 MG/DL (ref 82–115)
HCT VFR BLD AUTO: 34.8 % (ref 42–52)
HGB BLD-MCNC: 11.2 G/DL (ref 14–18)
IMM GRANULOCYTES # BLD AUTO: 0.01 X10(3)/MCL (ref 0–0.04)
IMM GRANULOCYTES NFR BLD AUTO: 0.2 %
LYMPHOCYTES # BLD AUTO: 1.32 X10(3)/MCL (ref 0.6–4.6)
LYMPHOCYTES NFR BLD AUTO: 26.2 %
MCH RBC QN AUTO: 27.3 PG (ref 27–31)
MCHC RBC AUTO-ENTMCNC: 32.2 G/DL (ref 33–36)
MCV RBC AUTO: 84.9 FL (ref 80–94)
MONOCYTES # BLD AUTO: 0.72 X10(3)/MCL (ref 0.1–1.3)
MONOCYTES NFR BLD AUTO: 14.3 %
NEUTROPHILS # BLD AUTO: 2.91 X10(3)/MCL (ref 2.1–9.2)
NEUTROPHILS NFR BLD AUTO: 57.9 %
NRBC BLD AUTO-RTO: 0 %
PLATELET # BLD AUTO: 160 X10(3)/MCL (ref 130–400)
PMV BLD AUTO: 11.7 FL (ref 7.4–10.4)
POTASSIUM SERPL-SCNC: 3.7 MMOL/L (ref 3.5–5.1)
PROT SERPL-MCNC: 7.5 GM/DL (ref 5.8–7.6)
RBC # BLD AUTO: 4.1 X10(6)/MCL (ref 4.7–6.1)
SODIUM SERPL-SCNC: 137 MMOL/L (ref 136–145)
WBC # SPEC AUTO: 5 X10(3)/MCL (ref 4.5–11.5)

## 2023-03-26 PROCEDURE — 85025 COMPLETE CBC W/AUTO DIFF WBC: CPT | Performed by: STUDENT IN AN ORGANIZED HEALTH CARE EDUCATION/TRAINING PROGRAM

## 2023-03-26 PROCEDURE — 90471 IMMUNIZATION ADMIN: CPT | Performed by: INTERNAL MEDICINE

## 2023-03-26 PROCEDURE — 90686 IIV4 VACC NO PRSV 0.5 ML IM: CPT | Performed by: INTERNAL MEDICINE

## 2023-03-26 PROCEDURE — 80053 COMPREHEN METABOLIC PANEL: CPT | Performed by: STUDENT IN AN ORGANIZED HEALTH CARE EDUCATION/TRAINING PROGRAM

## 2023-03-26 PROCEDURE — 25000003 PHARM REV CODE 250: Performed by: STUDENT IN AN ORGANIZED HEALTH CARE EDUCATION/TRAINING PROGRAM

## 2023-03-26 PROCEDURE — 94761 N-INVAS EAR/PLS OXIMETRY MLT: CPT

## 2023-03-26 PROCEDURE — A4216 STERILE WATER/SALINE, 10 ML: HCPCS | Performed by: INTERNAL MEDICINE

## 2023-03-26 PROCEDURE — 63600175 PHARM REV CODE 636 W HCPCS: Performed by: INTERNAL MEDICINE

## 2023-03-26 PROCEDURE — 25000003 PHARM REV CODE 250: Performed by: INTERNAL MEDICINE

## 2023-03-26 PROCEDURE — G0378 HOSPITAL OBSERVATION PER HR: HCPCS

## 2023-03-26 RX ORDER — POTASSIUM CHLORIDE 20 MEQ/1
40 TABLET, EXTENDED RELEASE ORAL ONCE
Status: COMPLETED | OUTPATIENT
Start: 2023-03-26 | End: 2023-03-26

## 2023-03-26 RX ADMIN — INFLUENZA VIRUS VACCINE 0.5 ML: 15; 15; 15; 15 SUSPENSION INTRAMUSCULAR at 10:03

## 2023-03-26 RX ADMIN — Medication 10 ML: at 12:03

## 2023-03-26 RX ADMIN — ASPIRIN 81 MG: 81 TABLET, COATED ORAL at 08:03

## 2023-03-26 RX ADMIN — BICTEGRAVIR SODIUM, EMTRICITABINE, AND TENOFOVIR ALAFENAMIDE FUMARATE 1 TABLET: 50; 200; 25 TABLET ORAL at 08:03

## 2023-03-26 RX ADMIN — Medication 10 ML: at 05:03

## 2023-03-26 RX ADMIN — APIXABAN 5 MG: 2.5 TABLET, FILM COATED ORAL at 08:03

## 2023-03-26 RX ADMIN — POTASSIUM CHLORIDE 40 MEQ: 1500 TABLET, EXTENDED RELEASE ORAL at 08:03

## 2023-03-26 RX ADMIN — ATOVAQUONE 1500 MG: 750 SUSPENSION ORAL at 08:03

## 2023-03-26 NOTE — PT/OT/SLP DISCHARGE
Physical Therapy Discharge Summary    Name: Jason Perdue  MRN: [id number]   Principal Problem: Acute on chronic congestive heart failure     Patient Discharged from acute Physical Therapy on 2023.  Please refer to prior PT noted date on 2023 for functional status.     Assessment:     Patient was discharged unexpectedly.  Information required to complete an accurate discharge summary is unknown.  Refer to therapy initial evaluation and last progress note for initial and most recent functional status and goal achievement.  Recommendations made may be found in medical record.    Objective:     GOALS:   Multidisciplinary Problems       Physical Therapy Goals          Problem: Physical Therapy    Goal Priority Disciplines Outcome Goal Variances Interventions   Physical Therapy Goal     PT, PT/OT Unable to Meet, Plan Revised     Description: Goals to be met by: Discharge     Patient will increase functional independence with mobility by performin. Gait  x 260 feet with modified independence using RW                          Reasons for Discontinuation of Therapy Services  Pt discharged from hospital       Plan:     Patient Discharged to: Home no PT services needed.      3/26/2023

## 2023-03-26 NOTE — PLAN OF CARE
Problem: Adult Inpatient Plan of Care  Goal: Plan of Care Review  Outcome: Met  Goal: Patient-Specific Goal (Individualized)  Outcome: Met  Goal: Absence of Hospital-Acquired Illness or Injury  Outcome: Met  Goal: Optimal Comfort and Wellbeing  Outcome: Met  Goal: Readiness for Transition of Care  Outcome: Met     Problem: Fluid Imbalance (Pneumonia)  Goal: Fluid Balance  Outcome: Met     Problem: Infection (Pneumonia)  Goal: Resolution of Infection Signs and Symptoms  Outcome: Met     Problem: Respiratory Compromise (Pneumonia)  Goal: Effective Oxygenation and Ventilation  Outcome: Met     Problem: Infection  Goal: Absence of Infection Signs and Symptoms  Outcome: Met     Problem: Fall Injury Risk  Goal: Absence of Fall and Fall-Related Injury  Outcome: Met

## 2023-03-26 NOTE — PLAN OF CARE
Problem: Adult Inpatient Plan of Care  Goal: Plan of Care Review  Outcome: Ongoing, Progressing  Goal: Patient-Specific Goal (Individualized)  Outcome: Ongoing, Progressing  Goal: Absence of Hospital-Acquired Illness or Injury  Outcome: Ongoing, Progressing  Goal: Optimal Comfort and Wellbeing  Outcome: Ongoing, Progressing  Goal: Readiness for Transition of Care  Outcome: Ongoing, Progressing     Problem: Fluid Imbalance (Pneumonia)  Goal: Fluid Balance  Outcome: Ongoing, Progressing     Problem: Infection (Pneumonia)  Goal: Resolution of Infection Signs and Symptoms  Outcome: Ongoing, Progressing     Problem: Respiratory Compromise (Pneumonia)  Goal: Effective Oxygenation and Ventilation  Outcome: Ongoing, Progressing     Problem: Infection  Goal: Absence of Infection Signs and Symptoms  Outcome: Ongoing, Progressing     Problem: Fall Injury Risk  Goal: Absence of Fall and Fall-Related Injury  Outcome: Ongoing, Progressing

## 2023-03-26 NOTE — DISCHARGE SUMMARY
[facility] Discharge Summary    Admitting Physician: Fili Pride MD  Attending Physician: Fili Pride MD  Date of Admit: 3/24/2023  Date of Discharge: 3/26/2023    Discharge to: Home or Self Care   Condition: Stable    Discharge Diagnoses     Patient Active Problem List   Diagnosis    Diffuse large B-cell lymphoma    HIV disease    Hepatitis B    B12 deficiency    Polysubstance abuse    Severe aortic stenosis    HFrEF (heart failure with reduced ejection fraction)    Paroxysmal atrial fibrillation    Community acquired pneumonia of right lower lobe of lung    Acute on chronic congestive heart failure       Consultants and Procedures     Consultants:  Consults (From admission, onward)          Status Ordering Provider     Inpatient consult to Midline team  Once        Provider:  (Not yet assigned)    Acknowledged JOSE COOLEY     Inpatient consult to Hospitalist  Once        Provider:  DO Ronnie Cleary JULIO J.               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  Patient stated that he was not able to ambulate due to shortness of breath during the last couple of days.  He stated that he was noncompliant before with his medications but has been taking his medicines since his last discharge on 03/13/2023 where he was restarted on Entresto, Coreg.  He is compliant with his HIV medicines.  Patient stated that he smokes half a pack daily since he was 19 and takes cocaine.  His last cocaine use was 2 weeks ago.  Patient also has history of heavy alcohol use in the  past but denies any alcohol use now.  Patient denies any orthopnea, proximal nocturnal dyspnea, chest pain, fever, chills, nausea, vomiting, abdominal pain, weakness, dizziness, lightheadedness, hematuria, hematuria, dysuria at this time.The ED labs significant for a BNP of 8000, creatinine of 1.6 with chest x-ray showing pulmonary edema.  Received 20 units of Lasix.  Internal Medicine consulted for CHF exacerbation.    Hospital Course with Pertinent Findings     Admitted for CHF exacerbation.  Patient's symptoms improved with diuresis. Kidney function improved with diuresis. Advised the patient to stop using cocaine. Patient is to follow with cardiology outpatient.  Denies any chest pain, shortness breath, fever, chills, nausea, vomiting, abdominal pain, leg swelling at the time of discharge.  Stable at discharge.  Strict ED precautions given at the time of discharge.    Discharge physical exam:  Vitals:    03/26/23 0800   BP: 104/75   Pulse: 78   Resp: (!) 21   Temp: 97.5 °F (36.4 °C)       General: Awake, alert, & oriented to person, place & time. No acute distress  Psychiatric: Mood and affect normal  HEENT: Normocephalic, atraumatic. Face symmetric.  Cardiovascular: Regular rate & rhythm, Normal S1 & S2 w/out murmurs, rubs or gallops  Pulmonary: Bilateral symmetric chest rise, Non-labored  Abdominal:  Soft nondistended  Extremities: No clubbing, cyanosis or edema.  Skin:  Exposed skin is warm & dry.  Neuro:   Patient moves all extremities equally. Sensation intact bilateraly.    TIME SPENT ON DISCHARGE: 60 minutes    Discharge Medications        Medication List        CONTINUE taking these medications      apixaban 5 mg Tab  Commonly known as: ELIQUIS  Take 1 tablet (5 mg total) by mouth 2 (two) times daily.     aspirin 81 MG EC tablet  Commonly known as: ECOTRIN  Take 1 tablet (81 mg total) by mouth once daily.     atorvastatin 40 MG tablet  Commonly known as: LIPITOR  Take 1 tablet (40 mg total) by mouth  every evening.     atovaquone 750 mg/5 mL Susp oral liquid  Commonly known as: MEPRON  Take 10 mLs (1,500 mg total) by mouth once daily.     BIKTARVY -25 mg (25 kg or greater)  Generic drug: tvaqaxfqt-gjkvhpxg-pvdvxgh ala  Take 1 tablet by mouth once daily.     carvediloL 12.5 MG tablet  Commonly known as: COREG  Take 1 tablet (12.5 mg total) by mouth 2 (two) times daily.     famotidine 20 MG tablet  Commonly known as: PEPCID  Take 1 tablet (20 mg total) by mouth 2 (two) times daily.     food supplemt, lactose-reduced Liqd     furosemide 20 MG tablet  Commonly known as: LASIX  Take 3 tablets (60 mg total) by mouth once daily.     megestroL 400 mg/10 mL (40 mg/mL) Susp  Commonly known as: MEGACE     polyethylene glycol 3350(bulk) Powd     sacubitriL-valsartan 24-26 mg per tablet  Commonly known as: ENTRESTO  Take 1 tablet by mouth 2 (two) times daily.     VITAMIN D2 50,000 unit Cap  Generic drug: ergocalciferol  Take 1 capsule (50,000 Units total) by mouth every 7 days.              Discharge Information:     Stable at discharge   Strict ED precautions given at the time of discharge   Instructed patient to stop taking cocaine   Continue home meds including Entresto, Lasix 60 daily, and Coreg 12.5 b.i.d.  Continue follow up with Cardiology outpatient   Follow up with PCP within 1-2 weeks          Lupillo Burnham DO

## 2023-03-27 LAB
GAMMA INTERFERON BACKGROUND BLD IA-ACNC: 0.07 IU/ML
M TB IFN-G BLD-IMP: NEGATIVE
M TB IFN-G CD4+ BCKGRND COR BLD-ACNC: 0.01 IU/ML
M TB IFN-G CD4+CD8+ BCKGRND COR BLD-ACNC: 0.05 IU/ML
MITOGEN IGNF BCKGRD COR BLD-ACNC: 9.93 IU/ML
T PALLIDUM AB SER QL: REACTIVE

## 2023-03-28 LAB
RHODAMINE-AURAMINE STN SPEC: NORMAL
T GONDII IGG SER QL IA: NEGATIVE
T GONDII IGG SER-ACNC: <3 IU/ML
T GONDII IGM SERPL QL IA: NEGATIVE

## 2023-03-28 NOTE — PROCEDURES
Fibroscan Procedure     Name: Jason Perdue  Date of Procedure : 2023  Interpreting Physician: Fiona Dhillon MD, MPH  Diagnosis: HBV    Probe: XL    Fibroscan readin.8 kPa    Fibrosis: F4     CAP readin dB/m    Steatosis: S0      Miscellaneous:   2022: Fibroscan: 6.1 kPa. S0, F0-1

## 2023-04-06 ENCOUNTER — HOSPITAL ENCOUNTER (INPATIENT)
Facility: HOSPITAL | Age: 63
LOS: 5 days | Discharge: HOME | DRG: 291 | End: 2023-04-11
Attending: EMERGENCY MEDICINE | Admitting: INTERNAL MEDICINE
Payer: MEDICAID

## 2023-04-06 DIAGNOSIS — I47.20 V TACH: ICD-10-CM

## 2023-04-06 DIAGNOSIS — J90 PLEURAL EFFUSION ON RIGHT: ICD-10-CM

## 2023-04-06 DIAGNOSIS — I50.20 HFREF (HEART FAILURE WITH REDUCED EJECTION FRACTION): ICD-10-CM

## 2023-04-06 DIAGNOSIS — E87.70 HYPERVOLEMIA, UNSPECIFIED HYPERVOLEMIA TYPE: ICD-10-CM

## 2023-04-06 DIAGNOSIS — R60.0 BILATERAL LOWER EXTREMITY EDEMA: ICD-10-CM

## 2023-04-06 DIAGNOSIS — I50.9 CONGESTIVE HEART FAILURE, UNSPECIFIED HF CHRONICITY, UNSPECIFIED HEART FAILURE TYPE: Primary | ICD-10-CM

## 2023-04-06 DIAGNOSIS — R06.02 SOB (SHORTNESS OF BREATH): ICD-10-CM

## 2023-04-06 DIAGNOSIS — N17.9 AKI (ACUTE KIDNEY INJURY): ICD-10-CM

## 2023-04-06 PROBLEM — E78.5 HLD (HYPERLIPIDEMIA): Status: ACTIVE | Noted: 2018-09-06

## 2023-04-06 PROBLEM — F41.9 ANXIETY: Status: ACTIVE | Noted: 2018-09-06

## 2023-04-06 PROBLEM — I48.91 A-FIB: Status: ACTIVE | Noted: 2018-09-06

## 2023-04-06 PROBLEM — I25.10 CAD (CORONARY ARTERY DISEASE): Status: ACTIVE | Noted: 2018-09-06

## 2023-04-06 PROBLEM — Z72.0 TOBACCO USER: Status: ACTIVE | Noted: 2023-04-06

## 2023-04-06 PROBLEM — B20 HIV (HUMAN IMMUNODEFICIENCY VIRUS INFECTION): Status: ACTIVE | Noted: 2018-09-06

## 2023-04-06 LAB
ALBUMIN SERPL-MCNC: 3.1 G/DL (ref 3.4–4.8)
ALBUMIN/GLOB SERPL: 0.6 RATIO (ref 1.1–2)
ALP SERPL-CCNC: 105 UNIT/L (ref 40–150)
ALT SERPL-CCNC: 13 UNIT/L (ref 0–55)
AMORPH URATE CRY URNS QL MICRO: ABNORMAL /UL
AMPHET UR QL SCN: NEGATIVE
APPEARANCE UR: CLEAR
AST SERPL-CCNC: 31 UNIT/L (ref 5–34)
BACTERIA #/AREA URNS AUTO: ABNORMAL /HPF
BARBITURATE SCN PRESENT UR: NEGATIVE
BASOPHILS # BLD AUTO: 0.03 X10(3)/MCL (ref 0–0.2)
BASOPHILS NFR BLD AUTO: 0.5 %
BENZODIAZ UR QL SCN: NEGATIVE
BILIRUB UR QL STRIP.AUTO: NEGATIVE MG/DL
BILIRUBIN DIRECT+TOT PNL SERPL-MCNC: 1.5 MG/DL
BNP BLD-MCNC: ABNORMAL PG/ML
BUN SERPL-MCNC: 39.6 MG/DL (ref 8.4–25.7)
CALCIUM SERPL-MCNC: 9.2 MG/DL (ref 8.8–10)
CANNABINOIDS UR QL SCN: NEGATIVE
CHLORIDE SERPL-SCNC: 103 MMOL/L (ref 98–107)
CK SERPL-CCNC: 106 U/L (ref 30–200)
CO2 SERPL-SCNC: 21 MMOL/L (ref 23–31)
COCAINE UR QL SCN: POSITIVE
COLOR UR AUTO: YELLOW
CREAT SERPL-MCNC: 1.85 MG/DL (ref 0.73–1.18)
EOSINOPHIL # BLD AUTO: 0.17 X10(3)/MCL (ref 0–0.9)
EOSINOPHIL NFR BLD AUTO: 2.6 %
ERYTHROCYTE [DISTWIDTH] IN BLOOD BY AUTOMATED COUNT: 17.2 % (ref 11.5–17)
ETHANOL SERPL-MCNC: <10 MG/DL
FENTANYL UR QL SCN: NEGATIVE
GFR SERPLBLD CREATININE-BSD FMLA CKD-EPI: 41 MLS/MIN/1.73/M2
GLOBULIN SER-MCNC: 4.9 GM/DL (ref 2.4–3.5)
GLUCOSE SERPL-MCNC: 97 MG/DL (ref 82–115)
GLUCOSE UR QL STRIP.AUTO: NORMAL MG/DL
HCT VFR BLD AUTO: 36 % (ref 42–52)
HGB BLD-MCNC: 11.5 G/DL (ref 14–18)
HYALINE CASTS #/AREA URNS LPF: ABNORMAL /LPF
IMM GRANULOCYTES # BLD AUTO: 0.01 X10(3)/MCL (ref 0–0.04)
IMM GRANULOCYTES NFR BLD AUTO: 0.2 %
KETONES UR QL STRIP.AUTO: NEGATIVE MG/DL
LEUKOCYTE ESTERASE UR QL STRIP.AUTO: NEGATIVE UNIT/L
LYMPHOCYTES # BLD AUTO: 1.24 X10(3)/MCL (ref 0.6–4.6)
LYMPHOCYTES NFR BLD AUTO: 19.3 %
MCH RBC QN AUTO: 27 PG (ref 27–31)
MCHC RBC AUTO-ENTMCNC: 31.9 G/DL (ref 33–36)
MCV RBC AUTO: 84.5 FL (ref 80–94)
MDMA UR QL SCN: NEGATIVE
MONOCYTES # BLD AUTO: 0.67 X10(3)/MCL (ref 0.1–1.3)
MONOCYTES NFR BLD AUTO: 10.4 %
MUCOUS THREADS URNS QL MICRO: ABNORMAL /LPF
NEUTROPHILS # BLD AUTO: 4.3 X10(3)/MCL (ref 2.1–9.2)
NEUTROPHILS NFR BLD AUTO: 67 %
NITRITE UR QL STRIP.AUTO: NEGATIVE
NRBC BLD AUTO-RTO: 0 %
OPIATES UR QL SCN: NEGATIVE
PCP UR QL: NEGATIVE
PH UR STRIP.AUTO: 5 [PH]
PH UR: 5 [PH] (ref 3–11)
PLATELET # BLD AUTO: 183 X10(3)/MCL (ref 130–400)
PMV BLD AUTO: 11.6 FL (ref 7.4–10.4)
POTASSIUM SERPL-SCNC: 3.3 MMOL/L (ref 3.5–5.1)
PROT SERPL-MCNC: 8 GM/DL (ref 5.8–7.6)
PROT UR QL STRIP.AUTO: ABNORMAL MG/DL
RBC # BLD AUTO: 4.26 X10(6)/MCL (ref 4.7–6.1)
RBC #/AREA URNS AUTO: ABNORMAL /HPF
RBC UR QL AUTO: NEGATIVE UNIT/L
SODIUM SERPL-SCNC: 134 MMOL/L (ref 136–145)
SP GR UR STRIP.AUTO: 1.01
SPECIFIC GRAVITY, URINE AUTO (.000) (OHS): 1.01 (ref 1–1.03)
SQUAMOUS #/AREA URNS LPF: ABNORMAL /HPF
TROPONIN I SERPL-MCNC: 0.04 NG/ML (ref 0–0.04)
UROBILINOGEN UR STRIP-ACNC: ABNORMAL MG/DL
WBC # SPEC AUTO: 6.4 X10(3)/MCL (ref 4.5–11.5)
WBC #/AREA URNS AUTO: ABNORMAL /HPF

## 2023-04-06 PROCEDURE — 99285 EMERGENCY DEPT VISIT HI MDM: CPT | Mod: 25

## 2023-04-06 PROCEDURE — 85025 COMPLETE CBC W/AUTO DIFF WBC: CPT | Performed by: EMERGENCY MEDICINE

## 2023-04-06 PROCEDURE — 84484 ASSAY OF TROPONIN QUANT: CPT | Performed by: EMERGENCY MEDICINE

## 2023-04-06 PROCEDURE — 80053 COMPREHEN METABOLIC PANEL: CPT | Performed by: EMERGENCY MEDICINE

## 2023-04-06 PROCEDURE — 82077 ASSAY SPEC XCP UR&BREATH IA: CPT | Performed by: EMERGENCY MEDICINE

## 2023-04-06 PROCEDURE — 80307 DRUG TEST PRSMV CHEM ANLYZR: CPT | Performed by: EMERGENCY MEDICINE

## 2023-04-06 PROCEDURE — 21400001 HC TELEMETRY ROOM

## 2023-04-06 PROCEDURE — 96374 THER/PROPH/DIAG INJ IV PUSH: CPT

## 2023-04-06 PROCEDURE — 93005 ELECTROCARDIOGRAM TRACING: CPT

## 2023-04-06 PROCEDURE — 81001 URINALYSIS AUTO W/SCOPE: CPT | Performed by: EMERGENCY MEDICINE

## 2023-04-06 PROCEDURE — 63600175 PHARM REV CODE 636 W HCPCS: Performed by: EMERGENCY MEDICINE

## 2023-04-06 PROCEDURE — 82550 ASSAY OF CK (CPK): CPT | Performed by: EMERGENCY MEDICINE

## 2023-04-06 PROCEDURE — 83880 ASSAY OF NATRIURETIC PEPTIDE: CPT | Performed by: EMERGENCY MEDICINE

## 2023-04-06 RX ORDER — FUROSEMIDE 10 MG/ML
80 INJECTION INTRAMUSCULAR; INTRAVENOUS
Status: COMPLETED | OUTPATIENT
Start: 2023-04-06 | End: 2023-04-06

## 2023-04-06 RX ORDER — ATORVASTATIN CALCIUM 40 MG/1
40 TABLET, FILM COATED ORAL NIGHTLY
Status: DISCONTINUED | OUTPATIENT
Start: 2023-04-07 | End: 2023-04-11 | Stop reason: HOSPADM

## 2023-04-06 RX ORDER — ATOVAQUONE 750 MG/5ML
1500 SUSPENSION ORAL DAILY
Status: DISCONTINUED | OUTPATIENT
Start: 2023-04-07 | End: 2023-04-11 | Stop reason: HOSPADM

## 2023-04-06 RX ORDER — ASPIRIN 81 MG/1
81 TABLET ORAL DAILY
Status: DISCONTINUED | OUTPATIENT
Start: 2023-04-07 | End: 2023-04-11 | Stop reason: HOSPADM

## 2023-04-06 RX ORDER — CARVEDILOL 3.12 MG/1
6.25 TABLET ORAL 2 TIMES DAILY
Status: DISCONTINUED | OUTPATIENT
Start: 2023-04-07 | End: 2023-04-09

## 2023-04-06 RX ORDER — SODIUM CHLORIDE 0.9 % (FLUSH) 0.9 %
10 SYRINGE (ML) INJECTION
Status: DISCONTINUED | OUTPATIENT
Start: 2023-04-07 | End: 2023-04-11 | Stop reason: HOSPADM

## 2023-04-06 RX ORDER — FUROSEMIDE 20 MG/1
80 TABLET ORAL 2 TIMES DAILY
Status: DISCONTINUED | OUTPATIENT
Start: 2023-04-07 | End: 2023-04-07

## 2023-04-06 RX ADMIN — FUROSEMIDE 80 MG: 10 INJECTION, SOLUTION INTRAMUSCULAR; INTRAVENOUS at 10:04

## 2023-04-07 LAB
ANION GAP SERPL CALC-SCNC: 7 MEQ/L
ANION GAP SERPL CALC-SCNC: 9 MEQ/L
BUN SERPL-MCNC: 37.1 MG/DL (ref 8.4–25.7)
BUN SERPL-MCNC: 39 MG/DL (ref 8.4–25.7)
CALCIUM SERPL-MCNC: 8.9 MG/DL (ref 8.8–10)
CALCIUM SERPL-MCNC: 9.3 MG/DL (ref 8.8–10)
CHLORIDE SERPL-SCNC: 104 MMOL/L (ref 98–107)
CHLORIDE SERPL-SCNC: 106 MMOL/L (ref 98–107)
CO2 SERPL-SCNC: 21 MMOL/L (ref 23–31)
CO2 SERPL-SCNC: 22 MMOL/L (ref 23–31)
CREAT SERPL-MCNC: 1.59 MG/DL (ref 0.73–1.18)
CREAT SERPL-MCNC: 1.79 MG/DL (ref 0.73–1.18)
CREAT/UREA NIT SERPL: 22
CREAT/UREA NIT SERPL: 23
GFR SERPLBLD CREATININE-BSD FMLA CKD-EPI: 42 MLS/MIN/1.73/M2
GFR SERPLBLD CREATININE-BSD FMLA CKD-EPI: 49 MLS/MIN/1.73/M2
GLUCOSE SERPL-MCNC: 108 MG/DL (ref 82–115)
GLUCOSE SERPL-MCNC: 123 MG/DL (ref 82–115)
MAGNESIUM SERPL-MCNC: 2 MG/DL (ref 1.6–2.6)
MAGNESIUM SERPL-MCNC: 2.1 MG/DL (ref 1.6–2.6)
PHOSPHATE SERPL-MCNC: 3.8 MG/DL (ref 2.3–4.7)
POTASSIUM SERPL-SCNC: 3.3 MMOL/L (ref 3.5–5.1)
POTASSIUM SERPL-SCNC: 4.1 MMOL/L (ref 3.5–5.1)
SODIUM SERPL-SCNC: 134 MMOL/L (ref 136–145)
SODIUM SERPL-SCNC: 135 MMOL/L (ref 136–145)

## 2023-04-07 PROCEDURE — 80048 BASIC METABOLIC PNL TOTAL CA: CPT | Performed by: STUDENT IN AN ORGANIZED HEALTH CARE EDUCATION/TRAINING PROGRAM

## 2023-04-07 PROCEDURE — 21400001 HC TELEMETRY ROOM

## 2023-04-07 PROCEDURE — 84100 ASSAY OF PHOSPHORUS: CPT | Performed by: STUDENT IN AN ORGANIZED HEALTH CARE EDUCATION/TRAINING PROGRAM

## 2023-04-07 PROCEDURE — 83735 ASSAY OF MAGNESIUM: CPT | Performed by: STUDENT IN AN ORGANIZED HEALTH CARE EDUCATION/TRAINING PROGRAM

## 2023-04-07 PROCEDURE — S4991 NICOTINE PATCH NONLEGEND: HCPCS | Performed by: STUDENT IN AN ORGANIZED HEALTH CARE EDUCATION/TRAINING PROGRAM

## 2023-04-07 PROCEDURE — 93005 ELECTROCARDIOGRAM TRACING: CPT

## 2023-04-07 PROCEDURE — 25000003 PHARM REV CODE 250: Performed by: STUDENT IN AN ORGANIZED HEALTH CARE EDUCATION/TRAINING PROGRAM

## 2023-04-07 PROCEDURE — 85025 COMPLETE CBC W/AUTO DIFF WBC: CPT | Performed by: STUDENT IN AN ORGANIZED HEALTH CARE EDUCATION/TRAINING PROGRAM

## 2023-04-07 PROCEDURE — 63600175 PHARM REV CODE 636 W HCPCS: Performed by: STUDENT IN AN ORGANIZED HEALTH CARE EDUCATION/TRAINING PROGRAM

## 2023-04-07 PROCEDURE — 94761 N-INVAS EAR/PLS OXIMETRY MLT: CPT

## 2023-04-07 RX ORDER — POTASSIUM CHLORIDE 20 MEQ/1
40 TABLET, EXTENDED RELEASE ORAL 2 TIMES DAILY
Status: COMPLETED | OUTPATIENT
Start: 2023-04-07 | End: 2023-04-07

## 2023-04-07 RX ORDER — FUROSEMIDE 10 MG/ML
80 INJECTION INTRAMUSCULAR; INTRAVENOUS
Status: DISCONTINUED | OUTPATIENT
Start: 2023-04-07 | End: 2023-04-09

## 2023-04-07 RX ORDER — IBUPROFEN 200 MG
1 TABLET ORAL DAILY
Status: DISCONTINUED | OUTPATIENT
Start: 2023-04-07 | End: 2023-04-11 | Stop reason: HOSPADM

## 2023-04-07 RX ADMIN — ATORVASTATIN CALCIUM 40 MG: 40 TABLET, FILM COATED ORAL at 08:04

## 2023-04-07 RX ADMIN — APIXABAN 5 MG: 2.5 TABLET, FILM COATED ORAL at 09:04

## 2023-04-07 RX ADMIN — POTASSIUM CHLORIDE 40 MEQ: 1500 TABLET, EXTENDED RELEASE ORAL at 09:04

## 2023-04-07 RX ADMIN — SACUBITRIL AND VALSARTAN 1 TABLET: 24; 26 TABLET, FILM COATED ORAL at 09:04

## 2023-04-07 RX ADMIN — NICOTINE 1 PATCH: 14 PATCH, EXTENDED RELEASE TRANSDERMAL at 09:04

## 2023-04-07 RX ADMIN — CARVEDILOL 6.25 MG: 3.12 TABLET, FILM COATED ORAL at 08:04

## 2023-04-07 RX ADMIN — APIXABAN 5 MG: 2.5 TABLET, FILM COATED ORAL at 08:04

## 2023-04-07 RX ADMIN — BICTEGRAVIR SODIUM, EMTRICITABINE, AND TENOFOVIR ALAFENAMIDE FUMARATE 1 TABLET: 50; 200; 25 TABLET ORAL at 10:04

## 2023-04-07 RX ADMIN — POTASSIUM CHLORIDE 40 MEQ: 1500 TABLET, EXTENDED RELEASE ORAL at 08:04

## 2023-04-07 RX ADMIN — FUROSEMIDE 80 MG: 10 INJECTION, SOLUTION INTRAMUSCULAR; INTRAVENOUS at 09:04

## 2023-04-07 RX ADMIN — CARVEDILOL 6.25 MG: 3.12 TABLET, FILM COATED ORAL at 09:04

## 2023-04-07 RX ADMIN — NICOTINE 1 PATCH: 14 PATCH, EXTENDED RELEASE TRANSDERMAL at 01:04

## 2023-04-07 RX ADMIN — SACUBITRIL AND VALSARTAN 1 TABLET: 24; 26 TABLET, FILM COATED ORAL at 08:04

## 2023-04-07 RX ADMIN — ASPIRIN 81 MG: 81 TABLET, COATED ORAL at 09:04

## 2023-04-07 RX ADMIN — POTASSIUM CHLORIDE 40 MEQ: 1500 TABLET, EXTENDED RELEASE ORAL at 01:04

## 2023-04-07 RX ADMIN — ATOVAQUONE 1500 MG: 750 SUSPENSION ORAL at 10:04

## 2023-04-07 NOTE — H&P
[facility] Wards History & Physical Note     Resident Team: [facility] List 1  Attending Physician: Jose Maria Narayanan MD      Date of Admit: 4/6/2023    Chief Complaint     Leg Swelling (Chronic BLE edema and SOB)      Subjective:      History of Present Illness:  Jason Perdue is a 63 y.o.  male with a past medical history HIV, heart failure with reduced ejection fraction (EF 10%), severe aortic stenosis, atrial fibrillation on eliquis, B-cell lymphoma, hypertension, hyperlipidemia, tobacco use, crack cocaine use who presents to [facility] Emergency Department with 2 day history of progressive bilateral lower extremity edema, 2 pillow orthopnea, and dyspnea on exertion.  He denies shortness or breath at rest, chest pain, palpitations, PND, dizziness, headache, weakness.  He has been compliant with his medications, though has been drinking more water due to increasing temperatures.  Last use of cocaine was roughly 5 days ago.  He was recently discharged from this hospital on 03/26/2023 for CHF exacerbation.    In the emergency department, initial vitals significant for hypotension and hypoxia which both self resolved with rest.  Labs event for normocytic anemia, hypo kalemia, metabolic acidosis, elevated BUN/creatinine from baseline, elevated BNP 10,600.  Urine drug screen positive for cocaine.  Chest x-ray shows pulmonary vascular congestion as well as mild right-sided pleural effusion.    Internal Medicine service was consulted for admission due to CHF exacerbation.    Past Medical History:  Past Medical History:   Diagnosis Date    Cancer     CHF (congestive heart failure)     Human immunodeficiency virus (HIV) disease     Hypertension        Past Surgical History:  History reviewed. No pertinent surgical history.    Family History:  History reviewed. No pertinent family history.    Social History:  Social History     Tobacco Use    Smoking status: Every Day     Packs/day: 0.50     Types:  "Cigarettes    Smokeless tobacco: Never   Substance Use Topics    Alcohol use: Not Currently    Drug use: Yes     Types: "Crack" cocaine     Comment: last taken 1 month and a half       Allergies:  Review of patient's allergies indicates:   Allergen Reactions    Ace inhibitors      Other reaction(s): Angioedema    Nitroglycerin Itching       Home Medications:  Prior to Admission medications    Medication Sig Start Date End Date Taking? Authorizing Provider   apixaban (ELIQUIS) 5 mg Tab Take 1 tablet (5 mg total) by mouth 2 (two) times daily. 3/15/23 3/14/24 Yes Darius Zhou MD   aspirin (ECOTRIN) 81 MG EC tablet Take 1 tablet (81 mg total) by mouth once daily. 1/27/23 1/27/24 Yes David Posey DO   atorvastatin (LIPITOR) 40 MG tablet Take 1 tablet (40 mg total) by mouth every evening. 1/26/23 1/26/24 Yes David Posey DO   atovaquone (MEPRON) 750 mg/5 mL Susp oral liquid Take 10 mLs (1,500 mg total) by mouth once daily. 3/23/23  Yes SAVAGE Dockery   wclbupduf-puebywdg-rykkqiz ala (BIKTARVY) -25 mg (25 kg or greater) Take 1 tablet by mouth once daily. 3/23/23  Yes SAVAGE Dockery   carvediloL (COREG) 12.5 MG tablet Take 1 tablet (12.5 mg total) by mouth 2 (two) times daily. 1/26/23 1/26/24 Yes David Posey DO   food supplemt, lactose-reduced Liqd Take 1 Bottle by mouth.   Yes Historical Provider   furosemide (LASIX) 20 MG tablet Take 3 tablets (60 mg total) by mouth once daily. 1/26/23 1/26/24 Yes David Posey DO   megestroL (MEGACE) 400 mg/10 mL (40 mg/mL) Susp Take 200 mg by mouth.   Yes Historical Provider   polyethylene glycol 3350,bulk, Powd by Other route.   Yes Historical Provider   sacubitriL-valsartan (ENTRESTO) 24-26 mg per tablet Take 1 tablet by mouth 2 (two) times daily. 3/15/23 3/14/24 Yes Darius Zhou MD   VITAMIN D2 1,250 mcg (50,000 unit) capsule Take 1 capsule (50,000 Units total) by mouth every 7 days. 3/23/23  Yes SAVAGE Dockery   famotidine (PEPCID) 20 MG " "tablet Take 1 tablet (20 mg total) by mouth 2 (two) times daily. 9/11/22 10/11/22  Zana Tatum MD         Review of Systems:  Pertinent positive and negatives listed in HPI         Objective:   Last 24 Hour Vital Signs:  BP  Min: 98/76  Max: 99/71  Temp  Av.3 °F (36.3 °C)  Min: 97.3 °F (36.3 °C)  Max: 97.3 °F (36.3 °C)  Pulse  Av.5  Min: 42  Max: 83  Resp  Av.5  Min: 17  Max: 22  SpO2  Av %  Min: 100 %  Max: 100 %  Height  Av' 11" (180.3 cm)  Min: 5' 11" (180.3 cm)  Max: 5' 11" (180.3 cm)  Body mass index is 29.92 kg/m².  No intake/output data recorded.    Physical Examination:  Gen:  Resting comfortably in bed, no acute distress  HEENT:  PERRLA, EOM intact, muddy brown sclera.  Nares patent without rhinorrhea.  Oropharynx clear  Neck:  No cervical lymphadenopathy  Heart:  S1, S2, regular rate and rhythm.  Systolic ejection murmur radiating to the carotids.  JVD and hepatojugular reflux present.  Bilateral radial and DP pulses palpable.  There is 3+ pitting edema to knees bilaterally.  Lungs:  Speaking in full sentences, breathing is nonlabored.  Crackles heard bilateral lung bases  Abd:  Soft, nontender and nondistended  MSK:  No obvious deformity, moving all extremities  Neuro:  Alert, oriented to person place situation.  Responding appropriately to questions and commands.  Skin:  No wounds or rashes    Laboratory:  Most Recent Data:  CBC:   Lab Results   Component Value Date    WBC 6.4 2023    HGB 11.5 (L) 2023    HCT 36.0 (L) 2023     2023    MCV 84.5 2023    RDW 17.2 (H) 2023       BMP:   Lab Results   Component Value Date     (L) 2023    K 3.3 (L) 2023    CO2 21 (L) 2023    BUN 39.6 (H) 2023    CREATININE 1.85 (H) 2023    CALCIUM 9.2 2023    MG 2.30 2023    PHOS 4.2 2023     Thyroid:   Lab Results   Component Value Date    TSH 2.893 2023      Cardiac:   Lab Results   Component Value " Date    TROPONINI 0.037 04/06/2023    BNP 10,645.7 (H) 04/06/2023     Urinalysis:   Lab Results   Component Value Date    COLORU LIGHT YELLOW 12/26/2018    PHUA 5.0 04/06/2023    NITRITE Negative 12/26/2018    KETONESU Negative 12/26/2018    UROBILINOGEN 1+ (A) 04/06/2023    WBCUA 0-5 04/06/2023           Other Results:  EKG (my interpretation):  Sinus rhythm, left axis deviation, LVH, nonspecific T-wave changes    Radiology:  Imaging Results              X-Ray Chest 1 View (Preliminary result)  Result time 04/06/23 23:00:32   Procedure changed from X-Ray Chest PA And Lateral     Wet Read by Ralf Newsome MD (04/06/23 23:00:32, [facility] - Emergency Dept, Emergency Medicine)    CM/ worsened right pleural effusion                                     Assessment & Plan:     Acute on chronic CHF exacerbation       - Last EF 12%, BNP 10,600 significant elevation from previous       - IV Lasix 80 given in ED, will continue BID       - continue home Entresto 24-26, decrease Coreg dose to 6.25 BID due to low blood pressures.        - Stict I/Os, daily weights, fluid and sodium restriction, elevate HOB       - Monitor diuretic response, may require cardiac inotrope with severe reduced EF    HOLLIS-prerenal (distributive)  - BUN/creatinine 39.6/1.85 for previous baseline 37.2/1.36  - Repeat BMP in AM    Hypokalemia  Hyponatremia   -chronic diuretic use   -will give 40 mEq p.o. Kcl   - Repeat BMP in AM    Metabolic acidosis  - likely due to vascular congestion/hypoperfusion. Should improve with diuresis    Severe aortic stenosis  Atrial fibrillation- rate controlled.  Continue home Eliquis  Hypertension- decreased home Coreg dose from 12-6.25 in setting of low blood pressures  Hyperlipidemia- continue home atorvastatin    Cocaine use  Tobacco use       - positive UDS       - provided with nicotine patch upon request        - recommend tobacco cessation program/possible drug detox or rehab    HIV- continue home  Senthil and frederick        CODE STATUS: Full  Access: PIV  Antibiotics: N/A  Diet:  Cardiac, sodium restriction  DVT Prophylaxis: Home anticoagulation, SCDs      Disposition:  Admit to telemetry for CHF exacerbation          Kike Chadwick MD  [facility] -2

## 2023-04-07 NOTE — PLAN OF CARE
Problem: Adult Inpatient Plan of Care  Goal: Plan of Care Review  Outcome: Ongoing, Progressing  Flowsheets (Taken 4/7/2023 0736)  Plan of Care Reviewed With: patient  Goal: Absence of Hospital-Acquired Illness or Injury  Outcome: Ongoing, Progressing  Intervention: Identify and Manage Fall Risk  Flowsheets (Taken 4/7/2023 0736)  Safety Promotion/Fall Prevention:   assistive device/personal item within reach   side rails raised x 2   instructed to call staff for mobility   supervised activity  Intervention: Prevent Skin Injury  Flowsheets (Taken 4/7/2023 0736)  Body Position: position changed independently  Intervention: Prevent Infection  Flowsheets (Taken 4/7/2023 0736)  Infection Prevention:   hand hygiene promoted   single patient room provided   rest/sleep promoted  Goal: Optimal Comfort and Wellbeing  Outcome: Ongoing, Progressing  Intervention: Monitor Pain and Promote Comfort  Flowsheets (Taken 4/7/2023 0736)  Pain Management Interventions:   around-the-clock dosing utilized   quiet environment facilitated   relaxation techniques promoted  Intervention: Provide Person-Centered Care  Flowsheets (Taken 4/7/2023 0736)  Trust Relationship/Rapport:   care explained   questions encouraged   choices provided   reassurance provided   emotional support provided   empathic listening provided   thoughts/feelings acknowledged   questions answered

## 2023-04-07 NOTE — PLAN OF CARE
Pt seen and examined during rounds.   Admitted for CHF exacerbation with most recent EF of 12% based on echo 2 months ago, BNP 55463, increased from 9000, 2 weeks ago when he was admitted last. Pt has been compliant with GDMT. UDS + cocaine, h/o HIV with CD4 of 59.       On physical exam, vitals stable  Stable on room air  Diminished breath sounds R lower lung field, correlates with pleural effusion seen on chest x-ray  Regular rate and rhythm, no murmurs  3+ pitting edema to BLE  Abdomen mildly distended without guarding or rigidity    Plan:   Continue with IV diuresis wit Lasix 80mg bid (home dose is 60mg daily). Strick I/O, daily weight, sodium < 1500mg daily. Keep Coreg at half dose of 6.25 bid. Will need goals of care conversation. Resume other home meds.     Unique Lozano M.D.  [facility] PGY-2

## 2023-04-07 NOTE — PROGRESS NOTES
[facility] Medicine  Progress Note      Patient Name: Jason Perdue  : 1960  MRN: [id number]  Patient Class: IP- Inpatient   Admission Date: 2023   Length of Stay: 1  Admitting Service: Hospital Medicine  Attending Physician: Jose Maria Narayanan MD  PCP: Earl Aguilar MD    CHIEF COMPLAINT   Hospital follow up    HOSPITAL COURSE     Brief HPI:  Pt with h/o HFrEF (EF 12% in 2023), HIV with CD4 of 59, and h/o  cocaine use presented on ED on 23 for 2 days history of increased bilateral leg swelling. Pt recently admitted for CHF exacerbation on 3/24, discharged on 3/26. Currently BNP 81691, increased from 9000, 2 weeks ago when he was admitted last. Pt has been compliant with GDMT (on home Lasix 60 daily). UDS + cocaine, CXR with vascular congestion and R pleural effusion.     Received 80mg IV lasix in ED  Admitted to IM service for CHF exacerbation.     Interval History:  Pt reports feeling well this morning, however not too much improvement in his leg swelling. Denies SOB, CP or GI/ issues. Stable on room air.     OBJECTIVE/PHYSICAL EXAM     VITAL SIGNS (MOST RECENT):  Temp: 97.4 °F (36.3 °C) (23 0740)  Pulse: 77 (23 0740)  Resp: 16 (23 0009)  BP: 90/66 (23 0740)  SpO2: 99 % (23 0753) VITAL SIGNS (24 HOUR RANGE):  Temp:  [97.4 °F (36.3 °C)-97.7 °F (36.5 °C)]   Pulse:  [42-86]   Resp:  [16-17]   BP: ()/(63-79)   SpO2:  [99 %-100 %]      Physical Exam  Vitals reviewed.   Constitutional:       General: He is awake. He is not in acute distress.  Cardiovascular:      Rate and Rhythm: Normal rate and regular rhythm.      Heart sounds: Normal heart sounds.   Pulmonary:      Effort: Pulmonary effort is normal.      Breath sounds: Examination of the right-middle field reveals decreased breath sounds. Examination of the right-lower field reveals decreased breath sounds. Decreased breath sounds present. No rhonchi or rales.   Abdominal:       General: Bowel sounds are normal. There is distension.      Palpations: Abdomen is soft.      Tenderness: There is no abdominal tenderness.   Musculoskeletal:      Right lower leg: 3+ Edema present.      Left lower leg: 3+ Edema present.   Skin:     General: Skin is warm and dry.   Neurological:      General: No focal deficit present.      Mental Status: He is alert.   Psychiatric:         Attention and Perception: Attention normal.         Mood and Affect: Mood normal.       LABS/MICRO/MEDS/DIAGNOSTICS     LABS  CBC  Recent Labs     04/06/23 2217   WBC 6.4   RBC 4.26*   HGB 11.5*   HCT 36.0*   MCV 84.5   MCH 27.0   MCHC 31.9*   RDW 17.2*        Anemia  No results for input(s): HAPTOGLOBIN, FERRITIN, IRON, TIBC, HATMASQB07, FOLATE in the last 72 hours.  Coags  No results for input(s): INR, APTT, D-DIMER in the last 72 hours.  Cardiac  Recent Labs     04/06/23 2217   BNP 10,645.7*      TROPONINI 0.037     ABG/Lactate  No results for input(s): PH, PCO2, PO2, HCO3, POCSATURATED, BE, LACTIC in the last 72 hours.    BMP  Recent Labs     04/06/23 2217 04/07/23 0338   * 134*   K 3.3* 3.3*   CHLORIDE 103 104   CO2 21* 21*   BUN 39.6* 39.0*   CREATININE 1.85* 1.79*   GLUCOSE 97 123*   CALCIUM 9.2 8.9   MG  --  2.00   PHOS  --  3.8     LFTs  Recent Labs     04/06/23 2217   ALBUMIN 3.1*   GLOBULIN 4.9*   ALKPHOS 105   ALT 13   AST 31   BILITOT 1.5     Inflammatory Markers  No results for input(s): CRP, LDH, ESR in the last 72 hours.  Lipid  No results for input(s): CHOL, TRIG, LDL, VLDL, HDL in the last 72 hours.  Diabetes  Recent Labs     04/06/23 2217 04/07/23  0338   GLUCOSE 97 123*     Thyroid  No results for input(s): TSH, FREET4 in the last 72 hours.     INTAKE/OUTPUT    Intake/Output Summary (Last 24 hours) at 4/7/2023 0943  Last data filed at 4/7/2023 0010  Gross per 24 hour   Intake --   Output 200 ml   Net -200 ml        MICROBIOLOGY  Microbiology Results (last 7 days)       ** No results  found for the last 168 hours. **             MEDICATIONS   apixaban  5 mg Oral BID    aspirin  81 mg Oral Daily    atorvastatin  40 mg Oral QHS    atovaquone  1,500 mg Oral Daily    lbdngvwas-jarrylvn-pjrydcp ala  1 tablet Oral Daily    carvediloL  6.25 mg Oral BID    furosemide (LASIX) injection  80 mg Intravenous Q12H    nicotine  1 patch Transdermal Daily    potassium bicarbonate  60 mEq Oral Once    potassium chloride  40 mEq Oral BID    sacubitriL-valsartan  1 tablet Oral BID         INFUSIONS       DIAGNOSTIC TESTS  X-Ray Chest 1 View   ED Interpretation   CM/ worsened right pleural effusion      Final Result      Small right pleural effusion with right basilar atelectasis versus pneumonia.      Enlarged cardiac silhouette without overt alveolar edema.         Electronically signed by: Sangeeta Wadsworth   Date:    04/07/2023   Time:    07:54           EF   Date Value Ref Range Status   01/24/2023 12 % Final          ASSESSMENT/PLAN     Acute on chronic CHF exacerbation       - Last EF 12%, BNP 10,600 significant elevation from previous       - IV Lasix 80 given in ED, will continue IV Lasix 80 mg BID       - continue home Entresto 24-26, decrease Coreg dose to 6.25 BID d       - Stict I/Os, daily weights, fluid and sodium restriction, elevate HOB       - Monitor diuretic response, may require cardiac inotrope with severe reduced EF    2. Hypokalemia  -replete as necessary  -likely 2/2 to chronic diuretic use  -given 40 in ED, ordered 60 mEq more to be given today    2. H/o HIV  -continue home Biktarvy and Atovaquone    3. H/o A-fib  -Rate controlled  -continue home Eliquis    4. H/o HTN, HLD  -continue home atorvastation  -half dose Coreg (6.25mg bid)    Access: PIV  Antibiotics: none  Diet: low sodium  DVT Prophylaxis: on Eliquis  GI Prophylaxis: None  Fluids: none     Anticipated discharge and disposition: Continue with IV diuresis and closely monitor status for adequate response. Likely d/c 4/9. Will need  goals of care conversation.   ___________________________________________________________    GRACIELA Robledo  PGY-2

## 2023-04-07 NOTE — ED PROVIDER NOTES
"Encounter Date: 4/6/2023       History     Chief Complaint   Patient presents with    Leg Swelling     Chronic BLE edema and SOB     History significant for congestive heart failure, HIV managed with chronic medication, hypertension, cocaine abuse, B cell lymphoma in the past presents with recurrent bilateral lower extremity edema, similar to recent circumstances requiring hospitalization here a few weeks ago.  Chronic dyspnea without much interval change.  No fever or chest pain, does find lower extremity swelling is uncomfortable and feels he needs IV diuresis and admission for this again.  States he is compliant with medications including HIV medicines.  Continues to smoke.  No other specific complaints.    The history is provided by the patient. No  was used.   Review of patient's allergies indicates:   Allergen Reactions    Ace inhibitors      Other reaction(s): Angioedema    Nitroglycerin Itching     Past Medical History:   Diagnosis Date    Cancer     CHF (congestive heart failure)     Human immunodeficiency virus (HIV) disease     Hypertension      History reviewed. No pertinent surgical history.  History reviewed. No pertinent family history.  Social History     Tobacco Use    Smoking status: Every Day     Packs/day: 0.50     Types: Cigarettes    Smokeless tobacco: Never   Substance Use Topics    Alcohol use: Not Currently    Drug use: Yes     Types: "Crack" cocaine     Comment: last taken 1 month and a half     Review of Systems   Constitutional:  Negative for chills and fever.   HENT:  Negative for congestion, facial swelling, nosebleeds and sinus pressure.    Eyes:  Negative for pain and redness.   Respiratory:  Positive for shortness of breath. Negative for chest tightness and wheezing.    Cardiovascular:  Positive for leg swelling. Negative for chest pain and palpitations.   Gastrointestinal:  Negative for abdominal distention, abdominal pain, diarrhea, nausea and vomiting. "   Endocrine: Negative for cold intolerance, polydipsia and polyphagia.   Genitourinary:  Negative for difficulty urinating, dysuria, frequency and hematuria.   Musculoskeletal:  Negative for arthralgias, back pain, myalgias and neck pain.   Skin:  Negative for color change and rash.   Neurological:  Negative for dizziness, weakness, numbness and headaches.   Hematological:  Negative for adenopathy. Does not bruise/bleed easily.   Psychiatric/Behavioral:  Negative for agitation and behavioral problems.    All other systems reviewed and are negative.    Physical Exam     Initial Vitals [04/06/23 2135]   BP Pulse Resp Temp SpO2   99/71 83 (!) 22 97.3 °F (36.3 °C) 100 %      MAP       --         Physical Exam    Nursing note and vitals reviewed.  Constitutional: He appears well-developed and well-nourished. He is not diaphoretic. No distress.   Obese/ mildly chronically ill-appearing/ mild discomfort   HENT:   Head: Normocephalic and atraumatic.   Mouth/Throat: Oropharynx is clear and moist. No oropharyngeal exudate.   Eyes: Conjunctivae and EOM are normal. Pupils are equal, round, and reactive to light. Right eye exhibits no discharge. Left eye exhibits no discharge. No scleral icterus.   Neck: Neck supple. No thyromegaly present. No tracheal deviation present. No JVD present.   Normal range of motion.  Cardiovascular:  Normal rate and regular rhythm.     Exam reveals no gallop and no friction rub.       No murmur heard.  Distant heart tones; significant JVD   Pulmonary/Chest: No respiratory distress. He has no wheezes. He has no rhonchi. He has no rales. He exhibits no tenderness.   Dull at bases; no rales   Abdominal: Abdomen is soft. Bowel sounds are normal. He exhibits no distension and no mass. There is no abdominal tenderness. There is no rebound and no guarding.   Musculoskeletal:         General: Edema present. No tenderness. Normal range of motion.      Cervical back: Normal range of motion and neck supple.       Comments: 3(+) BLE pitting edema to thighs     Lymphadenopathy:     He has no cervical adenopathy.   Neurological: He is alert and oriented to person, place, and time. He has normal strength. No cranial nerve deficit.   Skin: Skin is warm and dry. No rash noted. No erythema.   Psychiatric: He has a normal mood and affect. His behavior is normal. Judgment and thought content normal.       ED Course   Procedures  Labs Reviewed   COMPREHENSIVE METABOLIC PANEL - Abnormal; Notable for the following components:       Result Value    Sodium Level 134 (*)     Potassium Level 3.3 (*)     Carbon Dioxide 21 (*)     Blood Urea Nitrogen 39.6 (*)     Creatinine 1.85 (*)     Protein Total 8.0 (*)     Albumin Level 3.1 (*)     Globulin 4.9 (*)     Albumin/Globulin Ratio 0.6 (*)     All other components within normal limits   URINALYSIS, REFLEX TO URINE CULTURE - Abnormal; Notable for the following components:    Protein, UA Trace (*)     Urobilinogen, UA 1+ (*)     Bacteria, UA Trace (*)     Squamous Epithelial Cells, UA Trace (*)     Mucous, UA Trace (*)     Hyaline Casts, UA 11-20 (*)     All other components within normal limits   DRUG SCREEN, URINE (BEAKER) - Abnormal; Notable for the following components:    Cocaine, Urine Positive (*)     All other components within normal limits    Narrative:     Cut off concentrations:    Amphetamines - 1000 ng/ml  Barbiturates - 200 ng/ml  Benzodiazepine - 200 ng/ml  Cannabinoids (THC) - 50 ng/ml  Cocaine - 300 ng/ml  Fentanyl - 1.0 ng/ml  MDMA - 500 ng/ml  Opiates - 300 ng/ml   Phencyclidine (PCP) - 25 ng/ml    Specimen submitted for drug analysis and tested for pH and specific gravity in order to evaluate sample integrity. Suspect tampering if specific gravity is <1.003 and/or pH is not within the range of 4.5 - 8.0  False negatives may result form substances such as bleach added to urine.  False positives may result for the presence of a substance with similar chemical structure to  the drug or its metabolite.    This test provides only a PRELIMINARY analytical test result. A more specific alternate chemical method must be used in order to obtain a confirmed analytical result. Gas chromatography/mass spectrometry (GC/MS) is the preferred confirmatory method. Other chemical confirmation methods are available. Clinical consideration and professional judgement should be applied to any drug of abuse test result, particularly when preliminary positive results are used.    Positive results will be confirmed only at the physicians request. Unconfirmed screening results are to be used only for medical purposes (treatment).        B-TYPE NATRIURETIC PEPTIDE - Abnormal; Notable for the following components:    Natriuretic Peptide 10,645.7 (*)     All other components within normal limits   CBC WITH DIFFERENTIAL - Abnormal; Notable for the following components:    RBC 4.26 (*)     Hgb 11.5 (*)     Hct 36.0 (*)     MCHC 31.9 (*)     RDW 17.2 (*)     MPV 11.6 (*)     All other components within normal limits   ALCOHOL,MEDICAL (ETHANOL) - Normal   TROPONIN I - Normal   CK - Normal   CBC W/ AUTO DIFFERENTIAL    Narrative:     The following orders were created for panel order CBC auto differential.  Procedure                               Abnormality         Status                     ---------                               -----------         ------                     CBC with Differential[[id number]]        Abnormal            Final result                 Please view results for these tests on the individual orders.     EKG Readings: (Independently Interpreted)   Initial Reading: No STEMI. Rhythm: Normal Sinus Rhythm. Heart Rate: 78. Ectopy: No Ectopy.   Normal sinus rhythm with slightly noisy baseline, left axis deviation, old inferior and anteroseptal infarction likely, no acute ischemic change.  Poor anterior R-wave progression.     Imaging Results              X-Ray Chest 1 View (Preliminary result)   Result time 04/06/23 23:00:32   Procedure changed from X-Ray Chest PA And Lateral     Wet Read by Ralf Newsome MD (04/06/23 23:00:32, [facility], Emergency Medicine)    CM/ worsened right pleural effusion                                     Medications   sodium chloride 0.9% flush 10 mL (has no administration in time range)   furosemide tablet 80 mg (has no administration in time range)   apixaban tablet 5 mg (has no administration in time range)   aspirin EC tablet 81 mg (has no administration in time range)   atorvastatin tablet 40 mg (has no administration in time range)   hnuxptkat-aadiadxl-pziwskh ala -25 mg (25 kg or greater) 1 tablet (has no administration in time range)   atovaquone 750 mg/5 mL oral liquid 1,500 mg (has no administration in time range)   carvediloL tablet 6.25 mg (has no administration in time range)   sacubitriL-valsartan 24-26 mg per tablet 1 tablet (has no administration in time range)   furosemide injection 80 mg (80 mg Intravenous Given 4/6/23 2227)       11:00 PM Consulted Int [facility] for admit.                      Medical Decision Making  Problems Addressed:  Congestive heart failure, unspecified HF chronicity, unspecified heart failure type: chronic illness or injury with exacerbation, progression, or side effects of treatment  Pleural effusion on right: chronic illness or injury with exacerbation, progression, or side effects of treatment    Amount and/or Complexity of Data Reviewed  External Data Reviewed: labs, radiology, ECG and notes.  Labs: ordered. Decision-making details documented in ED Course.  Radiology: ordered and independent interpretation performed. Decision-making details documented in ED Course.  ECG/medicine tests: ordered and independent interpretation performed. Decision-making details documented in ED Course.             Clinical Impression:   Final diagnoses:  [R06.02] SOB (shortness of breath)  [I50.9] Congestive heart failure,  unspecified HF chronicity, unspecified heart failure type (Primary)  [R60.0] Bilateral lower extremity edema  [E87.70] Hypervolemia, unspecified hypervolemia type  [J90] Pleural effusion on right  [N17.9] HOLLIS (acute kidney injury)        ED Disposition Condition    Admit                 Ralf Newsome MD  04/06/23 7392       Ralf Newsome MD  04/06/23 9401

## 2023-04-07 NOTE — PROGRESS NOTES
"Inpatient Nutrition Evaluation    Admit Date: 2023   Total duration of encounter: 1 day    Nutrition Recommendation/Prescription     Continue cardiac diet  Pt education on diet/complete  MVI/fe  Daily wt  Will monitor nutrition status     Nutrition Assessment     Chart Review    Reason Seen: continuous nutrition monitoring    Malnutrition Screening Tool Results                Diagnosis:  SOB, CHF, B LE edema, pleural effusion, HOLLIS     Relevant Medical History: CHF, HIV, HTN, cocaine use, B cell lymphoma     Nutrition-Related Medications: ASA, atorvastatin, furosemide, Kcl     Nutrition-Related Labs:  () H/H 11.5/36.0  () Gluc 123 Bun 39 Cr 1.7 GFR 42 K 3.3(L)     Diet Order: Diet heart healthy  Oral Supplement Order: none  Appetite/Oral Intake: good/% of meals  Factors Affecting Nutritional Intake: shortness of breath  Food/Roman [employer]/Cultural Preferences: none reported  Food Allergies: none reported       Wound(s):   none reported     Comments    () Pt reported he is tolerating oral diet; good appetite; no N/V; SOB improving; wt elevated approx 15#--edema/fluid; on furosemide. Encouraged diet adherence. Pt is obese--BMI 32.     Anthropometrics    Height: 5' 11" (180.3 cm) Height Method: Stated  Last Weight: 105.1 kg (231 lb 9.6 oz) (23) Weight Method: Bed Scale  BMI (Calculated): 32.3  BMI Classification: obese grade I (BMI 30-34.9)        Ideal Body Weight (IBW), Male: 172 lb                       Usual Body Weight (UBW), k kg  % Usual Body Weight: 107.42     Usual Weight Provided By: patient and EMR weight history    Wt Readings from Last 5 Encounters:   23 105.1 kg (231 lb 9.6 oz)   23 97.3 kg (214 lb 8.1 oz)   23 98 kg (216 lb)   03/15/23 92.1 kg (203 lb)   23 92.7 kg (204 lb 5.9 oz)     Weight Change(s) Since Admission:  Admit Weight: 105.1 kg (231 lb 9.6 oz) (23)  Wt elevated/ fluid     Patient Education    Education Provided: heart healthy " diet  Teaching Method: explanation and printed materials  Comprehension: verbalizes understanding  Barriers to Learning: none evident  Expected Compliance: fair  Comments: All questions were answered and dietitian's contact information was provided.     Monitoring & Evaluation     Dietitian will monitor food and beverage intake and weight.  Nutrition Risk/Follow-Up: low (follow-up in 5-7 days)  Patients assigned 'low nutrition risk' status do not qualify for a full nutritional assessment but will be monitored and re-evaluated in a 5-7 day time period. Please consult if re-evaluation needed sooner.

## 2023-04-08 LAB
ANION GAP SERPL CALC-SCNC: 10 MEQ/L
ANION GAP SERPL CALC-SCNC: 7 MEQ/L
BASOPHILS # BLD AUTO: 0.03 X10(3)/MCL (ref 0–0.2)
BASOPHILS NFR BLD AUTO: 0.5 %
BUN SERPL-MCNC: 36.7 MG/DL (ref 8.4–25.7)
BUN SERPL-MCNC: 36.8 MG/DL (ref 8.4–25.7)
CALCIUM SERPL-MCNC: 9.1 MG/DL (ref 8.8–10)
CALCIUM SERPL-MCNC: 9.4 MG/DL (ref 8.8–10)
CHLORIDE SERPL-SCNC: 105 MMOL/L (ref 98–107)
CHLORIDE SERPL-SCNC: 106 MMOL/L (ref 98–107)
CO2 SERPL-SCNC: 21 MMOL/L (ref 23–31)
CO2 SERPL-SCNC: 25 MMOL/L (ref 23–31)
CREAT SERPL-MCNC: 1.46 MG/DL (ref 0.73–1.18)
CREAT SERPL-MCNC: 1.49 MG/DL (ref 0.73–1.18)
CREAT/UREA NIT SERPL: 25
CREAT/UREA NIT SERPL: 25
EOSINOPHIL # BLD AUTO: 0.07 X10(3)/MCL (ref 0–0.9)
EOSINOPHIL NFR BLD AUTO: 1.1 %
ERYTHROCYTE [DISTWIDTH] IN BLOOD BY AUTOMATED COUNT: 16.8 % (ref 11.5–17)
GFR SERPLBLD CREATININE-BSD FMLA CKD-EPI: 53 MLS/MIN/1.73/M2
GFR SERPLBLD CREATININE-BSD FMLA CKD-EPI: 54 MLS/MIN/1.73/M2
GLUCOSE SERPL-MCNC: 100 MG/DL (ref 82–115)
GLUCOSE SERPL-MCNC: 102 MG/DL (ref 82–115)
HCT VFR BLD AUTO: 35.6 % (ref 42–52)
HGB BLD-MCNC: 11.2 G/DL (ref 14–18)
IMM GRANULOCYTES # BLD AUTO: 0.02 X10(3)/MCL (ref 0–0.04)
IMM GRANULOCYTES NFR BLD AUTO: 0.3 %
LYMPHOCYTES # BLD AUTO: 1.36 X10(3)/MCL (ref 0.6–4.6)
LYMPHOCYTES NFR BLD AUTO: 21.3 %
MAGNESIUM SERPL-MCNC: 2.1 MG/DL (ref 1.6–2.6)
MCH RBC QN AUTO: 26.5 PG (ref 27–31)
MCHC RBC AUTO-ENTMCNC: 31.5 G/DL (ref 33–36)
MCV RBC AUTO: 84.4 FL (ref 80–94)
MONOCYTES # BLD AUTO: 0.79 X10(3)/MCL (ref 0.1–1.3)
MONOCYTES NFR BLD AUTO: 12.4 %
NEUTROPHILS # BLD AUTO: 4.12 X10(3)/MCL (ref 2.1–9.2)
NEUTROPHILS NFR BLD AUTO: 64.4 %
NRBC BLD AUTO-RTO: 0 %
PHOSPHATE SERPL-MCNC: 3.5 MG/DL (ref 2.3–4.7)
PLATELET # BLD AUTO: 180 X10(3)/MCL (ref 130–400)
PMV BLD AUTO: 12.4 FL (ref 7.4–10.4)
POTASSIUM SERPL-SCNC: 4.3 MMOL/L (ref 3.5–5.1)
POTASSIUM SERPL-SCNC: 4.4 MMOL/L (ref 3.5–5.1)
RBC # BLD AUTO: 4.22 X10(6)/MCL (ref 4.7–6.1)
SODIUM SERPL-SCNC: 137 MMOL/L (ref 136–145)
SODIUM SERPL-SCNC: 137 MMOL/L (ref 136–145)
WBC # SPEC AUTO: 6.4 X10(3)/MCL (ref 4.5–11.5)

## 2023-04-08 PROCEDURE — 25000003 PHARM REV CODE 250: Performed by: STUDENT IN AN ORGANIZED HEALTH CARE EDUCATION/TRAINING PROGRAM

## 2023-04-08 PROCEDURE — 63600175 PHARM REV CODE 636 W HCPCS: Performed by: STUDENT IN AN ORGANIZED HEALTH CARE EDUCATION/TRAINING PROGRAM

## 2023-04-08 PROCEDURE — 97162 PT EVAL MOD COMPLEX 30 MIN: CPT

## 2023-04-08 PROCEDURE — 80048 BASIC METABOLIC PNL TOTAL CA: CPT | Performed by: STUDENT IN AN ORGANIZED HEALTH CARE EDUCATION/TRAINING PROGRAM

## 2023-04-08 PROCEDURE — 94761 N-INVAS EAR/PLS OXIMETRY MLT: CPT

## 2023-04-08 PROCEDURE — S4991 NICOTINE PATCH NONLEGEND: HCPCS | Performed by: STUDENT IN AN ORGANIZED HEALTH CARE EDUCATION/TRAINING PROGRAM

## 2023-04-08 PROCEDURE — 21400001 HC TELEMETRY ROOM

## 2023-04-08 PROCEDURE — 84100 ASSAY OF PHOSPHORUS: CPT | Performed by: STUDENT IN AN ORGANIZED HEALTH CARE EDUCATION/TRAINING PROGRAM

## 2023-04-08 PROCEDURE — 83735 ASSAY OF MAGNESIUM: CPT | Performed by: STUDENT IN AN ORGANIZED HEALTH CARE EDUCATION/TRAINING PROGRAM

## 2023-04-08 PROCEDURE — 25000003 PHARM REV CODE 250: Performed by: INTERNAL MEDICINE

## 2023-04-08 RX ADMIN — CARVEDILOL 6.25 MG: 3.12 TABLET, FILM COATED ORAL at 08:04

## 2023-04-08 RX ADMIN — APIXABAN 5 MG: 2.5 TABLET, FILM COATED ORAL at 08:04

## 2023-04-08 RX ADMIN — FUROSEMIDE 80 MG: 10 INJECTION, SOLUTION INTRAMUSCULAR; INTRAVENOUS at 09:04

## 2023-04-08 RX ADMIN — ASPIRIN 81 MG: 81 TABLET, COATED ORAL at 08:04

## 2023-04-08 RX ADMIN — ATORVASTATIN CALCIUM 40 MG: 40 TABLET, FILM COATED ORAL at 09:04

## 2023-04-08 RX ADMIN — SACUBITRIL AND VALSARTAN 1 TABLET: 24; 26 TABLET, FILM COATED ORAL at 08:04

## 2023-04-08 RX ADMIN — ATOVAQUONE 1500 MG: 750 SUSPENSION ORAL at 08:04

## 2023-04-08 RX ADMIN — BICTEGRAVIR SODIUM, EMTRICITABINE, AND TENOFOVIR ALAFENAMIDE FUMARATE 1 TABLET: 50; 200; 25 TABLET ORAL at 08:04

## 2023-04-08 RX ADMIN — NICOTINE 1 PATCH: 14 PATCH, EXTENDED RELEASE TRANSDERMAL at 08:04

## 2023-04-08 RX ADMIN — APIXABAN 5 MG: 2.5 TABLET, FILM COATED ORAL at 09:04
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
## 2023-04-08 NOTE — PLAN OF CARE
Problem: Adult Inpatient Plan of Care  Goal: Plan of Care Review  Outcome: Ongoing, Progressing  Flowsheets (Taken 4/8/2023 [id number])  Plan of Care Reviewed With: patient  Goal: Patient-Specific Goal (Individualized)  Outcome: Ongoing, Progressing  Goal: Absence of Hospital-Acquired Illness or Injury  Outcome: Ongoing, Progressing  Goal: Optimal Comfort and Wellbeing  Outcome: Ongoing, Progressing  Goal: Readiness for Transition of Care  Outcome: Ongoing, Progressing     Problem: Fluid Imbalance (Pneumonia)  Goal: Fluid Balance  Outcome: Ongoing, Progressing     Problem: Infection (Pneumonia)  Goal: Resolution of Infection Signs and Symptoms  Outcome: Ongoing, Progressing     Problem: Respiratory Compromise (Pneumonia)  Goal: Effective Oxygenation and Ventilation  Outcome: Ongoing, Progressing

## 2023-04-08 NOTE — PROGRESS NOTES
[facility] Medicine  Progress Note      Patient Name: Jason Perdue  : 1960  MRN: [id number]  Patient Class: IP- Inpatient   Admission Date: 2023   Length of Stay: 2  Admitting Service: Hospital Medicine  Attending Physician: Jose Maria Narayanan MD  PCP: Earl Aguilar MD    CHIEF COMPLAINT   Hospital follow up    HOSPITAL COURSE     Brief HPI:  Pt with h/o HFrEF (EF 12% in 2023), HIV with CD4 of 59, and h/o  cocaine use presented on ED on 23 for 2 days history of increased bilateral leg swelling. Pt recently admitted for CHF exacerbation on 3/24, discharged on 3/26. Currently BNP 02451, increased from 9000, 2 weeks ago when he was admitted last. Pt has been compliant with GDMT (on home Lasix 60 daily). UDS + cocaine, CXR with vascular congestion and R pleural effusion.     Received 80mg IV lasix in ED  Admitted to IM service for CHF exacerbation.     Interval History:  Evening dose of Lasix 80mg held due to hypotension (82/57)  Pt reports feeling well this morning with improvement in his leg swelling. Denies SOB, CP or GI/ issues. Stable on room air. Breath sounds much improved since yesterday. Total of 600cc urine output last night, however he did not receive his evening lasix.     OBJECTIVE/PHYSICAL EXAM     VITAL SIGNS (MOST RECENT):  Temp: 97.4 °F (36.3 °C) (23 0745)  Pulse: 87 (23 0745)  Resp: 16 (23 0009)  BP: 105/76 (23 0935)  SpO2: 99 % (23 0800) VITAL SIGNS (24 HOUR RANGE):  Temp:  [97.4 °F (36.3 °C)-98.1 °F (36.7 °C)]   Pulse:  [74-87]   BP: ()/(72-78)   SpO2:  [98 %-99 %]      Physical Exam  Vitals reviewed.   Constitutional:       General: He is awake. He is not in acute distress.  Cardiovascular:      Rate and Rhythm: Normal rate and regular rhythm.      Heart sounds: Normal heart sounds.   Pulmonary:      Effort: Pulmonary effort is normal.      Breath sounds: No rhonchi or rales.   Abdominal:      General: Bowel  sounds are normal. There is distension.      Palpations: Abdomen is soft.      Tenderness: There is no abdominal tenderness.   Musculoskeletal:      Right lower leg: 3+ Edema present.      Left lower leg: 3+ Edema present.   Skin:     General: Skin is warm and dry.   Neurological:      General: No focal deficit present.      Mental Status: He is alert.   Psychiatric:         Attention and Perception: Attention normal.         Mood and Affect: Mood normal.       LABS/MICRO/MEDS/DIAGNOSTICS     LABS  CBC  Recent Labs     04/06/23 2217 04/07/23  1620   WBC 6.4 6.4   RBC 4.26* 4.22*   HGB 11.5* 11.2*   HCT 36.0* 35.6*   MCV 84.5 84.4   MCH 27.0 26.5*   MCHC 31.9* 31.5*   RDW 17.2* 16.8    180       Anemia  No results for input(s): HAPTOGLOBIN, FERRITIN, IRON, TIBC, [id number], FOLATE in the last 72 hours.  Coags  No results for input(s): INR, APTT, D-DIMER in the last 72 hours.  Cardiac  Recent Labs     04/06/23 2217   BNP 10,645.7*      TROPONINI 0.037       ABG/Lactate  No results for input(s): PH, PCO2, PO2, HCO3, POCSATURATED, BE, LACTIC in the last 72 hours.    BMP  Recent Labs     04/07/23 0338 04/07/23 1615 04/08/23  0441   * 135* 137   K 3.3* 4.1 4.3   CHLORIDE 104 106 106   CO2 21* 22* 21*   BUN 39.0* 37.1* 36.8*   CREATININE 1.79* 1.59* 1.46*   GLUCOSE 123* 108 100   CALCIUM 8.9 9.3 9.1   MG 2.00 2.10 2.10   PHOS 3.8  --  3.5       LFTs  Recent Labs     04/06/23 2217   ALBUMIN 3.1*   GLOBULIN 4.9*   ALKPHOS 105   ALT 13   AST 31   BILITOT 1.5       Inflammatory Markers  No results for input(s): CRP, LDH, ESR in the last 72 hours.  Lipid  No results for input(s): CHOL, TRIG, LDL, VLDL, HDL in the last 72 hours.  Diabetes  Recent Labs     04/07/23 0338 04/07/23  1615 04/08/23  0441   GLUCOSE 123* 108 100       Thyroid  No results for input(s): TSH, FREET4 in the last 72 hours.     INTAKE/OUTPUT    Intake/Output Summary (Last 24 hours) at 4/8/2023 0956  Last data filed at 4/8/2023  0600  Gross per 24 hour   Intake --   Output 1250 ml   Net -1250 ml          MICROBIOLOGY  Microbiology Results (last 7 days)       ** No results found for the last 168 hours. **             MEDICATIONS   apixaban  5 mg Oral BID    aspirin  81 mg Oral Daily    atorvastatin  40 mg Oral QHS    atovaquone  1,500 mg Oral Daily    lubzpmlik-ymsrcyxt-tflsunw ala  1 tablet Oral Daily    carvediloL  6.25 mg Oral BID    furosemide (LASIX) injection  80 mg Intravenous Q12H    nicotine  1 patch Transdermal Daily    sacubitriL-valsartan  1 tablet Oral BID         INFUSIONS       DIAGNOSTIC TESTS  X-Ray Chest 1 View   ED Interpretation   CM/ worsened right pleural effusion      Final Result      Small right pleural effusion with right basilar atelectasis versus pneumonia.      Enlarged cardiac silhouette without overt alveolar edema.         Electronically signed by: Sangeeta Wadsworth   Date:    04/07/2023   Time:    07:54           EF   Date Value Ref Range Status   01/24/2023 12 % Final          ASSESSMENT/PLAN     Acute on chronic CHF exacerbation       - Last EF 12%, BNP 10,600 significant elevation from previous       - IV Lasix 80 given in ED, will continue IV Lasix 80 mg BID       - continue home Entresto 24-26 unless hypotensive, then can hold it   -decrease Coreg dose to 6.25 BID        - Stict I/Os, daily weights, fluid and sodium restriction, elevate HOB       - Monitor diuretic response    2. Hypokalemia  -replete as necessary  -likely 2/2 to chronic diuretic use  -BMP today at 1600    2. H/o HIV  -continue home Biktarvy and Atovaquone    3. H/o A-fib  -Rate controlled  -continue home Eliquis    4. H/o HTN, HLD  -continue home atorvastation  -half dose Coreg (6.25mg bid)    Access: PIV  Antibiotics: none  Diet: low sodium  DVT Prophylaxis: on Eliquis  GI Prophylaxis: None  Fluids: none     Anticipated discharge and disposition: Continue with IV diuresis and closely monitor status for adequate response. Likely d/c 4/9.  Will need goals of care conversation.   ___________________________________________________________    GRACIELA Robledo  PGY-2

## 2023-04-09 LAB
ANION GAP SERPL CALC-SCNC: 8 MEQ/L
BUN SERPL-MCNC: 35.6 MG/DL (ref 8.4–25.7)
CALCIUM SERPL-MCNC: 9.2 MG/DL (ref 8.8–10)
CHLORIDE SERPL-SCNC: 108 MMOL/L (ref 98–107)
CO2 SERPL-SCNC: 22 MMOL/L (ref 23–31)
CREAT SERPL-MCNC: 1.4 MG/DL (ref 0.73–1.18)
CREAT/UREA NIT SERPL: 25
GFR SERPLBLD CREATININE-BSD FMLA CKD-EPI: 57 MLS/MIN/1.73/M2
GLUCOSE SERPL-MCNC: 117 MG/DL (ref 82–115)
MAGNESIUM SERPL-MCNC: 2.1 MG/DL (ref 1.6–2.6)
PHOSPHATE SERPL-MCNC: 3.8 MG/DL (ref 2.3–4.7)
POTASSIUM SERPL-SCNC: 4.1 MMOL/L (ref 3.5–5.1)
SODIUM SERPL-SCNC: 138 MMOL/L (ref 136–145)

## 2023-04-09 PROCEDURE — 84100 ASSAY OF PHOSPHORUS: CPT | Performed by: STUDENT IN AN ORGANIZED HEALTH CARE EDUCATION/TRAINING PROGRAM

## 2023-04-09 PROCEDURE — 21400001 HC TELEMETRY ROOM

## 2023-04-09 PROCEDURE — 80048 BASIC METABOLIC PNL TOTAL CA: CPT | Performed by: STUDENT IN AN ORGANIZED HEALTH CARE EDUCATION/TRAINING PROGRAM

## 2023-04-09 PROCEDURE — S4991 NICOTINE PATCH NONLEGEND: HCPCS | Performed by: STUDENT IN AN ORGANIZED HEALTH CARE EDUCATION/TRAINING PROGRAM

## 2023-04-09 PROCEDURE — 25000003 PHARM REV CODE 250: Performed by: STUDENT IN AN ORGANIZED HEALTH CARE EDUCATION/TRAINING PROGRAM

## 2023-04-09 PROCEDURE — 25000003 PHARM REV CODE 250: Performed by: INTERNAL MEDICINE

## 2023-04-09 PROCEDURE — 83735 ASSAY OF MAGNESIUM: CPT | Performed by: STUDENT IN AN ORGANIZED HEALTH CARE EDUCATION/TRAINING PROGRAM

## 2023-04-09 PROCEDURE — 63600175 PHARM REV CODE 636 W HCPCS: Performed by: STUDENT IN AN ORGANIZED HEALTH CARE EDUCATION/TRAINING PROGRAM

## 2023-04-09 PROCEDURE — 94761 N-INVAS EAR/PLS OXIMETRY MLT: CPT

## 2023-04-09 RX ORDER — FUROSEMIDE 10 MG/ML
60 INJECTION INTRAMUSCULAR; INTRAVENOUS ONCE
Status: DISCONTINUED | OUTPATIENT
Start: 2023-04-09 | End: 2023-04-10

## 2023-04-09 RX ORDER — FUROSEMIDE 10 MG/ML
60 INJECTION INTRAMUSCULAR; INTRAVENOUS ONCE
Status: COMPLETED | OUTPATIENT
Start: 2023-04-09 | End: 2023-04-09

## 2023-04-09 RX ADMIN — NICOTINE 1 PATCH: 14 PATCH, EXTENDED RELEASE TRANSDERMAL at 09:04

## 2023-04-09 RX ADMIN — ATOVAQUONE 1500 MG: 750 SUSPENSION ORAL at 09:04

## 2023-04-09 RX ADMIN — BICTEGRAVIR SODIUM, EMTRICITABINE, AND TENOFOVIR ALAFENAMIDE FUMARATE 1 TABLET: 50; 200; 25 TABLET ORAL at 09:04

## 2023-04-09 RX ADMIN — ATORVASTATIN CALCIUM 40 MG: 40 TABLET, FILM COATED ORAL at 08:04

## 2023-04-09 RX ADMIN — ASPIRIN 81 MG: 81 TABLET, COATED ORAL at 09:04

## 2023-04-09 RX ADMIN — APIXABAN 5 MG: 2.5 TABLET, FILM COATED ORAL at 09:04

## 2023-04-09 RX ADMIN — FUROSEMIDE 60 MG: 10 INJECTION, SOLUTION INTRAMUSCULAR; INTRAVENOUS at 08:04

## 2023-04-09 RX ADMIN — APIXABAN 5 MG: 2.5 TABLET, FILM COATED ORAL at 08:04

## 2023-04-09 NOTE — PROGRESS NOTES
[facility] Medicine  Progress Note      Patient Name: Jason Perdue  : 1960  MRN: [id number]  Patient Class: IP- Inpatient   Admission Date: 2023   Length of Stay: 3  Admitting Service: Hospital Medicine  Attending Physician: Jose Maria Narayanan MD  PCP: Earl Aguilar MD    CHIEF COMPLAINT   Leg swelling, SOB    HOSPITAL COURSE     Brief HPI:  Pt with h/o HFrEF (EF 12% in 2023), HIV with CD4 of 59, and h/o  cocaine use presented on ED on 23 for 2 days history of increased bilateral leg swelling. Pt recently admitted for CHF exacerbation on 3/24, discharged on 3/26. Currently BNP 09684, increased from 9000, 2 weeks ago when he was admitted last. Pt has been compliant with GDMT (on home Lasix 60 daily). UDS + cocaine, CXR with vascular congestion and R pleural effusion.     Received 80mg IV lasix in ED  Admitted to IM service for CHF exacerbation.     Interval History:  Evening dose of Lasix 80mg held due to hypotension  Pt reports feeling well this morning with improvement in his leg swelling. Denies SOB, CP or GI/ issues. Stable on room air, however does admit to gasping for air at night and needing supplemental O2 this morning. Breath sounds more diminished on the R lower base.   Leg edema improved from yesterday.     OBJECTIVE/PHYSICAL EXAM     VITAL SIGNS (MOST RECENT):  Temp: 97.5 °F (36.4 °C) (23 1600)  Pulse: 76 (23 1600)  Resp: 20 (23 1600)  BP: 95/71 (23 1600)  SpO2: 98 % (23 1600) VITAL SIGNS (24 HOUR RANGE):  Temp:  [97.5 °F (36.4 °C)-98 °F (36.7 °C)]   Pulse:  [75-86]   Resp:  [18-20]   BP: ()/(55-73)   SpO2:  [95 %-98 %]      Physical Exam  Vitals reviewed.   Constitutional:       General: He is awake. He is not in acute distress.  Cardiovascular:      Rate and Rhythm: Normal rate and regular rhythm.      Heart sounds: Normal heart sounds.   Pulmonary:      Effort: Pulmonary effort is normal.      Breath sounds: No  rhonchi or rales.   Abdominal:      General: Bowel sounds are normal. There is distension.      Palpations: Abdomen is soft.      Tenderness: There is no abdominal tenderness.   Musculoskeletal:      Right lower leg: 3+ Edema present.      Left lower leg: 3+ Edema present.   Skin:     General: Skin is warm and dry.   Neurological:      General: No focal deficit present.      Mental Status: He is alert.   Psychiatric:         Attention and Perception: Attention normal.         Mood and Affect: Mood normal.       LABS/MICRO/MEDS/DIAGNOSTICS     LABS  CBC  Recent Labs     04/06/23 2217 04/07/23  1620   WBC 6.4 6.4   RBC 4.26* 4.22*   HGB 11.5* 11.2*   HCT 36.0* 35.6*   MCV 84.5 84.4   MCH 27.0 26.5*   MCHC 31.9* 31.5*   RDW 17.2* 16.8    180       Anemia  No results for input(s): HAPTOGLOBIN, FERRITIN, IRON, TIBC, [id number], FOLATE in the last 72 hours.  Coags  No results for input(s): INR, APTT, D-DIMER in the last 72 hours.  Cardiac  Recent Labs     04/06/23 2217   BNP 10,645.7*      TROPONINI 0.037       ABG/Lactate  No results for input(s): PH, PCO2, PO2, HCO3, POCSATURATED, BE, LACTIC in the last 72 hours.    BMP  Recent Labs     04/08/23 0441 04/08/23  1552 04/09/23  0317    137 138   K 4.3 4.4 4.1   CHLORIDE 106 105 108*   CO2 21* 25 22*   BUN 36.8* 36.7* 35.6*   CREATININE 1.46* 1.49* 1.40*   GLUCOSE 100 102 117*   CALCIUM 9.1 9.4 9.2   MG 2.10  --  2.10   PHOS 3.5  --  3.8       LFTs  Recent Labs     04/06/23 2217   ALBUMIN 3.1*   GLOBULIN 4.9*   ALKPHOS 105   ALT 13   AST 31   BILITOT 1.5       Inflammatory Markers  No results for input(s): CRP, LDH, ESR in the last 72 hours.  Lipid  No results for input(s): CHOL, TRIG, LDL, VLDL, HDL in the last 72 hours.  Diabetes  Recent Labs     04/08/23 0441 04/08/23  1552 04/09/23  0317   GLUCOSE 100 102 117*       Thyroid  No results for input(s): TSH, FREET4 in the last 72 hours.     INTAKE/OUTPUT    Intake/Output Summary (Last 24 hours) at  4/9/2023 1735  Last data filed at 4/9/2023 1114  Gross per 24 hour   Intake 120 ml   Output 500 ml   Net -380 ml          MICROBIOLOGY  Microbiology Results (last 7 days)       ** No results found for the last 168 hours. **             MEDICATIONS   apixaban  5 mg Oral BID    aspirin  81 mg Oral Daily    atorvastatin  40 mg Oral QHS    atovaquone  1,500 mg Oral Daily    lavcvcvrb-qcwsvtbd-hamocjh ala  1 tablet Oral Daily    carvediloL  6.25 mg Oral BID    furosemide (LASIX) injection  60 mg Intravenous Once    nicotine  1 patch Transdermal Daily    sacubitriL-valsartan  1 tablet Oral BID         INFUSIONS       DIAGNOSTIC TESTS  X-Ray Chest PA And Lateral   Final Result      Interval size increase of right pleural effusion and further right lower lung zone atelectasis.         Electronically signed by: Romulo Ayala   Date:    04/09/2023   Time:    10:52      X-Ray Chest 1 View   ED Interpretation   CM/ worsened right pleural effusion      Final Result      Small right pleural effusion with right basilar atelectasis versus pneumonia.      Enlarged cardiac silhouette without overt alveolar edema.         Electronically signed by: Sangeeta Wadsworth   Date:    04/07/2023   Time:    07:54           EF   Date Value Ref Range Status   01/24/2023 12 % Final          ASSESSMENT/PLAN     Acute on chronic CHF exacerbation       - Last EF 12%, BNP 10,600 significant elevation from previous       - IV Lasix 80 given in ED, will continue IV Lasix 80 mg BID       - continue home Entresto 24-26 unless hypotensive, then can hold it   -decrease Coreg dose to 6.25 BID        - Stict I/Os, daily weights, fluid and sodium restriction, elevate HOB       - Monitor diuretic response    2. Pleural effusion   -worsened from admission   -continue with IV diuresis, re-assess in the am   -consider thoracentesis if not improved    3. Hypokalemia  -replete as necessary  -likely 2/2 to chronic diuretic use      4. H/o HIV  -continue home Biktarvy  and Atovaquone    5. H/o A-fib  -Rate controlled  -continue home Eliquis    6. H/o HTN, HLD  -continue home atorvastation  -half dose Coreg (6.25mg bid)    Access: PIV  Antibiotics: none  Diet: low sodium  DVT Prophylaxis: on Eliquis  GI Prophylaxis: None  Fluids: none     Anticipated discharge and disposition: Continue with IV diuresis and closely monitor status for adequate response. Likely d/c 4/10. Will need goals of care conversation.   ___________________________________________________________    Unique Lozano M.D.  [facility] PGY-2

## 2023-04-10 LAB
ANION GAP SERPL CALC-SCNC: 9 MEQ/L
BUN SERPL-MCNC: 30.7 MG/DL (ref 8.4–25.7)
CALCIUM SERPL-MCNC: 9 MG/DL (ref 8.8–10)
CHLORIDE SERPL-SCNC: 106 MMOL/L (ref 98–107)
CO2 SERPL-SCNC: 25 MMOL/L (ref 23–31)
CREAT SERPL-MCNC: 1.35 MG/DL (ref 0.73–1.18)
CREAT/UREA NIT SERPL: 23
GFR SERPLBLD CREATININE-BSD FMLA CKD-EPI: 59 MLS/MIN/1.73/M2
GLUCOSE SERPL-MCNC: 111 MG/DL (ref 82–115)
MAGNESIUM SERPL-MCNC: 2 MG/DL (ref 1.6–2.6)
PHOSPHATE SERPL-MCNC: 3.7 MG/DL (ref 2.3–4.7)
POTASSIUM SERPL-SCNC: 4 MMOL/L (ref 3.5–5.1)
SODIUM SERPL-SCNC: 140 MMOL/L (ref 136–145)

## 2023-04-10 PROCEDURE — 94761 N-INVAS EAR/PLS OXIMETRY MLT: CPT

## 2023-04-10 PROCEDURE — 21400001 HC TELEMETRY ROOM

## 2023-04-10 PROCEDURE — 97116 GAIT TRAINING THERAPY: CPT

## 2023-04-10 PROCEDURE — 97166 OT EVAL MOD COMPLEX 45 MIN: CPT

## 2023-04-10 PROCEDURE — 83735 ASSAY OF MAGNESIUM: CPT | Performed by: STUDENT IN AN ORGANIZED HEALTH CARE EDUCATION/TRAINING PROGRAM

## 2023-04-10 PROCEDURE — S4991 NICOTINE PATCH NONLEGEND: HCPCS | Performed by: STUDENT IN AN ORGANIZED HEALTH CARE EDUCATION/TRAINING PROGRAM

## 2023-04-10 PROCEDURE — 63600175 PHARM REV CODE 636 W HCPCS: Performed by: STUDENT IN AN ORGANIZED HEALTH CARE EDUCATION/TRAINING PROGRAM

## 2023-04-10 PROCEDURE — 97530 THERAPEUTIC ACTIVITIES: CPT

## 2023-04-10 PROCEDURE — 80048 BASIC METABOLIC PNL TOTAL CA: CPT | Performed by: STUDENT IN AN ORGANIZED HEALTH CARE EDUCATION/TRAINING PROGRAM

## 2023-04-10 PROCEDURE — 25000003 PHARM REV CODE 250: Performed by: INTERNAL MEDICINE

## 2023-04-10 PROCEDURE — 93005 ELECTROCARDIOGRAM TRACING: CPT

## 2023-04-10 PROCEDURE — 25000003 PHARM REV CODE 250: Performed by: STUDENT IN AN ORGANIZED HEALTH CARE EDUCATION/TRAINING PROGRAM

## 2023-04-10 PROCEDURE — 84100 ASSAY OF PHOSPHORUS: CPT | Performed by: STUDENT IN AN ORGANIZED HEALTH CARE EDUCATION/TRAINING PROGRAM

## 2023-04-10 RX ORDER — FUROSEMIDE 10 MG/ML
40 INJECTION INTRAMUSCULAR; INTRAVENOUS ONCE
Status: COMPLETED | OUTPATIENT
Start: 2023-04-10 | End: 2023-04-10

## 2023-04-10 RX ADMIN — BICTEGRAVIR SODIUM, EMTRICITABINE, AND TENOFOVIR ALAFENAMIDE FUMARATE 1 TABLET: 50; 200; 25 TABLET ORAL at 08:04

## 2023-04-10 RX ADMIN — APIXABAN 5 MG: 2.5 TABLET, FILM COATED ORAL at 08:04

## 2023-04-10 RX ADMIN — ATORVASTATIN CALCIUM 40 MG: 40 TABLET, FILM COATED ORAL at 09:04

## 2023-04-10 RX ADMIN — FUROSEMIDE 40 MG: 10 INJECTION, SOLUTION INTRAMUSCULAR; INTRAVENOUS at 09:04

## 2023-04-10 RX ADMIN — FUROSEMIDE 40 MG: 10 INJECTION, SOLUTION INTRAMUSCULAR; INTRAVENOUS at 02:04

## 2023-04-10 RX ADMIN — APIXABAN 5 MG: 2.5 TABLET, FILM COATED ORAL at 09:04

## 2023-04-10 RX ADMIN — ATOVAQUONE 1500 MG: 750 SUSPENSION ORAL at 08:04

## 2023-04-10 RX ADMIN — ASPIRIN 81 MG: 81 TABLET, COATED ORAL at 08:04

## 2023-04-10 RX ADMIN — NICOTINE 1 PATCH: 14 PATCH, EXTENDED RELEASE TRANSDERMAL at 08:04

## 2023-04-10 NOTE — PROGRESS NOTES
[facility] Medicine  Progress Note      Patient Name: Jason Perdue  : 1960  MRN: [id number]  Patient Class: IP- Inpatient   Admission Date: 2023   Length of Stay: 4  Admitting Service: Hospital Medicine  Attending Physician: Jose Maria Narayanan MD  PCP: Earl Aguilar MD    CHIEF COMPLAINT   Leg swelling, SOB    HOSPITAL COURSE     Brief HPI:  Pt with h/o HFrEF (EF 12% in 2023), HIV with CD4 of 59, and h/o  cocaine use presented on ED on 23 for 2 days history of increased bilateral leg swelling. Pt recently admitted for CHF exacerbation on 3/24, discharged on 3/26. Currently BNP 71575, increased from 9000, 2 weeks ago when he was admitted last. Pt has been compliant with GDMT (on home Lasix 60 daily). UDS + cocaine, CXR with vascular congestion and R pleural effusion.     Received 80mg IV lasix in ED  Admitted to IM service for CHF exacerbation.       Interval History:    Pt reports feeling well this morning with improvement in his leg swelling. Admits to some orthopnea early morning, better with sitting forward. Remains asymptomatic with his hypotension, no light headednes, dizziness or CP or GI/ issues. Stable on room air. Breath sounds improved this morning, no crackles. Leg edema improved from yesterday.   1L urine output overnight.     OBJECTIVE/PHYSICAL EXAM     VITAL SIGNS (MOST RECENT):  Temp: 98.3 °F (36.8 °C) (04/10/23 1100)  Pulse: 101 (04/10/23 1415)  Resp: 20 (23 1600)  BP: 103/72 (04/10/23 1415)  SpO2: 97 % (04/10/23 1100) VITAL SIGNS (24 HOUR RANGE):  Temp:  [98.3 °F (36.8 °C)]   Pulse:  []   BP: ()/(63-74)   SpO2:  [96 %-98 %]      Physical Exam  Vitals reviewed.   Constitutional:       General: He is awake. He is not in acute distress.  Cardiovascular:      Rate and Rhythm: Normal rate and regular rhythm.      Heart sounds: Normal heart sounds.   Pulmonary:      Effort: Pulmonary effort is normal.      Breath sounds: Examination of  the right-lower field reveals decreased breath sounds. Decreased breath sounds (mild) present. No rhonchi or rales.   Abdominal:      General: Bowel sounds are normal. There is distension.      Palpations: Abdomen is soft.      Tenderness: There is no abdominal tenderness.   Musculoskeletal:      Right lower le+ Edema present.      Left lower le+ Edema present.   Skin:     General: Skin is warm and dry.   Neurological:      General: No focal deficit present.      Mental Status: He is alert.   Psychiatric:         Attention and Perception: Attention normal.         Mood and Affect: Mood normal.       LABS/MICRO/MEDS/DIAGNOSTICS     LABS  CBC  Recent Labs     23  1620   WBC 6.4   RBC 4.22*   HGB 11.2*   HCT 35.6*   MCV 84.4   MCH 26.5*   MCHC 31.5*   RDW 16.8          Anemia  No results for input(s): HAPTOGLOBIN, FERRITIN, IRON, TIBC, [id number], FOLATE in the last 72 hours.  Coags  No results for input(s): INR, APTT, D-DIMER in the last 72 hours.  Cardiac  No results for input(s): BNP, CPK, TROPONINI in the last 72 hours.    ABG/Lactate  No results for input(s): PH, PCO2, PO2, HCO3, POCSATURATED, BE, LACTIC in the last 72 hours.    BMP  Recent Labs     04/09/23  0317 04/10/23  013    140   K 4.1 4.0   CHLORIDE 108* 106   CO2 22* 25   BUN 35.6* 30.7*   CREATININE 1.40* 1.35*   GLUCOSE 117* 111   CALCIUM 9.2 9.0   MG 2.10 2.00   PHOS 3.8 3.7       LFTs  No results for input(s): ALBUMIN, GLOBULIN, ALKPHOS, ALT, AST, BILITOT, LIPASE in the last 72 hours.    Inflammatory Markers  No results for input(s): CRP, LDH, ESR in the last 72 hours.  Lipid  No results for input(s): CHOL, TRIG, LDL, VLDL, HDL in the last 72 hours.  Diabetes  Recent Labs     23  1552 237 04/10/23  0132   GLUCOSE 102 117* 111       Thyroid  No results for input(s): TSH, FREET4 in the last 72 hours.     INTAKE/OUTPUT    Intake/Output Summary (Last 24 hours) at 4/10/2023 1428  Last data filed at 4/10/2023  0643  Gross per 24 hour   Intake 240 ml   Output 1725 ml   Net -1485 ml          MICROBIOLOGY  Microbiology Results (last 7 days)       ** No results found for the last 168 hours. **             MEDICATIONS   apixaban  5 mg Oral BID    aspirin  81 mg Oral Daily    atorvastatin  40 mg Oral QHS    atovaquone  1,500 mg Oral Daily    kufzzangc-dcbtywvs-txedajt ala  1 tablet Oral Daily    furosemide (LASIX) injection  40 mg Intravenous Once    nicotine  1 patch Transdermal Daily         INFUSIONS       DIAGNOSTIC TESTS  X-Ray Chest PA And Lateral   Final Result      Interval size increase of right pleural effusion and further right lower lung zone atelectasis.         Electronically signed by: Romulo Ayala   Date:    04/09/2023   Time:    10:52      X-Ray Chest 1 View   ED Interpretation   CM/ worsened right pleural effusion      Final Result      Small right pleural effusion with right basilar atelectasis versus pneumonia.      Enlarged cardiac silhouette without overt alveolar edema.         Electronically signed by: Sangeeta Wadsworth   Date:    04/07/2023   Time:    07:54           EF   Date Value Ref Range Status   01/24/2023 12 % Final          ASSESSMENT/PLAN     Acute on chronic CHF exacerbation       - Last EF 12%, BNP 10,600 significant elevation from previous       - continue IV lasix 40mg       - holding Coreg and Entresto       - will switch Coreg to Metoprolol succinate 12.5mg daily at discharge, will likely have less effect on BP       - will also start on Jardiance as part of GDMT upon discharge       - Stict I/Os, daily weights, fluid and sodium restriction, elevate HOB       - Monitor diuretic response    2. Pleural effusion   -stable   -continue with IV diuresis, re-assess in the am    3. Hypokalemia-resolved  -replete as necessary  -likely 2/2 to chronic diuretic use      4. H/o HIV  -continue home Biktarvy and Atovaquone    5. H/o A-fib  -Rate controlled  -continue home Eliquis    6. H/o HTN,  HLD  -continue home atorvastatin      Access: PIV  Antibiotics: none  Diet: low sodium  DVT Prophylaxis: on Eliquis  GI Prophylaxis: None  Fluids: none     Anticipated discharge and disposition: Continue with IV diuresis and closely monitor status for adequate response. Pt is showing signs of improvement, however prefers to be observed for 1 more night. Anticipate discharge on 4/11. Pt has appt with cardiology in May  ___________________________________________________________    Unique Lozano M.D.  [employer]  PGY-2

## 2023-04-10 NOTE — CARE UPDATE
Called to bedside by nurse for low BP of 78/55. Pt resting comfortably, in NAD. Denies any dizziness or lightheadedness. Lungs CTA bilaterally; card exam with RRR, no murmurs. Repeat BP in room with mild improvement to 82/62, MAP 69. Will cont to monitor and repeat BP check in 1 hr. If remains low, will give 250cc bolus due to present HF exacerbation treatment

## 2023-04-10 NOTE — PT/OT/SLP EVAL
Occupational Therapy   Evaluation    Name: Jason Perdue  MRN: [id number]  Admitting Diagnosis: HFrEF (heart failure with reduced ejection fraction)  Patient Active Problem List   Diagnosis    Diffuse large B-cell lymphoma    HIV (human immunodeficiency virus infection)    Hepatitis B    B12 deficiency    Polysubstance abuse    Severe aortic stenosis    HFrEF (heart failure with reduced ejection fraction)    A-fib    Community acquired pneumonia of right lower lobe of lung    Acute on chronic congestive heart failure    Tobacco user    CAD (coronary artery disease)    HLD (hyperlipidemia)    Anxiety      Recent Surgery: * No surgery found *      Recommendations:     Discharge Recommendations: home  Discharge Equipment Recommendations:  walker, rolling  Barriers to discharge:  Other (Comment) (endurance)    Assessment:     Jason Perdue is a 63 y.o. male with a medical diagnosis of HFrEF (heart failure with reduced ejection fraction).  He presents with functional decline. Performance deficits affecting function: impaired endurance, impaired self care skills, impaired functional mobility, gait instability, impaired balance.      Pt presents with decreased endurance and LE fatigue during OOB activities impacting safety during functional mobility and self care tasks .     Rehab Prognosis: Good; patient would benefit from acute skilled OT services to address these deficits and reach maximum level of function.       Plan:     Patient to be seen  (Mon-Fri 2-5 times per week) to address the above listed problems via self-care/home management, therapeutic activities, therapeutic exercises  Plan of Care Expires: 04/17/23  Plan of Care Reviewed with: patient    Subjective     Chief Complaint: fatigue ; LE weakness during standing and mobility tasks   Patient/Family Comments/goals: return home at [facility]    Occupational Profile:  Living Environment: Pt lives alone in SS house with no steps and tub/shower combo with shower chair; Pts  sister lives next door and has 3 steps ; pt goes to her house multiple times a day for meals  Previous level of function: independent basic and I ADL's  Roles and Routines: Pt does not drive ; pt walks ~ 1/2 mile to store and multiple times daily next door to sisters; pt performs household chores; pt does not cook ; pt is recently retired .  Equipment Used at Home: shower chair  Assistance upon Discharge: sister    Pain/Comfort:  Pain Rating 1: 0/10  Pain Rating Post-Intervention 1: 0/10    Patients cultural, spiritual, Faith conflicts given the current situation: no    Objective:     Communicated with: nurse Zepeda prior to session.  Patient found HOB elevated with peripheral IV, telemetry upon OT entry to room.    General Precautions: Standard, fall  Orthopedic Precautions: N/A  Braces: N/A  Respiratory Status: Room air    VITALS  Presession   Post session  BP 99/72   97/72  HR 94   94  O2 98%   100%    Occupational Performance:    Bed Mobility:    Patient completed Supine to Sit with independence    Functional Mobility/Transfers:  Patient completed Sit <> Stand Transfer with supervision  with  no assistive device   Patient completed Bed <> Chair Transfer using Stand Pivot technique with supervision with rolling walker  Patient completed Toilet Transfer Stand Pivot technique with minimum assistance with  grab bars; note that toilet seat is low in pts room   Functional Mobility: Pt ambulated with SBA without AD ~ 6 feet  bed to lavatory ; pt expressed LE fatigue after standing for grooming tasks ~ 1 1/2 -2 min which required pt leaning on sink and or wall ; pt given RW and pt ambulated with supervision ~ 15 feet lavatory to toilet and ~ 10 feet toilet to bedside chair    Activities of Daily Living:  Grooming: supervision standing at sink   Lower Body Dressing: supervision    Toileting: supervision with use grab bar for clothing mgt and mod I hygiene     Cognitive/Visual Perceptual:  Cognitive/Psychosocial  Skills:     -       Oriented to: Person, Place, Time, and Situation   -       Follows Commands/attention:Follows multistep  commands  -       Safety awareness/insight to disability: F+     Physical Exam:  Edema:  Moderate R > L LE  Sensation:    -       Intact  light/touch B UE and hands  Dominant hand: -       right  Upper Extremity Range of Motion:  -       Right Upper Extremity: WFL  -       Left Upper Extremity: WFL  Upper Extremity Strength:    -       Right Upper Extremity: WFL  -       Left Upper Extremity: WFL   Strength:    -       Right Upper Extremity: WFL  -       Left Upper Extremity: WFL  Fine Motor Coordination:    -       Intact  Left hand, finger to nose, Right hand, finger to nose, Left hand thumb/finger opposition skills, and Right hand thumb/finger opposition skills    Treatment & Education:  Pt. educated on OT goals, POC, orientation to environment, use of call bell for assist with transfers OOB or for any other needs due to fall risk.     Patient left up in chair with all lines intact, call button in reach, and nurse  notified    GOALS:   Multidisciplinary Problems       Occupational Therapy Goals          Problem: Occupational Therapy    Goal Priority Disciplines Outcome Interventions   Occupational Therapy Goal     OT, PT/OT Ongoing, Progressing    Description: Goals to be met by: 4/17/23     Patient will increase functional independence with ADLs by performing:    LE Dressing with Modified [place].  Grooming while standing at sink with Modified [place].  Toileting from toilet with Modified [place] for hygiene and clothing management.   Toilet transfer to toilet with Modified [place].                         History:     Past Medical History:   Diagnosis Date    Cancer     CHF (congestive heart failure)     Human immunodeficiency virus (HIV) disease     Hypertension        History reviewed. No pertinent surgical history.    Time Tracking:     OT Date of Treatment:  04/10/23  OT Start Time: 1100  OT Stop Time: 1130  OT Total Time (min): 30 min    Billable Minutes:Evaluation 30 min    4/10/2023

## 2023-04-10 NOTE — PLAN OF CARE
Problem: Occupational Therapy  Goal: Occupational Therapy Goal  Description: Goals to be met by: 4/17/23     Patient will increase functional independence with ADLs by performing:    LE Dressing with Modified [place].  Grooming while standing at sink with Modified [place].  Toileting from toilet with Modified [place] for hygiene and clothing management.   Toilet transfer to toilet with Modified [place].    Outcome: Ongoing, Progressing

## 2023-04-10 NOTE — PLAN OF CARE
04/10/23 1015   Discharge Assessment   Assessment Type Discharge Planning Assessment   Confirmed/corrected address, phone number and insurance Yes   Confirmed Demographics Correct on Facesheet   Source of Information patient   When was your last doctors appointment?   ([place])   Reason For Admission SOB, CHF   People in Home alone   Facility Arrived From: Home   Do you expect to return to your current living situation? Yes   Do you have help at home or someone to help you manage your care at home? No   Prior to hospitilization cognitive status: Alert/Oriented   Current cognitive status: Alert/Oriented   Equipment Currently Used at Home shower chair   Patient currently being followed by outpatient case management? No   Do you currently have service(s) that help you manage your care at home? No   Do you take prescription medications? Yes   Do you have prescription coverage? Yes   Coverage MEDICAID - [employer] [facility] [place]   Do you have any problems affording any of your prescribed medications? No   Is the patient taking medications as prescribed? yes   Who is going to help you get home at discharge? Sister Stephani Perdue [phone]   How do you get to doctors appointments? family or friend will provide   Are you on dialysis? No   Do you take coumadin? No   Discharge Plan A Home   DME Needed Upon Discharge    (Pending PT/OT eval)   Discharge Plan discussed with: Patient   Discharge Barriers Identified None   Physical Activity   On average, how many days per week do you engage in moderate to strenuous exercise (like a brisk walk)? 0 days   On average, how many minutes do you engage in exercise at this level? 0 min   Financial Resource Strain   How hard is it for you to pay for the very basics like food, housing, medical care, and heating? Not hard   Housing Stability   In the last 12 months, was there a time when you were not able to pay the mortgage or rent on time? N   In the last 12 months, was there a  time when you did not have a steady place to sleep or slept in a shelter (including now)? N   Transportation Needs   In the past 12 months, has lack of transportation kept you from medical appointments or from getting medications? no   In the past 12 months, has lack of transportation kept you from meetings, work, or from getting things needed for daily living? No   Food Insecurity   Within the past 12 months, you worried that your food would run out before you got the money to buy more. Never true   Within the past 12 months, the food you bought just didn't last and you didn't have money to get more. Never true   Stress   Do you feel stress - tense, restless, nervous, or anxious, or unable to sleep at night because your mind is troubled all the time - these days? Not at all   Social Connections   In a typical week, how many times do you talk on the phone with family, friends, or neighbors? More than 3   How often do you get together with friends or relatives? More than 3   How often do you attend Episcopalian or Muslim services? Never   Do you belong to any clubs or organizations such as Episcopalian groups, unions, fraternal or athletic groups, or school groups? No   How often do you attend meetings of the clubs or organizations you belong to? Never   Are you , , , , never , or living with a partner? Never marrie   Alcohol Use   Q1: How often do you have a drink containing alcohol? Never   Q2: How many drinks containing alcohol do you have on a typical day when you are drinking? None   Q3: How often do you have six or more drinks on one occasion? Never     Sister Stephani Perdue [phone] lives next door.

## 2023-04-10 NOTE — PT/OT/SLP PROGRESS
Physical Therapy Treatment    Patient Name:  Jason Perdue   MRN:  [id number]    Recommendations     Discharge Recommendations:   (home w/ responsible caregiver)   Discharge Equipment Recommendations: walker, rolling, shower chair   Barriers to discharge: Barriers: Severity of deficits, Level of skilled assistance required, and Decreased endurance    Assessment     Jason Perdue is a 63 y.o. male admitted with a medical diagnosis of HFrEF (heart failure with reduced ejection fraction).    Acute on chronic CHF exacerbation  Hypokalemia  H/o HIV  H/o A-fib  H/o HTN, HLD      Patient Active Problem List   Diagnosis    Diffuse large B-cell lymphoma    HIV (human immunodeficiency virus infection)    Hepatitis B    B12 deficiency    Polysubstance abuse    Severe aortic stenosis    HFrEF (heart failure with reduced ejection fraction)    A-fib    Community acquired pneumonia of right lower lobe of lung    Acute on chronic congestive heart failure    Tobacco user    CAD (coronary artery disease)    HLD (hyperlipidemia)    Anxiety     He presents with the following impairments/functional limitations:  impaired endurance, impaired self care skills, impaired functional mobility, gait instability, impaired balance.    Rehab Prognosis: Good.    Patient would benefit from continued skilled acute PT services to: address above listed impairments/functional limitations; receive patient/caregiver education; reduce fall risk; and maximize independency/safety with functional mobility.    -continued out of bed, up to chair and rolling walker ambulation w/ progression of gait distance/duration/frequency/speed - as tolerated/appropriate    Recent Surgery: * No surgery found *      Plan     During this hospitalization, patient to be seen  (3-5 times/week) to address the identified rehab impairments via gait training, therapeutic activities, therapeutic exercises and progress toward the following goals:    Plan of Care Expires:   05/06/23    Subjective     Communicated with patient's nurse Katelyn prior to session.    Patient agreeable to participate in treatment session.    Chief Complaint: weakness  Patient/Family Comments/goals: home  Pain/Comfort:  Pain Rating 1: 0/10  Pain Addressed 1: Nurse notified  Pain Rating Post-Intervention 1: 0/10    Objective     Patient found sitting in chair with telemetry, peripheral IV  upon PT entry to room.    General Precautions: Standard, fall   Orthopedic Precautions:N/A   Braces: N/A  Respiratory Status: room air    Functional Mobility:    Bed Mobility:  Seated in chair at start of session and returned to chair    Transfers:  Sit to Stand: supervision with rolling walker w/ cues for hand position, foot position and wt shifting    Patient requested toilet  Patient ambulated 10ft with rolling walker and stand by assistance.  To toilet  Patient ambulated 15ft  To sink  Hand hygiene performed by patient  5ft x1 to edge of bed  Patient sat on bed  Sitting rest x4 minutes w/ sitting balance activities - wt shifting, scooting, repositioning at edge of bed, 2nd back gown donned, bilat  socks adjusted, patient took medication (on tray table) after approved by pt's nurse    Gait:  Patient ambulated 70ft with rolling walker and stand by assistance.  Sitting rest x5 minutes  Patient ambulated 80ft  Patient sat in bedside chair    Balance:  Sit  Patient demonstrated static balance on level surface with independence with no verbal cues.  Patient demonstrated dynamic balance on level surface with independence with no verbal cues during maximal excursions.  Stand  Patient demonstrated static balance on level surface  using rolling walker with modified independence with no verbal cues.    Patient left  sitting in chair with telemetry, peripheral IV  with all lines intact, call button in reach, pt's nurse Katelyn notified, and tray table at bedside .    Goals     Multidisciplinary Problems       Physical Therapy Goals        Problem: Physical Therapy    Goal Priority Disciplines Outcome Goal Variances Interventions   Physical Therapy Goal     PT, PT/OT Ongoing, Progressing     Description: Goals to be met by: DISCHARGE     Patient will increase functional independence with mobility by performing:    -. Supine to sit with [place] - ONGOING  -. Sit to supine with [place] - ONGOING  -. Rolling to Left and Right with [place] - ONGOING  -. Sit to stand transfer with Modified [place] - ONGOING  -. Gait  x 130 feet x2 with Modified [place] using Rolling Walker - ONGOING           Time Tracking     PT Received On: 04/10/23  PT Start Time: 1139     PT Stop Time: 1203  PT Total Time (min): 24 min     Billable Minutes: Gait Training 15 and Therapeutic Activity 9    04/10/2023

## 2023-04-11 VITALS
HEART RATE: 90 BPM | HEIGHT: 71 IN | DIASTOLIC BLOOD PRESSURE: 70 MMHG | BODY MASS INDEX: 31.36 KG/M2 | WEIGHT: 224 LBS | TEMPERATURE: 98 F | SYSTOLIC BLOOD PRESSURE: 101 MMHG | OXYGEN SATURATION: 96 % | RESPIRATION RATE: 20 BRPM

## 2023-04-11 LAB
ALBUMIN SERPL-MCNC: 2.7 G/DL (ref 3.4–4.8)
ALBUMIN/GLOB SERPL: 0.6 RATIO (ref 1.1–2)
ALP SERPL-CCNC: 92 UNIT/L (ref 40–150)
ALT SERPL-CCNC: 10 UNIT/L (ref 0–55)
AST SERPL-CCNC: 23 UNIT/L (ref 5–34)
BILIRUBIN DIRECT+TOT PNL SERPL-MCNC: 1.5 MG/DL
BUN SERPL-MCNC: 25.4 MG/DL (ref 8.4–25.7)
CALCIUM SERPL-MCNC: 8.6 MG/DL (ref 8.8–10)
CHLORIDE SERPL-SCNC: 105 MMOL/L (ref 98–107)
CO2 SERPL-SCNC: 24 MMOL/L (ref 23–31)
CREAT SERPL-MCNC: 1.2 MG/DL (ref 0.73–1.18)
GFR SERPLBLD CREATININE-BSD FMLA CKD-EPI: >60 MLS/MIN/1.73/M2
GLOBULIN SER-MCNC: 4.4 GM/DL (ref 2.4–3.5)
GLUCOSE SERPL-MCNC: 84 MG/DL (ref 82–115)
MAGNESIUM SERPL-MCNC: 2 MG/DL (ref 1.6–2.6)
PHOSPHATE SERPL-MCNC: 3.8 MG/DL (ref 2.3–4.7)
POCT GLUCOSE: 83 MG/DL (ref 70–110)
POTASSIUM SERPL-SCNC: 3.6 MMOL/L (ref 3.5–5.1)
PROT SERPL-MCNC: 7.1 GM/DL (ref 5.8–7.6)
SODIUM SERPL-SCNC: 138 MMOL/L (ref 136–145)

## 2023-04-11 PROCEDURE — 63600175 PHARM REV CODE 636 W HCPCS: Performed by: STUDENT IN AN ORGANIZED HEALTH CARE EDUCATION/TRAINING PROGRAM

## 2023-04-11 PROCEDURE — 97535 SELF CARE MNGMENT TRAINING: CPT

## 2023-04-11 PROCEDURE — 80053 COMPREHEN METABOLIC PANEL: CPT | Performed by: STUDENT IN AN ORGANIZED HEALTH CARE EDUCATION/TRAINING PROGRAM

## 2023-04-11 PROCEDURE — 84100 ASSAY OF PHOSPHORUS: CPT | Performed by: STUDENT IN AN ORGANIZED HEALTH CARE EDUCATION/TRAINING PROGRAM

## 2023-04-11 PROCEDURE — 25000003 PHARM REV CODE 250: Performed by: INTERNAL MEDICINE

## 2023-04-11 PROCEDURE — S4991 NICOTINE PATCH NONLEGEND: HCPCS | Performed by: STUDENT IN AN ORGANIZED HEALTH CARE EDUCATION/TRAINING PROGRAM

## 2023-04-11 PROCEDURE — 97116 GAIT TRAINING THERAPY: CPT

## 2023-04-11 PROCEDURE — 25000003 PHARM REV CODE 250: Performed by: STUDENT IN AN ORGANIZED HEALTH CARE EDUCATION/TRAINING PROGRAM

## 2023-04-11 PROCEDURE — 83735 ASSAY OF MAGNESIUM: CPT | Performed by: STUDENT IN AN ORGANIZED HEALTH CARE EDUCATION/TRAINING PROGRAM

## 2023-04-11 PROCEDURE — 94761 N-INVAS EAR/PLS OXIMETRY MLT: CPT

## 2023-04-11 PROCEDURE — 25000003 PHARM REV CODE 250

## 2023-04-11 RX ORDER — FUROSEMIDE 10 MG/ML
40 INJECTION INTRAMUSCULAR; INTRAVENOUS ONCE
Status: COMPLETED | OUTPATIENT
Start: 2023-04-11 | End: 2023-04-11

## 2023-04-11 RX ORDER — POTASSIUM CHLORIDE 20 MEQ/1
40 TABLET, EXTENDED RELEASE ORAL ONCE
Status: COMPLETED | OUTPATIENT
Start: 2023-04-11 | End: 2023-04-11

## 2023-04-11 RX ORDER — METOPROLOL SUCCINATE 25 MG/1
12.5 TABLET, EXTENDED RELEASE ORAL DAILY
Qty: 15 TABLET | Refills: 11 | Status: ON HOLD | OUTPATIENT
Start: 2023-04-11 | End: 2023-05-05 | Stop reason: HOSPADM

## 2023-04-11 RX ORDER — IBUPROFEN 200 MG
1 TABLET ORAL DAILY
Qty: 28 PATCH | Refills: 2 | Status: SHIPPED | OUTPATIENT
Start: 2023-04-11

## 2023-04-11 RX ADMIN — ASPIRIN 81 MG: 81 TABLET, COATED ORAL at 09:04

## 2023-04-11 RX ADMIN — BICTEGRAVIR SODIUM, EMTRICITABINE, AND TENOFOVIR ALAFENAMIDE FUMARATE 1 TABLET: 50; 200; 25 TABLET ORAL at 09:04

## 2023-04-11 RX ADMIN — POTASSIUM CHLORIDE 40 MEQ: 1500 TABLET, EXTENDED RELEASE ORAL at 07:04

## 2023-04-11 RX ADMIN — NICOTINE 1 PATCH: 14 PATCH, EXTENDED RELEASE TRANSDERMAL at 09:04

## 2023-04-11 RX ADMIN — ATOVAQUONE 1500 MG: 750 SUSPENSION ORAL at 09:04

## 2023-04-11 RX ADMIN — SACUBITRIL AND VALSARTAN 1 TABLET: 24; 26 TABLET, FILM COATED ORAL at 09:04

## 2023-04-11 RX ADMIN — FUROSEMIDE 40 MG: 10 INJECTION, SOLUTION INTRAMUSCULAR; INTRAVENOUS at 09:04

## 2023-04-11 RX ADMIN — APIXABAN 5 MG: 2.5 TABLET, FILM COATED ORAL at 09:04

## 2023-04-11 RX ADMIN — METOPROLOL SUCCINATE 12.5 MG: 25 TABLET, EXTENDED RELEASE ORAL at 09:04

## 2023-04-11 NOTE — DISCHARGE SUMMARY
[facility] Internal Medicine Discharge Summary    Admitting Physician: Jose Maria Narayanan MD  Attending Physician: Jose Maria Narayanan MD  Date of Admit: 4/6/2023  Date of Discharge: 4/11/2023    Condition: Good  Outcome: Condition has improved and patient is now ready for discharge.  DISPOSITION: Home or Self Care        Discharge Diagnoses:     Patient Active Problem List   Diagnosis    Diffuse large B-cell lymphoma    HIV (human immunodeficiency virus infection)    Hepatitis B    B12 deficiency    Polysubstance abuse    Severe aortic stenosis    HFrEF (heart failure with reduced ejection fraction)    A-fib    Community acquired pneumonia of right lower lobe of lung    Acute on chronic congestive heart failure    Tobacco user    CAD (coronary artery disease)    HLD (hyperlipidemia)    Anxiety       Principal Problem:  HFrEF (heart failure with reduced ejection fraction)    Consultants and Procedures:     Consultants:  IP CONSULT TO INTERNAL MEDICINE    Procedures:   * No surgery found *      Brief Admission History:      Pt with h/o HFrEF (EF 12% in 01/2023), HIV with CD4 of 59, and h/o  cocaine use presented on ED on 4/6/23 for 2 days history of increased bilateral leg swelling. Pt recently admitted for CHF exacerbation on 3/24, discharged on 3/26. Currently BNP 06106, increased from 9000, 2 weeks ago when he was admitted last. Pt has been compliant with GDMT (on home Lasix 60 daily). UDS + cocaine, CXR with vascular congestion and R pleural effusion.  Received 80mg IV lasix in ED.  Admitted to IM service for CHF exacerbation.     Hospital Course with Pertinent Findings:      During his admission patient was treated for CHF exacerbation, on several occasions Entresto and lasix were held due to low blood pressures. However on day of discharge all home meds have been restarted and pt received 40 lasix prior to discharge. On physical exam lungs are clear to auscultation and pt reports that he feels much better with no  "increased work of breathing, satting appropriately on room air. We are changing home coreg to metoprolol 12.5 qd due to soft bp's this admission. All other chronic conditions were managed while inpatient.  Has an appointment scheduled with cardiology May 11, 2023.  PCP has retired so will establish in our clinic.    Discharge physical exam:  Vitals  BP: 105/63  Temp: 97.7 °F (36.5 °C)  Temp Source: Oral  Pulse: 86  Resp: 20  SpO2: (!) 92 %  Height: 5' 11" (180.3 cm)  Weight: 101.6 kg (223 lb 15.8 oz)    General: Patient resting comfortably, in no acute distress   Eye: pupils equal, EOMI, clear conjunctiva, eyelids normal  HENT: Head-normocephalic and atraumatic  Neck: full range of motion, no thyromegaly or lymphadenopathy, trachea midline, supple  Respiratory: clear to auscultation bilaterally without wheezes, rales, rhonchi  Cardiovascular: regular rate and rhythm without murmurs.  1+ pitting edema in lower legs up to mid-shin  Gastrointestinal: soft, non-tender, non-distended with normal bowel sounds, without masses to palpation  Genitourinary: no CVA tenderness to palpation  Musculoskeletal: full range of motion of all extremities/spine without limitation or discomfort  Integumentary: no rashes or skin lesions present  Neurologic: no signs of peripheral neurological deficit, motor/sensory function intact  Psychiatric:  alert and oriented, cognitive function intact, cooperative with exam      TIME SPENT ON DISCHARGE: 35 minutes    Discharge Medications:         Medication List        START taking these medications      metoprolol succinate 25 MG 24 hr tablet  Commonly known as: TOPROL-XL  Take 0.5 tablets (12.5 mg total) by mouth once daily.     nicotine 14 mg/24 hr  Commonly known as: NICODERM CQ  Place 1 patch onto the skin once daily.            CONTINUE taking these medications      apixaban 5 mg Tab  Commonly known as: ELIQUIS  Take 1 tablet (5 mg total) by mouth 2 (two) times daily.     aspirin 81 MG EC " "tablet  Commonly known as: ECOTRIN  Take 1 tablet (81 mg total) by mouth once daily.     atorvastatin 40 MG tablet  Commonly known as: LIPITOR  Take 1 tablet (40 mg total) by mouth every evening.     atovaquone 750 mg/5 mL Susp oral liquid  Commonly known as: MEPRON  Take 10 mLs (1,500 mg total) by mouth once daily.     BIKTARVY -25 mg (25 kg or greater)  Generic drug: ezupvzkix-neuncdfs-uxlmopq ala  Take 1 tablet by mouth once daily.     famotidine 20 MG tablet  Commonly known as: PEPCID  Take 1 tablet (20 mg total) by mouth 2 (two) times daily.     food supplemt, lactose-reduced Liqd  Take 1 Bottle by mouth.     furosemide 20 MG tablet  Commonly known as: LASIX  Take 3 tablets (60 mg total) by mouth once daily.     megestroL 400 mg/10 mL (40 mg/mL) Susp  Commonly known as: MEGACE  Take 200 mg by mouth.     polyethylene glycol 3350(bulk) Powd  by Other route.     sacubitriL-valsartan 24-26 mg per tablet  Commonly known as: ENTRESTO  Take 1 tablet by mouth 2 (two) times daily.     VITAMIN D2 50,000 unit Cap  Generic drug: ergocalciferol  Take 1 capsule (50,000 Units total) by mouth every 7 days.            STOP taking these medications      carvediloL 12.5 MG tablet  Commonly known as: COREG                Discharge Instructions:         Jason Perdue is being discharged Home or Self Care.    Discharge Procedure Orders   WALKER FOR HOME USE     Order Specific Question Answer Comments   Type of Walker: Adult (5'4"-6'6")    With wheels? Yes    Height: 5' 11" (1.803 m)    Weight: 101.6 kg (223 lb 15.8 oz)    Length of need (1-99 months): 99    Does patient have medical equipment at home? shower chair    Please check all that apply: Patient's condition impairs ambulation.    Please check all that apply: Patient is unable to safely ambulate without equipment.      Ambulatory referral/consult to Internal Medicine   Standing Status: Future   Referral Priority: Routine Referral Type: Consultation   Referral Reason: " Specialty Services Required Referral Location: [facility]   Requested Specialty: Internal Medicine   Number of Visits Requested: 1     Ambulatory referral/consult to Smoking Cessation Program   Standing Status: Future   Referral Priority: Routine Referral Type: Consultation   Referral Reason: Specialty Services Required   Requested Specialty: CTTS   Number of Visits Requested: 1        Follow-Up Appointments:   Follow-up Information       [facility] - Infectious Disease Follow up.    Specialty: Infectious Diseases  Contact information:  [facility] [address] [phone]             [facility] INTERNAL MEDICINE Follow up.                                 At this time, Jason Perdue is determined to have maximized benefits of IP hospitalization. he is discharged in stable condition with OP f/u recommendations and instructions. All questions answered, and patient verbalized agreement with the POC. They were given return precautions prior to d/c including symptoms that should prompt return to ED or to call PCP. Total time spent of DC of 35 minutes.       Idalmis Sommers DO  [facility] Internal Medicine  HO-1

## 2023-04-11 NOTE — PT/OT/SLP PROGRESS
"Occupational Therapy   Treatment and d/c note    Name: Jason Perdue  MRN: [id number]  Admitting Diagnosis:  HFrEF (heart failure with reduced ejection fraction)     Patient Active Problem List   Diagnosis    Diffuse large B-cell lymphoma    HIV (human immunodeficiency virus infection)    Hepatitis B    B12 deficiency    Polysubstance abuse    Severe aortic stenosis    HFrEF (heart failure with reduced ejection fraction)    A-fib    Community acquired pneumonia of right lower lobe of lung    Acute on chronic congestive heart failure    Tobacco user    CAD (coronary artery disease)    HLD (hyperlipidemia)    Anxiety        Recommendations:     Discharge Recommendations: other (see comments), home with home health (home with responsible caregiver)  Discharge Equipment Recommendations:  walker, rolling  Barriers to discharge:  Other (Comment) (endurance)    Assessment:     Jason Perdue is a 63 y.o. male with a medical diagnosis of HFrEF (heart failure with reduced ejection fraction).  He presents with  functional decline. Performance deficits affecting function are impaired endurance     Pt met goals in acute care setting and is mod I for basic self care tasks with rest periods and pacing .  Recommend HH OT to improve endurance to allow  for PLOF with mod I light extended ADL's and simple meal prep.      Plan:     Patient to be seen  (d/c OT as pt being d/c'd home this pm)  ; recommend HH OT to continue to improve endurance as needed to improve efficiency in basic and extended ADL's . Plan of Care Expires: 04/11/23  Plan of Care Reviewed with: patient    Subjective     Chief Complaint: none stated   Patient/Family Comments/goals: return home " my friend will stay with me for a while"   Pain/Comfort:  Pain Rating 1: 0/10  Pain Rating Post-Intervention 1: 0/10    Objective:     Communicated with: Nurse Genevive prior to session.  Patient found sitting edge of bed with telemetry, peripheral IV upon OT entry to " room.    General Precautions: Standard, fall    Orthopedic Precautions:N/A  Braces: N/A  Respiratory Status: Room air     Occupational Performance:     Functional Mobility/Transfers:  Patient completed Sit <> Stand Transfer with modified independence  with  rolling walker   Patient completed Bed <> Chair Transfer using Step Transfer technique with modified independence with rolling walker  Patient completed Toilet Transfer Stand Pivot technique with modified independence with  grab bars  Functional Mobility: Pt ambulated with RW short distances 10-15 feet x 3 in room for basic self care tasks to toilet and lavatory with mod Independent and rest break between tasks    Activities of Daily Living:  Grooming: modified independence standing at sink with RW   Lower Body Dressing: modified independence    Toileting: modified independence clothing mgt and hygiene       Treatment & Education:  Pt educated on pacing and energy conservation tech in basic self care tasks. Pt verbalized understanding .     Facilitated endurance during standing EOB while performing B UE O/H 2 x 10 reps, seated rest break and standing B UE chest press 2 x 10 reps .     Patient left  up in chair with B LE elevated  with all lines intact, call button in reach, and nurse Janessa  present    GOALS: 4/4 goals met   Multidisciplinary Problems       Occupational Therapy Goals       Not on file              Multidisciplinary Problems (Resolved)          Problem: Occupational Therapy    Goal Priority Disciplines Outcome Interventions   Occupational Therapy Goal   (Resolved)     OT, PT/OT Met    Description: Goals to be met by: 4/17/23     Patient will increase functional independence with ADLs by performing:    LE Dressing with Modified [place]- goal met 4/11/23.  Grooming while standing at sink with Modified [place].-goal met 4/11/23  Toileting from toilet with Modified [place] for hygiene and clothing management.-goal met 4/11/23   Toilet  transfer to toilet with Modified [place].-goal met 4/11/23                       Reason for d/c  Pt met goals in acute care setting but appropriate for care in home setting     RECOMMENDATION   OT services to max efficiency and safety in home environment to achieve PLOF   Time Tracking:     OT Date of Treatment: 04/11/23  OT Start Time: 0855  OT Stop Time: 0914  OT Total Time (min): 19 min    Billable Minutes:Self Care/Home Management 19 min     OT/MARIANN: OT          4/11/2023

## 2023-04-11 NOTE — PT/OT/SLP PROGRESS
Physical Therapy Treatment    Patient Name:  Jason Perdue   MRN:  [id number]    Recommendations     Discharge Recommendations:   (home w/ responsible caregiver w/ HH)   Discharge Equipment Recommendations: shower chair, walker, rolling   Barriers to discharge: Severity of deficits, Level of skilled assistance required, and Decreased endurance    Assessment     Jason Perdue is a 63 y.o. male admitted with a medical diagnosis of HFrEF (heart failure with reduced ejection fraction).    Acute on chronic CHF exacerbation  Hypokalemia  H/o HIV  H/o A-fib  H/o HTN, HLD        Patient Active Problem List   Diagnosis    Diffuse large B-cell lymphoma    HIV (human immunodeficiency virus infection)    Hepatitis B    B12 deficiency    Polysubstance abuse    Severe aortic stenosis    HFrEF (heart failure with reduced ejection fraction)    A-fib    Community acquired pneumonia of right lower lobe of lung    Acute on chronic congestive heart failure    Tobacco user    CAD (coronary artery disease)    HLD (hyperlipidemia)    Anxiety     He presents with the following impairments/functional limitations:  impaired endurance, gait instability, impaired balance, impaired functional mobility.    Rehab Prognosis: Good.    Patient would benefit from continued skilled acute PT services to: address above listed impairments/functional limitations; receive patient/caregiver education; reduce fall risk; and maximize independency/safety with functional mobility.    -continued out of bed, up to chair and rolling walker ambulation w/ progression of gait distance/duration/frequency/speed - as tolerated/appropriate    Recent Surgery: * No surgery found *      Plan     During this hospitalization, patient to be seen  (3-5 TIMES/WEEK) to address the identified rehab impairments via gait training, therapeutic activities, therapeutic exercises and progress toward the following goals:    Plan of Care Expires:  05/06/23    Subjective     Communicated  with patient's nurse Saad'chase prior to session.    Patient agreeable to participate in treatment session.    Chief Complaint: weakness  Patient/Family Comments/goals: home  Pain/Comfort:  Pain Rating 1: 0/10  Pain Addressed 1: Nurse notified  Pain Rating Post-Intervention 1: 0/10    Objective     Patient found supine in bed, with HOB elevated, and bed rails up bilateral HOB with telemetry, peripheral IV  upon PT entry to room.    General Precautions: Standard, fall   Orthopedic Precautions:N/A   Braces: N/A  Respiratory Status: room air    Functional Mobility:    Bed Mobility:  Rolling Left: independence  Supine to Sit: modified independence  with no cues required    Transfers:  Sit to Stand: supervision with rolling walker    Gait:  Patient ambulated 130ft x2 w/ sitting rest x5 minutes b/w sessions with rolling walker and supervision.  Patient demonstrates  slowed/guarded mvmts, no loss of balance, no mis-steps, occasional unsteady gait, decreased sylvain, decreased step length, wide base of support, and flexed posture.    Balance:  Sit  Patient demonstrated static balance on level surface with independence with no verbal cues.  Patient demonstrated dynamic balance on level surface with independence with no verbal cues during maximal excursions.  Stand  Patient demonstrated static balance on level surface  using rolling walker with modified independence with no verbal cues.    Patient left bed rails up bilateral HOB and sitting edge of bed with telemetry monitoring and UE IV all lines intact, call button in reach, pt's nurse Saad've notified, and tray table at bedside .    Goals     Multidisciplinary Problems       Physical Therapy Goals          Problem: Physical Therapy    Goal Priority Disciplines Outcome Goal Variances Interventions   Physical Therapy Goal     PT, PT/OT Ongoing, Progressing     Description: Goals to be met by: DISCHARGE     Patient will increase functional independence with mobility by  performing:    -. Supine to sit with [place] - ONGOING  -. Sit to supine with [place] - ONGOING  -. Rolling to Left and Right with [place] - ONGOING - PARTIALLY MET (rolling Lt) 04/11/2023  -. Sit to stand transfer with Modified [place] - ONGOING  -. Gait  x 130 feet x2 with Modified [place] using Rolling Walker - ONGOING                     Time Tracking     PT Received On: 04/11/23  PT Start Time: 0835     PT Stop Time: 0853  PT Total Time (min): 18 min     Billable Minutes: Gait Training 18    04/11/2023

## 2023-04-11 NOTE — PLAN OF CARE
New consult for DME (RW), pt has no preference, referral submitted to [facility] via [facility].

## 2023-04-11 NOTE — PT/OT/SLP DISCHARGE
POST DISCHARGE DOCUMENTATION - 04/11/2023 2:10 PM    Physical Therapy Discharge Summary    Name: Jason Perdue  MRN: [id number]   Principal Problem: HFrEF (heart failure with reduced ejection fraction)   Acute on chronic CHF exacerbation  Hypokalemia  H/o HIV  H/o A-fib  H/o HTN, HLD        Patient Active Problem List   Diagnosis    Diffuse large B-cell lymphoma    HIV (human immunodeficiency virus infection)    Hepatitis B    B12 deficiency    Polysubstance abuse    Severe aortic stenosis    HFrEF (heart failure with reduced ejection fraction)    A-fib    Community acquired pneumonia of right lower lobe of lung    Acute on chronic congestive heart failure    Tobacco user    CAD (coronary artery disease)    HLD (hyperlipidemia)    Anxiety      Patient Discharged from acute Physical Therapy on 04/11/2023 (hospital discharge date).    Please refer to prior Physical Therapy note dated 04/11/2023 for last known functional status of patient.    Recommendations - per last treatment session     Discharge Recommendations:   (home w/ responsible caregiver w/ HH)   Discharge Equipment Recommendations: shower chair, walker, rolling   Barriers to discharge: Severity of deficits, Level of skilled assistance required, and Decreased endurance    Assessment:     Discharge: The patient was discharged unexpectedly.  Information required to complete an accurate discharge summary is unknown.  Refer to the therapy initial evaluation and last treatment note for initial and most recent functional status and goal achievement.  Therapy recommendations may be found in the last treatment note. Patient has partially met goals.    -continued out of bed, up to chair and rolling walker ambulation w/ progression of gait distance/duration/frequency/speed - as tolerated/appropriate (as ordered by M.D.)    Objective     GOALS: 1 out of 5 STG's partially met by patient    Multidisciplinary Problems       Physical Therapy Goals       Problem: Physical  Therapy    Goal Priority Disciplines Outcome Goal Variances Interventions   Physical Therapy Goal     PT, PT/OT Ongoing, Progressing     Description: Goals to be met by: DISCHARGE     Patient will increase functional independence with mobility by performing:    -. Supine to sit with [place] - ONGOING - NOT MET  -. Sit to supine with [place] - ONGOING - NOT MET  -. Rolling to Left and Right with [place] - ONGOING - PARTIALLY MET (rolling Lt) 04/11/2023  -. Sit to stand transfer with Modified [place] - ONGOING - NOT MET  -. Gait  x 130 feet x2 with Modified [place] using Rolling Walker - ONGOING - NOT MET         Reasons for Discontinuation of Therapy Services: hospital discharge    Plan     Patient Discharged to: home or self care per chart.    4/11/2023

## 2023-04-12 ENCOUNTER — PATIENT OUTREACH (OUTPATIENT)
Dept: ADMINISTRATIVE | Facility: CLINIC | Age: 63
End: 2023-04-12
Payer: MEDICAID

## 2023-04-12 NOTE — PROGRESS NOTES
C3 nurse spoke with Jason Perdue for a TCC post hospital discharge follow up call. Pt states he has a new cough that just started last night, not coughing anything up; pt verbalizes understanding to call DO Matthieu or visit nearby walk-in clinic if cough worsens or he has aditional symptoms. The patient has a scheduled HOSFU with Idalmis Sommers DO on 4/17/23 at 1230 and still needs a follow up with [facility] ([facility]). Pt states he has  PCP but is unaware of his name. Unable to route to PCP.

## 2023-04-13 ENCOUNTER — HOSPITAL ENCOUNTER (OUTPATIENT)
Facility: HOSPITAL | Age: 63
LOS: 1 days | Discharge: HOME | End: 2023-04-14
Attending: STUDENT IN AN ORGANIZED HEALTH CARE EDUCATION/TRAINING PROGRAM | Admitting: INTERNAL MEDICINE
Payer: MEDICAID

## 2023-04-13 DIAGNOSIS — R06.02 SOB (SHORTNESS OF BREATH): ICD-10-CM

## 2023-04-13 DIAGNOSIS — I50.9 CONGESTIVE HEART FAILURE, UNSPECIFIED HF CHRONICITY, UNSPECIFIED HEART FAILURE TYPE: Primary | ICD-10-CM

## 2023-04-13 DIAGNOSIS — I50.9 CHF (CONGESTIVE HEART FAILURE): ICD-10-CM

## 2023-04-13 DIAGNOSIS — F14.10 COCAINE ABUSE: ICD-10-CM

## 2023-04-13 DIAGNOSIS — M79.89 LEG SWELLING: ICD-10-CM

## 2023-04-13 LAB
ALBUMIN SERPL-MCNC: 3.2 G/DL (ref 3.4–4.8)
ALBUMIN/GLOB SERPL: 0.7 RATIO (ref 1.1–2)
ALP SERPL-CCNC: 114 UNIT/L (ref 40–150)
ALT SERPL-CCNC: 11 UNIT/L (ref 0–55)
AMPHET UR QL SCN: NEGATIVE
AORTIC VALVE CUSP SEPERATION: 0.8 CM
APTT PPP: 54.3 SECONDS (ref 23.2–33.7)
AST SERPL-CCNC: 30 UNIT/L (ref 5–34)
AV INDEX (PROSTH): 0.25
AV MEAN GRADIENT: 24 MMHG
AV PEAK GRADIENT: 38 MMHG
AV VALVE AREA: 0.93 CM2
AV VELOCITY RATIO: 0.2
BARBITURATE SCN PRESENT UR: NEGATIVE
BASOPHILS # BLD AUTO: 0.03 X10(3)/MCL (ref 0–0.2)
BASOPHILS NFR BLD AUTO: 0.5 %
BENZODIAZ UR QL SCN: NEGATIVE
BILIRUBIN DIRECT+TOT PNL SERPL-MCNC: 2 MG/DL
BNP BLD-MCNC: ABNORMAL PG/ML
BSA FOR ECHO PROCEDURE: 2.13 M2
BUN SERPL-MCNC: 27.6 MG/DL (ref 8.4–25.7)
CALCIUM SERPL-MCNC: 9.2 MG/DL (ref 8.8–10)
CANNABINOIDS UR QL SCN: NEGATIVE
CHLORIDE SERPL-SCNC: 104 MMOL/L (ref 98–107)
CO2 SERPL-SCNC: 22 MMOL/L (ref 23–31)
COCAINE UR QL SCN: POSITIVE
CREAT SERPL-MCNC: 1.48 MG/DL (ref 0.73–1.18)
CV ECHO LV RWT: 0.34 CM
DOP CALC AO PEAK VEL: 3.1 M/S
DOP CALC AO VTI: 61 CM
DOP CALC LVOT AREA: 3.8 CM2
DOP CALC LVOT DIAMETER: 2.2 CM
DOP CALC LVOT PEAK VEL: 0.62 M/S
DOP CALC LVOT STROKE VOLUME: 56.99 CM3
DOP CALC MV VTI: 17.6 CM
DOP CALCLVOT PEAK VEL VTI: 15 CM
E WAVE DECELERATION TIME: 165 MSEC
E/A RATIO: 1.03
E/E' RATIO: 18.25 M/S
ECHO LV POSTERIOR WALL: 0.94 CM (ref 0.6–1.1)
EJECTION FRACTION: 15 %
EOSINOPHIL # BLD AUTO: 0.07 X10(3)/MCL (ref 0–0.9)
EOSINOPHIL NFR BLD AUTO: 1.2 %
ERYTHROCYTE [DISTWIDTH] IN BLOOD BY AUTOMATED COUNT: 17.6 % (ref 11.5–17)
ETHANOL SERPL-MCNC: <10 MG/DL
FENTANYL UR QL SCN: NEGATIVE
FLUAV AG UPPER RESP QL IA.RAPID: NOT DETECTED
FLUBV AG UPPER RESP QL IA.RAPID: NOT DETECTED
FRACTIONAL SHORTENING: 6 % (ref 28–44)
GFR SERPLBLD CREATININE-BSD FMLA CKD-EPI: 53 MLS/MIN/1.73/M2
GLOBULIN SER-MCNC: 4.9 GM/DL (ref 2.4–3.5)
GLUCOSE SERPL-MCNC: 90 MG/DL (ref 82–115)
HCT VFR BLD AUTO: 37.7 % (ref 42–52)
HGB BLD-MCNC: 11.8 G/DL (ref 14–18)
IMM GRANULOCYTES # BLD AUTO: 0.02 X10(3)/MCL (ref 0–0.04)
IMM GRANULOCYTES NFR BLD AUTO: 0.3 %
INR BLD: 2.45 (ref 0–1.3)
INTERVENTRICULAR SEPTUM: 1.01 CM (ref 0.6–1.1)
LEFT ATRIUM SIZE: 3.9 CM
LEFT INTERNAL DIMENSION IN SYSTOLE: 5.19 CM (ref 2.1–4)
LEFT VENTRICLE DIASTOLIC VOLUME INDEX: 89.1 ML/M2
LEFT VENTRICLE DIASTOLIC VOLUME: 188 ML
LEFT VENTRICLE MASS INDEX: 99 G/M2
LEFT VENTRICLE SYSTOLIC VOLUME INDEX: 71.1 ML/M2
LEFT VENTRICLE SYSTOLIC VOLUME: 150 ML
LEFT VENTRICULAR INTERNAL DIMENSION IN DIASTOLE: 5.55 CM (ref 3.5–6)
LEFT VENTRICULAR MASS: 209.37 G
LV LATERAL E/E' RATIO: 18.25 M/S
LV SEPTAL E/E' RATIO: 18.25 M/S
LVOT MG: 1 MMHG
LVOT MV: 0.46 CM/S
LYMPHOCYTES # BLD AUTO: 1.21 X10(3)/MCL (ref 0.6–4.6)
LYMPHOCYTES NFR BLD AUTO: 20.2 %
MCH RBC QN AUTO: 26.9 PG (ref 27–31)
MCHC RBC AUTO-ENTMCNC: 31.3 G/DL (ref 33–36)
MCV RBC AUTO: 86.1 FL (ref 80–94)
MDMA UR QL SCN: NEGATIVE
MONOCYTES # BLD AUTO: 0.76 X10(3)/MCL (ref 0.1–1.3)
MONOCYTES NFR BLD AUTO: 12.7 %
MV MEAN GRADIENT: 2 MMHG
MV PEAK A VEL: 0.71 M/S
MV PEAK E VEL: 0.73 M/S
MV PEAK GRADIENT: 5 MMHG
MV STENOSIS PRESSURE HALF TIME: 48 MS
MV VALVE AREA BY CONTINUITY EQUATION: 3.24 CM2
MV VALVE AREA P 1/2 METHOD: 4.58 CM2
NEUTROPHILS # BLD AUTO: 3.9 X10(3)/MCL (ref 2.1–9.2)
NEUTROPHILS NFR BLD AUTO: 65.1 %
NRBC BLD AUTO-RTO: 0 %
OPIATES UR QL SCN: NEGATIVE
PCP UR QL: NEGATIVE
PH UR: 6 [PH] (ref 3–11)
PISA TR MAX VEL: 2.47 M/S
PLATELET # BLD AUTO: 157 X10(3)/MCL (ref 130–400)
PMV BLD AUTO: 12.7 FL (ref 7.4–10.4)
POCT GLUCOSE: 117 MG/DL (ref 70–110)
POCT GLUCOSE: 89 MG/DL (ref 70–110)
POCT GLUCOSE: 97 MG/DL (ref 70–110)
POTASSIUM SERPL-SCNC: 4.2 MMOL/L (ref 3.5–5.1)
PROT SERPL-MCNC: 8.1 GM/DL (ref 5.8–7.6)
PROTHROMBIN TIME: 26.1 SECONDS (ref 12.5–14.5)
RA PRESSURE: 15 MMHG
RBC # BLD AUTO: 4.38 X10(6)/MCL (ref 4.7–6.1)
RIGHT VENTRICULAR END-DIASTOLIC DIMENSION: 3.65 CM
SARS-COV-2 RNA RESP QL NAA+PROBE: NOT DETECTED
SODIUM SERPL-SCNC: 137 MMOL/L (ref 136–145)
SPECIFIC GRAVITY, URINE AUTO (.000) (OHS): 1.01 (ref 1–1.03)
TDI LATERAL: 0.04 M/S
TDI SEPTAL: 0.04 M/S
TDI: 0.04 M/S
TR MAX PG: 24 MMHG
TRICUSPID ANNULAR PLANE SYSTOLIC EXCURSION: 1.95 CM
TROPONIN I SERPL-MCNC: 0.04 NG/ML (ref 0–0.04)
TROPONIN I SERPL-MCNC: 0.05 NG/ML (ref 0–0.04)
TV REST PULMONARY ARTERY PRESSURE: 39 MMHG
WBC # SPEC AUTO: 6 X10(3)/MCL (ref 4.5–11.5)

## 2023-04-13 PROCEDURE — 80307 DRUG TEST PRSMV CHEM ANLYZR: CPT | Performed by: STUDENT IN AN ORGANIZED HEALTH CARE EDUCATION/TRAINING PROGRAM

## 2023-04-13 PROCEDURE — 85025 COMPLETE CBC W/AUTO DIFF WBC: CPT | Performed by: STUDENT IN AN ORGANIZED HEALTH CARE EDUCATION/TRAINING PROGRAM

## 2023-04-13 PROCEDURE — 82077 ASSAY SPEC XCP UR&BREATH IA: CPT | Performed by: STUDENT IN AN ORGANIZED HEALTH CARE EDUCATION/TRAINING PROGRAM

## 2023-04-13 PROCEDURE — 63600175 PHARM REV CODE 636 W HCPCS: Performed by: STUDENT IN AN ORGANIZED HEALTH CARE EDUCATION/TRAINING PROGRAM

## 2023-04-13 PROCEDURE — 83880 ASSAY OF NATRIURETIC PEPTIDE: CPT | Performed by: STUDENT IN AN ORGANIZED HEALTH CARE EDUCATION/TRAINING PROGRAM

## 2023-04-13 PROCEDURE — S0179 MEGESTROL 20 MG: HCPCS | Performed by: HOSPITALIST

## 2023-04-13 PROCEDURE — 25000003 PHARM REV CODE 250: Performed by: HOSPITALIST

## 2023-04-13 PROCEDURE — G0378 HOSPITAL OBSERVATION PER HR: HCPCS

## 2023-04-13 PROCEDURE — 82962 GLUCOSE BLOOD TEST: CPT

## 2023-04-13 PROCEDURE — 85610 PROTHROMBIN TIME: CPT | Performed by: STUDENT IN AN ORGANIZED HEALTH CARE EDUCATION/TRAINING PROGRAM

## 2023-04-13 PROCEDURE — 80053 COMPREHEN METABOLIC PANEL: CPT | Performed by: STUDENT IN AN ORGANIZED HEALTH CARE EDUCATION/TRAINING PROGRAM

## 2023-04-13 PROCEDURE — 84484 ASSAY OF TROPONIN QUANT: CPT | Performed by: STUDENT IN AN ORGANIZED HEALTH CARE EDUCATION/TRAINING PROGRAM

## 2023-04-13 PROCEDURE — 84484 ASSAY OF TROPONIN QUANT: CPT | Mod: 91 | Performed by: NURSE PRACTITIONER

## 2023-04-13 PROCEDURE — 99285 EMERGENCY DEPT VISIT HI MDM: CPT | Mod: 25

## 2023-04-13 PROCEDURE — 0240U COVID/FLU A&B PCR: CPT | Performed by: STUDENT IN AN ORGANIZED HEALTH CARE EDUCATION/TRAINING PROGRAM

## 2023-04-13 PROCEDURE — 96374 THER/PROPH/DIAG INJ IV PUSH: CPT | Mod: 59

## 2023-04-13 PROCEDURE — 85730 THROMBOPLASTIN TIME PARTIAL: CPT | Performed by: STUDENT IN AN ORGANIZED HEALTH CARE EDUCATION/TRAINING PROGRAM

## 2023-04-13 PROCEDURE — S4991 NICOTINE PATCH NONLEGEND: HCPCS | Performed by: HOSPITALIST

## 2023-04-13 RX ORDER — ASPIRIN 81 MG/1
81 TABLET ORAL DAILY
Status: DISCONTINUED | OUTPATIENT
Start: 2023-04-13 | End: 2023-04-14 | Stop reason: HOSPADM

## 2023-04-13 RX ORDER — MEGESTROL ACETATE 40 MG/ML
200 SUSPENSION ORAL
Status: DISCONTINUED | OUTPATIENT
Start: 2023-04-13 | End: 2023-04-14 | Stop reason: HOSPADM

## 2023-04-13 RX ORDER — ATORVASTATIN CALCIUM 40 MG/1
40 TABLET, FILM COATED ORAL NIGHTLY
Status: DISCONTINUED | OUTPATIENT
Start: 2023-04-13 | End: 2023-04-14 | Stop reason: HOSPADM

## 2023-04-13 RX ORDER — ATOVAQUONE 750 MG/5ML
1500 SUSPENSION ORAL DAILY
Status: DISCONTINUED | OUTPATIENT
Start: 2023-04-13 | End: 2023-04-14 | Stop reason: HOSPADM

## 2023-04-13 RX ORDER — GLUCAGON 1 MG
1 KIT INJECTION
Status: DISCONTINUED | OUTPATIENT
Start: 2023-04-13 | End: 2023-04-14 | Stop reason: HOSPADM

## 2023-04-13 RX ORDER — CARVEDILOL 12.5 MG/1
12.5 TABLET ORAL 2 TIMES DAILY WITH MEALS
Status: ON HOLD | COMMUNITY
End: 2023-04-17

## 2023-04-13 RX ORDER — INSULIN ASPART 100 [IU]/ML
0-5 INJECTION, SOLUTION INTRAVENOUS; SUBCUTANEOUS
Status: DISCONTINUED | OUTPATIENT
Start: 2023-04-13 | End: 2023-04-14 | Stop reason: HOSPADM

## 2023-04-13 RX ORDER — IBUPROFEN 200 MG
1 TABLET ORAL DAILY
Status: DISCONTINUED | OUTPATIENT
Start: 2023-04-13 | End: 2023-04-14 | Stop reason: HOSPADM

## 2023-04-13 RX ORDER — FUROSEMIDE 10 MG/ML
40 INJECTION INTRAMUSCULAR; INTRAVENOUS
Status: DISCONTINUED | OUTPATIENT
Start: 2023-04-13 | End: 2023-04-13

## 2023-04-13 RX ORDER — IBUPROFEN 200 MG
24 TABLET ORAL
Status: DISCONTINUED | OUTPATIENT
Start: 2023-04-13 | End: 2023-04-14 | Stop reason: HOSPADM

## 2023-04-13 RX ORDER — FUROSEMIDE 10 MG/ML
80 INJECTION INTRAMUSCULAR; INTRAVENOUS
Status: COMPLETED | OUTPATIENT
Start: 2023-04-13 | End: 2023-04-13

## 2023-04-13 RX ORDER — IBUPROFEN 200 MG
16 TABLET ORAL
Status: DISCONTINUED | OUTPATIENT
Start: 2023-04-13 | End: 2023-04-14 | Stop reason: HOSPADM

## 2023-04-13 RX ADMIN — MEGESTROL ACETATE 200 MG: 400 SUSPENSION ORAL at 11:04

## 2023-04-13 RX ADMIN — NICOTINE 1 PATCH: 14 PATCH TRANSDERMAL at 08:04

## 2023-04-13 RX ADMIN — ATOVAQUONE 1500 MG: 750 SUSPENSION ORAL at 08:04

## 2023-04-13 RX ADMIN — ATORVASTATIN CALCIUM 40 MG: 40 TABLET, FILM COATED ORAL at 09:04

## 2023-04-13 RX ADMIN — BICTEGRAVIR SODIUM, EMTRICITABINE, AND TENOFOVIR ALAFENAMIDE FUMARATE 1 TABLET: 50; 200; 25 TABLET ORAL at 09:04

## 2023-04-13 RX ADMIN — APIXABAN 5 MG: 5 TABLET, FILM COATED ORAL at 09:04

## 2023-04-13 RX ADMIN — FUROSEMIDE 80 MG: 10 INJECTION, SOLUTION INTRAMUSCULAR; INTRAVENOUS at 02:04

## 2023-04-13 RX ADMIN — ASPIRIN 81 MG: 81 TABLET, COATED ORAL at 08:04

## 2023-04-13 RX ADMIN — MEGESTROL ACETATE 200 MG: 400 SUSPENSION ORAL at 06:04

## 2023-04-13 RX ADMIN — APIXABAN 5 MG: 5 TABLET, FILM COATED ORAL at 08:04

## 2023-04-13 NOTE — ED PROVIDER NOTES
"Encounter Date: 4/13/2023    SCRIBE #1 NOTE: I, Loyd Hull, am scribing for, and in the presence of,  Dr. Olivarez. I have scribed the following portions of the note - the EKG reading. Other sections scribed: HPI, ROS, Physical Exam, MDM, Attending.     History     Chief Complaint   Patient presents with    Shortness of Breath     AASI w/ c/o of bilateral lower extremity swelling and SOB starting tonight; D/c on 04/11 after being admitted at [facility] for same complaints. Pt denies CP; Hx of CHF, a fib; Takes eliquis and lasix at home     64 y/o male with history of cocaine abuse, HTN, HIV on antiretrovirals, Afib on Eliquis and CHF on Lasix presents to ED via EMS for SOB and bilateral leg swelling.  Pt was discharged from [facility] on 4/11 for the same complaints.  Pt denies chest pain.    The history is provided by the patient.   Shortness of Breath  This is a new problem. The problem occurs continuously.Episode onset: last night. The problem has not changed since onset.Associated symptoms include leg swelling. Pertinent negatives include no fever, no sore throat, no chest pain, no abdominal pain and no rash. He has tried nothing for the symptoms. The treatment provided no relief. He has had Prior hospitalizations. He has had Prior ED visits. Associated medical issues include heart failure.         Review of patient's allergies indicates:   Allergen Reactions    Ace inhibitors      Other reaction(s): Angioedema    Nitroglycerin Itching     Past Medical History:   Diagnosis Date    Cancer     CHF (congestive heart failure)     Human immunodeficiency virus (HIV) disease     Hypertension      No past surgical history on file.  No family history on file.  Social History     Tobacco Use    Smoking status: Every Day     Packs/day: 0.50     Types: Cigarettes    Smokeless tobacco: Never   Substance Use Topics    Alcohol use: Not Currently    Drug use: Yes     Types: "Crack" cocaine     Comment: last taken 1 month and a half     Review " of Systems   Constitutional:  Negative for fever.   HENT:  Negative for sore throat.    Eyes:  Negative for visual disturbance.   Respiratory:  Positive for shortness of breath.    Cardiovascular:  Positive for leg swelling. Negative for chest pain.   Gastrointestinal:  Negative for abdominal pain.   Genitourinary:  Negative for dysuria.   Musculoskeletal:  Negative for joint swelling.   Skin:  Negative for rash.   Neurological:  Negative for weakness.   Psychiatric/Behavioral:  Negative for confusion.    All other systems reviewed and are negative.    Physical Exam     Initial Vitals [04/13/23 0125]   BP Pulse Resp Temp SpO2   91/67 89 20 97.5 °F (36.4 °C) 99 %      MAP       --         Physical Exam    Nursing note and vitals reviewed.  Constitutional: He appears well-developed and well-nourished. He is not diaphoretic. No distress.   HENT:   Head: Normocephalic and atraumatic.   Eyes: Conjunctivae and EOM are normal. Pupils are equal, round, and reactive to light.   Neck:   Normal range of motion.  Cardiovascular:  Normal rate, regular rhythm, normal heart sounds and intact distal pulses.           No murmur heard.  Pulmonary/Chest: No respiratory distress. He has no wheezes. He has no rales.   Crackles at the bases bilaterally   Abdominal: Abdomen is soft. He exhibits no distension. There is no abdominal tenderness.   Musculoskeletal:         General: Edema (2+ LE) present. No tenderness. Normal range of motion.      Cervical back: Normal range of motion.     Neurological: He is alert and oriented to person, place, and time. No cranial nerve deficit.   Skin: Skin is warm and dry. Capillary refill takes less than 2 seconds. No rash noted. No erythema.   Psychiatric: He has a normal mood and affect.       ED Course   Procedures  Labs Reviewed   B-TYPE NATRIURETIC PEPTIDE - Abnormal; Notable for the following components:       Result Value    Natriuretic Peptide 10,149.2 (*)     All other components within normal  limits   COMPREHENSIVE METABOLIC PANEL - Abnormal; Notable for the following components:    Carbon Dioxide 22 (*)     Blood Urea Nitrogen 27.6 (*)     Creatinine 1.48 (*)     Protein Total 8.1 (*)     Albumin Level 3.2 (*)     Globulin 4.9 (*)     Albumin/Globulin Ratio 0.7 (*)     Bilirubin Total 2.0 (*)     All other components within normal limits   APTT - Abnormal; Notable for the following components:    PTT 54.3 (*)     All other components within normal limits   PROTIME-INR - Abnormal; Notable for the following components:    PT 26.1 (*)     INR 2.45 (*)     All other components within normal limits   CBC WITH DIFFERENTIAL - Abnormal; Notable for the following components:    RBC 4.38 (*)     Hgb 11.8 (*)     Hct 37.7 (*)     MCH 26.9 (*)     MCHC 31.3 (*)     RDW 17.6 (*)     MPV 12.7 (*)     All other components within normal limits   DRUG SCREEN, URINE (BEAKER) - Abnormal; Notable for the following components:    Cocaine, Urine Positive (*)     All other components within normal limits    Narrative:     Cut off concentrations:    Amphetamines - 1000 ng/ml  Barbiturates - 200 ng/ml  Benzodiazepine - 200 ng/ml  Cannabinoids (THC) - 50 ng/ml  Cocaine - 300 ng/ml  Fentanyl - 1.0 ng/ml  MDMA - 500 ng/ml  Opiates - 300 ng/ml   Phencyclidine (PCP) - 25 ng/ml    Specimen submitted for drug analysis and tested for pH and specific gravity in order to evaluate sample integrity. Suspect tampering if specific gravity is <1.003 and/or pH is not within the range of 4.5 - 8.0  False negatives may result form substances such as bleach added to urine.  False positives may result for the presence of a substance with similar chemical structure to the drug or its metabolite.    This test provides only a PRELIMINARY analytical test result. A more specific alternate chemical method must be used in order to obtain a confirmed analytical result. Gas chromatography/mass spectrometry (GC/MS) is the preferred confirmatory method.  Other chemical confirmation methods are available. Clinical consideration and professional judgement should be applied to any drug of abuse test result, particularly when preliminary positive results are used.    Positive results will be confirmed only at the physicians request. Unconfirmed screening results are to be used only for medical purposes (treatment).        TROPONIN I - Abnormal; Notable for the following components:    Troponin-I 0.048 (*)     All other components within normal limits   POCT GLUCOSE - Abnormal; Notable for the following components:    POCT Glucose 117 (*)     All other components within normal limits   TROPONIN I - Normal   COVID/FLU A&B PCR - Normal    Narrative:     The Xpert Xpress SARS-CoV-2/FLU/RSV plus is a rapid, multiplexed real-time PCR test intended for the simultaneous qualitative detection and differentiation of SARS-CoV-2, Influenza A, Influenza B, and respiratory syncytial virus (RSV) viral RNA in either nasopharyngeal swab or nasal swab specimens.         ALCOHOL,MEDICAL (ETHANOL) - Normal   TROPONIN I - Normal   CBC W/ AUTO DIFFERENTIAL    Narrative:     The following orders were created for panel order CBC auto differential.  Procedure                               Abnormality         Status                     ---------                               -----------         ------                     CBC with Differential[[id number]]        Abnormal            Final result                 Please view results for these tests on the individual orders.   POCT GLUCOSE   POCT GLUCOSE MONITORING CONTINUOUS     EKG Readings: (Independently Interpreted)   Initial Reading: No STEMI. Rhythm: Normal Sinus Rhythm. Heart Rate: 86. Ectopy: No Ectopy. Conduction: Normal. ST Segments: Normal ST Segments. T Waves: Normal. Axis: Left Axis Deviation.   0132     Imaging Results              X-Ray Chest AP Portable (Final result)  Result time 04/13/23 07:35:40      Final result by Benny MACHADO  MD Jv (04/13/23 07:35:40)                   Impression:      As above.  Recommend follow-up to resolution.      Electronically signed by: Benny Carias  Date:    04/13/2023  Time:    07:35               Narrative:    EXAMINATION:  XR CHEST AP PORTABLE    CLINICAL HISTORY:  Shortness of breath    TECHNIQUE:  Single view of the chest    COMPARISON:  04/09/2023    FINDINGS:  Cardiomegaly with small right-sided pleural effusion.  Right-sided MediPort remains in place.  The left hemithorax is clear.                                    X-Rays:   Independently Interpreted Readings:   Chest X-Ray: R sided pleural effusion   Medications   apixaban tablet 5 mg (5 mg Oral Given 4/13/23 0852)   aspirin EC tablet 81 mg (81 mg Oral Given 4/13/23 0853)   atorvastatin tablet 40 mg (has no administration in time range)   atovaquone 750 mg/5 mL oral liquid 1,500 mg (1,500 mg Oral Given 4/13/23 0854)   zfskigbyd-abkhewve-mtjwqca ala -25 mg (25 kg or greater) 1 tablet (1 tablet Oral Given 4/13/23 0900)   empagliflozin 10 mg tablet 10 mg (10 mg Oral Not Given 4/13/23 0900)   furosemide tablet 60 mg (60 mg Oral Not Given 4/13/23 0853)   megestroL 400 mg/10 mL (10 mL) suspension 200 mg (200 mg Oral Given 4/13/23 1146)   nicotine 14 mg/24 hr 1 patch (1 patch Transdermal Patch Applied 4/13/23 0851)   sacubitriL-valsartan 24-26 mg per tablet 1 tablet (1 tablet Oral Not Given 4/13/23 0853)   glucose chewable tablet 16 g (has no administration in time range)   glucose chewable tablet 24 g (has no administration in time range)   glucagon (human recombinant) injection 1 mg (has no administration in time range)   insulin aspart U-100 injection 0-5 Units (has no administration in time range)   dextrose 10% bolus 125 mL 125 mL (has no administration in time range)   dextrose 10% bolus 250 mL 250 mL (has no administration in time range)   furosemide injection 80 mg (80 mg Intravenous Given 4/13/23 0203)     Medical Decision Making:    History:   Old Medical Records: I decided to obtain old medical records.  Old Records Summarized: records from clinic visits and records from previous admission(s).       <> Summary of Records: Reviewed previous records, multiple admissions for congestive heart failure exacerbations.  Initial Assessment:   CHF exacerbation  Differential Diagnosis:   Judging by the patient's chief complaint and pertinent history, the patient has the following possible differential diagnoses, including but not limited to the following.  Some of these are deemed to be lower likelihood and some more likely based on my physical exam and history combined with possible lab work and/or imaging studies.   Please see the pertinent studies, and refer to the HPI.  Some of these diagnoses will take further evaluation to fully rule out, perhaps as an outpatient and the patient was encouraged to follow up when discharged for more comprehensive evaluation.    ACS, pneumonia, COVID/Flu, congestive heart failure, asthma, COPD, pleural effusion, pulmonary edema, acute bronchitis, PE, pneumothorax, hemothorax, aortic dissection, electrolyte abnormalities, anemia, anxiety     Independently Interpreted Test(s):   I have ordered and independently interpreted X-rays - see prior notes.  I have ordered and independently interpreted EKG Reading(s) - see prior notes  Clinical Tests:   Lab Tests: Ordered and Reviewed  Radiological Study: Ordered and Reviewed  Medical Tests: Ordered and Reviewed  ED Management:  Patient is a 63-year-old male with past medical history of congestive heart failure, cocaine abuse presents to the emergency department for shortness of breath.  See HPI.  See physical exam.  History of congestive heart failure, cocaine abuse.  Lab work notable for elevated BNP.  Chest x-ray with right-sided pleural effusion.  Given diuretics.  On reassessment patient reports continues to have shortness of breath and no relief despite approximately 1 L  of urine output.  Patient with minimal elevation of troponin likely due to demand from cocaine and CHF exacerbation.  Is on Eliquis.  All results discussed with patient.  Counseled extensively.  Discussed with hospital medicine who will admit the patient.        Scribe Attestation:   Scribe #1: I performed the above scribed service and the documentation accurately describes the services I performed. I attest to the accuracy of the note.    Attending Attestation:           Physician Attestation for Scribe:  Physician Attestation Statement for Scribe #1: I, reviewed documentation, as scribed by Loyd Hull in my presence, and it is both accurate and complete.         Medical Decision Making  Problems Addressed:  Cocaine abuse: acute illness or injury that poses a threat to life or bodily functions  Congestive heart failure, unspecified HF chronicity, unspecified heart failure type: acute illness or injury that poses a threat to life or bodily functions  Leg swelling: acute illness or injury that poses a threat to life or bodily functions  SOB (shortness of breath): acute illness or injury that poses a threat to life or bodily functions    Amount and/or Complexity of Data Reviewed  External Data Reviewed: labs, radiology and ECG.  Labs: ordered.  Radiology: ordered and independent interpretation performed.  ECG/medicine tests: ordered and independent interpretation performed.    Risk  Prescription drug management.  Parenteral controlled substances.  Decision regarding hospitalization.                         Clinical Impression:   Final diagnoses:  [R06.02] SOB (shortness of breath)  [I50.9] Congestive heart failure, unspecified HF chronicity, unspecified heart failure type (Primary)  [F14.10] Cocaine abuse  [M79.89] Leg swelling        ED Disposition Condition    Observation                 Ronaldo Olivarez MD  04/13/23 1529

## 2023-04-13 NOTE — Clinical Note
Diagnosis: Congestive heart failure, unspecified HF chronicity, unspecified heart failure type [0725091]   Admitting Provider:: BITA SANCHEZ [[id number]]   Future Attending Provider: BITA SANCHEZ [[id number]]   Reason for IP Medical Treatment  (Clinical interventions that can only be accomplished in the IP setting? ) :: CHF exacerbation, cocaine abuse   I certify that Inpatient services for greater than or equal to 2 midnights are medically necessary:: Yes   Plans for Post-Acute care--if anticipated (pick the single best option):: A. No post acute care anticipated at this time

## 2023-04-13 NOTE — H&P
[facility] Hospital Medicine History & Physical Examination       Patient Name: Jason Perdue  MRN: [id number]  Patient Class: IP- Inpatient   Admission Date: 4/6/2023   Admitting Physician:  Service   Attending Physician: Mike Michelle MD  Primary Care Provider: No primary care provider on file.  Face-to-Face encounter date: 04/13/2023  Code Status:Code Status Discussion Note   Chief Complaint: Leg Swelling (Chronic BLE edema and SOB)          Patient information was obtained from patient, patient's family, past medical records and ER records.     HISTORY OF PRESENT ILLNESS:   Jason Perdue is a 63 y.o. male who  has a past medical history of Cancer, CHF (congestive heart failure), Human immunodeficiency virus (HIV) disease, and Hypertension.. The patient presented to [facility] on 4/6/2023     63-year-old male with past medical history of chronic systolic heart failure, HIV, polysubstance abuse presents to the ER on 04/13 complaint of worsening shortness of breath.  He was recently discharged from [facility] just 2 days ago after heart failure exacerbation.  Per discharge note he was weaned to room air and was doing well.  He was started back on his home medications which include 60 mg of Lasix daily.  His BNP at discharge was around 10,000. In the emergency room he does not show any signs of hypoxemia.  He is on room air saturating in the 90s.  His renal function is at baseline.  Electrolytes are stable.  EKG was unrevealing.  He does report some increased lower extremity edema.  He does continue to use cocaine last being yesterday.  PAST MEDICAL HISTORY:     Past Medical History:   Diagnosis Date    Cancer     CHF (congestive heart failure)     Human immunodeficiency virus (HIV) disease     Hypertension        PAST SURGICAL HISTORY:     History reviewed. No pertinent surgical history.    ALLERGIES:   Ace inhibitors and Nitroglycerin    FAMILY HISTORY:   Reviewed and  negative    SOCIAL HISTORY:     Social History     Tobacco Use    Smoking status: Every Day     Packs/day: 0.50     Types: Cigarettes    Smokeless tobacco: Never   Substance Use Topics    Alcohol use: Not Currently        HOME MEDICATIONS:     Prior to Admission medications    Medication Sig Start Date End Date Taking? Authorizing Provider   apixaban (ELIQUIS) 5 mg Tab Take 1 tablet (5 mg total) by mouth 2 (two) times daily. 3/15/23 3/14/24 Yes Darius Zhou MD   aspirin (ECOTRIN) 81 MG EC tablet Take 1 tablet (81 mg total) by mouth once daily. 1/27/23 1/27/24 Yes David Posey DO   atorvastatin (LIPITOR) 40 MG tablet Take 1 tablet (40 mg total) by mouth every evening. 1/26/23 1/26/24 Yes David Posey DO   atovaquone (MEPRON) 750 mg/5 mL Susp oral liquid Take 10 mLs (1,500 mg total) by mouth once daily. 3/23/23  Yes SAVAGE Dockery   gwhhtqofd-msbwrava-aetchzh ala (BIKTARVY) -25 mg (25 kg or greater) Take 1 tablet by mouth once daily. 3/23/23  Yes SAVAGE Dockery   food supplemt, lactose-reduced Liqd Take 1 Bottle by mouth.   Yes Historical Provider   furosemide (LASIX) 20 MG tablet Take 3 tablets (60 mg total) by mouth once daily. 1/26/23 1/26/24 Yes David Posey,    megestroL (MEGACE) 400 mg/10 mL (40 mg/mL) Susp Take 200 mg by mouth.   Yes Historical Provider   polyethylene glycol 3350,bulk, Powd by Other route.   Yes Historical Provider   sacubitriL-valsartan (ENTRESTO) 24-26 mg per tablet Take 1 tablet by mouth 2 (two) times daily. 3/15/23 3/14/24 Yes Darius Zhou MD   VITAMIN D2 1,250 mcg (50,000 unit) capsule Take 1 capsule (50,000 Units total) by mouth every 7 days. 3/23/23  Yes SAVAGE Dockery   empagliflozin (JARDIANCE) 10 mg tablet Take 1 tablet (10 mg total) by mouth once daily. 4/11/23   Idalmis Sommers DO   famotidine (PEPCID) 20 MG tablet Take 1 tablet (20 mg total) by mouth 2 (two) times daily. 9/11/22 4/12/23  Zana Tatum MD   metoprolol succinate (TOPROL-XL)  25 MG 24 hr tablet Take 0.5 tablets (12.5 mg total) by mouth once daily. 4/11/23 4/10/24  Idalmis Sommers DO   nicotine (NICODERM CQ) 14 mg/24 hr Place 1 patch onto the skin once daily. 4/11/23   Idalmis Sommers DO       REVIEW OF SYSTEMS:   Except as documented, all other systems reviewed and negative     PHYSICAL EXAM:     VITAL SIGNS: 24 HRS MIN & MAX LAST   No data recorded 97.5 °F (36.4 °C)   No data recorded 101/70   No data recorded  90   No data recorded 20   No data recorded 96 %       General appearance: Well-developed, well-nourished male in no apparent distress.  HENT: Atraumatic head. Moist mucous membranes of oral cavity.  Eyes: Normal extraocular movements.   Neck: Supple.   Lungs: Clear to auscultation bilaterally. No wheezing present.   Heart: Regular rate and rhythm. S1 and S2 present with no murmurs/gallop/rub. No pedal edema. No JVD present.   Abdomen: Soft, non-distended, non-tender. No rebound tenderness/guarding. Bowel sounds are normal.   Extremities: No cyanosis, clubbing, or edema.  Skin: No Rash.   Neuro: Motor and sensory exams grossly intact. Good tone. Muscle strength 5/5 in all 4 extremities  Psych/mental status: Appropriate mood and affect. Responds appropriately to questions.     LABS AND IMAGING:     Recent Labs   Lab 04/06/23 2217 04/07/23  1620 04/13/23  0146   WBC 6.4 6.4 6.0   RBC 4.26* 4.22* 4.38*   HGB 11.5* 11.2* 11.8*   HCT 36.0* 35.6* 37.7*   MCV 84.5 84.4 86.1   MCH 27.0 26.5* 26.9*   MCHC 31.9* 31.5* 31.3*   RDW 17.2* 16.8 17.6*    180 157   MPV 11.6* 12.4* 12.7*       Recent Labs   Lab 04/06/23 2217 04/07/23  0338 04/09/23  0317 04/10/23  0132 04/11/23  0418 04/13/23  0146   *   < > 138 140 138 137   K 3.3*   < > 4.1 4.0 3.6 4.2   CO2 21*   < > 22* 25 24 22*   BUN 39.6*   < > 35.6* 30.7* 25.4 27.6*   CREATININE 1.85*   < > 1.40* 1.35* 1.20* 1.48*   CALCIUM 9.2   < > 9.2 9.0 8.6* 9.2   MG  --    < > 2.10 2.00 2.00  --    ALBUMIN 3.1*  --   --   --  2.7* 3.2*    ALKPHOS 105  --   --   --  92 114   ALT 13  --   --   --  10 11   AST 31  --   --   --  23 30   BILITOT 1.5  --   --   --  1.5 2.0*    < > = values in this interval not displayed.       Microbiology Results (last 7 days)       ** No results found for the last 168 hours. **             X-Ray Chest AP Portable  Narrative: EXAMINATION:  XR CHEST AP PORTABLE    CLINICAL HISTORY:  Shortness of breath    TECHNIQUE:  Single view of the chest    COMPARISON:  04/09/2023    FINDINGS:  Cardiomegaly with small right-sided pleural effusion.  Right-sided MediPort remains in place.  The left hemithorax is clear.  Impression: As above.  Recommend follow-up to resolution.    Electronically signed by: Benny Carias  Date:    04/13/2023  Time:    07:35      _____________________________________  INPATIENT LIST OF MEDICATIONS   No current facility-administered medications for this encounter.    Current Outpatient Medications:     apixaban (ELIQUIS) 5 mg Tab, Take 1 tablet (5 mg total) by mouth 2 (two) times daily., Disp: 60 tablet, Rfl: 11    aspirin (ECOTRIN) 81 MG EC tablet, Take 1 tablet (81 mg total) by mouth once daily., Disp: 90 tablet, Rfl: 3    atorvastatin (LIPITOR) 40 MG tablet, Take 1 tablet (40 mg total) by mouth every evening., Disp: 90 tablet, Rfl: 3    atovaquone (MEPRON) 750 mg/5 mL Susp oral liquid, Take 10 mLs (1,500 mg total) by mouth once daily., Disp: 900 mL, Rfl: 1    btoornszl-mlzuudoi-ixllxdq ala (BIKTARVY) -25 mg (25 kg or greater), Take 1 tablet by mouth once daily., Disp: 90 tablet, Rfl: 1    food supplemt, lactose-reduced Liqd, Take 1 Bottle by mouth., Disp: , Rfl:     furosemide (LASIX) 20 MG tablet, Take 3 tablets (60 mg total) by mouth once daily., Disp: 90 tablet, Rfl: 11    megestroL (MEGACE) 400 mg/10 mL (40 mg/mL) Susp, Take 200 mg by mouth., Disp: , Rfl:     polyethylene glycol 3350,bulk, Powd, by Other route., Disp: , Rfl:     sacubitriL-valsartan (ENTRESTO) 24-26 mg per tablet, Take 1  tablet by mouth 2 (two) times daily., Disp: 180 tablet, Rfl: 3    VITAMIN D2 1,250 mcg (50,000 unit) capsule, Take 1 capsule (50,000 Units total) by mouth every 7 days., Disp: 12 capsule, Rfl: 1    empagliflozin (JARDIANCE) 10 mg tablet, Take 1 tablet (10 mg total) by mouth once daily., Disp: 30 tablet, Rfl: 6    famotidine (PEPCID) 20 MG tablet, Take 1 tablet (20 mg total) by mouth 2 (two) times daily., Disp: 60 tablet, Rfl: 0    metoprolol succinate (TOPROL-XL) 25 MG 24 hr tablet, Take 0.5 tablets (12.5 mg total) by mouth once daily., Disp: 15 tablet, Rfl: 11    nicotine (NICODERM CQ) 14 mg/24 hr, Place 1 patch onto the skin once daily., Disp: 28 patch, Rfl: 2    Facility-Administered Medications Ordered in Other Encounters:     furosemide injection 40 mg, 40 mg, Intravenous, Q12H, Rose Sampson, [facility]      Scheduled Meds:    Continuous Infusions:  PRN Meds:.      VTE Prophylaxis: will be placed on appropriate DVT prophylaxis and will be advised to be as mobile as possible and sit in a chair as tolerated  _____________________________________    ASSESSMENT & PLAN:   Chronic congestive systolic heart failure   Dyspnea   Peripheral edema   Essential hypertension   HIV on ARTS     Patient overall seems to be at his baseline.  I do not see any acute issues though he does report worsening dyspnea.  Will give him an extra dose of Lasix today.    Will get PT and OT to evaluate him but he is not requiring any supplemental O2.    I placed under observation for now and monitor overnight.    He is to follow up with his cardiologist as an outpatient and discussed importance of cocaine cessation.    Plan to discharge tomorrow if remains stable overnight.    Of note his BNP is greater than 10,000.  This is a chronic elevation especially in the setting of acute renal failure.  This is not represent an acute exacerbation of his heart failure.

## 2023-04-13 NOTE — NURSING
Nurses Note -- 4 Eyes      4/13/2023   5:27 PM      Skin assessed during: Admit      [x] No Pressure Injuries Present    []Prevention Measures Documented      [] Yes- Altered Skin Integrity Present or Discovered   [] LDA Added if Not in Epic (Describe Wound)   [] New Altered Skin Integrity was Present on Admit and Documented in LDA   [] Wound Image Taken    Wound Care Consulted? No    Attending Nurse:  Vivi Bowman LPN     Second RN/Staff Member:  Adilia LOMELI CNA

## 2023-04-14 VITALS
TEMPERATURE: 98 F | HEART RATE: 91 BPM | WEIGHT: 218.88 LBS | DIASTOLIC BLOOD PRESSURE: 72 MMHG | BODY MASS INDEX: 30.64 KG/M2 | OXYGEN SATURATION: 99 % | SYSTOLIC BLOOD PRESSURE: 95 MMHG | HEIGHT: 71 IN | RESPIRATION RATE: 19 BRPM

## 2023-04-14 PROBLEM — R06.00 DYSPNEA: Status: ACTIVE | Noted: 2023-03-15

## 2023-04-14 PROBLEM — F14.10 COCAINE ABUSE: Status: ACTIVE | Noted: 2023-04-14

## 2023-04-14 LAB
POCT GLUCOSE: 103 MG/DL (ref 70–110)
POCT GLUCOSE: 90 MG/DL (ref 70–110)
TROPONIN I SERPL-MCNC: 0.04 NG/ML (ref 0–0.04)

## 2023-04-14 PROCEDURE — S4991 NICOTINE PATCH NONLEGEND: HCPCS | Performed by: HOSPITALIST

## 2023-04-14 PROCEDURE — 51798 US URINE CAPACITY MEASURE: CPT

## 2023-04-14 PROCEDURE — 84484 ASSAY OF TROPONIN QUANT: CPT | Performed by: NURSE PRACTITIONER

## 2023-04-14 PROCEDURE — 25000003 PHARM REV CODE 250: Performed by: HOSPITALIST

## 2023-04-14 PROCEDURE — G0378 HOSPITAL OBSERVATION PER HR: HCPCS

## 2023-04-14 PROCEDURE — S0179 MEGESTROL 20 MG: HCPCS | Performed by: HOSPITALIST

## 2023-04-14 RX ADMIN — MEGESTROL ACETATE 200 MG: 400 SUSPENSION ORAL at 09:04

## 2023-04-14 RX ADMIN — FUROSEMIDE 60 MG: 40 TABLET ORAL at 09:04

## 2023-04-14 RX ADMIN — NICOTINE 1 PATCH: 14 PATCH TRANSDERMAL at 09:04

## 2023-04-14 RX ADMIN — ASPIRIN 81 MG: 81 TABLET, COATED ORAL at 09:04

## 2023-04-14 RX ADMIN — SACUBITRIL AND VALSARTAN 1 TABLET: 24; 26 TABLET, FILM COATED ORAL at 09:04

## 2023-04-14 RX ADMIN — APIXABAN 5 MG: 5 TABLET, FILM COATED ORAL at 09:04

## 2023-04-14 NOTE — PLAN OF CARE
Problem: Adult Inpatient Plan of Care  Goal: Plan of Care Review  4/14/2023 0125 by Kelley Bustos RN  Outcome: Ongoing, Progressing  4/14/2023 0057 by Kelley Bustos RN  Outcome: Ongoing, Progressing  Goal: Absence of Hospital-Acquired Illness or Injury  4/14/2023 0125 by Kelley Bustos RN  Outcome: Ongoing, Progressing  4/14/2023 0057 by Kelley Bustos RN  Outcome: Ongoing, Progressing  Goal: Optimal Comfort and Wellbeing  4/14/2023 0125 by Kelley Bustos RN  Outcome: Ongoing, Progressing  4/14/2023 0057 by Kelley Bustos RN  Outcome: Ongoing, Progressing     Problem: Fluid Imbalance (Pneumonia)  Goal: Fluid Balance  Outcome: Ongoing, Progressing     Problem: Respiratory Compromise (Pneumonia)  Goal: Effective Oxygenation and Ventilation  Outcome: Ongoing, Progressing     Problem: Fall Injury Risk  Goal: Absence of Fall and Fall-Related Injury  4/14/2023 0125 by Kelley Bustos RN  Outcome: Ongoing, Progressing  4/14/2023 0057 by Kelley Bustos RN  Outcome: Ongoing, Progressing

## 2023-04-14 NOTE — DISCHARGE SUMMARY
" [facility] Hospital Medicine Discharge Summary    Admit Date: 4/13/2023  Discharge Date and Time: 4/14/20237:15 AM  Admitting Physician: Hospitalist team   Discharging Physician: Mike Michelle MD.  Primary Care Physician: No primary care provider on file.      Discharge Diagnoses:  Chronic congestive systolic heart failure   Inhaled cocaine abuse  Dyspnea   Peripheral edema   Essential hypertension   HIV on ARTS     Hospital Course:   63-year-old male with past medical history of chronic systolic heart failure, HIV, polysubstance abuse presents to the ER on 04/13 complaint of worsening shortness of breath.  He was recently discharged from [facility] just 2 days ago after heart failure exacerbation.  Per discharge note he was weaned to room air and was doing well.  He was started back on his home medications which include 60 mg of Lasix daily.  His BNP at discharge was around 10,000. In the emergency room he does not show any signs of hypoxemia.  He is on room air saturating in the 90s.  His renal function is at baseline.  Electrolytes are stable.  EKG was unrevealing.  He does report some increased lower extremity edema.  He does continue to use cocaine last being yesterday.    He was observed on the floor overnight.  Remained stable.  On room air.  No further chest pain or shortness of breath.  I discussed in length the importance of cocaine cessation.  No acute cardiac issues.  He has an appointment with his cardiologist on Monday.  He plans to follow-up.  No new medications at discharge.  Okay to send home today    Vitals:  Blood pressure 122/87, pulse 81, temperature 97.4 °F (36.3 °C), temperature source Oral, resp. rate 18, height 5' 10.87" (1.8 m), weight 99.3 kg (218 lb 14.4 oz), SpO2 100 %..    Physical Exam:  Awake, Alert, Oriented x 3, No new focal Neurologic deficit, Normal Affect  NC/AT, PERRLA, EOMI  Supple neck, no JVD, No cervical lymphadenopathy  Symmetrical " chest, Good air entry bilaterally. No rhonchi, wheezes, crackles appreciated  RRR, No gallop, rub or murmur  +ve Bowel sounds x4, Abd soft and non tender, no rebound, guarding or rigidity  No Cyanosis, cludding or edema, No new rash or bruises    Procedures Performed: No admission procedures for hospital encounter.     Significant Diagnostic Studies: See Full reports for all details  Admission on 04/13/2023   Component Date Value    Natriuretic Peptide 04/13/2023 10,149.2 (H)     Sodium Level 04/13/2023 137     Potassium Level 04/13/2023 4.2     Chloride 04/13/2023 104     Carbon Dioxide 04/13/2023 22 (L)     Glucose Level 04/13/2023 90     Blood Urea Nitrogen 04/13/2023 27.6 (H)     Creatinine 04/13/2023 1.48 (H)     Calcium Level Total 04/13/2023 9.2     Protein Total 04/13/2023 8.1 (H)     Albumin Level 04/13/2023 3.2 (L)     Globulin 04/13/2023 4.9 (H)     Albumin/Globulin Ratio 04/13/2023 0.7 (L)     Bilirubin Total 04/13/2023 2.0 (H)     Alkaline Phosphatase 04/13/2023 114     Alanine Aminotransferase 04/13/2023 11     Aspartate Aminotransfera* 04/13/2023 30     eGFR 04/13/2023 53     Troponin-I 04/13/2023 0.041     Influenza A PCR 04/13/2023 Not Detected     Influenza B PCR 04/13/2023 Not Detected     SARS-CoV-2 PCR 04/13/2023 Not Detected     PTT 04/13/2023 54.3 (H)     PT 04/13/2023 26.1 (H)     INR 04/13/2023 2.45 (H)     WBC 04/13/2023 6.0     RBC 04/13/2023 4.38 (L)     Hgb 04/13/2023 11.8 (L)     Hct 04/13/2023 37.7 (L)     MCV 04/13/2023 86.1     MCH 04/13/2023 26.9 (L)     MCHC 04/13/2023 31.3 (L)     RDW 04/13/2023 17.6 (H)     Platelet 04/13/2023 157     MPV 04/13/2023 12.7 (H)     Neut % 04/13/2023 65.1     Lymph % 04/13/2023 20.2     Mono % 04/13/2023 12.7     Eos % 04/13/2023 1.2     Basophil % 04/13/2023 0.5     Lymph # 04/13/2023 1.21     Neut # 04/13/2023 3.90     Mono # 04/13/2023 0.76     Eos # 04/13/2023 0.07     Baso # 04/13/2023 0.03     IG# 04/13/2023 0.02     IG% 04/13/2023 0.3      NRBC% 04/13/2023 0.0     Amphetamines, Urine 04/13/2023 Negative     Barbituates, Urine 04/13/2023 Negative     Benzodiazepine, Urine 04/13/2023 Negative     Cannabinoids, Urine 04/13/2023 Negative     Cocaine, Urine 04/13/2023 Positive (A)     Fentanyl, Urine 04/13/2023 Negative     MDMA, Urine 04/13/2023 Negative     Opiates, Urine 04/13/2023 Negative     Phencyclidine, Urine 04/13/2023 Negative     pH, Urine 04/13/2023 6.0     Specific Gravity, Urine * 04/13/2023 1.015     Ethanol Level 04/13/2023 <10.0     BSA 04/13/2023 2.13     TDI LATERAL 04/13/2023 0.04     Left Ventricular Outflow* 04/13/2023 1.00     Left Ventricular Outflow* 04/13/2023 0.46     MV peak gradient 04/13/2023 5     MV mean gradient 04/13/2023 2     MV stenosis pressure 1/2* 04/13/2023 48.00     MV VTI 04/13/2023 17.6     TR Max Fer 04/13/2023 2.47     Ao peak fer 04/13/2023 3.1     Posterior Wall 04/13/2023 0.94     LVOT diameter 04/13/2023 2.20     LVOT peak fer 04/13/2023 0.62     LVOT peak VTI 04/13/2023 15.00     E wave deceleration time 04/13/2023 165.00     MV Peak A Fer 04/13/2023 0.71     MV Peak E Fer 04/13/2023 0.73     TAPSE 04/13/2023 1.95     IVS 04/13/2023 1.01     Ao VTI 04/13/2023 61.00     AV mean gradient 04/13/2023 24     LVIDd 04/13/2023 5.55     LVIDs 04/13/2023 5.19 (A)     LV Systolic Volume 04/13/2023 150.00     LV Diastolic Volume 04/13/2023 188.00     TDI SEPTAL 04/13/2023 0.04     RVDD 04/13/2023 3.65     AORTIC VALVE CUSP SEPERA* 04/13/2023 0.80     LA size 04/13/2023 3.90     LV LATERAL E/E' RATIO 04/13/2023 18.25     LV SEPTAL E/E' RATIO 04/13/2023 18.25     FS 04/13/2023 6     LV mass 04/13/2023 209.37     Left Ventricle Relative * 04/13/2023 0.34     AV valve area 04/13/2023 0.93     AV Velocity Ratio 04/13/2023 0.20     AV index (prosthetic) 04/13/2023 0.25     MV valve area p 1/2 meth* 04/13/2023 4.58     MV valve area by continu* 04/13/2023 3.24     E/A ratio 04/13/2023 1.03     Mean e' 04/13/2023 0.04      LVOT area 04/13/2023 3.8     LVOT stroke volume 04/13/2023 56.99     AV peak gradient 04/13/2023 38     E/E' ratio 04/13/2023 18.25     LV Systolic Volume Index 04/13/2023 71.1     LV Diastolic Volume Index 04/13/2023 89.10     LV Mass Index 04/13/2023 99     Triscuspid Valve Regurgi* 04/13/2023 24     Right Atrial Pressure (f* 04/13/2023 15     EF 04/13/2023 15     TV rest pulmonary artery* 04/13/2023 39     Troponin-I 04/13/2023 0.039     POCT Glucose 04/13/2023 89     Troponin-I 04/13/2023 0.048 (H)     POCT Glucose 04/13/2023 117 (H)     Troponin-I 04/13/2023 0.037     POCT Glucose 04/13/2023 97     Troponin-I 04/14/2023 0.037         Microbiology Results (last 7 days)       ** No results found for the last 168 hours. **             X-Ray Chest 1 View    Result Date: 4/7/2023  EXAMINATION: XR CHEST 1 VIEW CLINICAL HISTORY: Chest Pain; TECHNIQUE: Single frontal view of the chest was performed. COMPARISON: None FINDINGS: LINES AND TUBES: Right IJ joss catheter tip projects over the confluence of the brachiocephalic veins. MEDIASTINUM AND ANA PAULA: Cardiac silhouette is enlarged. LUNGS: Right basilar opacity with silhouetting of the diaphragm. PLEURA:Small right pleural effusion.No pneumothorax. OTHER: No acute osseous abnormality.     Small right pleural effusion with right basilar atelectasis versus pneumonia. Enlarged cardiac silhouette without overt alveolar edema. Electronically signed by: Sangeeta Wadsworth Date:    04/07/2023 Time:    07:54    X-Ray Chest 1 View    Result Date: 3/24/2023  EXAMINATION: XR CHEST 1 VIEW CLINICAL HISTORY: shortness of breath; TECHNIQUE: AP chest COMPARISON: Chest x-ray dated 03/13/2023 FINDINGS: Right chest wall port catheter remains in place.  There is stable cardiomegaly.  There is trace right pleural effusion.  There are increased interstitial markings without focal consolidation.  There is no visible pneumothorax     Trace right pleural effusions with findings suggestive of mild  pulmonary edema. Electronically signed by: Nae Burden Date:    03/24/2023 Time:    06:09    X-Ray Chest PA And Lateral    Result Date: 4/9/2023  EXAMINATION: XR CHEST PA AND LATERAL CLINICAL HISTORY: dyspnea; TECHNIQUE: Two views COMPARISON: April 6, 2023. FINDINGS: Cardiopericardial silhouette enlarged appearance similar.  Interval size increase of right pleural and further right lower lung zone atelectasis and/or infiltrates.  Left lung and the pleural space are unremarkable.  There is no pulmonary edema.  No pneumothorax.  MediPort terminates within the proximal superior vena cava.     Interval size increase of right pleural effusion and further right lower lung zone atelectasis. Electronically signed by: Romulo Ayala Date:    04/09/2023 Time:    10:52    X-Ray Chest AP Portable    Result Date: 4/13/2023  EXAMINATION: XR CHEST AP PORTABLE CLINICAL HISTORY: Shortness of breath TECHNIQUE: Single view of the chest COMPARISON: 04/09/2023 FINDINGS: Cardiomegaly with small right-sided pleural effusion.  Right-sided MediPort remains in place.  The left hemithorax is clear.     As above.  Recommend follow-up to resolution. Electronically signed by: Benny Carias Date:    04/13/2023 Time:    07:35    Echo    Result Date: 4/13/2023  · Mild eccentric hypertrophy and severely decreased systolic function. · The estimated ejection fraction is 15%. · Grade III left ventricular diastolic dysfunction. · Normal right ventricular size. · Due to severe LV systolic dysfunction and an EF of 15% - There is atleat moderate to severe if not severe aortic valve stenosis. with max velocity over 3.0m/s and Dimensionless Index=0.24 · Aortic valve area is 0.93 cm2; peak velocity is 3.1 m/s; mean gradient is 24 mmHg. · Moderate mitral regurgitation. · Moderate tricuspid regurgitation. · Elevated central venous pressure (15 mmHg). · The estimated PA systolic pressure is 39 mmHg.      ED US Guided Misc Procedure    Result Date:  3/24/2023  Jarod Shoemaker MD     3/24/2023 12:06 PM ED US Guided Misc Procedure Date/Time: 3/24/2023 12:06 PM Performed by: Jarod Shoemaker MD Authorized by: Jarod Shoemaker MD Procedure:  Ultrasound-guided peripheral venous cannulation Indication:  Failed or difficult IV access  Left  Antecubital Procedure:  Dynamic ultrasound guidance used, Candidate vein examined with linear probe - confirmed collapsibility, lack of pulsatility, and proper anatomic location. and Using aseptic technique, IV catheter inserted with flash of blood noted, flow of venous blood confirmed. Catheter gauge:  20  Flushes easily and without pain. Patient tolerated procedure well. Complications:  None Charge?:  Yes  - pulls last radiology orders        Medication List        CONTINUE taking these medications      apixaban 5 mg Tab  Commonly known as: ELIQUIS  Take 1 tablet (5 mg total) by mouth 2 (two) times daily.     aspirin 81 MG EC tablet  Commonly known as: ECOTRIN  Take 1 tablet (81 mg total) by mouth once daily.     atorvastatin 40 MG tablet  Commonly known as: LIPITOR  Take 1 tablet (40 mg total) by mouth every evening.     atovaquone 750 mg/5 mL Susp oral liquid  Commonly known as: MEPRON  Take 10 mLs (1,500 mg total) by mouth once daily.     BIKTARVY -25 mg (25 kg or greater)  Generic drug: viqlrqqhk-dzgabknw-nhymvcl ala  Take 1 tablet by mouth once daily.     carvediloL 12.5 MG tablet  Commonly known as: COREG     empagliflozin 10 mg tablet  Commonly known as: JARDIANCE  Take 1 tablet (10 mg total) by mouth once daily.     food supplemt, lactose-reduced Liqd     furosemide 20 MG tablet  Commonly known as: LASIX  Take 3 tablets (60 mg total) by mouth once daily.     megestroL 400 mg/10 mL (40 mg/mL) Susp  Commonly known as: MEGACE     metoprolol succinate 25 MG 24 hr tablet  Commonly known as: TOPROL-XL  Take 0.5 tablets (12.5 mg total) by mouth once daily.     nicotine 14 mg/24 hr  Commonly known as:  NICODERM CQ  Place 1 patch onto the skin once daily.     polyethylene glycol 3350(bulk) Powd     sacubitriL-valsartan 24-26 mg per tablet  Commonly known as: ENTRESTO  Take 1 tablet by mouth 2 (two) times daily.     VITAMIN D2 50,000 unit Cap  Generic drug: ergocalciferol  Take 1 capsule (50,000 Units total) by mouth every 7 days.               Explained in detail to the patient about the discharge plan, medications, and follow-up visits. Pt understands and agrees with the treatment plan  Discharged Condition: stable  Diet: cardiac  Disposition: home    Medications Per DC med rec  Activities as tolerated  Follow up with your PCP in 2 wks   For further questions contact hospitalist office    Discharge time 33 minutes    For worsening symptoms, chest pain, shortness of breath, increased abdominal pain, high grade fever, stroke or stroke like symptoms, immediately go to the nearest Emergency Room or call 911 as soon as possible.      Mike Liz M.D, on 4/14/2023. at 7:15 AM.

## 2023-04-14 NOTE — PLAN OF CARE
Problem: Adult Inpatient Plan of Care  Goal: Plan of Care Review  Outcome: Ongoing, Progressing  Goal: Absence of Hospital-Acquired Illness or Injury  Outcome: Ongoing, Progressing  Goal: Optimal Comfort and Wellbeing  Outcome: Ongoing, Progressing     Problem: Fluid Imbalance (Pneumonia)  Goal: Fluid Balance  Outcome: Ongoing, Progressing     Problem: Fall Injury Risk  Goal: Absence of Fall and Fall-Related Injury  Outcome: Ongoing, Progressing

## 2023-04-16 ENCOUNTER — HOSPITAL ENCOUNTER (INPATIENT)
Facility: HOSPITAL | Age: 63
LOS: 4 days | Discharge: HOME | DRG: 281 | End: 2023-04-20
Attending: FAMILY MEDICINE | Admitting: INTERNAL MEDICINE
Payer: MEDICAID

## 2023-04-16 DIAGNOSIS — R06.00 DYSPNEA: ICD-10-CM

## 2023-04-16 DIAGNOSIS — R06.02 SOB (SHORTNESS OF BREATH): ICD-10-CM

## 2023-04-16 DIAGNOSIS — I50.9 ACUTE ON CHRONIC CONGESTIVE HEART FAILURE, UNSPECIFIED HEART FAILURE TYPE: Primary | ICD-10-CM

## 2023-04-16 DIAGNOSIS — R07.9 CHEST PAIN: ICD-10-CM

## 2023-04-16 DIAGNOSIS — R79.89 ELEVATED TROPONIN: ICD-10-CM

## 2023-04-16 PROCEDURE — 99285 EMERGENCY DEPT VISIT HI MDM: CPT | Mod: 25

## 2023-04-16 PROCEDURE — 11000001 HC ACUTE MED/SURG PRIVATE ROOM

## 2023-04-16 PROCEDURE — 93005 ELECTROCARDIOGRAM TRACING: CPT

## 2023-04-16 PROCEDURE — 96374 THER/PROPH/DIAG INJ IV PUSH: CPT

## 2023-04-17 LAB
ALBUMIN SERPL-MCNC: 3.2 G/DL (ref 3.4–4.8)
ALBUMIN SERPL-MCNC: 3.3 G/DL (ref 3.4–4.8)
ALBUMIN/GLOB SERPL: 0.6 RATIO (ref 1.1–2)
ALBUMIN/GLOB SERPL: 0.6 RATIO (ref 1.1–2)
ALP SERPL-CCNC: 134 UNIT/L (ref 40–150)
ALP SERPL-CCNC: 136 UNIT/L (ref 40–150)
ALT SERPL-CCNC: 15 UNIT/L (ref 0–55)
ALT SERPL-CCNC: 17 UNIT/L (ref 0–55)
AMPHET UR QL SCN: NEGATIVE
APPEARANCE UR: CLEAR
AST SERPL-CCNC: 35 UNIT/L (ref 5–34)
AST SERPL-CCNC: 41 UNIT/L (ref 5–34)
BACTERIA #/AREA URNS AUTO: ABNORMAL /HPF
BARBITURATE SCN PRESENT UR: NEGATIVE
BASOPHILS # BLD AUTO: 0.03 X10(3)/MCL (ref 0–0.2)
BASOPHILS # BLD AUTO: 0.03 X10(3)/MCL (ref 0–0.2)
BASOPHILS NFR BLD AUTO: 0.5 %
BASOPHILS NFR BLD AUTO: 0.5 %
BENZODIAZ UR QL SCN: NEGATIVE
BILIRUB UR QL STRIP.AUTO: NEGATIVE MG/DL
BILIRUBIN DIRECT+TOT PNL SERPL-MCNC: 2 MG/DL
BILIRUBIN DIRECT+TOT PNL SERPL-MCNC: 2.5 MG/DL
BNP BLD-MCNC: ABNORMAL PG/ML
BUN SERPL-MCNC: 31.1 MG/DL (ref 8.4–25.7)
BUN SERPL-MCNC: 31.1 MG/DL (ref 8.4–25.7)
CALCIUM SERPL-MCNC: 9.2 MG/DL (ref 8.8–10)
CALCIUM SERPL-MCNC: 9.9 MG/DL (ref 8.8–10)
CANNABINOIDS UR QL SCN: NEGATIVE
CHLORIDE SERPL-SCNC: 104 MMOL/L (ref 98–107)
CHLORIDE SERPL-SCNC: 105 MMOL/L (ref 98–107)
CK MB SERPL-MCNC: 1.3 NG/ML
CK SERPL-CCNC: 118 U/L (ref 30–200)
CO2 SERPL-SCNC: 19 MMOL/L (ref 23–31)
CO2 SERPL-SCNC: 19 MMOL/L (ref 23–31)
COCAINE UR QL SCN: POSITIVE
COLOR UR AUTO: YELLOW
CREAT SERPL-MCNC: 1.61 MG/DL (ref 0.73–1.18)
CREAT SERPL-MCNC: 1.62 MG/DL (ref 0.73–1.18)
EOSINOPHIL # BLD AUTO: 0.02 X10(3)/MCL (ref 0–0.9)
EOSINOPHIL # BLD AUTO: 0.07 X10(3)/MCL (ref 0–0.9)
EOSINOPHIL NFR BLD AUTO: 0.4 %
EOSINOPHIL NFR BLD AUTO: 1.1 %
ERYTHROCYTE [DISTWIDTH] IN BLOOD BY AUTOMATED COUNT: 18 % (ref 11.5–17)
ERYTHROCYTE [DISTWIDTH] IN BLOOD BY AUTOMATED COUNT: 18.2 % (ref 11.5–17)
FENTANYL UR QL SCN: NEGATIVE
FLUAV AG UPPER RESP QL IA.RAPID: NOT DETECTED
FLUBV AG UPPER RESP QL IA.RAPID: NOT DETECTED
GFR SERPLBLD CREATININE-BSD FMLA CKD-EPI: 48 MLS/MIN/1.73/M2
GFR SERPLBLD CREATININE-BSD FMLA CKD-EPI: 48 MLS/MIN/1.73/M2
GLOBULIN SER-MCNC: 5 GM/DL (ref 2.4–3.5)
GLOBULIN SER-MCNC: 5.3 GM/DL (ref 2.4–3.5)
GLUCOSE SERPL-MCNC: 125 MG/DL (ref 82–115)
GLUCOSE SERPL-MCNC: 91 MG/DL (ref 82–115)
GLUCOSE UR QL STRIP.AUTO: NORMAL MG/DL
HCT VFR BLD AUTO: 36.3 % (ref 42–52)
HCT VFR BLD AUTO: 37.7 % (ref 42–52)
HGB BLD-MCNC: 11.5 G/DL (ref 14–18)
HGB BLD-MCNC: 12 G/DL (ref 14–18)
HYALINE CASTS #/AREA URNS LPF: ABNORMAL /LPF
IMM GRANULOCYTES # BLD AUTO: 0.01 X10(3)/MCL (ref 0–0.04)
IMM GRANULOCYTES # BLD AUTO: 0.01 X10(3)/MCL (ref 0–0.04)
IMM GRANULOCYTES NFR BLD AUTO: 0.2 %
IMM GRANULOCYTES NFR BLD AUTO: 0.2 %
KETONES UR QL STRIP.AUTO: NEGATIVE MG/DL
LEUKOCYTE ESTERASE UR QL STRIP.AUTO: NEGATIVE UNIT/L
LYMPHOCYTES # BLD AUTO: 0.99 X10(3)/MCL (ref 0.6–4.6)
LYMPHOCYTES # BLD AUTO: 1.15 X10(3)/MCL (ref 0.6–4.6)
LYMPHOCYTES NFR BLD AUTO: 17.8 %
LYMPHOCYTES NFR BLD AUTO: 18.4 %
MAGNESIUM SERPL-MCNC: 2.3 MG/DL (ref 1.6–2.6)
MCH RBC QN AUTO: 26.5 PG (ref 27–31)
MCH RBC QN AUTO: 26.8 PG (ref 27–31)
MCHC RBC AUTO-ENTMCNC: 31.7 G/DL (ref 33–36)
MCHC RBC AUTO-ENTMCNC: 31.8 G/DL (ref 33–36)
MCV RBC AUTO: 83.4 FL (ref 80–94)
MCV RBC AUTO: 84.6 FL (ref 80–94)
MDMA UR QL SCN: NEGATIVE
MONOCYTES # BLD AUTO: 0.57 X10(3)/MCL (ref 0.1–1.3)
MONOCYTES # BLD AUTO: 0.7 X10(3)/MCL (ref 0.1–1.3)
MONOCYTES NFR BLD AUTO: 10.3 %
MONOCYTES NFR BLD AUTO: 11.2 %
MUCOUS THREADS URNS QL MICRO: ABNORMAL /LPF
NEUTROPHILS # BLD AUTO: 3.93 X10(3)/MCL (ref 2.1–9.2)
NEUTROPHILS # BLD AUTO: 4.3 X10(3)/MCL (ref 2.1–9.2)
NEUTROPHILS NFR BLD AUTO: 68.6 %
NEUTROPHILS NFR BLD AUTO: 70.8 %
NITRITE UR QL STRIP.AUTO: NEGATIVE
NRBC BLD AUTO-RTO: 0 %
NRBC BLD AUTO-RTO: 0 %
OPIATES UR QL SCN: NEGATIVE
PCP UR QL: NEGATIVE
PH UR STRIP.AUTO: 6 [PH]
PH UR: 6 [PH] (ref 3–11)
PHOSPHATE SERPL-MCNC: 3.7 MG/DL (ref 2.3–4.7)
PLATELET # BLD AUTO: 139 X10(3)/MCL (ref 130–400)
PLATELET # BLD AUTO: 148 X10(3)/MCL (ref 130–400)
PMV BLD AUTO: 12.5 FL (ref 7.4–10.4)
PMV BLD AUTO: 12.8 FL (ref 7.4–10.4)
POTASSIUM SERPL-SCNC: 3.9 MMOL/L (ref 3.5–5.1)
POTASSIUM SERPL-SCNC: 4.3 MMOL/L (ref 3.5–5.1)
PROT SERPL-MCNC: 8.2 GM/DL (ref 5.8–7.6)
PROT SERPL-MCNC: 8.6 GM/DL (ref 5.8–7.6)
PROT UR QL STRIP.AUTO: ABNORMAL MG/DL
RBC # BLD AUTO: 4.29 X10(6)/MCL (ref 4.7–6.1)
RBC # BLD AUTO: 4.52 X10(6)/MCL (ref 4.7–6.1)
RBC #/AREA URNS AUTO: ABNORMAL /HPF
RBC UR QL AUTO: NEGATIVE UNIT/L
RSV A 5' UTR RNA NPH QL NAA+PROBE: NOT DETECTED
SARS-COV-2 RNA RESP QL NAA+PROBE: NOT DETECTED
SODIUM SERPL-SCNC: 135 MMOL/L (ref 136–145)
SODIUM SERPL-SCNC: 136 MMOL/L (ref 136–145)
SP GR UR STRIP.AUTO: 1.01
SPECIFIC GRAVITY, URINE AUTO (.000) (OHS): 1.01 (ref 1–1.03)
SQUAMOUS #/AREA URNS LPF: ABNORMAL /HPF
TROPONIN I SERPL-MCNC: 0.04 NG/ML (ref 0–0.04)
TROPONIN I SERPL-MCNC: 0.05 NG/ML (ref 0–0.04)
TROPONIN I SERPL-MCNC: 0.06 NG/ML (ref 0–0.04)
TROPONIN I SERPL-MCNC: 0.06 NG/ML (ref 0–0.04)
UROBILINOGEN UR STRIP-ACNC: ABNORMAL MG/DL
WBC # SPEC AUTO: 5.6 X10(3)/MCL (ref 4.5–11.5)
WBC # SPEC AUTO: 6.3 X10(3)/MCL (ref 4.5–11.5)
WBC #/AREA URNS AUTO: ABNORMAL /HPF

## 2023-04-17 PROCEDURE — 85025 COMPLETE CBC W/AUTO DIFF WBC: CPT | Performed by: FAMILY MEDICINE

## 2023-04-17 PROCEDURE — 81001 URINALYSIS AUTO W/SCOPE: CPT | Performed by: STUDENT IN AN ORGANIZED HEALTH CARE EDUCATION/TRAINING PROGRAM

## 2023-04-17 PROCEDURE — 63600175 PHARM REV CODE 636 W HCPCS: Performed by: FAMILY MEDICINE

## 2023-04-17 PROCEDURE — 84100 ASSAY OF PHOSPHORUS: CPT | Performed by: STUDENT IN AN ORGANIZED HEALTH CARE EDUCATION/TRAINING PROGRAM

## 2023-04-17 PROCEDURE — 93005 ELECTROCARDIOGRAM TRACING: CPT

## 2023-04-17 PROCEDURE — 82550 ASSAY OF CK (CPK): CPT | Performed by: FAMILY MEDICINE

## 2023-04-17 PROCEDURE — 63600175 PHARM REV CODE 636 W HCPCS

## 2023-04-17 PROCEDURE — 80053 COMPREHEN METABOLIC PANEL: CPT | Performed by: STUDENT IN AN ORGANIZED HEALTH CARE EDUCATION/TRAINING PROGRAM

## 2023-04-17 PROCEDURE — S4991 NICOTINE PATCH NONLEGEND: HCPCS | Performed by: STUDENT IN AN ORGANIZED HEALTH CARE EDUCATION/TRAINING PROGRAM

## 2023-04-17 PROCEDURE — 84484 ASSAY OF TROPONIN QUANT: CPT | Performed by: STUDENT IN AN ORGANIZED HEALTH CARE EDUCATION/TRAINING PROGRAM

## 2023-04-17 PROCEDURE — 51798 US URINE CAPACITY MEASURE: CPT

## 2023-04-17 PROCEDURE — 94760 N-INVAS EAR/PLS OXIMETRY 1: CPT

## 2023-04-17 PROCEDURE — 82553 CREATINE MB FRACTION: CPT | Performed by: FAMILY MEDICINE

## 2023-04-17 PROCEDURE — 80053 COMPREHEN METABOLIC PANEL: CPT | Performed by: FAMILY MEDICINE

## 2023-04-17 PROCEDURE — 25000003 PHARM REV CODE 250: Performed by: STUDENT IN AN ORGANIZED HEALTH CARE EDUCATION/TRAINING PROGRAM

## 2023-04-17 PROCEDURE — 0241U COVID/RSV/FLU A&B PCR: CPT | Performed by: STUDENT IN AN ORGANIZED HEALTH CARE EDUCATION/TRAINING PROGRAM

## 2023-04-17 PROCEDURE — 85025 COMPLETE CBC W/AUTO DIFF WBC: CPT | Performed by: STUDENT IN AN ORGANIZED HEALTH CARE EDUCATION/TRAINING PROGRAM

## 2023-04-17 PROCEDURE — 83880 ASSAY OF NATRIURETIC PEPTIDE: CPT | Performed by: FAMILY MEDICINE

## 2023-04-17 PROCEDURE — 80307 DRUG TEST PRSMV CHEM ANLYZR: CPT | Performed by: STUDENT IN AN ORGANIZED HEALTH CARE EDUCATION/TRAINING PROGRAM

## 2023-04-17 PROCEDURE — 51702 INSERT TEMP BLADDER CATH: CPT

## 2023-04-17 PROCEDURE — 83880 ASSAY OF NATRIURETIC PEPTIDE: CPT | Mod: 90 | Performed by: STUDENT IN AN ORGANIZED HEALTH CARE EDUCATION/TRAINING PROGRAM

## 2023-04-17 PROCEDURE — 84484 ASSAY OF TROPONIN QUANT: CPT | Performed by: FAMILY MEDICINE

## 2023-04-17 PROCEDURE — 25000003 PHARM REV CODE 250: Performed by: INTERNAL MEDICINE

## 2023-04-17 PROCEDURE — 21400001 HC TELEMETRY ROOM

## 2023-04-17 PROCEDURE — 83735 ASSAY OF MAGNESIUM: CPT | Performed by: STUDENT IN AN ORGANIZED HEALTH CARE EDUCATION/TRAINING PROGRAM

## 2023-04-17 RX ORDER — FUROSEMIDE 10 MG/ML
80 INJECTION INTRAMUSCULAR; INTRAVENOUS EVERY 6 HOURS
Status: COMPLETED | OUTPATIENT
Start: 2023-04-17 | End: 2023-04-17

## 2023-04-17 RX ORDER — FUROSEMIDE 10 MG/ML
60 INJECTION INTRAMUSCULAR; INTRAVENOUS
Status: COMPLETED | OUTPATIENT
Start: 2023-04-17 | End: 2023-04-17

## 2023-04-17 RX ORDER — IBUPROFEN 200 MG
1 TABLET ORAL DAILY
Status: DISCONTINUED | OUTPATIENT
Start: 2023-04-17 | End: 2023-04-20 | Stop reason: HOSPADM

## 2023-04-17 RX ORDER — DEXTROSE 40 %
30 GEL (GRAM) ORAL
Status: DISCONTINUED | OUTPATIENT
Start: 2023-04-17 | End: 2023-04-20 | Stop reason: HOSPADM

## 2023-04-17 RX ORDER — ATORVASTATIN CALCIUM 40 MG/1
40 TABLET, FILM COATED ORAL NIGHTLY
Status: DISCONTINUED | OUTPATIENT
Start: 2023-04-17 | End: 2023-04-20 | Stop reason: HOSPADM

## 2023-04-17 RX ORDER — ASPIRIN 81 MG/1
81 TABLET ORAL DAILY
Status: DISCONTINUED | OUTPATIENT
Start: 2023-04-17 | End: 2023-04-20 | Stop reason: HOSPADM

## 2023-04-17 RX ORDER — HEPARIN SODIUM 5000 [USP'U]/ML
5000 INJECTION, SOLUTION INTRAVENOUS; SUBCUTANEOUS EVERY 12 HOURS
Status: DISCONTINUED | OUTPATIENT
Start: 2023-04-17 | End: 2023-04-17

## 2023-04-17 RX ORDER — SODIUM CHLORIDE 0.9 % (FLUSH) 0.9 %
10 SYRINGE (ML) INJECTION EVERY 12 HOURS PRN
Status: DISCONTINUED | OUTPATIENT
Start: 2023-04-17 | End: 2023-04-20 | Stop reason: HOSPADM

## 2023-04-17 RX ORDER — DEXTROSE 40 %
15 GEL (GRAM) ORAL
Status: DISCONTINUED | OUTPATIENT
Start: 2023-04-17 | End: 2023-04-20 | Stop reason: HOSPADM

## 2023-04-17 RX ORDER — HYDROCODONE BITARTRATE AND ACETAMINOPHEN 7.5; 325 MG/1; MG/1
1 TABLET ORAL EVERY 6 HOURS PRN
Status: COMPLETED | OUTPATIENT
Start: 2023-04-17 | End: 2023-04-18

## 2023-04-17 RX ORDER — MUPIROCIN 20 MG/G
OINTMENT TOPICAL 2 TIMES DAILY
Status: DISCONTINUED | OUTPATIENT
Start: 2023-04-17 | End: 2023-04-20 | Stop reason: HOSPADM

## 2023-04-17 RX ORDER — GLUCAGON 1 MG
1 KIT INJECTION
Status: DISCONTINUED | OUTPATIENT
Start: 2023-04-17 | End: 2023-04-20 | Stop reason: HOSPADM

## 2023-04-17 RX ORDER — TRAMADOL HYDROCHLORIDE 50 MG/1
50 TABLET ORAL EVERY 6 HOURS PRN
Status: COMPLETED | OUTPATIENT
Start: 2023-04-17 | End: 2023-04-18

## 2023-04-17 RX ORDER — FUROSEMIDE 10 MG/ML
40 INJECTION INTRAMUSCULAR; INTRAVENOUS
Status: COMPLETED | OUTPATIENT
Start: 2023-04-17 | End: 2023-04-17

## 2023-04-17 RX ORDER — ATOVAQUONE 750 MG/5ML
1500 SUSPENSION ORAL DAILY
Status: DISCONTINUED | OUTPATIENT
Start: 2023-04-17 | End: 2023-04-20 | Stop reason: HOSPADM

## 2023-04-17 RX ORDER — TALC
6 POWDER (GRAM) TOPICAL NIGHTLY PRN
Status: DISCONTINUED | OUTPATIENT
Start: 2023-04-17 | End: 2023-04-20 | Stop reason: HOSPADM

## 2023-04-17 RX ORDER — ACETAMINOPHEN 325 MG/1
650 TABLET ORAL EVERY 6 HOURS PRN
Status: DISCONTINUED | OUTPATIENT
Start: 2023-04-17 | End: 2023-04-20 | Stop reason: HOSPADM

## 2023-04-17 RX ORDER — NALOXONE HCL 0.4 MG/ML
0.02 VIAL (ML) INJECTION
Status: DISCONTINUED | OUTPATIENT
Start: 2023-04-17 | End: 2023-04-20 | Stop reason: HOSPADM

## 2023-04-17 RX ORDER — POTASSIUM CHLORIDE 20 MEQ/1
20 TABLET, EXTENDED RELEASE ORAL ONCE
Status: COMPLETED | OUTPATIENT
Start: 2023-04-17 | End: 2023-04-17

## 2023-04-17 RX ADMIN — HYDROCODONE BITARTRATE AND ACETAMINOPHEN 1 TABLET: 7.5; 325 TABLET ORAL at 06:04

## 2023-04-17 RX ADMIN — POTASSIUM CHLORIDE 20 MEQ: 1500 TABLET, EXTENDED RELEASE ORAL at 05:04

## 2023-04-17 RX ADMIN — SACUBITRIL AND VALSARTAN 1 TABLET: 24; 26 TABLET, FILM COATED ORAL at 09:04

## 2023-04-17 RX ADMIN — APIXABAN 5 MG: 2.5 TABLET, FILM COATED ORAL at 09:04

## 2023-04-17 RX ADMIN — FUROSEMIDE 80 MG: 10 INJECTION, SOLUTION INTRAMUSCULAR; INTRAVENOUS at 09:04

## 2023-04-17 RX ADMIN — TRAMADOL HYDROCHLORIDE 50 MG: 50 TABLET, COATED ORAL at 09:04

## 2023-04-17 RX ADMIN — ASPIRIN 81 MG: 81 TABLET, COATED ORAL at 09:04

## 2023-04-17 RX ADMIN — FUROSEMIDE 40 MG: 10 INJECTION, SOLUTION INTRAMUSCULAR; INTRAVENOUS at 04:04

## 2023-04-17 RX ADMIN — BICTEGRAVIR SODIUM, EMTRICITABINE, AND TENOFOVIR ALAFENAMIDE FUMARATE 1 TABLET: 50; 200; 25 TABLET ORAL at 11:04

## 2023-04-17 RX ADMIN — Medication 6 MG: at 09:04

## 2023-04-17 RX ADMIN — MUPIROCIN: 20 OINTMENT TOPICAL at 09:04

## 2023-04-17 RX ADMIN — FUROSEMIDE 80 MG: 10 INJECTION, SOLUTION INTRAMUSCULAR; INTRAVENOUS at 05:04

## 2023-04-17 RX ADMIN — ATOVAQUONE 1500 MG: 750 SUSPENSION ORAL at 11:04

## 2023-04-17 RX ADMIN — ATORVASTATIN CALCIUM 40 MG: 40 TABLET, FILM COATED ORAL at 09:04

## 2023-04-17 RX ADMIN — NICOTINE 1 PATCH: 14 PATCH, EXTENDED RELEASE TRANSDERMAL at 09:04

## 2023-04-17 RX ADMIN — FUROSEMIDE 60 MG: 10 INJECTION, SOLUTION INTRAMUSCULAR; INTRAVENOUS at 04:04

## 2023-04-17 NOTE — PROGRESS NOTES
Progress Note    Patient Name: Jason Perdue  MRN: [id number]  Admission Date: 4/16/2023  Hospital Length of Stay: 0 days  Code Status: Full Code  Attending Provider: Keely Harris MD  Primary Care Provider: SAVAGE Reddy     Subjective:      Brief HPI:  Jason Perdue is a 63 y.o. male with a history of HIV, HFrEF (EF 10%), substance abuse, severe aortic stenosis, afib on eliquis, HTN, HLD, B cell lymphoma,  who presented to ED for recurrence of SOB, now assoc with new onset cough with severe coughing spells causing vomiting (deneis hematemesis, mostly fluid in emesis) and side pain since yesterday. Pt recently admitted for CHF exacerbation here with dc 4/11/2023. At that time, pt states BLE edema never resolved, but breathing was back to baseline with net 6.5L diuresis; weight was 223 at discharge. Returned to [facility] for similar sxs and dc'd 4/14/23. Was found to have +cocaine at both admissions.      At baseline, pt has +orthopnea and states he has been taking his medications as prescribed, including recent changes at last discharge from [facility] on 4/11/2023. Denies any recent sick contacts, fevers, abd pain, chest pain. States he has quit using cocaine for past wk to take care of his health now.     In ED, pt AF; BP running low at 90s/70s, which may be pt's baseline based on prior hospitalization. Pt initally with elevated BP in the field and received nitroglycerin sprays. Cr mildly elevated to 1.6; trop elevated to 0.055, BNP >13,000, however pt on entresto therapy. UDS pending, CXR with increase in R sided pleural effusion size - similar in appearance to 4/6/2023      Interval History: Patient continues to have SOB and pitting edema up to his knees. He endorses low urine output despite the 100 mg Lasix IV he received yesterday in ED, in addition to low urine output on the Lasix 60 mg PO daily he was taking at home. Strict Is/Os ordered and Lasix 80 mg BID IV ordered for today. Discussed  LifeVest with patient and CM. Patient had a LifeVest in the past but insurance took it back due to patient noncompliance. He seemed amenable to re-trying the LifeVest at this admission, CM consulted with patient's insurance ([employer] - Medicaid) not insuring the LifeVest.     Alerted by nurse that patient had a 9-second V-tach run at 11AM today. Patient was asymptomatic at such time.      Review of Systems:  The remainder of the 14 system ROS is noncontributory or negative unless mentioned/reviewed above.     Objective:     Vital Signs:  Vital Signs (Most Recent):  Temp: 97.5 °F (36.4 °C) (04/17/23 1153)  Pulse: 86 (04/17/23 1153)  Resp: 20 (04/17/23 0740)  BP: 104/76 (04/17/23 1153)  SpO2: 100 % (04/17/23 1153)  Body mass index is 29.99 kg/m².  Weight: 97.5 kg (215 lb) Vital Signs (24h Range):  Temp:  [97.5 °F (36.4 °C)-97.7 °F (36.5 °C)] 97.5 °F (36.4 °C)  Pulse:  [76-89] 86  Resp:  [15-34] 20  SpO2:  [92 %-100 %] 100 %  BP: ()/(63-80) 104/76       Input/output:     Intake/Output Summary (Last 24 hours) at 4/17/2023 1327  Last data filed at 4/17/2023 1249  Gross per 24 hour   Intake --   Output 10 ml   Net -10 ml       Physical Examination:  Physical Exam  Vitals and nursing note reviewed.   Constitutional:       Appearance: Normal appearance.   HENT:      Head: Normocephalic.   Eyes:      General: No scleral icterus.     Extraocular Movements: Extraocular movements intact.      Pupils: Pupils are equal, round, and reactive to light.   Cardiovascular:      Rate and Rhythm: Normal rate and regular rhythm.      Pulses: Normal pulses.      Heart sounds: Normal heart sounds. No murmur heard.  Pulmonary:      Effort: Pulmonary effort is normal. No respiratory distress.      Breath sounds: No stridor. Rhonchi present. No wheezing or rales.   Abdominal:      General: Abdomen is flat.      Palpations: Abdomen is soft.   Musculoskeletal:         General: Swelling present. No tenderness. Normal range of motion.       Cervical back: Normal range of motion.      Right lower leg: Edema (Pitting edema up to the knees bilaterally.) present.      Left lower leg: Edema present.   Skin:     General: Skin is warm.      Capillary Refill: Capillary refill takes less than 2 seconds.      Coloration: Skin is not jaundiced or pale.      Findings: No lesion or rash.   Neurological:      General: No focal deficit present.      Mental Status: He is alert and oriented to person, place, and time. Mental status is at baseline.          Lines/Drains/Airways       Drain  Duration                  Urethral Catheter 04/17/23 1249 Straight-tip;Silicone 16 Fr. <1 day                     Laboratory:    Recent Labs   Lab 04/13/23  0146 04/17/23  0115 04/17/23  0903   WBC 6.0 6.3 5.6   RBC 4.38* 4.29* 4.52*   HGB 11.8* 11.5* 12.0*   HCT 37.7* 36.3* 37.7*   MCV 86.1 84.6 83.4   MCH 26.9* 26.8* 26.5*   MCHC 31.3* 31.7* 31.8*   RDW 17.6* 18.2* 18.0*    139 148   MPV 12.7* 12.8* 12.5*      Recent Labs   Lab 04/11/23  0418 04/13/23  0146 04/17/23  0115 04/17/23  0903    137 136 135*   K 3.6 4.2 3.9 4.3   CHLORIDE 105 104 105 104   CO2 24 22* 19* 19*   BUN 25.4 27.6* 31.1* 31.1*   CREATININE 1.20* 1.48* 1.61* 1.62*   CALCIUM 8.6* 9.2 9.2 9.9   MG 2.00  --   --  2.30   ALBUMIN 2.7* 3.2* 3.2* 3.3*   ALKPHOS 92 114 134 136   ALT 10 11 15 17   AST 23 30 35* 41*   BILITOT 1.5 2.0* 2.0* 2.5*        Other Results:    Current Medications:     Infusions:        Scheduled:   apixaban  5 mg Oral BID    aspirin  81 mg Oral Daily    atorvastatin  40 mg Oral QHS    atovaquone  1,500 mg Oral Daily    acumbdkmc-sysydqfc-mtbwqxi ala  1 tablet Oral Daily    furosemide (LASIX) injection  80 mg Intravenous Q6H    nicotine  1 patch Transdermal Daily    sacubitriL-valsartan  1 tablet Oral BID         PRN:   dextrose 10%    dextrose 10%    dextrose    dextrose    glucagon (human recombinant)    melatonin    naloxone    sodium chloride 0.9%        Microbiology  Data:  Microbiology Results (last 7 days)       ** No results found for the last 168 hours. **             Antibiotics and Day Number of Therapy:  Antibiotics (From admission, onward)      None             Imaging:  X-Ray Chest 1 View  Narrative: EXAMINATION:  XR CHEST 1 VIEW    CPT 58224    CLINICAL HISTORY:  Dyspnea;    COMPARISON:  April 13, 2023    FINDINGS:  Examination reveals mediastinal silhouette to be within normal limits cardiac silhouette is enlarged there is blunting of the right costophrenic angle indicating the presence of a right-sided pleural effusion some linear densities are identified at the right base might represent compressive atelectatic changes.    There is some increase interstitial markings with no focal consolidative changes, however, a mild degree of congestion cannot be completely excluded.    No other interval change  Impression: Increase interstitial markings throughout mild degree of congestion cannot be completely excluded.    Cardiomegaly.    Right-sided pleural effusion with compression of atelectatic changes at the right base    Electronically signed by: Jordan Merlos  Date:    04/17/2023  Time:    08:40        Assessment & Plan:     Acute on Chronic CHF Exacerbation  HFrEF (EF 15%)  Valvular Heart Disease  - Lasix 60 mg IV given in ED, followed by additioanl 40 mg IV (roughly 40 minutes later) with about 250 cc urine in container by time of interview; patient endorsing minimal urine output prior to this catch   - Lasix 80 mg IV BID today; will follow Is/Os  - Holding home home beta blocker for now   - Stict I/Os, daily weights, fluid and sodium restriction, elevate HOB  - Will consider Inotropic therapy due to severely reduced EF, concurrent hypotension, poor urine output and prerenal azotemia concerning for cardiorenal syndrome  - TTE (4/13/23) revealed EF 15%, mild eccentric hypertrophy and severely decreased systolic function, Grade III LVDD, mod-severe aortic stenosis,  mod MR, mod TR, estimated PA systolic pressure 39 mmHg  - UDS positive for Cocaine; consistent to last 6 UDS which go back to 1 year ago  - Unable to achieve LifeVest for patient due to no coverage from patient's insurance ([employer] - Medicaid); Per patient, patient had LifeVest in past but due to noncompliance, patient had to return LifeVest     Coughing Spells w/ Emesis  RT Sided Pleural Effusion  - RT Sided pleural effusion on CXR 4/17 consistent with CXR 4/13, but not present on CXR from 3/24/23  - Likely cardiogenic in nature, but will continue to consider abx if effusion does not improve with diuresis  - Currently denying fever, cough with sputum production, night sweats or recent URI     Type II NSTEMI  - Suspect Type II NSTEMI in setting of current CHF exacerbation, will hold off on initiationg acs protocol at this time   - Continue to downtrend  - no cardiac sxs; will trend q6hr with ekgs     HOLLIS-prerenal (distributive)  - BUN/creatinine 31.1/1.61 for previous baseline 37.2/1.36  - cont to monitor with serial CMPs; continue to monitor for cardiorenal syndrome      Atrial fibrillation  - Currently stable  - Continue home Eliquis 5 mg BID    Hx of Hypertension  Hypotension 2/2 HFrEF (EF 10%)  - hypotensive 2/2 HFrEF w/ 10% EF; currently holding Toprol 12.5 daily      Hyperlipidemia  - Continue home atorvastatin 40 mg daily     Cocaine use  Tobacco use  - Endorses last cocaine use roughly 1 week ago  - UDS positive for Cocaine; consistent to last 6 UDS which go back to 1 year ago  - provided with nicotine patch upon request   - recommend tobacco cessation program/possible drug detox or rehab     HIV  Chronic Hep B Infection  - continue home Biktarvy -25 mg daily and atovaquone 1500 mg daily (10 mL)  - CD4 count 59 (3/13/23)  - Follows with ID at [facility]      CODE STATUS: Full Code   Access: PIV  Antibiotics: Atovaquone (ppx for CD4<200)  Diet: Diet heart healthy  DVT Prophylaxis: lovenox  GI Prophylaxis:  famotidine  Fluids: None    Disposition: Patient admitted on 4/17/23 for CHF exacerbation. Diuresis with Lasix 80 mg BID IV today, strict I/Os. Further dispo planning pending.      Marco A Salgado MD  [facility], -1       Detail Level: Generalized Detail Level: Zone Detail Level: Detailed

## 2023-04-17 NOTE — H&P
[facility] Medicine Wards History & Physical Note     Resident Team: [facility] Medicine List 2  Attending Physician: Keely Harris MD    Date of Admit: 4/16/2023    Chief Complaint     Shortness of Breath (Pt arrives via [facility] after he states that he has increased SOB and bilateral lower extremity edema; pt has a hx of  CHF and states he was d/c from [facility] on Thursday; per EMS pts BP was 200s/100s on enroute and they gave 3 sprays of nitro and BP now 91/66)      Subjective:      History of Present Illness:  Jason Perdue is a 63 y.o. male with a history of HIV, HFrEF (EF 10%), substance abuse, severe aortic stenosis, afib on eliquis, HTN, HLD, B cell lymphoma,  who presented to ED for recurrence of SOB, now assoc with new onset cough with severe coughing spells causing vomiting (deneis hematemesis, mostly fluid in emesis) and side pain since yesterday. Pt recently admitted for CHF exacerbation here with dc 4/11/2023. At that time, pt states BLE edema never resolved, but breathing was back to baseline with net 6.5L diuresis; weight was 223 at discharge. Returned to [facility] for similar sxs and dc'd 4/14/23. Was found to have +cocaine at both admissions.     At baseline, pt has +orthopnea and states he has been taking his medications as prescribed, including recent changes at last discharge from [facility] on 4/11/2023. Denies any recent sick contacts, fevers, abd pain, chest pain. States he has quit using cocaine for past wk to take care of his health now.    In ED, pt AF; BP running low at 90s/70s, which may be pt's baseline based on prior hospitalization. Pt initally with elevated BP in the field and received nitroglycerin sprays. Cr mildly elevated to 1.6; trop elevated to 0.055, BNP >13,000, however pt on entresto therapy. UDS pending, CXR with increase in R sided pleural effusion size - similar in appearance to 4/6/2023      Past Medical History:  Past Medical History:   Diagnosis Date    Cancer     CHF (congestive heart failure)   "   Human immunodeficiency virus (HIV) disease     Hypertension        Past Surgical History:  History reviewed. No pertinent surgical history.    Family History:  History reviewed. No pertinent family history.    Social History:  Social History     Tobacco Use    Smoking status: Every Day     Packs/day: 0.50     Types: Cigarettes    Smokeless tobacco: Never   Substance Use Topics    Alcohol use: Not Currently    Drug use: Yes     Types: "Crack" cocaine     Comment: last taken 1 month and a half       Allergies:  Review of patient's allergies indicates:   Allergen Reactions    Ace inhibitors      Other reaction(s): Angioedema    Nitroglycerin Itching       Home Medications:  Prior to Admission medications    Medication Sig Start Date End Date Taking? Authorizing Provider   apixaban (ELIQUIS) 5 mg Tab Take 1 tablet (5 mg total) by mouth 2 (two) times daily. 3/15/23 3/14/24  Darius Zhou MD   aspirin (ECOTRIN) 81 MG EC tablet Take 1 tablet (81 mg total) by mouth once daily. 1/27/23 1/27/24  David Posey DO   atorvastatin (LIPITOR) 40 MG tablet Take 1 tablet (40 mg total) by mouth every evening. 1/26/23 1/26/24  David Posey DO   atovaquone (MEPRON) 750 mg/5 mL Susp oral liquid Take 10 mLs (1,500 mg total) by mouth once daily. 3/23/23   SAVAGE Dockery   aktdiddod-nuytgrbn-ailchmw ala (BIKTARVY) -25 mg (25 kg or greater) Take 1 tablet by mouth once daily. 3/23/23   SAVAGE Dockeyr   carvediloL (COREG) 12.5 MG tablet Take 12.5 mg by mouth 2 (two) times daily with meals.    Historical Provider   empagliflozin (JARDIANCE) 10 mg tablet Take 1 tablet (10 mg total) by mouth once daily. 4/11/23   Idalmis Sommers DO   food supplemt, lactose-reduced Liqd Take 1 Bottle by mouth.    Historical Provider   furosemide (LASIX) 20 MG tablet Take 3 tablets (60 mg total) by mouth once daily. 1/26/23 1/26/24  David Posey DO   megestroL (MEGACE) 400 mg/10 mL (40 mg/mL) Susp Take 200 mg by mouth.    Historical " Provider   metoprolol succinate (TOPROL-XL) 25 MG 24 hr tablet Take 0.5 tablets (12.5 mg total) by mouth once daily. 4/11/23 4/10/24  Idalmis Sommers DO   nicotine (NICODERM CQ) 14 mg/24 hr Place 1 patch onto the skin once daily. 4/11/23   Idalmis Sommers DO   polyethylene glycol 3350,bulk, Powd by Other route.    Historical Provider   sacubitriL-valsartan (ENTRESTO) 24-26 mg per tablet Take 1 tablet by mouth 2 (two) times daily. 3/15/23 3/14/24  Darius Zhou MD   VITAMIN D2 1,250 mcg (50,000 unit) capsule Take 1 capsule (50,000 Units total) by mouth every 7 days. 3/23/23   SAVAGE Dockery         Review of Systems:  See hpi above       Objective:     Vital Signs (Most Recent):  Temp: 97.7 °F (36.5 °C) (04/17/23 0001)  Pulse: 89 (04/17/23 0301)  Resp: (!) 34 (04/17/23 0301)  BP: 99/80 (04/17/23 0301)  SpO2: 100 % (04/17/23 0301)  Body mass index is 31.58 kg/m².  Weight: 102.7 kg (226 lb 6.6 oz) Vital Signs (24h Range):  Temp:  [97.7 °F (36.5 °C)] 97.7 °F (36.5 °C)  Pulse:  [76-89] 89  Resp:  [15-34] 34  SpO2:  [92 %-100 %] 100 %  BP: ()/(63-80) 99/80     Physical Examination:  Gen: alert and oriented, NAD  HEENT: atraumatic, PERRLA, EOMI, external ear normal  CV: irregular rhythm, S1 and S2 present, 3+ LE edema, radial pulses equal and present bilaterally  Resp: +tachypnea with RR up to 30s on room air; +crackles to R mid lung  Abd: Non-distended, BSx4, non-tender  Neuro: A&Ox4, sensation intact throughout, spontaneous movements  Psych: Logical thought process, answers questions appropriately    Laboratory:  Most Recent Data:  Recent Results (from the past 24 hour(s))   Comprehensive Metabolic Panel    Collection Time: 04/17/23  1:15 AM   Result Value Ref Range    Sodium Level 136 136 - 145 mmol/L    Potassium Level 3.9 3.5 - 5.1 mmol/L    Chloride 105 98 - 107 mmol/L    Carbon Dioxide 19 (L) 23 - 31 mmol/L    Glucose Level 91 82 - 115 mg/dL    Blood Urea Nitrogen 31.1 (H) 8.4 - 25.7 mg/dL     Creatinine 1.61 (H) 0.73 - 1.18 mg/dL    Calcium Level Total 9.2 8.8 - 10.0 mg/dL    Protein Total 8.2 (H) 5.8 - 7.6 gm/dL    Albumin Level 3.2 (L) 3.4 - 4.8 g/dL    Globulin 5.0 (H) 2.4 - 3.5 gm/dL    Albumin/Globulin Ratio 0.6 (L) 1.1 - 2.0 ratio    Bilirubin Total 2.0 (H) <=1.5 mg/dL    Alkaline Phosphatase 134 40 - 150 unit/L    Alanine Aminotransferase 15 0 - 55 unit/L    Aspartate Aminotransferase 35 (H) 5 - 34 unit/L    eGFR 48 mls/min/1.73/m2   Troponin I    Collection Time: 04/17/23  1:15 AM   Result Value Ref Range    Troponin-I 0.055 (H) 0.000 - 0.045 ng/mL   CK-MB    Collection Time: 04/17/23  1:15 AM   Result Value Ref Range    Creatine Kinase MB 1.3 <=7.2 ng/mL   CK    Collection Time: 04/17/23  1:15 AM   Result Value Ref Range    Creatine Kinase 118 30 - 200 U/L   BNP    Collection Time: 04/17/23  1:15 AM   Result Value Ref Range    Natriuretic Peptide 13,052.8 (H) <=100.0 pg/mL   CBC with Differential    Collection Time: 04/17/23  1:15 AM   Result Value Ref Range    WBC 6.3 4.5 - 11.5 x10(3)/mcL    RBC 4.29 (L) 4.70 - 6.10 x10(6)/mcL    Hgb 11.5 (L) 14.0 - 18.0 g/dL    Hct 36.3 (L) 42.0 - 52.0 %    MCV 84.6 80.0 - 94.0 fL    MCH 26.8 (L) 27.0 - 31.0 pg    MCHC 31.7 (L) 33.0 - 36.0 g/dL    RDW 18.2 (H) 11.5 - 17.0 %    Platelet 139 130 - 400 x10(3)/mcL    MPV 12.8 (H) 7.4 - 10.4 fL    Neut % 68.6 %    Lymph % 18.4 %    Mono % 11.2 %    Eos % 1.1 %    Basophil % 0.5 %    Lymph # 1.15 0.6 - 4.6 x10(3)/mcL    Neut # 4.30 2.1 - 9.2 x10(3)/mcL    Mono # 0.70 0.1 - 1.3 x10(3)/mcL    Eos # 0.07 0 - 0.9 x10(3)/mcL    Baso # 0.03 0 - 0.2 x10(3)/mcL    IG# 0.01 0 - 0.04 x10(3)/mcL    IG% 0.2 %    NRBC% 0.0 %       Microbiology Data:  Microbiology Results (last 7 days)       ** No results found for the last 168 hours. **             Other Results:  Radiology:  Imaging Results              X-Ray Chest 1 View (Preliminary result)  Result time 04/17/23 01:18:26      Wet Read by Tej Osborne MD (04/17/23  01:18:26, [facility], Emergency Medicine)    Increased pulmonary vascular congestion with right sided pleural effusion                                     Assessment & Plan:   Acute on chronic CHF exacerbation  - Last EF 15% (4/13/2023), BNP 13,052 significant elevation from previous, however pt on entresto therapy, will also get NT-proBNP level  - IV Lasix 60 given in ED, will monitor response due to relative hypotension before continuing diuresis  - UDS pending  - K3.9, will give additional 20mEq now due to expected effect from diuresis  - hold home home beta blocker for now   - Stict I/Os, daily weights, fluid and sodium restriction, elevate HOB  - consider inotrope therapy due to severely reduced EF and concurrent low BP    New onset cough  - recent new cough - possible trigger for current exacerbation, covid/flu/rsv testing pending. CXR with R sided effusion, consolidation unclear due to presence of effusion. Consider abx therapy if sxs do not improve with diuresis     Elevated troponin  - suspect Type II NSTEMI in setting of current CHF exacerbation, will hold off on initiationg acs protocol at this time  - no cardiac sxs; will trend q6hr with ekgs; if continues to rise or pt develops sxs suggesting ACS, consider initiating ACS protocol at that time    HOLLIS-prerenal (distributive)  - BUN/creatinine 31.1/1.61 for previous baseline 37.2/1.36  - cont to monitor with serial CMPs; possible element of cardiorenal syndrome contributing     Metabolic acidosis  - likely due to vascular congestion/hypoperfusion. Expect to improve with diuretic therapy     Severe aortic stenosis  Atrial fibrillation- rate controlled.  Continue home Eliquis  Hypertension- hypotensive in ED, hold home antihypertensives at this time  Hyperlipidemia- continue home atorvastatin     Cocaine use  Tobacco use  - UDS pending  - provided with nicotine patch upon request   - recommend tobacco cessation program/possible drug  detox or rehab     HIV- continue home Biktarvy and atovaquane     CODE STATUS: Full  Access: PIV  Antibiotics: N/A  Diet:  Cardiac, sodium restriction  DVT Prophylaxis: Home anticoagulation, SCDs      Disposition:  Admit to telemetry for CHF exacerbation    Randee Matthews MD  [facility]

## 2023-04-17 NOTE — PLAN OF CARE
04/17/23 1135   Discharge Assessment   Assessment Type Discharge Planning Assessment   Confirmed/corrected address, phone number and insurance Yes   Confirmed Demographics Correct on Facesheet   Source of Information patient   When was your last doctors appointment?   (Kiara Qureshi)   Reason For Admission Dyspnea, SOB, Elevated troponin, Chest Pain, Acute on chronic CHF   People in Home alone   Facility Arrived From: Home   Do you expect to return to your current living situation? Yes   Do you have help at home or someone to help you manage your care at home? Yes   Who are your caregiver(s) and their phone number(s)? Stephani Perdue Sister [phone];  Domonique Perdue Niece [phone]   Prior to hospitilization cognitive status: Alert/Oriented   Current cognitive status: Alert/Oriented   Walking or Climbing Stairs ambulation difficulty, requires equipment   Mobility Management RW   Dressing/Bathing bathing difficulty, requires equipment   Dressing/Bathing Management Shower Chair   Home Accessibility wheelchair accessible   Equipment Currently Used at Home shower chair;walker, rolling   Readmission within 30 days? Yes   Patient currently being followed by outpatient case management? No   Do you currently have service(s) that help you manage your care at home? No   Do you take prescription medications? Yes  ([facility] [place])   Do you have prescription coverage? Yes   Coverage [employer] M/D   Do you have any problems affording any of your prescribed medications? No   Is the patient taking medications as prescribed? yes   Who is going to help you get home at discharge? Family   How do you get to doctors appointments? health plan transportation;family or friend will provide   Are you on dialysis? No   Discharge Plan A Home   DME Needed Upon Discharge    (LifeVest)   Discharge Plan discussed with: Patient   Discharge Barriers Identified None;Substance Abuse     Pt single; No children; Resides  alone; Support/asst provided by sister, Stephani, who lives next door, and niece, Domonique; Pt receives SS & SNAP benefits; Unable to obtain LifeVest, as [employer] not accepted by [facility] per Carolynn Horn; CM to follow for d/c planning needs.

## 2023-04-17 NOTE — ED PROVIDER NOTES
"Encounter Date: 4/16/2023       History     Chief Complaint   Patient presents with    Shortness of Breath     Pt arrives via [facility] after he states that he has increased SOB and bilateral lower extremity edema; pt has a hx of  CHF and states he was d/c from [facility] on Thursday; per EMS pts BP was 200s/100s on enroute and they gave 3 sprays of nitro and BP now 91/66     Patient is a 63-year-old gentleman presents emergency room complaints of shortness of breath.  Patient has a history of hypertension, congestive heart failure.  Reports he has been compliant with medications.  Patient reports increased dyspnea on exertion and orthopnea.  When symptoms not improve, decided come the emergency room for evaluation.  Patient reports dry persistent cough.  Denies abdominal pain nausea or vomiting.  Denies fever chills.    The history is provided by the patient.   Review of patient's allergies indicates:   Allergen Reactions    Ace inhibitors      Other reaction(s): Angioedema    Nitroglycerin Itching     Past Medical History:   Diagnosis Date    Cancer     CHF (congestive heart failure)     Human immunodeficiency virus (HIV) disease     Hypertension      History reviewed. No pertinent surgical history.  History reviewed. No pertinent family history.  Social History     Tobacco Use    Smoking status: Every Day     Packs/day: 0.50     Types: Cigarettes    Smokeless tobacco: Never   Substance Use Topics    Alcohol use: Not Currently    Drug use: Yes     Types: "Crack" cocaine     Comment: last taken 1 month and a half     Review of Systems   Constitutional:  Negative for chills, fatigue and fever.   HENT:  Negative for ear pain, rhinorrhea and sore throat.    Eyes:  Negative for photophobia and pain.   Respiratory:  Positive for cough and shortness of breath. Negative for wheezing.    Cardiovascular:  Negative for chest pain.   Gastrointestinal:  Negative for abdominal pain, diarrhea, nausea and vomiting.   Genitourinary:  " Negative for dysuria.   Neurological:  Negative for dizziness, weakness and headaches.   All other systems reviewed and are negative.    Physical Exam     Initial Vitals [04/17/23 0001]   BP Pulse Resp Temp SpO2   91/66 82 20 97.7 °F (36.5 °C) 98 %      MAP       --         Physical Exam    Nursing note and vitals reviewed.  Constitutional: He appears well-developed and well-nourished.   HENT:   Head: Normocephalic and atraumatic.   Eyes: EOM are normal. Pupils are equal, round, and reactive to light.   Neck: Neck supple.   Distended neck veins, JVD   Normal range of motion.  Cardiovascular:  Normal rate, regular rhythm, normal heart sounds and intact distal pulses.     Exam reveals no gallop and no friction rub.       No murmur heard.  Pulmonary/Chest: Breath sounds normal. No respiratory distress. He has no wheezes. He has no rales.   Abdominal: Abdomen is soft. Bowel sounds are normal. He exhibits no distension. There is no abdominal tenderness.   Musculoskeletal:         General: Edema present. Normal range of motion.      Cervical back: Normal range of motion and neck supple.      Comments: 3+ bilateral lower extremity edema     Neurological: He is alert and oriented to person, place, and time. He has normal strength.   Skin: Skin is warm and dry. Capillary refill takes less than 2 seconds.   Psychiatric: He has a normal mood and affect. His behavior is normal. Judgment and thought content normal.       ED Course   Procedures  Labs Reviewed   COMPREHENSIVE METABOLIC PANEL - Abnormal; Notable for the following components:       Result Value    Carbon Dioxide 19 (*)     Blood Urea Nitrogen 31.1 (*)     Creatinine 1.61 (*)     Protein Total 8.2 (*)     Albumin Level 3.2 (*)     Globulin 5.0 (*)     Albumin/Globulin Ratio 0.6 (*)     Bilirubin Total 2.0 (*)     Aspartate Aminotransferase 35 (*)     All other components within normal limits   TROPONIN I - Abnormal; Notable for the following components:    Troponin-I  0.055 (*)     All other components within normal limits   B-TYPE NATRIURETIC PEPTIDE - Abnormal; Notable for the following components:    Natriuretic Peptide 13,052.8 (*)     All other components within normal limits   CBC WITH DIFFERENTIAL - Abnormal; Notable for the following components:    RBC 4.29 (*)     Hgb 11.5 (*)     Hct 36.3 (*)     MCH 26.8 (*)     MCHC 31.7 (*)     RDW 18.2 (*)     MPV 12.8 (*)     All other components within normal limits   CK-MB - Normal   CK - Normal   CBC W/ AUTO DIFFERENTIAL    Narrative:     The following orders were created for panel order CBC Auto Differential.  Procedure                               Abnormality         Status                     ---------                               -----------         ------                     CBC with Differential[[id number]]        Abnormal            Final result                 Please view results for these tests on the individual orders.   DRUG SCREEN, URINE (BEAKER)   COVID/RSV/FLU A&B PCR   NT-PRO NATRIURETIC PEPTIDE   COMPREHENSIVE METABOLIC PANEL   MAGNESIUM   PHOSPHORUS   TROPONIN I   CBC W/ AUTO DIFFERENTIAL    Narrative:     The following orders were created for panel order CBC with Automated Differential.  Procedure                               Abnormality         Status                     ---------                               -----------         ------                     CBC with Differential[[id number]]                                                         Please view results for these tests on the individual orders.   URINALYSIS, REFLEX TO URINE CULTURE   CBC WITH DIFFERENTIAL     EKG Readings: (Independently Interpreted)   My Independent EKG Interpretation  04/16/2023 11:51 AM  Rate: 81 bpm  Rhythm: Sinus  Axis: Rightward  Intervals: Normal  ST Changes: None  Impression: Normal sinus rhythm with nonspecific ST changes        Imaging Results              X-Ray Chest 1 View (Preliminary result)  Result time 04/17/23  01:18:26      Wet Read by Tej Osborne MD (04/17/23 01:18:26, [facility], Emergency Medicine)    Increased pulmonary vascular congestion with right sided pleural effusion                                     Medications   sodium chloride 0.9% flush 10 mL (has no administration in time range)   naloxone 0.4 mg/mL injection 0.02 mg (has no administration in time range)   glucagon (human recombinant) injection 1 mg (has no administration in time range)   dextrose 10% bolus 125 mL 125 mL (has no administration in time range)   dextrose 10% bolus 250 mL 250 mL (has no administration in time range)   dextrose 40 % gel 15,000 mg (has no administration in time range)   dextrose 40 % gel 30,000 mg (has no administration in time range)   melatonin tablet 6 mg (has no administration in time range)   apixaban tablet 5 mg (has no administration in time range)   aspirin EC tablet 81 mg (has no administration in time range)   atorvastatin tablet 40 mg (has no administration in time range)   atovaquone 750 mg/5 mL oral liquid 1,500 mg (has no administration in time range)   tzzhyqsrn-pcjhkyrb-dkdiyrk ala -25 mg (25 kg or greater) 1 tablet (has no administration in time range)   nicotine 14 mg/24 hr 1 patch (has no administration in time range)   potassium chloride SA CR tablet 20 mEq (has no administration in time range)   furosemide injection 40 mg (has no administration in time range)   furosemide injection 60 mg (60 mg Intravenous Given 4/17/23 0400)     Medical Decision Making:   Initial Assessment:   Patient is 61-year-old gentleman presents emergency room with complaints of shortness of breath.  Patient's initial blood pressure here was hypotensive, the patient just received 3 sprays of nitroglycerin.  Currently denies any chest pain.  Only reports shortness of breath.  On review of the patient's medical record, recently discharged from [facility] for congestive  heart failure, and a few days prior to that, patient has been admitted at [facility] for congestive heart failure.  Patient has a history of cocaine abuse, but reports that he has not used cocaine since before being admitted to [facility].  Patient currently calm cooperative.  Will obtain laboratory evaluation including CBC, CMP, cardiac enzymes, and BNP to assess fluid status.  Will also obtain a chest x-ray.  Differential Diagnosis:   Congestive heart failure with acute exacerbation, atypical chest pain, NSTEMI           ED Course as of 04/17/23 0438 Mon Apr 17, 2023   0149 WBC: 6.3 [MW]   0149 Hemoglobin(!): 11.5 [MW]   0149 Hematocrit(!): 36.3 [MW]   0149 Platelets: 139 [MW]   0149 Neut %: 68.6 [MW]   0225 Sodium: 136 [MW]   0225 Potassium: 3.9 [MW]   0225 Chloride: 105 [MW]   0225 CO2(!): 19 [MW]   0225 Glucose: 91 [MW]   0225 BUN(!): 31.1 [MW]   0225 Creatinine(!): 1.61 [MW]   0225 Albumin(!): 3.2 [MW]   0225 ALT: 15 [MW]   0225 AST(!): 35 [MW]   0225 Alkaline Phosphatase: 134 [MW]   0225 CPK MB: 1.3 [MW]   0225 Troponin I(!): 0.055 [MW]   0225 CPK: 118 [MW]   0247 Patient has elevated being he at 13,000, higher than baseline.  Patient's creatinine is slightly worse at 1.6.  Findings consistent with worsening congestive heart failure.  Patient does appear to be fluid overloaded.  Internal Medicine has been consulted for admission.  Patient did receive aspirin 325 mg along with 3 sprays of nitroglycerin.  Initially following the nitroglycerin, patient was hypotensive, but appears to have rebounded.  Currently resting comfortably in no distress. [MW]   0438 Initial IV line infiltrated as patient was receiving Lasix.  Unsure patient received any of it (was initially 60 mg).  Since patient has another IV site, will order 40 mg of Lasix [MW]   0438 IV.  [MW]      ED Course User Index  [MW] Tej Osborne MD                 Clinical Impression:   Final  diagnoses:  [R06.02] SOB (shortness of breath)  [R06.00] Dyspnea  [I50.9] Acute on chronic congestive heart failure, unspecified heart failure type (Primary)        ED Disposition Condition    Observation Stable                Tej Osborne MD  04/17/23 0250       Tej Osborne MD  04/17/23 0438

## 2023-04-17 NOTE — PROGRESS NOTES
"Inpatient Nutrition Evaluation    Admit Date: 2023   Total duration of encounter: 1 day    Nutrition Recommendation/Prescription     Continue Heart Healthy diet as tolerated  Monitor po intake, wt, labs    Nutrition Assessment     Chart Review    Reason Seen: continuous nutrition monitoring    Malnutrition Screening Tool Results   Have you recently lost weight without trying?: No  Have you been eating poorly because of a decreased appetite?: No   MST Score: 0     Diagnosis: Acute on chronic CHF exacerbation  New onset cough  Elevated troponin  HOLLIS-prerenal (distributive)  Metabolic acidosis  Severe aortic stenosis  Atrial fibrillation  Hypertension  Hyperlipidemia  Cocaine use  Tobacco use  HIV    Relevant Medical History: HIV, HFrEF (EF 10%), substance abuse, severe aortic stenosis, afib on eliquis, HTN, HLD, B cell lymphoma     Nutrition-Related Medications: ASA, statin, lasix, KCl    Nutrition-Related Labs: -Na 135, CO2 19, Gluc 125, BUN 31.1, Creat 1.62, Pro Tot 8.6, Alb 3.3, Bili Tot 2.5, AST 41, GFR 48      Diet Order: Diet heart healthy  Oral Supplement Order: none  Appetite/Oral Intake: good/% of meals  Factors Affecting Nutritional Intake: constipation  Food/Yazidi/Cultural Preferences: none reported  Food Allergies: no known food allergies       Wound(s):   None noted    Comments  : Pt reports good po intake of meals. Pt reports some constipation this AM; no other GI complaints. Pt reports good appetite pta. Pt unsure UBW; states current wt of 215# sounds accurate -- 3+ BLE edema noted; on lasix.      Anthropometrics    Height: 5' 11" (180.3 cm)    Last Weight: 97.5 kg (215 lb) (23 0600) Weight Method: Bed Scale  BMI (Calculated): 30  BMI Classification: obese grade I (BMI 30-34.9)        Ideal Body Weight (IBW), Male: 172 lb     % Ideal Body Weight, Male (lb): 131.63 %                 Usual Body Weight (UBW), k.73 kg (UBW unknown by pt; states 215# sounds accurate)  % " Usual Body Weight: 100     Usual Weight Provided By: patient and patient denies unintentional weight loss    Wt Readings from Last 5 Encounters:   04/17/23 97.5 kg (215 lb)   04/14/23 99.3 kg (218 lb 14.4 oz)   04/11/23 101.6 kg (223 lb 15.8 oz)   03/26/23 97.3 kg (214 lb 8.1 oz)   03/23/23 98 kg (216 lb)     Weight Change(s) Since Admission:  Admit Weight: 102.7 kg (226 lb 6.6 oz) (04/17/23 0001)  4/17: UBW unknown by pt; states 215# sounds accurate; + edema    Patient Education    Not applicable.    Monitoring & Evaluation     Dietitian will monitor food and beverage intake, weight, and electrolyte/renal panel.  Nutrition Risk/Follow-Up: low (follow-up in 5-7 days)  Patients assigned 'low nutrition risk' status do not qualify for a full nutritional assessment but will be monitored and re-evaluated in a 5-7 day time period. Please consult if re-evaluation needed sooner.

## 2023-04-18 LAB
ALBUMIN SERPL-MCNC: 3.1 G/DL (ref 3.4–4.8)
ALBUMIN/GLOB SERPL: 0.6 RATIO (ref 1.1–2)
ALP SERPL-CCNC: 133 UNIT/L (ref 40–150)
ALT SERPL-CCNC: 16 UNIT/L (ref 0–55)
AST SERPL-CCNC: 41 UNIT/L (ref 5–34)
BASOPHILS # BLD AUTO: 0.02 X10(3)/MCL (ref 0–0.2)
BASOPHILS NFR BLD AUTO: 0.3 %
BILIRUBIN DIRECT+TOT PNL SERPL-MCNC: 2.3 MG/DL
BUN SERPL-MCNC: 32.5 MG/DL (ref 8.4–25.7)
C TRACH DNA SPEC QL NAA+PROBE: NOT DETECTED
CALCIUM SERPL-MCNC: 9 MG/DL (ref 8.8–10)
CHLORIDE SERPL-SCNC: 104 MMOL/L (ref 98–107)
CO2 SERPL-SCNC: 21 MMOL/L (ref 23–31)
CREAT SERPL-MCNC: 1.83 MG/DL (ref 0.73–1.18)
CREAT UR-MCNC: 145.5 MG/DL (ref 63–166)
EOSINOPHIL # BLD AUTO: 0.01 X10(3)/MCL (ref 0–0.9)
EOSINOPHIL NFR BLD AUTO: 0.1 %
ERYTHROCYTE [DISTWIDTH] IN BLOOD BY AUTOMATED COUNT: 18.2 % (ref 11.5–17)
GFR SERPLBLD CREATININE-BSD FMLA CKD-EPI: 41 MLS/MIN/1.73/M2
GLOBULIN SER-MCNC: 5.1 GM/DL (ref 2.4–3.5)
GLUCOSE SERPL-MCNC: 124 MG/DL (ref 82–115)
HCT VFR BLD AUTO: 33.7 % (ref 42–52)
HGB BLD-MCNC: 11 G/DL (ref 14–18)
IMM GRANULOCYTES # BLD AUTO: 0.02 X10(3)/MCL (ref 0–0.04)
IMM GRANULOCYTES NFR BLD AUTO: 0.3 %
LYMPHOCYTES # BLD AUTO: 1.16 X10(3)/MCL (ref 0.6–4.6)
LYMPHOCYTES NFR BLD AUTO: 15.9 %
MAGNESIUM SERPL-MCNC: 2.4 MG/DL (ref 1.6–2.6)
MCH RBC QN AUTO: 27 PG (ref 27–31)
MCHC RBC AUTO-ENTMCNC: 32.6 G/DL (ref 33–36)
MCV RBC AUTO: 82.6 FL (ref 80–94)
MONOCYTES # BLD AUTO: 1 X10(3)/MCL (ref 0.1–1.3)
MONOCYTES NFR BLD AUTO: 13.7 %
N GONORRHOEA DNA SPEC QL NAA+PROBE: NOT DETECTED
NEUTROPHILS # BLD AUTO: 5.1 X10(3)/MCL (ref 2.1–9.2)
NEUTROPHILS NFR BLD AUTO: 69.7 %
NRBC BLD AUTO-RTO: 0 %
NT-PROBNP SERPL IA-MCNC: ABNORMAL PG/ML
OSMOLALITY SERPL: 301 MOSM/KG (ref 280–300)
OSMOLALITY UR: 508 MOSM/KG (ref 300–1300)
PHOSPHATE SERPL-MCNC: 3.8 MG/DL (ref 2.3–4.7)
PLATELET # BLD AUTO: 150 X10(3)/MCL (ref 130–400)
PMV BLD AUTO: 12.9 FL (ref 7.4–10.4)
POTASSIUM SERPL-SCNC: 4.2 MMOL/L (ref 3.5–5.1)
PROT SERPL-MCNC: 8.2 GM/DL (ref 5.8–7.6)
RBC # BLD AUTO: 4.08 X10(6)/MCL (ref 4.7–6.1)
SODIUM SERPL-SCNC: 134 MMOL/L (ref 136–145)
SODIUM UR-SCNC: <20 MMOL/L
WBC # SPEC AUTO: 7.3 X10(3)/MCL (ref 4.5–11.5)

## 2023-04-18 PROCEDURE — 84300 ASSAY OF URINE SODIUM: CPT

## 2023-04-18 PROCEDURE — 93005 ELECTROCARDIOGRAM TRACING: CPT

## 2023-04-18 PROCEDURE — 94761 N-INVAS EAR/PLS OXIMETRY MLT: CPT

## 2023-04-18 PROCEDURE — 83935 ASSAY OF URINE OSMOLALITY: CPT

## 2023-04-18 PROCEDURE — 25000003 PHARM REV CODE 250: Performed by: STUDENT IN AN ORGANIZED HEALTH CARE EDUCATION/TRAINING PROGRAM

## 2023-04-18 PROCEDURE — S4991 NICOTINE PATCH NONLEGEND: HCPCS | Performed by: STUDENT IN AN ORGANIZED HEALTH CARE EDUCATION/TRAINING PROGRAM

## 2023-04-18 PROCEDURE — 82570 ASSAY OF URINE CREATININE: CPT

## 2023-04-18 PROCEDURE — 80053 COMPREHEN METABOLIC PANEL: CPT | Performed by: STUDENT IN AN ORGANIZED HEALTH CARE EDUCATION/TRAINING PROGRAM

## 2023-04-18 PROCEDURE — 21400001 HC TELEMETRY ROOM

## 2023-04-18 PROCEDURE — 83735 ASSAY OF MAGNESIUM: CPT | Performed by: STUDENT IN AN ORGANIZED HEALTH CARE EDUCATION/TRAINING PROGRAM

## 2023-04-18 PROCEDURE — 85025 COMPLETE CBC W/AUTO DIFF WBC: CPT | Performed by: STUDENT IN AN ORGANIZED HEALTH CARE EDUCATION/TRAINING PROGRAM

## 2023-04-18 PROCEDURE — 84100 ASSAY OF PHOSPHORUS: CPT | Performed by: STUDENT IN AN ORGANIZED HEALTH CARE EDUCATION/TRAINING PROGRAM

## 2023-04-18 PROCEDURE — 83930 ASSAY OF BLOOD OSMOLALITY: CPT

## 2023-04-18 PROCEDURE — 87591 N.GONORRHOEAE DNA AMP PROB: CPT | Performed by: STUDENT IN AN ORGANIZED HEALTH CARE EDUCATION/TRAINING PROGRAM

## 2023-04-18 RX ADMIN — ACETAMINOPHEN 650 MG: 325 TABLET ORAL at 04:04

## 2023-04-18 RX ADMIN — ASPIRIN 81 MG: 81 TABLET, COATED ORAL at 10:04

## 2023-04-18 RX ADMIN — SACUBITRIL AND VALSARTAN 1 TABLET: 24; 26 TABLET, FILM COATED ORAL at 10:04

## 2023-04-18 RX ADMIN — TRAMADOL HYDROCHLORIDE 50 MG: 50 TABLET, COATED ORAL at 08:04

## 2023-04-18 RX ADMIN — APIXABAN 5 MG: 2.5 TABLET, FILM COATED ORAL at 08:04

## 2023-04-18 RX ADMIN — NICOTINE 1 PATCH: 14 PATCH, EXTENDED RELEASE TRANSDERMAL at 10:04

## 2023-04-18 RX ADMIN — APIXABAN 5 MG: 2.5 TABLET, FILM COATED ORAL at 10:04

## 2023-04-18 RX ADMIN — ATOVAQUONE 1500 MG: 750 SUSPENSION ORAL at 10:04

## 2023-04-18 RX ADMIN — Medication 6 MG: at 08:04

## 2023-04-18 RX ADMIN — MUPIROCIN: 20 OINTMENT TOPICAL at 09:04

## 2023-04-18 RX ADMIN — ATORVASTATIN CALCIUM 40 MG: 40 TABLET, FILM COATED ORAL at 08:04

## 2023-04-18 RX ADMIN — SACUBITRIL AND VALSARTAN 1 TABLET: 24; 26 TABLET, FILM COATED ORAL at 08:04

## 2023-04-18 RX ADMIN — BICTEGRAVIR SODIUM, EMTRICITABINE, AND TENOFOVIR ALAFENAMIDE FUMARATE 1 TABLET: 50; 200; 25 TABLET ORAL at 10:04

## 2023-04-18 RX ADMIN — HYDROCODONE BITARTRATE AND ACETAMINOPHEN 1 TABLET: 7.5; 325 TABLET ORAL at 05:04

## 2023-04-18 RX ADMIN — MUPIROCIN: 20 OINTMENT TOPICAL at 12:04

## 2023-04-18 NOTE — PROGRESS NOTES
Progress Note    Patient Name: Jason Perdue  MRN: [id number]  Admission Date: 4/16/2023  Hospital Length of Stay: 1 days  Code Status: Full Code  Attending Provider: Keely Harris MD  Primary Care Provider: SAVAGE Reddy     Subjective:      Brief HPI:  Jason Perdue is a 63 y.o. male with a history of HIV, HFrEF (EF 10%), substance abuse, severe aortic stenosis, afib on eliquis, HTN, HLD, B cell lymphoma,  who presented to ED for recurrence of SOB, now assoc with new onset cough with severe coughing spells causing vomiting (deneis hematemesis, mostly fluid in emesis) and side pain since yesterday. Pt recently admitted for CHF exacerbation here with dc 4/11/2023. At that time, pt states BLE edema never resolved, but breathing was back to baseline with net 6.5L diuresis; weight was 223 at discharge. Returned to [facility] for similar sxs and dc'd 4/14/23. Was found to have +cocaine at both admissions.      At baseline, pt has +orthopnea and states he has been taking his medications as prescribed, including recent changes at last discharge from [facility] on 4/11/2023. Denies any recent sick contacts, fevers, abd pain, chest pain. States he has quit using cocaine for past wk to take care of his health now.     In ED, pt AF; BP running low at 90s/70s, which may be pt's baseline based on prior hospitalization. Pt initally with elevated BP in the field and received nitroglycerin sprays. Cr mildly elevated to 1.6; trop elevated to 0.055, BNP >13,000, however pt on entresto therapy. UDS pending, CXR with increase in R sided pleural effusion size - similar in appearance to 4/6/2023      Interval History: Patient continues stating is he feeling slightly better today, but continues to have SOB and BLE edema. Urine output of 800 cc overnight with Lasix 80 mg BID IV regime yesterday. Consulted Cardiology today for continued poor urine output despite diuretic therapy and MAP 85-93. Patient informed about switching  Medicaid plans to obtain LifeVest coverage, which patient is currently working on. Hartmann switched to condom catheter given patient is experiencing increased discomfort with placement yesterday (patient is not obstructed).       Review of Systems:  The remainder of the 14 system ROS is noncontributory or negative unless mentioned/reviewed above.     Objective:     Vital Signs:  Vital Signs (Most Recent):  Temp: 97.4 °F (36.3 °C) (04/18/23 1317)  Pulse: 94 (04/18/23 1317)  Resp: 20 (04/18/23 0541)  BP: 100/77 (04/18/23 1317)  SpO2: 99 % (04/18/23 1317)  Body mass index is 29.99 kg/m².  Weight: 97.5 kg (215 lb) Vital Signs (24h Range):  Temp:  [97.4 °F (36.3 °C)-98.6 °F (37 °C)] 97.4 °F (36.3 °C)  Pulse:  [85-94] 94  Resp:  [20-22] 20  SpO2:  [96 %-100 %] 99 %  BP: (100-117)/(71-85) 100/77       Input/output:     Intake/Output Summary (Last 24 hours) at 4/18/2023 1321  Last data filed at 4/18/2023 0544  Gross per 24 hour   Intake 815 ml   Output 800 ml   Net 15 ml         Physical Examination:  Physical Exam  Vitals and nursing note reviewed.   Constitutional:       Appearance: Normal appearance.   HENT:      Head: Normocephalic.   Eyes:      General: No scleral icterus.     Extraocular Movements: Extraocular movements intact.      Pupils: Pupils are equal, round, and reactive to light.   Cardiovascular:      Rate and Rhythm: Normal rate and regular rhythm.      Pulses: Normal pulses.      Heart sounds: Normal heart sounds. No murmur heard.  Pulmonary:      Effort: Pulmonary effort is normal. No respiratory distress.      Breath sounds: No stridor. Rhonchi present. No wheezing or rales.   Abdominal:      General: Abdomen is flat.      Palpations: Abdomen is soft.   Musculoskeletal:         General: Swelling present. No tenderness. Normal range of motion.      Cervical back: Normal range of motion.      Right lower leg: Edema (Pitting edema up to the knees bilaterally.) present.      Left lower leg: Edema present.    Skin:     General: Skin is warm.      Capillary Refill: Capillary refill takes less than 2 seconds.      Coloration: Skin is not jaundiced or pale.      Findings: No lesion or rash.   Neurological:      General: No focal deficit present.      Mental Status: He is alert and oriented to person, place, and time. Mental status is at baseline.          Lines/Drains/Airways       Drain  Duration                  Urethral Catheter 04/17/23 1249 Straight-tip;Silicone 16 Fr. <1 day                     Laboratory:    Recent Labs   Lab 04/17/23  0115 04/17/23  0903 04/18/23  0222   WBC 6.3 5.6 7.3   RBC 4.29* 4.52* 4.08*   HGB 11.5* 12.0* 11.0*   HCT 36.3* 37.7* 33.7*   MCV 84.6 83.4 82.6   MCH 26.8* 26.5* 27.0   MCHC 31.7* 31.8* 32.6*   RDW 18.2* 18.0* 18.2*    148 150   MPV 12.8* 12.5* 12.9*        Recent Labs   Lab 04/17/23  0115 04/17/23  0903 04/18/23  0222    135* 134*   K 3.9 4.3 4.2   CHLORIDE 105 104 104   CO2 19* 19* 21*   BUN 31.1* 31.1* 32.5*   CREATININE 1.61* 1.62* 1.83*   CALCIUM 9.2 9.9 9.0   MG  --  2.30 2.40   ALBUMIN 3.2* 3.3* 3.1*   ALKPHOS 134 136 133   ALT 15 17 16   AST 35* 41* 41*   BILITOT 2.0* 2.5* 2.3*          Other Results:    Current Medications:     Infusions:        Scheduled:   apixaban  5 mg Oral BID    aspirin  81 mg Oral Daily    atorvastatin  40 mg Oral QHS    atovaquone  1,500 mg Oral Daily    redujsiyl-jjmbgurm-zqueowo ala  1 tablet Oral Daily    mupirocin   Nasal BID    nicotine  1 patch Transdermal Daily    sacubitriL-valsartan  1 tablet Oral BID         PRN:   acetaminophen    dextrose 10%    dextrose 10%    dextrose    dextrose    glucagon (human recombinant)    melatonin    naloxone    sodium chloride 0.9%    traMADoL        Microbiology Data:  Microbiology Results (last 7 days)       Procedure Component Value Units Date/Time    Chlamydia/GC, PCR [[id number]]  (Normal) Collected: 04/18/23 0011    Order Status: Completed Specimen: Urine Updated: 04/18/23 0158      Chlamydia trachomatis PCR Not Detected     N. gonorrhea PCR Not Detected    Narrative:      The Xpert CT/NG test, performed on the GeneXpert system is a qualitative in vitro real-time polymerase chain reaction (PCR) test for the automated detected and differentiation for genomic DNA from Chlamydia trachomatis (CT) and/or Neisseria gonorrhoeae (NG).             Antibiotics and Day Number of Therapy:  Antibiotics (From admission, onward)      Start     Stop Route Frequency Ordered    04/17/23 2100  mupirocin 2 % ointment         04/22 2059 Nasl 2 times daily 04/17/23 1754             Imaging:  CT Abdomen Pelvis  Without Contrast  Narrative: EXAMINATION:  CT ABDOMEN PELVIS WITHOUT CONTRAST    CLINICAL HISTORY:  diffuse nonspecific abdominal pain, poor urine output, dysuria;    TECHNIQUE:  Low dose axial images, sagittal and coronal reformations were obtained from the lung bases to the pubic symphysis.  No contrast was administered.  Automatic exposure control is utilized to reduce patient radiation exposure.    COMPARISON:  09/11/2022 and 03/14/2023    FINDINGS:  There is a large right-sided pleural effusion.  There is right lower lobe atelectasis..  The findings are worse when compared to 03/14/2023    There is some perihepatic fluid seen and fluid seen along the right pericolic gutter.  There is fluid seen in the pelvis as well.    The liver appears normal.  No obvious liver mass or lesion is seen.    The gallbladder is contracted.  Gallbladder wall appears to be thickened but this may be due to the ascites..    The pancreas appears normal.  No inflammatory changes are seen in the pancreatic region.    The spleen appears normal and adrenal glands appear normal.  No adrenal nodule is seen.    No nephrolithiasis is seen.  No hydronephrosis is seen.  No hydroureter is seen.  No ureteral stone is seen.  There is a hypoattenuated area seen within the right kidney in the midpole possibly representing a cyst.    No colitis  is seen.  No diverticulitis is seen.  No appendicitis is seen.    The urinary bladder is decompressed..    Bones show no acute abnormality.  Impression: Worsening right-sided pleural effusion and right lower lobe atelectasis    Ascites    Gallbladder is decompressed and the wall appears to be thickened possibly due to decompression and the ascites but if gallbladder pathology is suspected ultrasound correlation is recommended    No obvious renal abnormality seen but there is a cyst in the right kidney in the midpole    Electronically signed by: Sanjana Otero  Date:    04/17/2023  Time:    15:38  X-Ray Chest 1 View  Narrative: EXAMINATION:  XR CHEST 1 VIEW    CPT 23248    CLINICAL HISTORY:  Dyspnea;    COMPARISON:  April 13, 2023    FINDINGS:  Examination reveals mediastinal silhouette to be within normal limits cardiac silhouette is enlarged there is blunting of the right costophrenic angle indicating the presence of a right-sided pleural effusion some linear densities are identified at the right base might represent compressive atelectatic changes.    There is some increase interstitial markings with no focal consolidative changes, however, a mild degree of congestion cannot be completely excluded.    No other interval change  Impression: Increase interstitial markings throughout mild degree of congestion cannot be completely excluded.    Cardiomegaly.    Right-sided pleural effusion with compression of atelectatic changes at the right base    Electronically signed by: Jordan Merlos  Date:    04/17/2023  Time:    08:40        Assessment & Plan:     Acute on Chronic CHF Exacerbation  HFrEF (EF 15%)  Valvular Heart Disease  - Lasix 80 mg IV BID given yesterday with 800 cc urine output overnight  - Cardiology consulted for diuretic regime recommendations  - Ordered Urine Na, Urine Osm, Serum Osm and Urine Cr  - Holding home home beta blocker for now   - Stict I/Os, daily weights, fluid and sodium restriction,  elevate HOB  - TTE (4/13/23) revealed EF 15%, mild eccentric hypertrophy and severely decreased systolic function, Grade III LVDD, mod-severe aortic stenosis, mod MR, mod TR, estimated PA systolic pressure 39 mmHg  - UDS positive for Cocaine; consistent to last 6 UDS which go back to 1 year ago  - Patient working on swapping Medicaid plans to receive coverage for LifeVest     Coughing Spells w/ Emesis  RT Sided Pleural Effusion  - RT Sided pleural effusion on CXR 4/17 consistent with CXR 4/13, but not present on CXR from 3/24/23  - Likely cardiogenic in nature, but will continue to consider abx if effusion does not improve with diuresis  - Currently denying fever, cough with sputum production, night sweats or recent URI     Type II NSTEMI  - Suspect Type II NSTEMI in setting of current CHF exacerbation, will hold off on initiating acs protocol at this time  - no cardiac sxs     HOLLIS-prerenal (distributive)  - BUN/creatinine 32.5/1.83 (baseline Cr 1.36)  - cont to monitor with serial CMPs; continue to monitor for cardiorenal syndrome      Atrial fibrillation  - Currently stable  - Continue home Eliquis 5 mg BID    Hx of Hypertension  Hypotension 2/2 HFrEF (EF 10%)  - hypotensive 2/2 HFrEF w/ 10% EF; currently holding Toprol 12.5 mg daily      Hyperlipidemia  - Continue home atorvastatin 40 mg daily     Cocaine use  Tobacco use  - Endorses last cocaine use roughly 1 week ago  - UDS positive for Cocaine; consistent to last 6 UDS which go back to 1 year ago  - provided with nicotine patch upon request   - recommend tobacco cessation program/possible drug detox or rehab     HIV  Chronic Hep B Infection  - continue home Biktarvy -25 mg daily and atovaquone 1500 mg daily (10 mL)  - CD4 count 59 (3/13/23)  - Follows with ID [facility]      CODE STATUS: Full Code   Access: PIV  Antibiotics: Atovaquone (ppx for CD4<200)  Diet: Diet heart healthy  DVT Prophylaxis: Eliquis 5 mg BID  GI Prophylaxis: None  Fluids:  None    Disposition: Patient admitted on 4/17/23 for CHF exacerbation. Diuresis with Lasix 80 mg BID IV yesterday with 800 cc urine output overnight, strict I/Os. Cardiology consulted due to continued poor urine output. Further dispo planning pending.      Marco A Salgado MD  [facility], -1

## 2023-04-18 NOTE — PROGRESS NOTES
Pt agreeable to changing insurance providers in order to obtain LifeVest. Provided pt with M/D office contact # and alternate insurance plans which are in network with [facility].

## 2023-04-18 NOTE — PLAN OF CARE
Problem: Adult Inpatient Plan of Care  Goal: Plan of Care Review  4/18/2023 0043 by Lola Dale LPN  Outcome: Ongoing, Progressing  4/18/2023 0043 by Lola Dale LPN  Outcome: Ongoing, Progressing  Goal: Patient-Specific Goal (Individualized)  4/18/2023 0043 by Lola Dale LPN  Outcome: Ongoing, Progressing  4/18/2023 0043 by Lola Dale LPN  Outcome: Ongoing, Progressing  Goal: Absence of Hospital-Acquired Illness or Injury  4/18/2023 0043 by Lola Dale LPN  Outcome: Ongoing, Progressing  4/18/2023 0043 by Lola Dale LPN  Outcome: Ongoing, Progressing  Goal: Optimal Comfort and Wellbeing  4/18/2023 0043 by Lola Dale LPN  Outcome: Ongoing, Progressing  4/18/2023 0043 by Lola Dale LPN  Outcome: Ongoing, Progressing  Goal: Readiness for Transition of Care  4/18/2023 0043 by Lola Dale LPN  Outcome: Ongoing, Progressing  4/18/2023 0043 by Lola Dale LPN  Outcome: Ongoing, Progressing     Problem: Fluid Imbalance (Pneumonia)  Goal: Fluid Balance  Outcome: Ongoing, Progressing

## 2023-04-18 NOTE — MEDICAL/APP STUDENT
"[facility] Medicine Wards   Progress Note     Resident Team: [facility] Medicine List 2  Attending Physician: Keely Harris MD  Hospital Length of Stay: 1 days    Subjective:      Brief HPI:  Jason Perdue is a 63 y.o. male with a history of HIV, HFrEF (EF 10%), substance abuse, severe aortic stenosis, afib on eliquis, HTN, HLD, B cell lymphoma,  who presented to ED for recurrence of SOB, now assoc with new onset cough with severe coughing spells causing vomiting (deneis hematemesis, mostly fluid in emesis) and side pain since yesterday. Pt recently admitted for CHF exacerbation here with dc 4/11/2023. At that time, pt states BLE edema never resolved, but breathing was back to baseline with net 6.5L diuresis; weight was 223 at discharge. Returned to [facility] for similar sxs and dc'd 4/14/23. Was found to have +cocaine at both admissions.      At baseline, pt has +orthopnea and states he has been taking his medications as prescribed, including recent changes at last discharge from [facility] on 4/11/2023. Denies any recent sick contacts, fevers, abd pain, chest pain. States he has quit using cocaine for past wk to take care of his health now.     In ED, pt AF; BP running low at 90s/70s, which may be pt's baseline based on prior hospitalization. Pt initally with elevated BP in the field and received nitroglycerin sprays. Cr mildly elevated to 1.6; trop elevated to 0.055, BNP >13,000, however pt on entresto therapy. UDS pending, CXR with increase in R sided pleural effusion size - similar in appearance to 4/6/2023       Interval History: Mr. Perdue was seen today on morning rounds. EULOGIO. Per nursing, pt has had about 800 ccs of urine output over the past day. Pt has complaints of pain during urination. He feels that he constantly has to urinate, but can only produce a "dribble at a time." Describes pain as a tight sensation rated as a 10/10. This keeps him up at night. No burning or discharge associated. Per nursing, this was present " since before presenting to the hospital. He was given 650 mg po of acetaminophen and 50 mg po of tramadol for pain. Pt continues to endorse nonproductive cough, which precipitates the need to urinate. No other complaints at this time.       Review of Systems:  Review of Systems   Constitutional:  Negative for chills and fever.   Respiratory:  Positive for cough. Negative for sputum production, shortness of breath and wheezing.    Cardiovascular:  Positive for leg swelling. Negative for chest pain, palpitations and orthopnea.   Gastrointestinal:  Negative for abdominal pain and heartburn.   Genitourinary:  Positive for dysuria, frequency and urgency.        Objective:     Vital Signs (Most Recent):  Temp: 98.6 °F (37 °C) (04/18/23 0418)  Pulse: 85 (04/18/23 0418)  Resp: 20 (04/18/23 0541)  BP: 109/85 (04/18/23 0418)  SpO2: 97 % (04/18/23 0736) Vital Signs (24h Range):  Temp:  [97.5 °F (36.4 °C)-98.6 °F (37 °C)] 98.6 °F (37 °C)  Pulse:  [85-90] 85  Resp:  [20-22] 20  SpO2:  [97 %-100 %] 97 %  BP: (102-117)/(71-85) 109/85       Physical Examination:  Physical Exam  Constitutional:       General: He is not in acute distress.     Appearance: Normal appearance. He is not ill-appearing, toxic-appearing or diaphoretic.   HENT:      Head: Normocephalic and atraumatic.   Eyes:      Extraocular Movements: Extraocular movements intact.      Pupils: Pupils are equal, round, and reactive to light.   Cardiovascular:      Rate and Rhythm: Normal rate and regular rhythm.      Pulses: Normal pulses.      Heart sounds: Normal heart sounds.   Pulmonary:      Effort: Pulmonary effort is normal. No respiratory distress.      Breath sounds: Rhonchi present.      Comments: Crackles auscultated to right lower lobe  Abdominal:      General: Bowel sounds are normal. There is no distension.      Palpations: Abdomen is soft.      Tenderness: There is no abdominal tenderness. There is no guarding.      Comments: Negative burton's sign     Musculoskeletal:      Right lower leg: Edema present.      Left lower leg: Edema present.      Comments: Pitting edema present to knees bilaterally   Skin:     General: Skin is warm and dry.      Capillary Refill: Capillary refill takes 2 to 3 seconds.   Neurological:      Mental Status: He is alert.   Psychiatric:         Mood and Affect: Mood normal.         Behavior: Behavior normal.         Thought Content: Thought content normal.         Judgment: Judgment normal.       Laboratory:  Most Recent Data:  CBC:   Recent Labs   Lab 04/17/23  0115 04/17/23  0903 04/18/23  0222   WBC 6.3 5.6 7.3   HGB 11.5* 12.0* 11.0*   HCT 36.3* 37.7* 33.7*    148 150     CMP:   Recent Labs   Lab 04/18/23 0222   CALCIUM 9.0   ALBUMIN 3.1*   *   K 4.2   CO2 21*   BUN 32.5*   CREATININE 1.83*   ALKPHOS 133   ALT 16   AST 41*   BILITOT 2.3*       Other Results:  EKG (my interpretation) 4/18/23: occasional PVC noted, unifocal, ischemic changes noted in inferior and septal leads, left axis deviation.    Radiology:    CT Abdomen Pelvis Without Contrast 4/17/23:  Impression:  -Worsening right-sided pleural effusion and right lower lobe atelectasis   -Ascites   -Gallbladder is decompressed and the wall appears to be thickened possibly due to decompression and the ascites but if gallbladder pathology is suspected ultrasound correlation is recommended   -No obvious renal abnormality seen but there is a cyst in the right kidney in the midpole       Imaging Results              X-Ray Chest 1 View (Final result)  Result time 04/17/23 08:40:03      Final result by Jordan Merlos MD (04/17/23 08:40:03)                   Impression:      Increase interstitial markings throughout mild degree of congestion cannot be completely excluded.    Cardiomegaly.    Right-sided pleural effusion with compression of atelectatic changes at the right base      Electronically signed by: Jordan Merlos  Date:    04/17/2023  Time:    08:40                Narrative:    EXAMINATION:  XR CHEST 1 VIEW    CPT 41774    CLINICAL HISTORY:  Dyspnea;    COMPARISON:  April 13, 2023    FINDINGS:  Examination reveals mediastinal silhouette to be within normal limits cardiac silhouette is enlarged there is blunting of the right costophrenic angle indicating the presence of a right-sided pleural effusion some linear densities are identified at the right base might represent compressive atelectatic changes.    There is some increase interstitial markings with no focal consolidative changes, however, a mild degree of congestion cannot be completely excluded.    No other interval change                        Wet Read by Tej Osborne MD (04/17/23 01:18:26, [facility], Emergency Medicine)    Increased pulmonary vascular congestion with right sided pleural effusion                                    Current Medications:     Infusions:       Scheduled:   apixaban  5 mg Oral BID    aspirin  81 mg Oral Daily    atorvastatin  40 mg Oral QHS    atovaquone  1,500 mg Oral Daily    uokzukmil-tlhltemq-knypyzo ala  1 tablet Oral Daily    mupirocin   Nasal BID    nicotine  1 patch Transdermal Daily    sacubitriL-valsartan  1 tablet Oral BID        PRN:  acetaminophen, dextrose 10%, dextrose 10%, dextrose, dextrose, glucagon (human recombinant), melatonin, naloxone, sodium chloride 0.9%, traMADoL        Intake/Output Summary (Last 24 hours) at 4/18/2023 0830  Last data filed at 4/18/2023 0544  Gross per 24 hour   Intake 815 ml   Output 810 ml   Net 5 ml       Lines/Drains/Airways       Drain  Duration                  Urethral Catheter 04/17/23 1249 Straight-tip;Silicone 16 Fr. <1 day              Peripheral Intravenous Line  Duration                  Peripheral IV - Single Lumen 04/17/23 0427 20 G Anterior;Left Forearm 1 day                      Assessment & Plan:     Acute on Chronic CHF Exacerbation  JFrEF (EF 15%)  Valvular Heart disease  - continue  lasix 80 mg IV BID.  - strict I/Os, daiy weights, and fluid/Na retention.  - poor urine output and prerental azotemia concerning for cardiorenal syndrome.  - TTE 4/13/23 showed EF of 15%, mild eccentric hypertrophy and severely decreased systolic function, Grade III LVDD, mod-severe aortic stenosis, mod MR, mod TR, estimated PA systolic pressure 39 mmHg  - consider [employer] management. Pt insurance does not cover ([employer] - [employer]).  with pt concerning possible insurance change.     Dysuria  - pt complaining of 10/10 pain with urination. Denies discharge or burning sensation.   - will change ballesteros catheter and reassess for pain.     Right sided pleural effusion and suspected cholecystitis  - CT Abdomen Pelvis 4/17/23 showed  -Worsening right-sided pleural effusion and right lower lobe atelectasis   -Ascites   -Gallbladder is decompressed and the wall appears to be thickened possibly due to decompression and the ascites but if gallbladder pathology is suspected ultrasound correlation is recommended   -No obvious renal abnormality seen but there is a cyst in the right kidney in the midpole  - pt denies RUQ pain with meals, negative Jain's sign during PE. No acute signs of cholecystitis. Will consider RUQ US if symptoms present.   -pleural effusion is likely cardiogenic in nature and associated with CHF exacerbation. Pt denies shortness of breath on interview today, will consider thoracentesis but will manage with diuretics.    Type II NSTEMI  - elevated troponins on 4/17/23 showing 0.055. Will continue to trend x2 days.  - likely secondary to demand ischemia via CHF exacerbation.  - continue to monitor with EKG q6hrs    HOLLIS   - BUN/Cr increased from 31.1/1.62 to 32.5/1.83. Pt baseline is 37.2/1.36.  - will serially monitor with daily CMPs. Monitor for cardiorenal syndrome.    Atrial fibrillation  - pt currently stable.  - continue Eliquis 5 mg po BID.  - monitor with KEG q6hrs.    Hx of HTN  - pt  hypotensive in ED. Current BP is well controlled at 109/85.   - hold home metoprolol succinate.    Hx of HLD  - lipid panel 1/24/23 showed cholesterol 168, HDL 28, triglyceride 101, VLDL 20, and .00. Pt is well managed on current medications.  - continue atorvastatin 40 mg po qhs.    Cocaine use disorder, severe  - endorses hx of crack cocaine use  - UDS showed positive markings for cocaine, pt denies recent history stating last time using was a week ago.   - would recommend possible drug detox/rehab program upon discharge.    Tobacco use disorder, severe  - smokes 1/2 PPD  - continue nicotine patch    Hx of HIV  Chronic Hep B Infection  - - continue home Biktarvy -25 mg daily and atovaquone 1500 mg daily (10 mL)  - CD4 count 59 (3/13/23)  - Follows with ID at [facility]    Disposition: Day 2 of current admission. Diuresis with lasix 80 mg IV BID. Strict I/Os. DVT prophylaxis with lovenox. GI prophylaxis with famotidine.     Ez Sandoval, OMS III

## 2023-04-18 NOTE — CONSULTS
"[facility] Cardiology Consult Note    Date of Admit: 4/16/2023    Chief Complaint     3 day hx of shortness of breath    Subjective:      History of Present Illness:  Jason Perdue is a 63 y.o. male who with a history of HIV, HFrEF (EF 10%), substance abuse, severe aortic stenosis, afib on eliquis, HTN, HLD, B cell lymphoma,  who presented to ED for recurrence of SOB, now assoc with new onset cough with severe coughing spells causing vomiting (deneis hematemesis, mostly fluid in emesis) and side pain since yesterday. Pt recently admitted for CHF exacerbation here with dc 4/11/2023. At that time, pt states BLE edema never resolved, but breathing was back to baseline with net 6.5L diuresis; weight was 223 at discharge. Returned to [facility] for similar sxs and dc'd 4/14/23. Was found to have +cocaine at both admissions. Admission BNP was 13,000, with previous admission BNPs around 10,000.     Today, patient denies chest pain, but does report occasional shortness of breath. Internal medicine team has been attempting to diurese with Lasix 80 mg BID with minimal urine output of 800 mL. Cardiology consulted for assistance in diuresis.      Past Medical History:  Past Medical History:   Diagnosis Date    Cancer     CHF (congestive heart failure)     Human immunodeficiency virus (HIV) disease     Hypertension        Past Surgical History:  History reviewed. No pertinent surgical history.    Family History:  History reviewed. No pertinent family history.    Social History:  Social History     Tobacco Use    Smoking status: Every Day     Packs/day: 0.50     Types: Cigarettes    Smokeless tobacco: Never   Substance Use Topics    Alcohol use: Not Currently    Drug use: Yes     Types: "Crack" cocaine     Comment: last taken 1 month and a half       Allergies:  Review of patient's allergies indicates:   Allergen Reactions    Ace inhibitors      Other reaction(s): Angioedema    Nitroglycerin Itching       Home Medications:  Prior to Admission " medications    Medication Sig Start Date End Date Taking? Authorizing Provider   apixaban (ELIQUIS) 5 mg Tab Take 1 tablet (5 mg total) by mouth 2 (two) times daily. 3/15/23 3/14/24  Darius Zhou MD   aspirin (ECOTRIN) 81 MG EC tablet Take 1 tablet (81 mg total) by mouth once daily. 1/27/23 1/27/24  David Posey DO   atorvastatin (LIPITOR) 40 MG tablet Take 1 tablet (40 mg total) by mouth every evening. 1/26/23 1/26/24  David Posey DO   atovaquone (MEPRON) 750 mg/5 mL Susp oral liquid Take 10 mLs (1,500 mg total) by mouth once daily. 3/23/23   SAVAGE Dockery   rlpirzzhx-hisemkwq-nnvspab ala (BIKTARVY) -25 mg (25 kg or greater) Take 1 tablet by mouth once daily. 3/23/23   SAVAGE Dockery   empagliflozin (JARDIANCE) 10 mg tablet Take 1 tablet (10 mg total) by mouth once daily. 4/11/23   Idalmis Sommers DO   food supplemt, lactose-reduced Liqd Take 1 Bottle by mouth.    Historical Provider   furosemide (LASIX) 20 MG tablet Take 3 tablets (60 mg total) by mouth once daily. 1/26/23 1/26/24  David Posey DO   megestroL (MEGACE) 400 mg/10 mL (40 mg/mL) Susp Take 200 mg by mouth.    Historical Provider   metoprolol succinate (TOPROL-XL) 25 MG 24 hr tablet Take 0.5 tablets (12.5 mg total) by mouth once daily. 4/11/23 4/10/24  Idalmis Sommers DO   nicotine (NICODERM CQ) 14 mg/24 hr Place 1 patch onto the skin once daily. 4/11/23   Idalmis Sommers DO   polyethylene glycol 3350,bulk, Powd by Other route.    Historical Provider   sacubitriL-valsartan (ENTRESTO) 24-26 mg per tablet Take 1 tablet by mouth 2 (two) times daily. 3/15/23 3/14/24  Darius Zhou MD   VITAMIN D2 1,250 mcg (50,000 unit) capsule Take 1 capsule (50,000 Units total) by mouth every 7 days. 3/23/23   Kiara Qureshi, APRN         Review of Systems:  Gen: No fever, chills, or weight changes  Eye: No blindness or vision changes  Heart: No chest pain, palpitations, orthopnea, or diaphoresis  Lungs: +shortness of breath, no cough or  "wheezing  GI: No abdominal pain, nausea, vomiting, or diarrhea  : No hematuria, No dysuria  Musk: Increased LE swelling  Integumentary: No rash or itching  Neuro: Normal speech, no focal weakness or headache       Objective:   Last 24 Hour Vital Signs:  Blood pressure 105/76, pulse 94, temperature 97.5 °F (36.4 °C), temperature source Oral, resp. rate 20, height 5' 11" (1.803 m), weight 97.5 kg (215 lb), SpO2 97 %.  Body mass index is 29.99 kg/m².    Physical Examination:    General: unkempt, NAD, resting in bed.  Eye: PERRL, EOMI  HENT: Normocephalic, atraumatic, moist mucous membranes  Neck: Supple, nontender  Respiratory: CTAB, no crackles heard at bases  Cardiovascular: RRR, no murmurs, rubs, or gallops  Gastrointestinal: soft, nontender, no fluid wave present  Musculoskeletal: 3+ edema bilaterally  Integumentary: cold, dry at feet  Neurologic: grossly intact  Psychiatric: Appropriate mood and affect    Laboratory:  Most Recent Data:  Admission on 04/16/2023   Component Date Value    Sodium Level 04/17/2023 136     Potassium Level 04/17/2023 3.9     Chloride 04/17/2023 105     Carbon Dioxide 04/17/2023 19 (L)     Glucose Level 04/17/2023 91     Blood Urea Nitrogen 04/17/2023 31.1 (H)     Creatinine 04/17/2023 1.61 (H)     Calcium Level Total 04/17/2023 9.2     Protein Total 04/17/2023 8.2 (H)     Albumin Level 04/17/2023 3.2 (L)     Globulin 04/17/2023 5.0 (H)     Albumin/Globulin Ratio 04/17/2023 0.6 (L)     Bilirubin Total 04/17/2023 2.0 (H)     Alkaline Phosphatase 04/17/2023 134     Alanine Aminotransferase 04/17/2023 15     Aspartate Aminotransfera* 04/17/2023 35 (H)     eGFR 04/17/2023 48     Troponin-I 04/17/2023 0.055 (H)     Creatine Kinase MB 04/17/2023 1.3     Creatine Kinase 04/17/2023 118     Natriuretic Peptide 04/17/2023 13,052.8 (H)     WBC 04/17/2023 6.3     RBC 04/17/2023 4.29 (L)     Hgb 04/17/2023 11.5 (L)     Hct 04/17/2023 36.3 (L)     MCV 04/17/2023 84.6     MCH 04/17/2023 26.8 (L)     " MCHC 04/17/2023 31.7 (L)     RDW 04/17/2023 18.2 (H)     Platelet 04/17/2023 139     MPV 04/17/2023 12.8 (H)     Neut % 04/17/2023 68.6     Lymph % 04/17/2023 18.4     Mono % 04/17/2023 11.2     Eos % 04/17/2023 1.1     Basophil % 04/17/2023 0.5     Lymph # 04/17/2023 1.15     Neut # 04/17/2023 4.30     Mono # 04/17/2023 0.70     Eos # 04/17/2023 0.07     Baso # 04/17/2023 0.03     IG# 04/17/2023 0.01     IG% 04/17/2023 0.2     NRBC% 04/17/2023 0.0     Amphetamines, Urine 04/17/2023 Negative     Barbituates, Urine 04/17/2023 Negative     Benzodiazepine, Urine 04/17/2023 Negative     Cannabinoids, Urine 04/17/2023 Negative     Cocaine, Urine 04/17/2023 Positive (A)     Fentanyl, Urine 04/17/2023 Negative     MDMA, Urine 04/17/2023 Negative     Opiates, Urine 04/17/2023 Negative     Phencyclidine, Urine 04/17/2023 Negative     pH, Urine 04/17/2023 6.0     Specific Gravity, Urine * 04/17/2023 1.012     Influenza A PCR 04/17/2023 Not Detected     Influenza B PCR 04/17/2023 Not Detected     Respiratory Syncytial Vi* 04/17/2023 Not Detected     SARS-CoV-2 PCR 04/17/2023 Not Detected     NT-Pro BNP, S 04/17/2023 21896 (H)     Sodium Level 04/17/2023 135 (L)     Potassium Level 04/17/2023 4.3     Chloride 04/17/2023 104     Carbon Dioxide 04/17/2023 19 (L)     Glucose Level 04/17/2023 125 (H)     Blood Urea Nitrogen 04/17/2023 31.1 (H)     Creatinine 04/17/2023 1.62 (H)     Calcium Level Total 04/17/2023 9.9     Protein Total 04/17/2023 8.6 (H)     Albumin Level 04/17/2023 3.3 (L)     Globulin 04/17/2023 5.3 (H)     Albumin/Globulin Ratio 04/17/2023 0.6 (L)     Bilirubin Total 04/17/2023 2.5 (H)     Alkaline Phosphatase 04/17/2023 136     Alanine Aminotransferase 04/17/2023 17     Aspartate Aminotransfera* 04/17/2023 41 (H)     eGFR 04/17/2023 48     Magnesium Level 04/17/2023 2.30     Phosphorus Level 04/17/2023 3.7     Troponin-I 04/17/2023 0.052 (H)     Color, UA 04/17/2023 Yellow     Appearance, UA 04/17/2023 Clear      Specific Gravity, UA 04/17/2023 1.012     pH, UA 04/17/2023 6.0     Protein, UA 04/17/2023 Trace (A)     Glucose, UA 04/17/2023 Normal     Ketones, UA 04/17/2023 Negative     Blood, UA 04/17/2023 Negative     Bilirubin, UA 04/17/2023 Negative     Urobilinogen, UA 04/17/2023 1+ (A)     Nitrites, UA 04/17/2023 Negative     Leukocyte Esterase, UA 04/17/2023 Negative     WBC, UA 04/17/2023 0-5     Bacteria, UA 04/17/2023 None Seen     Squamous Epithelial Cell* 04/17/2023 Trace (A)     Mucous, UA 04/17/2023 Trace (A)     Hyaline Casts, UA 04/17/2023 11-20 (A)     RBC, UA 04/17/2023 0-5     WBC 04/17/2023 5.6     RBC 04/17/2023 4.52 (L)     Hgb 04/17/2023 12.0 (L)     Hct 04/17/2023 37.7 (L)     MCV 04/17/2023 83.4     MCH 04/17/2023 26.5 (L)     MCHC 04/17/2023 31.8 (L)     RDW 04/17/2023 18.0 (H)     Platelet 04/17/2023 148     MPV 04/17/2023 12.5 (H)     Neut % 04/17/2023 70.8     Lymph % 04/17/2023 17.8     Mono % 04/17/2023 10.3     Eos % 04/17/2023 0.4     Basophil % 04/17/2023 0.5     Lymph # 04/17/2023 0.99     Neut # 04/17/2023 3.93     Mono # 04/17/2023 0.57     Eos # 04/17/2023 0.02     Baso # 04/17/2023 0.03     IG# 04/17/2023 0.01     IG% 04/17/2023 0.2     NRBC% 04/17/2023 0.0     Troponin-I 04/17/2023 0.044     Troponin-I 04/17/2023 0.055 (H)     Chlamydia trachomatis PCR 04/18/2023 Not Detected     N. gonorrhea PCR 04/18/2023 Not Detected     Sodium Level 04/18/2023 134 (L)     Potassium Level 04/18/2023 4.2     Chloride 04/18/2023 104     Carbon Dioxide 04/18/2023 21 (L)     Glucose Level 04/18/2023 124 (H)     Blood Urea Nitrogen 04/18/2023 32.5 (H)     Creatinine 04/18/2023 1.83 (H)     Calcium Level Total 04/18/2023 9.0     Protein Total 04/18/2023 8.2 (H)     Albumin Level 04/18/2023 3.1 (L)     Globulin 04/18/2023 5.1 (H)     Albumin/Globulin Ratio 04/18/2023 0.6 (L)     Bilirubin Total 04/18/2023 2.3 (H)     Alkaline Phosphatase 04/18/2023 133     Alanine Aminotransferase 04/18/2023 16     Aspartate  Aminotransfera* 04/18/2023 41 (H)     eGFR 04/18/2023 41     Magnesium Level 04/18/2023 2.40     Phosphorus Level 04/18/2023 3.8     WBC 04/18/2023 7.3     RBC 04/18/2023 4.08 (L)     Hgb 04/18/2023 11.0 (L)     Hct 04/18/2023 33.7 (L)     MCV 04/18/2023 82.6     MCH 04/18/2023 27.0     MCHC 04/18/2023 32.6 (L)     RDW 04/18/2023 18.2 (H)     Platelet 04/18/2023 150     MPV 04/18/2023 12.9 (H)     Neut % 04/18/2023 69.7     Lymph % 04/18/2023 15.9     Mono % 04/18/2023 13.7     Eos % 04/18/2023 0.1     Basophil % 04/18/2023 0.3     Lymph # 04/18/2023 1.16     Neut # 04/18/2023 5.10     Mono # 04/18/2023 1.00     Eos # 04/18/2023 0.01     Baso # 04/18/2023 0.02     IG# 04/18/2023 0.02     IG% 04/18/2023 0.3     NRBC% 04/18/2023 0.0     Urine Sodium 04/18/2023 <20.0     Urine Osmolality 04/18/2023 508     Urine Creatinine 04/18/2023 145.5     Osmolality 04/18/2023 301 (H)    Admission on 04/13/2023, Discharged on 04/14/2023   Component Date Value    Natriuretic Peptide 04/13/2023 10,149.2 (H)     Sodium Level 04/13/2023 137     Potassium Level 04/13/2023 4.2     Chloride 04/13/2023 104     Carbon Dioxide 04/13/2023 22 (L)     Glucose Level 04/13/2023 90     Blood Urea Nitrogen 04/13/2023 27.6 (H)     Creatinine 04/13/2023 1.48 (H)     Calcium Level Total 04/13/2023 9.2     Protein Total 04/13/2023 8.1 (H)     Albumin Level 04/13/2023 3.2 (L)     Globulin 04/13/2023 4.9 (H)     Albumin/Globulin Ratio 04/13/2023 0.7 (L)     Bilirubin Total 04/13/2023 2.0 (H)     Alkaline Phosphatase 04/13/2023 114     Alanine Aminotransferase 04/13/2023 11     Aspartate Aminotransfera* 04/13/2023 30     eGFR 04/13/2023 53     Troponin-I 04/13/2023 0.041     Influenza A PCR 04/13/2023 Not Detected     Influenza B PCR 04/13/2023 Not Detected     SARS-CoV-2 PCR 04/13/2023 Not Detected     PTT 04/13/2023 54.3 (H)     PT 04/13/2023 26.1 (H)     INR 04/13/2023 2.45 (H)     WBC 04/13/2023 6.0     RBC 04/13/2023 4.38 (L)     Hgb 04/13/2023 11.8  (L)     Hct 04/13/2023 37.7 (L)     MCV 04/13/2023 86.1     MCH 04/13/2023 26.9 (L)     MCHC 04/13/2023 31.3 (L)     RDW 04/13/2023 17.6 (H)     Platelet 04/13/2023 157     MPV 04/13/2023 12.7 (H)     Neut % 04/13/2023 65.1     Lymph % 04/13/2023 20.2     Mono % 04/13/2023 12.7     Eos % 04/13/2023 1.2     Basophil % 04/13/2023 0.5     Lymph # 04/13/2023 1.21     Neut # 04/13/2023 3.90     Mono # 04/13/2023 0.76     Eos # 04/13/2023 0.07     Baso # 04/13/2023 0.03     IG# 04/13/2023 0.02     IG% 04/13/2023 0.3     NRBC% 04/13/2023 0.0     Amphetamines, Urine 04/13/2023 Negative     Barbituates, Urine 04/13/2023 Negative     Benzodiazepine, Urine 04/13/2023 Negative     Cannabinoids, Urine 04/13/2023 Negative     Cocaine, Urine 04/13/2023 Positive (A)     Fentanyl, Urine 04/13/2023 Negative     MDMA, Urine 04/13/2023 Negative     Opiates, Urine 04/13/2023 Negative     Phencyclidine, Urine 04/13/2023 Negative     pH, Urine 04/13/2023 6.0     Specific Gravity, Urine * 04/13/2023 1.015     Ethanol Level 04/13/2023 <10.0     BSA 04/13/2023 2.13     TDI LATERAL 04/13/2023 0.04     Left Ventricular Outflow* 04/13/2023 1.00     Left Ventricular Outflow* 04/13/2023 0.46     MV peak gradient 04/13/2023 5     MV mean gradient 04/13/2023 2     MV stenosis pressure 1/2* 04/13/2023 48.00     MV VTI 04/13/2023 17.6     TR Max Fer 04/13/2023 2.47     Ao peak fer 04/13/2023 3.1     Posterior Wall 04/13/2023 0.94     LVOT diameter 04/13/2023 2.20     LVOT peak fer 04/13/2023 0.62     LVOT peak VTI 04/13/2023 15.00     E wave deceleration time 04/13/2023 165.00     MV Peak A Fer 04/13/2023 0.71     MV Peak E Fer 04/13/2023 0.73     TAPSE 04/13/2023 1.95     IVS 04/13/2023 1.01     Ao VTI 04/13/2023 61.00     AV mean gradient 04/13/2023 24     LVIDd 04/13/2023 5.55     LVIDs 04/13/2023 5.19 (A)     LV Systolic Volume 04/13/2023 150.00     LV Diastolic Volume 04/13/2023 188.00     TDI SEPTAL 04/13/2023 0.04     RVDD 04/13/2023 3.65      AORTIC VALVE CUSP SEPERA* 04/13/2023 0.80     LA size 04/13/2023 3.90     LV LATERAL E/E' RATIO 04/13/2023 18.25     LV SEPTAL E/E' RATIO 04/13/2023 18.25     FS 04/13/2023 6     LV mass 04/13/2023 209.37     Left Ventricle Relative * 04/13/2023 0.34     AV valve area 04/13/2023 0.93     AV Velocity Ratio 04/13/2023 0.20     AV index (prosthetic) 04/13/2023 0.25     MV valve area p 1/2 meth* 04/13/2023 4.58     MV valve area by continu* 04/13/2023 3.24     E/A ratio 04/13/2023 1.03     Mean e' 04/13/2023 0.04     LVOT area 04/13/2023 3.8     LVOT stroke volume 04/13/2023 56.99     AV peak gradient 04/13/2023 38     E/E' ratio 04/13/2023 18.25     LV Systolic Volume Index 04/13/2023 71.1     LV Diastolic Volume Index 04/13/2023 89.10     LV Mass Index 04/13/2023 99     Triscuspid Valve Regurgi* 04/13/2023 24     Right Atrial Pressure (f* 04/13/2023 15     EF 04/13/2023 15     TV rest pulmonary artery* 04/13/2023 39     Troponin-I 04/13/2023 0.039     POCT Glucose 04/13/2023 89     Troponin-I 04/13/2023 0.048 (H)     POCT Glucose 04/13/2023 117 (H)     Troponin-I 04/13/2023 0.037     POCT Glucose 04/13/2023 97     Troponin-I 04/14/2023 0.037     POCT Glucose 04/14/2023 90     POCT Glucose 04/14/2023 103    Admission on 04/06/2023, Discharged on 04/11/2023   Component Date Value    Sodium Level 04/06/2023 134 (L)     Potassium Level 04/06/2023 3.3 (L)     Chloride 04/06/2023 103     Carbon Dioxide 04/06/2023 21 (L)     Glucose Level 04/06/2023 97     Blood Urea Nitrogen 04/06/2023 39.6 (H)     Creatinine 04/06/2023 1.85 (H)     Calcium Level Total 04/06/2023 9.2     Protein Total 04/06/2023 8.0 (H)     Albumin Level 04/06/2023 3.1 (L)     Globulin 04/06/2023 4.9 (H)     Albumin/Globulin Ratio 04/06/2023 0.6 (L)     Bilirubin Total 04/06/2023 1.5     Alkaline Phosphatase 04/06/2023 105     Alanine Aminotransferase 04/06/2023 13     Aspartate Aminotransfera* 04/06/2023 31     eGFR 04/06/2023 41     Color, UA 04/06/2023  Yellow     Appearance, UA 04/06/2023 Clear     Specific Gravity, UA 04/06/2023 1.013     pH, UA 04/06/2023 5.0     Protein, UA 04/06/2023 Trace (A)     Glucose, UA 04/06/2023 Normal     Ketones, UA 04/06/2023 Negative     Blood, UA 04/06/2023 Negative     Bilirubin, UA 04/06/2023 Negative     Urobilinogen, UA 04/06/2023 1+ (A)     Nitrites, UA 04/06/2023 Negative     Leukocyte Esterase, UA 04/06/2023 Negative     WBC, UA 04/06/2023 0-5     Bacteria, UA 04/06/2023 Trace (A)     Squamous Epithelial Cell* 04/06/2023 Trace (A)     Mucous, UA 04/06/2023 Trace (A)     Hyaline Casts, UA 04/06/2023 11-20 (A)     Amorpous Crystal, UA 04/06/2023 Trace     RBC, UA 04/06/2023 0-5     Ethanol Level 04/06/2023 <10.0     Amphetamines, Urine 04/06/2023 Negative     Barbituates, Urine 04/06/2023 Negative     Benzodiazepine, Urine 04/06/2023 Negative     Cannabinoids, Urine 04/06/2023 Negative     Cocaine, Urine 04/06/2023 Positive (A)     Fentanyl, Urine 04/06/2023 Negative     MDMA, Urine 04/06/2023 Negative     Opiates, Urine 04/06/2023 Negative     Phencyclidine, Urine 04/06/2023 Negative     pH, Urine 04/06/2023 5.0     Specific Gravity, Urine * 04/06/2023 1.013     Troponin-I 04/06/2023 0.037     Natriuretic Peptide 04/06/2023 10,645.7 (H)     Creatine Kinase 04/06/2023 106     WBC 04/06/2023 6.4     RBC 04/06/2023 4.26 (L)     Hgb 04/06/2023 11.5 (L)     Hct 04/06/2023 36.0 (L)     MCV 04/06/2023 84.5     MCH 04/06/2023 27.0     MCHC 04/06/2023 31.9 (L)     RDW 04/06/2023 17.2 (H)     Platelet 04/06/2023 183     MPV 04/06/2023 11.6 (H)     Neut % 04/06/2023 67.0     Lymph % 04/06/2023 19.3     Mono % 04/06/2023 10.4     Eos % 04/06/2023 2.6     Basophil % 04/06/2023 0.5     Lymph # 04/06/2023 1.24     Neut # 04/06/2023 4.30     Mono # 04/06/2023 0.67     Eos # 04/06/2023 0.17     Baso # 04/06/2023 0.03     IG# 04/06/2023 0.01     IG% 04/06/2023 0.2     NRBC% 04/06/2023 0.0     Sodium Level 04/07/2023 134 (L)     Potassium  Level 04/07/2023 3.3 (L)     Chloride 04/07/2023 104     Carbon Dioxide 04/07/2023 21 (L)     Glucose Level 04/07/2023 123 (H)     Blood Urea Nitrogen 04/07/2023 39.0 (H)     Creatinine 04/07/2023 1.79 (H)     BUN/Creatinine Ratio 04/07/2023 22     Calcium Level Total 04/07/2023 8.9     Anion Gap 04/07/2023 9.0     eGFR 04/07/2023 42     Magnesium Level 04/07/2023 2.00     Phosphorus Level 04/07/2023 3.8     Sodium Level 04/07/2023 135 (L)     Potassium Level 04/07/2023 4.1     Chloride 04/07/2023 106     Carbon Dioxide 04/07/2023 22 (L)     Glucose Level 04/07/2023 108     Blood Urea Nitrogen 04/07/2023 37.1 (H)     Creatinine 04/07/2023 1.59 (H)     BUN/Creatinine Ratio 04/07/2023 23     Calcium Level Total 04/07/2023 9.3     Anion Gap 04/07/2023 7.0     eGFR 04/07/2023 49     Magnesium Level 04/07/2023 2.10     Sodium Level 04/08/2023 137     Potassium Level 04/08/2023 4.3     Chloride 04/08/2023 106     Carbon Dioxide 04/08/2023 21 (L)     Glucose Level 04/08/2023 100     Blood Urea Nitrogen 04/08/2023 36.8 (H)     Creatinine 04/08/2023 1.46 (H)     BUN/Creatinine Ratio 04/08/2023 25     Calcium Level Total 04/08/2023 9.1     Anion Gap 04/08/2023 10.0     eGFR 04/08/2023 54     Magnesium Level 04/08/2023 2.10     Phosphorus Level 04/08/2023 3.5     WBC 04/07/2023 6.4     RBC 04/07/2023 4.22 (L)     Hgb 04/07/2023 11.2 (L)     Hct 04/07/2023 35.6 (L)     MCV 04/07/2023 84.4     MCH 04/07/2023 26.5 (L)     MCHC 04/07/2023 31.5 (L)     RDW 04/07/2023 16.8     Platelet 04/07/2023 180     MPV 04/07/2023 12.4 (H)     Neut % 04/07/2023 64.4     Lymph % 04/07/2023 21.3     Mono % 04/07/2023 12.4     Eos % 04/07/2023 1.1     Basophil % 04/07/2023 0.5     Lymph # 04/07/2023 1.36     Neut # 04/07/2023 4.12     Mono # 04/07/2023 0.79     Eos # 04/07/2023 0.07     Baso # 04/07/2023 0.03     IG# 04/07/2023 0.02     IG% 04/07/2023 0.3     NRBC% 04/07/2023 0.0     Sodium Level 04/08/2023 137     Potassium Level 04/08/2023 4.4      Chloride 04/08/2023 105     Carbon Dioxide 04/08/2023 25     Glucose Level 04/08/2023 102     Blood Urea Nitrogen 04/08/2023 36.7 (H)     Creatinine 04/08/2023 1.49 (H)     BUN/Creatinine Ratio 04/08/2023 25     Calcium Level Total 04/08/2023 9.4     Anion Gap 04/08/2023 7.0     eGFR 04/08/2023 53     Sodium Level 04/09/2023 138     Potassium Level 04/09/2023 4.1     Chloride 04/09/2023 108 (H)     Carbon Dioxide 04/09/2023 22 (L)     Glucose Level 04/09/2023 117 (H)     Blood Urea Nitrogen 04/09/2023 35.6 (H)     Creatinine 04/09/2023 1.40 (H)     BUN/Creatinine Ratio 04/09/2023 25     Calcium Level Total 04/09/2023 9.2     Anion Gap 04/09/2023 8.0     eGFR 04/09/2023 57     Magnesium Level 04/09/2023 2.10     Phosphorus Level 04/09/2023 3.8     Sodium Level 04/10/2023 140     Potassium Level 04/10/2023 4.0     Chloride 04/10/2023 106     Carbon Dioxide 04/10/2023 25     Glucose Level 04/10/2023 111     Blood Urea Nitrogen 04/10/2023 30.7 (H)     Creatinine 04/10/2023 1.35 (H)     BUN/Creatinine Ratio 04/10/2023 23     Calcium Level Total 04/10/2023 9.0     Anion Gap 04/10/2023 9.0     eGFR 04/10/2023 59     Magnesium Level 04/10/2023 2.00     Phosphorus Level 04/10/2023 3.7     Sodium Level 04/11/2023 138     Potassium Level 04/11/2023 3.6     Chloride 04/11/2023 105     Carbon Dioxide 04/11/2023 24     Glucose Level 04/11/2023 84     Blood Urea Nitrogen 04/11/2023 25.4     Creatinine 04/11/2023 1.20 (H)     Calcium Level Total 04/11/2023 8.6 (L)     Protein Total 04/11/2023 7.1     Albumin Level 04/11/2023 2.7 (L)     Globulin 04/11/2023 4.4 (H)     Albumin/Globulin Ratio 04/11/2023 0.6 (L)     Bilirubin Total 04/11/2023 1.5     Alkaline Phosphatase 04/11/2023 92     Alanine Aminotransferase 04/11/2023 10     Aspartate Aminotransfera* 04/11/2023 23     eGFR 04/11/2023 >60     Magnesium Level 04/11/2023 2.00     Phosphorus Level 04/11/2023 3.8     POCT Glucose 04/11/2023 83    Admission on 03/24/2023,  Discharged on 03/26/2023   Component Date Value    Sodium Level 03/24/2023 136     Potassium Level 03/24/2023 4.0     Chloride 03/24/2023 104     Carbon Dioxide 03/24/2023 21 (L)     Glucose Level 03/24/2023 137 (H)     Blood Urea Nitrogen 03/24/2023 37.7 (H)     Creatinine 03/24/2023 1.67 (H)     Calcium Level Total 03/24/2023 9.2     Protein Total 03/24/2023 7.8 (H)     Albumin Level 03/24/2023 3.0 (L)     Globulin 03/24/2023 4.8 (H)     Albumin/Globulin Ratio 03/24/2023 0.6 (L)     Bilirubin Total 03/24/2023 1.2     Alkaline Phosphatase 03/24/2023 96     Alanine Aminotransferase 03/24/2023 16     Aspartate Aminotransfera* 03/24/2023 38 (H)     eGFR 03/24/2023 46     Natriuretic Peptide 03/24/2023 8,996.4 (H)     Troponin-I 03/24/2023 0.045     WBC 03/24/2023 6.9     RBC 03/24/2023 3.96 (L)     Hgb 03/24/2023 10.9 (L)     Hct 03/24/2023 33.8 (L)     MCV 03/24/2023 85.4     MCH 03/24/2023 27.5     MCHC 03/24/2023 32.2 (L)     RDW 03/24/2023 16.1     Platelet 03/24/2023 169     MPV 03/24/2023 11.6 (H)     Neut % 03/24/2023 69.3     Lymph % 03/24/2023 18.0     Mono % 03/24/2023 11.1     Eos % 03/24/2023 0.9     Basophil % 03/24/2023 0.4     Lymph # 03/24/2023 1.25     Neut # 03/24/2023 4.81     Mono # 03/24/2023 0.77     Eos # 03/24/2023 0.06     Baso # 03/24/2023 0.03     IG# 03/24/2023 0.02     IG% 03/24/2023 0.3     NRBC% 03/24/2023 0.0     Magnesium Level 03/24/2023 2.00     Phosphorus Level 03/24/2023 4.1     Amphetamines, Urine 03/24/2023 Negative     Barbituates, Urine 03/24/2023 Negative     Benzodiazepine, Urine 03/24/2023 Negative     Cannabinoids, Urine 03/24/2023 Negative     Cocaine, Urine 03/24/2023 Positive (A)     Fentanyl, Urine 03/24/2023 Negative     MDMA, Urine 03/24/2023 Negative     Opiates, Urine 03/24/2023 Negative     Phencyclidine, Urine 03/24/2023 Negative     pH, Urine 03/24/2023 5.5     Sodium Level 03/24/2023 136     Potassium Level 03/24/2023 3.3 (L)     Chloride 03/24/2023 103      Carbon Dioxide 03/24/2023 25     Glucose Level 03/24/2023 110     Blood Urea Nitrogen 03/24/2023 37.2 (H)     Creatinine 03/24/2023 1.50 (H)     BUN/Creatinine Ratio 03/24/2023 25     Calcium Level Total 03/24/2023 9.2     Anion Gap 03/24/2023 8.0     eGFR 03/24/2023 53     Toxoplasma Ab, IgM 03/24/2023 Negative     Toxoplasma Ab, IgG 03/24/2023 Negative     Toxoplasma IgG Value 03/24/2023 <3     CRYPTOCOCCAL ANTIGEN, SE* 03/24/2023 Negative     MTB PCR ([facility]) 03/24/2023 Not Detected     Acid Fast Smear For Myco* 03/24/2023 SEE COMMENTS     WBC 03/25/2023 6.0     RBC 03/25/2023 4.25 (L)     Hgb 03/25/2023 11.7 (L)     Hct 03/25/2023 35.9 (L)     MCV 03/25/2023 84.5     MCH 03/25/2023 27.5     MCHC 03/25/2023 32.6 (L)     RDW 03/25/2023 16.2     Platelet 03/25/2023 167     MPV 03/25/2023 11.0 (H)     Neut % 03/25/2023 59.0     Lymph % 03/25/2023 23.5     Mono % 03/25/2023 16.3     Eos % 03/25/2023 0.7     Basophil % 03/25/2023 0.3     Lymph # 03/25/2023 1.40     Neut # 03/25/2023 3.52     Mono # 03/25/2023 0.97     Eos # 03/25/2023 0.04     Baso # 03/25/2023 0.02     IG# 03/25/2023 0.01     IG% 03/25/2023 0.2     NRBC% 03/25/2023 0.0     Sodium Level 03/25/2023 138     Potassium Level 03/25/2023 4.0     Chloride 03/25/2023 106     Carbon Dioxide 03/25/2023 21 (L)     Glucose Level 03/25/2023 97     Blood Urea Nitrogen 03/25/2023 37.0 (H)     Creatinine 03/25/2023 1.51 (H)     Calcium Level Total 03/25/2023 9.5     Protein Total 03/25/2023 7.8 (H)     Albumin Level 03/25/2023 3.0 (L)     Globulin 03/25/2023 4.8 (H)     Albumin/Globulin Ratio 03/25/2023 0.6 (L)     Bilirubin Total 03/25/2023 1.2     Alkaline Phosphatase 03/25/2023 114     Alanine Aminotransferase 03/25/2023 16     Aspartate Aminotransfera* 03/25/2023 39 (H)     eGFR 03/25/2023 52     Magnesium Level 03/25/2023 1.90     Phosphorus Level 03/25/2023 4.2     Sodium Level 03/25/2023 138     Potassium Level 03/25/2023 3.7     Chloride 03/25/2023 107      Carbon Dioxide 03/25/2023 23     Glucose Level 03/25/2023 141 (H)     Blood Urea Nitrogen 03/25/2023 36.6 (H)     Creatinine 03/25/2023 1.41 (H)     BUN/Creatinine Ratio 03/25/2023 26     Calcium Level Total 03/25/2023 9.3     Anion Gap 03/25/2023 8.0     eGFR 03/25/2023 57     Magnesium Level 03/25/2023 2.30     WBC 03/26/2023 5.0     RBC 03/26/2023 4.10 (L)     Hgb 03/26/2023 11.2 (L)     Hct 03/26/2023 34.8 (L)     MCV 03/26/2023 84.9     MCH 03/26/2023 27.3     MCHC 03/26/2023 32.2 (L)     RDW 03/26/2023 16.2     Platelet 03/26/2023 160     MPV 03/26/2023 11.7 (H)     Neut % 03/26/2023 57.9     Lymph % 03/26/2023 26.2     Mono % 03/26/2023 14.3     Eos % 03/26/2023 1.0     Basophil % 03/26/2023 0.4     Lymph # 03/26/2023 1.32     Neut # 03/26/2023 2.91     Mono # 03/26/2023 0.72     Eos # 03/26/2023 0.05     Baso # 03/26/2023 0.02     IG# 03/26/2023 0.01     IG% 03/26/2023 0.2     NRBC% 03/26/2023 0.0     Sodium Level 03/26/2023 137     Potassium Level 03/26/2023 3.7     Chloride 03/26/2023 106     Carbon Dioxide 03/26/2023 23     Glucose Level 03/26/2023 81 (L)     Blood Urea Nitrogen 03/26/2023 37.2 (H)     Creatinine 03/26/2023 1.36 (H)     Calcium Level Total 03/26/2023 9.5     Protein Total 03/26/2023 7.5     Albumin Level 03/26/2023 2.8 (L)     Globulin 03/26/2023 4.7 (H)     Albumin/Globulin Ratio 03/26/2023 0.6 (L)     Bilirubin Total 03/26/2023 1.4     Alkaline Phosphatase 03/26/2023 107     Alanine Aminotransferase 03/26/2023 15     Aspartate Aminotransfera* 03/26/2023 32     eGFR 03/26/2023 59        Radiology:  Imaging Results              X-Ray Chest 1 View (Final result)  Result time 04/17/23 08:40:03      Final result by Jordan Merlos MD (04/17/23 08:40:03)                   Impression:      Increase interstitial markings throughout mild degree of congestion cannot be completely excluded.    Cardiomegaly.    Right-sided pleural effusion with compression of atelectatic changes at the right  base      Electronically signed by: Jordan Merlos  Date:    04/17/2023  Time:    08:40               Narrative:    EXAMINATION:  XR CHEST 1 VIEW    CPT 85025    CLINICAL HISTORY:  Dyspnea;    COMPARISON:  April 13, 2023    FINDINGS:  Examination reveals mediastinal silhouette to be within normal limits cardiac silhouette is enlarged there is blunting of the right costophrenic angle indicating the presence of a right-sided pleural effusion some linear densities are identified at the right base might represent compressive atelectatic changes.    There is some increase interstitial markings with no focal consolidative changes, however, a mild degree of congestion cannot be completely excluded.    No other interval change                        Wet Read by Tej Osborne MD (04/17/23 01:18:26, [facility] - Emergency Dept, Emergency Medicine)    Increased pulmonary vascular congestion with right sided pleural effusion                                       Assessment & Plan:     Acute on Chronic CHF Exacerbation  HFrEF (EF 15%)  Valvular Heart Disease  -Currently on Lasix 80 mg BID with minimal response  -Recommend starting metolazone 2.5 mg qd prior to receiving Lasix dose  -Can uptitrate metolazone to 5 mg qd, up to a max of 20 mg/day in 1-2 divided doses  -Closely monitor electrolytes while using metolazone with lasix.  -Continue to monitor renal function closely, okay for creatinine to increase to a max of 30%.   -If not responsive to metolazone, would recommend trialing Bumex.  -Can consider adding an inotrope if continues to be non-responsive to bumex.    Thank you for your consult. We will continue following.    David Posey,   [facility] -[facility]

## 2023-04-19 LAB
ALBUMIN SERPL-MCNC: 3 G/DL (ref 3.4–4.8)
ALBUMIN/GLOB SERPL: 0.6 RATIO (ref 1.1–2)
ALP SERPL-CCNC: 136 UNIT/L (ref 40–150)
ALT SERPL-CCNC: 19 UNIT/L (ref 0–55)
AST SERPL-CCNC: 43 UNIT/L (ref 5–34)
B-OH-BUTYR SERPL-MCNC: 0.28 MMOL/L
BASOPHILS # BLD AUTO: 0.02 X10(3)/MCL (ref 0–0.2)
BASOPHILS NFR BLD AUTO: 0.3 %
BILIRUBIN DIRECT+TOT PNL SERPL-MCNC: 2.4 MG/DL
BUN SERPL-MCNC: 35.7 MG/DL (ref 8.4–25.7)
CALCIUM SERPL-MCNC: 9.2 MG/DL (ref 8.8–10)
CHLORIDE SERPL-SCNC: 104 MMOL/L (ref 98–107)
CHLORIDE UR-SCNC: <20 MMOL/L
CO2 SERPL-SCNC: 19 MMOL/L (ref 23–31)
CREAT SERPL-MCNC: 1.57 MG/DL (ref 0.73–1.18)
CREAT UR-MCNC: 136.7 MG/DL (ref 63–166)
EOSINOPHIL # BLD AUTO: 0.04 X10(3)/MCL (ref 0–0.9)
EOSINOPHIL NFR BLD AUTO: 0.6 %
ERYTHROCYTE [DISTWIDTH] IN BLOOD BY AUTOMATED COUNT: 18.4 % (ref 11.5–17)
GFR SERPLBLD CREATININE-BSD FMLA CKD-EPI: 50 MLS/MIN/1.73/M2
GLOBULIN SER-MCNC: 5 GM/DL (ref 2.4–3.5)
GLUCOSE SERPL-MCNC: 109 MG/DL (ref 82–115)
HCT VFR BLD AUTO: 36.1 % (ref 42–52)
HGB BLD-MCNC: 11.4 G/DL (ref 14–18)
IGA SERPL-MCNC: 360 MG/DL (ref 101–645)
IGG SERPL-MCNC: 2716 MG/DL (ref 540–1822)
IGM SERPL-MCNC: 123 MG/DL (ref 22–240)
IMM GRANULOCYTES # BLD AUTO: 0.02 X10(3)/MCL (ref 0–0.04)
IMM GRANULOCYTES NFR BLD AUTO: 0.3 %
LYMPHOCYTES # BLD AUTO: 1.07 X10(3)/MCL (ref 0.6–4.6)
LYMPHOCYTES NFR BLD AUTO: 17.1 %
MAGNESIUM SERPL-MCNC: 2.6 MG/DL (ref 1.6–2.6)
MCH RBC QN AUTO: 26.6 PG (ref 27–31)
MCHC RBC AUTO-ENTMCNC: 31.6 G/DL (ref 33–36)
MCV RBC AUTO: 84.3 FL (ref 80–94)
MONOCYTES # BLD AUTO: 0.7 X10(3)/MCL (ref 0.1–1.3)
MONOCYTES NFR BLD AUTO: 11.2 %
NEUTROPHILS # BLD AUTO: 4.4 X10(3)/MCL (ref 2.1–9.2)
NEUTROPHILS NFR BLD AUTO: 70.5 %
NRBC BLD AUTO-RTO: 0 %
PHOSPHATE SERPL-MCNC: 3.6 MG/DL (ref 2.3–4.7)
PLATELET # BLD AUTO: 140 X10(3)/MCL (ref 130–400)
PMV BLD AUTO: 12.7 FL (ref 7.4–10.4)
POTASSIUM SERPL-SCNC: 4.1 MMOL/L (ref 3.5–5.1)
POTASSIUM UR-SCNC: 47 MMOL/L
PROT SERPL-MCNC: 8 GM/DL (ref 5.8–7.6)
PROT UR STRIP-MCNC: 45.5 MG/DL
RBC # BLD AUTO: 4.28 X10(6)/MCL (ref 4.7–6.1)
SODIUM SERPL-SCNC: 134 MMOL/L (ref 136–145)
SODIUM UR-SCNC: <20 MMOL/L
URINE PROTEIN/CREATININE RATIO (OHS): 0.3
WBC # SPEC AUTO: 6.3 X10(3)/MCL (ref 4.5–11.5)

## 2023-04-19 PROCEDURE — 21400001 HC TELEMETRY ROOM

## 2023-04-19 PROCEDURE — 86036 ANCA SCREEN EACH ANTIBODY: CPT | Mod: 90 | Performed by: STUDENT IN AN ORGANIZED HEALTH CARE EDUCATION/TRAINING PROGRAM

## 2023-04-19 PROCEDURE — 84165 PROTEIN E-PHORESIS SERUM: CPT | Performed by: STUDENT IN AN ORGANIZED HEALTH CARE EDUCATION/TRAINING PROGRAM

## 2023-04-19 PROCEDURE — 82784 ASSAY IGA/IGD/IGG/IGM EACH: CPT | Performed by: STUDENT IN AN ORGANIZED HEALTH CARE EDUCATION/TRAINING PROGRAM

## 2023-04-19 PROCEDURE — 83521 IG LIGHT CHAINS FREE EACH: CPT | Mod: 90 | Performed by: STUDENT IN AN ORGANIZED HEALTH CARE EDUCATION/TRAINING PROGRAM

## 2023-04-19 PROCEDURE — 94761 N-INVAS EAR/PLS OXIMETRY MLT: CPT

## 2023-04-19 PROCEDURE — 84133 ASSAY OF URINE POTASSIUM: CPT | Performed by: STUDENT IN AN ORGANIZED HEALTH CARE EDUCATION/TRAINING PROGRAM

## 2023-04-19 PROCEDURE — 25000003 PHARM REV CODE 250: Performed by: INTERNAL MEDICINE

## 2023-04-19 PROCEDURE — 85025 COMPLETE CBC W/AUTO DIFF WBC: CPT | Performed by: STUDENT IN AN ORGANIZED HEALTH CARE EDUCATION/TRAINING PROGRAM

## 2023-04-19 PROCEDURE — 84100 ASSAY OF PHOSPHORUS: CPT | Performed by: STUDENT IN AN ORGANIZED HEALTH CARE EDUCATION/TRAINING PROGRAM

## 2023-04-19 PROCEDURE — 82436 ASSAY OF URINE CHLORIDE: CPT | Performed by: STUDENT IN AN ORGANIZED HEALTH CARE EDUCATION/TRAINING PROGRAM

## 2023-04-19 PROCEDURE — 82570 ASSAY OF URINE CREATININE: CPT | Performed by: STUDENT IN AN ORGANIZED HEALTH CARE EDUCATION/TRAINING PROGRAM

## 2023-04-19 PROCEDURE — 82010 KETONE BODYS QUAN: CPT

## 2023-04-19 PROCEDURE — 80053 COMPREHEN METABOLIC PANEL: CPT | Performed by: STUDENT IN AN ORGANIZED HEALTH CARE EDUCATION/TRAINING PROGRAM

## 2023-04-19 PROCEDURE — 86039 ANTINUCLEAR ANTIBODIES (ANA): CPT | Mod: 90 | Performed by: STUDENT IN AN ORGANIZED HEALTH CARE EDUCATION/TRAINING PROGRAM

## 2023-04-19 PROCEDURE — 25000003 PHARM REV CODE 250: Performed by: STUDENT IN AN ORGANIZED HEALTH CARE EDUCATION/TRAINING PROGRAM

## 2023-04-19 PROCEDURE — 84300 ASSAY OF URINE SODIUM: CPT | Performed by: STUDENT IN AN ORGANIZED HEALTH CARE EDUCATION/TRAINING PROGRAM

## 2023-04-19 PROCEDURE — S4991 NICOTINE PATCH NONLEGEND: HCPCS | Performed by: STUDENT IN AN ORGANIZED HEALTH CARE EDUCATION/TRAINING PROGRAM

## 2023-04-19 PROCEDURE — 83735 ASSAY OF MAGNESIUM: CPT | Performed by: STUDENT IN AN ORGANIZED HEALTH CARE EDUCATION/TRAINING PROGRAM

## 2023-04-19 PROCEDURE — 63600175 PHARM REV CODE 636 W HCPCS

## 2023-04-19 PROCEDURE — 25000003 PHARM REV CODE 250

## 2023-04-19 PROCEDURE — 86334 IMMUNOFIX E-PHORESIS SERUM: CPT | Performed by: STUDENT IN AN ORGANIZED HEALTH CARE EDUCATION/TRAINING PROGRAM

## 2023-04-19 RX ORDER — TAMSULOSIN HYDROCHLORIDE 0.4 MG/1
0.4 CAPSULE ORAL DAILY
Status: DISCONTINUED | OUTPATIENT
Start: 2023-04-19 | End: 2023-04-20 | Stop reason: HOSPADM

## 2023-04-19 RX ORDER — FUROSEMIDE 10 MG/ML
80 INJECTION INTRAMUSCULAR; INTRAVENOUS ONCE
Status: COMPLETED | OUTPATIENT
Start: 2023-04-19 | End: 2023-04-19

## 2023-04-19 RX ADMIN — ASPIRIN 81 MG: 81 TABLET, COATED ORAL at 08:04

## 2023-04-19 RX ADMIN — SACUBITRIL AND VALSARTAN 1 TABLET: 24; 26 TABLET, FILM COATED ORAL at 08:04

## 2023-04-19 RX ADMIN — FUROSEMIDE 80 MG: 10 INJECTION, SOLUTION INTRAMUSCULAR; INTRAVENOUS at 05:04

## 2023-04-19 RX ADMIN — APIXABAN 5 MG: 2.5 TABLET, FILM COATED ORAL at 08:04

## 2023-04-19 RX ADMIN — BICTEGRAVIR SODIUM, EMTRICITABINE, AND TENOFOVIR ALAFENAMIDE FUMARATE 1 TABLET: 50; 200; 25 TABLET ORAL at 08:04

## 2023-04-19 RX ADMIN — MUPIROCIN: 20 OINTMENT TOPICAL at 08:04

## 2023-04-19 RX ADMIN — NICOTINE 1 PATCH: 14 PATCH, EXTENDED RELEASE TRANSDERMAL at 08:04

## 2023-04-19 RX ADMIN — ATORVASTATIN CALCIUM 40 MG: 40 TABLET, FILM COATED ORAL at 08:04

## 2023-04-19 RX ADMIN — TAMSULOSIN HYDROCHLORIDE 0.4 MG: 0.4 CAPSULE ORAL at 12:04

## 2023-04-19 RX ADMIN — ATOVAQUONE 1500 MG: 750 SUSPENSION ORAL at 08:04

## 2023-04-19 NOTE — PROGRESS NOTES
Progress Note    Patient Name: Jason Perdue  MRN: [id number]  Admission Date: 4/16/2023  Hospital Length of Stay: 2 days  Code Status: Full Code  Attending Provider: Keely Harris MD  Primary Care Provider: SAVAGE Reddy     Subjective:      Brief HPI:  Jason Perdue is a 63 y.o. male with a history of HIV, HFrEF (EF 10%), substance abuse, severe aortic stenosis, afib on eliquis, HTN, HLD, B cell lymphoma,  who presented to ED for recurrence of SOB, now assoc with new onset cough with severe coughing spells causing vomiting (deneis hematemesis, mostly fluid in emesis) and side pain since yesterday. Pt recently admitted for CHF exacerbation here with dc 4/11/2023. At that time, pt states BLE edema never resolved, but breathing was back to baseline with net 6.5L diuresis; weight was 223 at discharge. Returned to [facility] for similar sxs and dc'd 4/14/23. Was found to have +cocaine at both admissions.      At baseline, pt has +orthopnea and states he has been taking his medications as prescribed, including recent changes at last discharge from [facility] on 4/11/2023. Denies any recent sick contacts, fevers, abd pain, chest pain. States he has quit using cocaine for past wk to take care of his health now.     In ED, pt AF; BP running low at 90s/70s, which may be pt's baseline based on prior hospitalization. Pt initally with elevated BP in the field and received nitroglycerin sprays. Cr mildly elevated to 1.6; trop elevated to 0.055, BNP >13,000, however pt on entresto therapy. UDS pending, CXR with increase in R sided pleural effusion size - similar in appearance to 4/6/2023      Interval History: Patient continues stating is he feeling slightly better today, both SOB and leg swelling have improved since admission. Nephrology consulted due to continued oliguria with setting of improved Cr on Am labs with one day of rest from diuresis. At this point, patient may be intra-vascular depleted with symptoms more  of his low EF versus CHF exacerbation.       Review of Systems:  The remainder of the 14 system ROS is noncontributory or negative unless mentioned/reviewed above.     Objective:     Vital Signs:  Vital Signs (Most Recent):  Temp: 97.7 °F (36.5 °C) (04/19/23 0700)  Pulse: 87 (04/19/23 0700)  Resp: 16 (04/19/23 0700)  BP: 105/81 (04/19/23 0700)  SpO2: (!) 94 % (04/19/23 0700)  Body mass index is 30.56 kg/m².  Weight: 99.4 kg (219 lb 2.2 oz) Vital Signs (24h Range):  Temp:  [97 °F (36.1 °C)-97.7 °F (36.5 °C)] 97.7 °F (36.5 °C)  Pulse:  [87-96] 87  Resp:  [16-20] 16  SpO2:  [94 %-100 %] 94 %  BP: (100-112)/(76-90) 105/81       Input/output:     Intake/Output Summary (Last 24 hours) at 4/19/2023 1117  Last data filed at 4/19/2023 0503  Gross per 24 hour   Intake --   Output 350 ml   Net -350 ml         Physical Examination:  Physical Exam  Vitals and nursing note reviewed.   Constitutional:       Appearance: Normal appearance.   HENT:      Head: Normocephalic.   Eyes:      General: No scleral icterus.     Extraocular Movements: Extraocular movements intact.      Pupils: Pupils are equal, round, and reactive to light.   Cardiovascular:      Rate and Rhythm: Normal rate and regular rhythm.      Pulses: Normal pulses.      Heart sounds: Normal heart sounds. No murmur heard.  Pulmonary:      Effort: Pulmonary effort is normal. No respiratory distress.      Breath sounds: No stridor. No wheezing, rhonchi or rales.      Comments: Decreased breath sounds over RT lower lung field.  Abdominal:      General: Abdomen is flat.      Palpations: Abdomen is soft.   Musculoskeletal:         General: Swelling present. No tenderness. Normal range of motion.      Cervical back: Normal range of motion.      Right lower leg: Edema (Pitting edema up to the knees on RT, pitting up to proximal 1/3 of LLE (improvement from yesterday).) present.      Left lower leg: Edema present.   Skin:     General: Skin is warm.      Capillary Refill:  Capillary refill takes less than 2 seconds.      Coloration: Skin is not jaundiced or pale.      Findings: No lesion or rash.   Neurological:      General: No focal deficit present.      Mental Status: He is alert and oriented to person, place, and time. Mental status is at baseline.          Lines/Drains/Airways       Drain  Duration                  Urethral Catheter 04/17/23 1249 Straight-tip;Silicone 16 Fr. <1 day                     Laboratory:    Recent Labs   Lab 04/17/23 0903 04/18/23 0222 04/19/23  0421   WBC 5.6 7.3 6.3   RBC 4.52* 4.08* 4.28*   HGB 12.0* 11.0* 11.4*   HCT 37.7* 33.7* 36.1*   MCV 83.4 82.6 84.3   MCH 26.5* 27.0 26.6*   MCHC 31.8* 32.6* 31.6*   RDW 18.0* 18.2* 18.4*    150 140   MPV 12.5* 12.9* 12.7*        Recent Labs   Lab 04/17/23 0903 04/18/23 0222 04/19/23  0421   * 134* 134*   K 4.3 4.2 4.1   CHLORIDE 104 104 104   CO2 19* 21* 19*   BUN 31.1* 32.5* 35.7*   CREATININE 1.62* 1.83* 1.57*   CALCIUM 9.9 9.0 9.2   MG 2.30 2.40 2.60   ALBUMIN 3.3* 3.1* 3.0*   ALKPHOS 136 133 136   ALT 17 16 19   AST 41* 41* 43*   BILITOT 2.5* 2.3* 2.4*          Other Results:    Current Medications:     Infusions:        Scheduled:   apixaban  5 mg Oral BID    aspirin  81 mg Oral Daily    atorvastatin  40 mg Oral QHS    atovaquone  1,500 mg Oral Daily    mvptrxqbd-lphdtbwc-xqyywqr ala  1 tablet Oral Daily    mupirocin   Nasal BID    nicotine  1 patch Transdermal Daily    sacubitriL-valsartan  1 tablet Oral BID         PRN:   acetaminophen    dextrose 10%    dextrose 10%    dextrose    dextrose    glucagon (human recombinant)    melatonin    naloxone    sodium chloride 0.9%        Microbiology Data:  Microbiology Results (last 7 days)       Procedure Component Value Units Date/Time    Chlamydia/GC, PCR [[id number]]  (Normal) Collected: 04/18/23 0011    Order Status: Completed Specimen: Urine Updated: 04/18/23 0158     Chlamydia trachomatis PCR Not Detected     N. gonorrhea PCR Not Detected     Narrative:      The Xpert CT/NG test, performed on the [employer] system is a qualitative in vitro real-time polymerase chain reaction (PCR) test for the automated detected and differentiation for genomic DNA from Chlamydia trachomatis (CT) and/or Neisseria gonorrhoeae (NG).             Antibiotics and Day Number of Therapy:  Antibiotics (From admission, onward)      Start     Stop Route Frequency Ordered    04/17/23 2100  mupirocin 2 % ointment         04/22 2059 Nasl 2 times daily 04/17/23 1754             Imaging:  CT Abdomen Pelvis  Without Contrast  Narrative: EXAMINATION:  CT ABDOMEN PELVIS WITHOUT CONTRAST    CLINICAL HISTORY:  diffuse nonspecific abdominal pain, poor urine output, dysuria;    TECHNIQUE:  Low dose axial images, sagittal and coronal reformations were obtained from the lung bases to the pubic symphysis.  No contrast was administered.  Automatic exposure control is utilized to reduce patient radiation exposure.    COMPARISON:  09/11/2022 and 03/14/2023    FINDINGS:  There is a large right-sided pleural effusion.  There is right lower lobe atelectasis..  The findings are worse when compared to 03/14/2023    There is some perihepatic fluid seen and fluid seen along the right pericolic gutter.  There is fluid seen in the pelvis as well.    The liver appears normal.  No obvious liver mass or lesion is seen.    The gallbladder is contracted.  Gallbladder wall appears to be thickened but this may be due to the ascites..    The pancreas appears normal.  No inflammatory changes are seen in the pancreatic region.    The spleen appears normal and adrenal glands appear normal.  No adrenal nodule is seen.    No nephrolithiasis is seen.  No hydronephrosis is seen.  No hydroureter is seen.  No ureteral stone is seen.  There is a hypoattenuated area seen within the right kidney in the midpole possibly representing a cyst.    No colitis is seen.  No diverticulitis is seen.  No appendicitis is seen.    The  urinary bladder is decompressed..    Bones show no acute abnormality.  Impression: Worsening right-sided pleural effusion and right lower lobe atelectasis    Ascites    Gallbladder is decompressed and the wall appears to be thickened possibly due to decompression and the ascites but if gallbladder pathology is suspected ultrasound correlation is recommended    No obvious renal abnormality seen but there is a cyst in the right kidney in the midpole    Electronically signed by: Sanjana Otero  Date:    04/17/2023  Time:    15:38  X-Ray Chest 1 View  Narrative: EXAMINATION:  XR CHEST 1 VIEW    CPT 85925    CLINICAL HISTORY:  Dyspnea;    COMPARISON:  April 13, 2023    FINDINGS:  Examination reveals mediastinal silhouette to be within normal limits cardiac silhouette is enlarged there is blunting of the right costophrenic angle indicating the presence of a right-sided pleural effusion some linear densities are identified at the right base might represent compressive atelectatic changes.    There is some increase interstitial markings with no focal consolidative changes, however, a mild degree of congestion cannot be completely excluded.    No other interval change  Impression: Increase interstitial markings throughout mild degree of congestion cannot be completely excluded.    Cardiomegaly.    Right-sided pleural effusion with compression of atelectatic changes at the right base    Electronically signed by: Jordan Merlos  Date:    04/17/2023  Time:    08:40        Assessment & Plan:     Acute on Chronic CHF Exacerbation  HFrEF (EF 15%)  Valvular Heart Disease  - Roughly 200 cc overnight, no lasix given yesterday  - Cardiology consulted yesterday diuretic regime recommendations, appreciating recs  - Nephrology consulted today for improvement of Cr with day of rest from diuresis; Patient appearing to be intra-vascularly depleted given serum Osm 301, Urine Na <20  - Holding home home beta blocker for now   - [place]ict  I/Os, daily weights, fluid and sodium restriction, elevate HOB  - TTE (4/13/23) revealed EF 15%, mild eccentric hypertrophy and severely decreased systolic function, Grade III LVDD, mod-severe aortic stenosis, mod MR, mod TR, estimated PA systolic pressure 39 mmHg  - UDS positive for Cocaine; consistent to last 6 UDS which go back to 1 year ago  - Patient working on swapping Medicaid plans to receive coverage for Life[employer]est     Coughing Spells w/ Emesis  RT Sided Pleural Effusion  - RT Sided pleural effusion on CXR 4/17 consistent with CXR 4/13, but not present on CXR from 3/24/23  - Likely cardiogenic in nature, but will continue to consider abx if effusion does not improve with diuresis  - Currently denying fever, cough with sputum production, night sweats or recent URI     Type II NSTEMI  - Suspect Type II NSTEMI in setting of current CHF exacerbation, will hold off on initiating acs protocol at this time  - no cardiac sxs     HOLLIS-prerenal (distributive)  - BUN/creatinine improvement from yesterday 32.5/1.83 ---> 35.7/1.57 with rest from diuresis yesterday (baseline Cr roughly 1.2)  - Consulted Nephrology  - cont to monitor with serial CMPs; continue to monitor for cardiorenal syndrome      Atrial fibrillation  - Currently stable  - Continue home Eliquis 5 mg BID    Hx of Hypertension  Hypotension 2/2 HFrEF (EF 10%)  - hypotensive 2/2 HFrEF w/ 10% EF; currently holding Toprol 12.5 mg daily      Hyperlipidemia  - Continue home atorvastatin 40 mg daily     Cocaine use  Tobacco use  - Endorses last cocaine use roughly 1 week ago  - UDS positive for Cocaine; consistent to last 6 UDS which go back to 1 year ago  - provided with nicotine patch upon request   - recommend tobacco cessation program/possible drug detox or rehab     HIV  Chronic Hep B Infection  - continue home Biktarvy -25 mg daily and atovaquone 1500 mg daily (10 mL)  - CD4 count 59 (3/13/23)  - Follows with ID at [facility]      CODE STATUS: Full Code    Access: PIV  Antibiotics: Atovaquone (ppx for CD4<200)  Diet: Diet heart healthy  DVT Prophylaxis: Eliquis 5 mg BID  GI Prophylaxis: None  Fluids: None    Disposition: Patient admitted on 4/17/23 for CHF exacerbation.Strict I/Os. Cardiology recommendation for further diuresis. Nephrology consulted for improvement of Cr with diuresis rest. Further dispo planning pending.      Marco A Salgado MD  [facility] Internal Medicine, -1

## 2023-04-19 NOTE — MEDICAL/APP STUDENT
[facility] Medicine Wards   Progress Note     Resident Team: [facility] Medicine List 2  Attending Physician: Keely Harris MD  Hospital Length of Stay: 2 days    Subjective:      Brief HPI:  Jason Perdue is a 63 y.o. male with a history of HIV, HFrEF (EF 10%), substance abuse, severe aortic stenosis, afib on eliquis, HTN, HLD, B cell lymphoma,  who presented to ED for recurrence of SOB, now assoc with new onset cough with severe coughing spells causing vomiting (deneis hematemesis, mostly fluid in emesis) and side pain since yesterday. Pt recently admitted for CHF exacerbation here with dc 4/11/2023. At that time, pt states BLE edema never resolved, but breathing was back to baseline with net 6.5L diuresis; weight was 223 at discharge. Returned to [facility] for similar sxs and dc'd 4/14/23. Was found to have +cocaine at both admissions.      At baseline, pt has +orthopnea and states he has been taking his medications as prescribed, including recent changes at last discharge from [facility] on 4/11/2023. Denies any recent sick contacts, fevers, abd pain, chest pain. States he has quit using cocaine for past wk to take care of his health now.     In ED, pt AF; BP running low at 90s/70s, which may be pt's baseline based on prior hospitalization. Pt initally with elevated BP in the field and received nitroglycerin sprays. Cr mildly elevated to 1.6; trop elevated to 0.055, BNP >13,000, however pt on entresto therapy. UDS pending, CXR with increase in R sided pleural effusion size - similar in appearance to 4/6/2023    Interval History: NAEON. Pt had 300 cc of output yesterday. Still producing dark concentrated urine. Pt continues to complaint of dysuria but denies burning, discharge, or hematuria. Cardiology was consulted, will appreciate recommendations of starting metolazone 2.5 mg qd. He does endorse shortness of breath overnight with the need of O2 nasal canula 2 liters. Pt has no other complaints or concerns at this time.    Review of  Systems:  Review of Systems   Constitutional:  Negative for chills and fever.   Respiratory:  Positive for shortness of breath. Negative for cough, hemoptysis and sputum production.    Cardiovascular:  Positive for leg swelling. Negative for chest pain, palpitations and orthopnea.   Gastrointestinal:  Negative for abdominal pain, nausea and vomiting.   Genitourinary:  Positive for dysuria and frequency. Negative for hematuria.   Neurological:  Negative for dizziness and headaches.        Objective:     Vital Signs (Most Recent):  Temp: 97.7 °F (36.5 °C) (04/19/23 0700)  Pulse: 87 (04/19/23 0700)  Resp: 16 (04/19/23 0700)  BP: 105/81 (04/19/23 0700)  SpO2: (!) 94 % (04/19/23 0700)   Vital Signs (24h Range):  Temp:  [97 °F (36.1 °C)-98.6 °F (37 °C)] 97.7 °F (36.5 °C)  Pulse:  [87-96] 87  Resp:  [16-20] 16  SpO2:  [94 %-100 %] 94 %  BP: (100-112)/(76-90) 105/81       Physical Examination:  Physical Exam  Constitutional:       General: He is not in acute distress.     Appearance: Normal appearance. He is obese. He is not ill-appearing.   HENT:      Head: Normocephalic and atraumatic.   Eyes:      Extraocular Movements: Extraocular movements intact.      Pupils: Pupils are equal, round, and reactive to light.   Cardiovascular:      Rate and Rhythm: Normal rate and regular rhythm.      Pulses: Normal pulses.      Heart sounds: Normal heart sounds.   Pulmonary:      Effort: Pulmonary effort is normal. No respiratory distress.      Breath sounds: Normal breath sounds. No wheezing, rhonchi or rales.   Abdominal:      General: Bowel sounds are normal. There is no distension.      Palpations: Abdomen is soft.      Tenderness: There is no abdominal tenderness. There is no guarding.   Musculoskeletal:      Right lower leg: Edema present.      Left lower leg: Edema present.      Comments: Bilateral pitting edema to mid shins    Skin:     General: Skin is warm and dry.      Capillary Refill: Capillary refill takes 2 to 3 seconds.    Neurological:      Mental Status: He is alert.   Psychiatric:         Mood and Affect: Mood normal.         Behavior: Behavior normal.         Thought Content: Thought content normal.         Judgment: Judgment normal.       Laboratory:  Most Recent Data:  CBC:   Recent Labs   Lab 04/17/23  0903 04/18/23  0222 04/19/23  0421   WBC 5.6 7.3 6.3   HGB 12.0* 11.0* 11.4*   HCT 37.7* 33.7* 36.1*    150 140     CMP:   Recent Labs   Lab 04/19/23  0421   CALCIUM 9.2   ALBUMIN 3.0*   *   K 4.1   CO2 19*   BUN 35.7*   CREATININE 1.57*   ALKPHOS 136   ALT 19   AST 43*   BILITOT 2.4*         Other Results:  EKG (my interpretation): unchanged from previous tracings, normal sinus rhythm, occasional PVC noted, unifocal, left axis deviation.    Radiology:    CT Abdomen Pelvis Without Contrast 4/17/23:  Impression:  -Worsening right-sided pleural effusion and right lower lobe atelectasis   -Ascites   -Gallbladder is decompressed and the wall appears to be thickened possibly due to decompression and the ascites but if gallbladder pathology is suspected ultrasound correlation is recommended   -No obvious renal abnormality seen but there is a cyst in the right kidney in the midpole     Imaging Results              X-Ray Chest 1 View (Final result)  Result time 04/17/23 08:40:03      Final result by Jordan Merlos MD (04/17/23 08:40:03)                   Impression:      Increase interstitial markings throughout mild degree of congestion cannot be completely excluded.    Cardiomegaly.    Right-sided pleural effusion with compression of atelectatic changes at the right base      Electronically signed by: Jordan Merlos  Date:    04/17/2023  Time:    08:40               Narrative:    EXAMINATION:  XR CHEST 1 VIEW    CPT 70187    CLINICAL HISTORY:  Dyspnea;    COMPARISON:  April 13, 2023    FINDINGS:  Examination reveals mediastinal silhouette to be within normal limits cardiac silhouette is enlarged there is blunting  of the right costophrenic angle indicating the presence of a right-sided pleural effusion some linear densities are identified at the right base might represent compressive atelectatic changes.    There is some increase interstitial markings with no focal consolidative changes, however, a mild degree of congestion cannot be completely excluded.    No other interval change                        Wet Read by Tej Osborne MD (04/17/23 01:18:26, [facility], Emergency Medicine)    Increased pulmonary vascular congestion with right sided pleural effusion                                    Current Medications:     Infusions:       Scheduled:   apixaban  5 mg Oral BID    aspirin  81 mg Oral Daily    atorvastatin  40 mg Oral QHS    atovaquone  1,500 mg Oral Daily    iddszlypn-qhmousad-kymsetx ala  1 tablet Oral Daily    mupirocin   Nasal BID    nicotine  1 patch Transdermal Daily    sacubitriL-valsartan  1 tablet Oral BID        PRN:  acetaminophen, dextrose 10%, dextrose 10%, dextrose, dextrose, glucagon (human recombinant), melatonin, naloxone, sodium chloride 0.9%      Intake/Output Summary (Last 24 hours) at 4/19/2023 0837  Last data filed at 4/19/2023 0503  Gross per 24 hour   Intake --   Output 350 ml   Net -350 ml       Lines/Drains/Airways       Peripheral Intravenous Line  Duration                  Peripheral IV - Single Lumen 04/17/23 0427 20 G Anterior;Left Forearm 2 days                      Assessment & Plan:     Acute on Chronic CHF Exacerbation  JFrEF (EF 15%)  Valvular Heart disease  - strict I/Os, daiy weights, and fluid/Na retention.  - poor urine output of 350 ccs on 4/18/23 and prerental azotemia concerning for cardiorenal syndrome.  - TTE 4/13/23 showed EF of 15%, mild eccentric hypertrophy and severely decreased systolic function, Grade III LVDD, mod-severe aortic stenosis, mod MR, mod TR, estimated PA systolic pressure 39 mmHg.  - consider LifeVest management. Pt  insurance does not cover ([employer] - Medicaid).  with pt concerning possible insurance change.   - Urine Cr, Na and osmolality ordered with readings of 145.5, <20.0, and 508 respectively.   - cardiology consulted, will appreciate recommendations.  - consider nephrology consult.      Dysuria  - pt complaining of 10/10 pain with urination. Denies discharge or burning sensation.   - ballesteros catheter was removed yesterday, with continued complaints of dysuria.  - consider overflow vs functional incontinence etiology.       Right sided pleural effusion and suspected cholecystitis  - CT Abdomen Pelvis 4/17/23 showed  -Worsening right-sided pleural effusion and right lower lobe atelectasis   -Ascites   -Gallbladder is decompressed and the wall appears to be thickened possibly due to decompression and the ascites but if gallbladder pathology is suspected ultrasound correlation is recommended   -No obvious renal abnormality seen but there is a cyst in the right kidney in the midpole  - pt denies RUQ pain with meals, negative Jain's sign during PE. No acute signs of cholecystitis. Will consider RUQ US if symptoms present.   -pleural effusion is likely cardiogenic in nature and associated with CHF exacerbation. Pt endorses shortness of breath overnight on interview today, will consider thoracentesis.     Type II NSTEMI  - elevated troponins on 4/17/23 showing 0.055. This is likely secondary to demand ischemia via CHF exacerbation.  - continue to monitor with EKG q6hrs     HOLLIS   - BUN/Cr changed from 32.5/1.83 to 35.7/1.57. Pt baseline is 37.2/1.36.  - per cardiology, it is okay for Cr to increase to a max of 30%.   - will serially monitor with daily CMPs. Monitor for cardiorenal syndrome.     Atrial fibrillation  - pt currently stable.  - continue Eliquis 5 mg po BID.  - monitor with KEG q6hrs.     Hx of HTN  - pt hypotensive in ED. Current BP is well controlled at 105/81.   - hold home metoprolol succinate.     Hx  of HLD  - lipid panel 1/24/23 showed cholesterol 168, HDL 28, triglyceride 101, VLDL 20, and .00. Pt is well managed on current medications.  - continue atorvastatin 40 mg po qhs.     Cocaine use disorder, severe  - endorses hx of crack cocaine use  - UDS showed positive markings for cocaine, pt denies recent history stating last time using was a week ago.   - would recommend possible drug detox/rehab program upon discharge.     Tobacco use disorder, severe  - smokes 1/2 PPD  - continue nicotine patch     Hx of HIV  Chronic Hep B Infection  - - continue home Biktarvy -25 mg daily and atovaquone 1500 mg daily (10 mL)  - CD4 count 59 (3/13/23).  - Follows [facility] ID [facility].     Disposition: Day 3 of current admission. Strict I/Os. DVT prophylaxis with lovenox. GI prophylaxis with famotidine.      Ez Sandoval, OMS III

## 2023-04-19 NOTE — CONSULTS
"[facility]   Nephrology Consult Note    Patient Name: Jason Perdue  MRN: [id number]  Admission Date: 4/16/2023  Hospital Length of Stay: 2 days  Code Status: Full Code  Attending Provider: Keely Harris MD  Primary Care Provider: SAVAGE Rdedy     Subjective:     HPI:   Patient is a 63 y.o. male with a history of HIV, HFrEF (EF 10%), substance abuse, severe aortic stenosis, afib on eliquis, HTN, HLD, B cell lymphoma who presented to the ED on 4/16/23 with complaints of SOB. Of note, patient recently discharged on 4/11 and 4/14 for CHF exacerbation. He take 60mg lasix daily at home. On arrival to ED, patient was found to have b/l LE edema. In the ED, BP was 90s/70s. BUN/Cr was 31.1/1.62 (baseline Cr of 1.30). He was given about 100mg of lasix in the ED for diuresis.  Patient admitted to medicine for CHF exacerbation. He initially had good urine output but creatinine did worsen. Diuretics were stopped yesterday and renal function improved to baseline. However, patient's urine output did start to decline. Nephrology consulted for decreased urine output and assistance with diuretics.     Hospital Course/Significant events:      24 Hour Interval History:      Past Medical History:   Diagnosis Date    Cancer     CHF (congestive heart failure)     Human immunodeficiency virus (HIV) disease     Hypertension        History reviewed. No pertinent surgical history.    Social History     Socioeconomic History    Marital status: Single   Tobacco Use    Smoking status: Every Day     Packs/day: 0.50     Types: Cigarettes    Smokeless tobacco: Never   Substance and Sexual Activity    Alcohol use: Not Currently    Drug use: Yes     Types: "Crack" cocaine     Comment: last taken 1 month and a half    Sexual activity: Not Currently     Partners: Male     Social Determinants of Health     Financial Resource Strain: Low Risk     Difficulty of Paying Living Expenses: Not hard at all   Food Insecurity: No Food " Insecurity    Worried About Running Out of Food in the Last Year: Never true    Ran Out of Food in the Last Year: Never true   Transportation Needs: No Transportation Needs    Lack of Transportation (Medical): No    Lack of Transportation (Non-Medical): No   Physical Activity: Inactive    Days of Exercise per Week: 0 days    Minutes of Exercise per Session: 0 min   Stress: No Stress Concern Present    Feeling of Stress : Not at all   Social Connections: Socially Isolated    Frequency of Communication with Friends and Family: More than three times a week    Frequency of Social Gatherings with Friends and Family: More than three times a week    Attends [employer] Services: Never    Active Member of Clubs or Organizations: No    Attends Club or Organization Meetings: Never    Marital Status: Never    Housing Stability: Low Risk     Unable to Pay for Housing in the Last Year: No    Number of Places Lived in the Last Year: 1    Unstable Housing in the Last Year: No           Current Outpatient Medications   Medication Instructions    apixaban (ELIQUIS) 5 mg, Oral, 2 times daily    aspirin (ECOTRIN) 81 mg, Oral, Daily    atorvastatin (LIPITOR) 40 mg, Oral, Nightly    atovaquone (MEPRON) 1,500 mg, Oral, Daily    ejdtjrskm-jqdpakcl-fqfyijy ala (BIKTARVY) -25 mg (25 kg or greater) 1 tablet, Oral, Daily    empagliflozin (JARDIANCE) 10 mg, Oral, Daily    food supplemt, lactose-reduced Liqd 1 Bottle, Oral    furosemide (LASIX) 60 mg, Oral, Daily    megestroL (MEGACE) 200 mg, Oral    metoprolol succinate (TOPROL-XL) 12.5 mg, Oral, Daily    nicotine (NICODERM CQ) 14 mg/24 hr 1 patch, Transdermal, Daily    polyethylene glycol 3350,bulk, Powd Other    sacubitriL-valsartan (ENTRESTO) 24-26 mg per tablet 1 tablet, Oral, 2 times daily    VITAMIN D2 50,000 Units, Oral, Every 7 days       Current Inpatient Medications   apixaban  5 mg Oral BID    aspirin  81 mg Oral Daily    atorvastatin  40 mg Oral QHS    atovaquone  1,500  mg Oral Daily    frxpfhfam-cvylnpxj-gfuostz ala  1 tablet Oral Daily    mupirocin   Nasal BID    nicotine  1 patch Transdermal Daily    sacubitriL-valsartan  1 tablet Oral BID    tamsulosin  0.4 mg Oral Daily       Current Intravenous Infusions        ROS   Negative except that mentioned above.     Objective:       Intake/Output Summary (Last 24 hours) at 4/19/2023 1434  Last data filed at 4/19/2023 0503  Gross per 24 hour   Intake --   Output 350 ml   Net -350 ml         Vital Signs (Most Recent):  Temp: 97.7 °F (36.5 °C) (04/19/23 1233)  Pulse: 89 (04/19/23 1233)  Resp: 16 (04/19/23 1233)  BP: 101/81 (04/19/23 1233)  SpO2: 99 % (04/19/23 1233)  Body mass index is 30.56 kg/m².  Weight: 99.4 kg (219 lb 2.2 oz) Vital Signs (24h Range):  Temp:  [97 °F (36.1 °C)-97.7 °F (36.5 °C)] 97.7 °F (36.5 °C)  Pulse:  [87-96] 89  Resp:  [16-20] 16  SpO2:  [94 %-100 %] 99 %  BP: (101-112)/(76-90) 101/81     Physical Exam  General: Awake, alert, & oriented to person, place & time. No acute distress  Psychiatric: Mood and affect normal  HEENT: Normocephalic, atraumatic. Face symmetric.  Cardiovascular: Regular rate & rhythm, Normal S1 & S2 w/out murmurs, rubs or gallops  Pulmonary: Bilateral symmetric chest rise, Non-labored  Abdominal:  Soft nondistended  Extremities: Trace edema b/l. No clubbing or cyanosis.   Skin:  Exposed skin is warm & dry.  Neuro:   Patient moves all extremities equally. Sensation intact bilateraly.      Lines/Drains/Airways       Peripheral Intravenous Line  Duration                  Peripheral IV - Single Lumen 04/17/23 0427 20 G Anterior;Left Forearm 2 days                    Significant Labs:    Lab Results   Component Value Date    WBC 6.3 04/19/2023    HGB 11.4 (L) 04/19/2023    HCT 36.1 (L) 04/19/2023    MCV 84.3 04/19/2023     04/19/2023         BMP  Lab Results   Component Value Date     (L) 04/19/2023    K 4.1 04/19/2023    CHLORIDE 104 04/19/2023    CO2 19 (L) 04/19/2023    BUN 35.7  (H) 04/19/2023    CREATININE 1.57 (H) 04/19/2023    CALCIUM 9.2 04/19/2023    AGAP 9.0 04/10/2023    EGFRNONAA 80 03/15/2022       ABG  No results for input(s): PH, PO2, PCO2, HCO3, BE in the last 168 hours.    Mechanical Ventilation Support:       Significant Imaging:  I have reviewed the pertinent imaging within the past 24 hours.        Assessment/Plan:     Assessment/Plan:  Decreased urinary output - likely due to stopping lasix. CT scan was also negative. Can restart lasix and monitor UOP. Would recommend getting bladder scan if still no improvement with lasix.   Advanced CKD Stage III with HOLLIS - patient likely intravascularly depleted. However urine Na is < 20 which can be seen in patient with CHF. Will order urine lytes to calculate urine gap. Also ordering JUAN, ANCA, and SPEP. Ordered urine protein/creatinine ratio.   Underlying chronic cardiorenal syndrome - patient may have some form of cardiorenal syndrome. He seems to be tolerating Entresto. Would continue to monitor BP as this can lead to hypoperfusion and further insult to renal function.   HFrEF with EF of 15%  Advanced HIV - patient could have FSGS in the setting of HIV. Will need follow up in Nephrology clinic with possible biopsy in the future.   Polysubstance abuse - patient with h/o cocaine use which can also contribute to decline in renal function.   Non-anion gap acidosis- this can be seen in patients on antiretroviral therapy   Hyponatremia -  secondary to CHF    Appreciate the consult. Will continue to follow.     Tiffanie Crockett MD  PGY-3, Internal Medicine

## 2023-04-20 VITALS
BODY MASS INDEX: 32.15 KG/M2 | OXYGEN SATURATION: 97 % | DIASTOLIC BLOOD PRESSURE: 69 MMHG | WEIGHT: 229.63 LBS | HEART RATE: 90 BPM | RESPIRATION RATE: 18 BRPM | TEMPERATURE: 98 F | HEIGHT: 71 IN | SYSTOLIC BLOOD PRESSURE: 94 MMHG

## 2023-04-20 LAB
ALBUMIN SERPL-MCNC: 2.8 G/DL (ref 3.4–4.8)
ALBUMIN/GLOB SERPL: 0.6 RATIO (ref 1.1–2)
ALP SERPL-CCNC: 127 UNIT/L (ref 40–150)
ALT SERPL-CCNC: 19 UNIT/L (ref 0–55)
AST SERPL-CCNC: 40 UNIT/L (ref 5–34)
BASOPHILS # BLD AUTO: 0.01 X10(3)/MCL (ref 0–0.2)
BASOPHILS NFR BLD AUTO: 0.1 %
BILIRUBIN DIRECT+TOT PNL SERPL-MCNC: 2 MG/DL
BUN SERPL-MCNC: 33.1 MG/DL (ref 8.4–25.7)
CALCIUM SERPL-MCNC: 8.7 MG/DL (ref 8.8–10)
CHLORIDE SERPL-SCNC: 103 MMOL/L (ref 98–107)
CO2 SERPL-SCNC: 23 MMOL/L (ref 23–31)
CREAT SERPL-MCNC: 1.36 MG/DL (ref 0.73–1.18)
EOSINOPHIL # BLD AUTO: 0.1 X10(3)/MCL (ref 0–0.9)
EOSINOPHIL NFR BLD AUTO: 1.5 %
ERYTHROCYTE [DISTWIDTH] IN BLOOD BY AUTOMATED COUNT: 18.4 % (ref 11.5–17)
GFR SERPLBLD CREATININE-BSD FMLA CKD-EPI: 59 MLS/MIN/1.73/M2
GLOBULIN SER-MCNC: 4.6 GM/DL (ref 2.4–3.5)
GLUCOSE SERPL-MCNC: 106 MG/DL (ref 82–115)
HCT VFR BLD AUTO: 34.2 % (ref 42–52)
HGB BLD-MCNC: 10.8 G/DL (ref 14–18)
IMM GRANULOCYTES # BLD AUTO: 0.01 X10(3)/MCL (ref 0–0.04)
IMM GRANULOCYTES NFR BLD AUTO: 0.1 %
LYMPHOCYTES # BLD AUTO: 0.79 X10(3)/MCL (ref 0.6–4.6)
LYMPHOCYTES NFR BLD AUTO: 11.7 %
MAGNESIUM SERPL-MCNC: 2.3 MG/DL (ref 1.6–2.6)
MCH RBC QN AUTO: 26.7 PG (ref 27–31)
MCHC RBC AUTO-ENTMCNC: 31.6 G/DL (ref 33–36)
MCV RBC AUTO: 84.4 FL (ref 80–94)
MONOCYTES # BLD AUTO: 0.66 X10(3)/MCL (ref 0.1–1.3)
MONOCYTES NFR BLD AUTO: 9.7 %
NEUTROPHILS # BLD AUTO: 5.21 X10(3)/MCL (ref 2.1–9.2)
NEUTROPHILS NFR BLD AUTO: 76.9 %
NRBC BLD AUTO-RTO: 0 %
PHOSPHATE SERPL-MCNC: 3.2 MG/DL (ref 2.3–4.7)
PLATELET # BLD AUTO: 144 X10(3)/MCL (ref 130–400)
PMV BLD AUTO: 13 FL (ref 7.4–10.4)
POTASSIUM SERPL-SCNC: 3.8 MMOL/L (ref 3.5–5.1)
PROT SERPL-MCNC: 7.4 GM/DL (ref 5.8–7.6)
PSA SERPL-MCNC: 0.4 NG/ML
RBC # BLD AUTO: 4.05 X10(6)/MCL (ref 4.7–6.1)
SODIUM SERPL-SCNC: 133 MMOL/L (ref 136–145)
WBC # SPEC AUTO: 6.8 X10(3)/MCL (ref 4.5–11.5)

## 2023-04-20 PROCEDURE — 25000003 PHARM REV CODE 250

## 2023-04-20 PROCEDURE — 84153 ASSAY OF PSA TOTAL: CPT | Performed by: STUDENT IN AN ORGANIZED HEALTH CARE EDUCATION/TRAINING PROGRAM

## 2023-04-20 PROCEDURE — 25000003 PHARM REV CODE 250: Performed by: STUDENT IN AN ORGANIZED HEALTH CARE EDUCATION/TRAINING PROGRAM

## 2023-04-20 PROCEDURE — S4991 NICOTINE PATCH NONLEGEND: HCPCS | Performed by: STUDENT IN AN ORGANIZED HEALTH CARE EDUCATION/TRAINING PROGRAM

## 2023-04-20 PROCEDURE — 84100 ASSAY OF PHOSPHORUS: CPT | Performed by: STUDENT IN AN ORGANIZED HEALTH CARE EDUCATION/TRAINING PROGRAM

## 2023-04-20 PROCEDURE — 83735 ASSAY OF MAGNESIUM: CPT | Performed by: STUDENT IN AN ORGANIZED HEALTH CARE EDUCATION/TRAINING PROGRAM

## 2023-04-20 PROCEDURE — 80053 COMPREHEN METABOLIC PANEL: CPT | Performed by: STUDENT IN AN ORGANIZED HEALTH CARE EDUCATION/TRAINING PROGRAM

## 2023-04-20 PROCEDURE — 25000003 PHARM REV CODE 250: Performed by: INTERNAL MEDICINE

## 2023-04-20 PROCEDURE — 85025 COMPLETE CBC W/AUTO DIFF WBC: CPT | Performed by: STUDENT IN AN ORGANIZED HEALTH CARE EDUCATION/TRAINING PROGRAM

## 2023-04-20 PROCEDURE — 94761 N-INVAS EAR/PLS OXIMETRY MLT: CPT

## 2023-04-20 RX ORDER — POTASSIUM CHLORIDE 20 MEQ/1
20 TABLET, EXTENDED RELEASE ORAL ONCE
Status: COMPLETED | OUTPATIENT
Start: 2023-04-20 | End: 2023-04-20

## 2023-04-20 RX ADMIN — TAMSULOSIN HYDROCHLORIDE 0.4 MG: 0.4 CAPSULE ORAL at 09:04

## 2023-04-20 RX ADMIN — MUPIROCIN: 20 OINTMENT TOPICAL at 09:04

## 2023-04-20 RX ADMIN — BICTEGRAVIR SODIUM, EMTRICITABINE, AND TENOFOVIR ALAFENAMIDE FUMARATE 1 TABLET: 50; 200; 25 TABLET ORAL at 09:04

## 2023-04-20 RX ADMIN — SACUBITRIL AND VALSARTAN 1 TABLET: 24; 26 TABLET, FILM COATED ORAL at 09:04

## 2023-04-20 RX ADMIN — APIXABAN 5 MG: 2.5 TABLET, FILM COATED ORAL at 09:04

## 2023-04-20 RX ADMIN — NICOTINE 1 PATCH: 14 PATCH, EXTENDED RELEASE TRANSDERMAL at 09:04

## 2023-04-20 RX ADMIN — POTASSIUM CHLORIDE 20 MEQ: 1500 TABLET, EXTENDED RELEASE ORAL at 09:04

## 2023-04-20 RX ADMIN — ATOVAQUONE 1500 MG: 750 SUSPENSION ORAL at 09:04

## 2023-04-20 RX ADMIN — ASPIRIN 81 MG: 81 TABLET, COATED ORAL at 09:04

## 2023-04-20 NOTE — MEDICAL/APP STUDENT
[facility] Medicine Wards   Progress Note     Resident Team: [facility] Medicine List 2  Attending Physician: Keely Harris MD  Hospital Length of Stay: 3 days    Subjective:      Brief HPI:  Jason Perdue is a 63 y.o. male with a history of HIV, HFrEF (EF 10%), substance abuse, severe aortic stenosis, afib on eliquis, HTN, HLD, B cell lymphoma,  who presented to ED for recurrence of SOB, now assoc with new onset cough with severe coughing spells causing vomiting (deneis hematemesis, mostly fluid in emesis) and side pain since yesterday. Pt recently admitted for CHF exacerbation here with dc 4/11/2023. At that time, pt states BLE edema never resolved, but breathing was back to baseline with net 6.5L diuresis; weight was 223 at discharge. Returned to [facility] for similar sxs and dc'd 4/14/23. Was found to have +cocaine at both admissions.      At baseline, pt has +orthopnea and states he has been taking his medications as prescribed, including recent changes at last discharge from [facility] on 4/11/2023. Denies any recent sick contacts, fevers, abd pain, chest pain. States he has quit using cocaine for past wk to take care of his health now.     In ED, pt AF; BP running low at 90s/70s, which may be pt's baseline based on prior hospitalization. Pt initally with elevated BP in the field and received nitroglycerin sprays. Cr mildly elevated to 1.6; trop elevated to 0.055, BNP >13,000, however pt on entresto therapy. UDS pending, CXR with increase in R sided pleural effusion size - similar in appearance to 4/6/2023     Interval History: NAEON. Pt had 300 cc of output yesterday. Still producing dark concentrated urine. Pt continues to complaint of dysuria but denies burning, discharge, or hematuria. Cardiology was consulted, will appreciate recommendations of starting metolazone 2.5 mg qd. He does endorse shortness of breath overnight with the need of O2 nasal canula 2 liters. Pt has no other complaints or concerns at this time.    Interval  History: Mr. Perdue was seen today on morning rounds. NAEON and no prn's given. Pt states he is feeling much better. After starting tamsulosin and being given another dose of lasix, his urine output increased to 850 cc's. His dysuria has improved, stating the pain is a 0/10 at the beginning and middle of voiding and is a 6/10 at the very end. This is much improved from a previous 10/10 pain during an entire void. He has no other complaints. Nephrology was consulted, will appreciate recommendations.       Review of Systems:  Review of Systems   Constitutional:  Negative for chills and fever.   Respiratory:  Positive for cough. Negative for sputum production and shortness of breath.    Cardiovascular:  Positive for leg swelling. Negative for chest pain, palpitations and orthopnea.   Gastrointestinal:  Negative for abdominal pain, nausea and vomiting.   Genitourinary:  Positive for dysuria and frequency. Negative for urgency.   Neurological:  Negative for dizziness and headaches.        Objective:     Vital Signs (Most Recent):  Temp: 98.5 °F (36.9 °C) (04/20/23 0457)  Pulse: 93 (04/20/23 0457)  Resp: 18 (04/20/23 0020)  BP: 96/72 (04/20/23 0457)  SpO2: 100 % (04/20/23 0715) Vital Signs (24h Range):  Temp:  [97.5 °F (36.4 °C)-98.5 °F (36.9 °C)] 98.5 °F (36.9 °C)  Pulse:  [53-93] 93  Resp:  [16-18] 18  SpO2:  [92 %-100 %] 100 %  BP: ()/(63-81) 96/72       Physical Examination:  Physical Exam  Constitutional:       General: He is not in acute distress.     Appearance: Normal appearance. He is obese. He is not ill-appearing or diaphoretic.   HENT:      Head: Normocephalic and atraumatic.   Eyes:      Extraocular Movements: Extraocular movements intact.      Pupils: Pupils are equal, round, and reactive to light.   Cardiovascular:      Rate and Rhythm: Normal rate and regular rhythm.      Pulses: Normal pulses.      Heart sounds: Normal heart sounds.   Pulmonary:      Effort: No respiratory distress.      Breath  sounds: No wheezing, rhonchi or rales.   Abdominal:      General: Abdomen is flat. Bowel sounds are normal. There is no distension.      Palpations: Abdomen is soft.      Tenderness: There is no abdominal tenderness. There is no guarding.   Musculoskeletal:      Right lower leg: Edema present.      Left lower leg: Edema present.      Comments: Pitting edema present to mid shins bilaterally   Skin:     General: Skin is warm and dry.      Capillary Refill: Capillary refill takes 2 to 3 seconds.   Neurological:      Mental Status: He is alert.   Psychiatric:         Mood and Affect: Mood normal.         Behavior: Behavior normal.         Thought Content: Thought content normal.         Judgment: Judgment normal.       Laboratory:  Most Recent Data:  CBC:   Recent Labs   Lab 04/19/23  0421 04/20/23  0340   WBC 6.3 6.8   HGB 11.4* 10.8*   HCT 36.1* 34.2*    144     CMP:   Recent Labs   Lab 04/20/23  0340   CALCIUM 8.7*   ALBUMIN 2.8*   *   K 3.8   CO2 23   BUN 33.1*   CREATININE 1.36*   ALKPHOS 127   ALT 19   AST 40*   BILITOT 2.0*       Radiology:    CT Abdomen Pelvis Without Contrast 4/17/23:  Impression:  -Worsening right-sided pleural effusion and right lower lobe atelectasis   -Ascites   -Gallbladder is decompressed and the wall appears to be thickened possibly due to decompression and the ascites but if gallbladder pathology is suspected ultrasound correlation is recommended   -No obvious renal abnormality seen but there is a cyst in the right kidney in the midpole     Imaging Results              X-Ray Chest 1 View (Final result)  Result time 04/17/23 08:40:03      Final result by Jordan Merlos MD (04/17/23 08:40:03)                   Impression:      Increase interstitial markings throughout mild degree of congestion cannot be completely excluded.    Cardiomegaly.    Right-sided pleural effusion with compression of atelectatic changes at the right base      Electronically signed by: Jordan  Gaurang  Date:    04/17/2023  Time:    08:40               Narrative:    EXAMINATION:  XR CHEST 1 VIEW    CPT 33986    CLINICAL HISTORY:  Dyspnea;    COMPARISON:  April 13, 2023    FINDINGS:  Examination reveals mediastinal silhouette to be within normal limits cardiac silhouette is enlarged there is blunting of the right costophrenic angle indicating the presence of a right-sided pleural effusion some linear densities are identified at the right base might represent compressive atelectatic changes.    There is some increase interstitial markings with no focal consolidative changes, however, a mild degree of congestion cannot be completely excluded.    No other interval change                        Wet Read by Tej Osborne MD (04/17/23 01:18:26, [facility], Emergency Medicine)    Increased pulmonary vascular congestion with right sided pleural effusion                                    Current Medications:     Infusions:       Scheduled:   apixaban  5 mg Oral BID    aspirin  81 mg Oral Daily    atorvastatin  40 mg Oral QHS    atovaquone  1,500 mg Oral Daily    adzqgpmyc-lndeuksd-vurxxso ala  1 tablet Oral Daily    mupirocin   Nasal BID    nicotine  1 patch Transdermal Daily    potassium chloride  20 mEq Oral Once    sacubitriL-valsartan  1 tablet Oral BID    tamsulosin  0.4 mg Oral Daily        PRN:  acetaminophen, dextrose 10%, dextrose 10%, dextrose, dextrose, glucagon (human recombinant), melatonin, naloxone, sodium chloride 0.9%        Intake/Output Summary (Last 24 hours) at 4/20/2023 0828  Last data filed at 4/19/2023 1845  Gross per 24 hour   Intake 520 ml   Output 850 ml   Net -330 ml       Lines/Drains/Airways       Peripheral Intravenous Line  Duration                  Peripheral IV - Single Lumen 04/17/23 0427 20 G Anterior;Left Forearm 3 days                      Assessment & Plan:     Acute on Chronic CHF Exacerbation  JFrEF (EF 15%)  Valvular Heart disease  -  strict I/Os, daiy weights, and fluid/Na retention.  - improved urine output of 850 cc's with tamsulosin and lasix adherence.   - continue furosemide injection 80 mg once daily.   - TTE 4/13/23 showed EF of 15%, mild eccentric hypertrophy and severely decreased systolic function, Grade III LVDD, mod-severe aortic stenosis, mod MR, mod TR, estimated PA systolic pressure 39 mmHg.  - consider LifeVest management. Pt insurance does not cover ([employer] - Medicaid).  with pt concerning possible insurance change.   - Urine Cr, Na and osmolality ordered with readings of 145.5, <20.0, and 508 respectively. Pt appears to be intravascularly depleted.   - cardiology consulted, will appreciate recommendations.  - nephrology consulted, will appreciate recommendations.      Dysuria  - pain on urination much improved with addition of tamsulosin. Continues to deny discharge or burning sensation.   - continue tamsulosin 24 hr capsule 0.4 mg once daily.   - consider overflow vs functional incontinence etiology. PSA pending.        Right sided pleural effusion and suspected cholecystitis  - CT Abdomen Pelvis 4/17/23 showed  -Worsening right-sided pleural effusion and right lower lobe atelectasis   -Ascites   -Gallbladder is decompressed and the wall appears to be thickened possibly due to decompression and the ascites but if gallbladder pathology is suspected ultrasound correlation is recommended   -No obvious renal abnormality seen but there is a cyst in the right kidney in the midpole  - continue to deny RUQ pain with meals, negative Jain's sign during PE. No acute signs of cholecystitis. Will consider RUQ US if symptoms present.   - pleural effusion is likely cardiogenic in nature and associated with CHF exacerbation. Likely not due to infectious process. Pt denies fever/chills, night sweats, or productive cough.   - pt endorses shortness of breath overnight on interview today, will consider thoracentesis.     Type II  NSTEMI  - elevated troponins on 4/17/23 showing 0.055. This is likely secondary to demand ischemia via CHF exacerbation.  - will continue to monitor for signs ischemic changes.      HOLLIS   - BUN/Cr changed from 35.7/1.57 to 33.1/1.36. Pt baseline is 37.2/1.36.  - per cardiology, it is okay for Cr to increase to a max of 30%.   - will serially monitor with daily CMPs. Monitor for cardiorenal syndrome.     Atrial fibrillation  - pt currently stable.  - continue Eliquis 5 mg po BID.     Hx of HTN  - pt hypotensive in ED. Current BP is well controlled at 105/81.   - hold home metoprolol succinate.     Hx of HLD  - lipid panel 1/24/23 showed cholesterol 168, HDL 28, triglyceride 101, VLDL 20, and .00. Pt is well managed on current medications.  - continue atorvastatin 40 mg po qhs.     Cocaine use disorder, severe  - endorses hx of crack cocaine use  - UDS showed positive markings for cocaine, pt denies recent history stating last time using was a week ago.   - would recommend possible drug detox/rehab program upon discharge.     Tobacco use disorder, severe  - smokes 1/2 PPD  - continue nicotine patch     Hx of HIV  Chronic Hep B Infection  - continue home Biktarvy -25 mg daily and atovaquone 1500 mg daily (10 mL)  - CD4 count 59 (3/13/23).  - Follows with ID at [facility].  - IgG levels obtained showing value of 2/716.00 mg/dL. Consider nephropathy secondary to HIV/hepatitis. Will have pt follow up with nephrology s/p discharge for possible biopsy.      Disposition: Day 4 of current admission. Strict I/Os. Diuresing with lasix. DVT prophylaxis with lovenox. GI prophylaxis with famotidine.      Ez Snadoval, OMS III

## 2023-04-20 NOTE — PROGRESS NOTES
"[facility]   Nephrology Progress Note    Patient Name: Jason Perdue  MRN: [id number]  Admission Date: 4/16/2023  Hospital Length of Stay: 3 days  Code Status: Full Code  Attending Provider: Keely Harris MD  Primary Care Provider: SAVAGE Reddy     Subjective:     HPI:   Patient is a 63 y.o. male with a history of HIV, HFrEF (EF 10%), substance abuse, severe aortic stenosis, afib on eliquis, HTN, HLD, B cell lymphoma who presented to the ED on 4/16/23 with complaints of SOB. Of note, patient recently discharged on 4/11 and 4/14 for CHF exacerbation. He take 60mg lasix daily at home. On arrival to ED, patient was found to have b/l LE edema. In the ED, BP was 90s/70s. BUN/Cr was 31.1/1.62 (baseline Cr of 1.30). He was given about 100mg of lasix in the ED for diuresis.  Patient admitted to medicine for CHF exacerbation. He initially had good urine output but creatinine did worsen. Diuretics were stopped yesterday and renal function improved to baseline. However, patient's urine output did start to decline. Nephrology consulted for decreased urine output and assistance with diuretics.     Hospital Course/Significant events:      24 Hour Interval History:  No acute events overnight. Patient renal function appears to have improved to around his baseline. UOP of about 850cc in last 24 hours. Patient did receive dose of lasix yesterday.     Past Medical History:   Diagnosis Date    Cancer     CHF (congestive heart failure)     Human immunodeficiency virus (HIV) disease     Hypertension        History reviewed. No pertinent surgical history.    Social History     Socioeconomic History    Marital status: Single   Tobacco Use    Smoking status: Every Day     Packs/day: 0.50     Types: Cigarettes    Smokeless tobacco: Never   Substance and Sexual Activity    Alcohol use: Not Currently    Drug use: Yes     Types: "Crack" cocaine     Comment: last taken 1 month and a half    Sexual activity: Not Currently     " Partners: Male     Social Determinants of Health     Financial Resource Strain: Low Risk     Difficulty of Paying Living Expenses: Not hard at all   Food Insecurity: No Food Insecurity    Worried About Running Out of Food in the Last Year: Never true    Ran Out of Food in the Last Year: Never true   Transportation Needs: No Transportation Needs    Lack of Transportation (Medical): No    Lack of Transportation (Non-Medical): No   Physical Activity: Inactive    Days of Exercise per Week: 0 days    Minutes of Exercise per Session: 0 min   Stress: No Stress Concern Present    Feeling of Stress : Not at all   Social Connections: Socially Isolated    Frequency of Communication with Friends and Family: More than three times a week    Frequency of Social Gatherings with Friends and Family: More than three times a week    Attends Christianity Services: Never    Active Member of Clubs or Organizations: No    Attends Club or Organization Meetings: Never    Marital Status: Never    Housing Stability: Low Risk     Unable to Pay for Housing in the Last Year: No    Number of Places Lived in the Last Year: 1    Unstable Housing in the Last Year: No           Current Outpatient Medications   Medication Instructions    apixaban (ELIQUIS) 5 mg, Oral, 2 times daily    aspirin (ECOTRIN) 81 mg, Oral, Daily    atorvastatin (LIPITOR) 40 mg, Oral, Nightly    atovaquone (MEPRON) 1,500 mg, Oral, Daily    lfftqfmjo-hvwxtmlt-wkddaab ala (BIKTARVY) -25 mg (25 kg or greater) 1 tablet, Oral, Daily    empagliflozin (JARDIANCE) 10 mg, Oral, Daily    food supplemt, lactose-reduced Liqd 1 Bottle, Oral    furosemide (LASIX) 60 mg, Oral, Daily    megestroL (MEGACE) 200 mg, Oral    metoprolol succinate (TOPROL-XL) 12.5 mg, Oral, Daily    nicotine (NICODERM CQ) 14 mg/24 hr 1 patch, Transdermal, Daily    polyethylene glycol 3350,bulk, Powd Other    sacubitriL-valsartan (ENTRESTO) 24-26 mg per tablet 1 tablet, Oral, 2 times daily    VITAMIN D2  50,000 Units, Oral, Every 7 days       Current Inpatient Medications   apixaban  5 mg Oral BID    aspirin  81 mg Oral Daily    atorvastatin  40 mg Oral QHS    atovaquone  1,500 mg Oral Daily    xryybyyzb-kpmeiymf-qoauuke ala  1 tablet Oral Daily    mupirocin   Nasal BID    nicotine  1 patch Transdermal Daily    potassium chloride  20 mEq Oral Once    sacubitriL-valsartan  1 tablet Oral BID    tamsulosin  0.4 mg Oral Daily       Current Intravenous Infusions        ROS   Negative except that mentioned above.     Objective:       Intake/Output Summary (Last 24 hours) at 4/20/2023 0911  Last data filed at 4/19/2023 1845  Gross per 24 hour   Intake 520 ml   Output 700 ml   Net -180 ml           Vital Signs (Most Recent):  Temp: 97.6 °F (36.4 °C) (04/20/23 0853)  Pulse: 95 (04/20/23 0853)  Resp: 18 (04/20/23 0020)  BP: 100/71 (04/20/23 0853)  SpO2: 97 % (04/20/23 0853)  Body mass index is 32.02 kg/m².  Weight: 104.1 kg (229 lb 9.6 oz) Vital Signs (24h Range):  Temp:  [97.5 °F (36.4 °C)-98.5 °F (36.9 °C)] 97.6 °F (36.4 °C)  Pulse:  [53-95] 95  Resp:  [16-18] 18  SpO2:  [92 %-100 %] 97 %  BP: ()/(63-81) 100/71     Physical Exam  General: Awake, alert, & oriented to person, place & time. No acute distress  Psychiatric: Mood and affect normal  HEENT: Normocephalic, atraumatic. Face symmetric.  Cardiovascular: Regular rate & rhythm, Normal S1 & S2 w/out murmurs, rubs or gallops  Pulmonary: Bilateral symmetric chest rise, Non-labored  Abdominal:  Soft nondistended  Extremities: Trace edema b/l. No clubbing or cyanosis.   Skin:  Exposed skin is warm & dry.  Neuro:   Patient moves all extremities equally. Sensation intact bilateraly.      Lines/Drains/Airways       Peripheral Intravenous Line  Duration                  Peripheral IV - Single Lumen 04/17/23 0427 20 G Anterior;Left Forearm 3 days                    Significant Labs:    Lab Results   Component Value Date    WBC 6.8 04/20/2023    HGB 10.8 (L) 04/20/2023    HCT  34.2 (L) 04/20/2023    MCV 84.4 04/20/2023     04/20/2023         BMP  Lab Results   Component Value Date     (L) 04/20/2023    K 3.8 04/20/2023    CHLORIDE 103 04/20/2023    CO2 23 04/20/2023    BUN 33.1 (H) 04/20/2023    CREATININE 1.36 (H) 04/20/2023    CALCIUM 8.7 (L) 04/20/2023    AGAP 9.0 04/10/2023    EGFRNONAA 80 03/15/2022       ABG  No results for input(s): PH, PO2, PCO2, HCO3, BE in the last 168 hours.    Mechanical Ventilation Support:       Significant Imaging:  I have reviewed the pertinent imaging within the past 24 hours.        Assessment/Plan:     Assessment/Plan:  Decreased urinary output - likely due to stopping lasix. CT scan was also negative. Can restart lasix and monitor UOP. Would recommend getting bladder scan if still no improvement with lasix. Patient also on tamsulosin.  Advanced CKD Stage III with HOLLIS - patient likely intravascularly depleted. However urine Na is < 20 which can be seen in patient with CHF. Positive urine AG.  JUAN, ANCA, SPEP pending.   Underlying chronic cardiorenal syndrome - patient may have some form of cardiorenal syndrome. He seems to be tolerating Entresto. Would continue to monitor BP as this can lead to hypoperfusion and further insult to renal function.   HFrEF with EF of 15%  Advanced HIV - patient could have FSGS in the setting of HIV. Will need follow up in Nephrology clinic with possible biopsy in the future.   Polysubstance abuse - patient with h/o cocaine use which can also contribute to decline in renal function.   Non-anion gap acidosis- this can be seen in patients on antiretroviral therapy   Hyponatremia -  secondary to CHF    Patient will need follow up with nephrology outpatient. Renal indices are stable. Will sign off at this time. Thank you for allowing us to participate in patient care. Please re-consult if needed.       Tiffanie Crockett MD  PGY-3, Internal Medicine

## 2023-04-20 NOTE — DISCHARGE SUMMARY
[facility] Discharge Summary    Admitting Physician: Keely Harris MD  Attending Physician: No att. providers found  Date of Admit: 4/16/2023  Date of Discharge: 4/20/2023    Condition: Good  Outcome: Patient tolerated treatment/procedure well without complication and is now ready for discharge.  DISPOSITION: Home or Self Care          Discharge Diagnoses     Patient Active Problem List   Diagnosis    Diffuse large B-cell lymphoma    HIV (human immunodeficiency virus infection)    Hepatitis B    B12 deficiency    Polysubstance abuse    Severe aortic stenosis    HFrEF (heart failure with reduced ejection fraction)    A-fib    Dyspnea    Community acquired pneumonia of right lower lobe of lung    Acute on chronic congestive heart failure    Tobacco user    CAD (coronary artery disease)    HLD (hyperlipidemia)    Anxiety    Cocaine abuse       Principal Problem:  Acute on chronic congestive heart failure    Consultants and Procedures     Consultants:  IP CONSULT TO HOSPITAL MEDICINE  IP CONSULT TO SOCIAL WORK/CASE MANAGEMENT  IP CONSULT TO CARDIOLOGY  IP CONSULT TO NEPHROLOGY    Procedures:   * No surgery found *     Brief Admission History      Jason Perdue is a 63 y.o. male with a history of HIV, HFrEF (EF 10%), substance abuse, severe aortic stenosis, afib on eliquis, HTN, HLD, B cell lymphoma,  who presented to ED for recurrence of SOB, now assoc with new onset cough with severe coughing spells causing vomiting (deneis hematemesis, mostly fluid in emesis) and side pain since yesterday. Pt recently admitted for CHF exacerbation here with dc 4/11/2023. At that time, pt states BLE edema never resolved, but breathing was back to baseline with net 6.5L diuresis; weight was 223 at discharge. Returned to [facility] for similar sxs and dc'd 4/14/23. Was found to have +cocaine at both admissions.      At baseline, pt has +orthopnea and states he has been taking his medications as prescribed, including recent  "changes at last discharge [facility] on 4/11/[facility]. Denies any recent sick contacts, fevers, abd pain, chest pain. States he has quit using cocaine for past wk to take care of his health now.     In ED, pt AF; BP running low at 90s/70s, which may be pt's baseline based on prior hospitalization. Pt initally with elevated BP in the field and received nitroglycerin sprays. Cr mildly elevated to 1.6; trop elevated to 0.055, BNP >13,000, however pt on entresto therapy. UDS pending, CXR with increase in R sided pleural effusion size - similar in appearance to 4/6/2023    Hospital Course with Pertinent Findings     Patient admitted on 4/17/23 for CHF exacerbation. Received Lasix 100 mg IV on day 1 with 960 cc UOP, Lasix 80 mg BID IV on day 2 with 1050 cc UOP, no lasix on day 3 with no urine output (due to elevating Cr concerns). After holding lasix on day 3, patient's Cr improved from 1.83--> 1.57. Nephrology was consult with recommendation for Nephrology f/u OP and to continue diuresis. Cardiology was consulted with recs to continue diuresis. Lasix was continued for 1 dose on day 4 (80 mg IV once). Cr improved to 1.36 (close to baseline Cr) on day 5 with patient endorses improvement of symptoms.  Patient discharged on 4/20/23.    Nephrology referral placed at time of discharge.  Continue follow up with PCP and Cardiology as scheduled apts.    Discharge physical exam:  Vitals  BP: 94/69  Temp: 97.6 °F (36.4 °C)  Temp Source: Oral  Pulse: 90  Resp: 18  SpO2: 97 %  Height: 5' 11" (180.3 cm)  Weight: 104.1 kg (229 lb 9.6 oz)    Physical Exam  Vitals and nursing note reviewed.   Constitutional:       Appearance: Normal appearance.   HENT:      Head: Normocephalic.   Eyes:      General: No scleral icterus.     Extraocular Movements: Extraocular movements intact.      Pupils: Pupils are equal, round, and reactive to light.   Cardiovascular:      Rate and Rhythm: Normal rate and regular rhythm.      Pulses: Normal pulses.      " "Heart sounds: Normal heart sounds. No murmur heard.  Pulmonary:      Effort: Pulmonary effort is normal. No respiratory distress.      Breath sounds: No stridor. No wheezing, rhonchi or rales.      Comments: Decreased breath sounds over RT lower lung field.  Abdominal:      General: Abdomen is flat.      Palpations: Abdomen is soft.   Musculoskeletal:         General: Swelling present. No tenderness. Normal range of motion.      Cervical back: Normal range of motion.      Right lower leg: Edema (Pitting edema up to the knees on RT, pitting up to proximal 1/3 of LLE (improvement from yesterday).) present.      Left lower leg: Edema present.   Skin:     General: Skin is warm.      Capillary Refill: Capillary refill takes less than 2 seconds.      Coloration: Skin is not jaundiced or pale.      Findings: No lesion or rash.   Neurological:      General: No focal deficit present.      Mental Status: He is alert and oriented to person, place, and time. Mental status is at baseline.     TIME SPENT ON DISCHARGE: 60 minutes    Discharge Medications        Medication List        START taking these medications      syringe-needle,safety,disp unt 1 mL 25 gauge x 5/8" Syrg  100 each by Misc.(Non-Drug; Combo Route) route once daily.            CONTINUE taking these medications      apixaban 5 mg Tab  Commonly known as: ELIQUIS  Take 1 tablet (5 mg total) by mouth 2 (two) times daily.     aspirin 81 MG EC tablet  Commonly known as: ECOTRIN  Take 1 tablet (81 mg total) by mouth once daily.     atorvastatin 40 MG tablet  Commonly known as: LIPITOR  Take 1 tablet (40 mg total) by mouth every evening.     atovaquone 750 mg/5 mL Susp oral liquid  Commonly known as: MEPRON  Take 10 mLs (1,500 mg total) by mouth once daily.     BIKTARVY -25 mg (25 kg or greater)  Generic drug: tngqfwein-hoqwczxe-dyeamkb ala  Take 1 tablet by mouth once daily.     empagliflozin 10 mg tablet  Commonly known as: JARDIANCE  Take 1 tablet (10 mg total) " "by mouth once daily.     food supplemt, lactose-reduced Liqd     furosemide 20 MG tablet  Commonly known as: LASIX  Take 3 tablets (60 mg total) by mouth once daily.     megestroL 400 mg/10 mL (40 mg/mL) Susp  Commonly known as: MEGACE     metoprolol succinate 25 MG 24 hr tablet  Commonly known as: TOPROL-XL  Take 0.5 tablets (12.5 mg total) by mouth once daily.     nicotine 14 mg/24 hr  Commonly known as: NICODERM CQ  Place 1 patch onto the skin once daily.     polyethylene glycol 3350(bulk) Powd     sacubitriL-valsartan 24-26 mg per tablet  Commonly known as: ENTRESTO  Take 1 tablet by mouth 2 (two) times daily.     VITAMIN D2 50,000 unit Cap  Generic drug: ergocalciferol  Take 1 capsule (50,000 Units total) by mouth every 7 days.               Where to Get Your Medications        These medications were sent to [facility] - [place], [place] - [address], [facility] [place] [address]      Phone: [phone]   syringe-needle,safety,disp unt 1 mL 25 gauge x 5/8" Syrg         Discharge Information:   Jason Perdue is being discharged Home or Self Care.    Discharge Procedure Orders   Ambulatory referral/consult to Nephrology   Standing Status: Future   Referral Priority: Routine Referral Type: Consultation   Referral Reason: Specialty Services Required   Referred to Provider: SAIMA BLAKELY Requested Specialty: Nephrology   Number of Visits Requested: 1        Follow-Up Appointments:      To address at follow-up:    - Follow up with PCP (5/23/23), Cardiology (5/11/23), Infectious Disease (5/4/23) and Nephrology at scheduled apts.    The above information was discussed with the patient in clear terms. Patient was able to repeat the instructions to me in their own words. All questions answered. ED precautions provided.    Marco A Salgado MD  [facility] Internal Medicine, HO-1          "

## 2023-04-20 NOTE — PROGRESS NOTES
Pt reported he was unable to get through to M/D to disucss plan change. Pt plans to f/u after discharge.

## 2023-04-21 LAB
ALBUMIN % SPEP (OHS): 38.56
ALBUMIN SERPL-MCNC: 2.9 G/DL (ref 3.4–4.8)
ALBUMIN/GLOB SERPL: 0.6 RATIO (ref 1.1–2)
ALPHA 1 GLOB (OHS): 0.32 GM/DL
ALPHA 1 GLOB% (OHS): 4.22
ALPHA 2 GLOB % (OHS): 8.98
ALPHA 2 GLOB (OHS): 0.67 GM/DL
BETA GLOB (OHS): 1.12 GM/DL
BETA GLOB% (OHS): 14.95
GAMMA GLOBULIN % (OHS): 33.29
GAMMA GLOBULIN (OHS): 2.5 GM/DL
GLOBULIN SER-MCNC: 4.6 GM/DL (ref 2.4–3.5)
KAPPA LC FREE SER-MCNC: 10.5 MG/DL (ref 0.33–1.94)
KAPPA LC FREE/LAMBDA FREE SER: 2.51 {RATIO} (ref 0.26–1.65)
LAMBDA LC FREE SERPL-MCNC: 4.19 MG/DL (ref 0.57–2.63)
M SPIKE % (OHS): ABNORMAL
M SPIKE (OHS): ABNORMAL
PATH REV: NORMAL
PROT SERPL-MCNC: 7.5 GM/DL (ref 5.8–7.6)

## 2023-04-22 ENCOUNTER — HOSPITAL ENCOUNTER (OUTPATIENT)
Facility: HOSPITAL | Age: 63
Discharge: LEFT BEFORE DISCHARGE | End: 2023-04-22
Attending: EMERGENCY MEDICINE | Admitting: STUDENT IN AN ORGANIZED HEALTH CARE EDUCATION/TRAINING PROGRAM
Payer: MEDICAID

## 2023-04-22 VITALS
HEIGHT: 72 IN | RESPIRATION RATE: 18 BRPM | BODY MASS INDEX: 29.86 KG/M2 | SYSTOLIC BLOOD PRESSURE: 98 MMHG | DIASTOLIC BLOOD PRESSURE: 64 MMHG | HEART RATE: 74 BPM | WEIGHT: 220.44 LBS | TEMPERATURE: 98 F | OXYGEN SATURATION: 100 %

## 2023-04-22 DIAGNOSIS — R06.00 DYSPNEA: ICD-10-CM

## 2023-04-22 DIAGNOSIS — I35.0 AORTIC VALVE STENOSIS, ETIOLOGY OF CARDIAC VALVE DISEASE UNSPECIFIED: ICD-10-CM

## 2023-04-22 DIAGNOSIS — I50.21 ACUTE SYSTOLIC CONGESTIVE HEART FAILURE: Primary | ICD-10-CM

## 2023-04-22 DIAGNOSIS — N30.00 ACUTE CYSTITIS WITHOUT HEMATURIA: ICD-10-CM

## 2023-04-22 LAB
AMPHET UR QL SCN: NEGATIVE
ANION GAP SERPL CALC-SCNC: 9 MEQ/L
APPEARANCE UR: ABNORMAL
AR ANA INTERPRETIVE COMMENT: NORMAL
AR ANTINUCLEAR ANTIBODY (ANA), HEP-2, IGG: NORMAL
BACTERIA #/AREA URNS AUTO: ABNORMAL /HPF
BARBITURATE SCN PRESENT UR: NEGATIVE
BASOPHILS # BLD AUTO: 0.02 X10(3)/MCL (ref 0–0.2)
BASOPHILS NFR BLD AUTO: 0.3 %
BENZODIAZ UR QL SCN: NEGATIVE
BILIRUB UR QL STRIP.AUTO: NEGATIVE MG/DL
BNP BLD-MCNC: ABNORMAL PG/ML
BUN SERPL-MCNC: 29 MG/DL (ref 8.4–25.7)
C-ANCA TITR SER IF: NEGATIVE {TITER}
CALCIUM SERPL-MCNC: 8.6 MG/DL (ref 8.8–10)
CANNABINOIDS UR QL SCN: NEGATIVE
CHLORIDE SERPL-SCNC: 103 MMOL/L (ref 98–107)
CO2 SERPL-SCNC: 24 MMOL/L (ref 23–31)
COCAINE UR QL SCN: POSITIVE
COLOR UR AUTO: YELLOW
CREAT SERPL-MCNC: 1.56 MG/DL (ref 0.73–1.18)
CREAT/UREA NIT SERPL: 19
EOSINOPHIL # BLD AUTO: 0.07 X10(3)/MCL (ref 0–0.9)
EOSINOPHIL NFR BLD AUTO: 1.1 %
ERYTHROCYTE [DISTWIDTH] IN BLOOD BY AUTOMATED COUNT: 19.1 % (ref 11.5–17)
FENTANYL UR QL SCN: NEGATIVE
GFR SERPLBLD CREATININE-BSD FMLA CKD-EPI: 50 MLS/MIN/1.73/M2
GLUCOSE SERPL-MCNC: 95 MG/DL (ref 82–115)
GLUCOSE UR QL STRIP.AUTO: NORMAL MG/DL
GRAN CASTS #/AREA URNS LPF: >20 /LPF
HCT VFR BLD AUTO: 34.4 % (ref 42–52)
HGB BLD-MCNC: 10.9 G/DL (ref 14–18)
HYALINE CASTS #/AREA URNS LPF: ABNORMAL /LPF
IMM GRANULOCYTES # BLD AUTO: 0.01 X10(3)/MCL (ref 0–0.04)
IMM GRANULOCYTES NFR BLD AUTO: 0.2 %
KETONES UR QL STRIP.AUTO: NEGATIVE MG/DL
LEUKOCYTE ESTERASE UR QL STRIP.AUTO: 25 UNIT/L
LYMPHOCYTES # BLD AUTO: 0.9 X10(3)/MCL (ref 0.6–4.6)
LYMPHOCYTES NFR BLD AUTO: 14.8 %
MAGNESIUM SERPL-MCNC: 2.2 MG/DL (ref 1.6–2.6)
MCH RBC QN AUTO: 26.8 PG (ref 27–31)
MCHC RBC AUTO-ENTMCNC: 31.7 G/DL (ref 33–36)
MCV RBC AUTO: 84.7 FL (ref 80–94)
MDMA UR QL SCN: NEGATIVE
MONOCYTES # BLD AUTO: 0.6 X10(3)/MCL (ref 0.1–1.3)
MONOCYTES NFR BLD AUTO: 9.8 %
MUCOUS THREADS URNS QL MICRO: ABNORMAL /LPF
NEUTROPHILS # BLD AUTO: 4.5 X10(3)/MCL (ref 2.1–9.2)
NEUTROPHILS NFR BLD AUTO: 73.8 %
NITRITE UR QL STRIP.AUTO: NEGATIVE
NON-SQ EPI CELLS URNS QL MICRO: ABNORMAL /HPF
NRBC BLD AUTO-RTO: 0 %
OPIATES UR QL SCN: POSITIVE
P-ANCA SER QL IF: NEGATIVE
PCP UR QL: NEGATIVE
PH UR STRIP.AUTO: 5 [PH]
PH UR: 5 [PH] (ref 3–11)
PLATELET # BLD AUTO: 162 X10(3)/MCL (ref 130–400)
PMV BLD AUTO: 12.9 FL (ref 7.4–10.4)
POTASSIUM SERPL-SCNC: 4.3 MMOL/L (ref 3.5–5.1)
PROT UR QL STRIP.AUTO: ABNORMAL MG/DL
RBC # BLD AUTO: 4.06 X10(6)/MCL (ref 4.7–6.1)
RBC #/AREA URNS AUTO: >100 /HPF
RBC UR QL AUTO: ABNORMAL UNIT/L
SODIUM SERPL-SCNC: 136 MMOL/L (ref 136–145)
SP GR UR STRIP.AUTO: 1.02
SQUAMOUS #/AREA URNS LPF: ABNORMAL /HPF
TROPONIN I SERPL-MCNC: 0.06 NG/ML (ref 0–0.04)
UNIDENT CRYS #/AREA URNS HPF: ABNORMAL /HPF
UROBILINOGEN UR STRIP-ACNC: ABNORMAL MG/DL
WBC # SPEC AUTO: 6.1 X10(3)/MCL (ref 4.5–11.5)
WBC #/AREA URNS AUTO: ABNORMAL /HPF
WBC CLUMPS UR QL AUTO: ABNORMAL /HPF

## 2023-04-22 PROCEDURE — 63600175 PHARM REV CODE 636 W HCPCS: Performed by: EMERGENCY MEDICINE

## 2023-04-22 PROCEDURE — 84484 ASSAY OF TROPONIN QUANT: CPT | Performed by: EMERGENCY MEDICINE

## 2023-04-22 PROCEDURE — 80048 BASIC METABOLIC PNL TOTAL CA: CPT | Performed by: EMERGENCY MEDICINE

## 2023-04-22 PROCEDURE — 83735 ASSAY OF MAGNESIUM: CPT | Performed by: EMERGENCY MEDICINE

## 2023-04-22 PROCEDURE — 87077 CULTURE AEROBIC IDENTIFY: CPT | Performed by: EMERGENCY MEDICINE

## 2023-04-22 PROCEDURE — G0378 HOSPITAL OBSERVATION PER HR: HCPCS

## 2023-04-22 PROCEDURE — 99285 EMERGENCY DEPT VISIT HI MDM: CPT | Mod: 25

## 2023-04-22 PROCEDURE — 80307 DRUG TEST PRSMV CHEM ANLYZR: CPT | Performed by: EMERGENCY MEDICINE

## 2023-04-22 PROCEDURE — 96374 THER/PROPH/DIAG INJ IV PUSH: CPT

## 2023-04-22 PROCEDURE — 83880 ASSAY OF NATRIURETIC PEPTIDE: CPT | Performed by: EMERGENCY MEDICINE

## 2023-04-22 PROCEDURE — 93005 ELECTROCARDIOGRAM TRACING: CPT

## 2023-04-22 PROCEDURE — 81001 URINALYSIS AUTO W/SCOPE: CPT | Performed by: EMERGENCY MEDICINE

## 2023-04-22 PROCEDURE — 85025 COMPLETE CBC W/AUTO DIFF WBC: CPT | Performed by: EMERGENCY MEDICINE

## 2023-04-22 RX ORDER — TALC
6 POWDER (GRAM) TOPICAL NIGHTLY PRN
Status: DISCONTINUED | OUTPATIENT
Start: 2023-04-22 | End: 2023-04-22 | Stop reason: HOSPADM

## 2023-04-22 RX ORDER — ASPIRIN 81 MG/1
81 TABLET ORAL DAILY
Status: DISCONTINUED | OUTPATIENT
Start: 2023-04-23 | End: 2023-04-22 | Stop reason: HOSPADM

## 2023-04-22 RX ORDER — FUROSEMIDE 10 MG/ML
80 INJECTION INTRAMUSCULAR; INTRAVENOUS
Status: DISCONTINUED | OUTPATIENT
Start: 2023-04-22 | End: 2023-04-22

## 2023-04-22 RX ORDER — IBUPROFEN 200 MG
1 TABLET ORAL DAILY
Status: DISCONTINUED | OUTPATIENT
Start: 2023-04-23 | End: 2023-04-22 | Stop reason: HOSPADM

## 2023-04-22 RX ORDER — ATORVASTATIN CALCIUM 40 MG/1
40 TABLET, FILM COATED ORAL NIGHTLY
Status: DISCONTINUED | OUTPATIENT
Start: 2023-04-22 | End: 2023-04-22 | Stop reason: HOSPADM

## 2023-04-22 RX ORDER — FUROSEMIDE 10 MG/ML
80 INJECTION INTRAMUSCULAR; INTRAVENOUS ONCE
Status: DISCONTINUED | OUTPATIENT
Start: 2023-04-22 | End: 2023-04-22 | Stop reason: HOSPADM

## 2023-04-22 RX ORDER — SODIUM CHLORIDE 0.9 % (FLUSH) 0.9 %
10 SYRINGE (ML) INJECTION
Status: DISCONTINUED | OUTPATIENT
Start: 2023-04-22 | End: 2023-04-22 | Stop reason: HOSPADM

## 2023-04-22 RX ORDER — ATOVAQUONE 750 MG/5ML
1500 SUSPENSION ORAL DAILY
Status: DISCONTINUED | OUTPATIENT
Start: 2023-04-23 | End: 2023-04-22 | Stop reason: HOSPADM

## 2023-04-22 RX ORDER — MEGESTROL ACETATE 40 MG/ML
200 SUSPENSION ORAL DAILY
Status: DISCONTINUED | OUTPATIENT
Start: 2023-04-23 | End: 2023-04-22 | Stop reason: HOSPADM

## 2023-04-22 RX ORDER — LORAZEPAM 2 MG/ML
1 INJECTION INTRAMUSCULAR
Status: COMPLETED | OUTPATIENT
Start: 2023-04-22 | End: 2023-04-22

## 2023-04-22 RX ADMIN — LORAZEPAM 1 MG: 2 INJECTION INTRAMUSCULAR; INTRAVENOUS at 01:04

## 2023-04-22 NOTE — Clinical Note
Diagnosis: Acute systolic congestive heart failure [208663]   Future Attending Provider: ASHTYN GOMEZ [[id number]]   Admitting Provider:: ALEC HWITTEN [[id number]]

## 2023-04-22 NOTE — ED PROVIDER NOTES
"ED PROVIDER NOTE  4/22/2023    CHIEF COMPLAINT:   Chief Complaint   Patient presents with    Shortness of Breath     Pt reports to the ed c/o over the last few hours. Also states history of chf and has not been urinating. Cough noted. Sp02=98%  on room air. Denies chest pain at this time. 12 lead ekg obtained.       HISTORY OF PRESENT ILLNESS:   Jason Perdue is a 63 y.o. male who presents with chief complaint Shortness of breath.  Onset was just prior to arrival when patient states that he woke up short of breath having nonproductive cough that he states is been constant, no alleviating factors noted.  States he has been taking his Lasix.  Denies chest pain or fever.    The history is provided by the patient.       REVIEW OF SYSTEMS: as noted in the HPI.  NURSING NOTES REVIEWED      PAST MEDICAL/SURGICAL HISTORY:   Past Medical History:   Diagnosis Date    Cancer     CHF (congestive heart failure)     Human immunodeficiency virus (HIV) disease     Hypertension     No past surgical history on file.    FAMILY HISTORY: No family history on file.    SOCIAL HISTORY:   Social History     Tobacco Use    Smoking status: Every Day     Packs/day: 0.50     Types: Cigarettes    Smokeless tobacco: Never   Substance Use Topics    Alcohol use: Not Currently    Drug use: Yes     Types: "Crack" cocaine     Comment: last taken 1 month and a half       ALLERGIES:   Review of patient's allergies indicates:   Allergen Reactions    Ace inhibitors      Other reaction(s): Angioedema    Nitroglycerin Itching       PHYSICAL EXAM:  Initial Vitals [04/22/23 0615]   BP Pulse Resp Temp SpO2   96/67 72 (!) 30 98.4 °F (36.9 °C) 98 %      MAP       --         Physical Exam    Nursing note and vitals reviewed.  Constitutional: He appears well-developed and well-nourished. No distress.   HENT:   Head: Normocephalic and atraumatic.   Nose: Nose normal.   Mouth/Throat: Oropharynx is clear and moist and mucous membranes are normal.   Eyes: Conjunctivae " and EOM are normal. Pupils are equal, round, and reactive to light.   Neck: Neck supple. No tracheal deviation present.   Cardiovascular:  Normal rate, regular rhythm, normal heart sounds, intact distal pulses and normal pulses.           Pulmonary/Chest: Tachypnea noted. He has decreased breath sounds (Dullness to percussion) in the right lower field. He has rales in the right lower field and the left lower field.   Abdominal: Abdomen is soft. There is no abdominal tenderness. There is no rebound and no guarding.   Musculoskeletal:         General: Normal range of motion.      Cervical back: Neck supple.      Right lower leg: 3+ Pitting Edema present.      Left lower leg: 3+ Pitting Edema present.     Neurological: He is alert and oriented to person, place, and time. GCS score is 15.   CN II-XII intact. Moves all extremities. No gross sensory or motor deficits.   Skin: Skin is warm, dry and intact.   Psychiatric: He has a normal mood and affect. His speech is normal and behavior is normal. Judgment and thought content normal. Cognition and memory are normal.       RESULTS:  Labs Reviewed   CBC WITH DIFFERENTIAL - Abnormal; Notable for the following components:       Result Value    RBC 4.06 (*)     Hgb 10.9 (*)     Hct 34.4 (*)     MCH 26.8 (*)     MCHC 31.7 (*)     RDW 19.1 (*)     MPV 12.9 (*)     All other components within normal limits   BLOOD CULTURE OLG   BLOOD CULTURE OLG   CBC W/ AUTO DIFFERENTIAL    Narrative:     The following orders were created for panel order CBC auto differential.  Procedure                               Abnormality         Status                     ---------                               -----------         ------                     CBC with Differential[[id number]]        Abnormal            Final result                 Please view results for these tests on the individual orders.   BASIC METABOLIC PANEL   B-TYPE NATRIURETIC PEPTIDE   TROPONIN I   MAGNESIUM   EXTRA TUBES     Narrative:     The following orders were created for panel order EXTRA TUBES.  Procedure                               Abnormality         Status                     ---------                               -----------         ------                     Light Blue Top Hold[[id number]]                              In process                 Gold Top Hold[[id number]]                                    In process                 Pink Top Hold[[id number]]                                    In process                   Please view results for these tests on the individual orders.   LIGHT BLUE TOP HOLD   GOLD TOP HOLD   PINK TOP HOLD   LACTIC ACID, PLASMA   URINALYSIS, REFLEX TO URINE CULTURE     Imaging Results              X-Ray Chest AP Portable (Preliminary result)  Result time 04/22/23 06:44:08      Wet Read by Ez Pena DO (04/22/23 06:44:08, [facility], Emergency Medicine)    Prominent interstitial markings.  Right pleural effusion appears increased compared to previous chest x-ray on 04/17/2023.                                    PROCEDURES:  Procedures    ECG:  EKG Readings: (Independently Interpreted)   Initial Reading: No STEMI. Rhythm: Normal Sinus Rhythm. Heart Rate: 96. Ectopy: No Ectopy. Axis: Left Axis Deviation.     ED COURSE AND MEDICAL DECISION MAKING:  Medications   cefTRIAXone (ROCEPHIN) 2 g in sodium chloride 0.9 % 50 mL IVPB (MB+) (has no administration in time range)   azithromycin (ZITHROMAX) 500 mg in sodium chloride 0.9% 250 mL IVPB (Vial-Mate) (has no administration in time range)           Medical Decision Making  63-year-old male presents via EMS with shortness of breath that he states began this morning when he woke up from his sleep, having cough but denies any chest pain or fever.  He was known history of significant heart failure with last echocardiogram showing EF of 15%.  He is maintaining normal oxygen saturation on room air.  He does have some faint rales in  the bases and diminished in the right base with dullness to percussion suspect persistence of pleural effusion.  I initially was going to give some Lasix however his blood pressure has been soft was systolic in the 90s and at times hypotensive.  We will check labs and chest x-ray.    Review of notes from recent admission discuss considering pneumonia if pleural effusion does not improve with diuresis. His CXR today shows what appears to be interval increase in right pleural effusion so given his hypotension as well I will cover for possible pneumonia with rocephin and azithromycin. He will be signed out to oncoming physician at shift change who will assume care and determine appropriate disposition.    Amount and/or Complexity of Data Reviewed  External Data Reviewed: labs and notes.     Details: Echocardiogram 04/13/2023: ? Mild eccentric hypertrophy and severely decreased systolic function.  ? The estimated ejection fraction is 15%.  ? Grade III left ventricular diastolic dysfunction.  ? Normal right ventricular size.  ? Due to severe LV systolic dysfunction and an EF of 15% - There is atleat moderate to severe if not severe aortic valve stenosis. with max velocity over 3.0m/s and Dimensionless Index=0.24  ? Aortic valve area is 0.93 cm2; peak velocity is 3.1 m/s; mean gradient is 24 mmHg.  ? Moderate mitral regurgitation.  ? Moderate tricuspid regurgitation.  ? Elevated central venous pressure (15 mmHg).  ? The estimated PA systolic pressure is 39 mmHg.  Labs: ordered.  Radiology: ordered.  ECG/medicine tests: ordered.        CLINICAL IMPRESSION:  1. Dyspnea        DISPOSITION:              Ez Pena, DO  04/23/23 [id number]

## 2023-04-22 NOTE — PROVIDER PROGRESS NOTES - EMERGENCY DEPT.
Encounter Date: 4/22/2023    ED Physician Progress Notes            63-year-old male received by me and shift change handoff from his then current emergency provider, Dr. Pena, in summary patient presents with dyspnea, was recently discharged from this hospital, only a few days ago, with treatment for the same.    Medical record reviewed by me:  patient with known severe really reduced ejection fraction 10% to 15% with associated severe aortic stenosis, aortic valve 0.48 centimeters squared.  Associated comorbidities include past cocaine use (patient asserts he has abstained for over 2 weeks), HIV, recent CD4 count okay, viral load 42,000, past history of B-cell lymphoma, in remission.    Patient with worsening pleural effusions, worsening BNP, worsening congestive failure, diuretics held because patient is currently not in distress, no dyspnea or tachypnea, meaning and good O2 sat, and blood pressures systolic have run in the 80s to 90s.    Patient likely requires repair of his aortic valve if he is to have any meaningful chance of improvement.  Review of last cardiology consult indicates a plan to refer to Cardiothoracic surgery for possible valve repair.  TAVR as an option is not mentioned, however it is likely worth consideration by a cardiologist.    Based on last cardiology note, I initiated a [facility] transfer request for a facility with Cardiothoracic surgery.  Patient is very much in favor of this.    At present time, 1005, I discussed with Zana at the transfer center.  He states that he relayed all pertinent information to Noel with Cardiothoracic surgeon Dr. Paulino, and states he was told that patient should likely see a cardiologist and possibly receive additional workup to see if he could be a candidate for TAVR.    Based on this, I asked Zana to continue attempting transfer, but to a facility that has TAVR capabilities, and he will do so.  Rechecked on patient.  Vital signs remained stable.    At 12  at around noon I spoke with a hospitalist at [facility] who states she will consult with their cardiothoracic surgeon to determine eligibility for being accepted in transfer to their facility.    At approximately 2:30 p.m., I made aware that the [facility] hospitalist called back and states that the Cardiothoracic surgeon will not accept the patient, states that patient requires an outpatient workup.  They will accept the patient for CHF exacerbation if we are unable to provide care for that.    I am also made aware that patient was found on the ground by his bed.  I went in to see patient and he states he does not recall how he got there.  He reports a moderate headache.  Obtaining a noncontrast head CT scan to rule out acute intracranial injury, and will admit patient to the hospital for treatment of CHF exacerbation.    Head CT negative, PT with stable VS, I d/w IM team for admission. They are familiar with him.

## 2023-04-22 NOTE — DISCHARGE SUMMARY
[facility] Medicine Wards   AMA Note     Resident Team: [facility] Medicine List 2  Attending Physician: Anuj Ellis MD  Resident: Marco A Salgado MD    Summary:       Despite our efforts, Jason Perdue has decided to leave AGAINST MEDICAL ADVICE.  he  has normal mental status and full decisional capacity. he understands his medical comorbidities including HIV, HFrEF (EF 15%), substance abuse, severe AS, afib, HLD and B Cell Lymphoma pending further evaluation. Extensive discussion regarding risks of leaving AMA were had, including but not limited to infection/sepsis, arhythmia, permanent disability, death, ECT., he had the opportunity to ask questions about their medical condition and all questions were answered.  Again, explained the importance of remaining for further investigation, but he continues to refuse.  The patient has been informed to return for care if worsening or at any time, and has been referred to their local medical physician for follow-up ASAP.      Marco A Salgado MD  [facility] Internal Medicine, HO-1

## 2023-04-22 NOTE — H&P
History and Physical     Patient Name: Jason Perdue  MRN: [id number]  Admission Date: 4/22/2023  Hospital Length of Stay: 0 days  Code Status: Full Code  Attending Provider: Anuj Ellis MD  Primary Care Provider: SAVAGE Reddy     Chief Complaint:   Shortness of Breath (Pt reports to the ed c/o over the last few hours. Also states history of chf and has not been urinating. Cough noted. Sp02=98%  on room air. Denies chest pain at this time. 12 lead ekg obtained.)      Subjective:      Brief HPI:  Patient is a 63 y.o. male with a history of HIV, HFrEF (EF 15%), substance abuse, severe aortic stenosis, afib on eliquis, HTN, HLD, B cell lymphoma who presented to the ED on 4/22/23 with complaints of SOB. Of note, patient recently discharged on 4/11, 4/14 and 4/20 all for CHF exacerbations. He endorses compliance with his Lasix 60 mg daily at home. In ED, patient became agitated. ED physician gave Ativan 1 mg IV for agitation. Patient was then found sitting on ground next to bed without any idea of how he ended up there. CT head was unremarkable for acute intracranial process. Patient admitted to IM service for monitoring.    Patient was made aware that his shortness of breath, regardless of his fluid status, may remain due to his severe aortic stenosis. Patient also made aware that he must refrain from any and all crack cocaine use in order to receive surgical intervention (AVR, ICD placement, etc). He denies any cocaine use for the last 2 weeks. UDS positive in ED for cocaine, consistent with previous 7 UDS (spanning back 1 year). Patient stating he will make changes with his friend who comes over to smoke in his presence.    Past Medical History:   Diagnosis Date    Cancer     CHF (congestive heart failure)     Human immunodeficiency virus (HIV) disease     Hypertension        No past surgical history on file.    Review of patient's allergies indicates:   Allergen  "Reactions    Ace inhibitors      Other reaction(s): Angioedema    Nitroglycerin Itching       Current Outpatient Medications   Medication Instructions    apixaban (ELIQUIS) 5 mg, Oral, 2 times daily    aspirin (ECOTRIN) 81 mg, Oral, Daily    atorvastatin (LIPITOR) 40 mg, Oral, Nightly    atovaquone (MEPRON) 1,500 mg, Oral, Daily    lmmpndhyn-ysdszavo-jxkufzd ala (BIKTARVY) -25 mg (25 kg or greater) 1 tablet, Oral, Daily    empagliflozin (JARDIANCE) 10 mg, Oral, Daily    food supplemt, lactose-reduced Liqd 1 Bottle, Oral    furosemide (LASIX) 60 mg, Oral, Daily    megestroL (MEGACE) 200 mg, Oral    metoprolol succinate (TOPROL-XL) 12.5 mg, Oral, Daily    nicotine (NICODERM CQ) 14 mg/24 hr 1 patch, Transdermal, Daily    polyethylene glycol 3350,bulk, Powd Other    sacubitriL-valsartan (ENTRESTO) 24-26 mg per tablet 1 tablet, Oral, 2 times daily    syringe-needle,safety,disp unt 1 mL 25 gauge x 5/8" Syrg 100 each, Misc.(Non-Drug; Combo Route), Daily    VITAMIN D2 50,000 Units, Oral, Every 7 days          Social History:     Social History     Socioeconomic History    Marital status: Single   Tobacco Use    Smoking status: Every Day     Packs/day: 0.50     Types: Cigarettes    Smokeless tobacco: Never   Substance and Sexual Activity    Alcohol use: Not Currently    Drug use: Yes     Types: "Crack" cocaine     Comment: last taken 1 month and a half    Sexual activity: Not Currently     Partners: Male     Social Determinants of Health     Financial Resource Strain: Low Risk     Difficulty of Paying Living Expenses: Not hard at all   Food Insecurity: No Food Insecurity    Worried About Running Out of Food in the Last Year: Never true    Ran Out of Food in the Last Year: Never true   Transportation Needs: No Transportation Needs    Lack of Transportation (Medical): No    Lack of Transportation (Non-Medical): No   Physical Activity: Inactive    Days of Exercise per Week: 0 days    Minutes of Exercise per Session: 0 min "   Stress: No Stress Concern Present    Feeling of Stress : Not at all   Social Connections: Socially Isolated    Frequency of Communication with Friends and Family: More than three times a week    Frequency of Social Gatherings with Friends and Family: More than three times a week    Attends Spiritism Services: Never    Active Member of Clubs or Organizations: No    Attends Club or Organization Meetings: Never    Marital Status: Never    Housing Stability: Low Risk     Unable to Pay for Housing in the Last Year: No    Number of Places Lived in the Last Year: 1    Unstable Housing in the Last Year: No       No family history on file.    Review of Systems:  Review of Systems   Constitutional:  Negative for chills, diaphoresis and fever.   Eyes:  Negative for blurred vision.   Respiratory:  Positive for shortness of breath. Negative for cough, sputum production and wheezing.    Cardiovascular:  Positive for leg swelling. Negative for chest pain, palpitations, orthopnea and PND.   Gastrointestinal:  Negative for abdominal pain, blood in stool, constipation, melena, nausea and vomiting.   Genitourinary:  Negative for dysuria, frequency and urgency.   Skin:  Negative for rash.   Neurological:  Negative for dizziness, seizures, loss of consciousness, weakness and headaches.        Objective:     Vital Signs:  Vital Signs (Most Recent):  Temp: 98.4 °F (36.9 °C) (04/22/23 0615)  Pulse: 84 (04/22/23 0715)  Resp: 20 (04/22/23 0715)  BP: 92/76 (04/22/23 0715)  SpO2: 100 % (04/22/23 0715)  Body mass index is 30.19 kg/m².  Weight: 100 kg (220 lb 7.4 oz) Vital Signs (24h Range):  Temp:  [98.4 °F (36.9 °C)] 98.4 °F (36.9 °C)  Pulse:  [72-93] 84  Resp:  [20-30] 20  SpO2:  [98 %-100 %] 100 %  BP: (75-96)/(60-76) 92/76       Input/output:   No intake or output data in the 24 hours ending 04/22/23 1654    Physical Examination:  Physical Exam  Vitals and nursing note reviewed.   Constitutional:       Appearance: Normal  appearance.   HENT:      Head: Normocephalic.      Mouth/Throat:      Mouth: Mucous membranes are moist.   Eyes:      General: No scleral icterus.     Extraocular Movements: Extraocular movements intact.      Pupils: Pupils are equal, round, and reactive to light.   Cardiovascular:      Rate and Rhythm: Normal rate and regular rhythm.      Pulses: Normal pulses.      Heart sounds: Normal heart sounds. No murmur heard.  Pulmonary:      Effort: Pulmonary effort is normal. No respiratory distress.      Breath sounds: Normal breath sounds. No wheezing or rales.   Abdominal:      General: Abdomen is flat. There is no distension.      Palpations: Abdomen is soft.      Tenderness: There is no abdominal tenderness. There is no guarding.   Musculoskeletal:         General: Swelling present. No tenderness. Normal range of motion.      Cervical back: Normal range of motion.      Right lower leg: Edema (Pitting edema up to the knees bilaterally.) present.      Left lower leg: Edema present.   Skin:     General: Skin is warm.      Capillary Refill: Capillary refill takes less than 2 seconds.      Coloration: Skin is not jaundiced or pale.      Findings: No lesion or rash.   Neurological:      General: No focal deficit present.      Mental Status: He is alert and oriented to person, place, and time. Mental status is at baseline.         Lines/Drains/Airways       None                    Laboratory:    Recent Labs   Lab 04/22/23  0634   WBC 6.1   HGB 10.9*   HCT 34.4*      MCV 84.7   RDW 19.1*     Recent Labs   Lab 04/22/23  0634   TROPONINI 0.057*   BNP 11,126.3*     Recent Labs   Lab 04/22/23  0634   TROPONINI 0.057*   BNP 11,126.3*     No results for input(s): CHOL, HDL, LDLCALC, TRIG, CHOLHDL in the last 168 hours. Recent Labs   Lab 04/22/23  0634      K 4.3   CHLORIDE 103   CO2 24   BUN 29.0*   CREATININE 1.56*   CALCIUM 8.6*   MG 2.20     No results for input(s): PROT, ALBUMIN, BILITOT, AST, ALKPHOS, ALT in the  last 24 hours.  No results for input(s): IRON, TIBC, FERRITIN, SATURATEDIRO, HWPNPYSM80, FOLATE in the last 168 hours.  No results for input(s): TSH, O2QUEXP, HGBA1C, INR, PROTIME, PTT in the last 168 hours.           Other Results:    Current Medications:     Infusions:        Scheduled:   apixaban  5 mg Oral BID    [START ON 4/23/2023] aspirin  81 mg Oral Daily    atorvastatin  40 mg Oral QHS    [START ON 4/23/2023] atovaquone  1,500 mg Oral Daily    azithromycin  500 mg Intravenous Once    [START ON 4/23/2023] tvmzvsykn-rdesqwyl-lqokofk ala  1 tablet Oral Daily    cefTRIAXone (ROCEPHIN) IVPB  2 g Intravenous Once    furosemide (LASIX) injection  80 mg Intravenous Once    [START ON 4/23/2023] megestroL  200 mg Oral Daily    [START ON 4/23/2023] metoprolol succinate  12.5 mg Oral Daily    [START ON 4/23/2023] nicotine  1 patch Transdermal Daily    sacubitriL-valsartan  1 tablet Oral BID         PRN:   melatonin    sodium chloride 0.9%        Microbiology Data:  Microbiology Results (last 7 days)       Procedure Component Value Units Date/Time    Urine culture [[id number]] Collected: 04/22/23 0915    Order Status: Sent Specimen: Urine Updated: 04/22/23 0950    Blood culture x two cultures. Draw prior to antibiotics. [[id number]]     Order Status: Sent Specimen: Blood     Blood culture x two cultures. Draw prior to antibiotics. [[id number]]     Order Status: Sent Specimen: Blood              Antibiotics and Day Number of Therapy:  Antibiotics (From admission, onward)      Start     Stop Route Frequency Ordered    04/22/23 0715  cefTRIAXone (ROCEPHIN) 2 g in sodium chloride 0.9 % 50 mL IVPB (MB+)  (Sepsis Workup)         -- IV Once 04/22/23 0707 04/22/23 0715  azithromycin (ZITHROMAX) 500 mg in sodium chloride 0.9% 250 mL IVPB (Vial-Mate)  (Sepsis Workup)         -- IV Once 04/22/23 0707             Imaging:  CT Head Without Contrast  Narrative: EXAMINATION:  CT HEAD WITHOUT CONTRAST    CLINICAL HISTORY:  Head trauma,  moderate-severe;    TECHNIQUE:  CT images of the head without IV contrast. Axial, coronal and sagittal images reviewed. Dose length product 948 mGycm. Automatic exposure control, adjustment of mA/kV or iterative reconstruction technique used to limit radiation dose.    COMPARISON:  Brain MRI 04/17/2018    FINDINGS:  Extra-axial spaces/ventricular system: Normal for age.    Intracranial hemorrhage: None identified.    Cerebral parenchyma: No acute large vessel territory infarct or mass effect identified. Moderate chronic small vessel ischemic changes in the supratentorial white matter.    Vascular system: No hyperdense vessel appreciated.    Cerebellum: Normal.    Sella: Normal.    Included paranasal sinuses and mastoid air cells: Well-aerated.    Visualized orbits: Normal.    Scalp/Calvarium: No depressed skull fracture.  Impression: No acute intracranial process identified.    Electronically signed by: Emerson Rios  Date:    04/22/2023  Time:    15:35  X-Ray Chest AP Portable  Narrative: EXAMINATION:  XR CHEST AP PORTABLE    CLINICAL HISTORY:  Shortness of breath.    TECHNIQUE:  1 view of the chest.    COMPARISON:  04/17/2023    FINDINGS:  There is enlarging right pleural effusion..  There may be small left pleural effusion.  There is no pneumothorax.  There is right basilar atelectasis.  Similar interstitial prominence noted could reflect a component of pulmonary edema.  Daniel catheter is unchanged.  The cardiac silhouette is stable.    No acute displaced fracture is seen.  Impression: 1. Enlarging right pleural effusion.  Questionable small left pleural effusion.  2. Right basilar atelectasis again noted.  3. Similar interstitial prominence could reflect a component of pulmonary edema.    Electronically signed by: Jin Mason MD  Date:    04/22/2023  Time:    12:11        2D ECHO Results    No results found in the last 24 hours.       Pulmonary Functions Testing Results:    No results found for: FEV1, FVC,  [id number], TLC, DLCO    Assessment & Plan:     Acute on Chronic CHF Exacerbation  HFrEF (EF 15%)  Valvular Heart Disease  - Presentation consistent with admisson on 4/17/23-4/20/23  - Lasix 80 mg IV once today   - Strict Is/Os  - Continue home meds:  Entresto 24-26 mg BID and Toprol XL 25 mg daily  - Holding home meds: Jardiance 10 mg daily (not carried at [facility], patient did no bring supply)  - BNP elevated 11,126; stable when compared to last 2 weeks BNP  - Stict I/Os, daily weights, fluid and sodium restriction, elevate HOB  - TTE (4/13/23) revealed EF 15%, mild eccentric hypertrophy and severely decreased systolic function, Grade III LVDD, mod-severe aortic stenosis, mod MR, mod TR, estimated PA systolic pressure 39 mmHg  - UDS positive for Cocaine; consistent to last 7 UDS which go back to 1 year ago  - Per last admission, patient in middle of switching Medicaid insurance to obtain LifeVest (not covered by [employer] - Medicaid); Per patient, patient had LifeVest in past but due to noncompliance, patient had to return LifeVest     RT Sided Pleural Effusion  Small LT Sided Pleural Effusion  - RT Sided pleural effusion on CXR 4/22/23 consistent with CXR 4/17 and CXR 4/13 (but was not present on CXR from 3/24/23)  - Likely cardiogenic in nature, but will continue to consider abx if effusion does not improve with diuresis  - New LT sided small pleural effusion noted  - Thoracentesis with studies performed on 3/13/23: -reactive, no evidence of malignancy; None of Light's criteria met, suggests likely transudative effusion     Type II NSTEMI  - Suspect Type II NSTEMI in setting of current CHF exacerbation, will hold off on initiating acs protocol at this time   - Denying any chest pain; Troponemia consistent with previous admission     HOLLIS  Concern for ATN  - BUN/Cr 29/1.56 (Baseline Cr 1.25 1/25/23);   - Unknown if this is new baseline given patient has multiple co-morbidities likely contributing to renal dysfunction  (HIV, persistent crack cocaine use, HFrEF, hypotension, etc)  - Consider Nephrology consult, although kidney biopsy outpatient may be next step given unclear etiology with many possible culprits  - UA revealing 1+ protein, 3+ occult blood, 25 LE, >100 RBC, 11-20 WBC, trace bacteria and >20 Granular Cast  - cont to monitor with serial CMPs; continue to monitor for cardiorenal syndrome      Atrial fibrillation  - Currently stable  - Continue home Eliquis 5 mg BID     Hyperlipidemia  - Continue home atorvastatin 40 mg daily     Cocaine use  Tobacco use  - Endorses last cocaine use roughly 2 week ago  - UDS positive for Cocaine; consistent to last 7 UDS which go back to 1 year ago  - provided with nicotine patch upon request   - recommend tobacco cessation program/possible drug detox or rehab     HIV  Chronic Hep B Infection  - continue home Biktarvy -25 mg daily and atovaquone 1500 mg daily (10 mL)  - CD4 count 59 (3/13/23)  - [facility] ID [facility]        CODE STATUS: Full Code   Access: PIV  Antibiotics: Atovaquone (ppx for CD4<200)  Diet: Diet heart healthy  DVT Prophylaxis: lovenox  GI Prophylaxis: famotidine  Fluids: None    Disposition: Patient admitted on 4/22/23 for CHF exacerbation. Diuresis with Lasix 80 mg once IV today, strict I/Os. Further dispo planning pending.        Marco A Salgado MD  [facility], [facility]

## 2023-04-24 ENCOUNTER — HOSPITAL ENCOUNTER (INPATIENT)
Facility: HOSPITAL | Age: 63
LOS: 11 days | Discharge: HOSPICE | DRG: 280 | End: 2023-05-05
Attending: EMERGENCY MEDICINE | Admitting: INTERNAL MEDICINE
Payer: MEDICAID

## 2023-04-24 DIAGNOSIS — E78.5 HYPERLIPIDEMIA, UNSPECIFIED HYPERLIPIDEMIA TYPE: ICD-10-CM

## 2023-04-24 DIAGNOSIS — I21.4 NSTEMI (NON-ST ELEVATED MYOCARDIAL INFARCTION): ICD-10-CM

## 2023-04-24 DIAGNOSIS — E87.20 METABOLIC ACIDOSIS: ICD-10-CM

## 2023-04-24 DIAGNOSIS — D63.8 ANEMIA OF CHRONIC DISEASE: ICD-10-CM

## 2023-04-24 DIAGNOSIS — I73.9 PAD (PERIPHERAL ARTERY DISEASE): ICD-10-CM

## 2023-04-24 DIAGNOSIS — N17.9 AKI (ACUTE KIDNEY INJURY): Primary | ICD-10-CM

## 2023-04-24 DIAGNOSIS — R60.1 ANASARCA: ICD-10-CM

## 2023-04-24 DIAGNOSIS — I50.9 CONGESTIVE HEART FAILURE, UNSPECIFIED HF CHRONICITY, UNSPECIFIED HEART FAILURE TYPE: ICD-10-CM

## 2023-04-24 DIAGNOSIS — I82.409 DVT (DEEP VENOUS THROMBOSIS): ICD-10-CM

## 2023-04-24 DIAGNOSIS — R07.9 CHEST PAIN: ICD-10-CM

## 2023-04-24 DIAGNOSIS — I50.9 CHF (CONGESTIVE HEART FAILURE): ICD-10-CM

## 2023-04-24 PROCEDURE — 83880 ASSAY OF NATRIURETIC PEPTIDE: CPT | Performed by: EMERGENCY MEDICINE

## 2023-04-24 PROCEDURE — 83690 ASSAY OF LIPASE: CPT | Performed by: EMERGENCY MEDICINE

## 2023-04-24 PROCEDURE — 12000002 HC ACUTE/MED SURGE SEMI-PRIVATE ROOM

## 2023-04-24 PROCEDURE — 83735 ASSAY OF MAGNESIUM: CPT | Performed by: EMERGENCY MEDICINE

## 2023-04-24 PROCEDURE — 99285 EMERGENCY DEPT VISIT HI MDM: CPT | Mod: 25

## 2023-04-24 PROCEDURE — 84484 ASSAY OF TROPONIN QUANT: CPT | Performed by: EMERGENCY MEDICINE

## 2023-04-24 PROCEDURE — 96374 THER/PROPH/DIAG INJ IV PUSH: CPT

## 2023-04-24 PROCEDURE — 80053 COMPREHEN METABOLIC PANEL: CPT | Performed by: EMERGENCY MEDICINE

## 2023-04-24 PROCEDURE — 85025 COMPLETE CBC W/AUTO DIFF WBC: CPT | Performed by: EMERGENCY MEDICINE

## 2023-04-24 NOTE — PT/OT/SLP PROGRESS
Physical Therapy-post discharge documentation      Patient Name:  Jason Perdue   MRN:  [id number]    Patient not seen secondary to hospital discharge prior to PT evaluation.

## 2023-04-24 NOTE — PT/OT/SLP PROGRESS
Occupational Therapy      Patient Name:  Jason Perdue   MRN:  [id number]    OT eval not performed as pt d/c prior to OT availability.    4/24/2023

## 2023-04-25 LAB
ALBUMIN SERPL-MCNC: 2.8 G/DL (ref 3.4–4.8)
ALBUMIN SERPL-MCNC: 3.2 G/DL (ref 3.4–4.8)
ALBUMIN/GLOB SERPL: 0.6 RATIO (ref 1.1–2)
ALBUMIN/GLOB SERPL: 0.7 RATIO (ref 1.1–2)
ALP SERPL-CCNC: 138 UNIT/L (ref 40–150)
ALP SERPL-CCNC: 141 UNIT/L (ref 40–150)
ALT SERPL-CCNC: 28 UNIT/L (ref 0–55)
ALT SERPL-CCNC: 33 UNIT/L (ref 0–55)
AMPHET UR QL SCN: NEGATIVE
APPEARANCE UR: ABNORMAL
AST SERPL-CCNC: 43 UNIT/L (ref 5–34)
AST SERPL-CCNC: 48 UNIT/L (ref 5–34)
BACTERIA #/AREA URNS AUTO: ABNORMAL /HPF
BACTERIA UR CULT: ABNORMAL
BARBITURATE SCN PRESENT UR: NEGATIVE
BASOPHILS # BLD AUTO: 0.02 X10(3)/MCL (ref 0–0.2)
BASOPHILS # BLD AUTO: 0.03 X10(3)/MCL (ref 0–0.2)
BASOPHILS NFR BLD AUTO: 0.3 %
BASOPHILS NFR BLD AUTO: 0.5 %
BENZODIAZ UR QL SCN: NEGATIVE
BILIRUB UR QL STRIP.AUTO: NEGATIVE MG/DL
BILIRUBIN DIRECT+TOT PNL SERPL-MCNC: 2.9 MG/DL
BILIRUBIN DIRECT+TOT PNL SERPL-MCNC: 3.2 MG/DL
BNP BLD-MCNC: ABNORMAL PG/ML
BUN SERPL-MCNC: 46.7 MG/DL (ref 8.4–25.7)
BUN SERPL-MCNC: 46.9 MG/DL (ref 8.4–25.7)
CALCIUM SERPL-MCNC: 9 MG/DL (ref 8.8–10)
CALCIUM SERPL-MCNC: 9 MG/DL (ref 8.8–10)
CANNABINOIDS UR QL SCN: NEGATIVE
CAOX CRY URNS QL MICRO: ABNORMAL /HPF
CHLORIDE SERPL-SCNC: 102 MMOL/L (ref 98–107)
CHLORIDE SERPL-SCNC: 104 MMOL/L (ref 98–107)
CO2 SERPL-SCNC: 17 MMOL/L (ref 23–31)
CO2 SERPL-SCNC: 21 MMOL/L (ref 23–31)
COCAINE UR QL SCN: POSITIVE
COLOR UR AUTO: YELLOW
CREAT SERPL-MCNC: 2.28 MG/DL (ref 0.73–1.18)
CREAT SERPL-MCNC: 2.28 MG/DL (ref 0.73–1.18)
CREAT UR-MCNC: 98.7 MG/DL (ref 63–166)
EOSINOPHIL # BLD AUTO: 0.03 X10(3)/MCL (ref 0–0.9)
EOSINOPHIL # BLD AUTO: 0.03 X10(3)/MCL (ref 0–0.9)
EOSINOPHIL NFR BLD AUTO: 0.5 %
EOSINOPHIL NFR BLD AUTO: 0.5 %
ERYTHROCYTE [DISTWIDTH] IN BLOOD BY AUTOMATED COUNT: 18.8 % (ref 11.5–17)
ERYTHROCYTE [DISTWIDTH] IN BLOOD BY AUTOMATED COUNT: 18.9 % (ref 11.5–17)
FENTANYL UR QL SCN: NEGATIVE
GFR SERPLBLD CREATININE-BSD FMLA CKD-EPI: 32 MLS/MIN/1.73/M2
GFR SERPLBLD CREATININE-BSD FMLA CKD-EPI: 32 MLS/MIN/1.73/M2
GLOBULIN SER-MCNC: 4.7 GM/DL (ref 2.4–3.5)
GLOBULIN SER-MCNC: 4.7 GM/DL (ref 2.4–3.5)
GLUCOSE SERPL-MCNC: 90 MG/DL (ref 82–115)
GLUCOSE SERPL-MCNC: 92 MG/DL (ref 82–115)
GLUCOSE UR QL STRIP.AUTO: ABNORMAL MG/DL
HCT VFR BLD AUTO: 32.6 % (ref 42–52)
HCT VFR BLD AUTO: 34.1 % (ref 42–52)
HGB BLD-MCNC: 10.2 G/DL (ref 14–18)
HGB BLD-MCNC: 10.8 G/DL (ref 14–18)
HYALINE CASTS #/AREA URNS LPF: ABNORMAL /LPF
IMM GRANULOCYTES # BLD AUTO: 0.02 X10(3)/MCL (ref 0–0.04)
IMM GRANULOCYTES # BLD AUTO: 0.03 X10(3)/MCL (ref 0–0.04)
IMM GRANULOCYTES NFR BLD AUTO: 0.3 %
IMM GRANULOCYTES NFR BLD AUTO: 0.5 %
KETONES UR QL STRIP.AUTO: NEGATIVE MG/DL
LACTATE SERPL-SCNC: 1.9 MMOL/L (ref 0.5–2.2)
LEUKOCYTE ESTERASE UR QL STRIP.AUTO: 250 UNIT/L
LIPASE SERPL-CCNC: 19 U/L
LYMPHOCYTES # BLD AUTO: 0.95 X10(3)/MCL (ref 0.6–4.6)
LYMPHOCYTES # BLD AUTO: 1.09 X10(3)/MCL (ref 0.6–4.6)
LYMPHOCYTES NFR BLD AUTO: 15.9 %
LYMPHOCYTES NFR BLD AUTO: 18.6 %
MAGNESIUM SERPL-MCNC: 2.5 MG/DL (ref 1.6–2.6)
MAGNESIUM SERPL-MCNC: 2.6 MG/DL (ref 1.6–2.6)
MCH RBC QN AUTO: 26 PG (ref 27–31)
MCH RBC QN AUTO: 26.5 PG (ref 27–31)
MCHC RBC AUTO-ENTMCNC: 31.3 G/DL (ref 33–36)
MCHC RBC AUTO-ENTMCNC: 31.7 G/DL (ref 33–36)
MCV RBC AUTO: 83.2 FL (ref 80–94)
MCV RBC AUTO: 83.8 FL (ref 80–94)
MDMA UR QL SCN: NEGATIVE
MONOCYTES # BLD AUTO: 0.68 X10(3)/MCL (ref 0.1–1.3)
MONOCYTES # BLD AUTO: 0.76 X10(3)/MCL (ref 0.1–1.3)
MONOCYTES NFR BLD AUTO: 11.4 %
MONOCYTES NFR BLD AUTO: 13 %
MUCOUS THREADS URNS QL MICRO: ABNORMAL /LPF
NEUTROPHILS # BLD AUTO: 3.91 X10(3)/MCL (ref 2.1–9.2)
NEUTROPHILS # BLD AUTO: 4.29 X10(3)/MCL (ref 2.1–9.2)
NEUTROPHILS NFR BLD AUTO: 66.9 %
NEUTROPHILS NFR BLD AUTO: 71.6 %
NITRITE UR QL STRIP.AUTO: NEGATIVE
NRBC BLD AUTO-RTO: 0 %
NRBC BLD AUTO-RTO: 0 %
OPIATES UR QL SCN: NEGATIVE
PCP UR QL: NEGATIVE
PH UR STRIP.AUTO: 5.5 [PH]
PH UR: 5.5 [PH] (ref 3–11)
PHOSPHATE SERPL-MCNC: 4.1 MG/DL (ref 2.3–4.7)
PLATELET # BLD AUTO: 175 X10(3)/MCL (ref 130–400)
PLATELET # BLD AUTO: 197 X10(3)/MCL (ref 130–400)
PMV BLD AUTO: 12.3 FL (ref 7.4–10.4)
PMV BLD AUTO: 12.6 FL (ref 7.4–10.4)
POTASSIUM SERPL-SCNC: 4.5 MMOL/L (ref 3.5–5.1)
POTASSIUM SERPL-SCNC: 4.9 MMOL/L (ref 3.5–5.1)
PROT SERPL-MCNC: 7.5 GM/DL (ref 5.8–7.6)
PROT SERPL-MCNC: 7.9 GM/DL (ref 5.8–7.6)
PROT UR QL STRIP.AUTO: ABNORMAL MG/DL
PROT UR STRIP-MCNC: 41.3 MG/DL
RBC # BLD AUTO: 3.92 X10(6)/MCL (ref 4.7–6.1)
RBC # BLD AUTO: 4.07 X10(6)/MCL (ref 4.7–6.1)
RBC #/AREA URNS AUTO: ABNORMAL /HPF
RBC UR QL AUTO: ABNORMAL UNIT/L
SODIUM SERPL-SCNC: 134 MMOL/L (ref 136–145)
SODIUM SERPL-SCNC: 136 MMOL/L (ref 136–145)
SP GR UR STRIP.AUTO: 1.02
SPECIFIC GRAVITY, URINE AUTO (.000) (OHS): 1.02 (ref 1–1.03)
SQUAMOUS #/AREA URNS LPF: ABNORMAL /HPF
TROPONIN I SERPL-MCNC: 0.13 NG/ML (ref 0–0.04)
TROPONIN I SERPL-MCNC: 0.16 NG/ML (ref 0–0.04)
TROPONIN I SERPL-MCNC: 0.17 NG/ML (ref 0–0.04)
URINE PROTEIN/CREATININE RATIO (OHS): 0.4
UROBILINOGEN UR STRIP-ACNC: ABNORMAL MG/DL
WBC # SPEC AUTO: 5.9 X10(3)/MCL (ref 4.5–11.5)
WBC # SPEC AUTO: 6 X10(3)/MCL (ref 4.5–11.5)
WBC #/AREA URNS AUTO: ABNORMAL /HPF

## 2023-04-25 PROCEDURE — 83605 ASSAY OF LACTIC ACID: CPT | Performed by: INTERNAL MEDICINE

## 2023-04-25 PROCEDURE — 63600175 PHARM REV CODE 636 W HCPCS: Mod: JZ,JG

## 2023-04-25 PROCEDURE — 87077 CULTURE AEROBIC IDENTIFY: CPT | Performed by: EMERGENCY MEDICINE

## 2023-04-25 PROCEDURE — 86706 HEP B SURFACE ANTIBODY: CPT | Performed by: INTERNAL MEDICINE

## 2023-04-25 PROCEDURE — 93005 ELECTROCARDIOGRAM TRACING: CPT

## 2023-04-25 PROCEDURE — 81001 URINALYSIS AUTO W/SCOPE: CPT | Performed by: EMERGENCY MEDICINE

## 2023-04-25 PROCEDURE — 25000003 PHARM REV CODE 250: Performed by: STUDENT IN AN ORGANIZED HEALTH CARE EDUCATION/TRAINING PROGRAM

## 2023-04-25 PROCEDURE — 63600175 PHARM REV CODE 636 W HCPCS: Performed by: EMERGENCY MEDICINE

## 2023-04-25 PROCEDURE — 25000003 PHARM REV CODE 250: Performed by: INTERNAL MEDICINE

## 2023-04-25 PROCEDURE — 86036 ANCA SCREEN EACH ANTIBODY: CPT | Mod: 90 | Performed by: STUDENT IN AN ORGANIZED HEALTH CARE EDUCATION/TRAINING PROGRAM

## 2023-04-25 PROCEDURE — 84484 ASSAY OF TROPONIN QUANT: CPT | Performed by: STUDENT IN AN ORGANIZED HEALTH CARE EDUCATION/TRAINING PROGRAM

## 2023-04-25 PROCEDURE — 84100 ASSAY OF PHOSPHORUS: CPT | Performed by: STUDENT IN AN ORGANIZED HEALTH CARE EDUCATION/TRAINING PROGRAM

## 2023-04-25 PROCEDURE — 82570 ASSAY OF URINE CREATININE: CPT | Performed by: STUDENT IN AN ORGANIZED HEALTH CARE EDUCATION/TRAINING PROGRAM

## 2023-04-25 PROCEDURE — S0179 MEGESTROL 20 MG: HCPCS | Performed by: STUDENT IN AN ORGANIZED HEALTH CARE EDUCATION/TRAINING PROGRAM

## 2023-04-25 PROCEDURE — 85025 COMPLETE CBC W/AUTO DIFF WBC: CPT | Performed by: STUDENT IN AN ORGANIZED HEALTH CARE EDUCATION/TRAINING PROGRAM

## 2023-04-25 PROCEDURE — 83735 ASSAY OF MAGNESIUM: CPT | Performed by: STUDENT IN AN ORGANIZED HEALTH CARE EDUCATION/TRAINING PROGRAM

## 2023-04-25 PROCEDURE — 80307 DRUG TEST PRSMV CHEM ANLYZR: CPT | Performed by: STUDENT IN AN ORGANIZED HEALTH CARE EDUCATION/TRAINING PROGRAM

## 2023-04-25 PROCEDURE — 80053 COMPREHEN METABOLIC PANEL: CPT | Performed by: STUDENT IN AN ORGANIZED HEALTH CARE EDUCATION/TRAINING PROGRAM

## 2023-04-25 PROCEDURE — P9047 ALBUMIN (HUMAN), 25%, 50ML: HCPCS | Mod: JZ,JG

## 2023-04-25 PROCEDURE — 11000001 HC ACUTE MED/SURG PRIVATE ROOM

## 2023-04-25 PROCEDURE — 97162 PT EVAL MOD COMPLEX 30 MIN: CPT

## 2023-04-25 PROCEDURE — 27000221 HC OXYGEN, UP TO 24 HOURS

## 2023-04-25 PROCEDURE — 97166 OT EVAL MOD COMPLEX 45 MIN: CPT

## 2023-04-25 PROCEDURE — 86039 ANTINUCLEAR ANTIBODIES (ANA): CPT | Mod: 90 | Performed by: STUDENT IN AN ORGANIZED HEALTH CARE EDUCATION/TRAINING PROGRAM

## 2023-04-25 PROCEDURE — 88108 CYTOPATH CONCENTRATE TECH: CPT | Mod: TC | Performed by: STUDENT IN AN ORGANIZED HEALTH CARE EDUCATION/TRAINING PROGRAM

## 2023-04-25 PROCEDURE — S4991 NICOTINE PATCH NONLEGEND: HCPCS | Performed by: STUDENT IN AN ORGANIZED HEALTH CARE EDUCATION/TRAINING PROGRAM

## 2023-04-25 RX ORDER — IBUPROFEN 200 MG
1 TABLET ORAL DAILY
Status: DISCONTINUED | OUTPATIENT
Start: 2023-04-25 | End: 2023-05-05 | Stop reason: HOSPADM

## 2023-04-25 RX ORDER — HEPARIN SODIUM 5000 [USP'U]/ML
5000 INJECTION, SOLUTION INTRAVENOUS; SUBCUTANEOUS EVERY 8 HOURS
Status: DISCONTINUED | OUTPATIENT
Start: 2023-04-25 | End: 2023-04-25

## 2023-04-25 RX ORDER — ATORVASTATIN CALCIUM 40 MG/1
40 TABLET, FILM COATED ORAL NIGHTLY
Status: DISCONTINUED | OUTPATIENT
Start: 2023-04-25 | End: 2023-05-05 | Stop reason: HOSPADM

## 2023-04-25 RX ORDER — NALOXONE HCL 0.4 MG/ML
0.02 VIAL (ML) INJECTION
Status: DISCONTINUED | OUTPATIENT
Start: 2023-04-25 | End: 2023-05-05 | Stop reason: HOSPADM

## 2023-04-25 RX ORDER — DEXTROSE 40 %
30 GEL (GRAM) ORAL
Status: DISCONTINUED | OUTPATIENT
Start: 2023-04-25 | End: 2023-05-05 | Stop reason: HOSPADM

## 2023-04-25 RX ORDER — MEGESTROL ACETATE 40 MG/ML
200 SUSPENSION ORAL DAILY
Status: DISCONTINUED | OUTPATIENT
Start: 2023-04-25 | End: 2023-04-26

## 2023-04-25 RX ORDER — ALBUMIN HUMAN 250 G/1000ML
25 SOLUTION INTRAVENOUS 2 TIMES DAILY
Status: DISCONTINUED | OUTPATIENT
Start: 2023-04-25 | End: 2023-05-01

## 2023-04-25 RX ORDER — FUROSEMIDE 10 MG/ML
40 INJECTION INTRAMUSCULAR; INTRAVENOUS
Status: COMPLETED | OUTPATIENT
Start: 2023-04-25 | End: 2023-04-25

## 2023-04-25 RX ORDER — TALC
6 POWDER (GRAM) TOPICAL NIGHTLY PRN
Status: DISCONTINUED | OUTPATIENT
Start: 2023-04-25 | End: 2023-05-05 | Stop reason: HOSPADM

## 2023-04-25 RX ORDER — FUROSEMIDE 10 MG/ML
40 INJECTION INTRAMUSCULAR; INTRAVENOUS ONCE
Status: DISCONTINUED | OUTPATIENT
Start: 2023-04-25 | End: 2023-04-25

## 2023-04-25 RX ORDER — ASPIRIN 325 MG
325 TABLET ORAL ONCE
Status: COMPLETED | OUTPATIENT
Start: 2023-04-25 | End: 2023-04-25

## 2023-04-25 RX ORDER — ACETAMINOPHEN 500 MG
1000 TABLET ORAL ONCE AS NEEDED
Status: COMPLETED | OUTPATIENT
Start: 2023-04-25 | End: 2023-04-25

## 2023-04-25 RX ORDER — BUMETANIDE 0.25 MG/ML
2 INJECTION INTRAMUSCULAR; INTRAVENOUS EVERY 12 HOURS
Status: DISCONTINUED | OUTPATIENT
Start: 2023-04-25 | End: 2023-05-04

## 2023-04-25 RX ORDER — ASPIRIN 81 MG/1
81 TABLET ORAL DAILY
Status: DISCONTINUED | OUTPATIENT
Start: 2023-04-25 | End: 2023-05-05 | Stop reason: HOSPADM

## 2023-04-25 RX ORDER — SODIUM CHLORIDE 0.9 % (FLUSH) 0.9 %
10 SYRINGE (ML) INJECTION EVERY 12 HOURS PRN
Status: DISCONTINUED | OUTPATIENT
Start: 2023-04-25 | End: 2023-05-05 | Stop reason: HOSPADM

## 2023-04-25 RX ORDER — DEXTROSE 40 %
15 GEL (GRAM) ORAL
Status: DISCONTINUED | OUTPATIENT
Start: 2023-04-25 | End: 2023-05-05 | Stop reason: HOSPADM

## 2023-04-25 RX ORDER — BUMETANIDE 0.25 MG/ML
1.5 INJECTION INTRAMUSCULAR; INTRAVENOUS EVERY 12 HOURS
Status: DISCONTINUED | OUTPATIENT
Start: 2023-04-25 | End: 2023-04-25

## 2023-04-25 RX ORDER — METOLAZONE 5 MG/1
10 TABLET ORAL DAILY
Status: DISCONTINUED | OUTPATIENT
Start: 2023-04-25 | End: 2023-04-29

## 2023-04-25 RX ORDER — FUROSEMIDE 10 MG/ML
80 INJECTION INTRAMUSCULAR; INTRAVENOUS
Status: DISCONTINUED | OUTPATIENT
Start: 2023-04-25 | End: 2023-04-25

## 2023-04-25 RX ORDER — SODIUM CHLORIDE 0.9 % (FLUSH) 0.9 %
10 SYRINGE (ML) INJECTION
Status: DISCONTINUED | OUTPATIENT
Start: 2023-04-25 | End: 2023-05-05 | Stop reason: HOSPADM

## 2023-04-25 RX ORDER — ATOVAQUONE 750 MG/5ML
1500 SUSPENSION ORAL DAILY
Status: DISCONTINUED | OUTPATIENT
Start: 2023-04-25 | End: 2023-05-05 | Stop reason: HOSPADM

## 2023-04-25 RX ORDER — GLUCAGON 1 MG
1 KIT INJECTION
Status: DISCONTINUED | OUTPATIENT
Start: 2023-04-25 | End: 2023-05-05 | Stop reason: HOSPADM

## 2023-04-25 RX ADMIN — APIXABAN 5 MG: 2.5 TABLET, FILM COATED ORAL at 09:04

## 2023-04-25 RX ADMIN — ATORVASTATIN CALCIUM 40 MG: 40 TABLET, FILM COATED ORAL at 03:04

## 2023-04-25 RX ADMIN — NICOTINE 1 PATCH: 14 PATCH, EXTENDED RELEASE TRANSDERMAL at 09:04

## 2023-04-25 RX ADMIN — MELATONIN TAB 3 MG 6 MG: 3 TAB at 09:04

## 2023-04-25 RX ADMIN — SACUBITRIL AND VALSARTAN 1 TABLET: 24; 26 TABLET, FILM COATED ORAL at 09:04

## 2023-04-25 RX ADMIN — ACETAMINOPHEN 1000 MG: 500 TABLET, FILM COATED ORAL at 11:04

## 2023-04-25 RX ADMIN — METOPROLOL SUCCINATE 12.5 MG: 25 TABLET, EXTENDED RELEASE ORAL at 09:04

## 2023-04-25 RX ADMIN — BUMETANIDE 1.5 MG: 0.25 INJECTION INTRAMUSCULAR; INTRAVENOUS at 10:04

## 2023-04-25 RX ADMIN — ASPIRIN 325 MG ORAL TABLET 325 MG: 325 PILL ORAL at 03:04

## 2023-04-25 RX ADMIN — ATOVAQUONE 1500 MG: 750 SUSPENSION ORAL at 09:04

## 2023-04-25 RX ADMIN — ATORVASTATIN CALCIUM 40 MG: 40 TABLET, FILM COATED ORAL at 09:04

## 2023-04-25 RX ADMIN — MEGESTROL ACETATE 200 MG: 400 SUSPENSION ORAL at 09:04

## 2023-04-25 RX ADMIN — ASPIRIN 81 MG: 81 TABLET, COATED ORAL at 09:04

## 2023-04-25 RX ADMIN — ALBUMIN (HUMAN) 25 G: 12.5 SOLUTION INTRAVENOUS at 09:04

## 2023-04-25 RX ADMIN — METOLAZONE 10 MG: 5 TABLET ORAL at 11:04

## 2023-04-25 RX ADMIN — BUMETANIDE 2 MG: 0.25 INJECTION, SOLUTION INTRAMUSCULAR; INTRAVENOUS at 09:04

## 2023-04-25 RX ADMIN — ALBUMIN (HUMAN) 25 G: 12.5 SOLUTION INTRAVENOUS at 11:04

## 2023-04-25 RX ADMIN — FUROSEMIDE 40 MG: 10 INJECTION, SOLUTION INTRAMUSCULAR; INTRAVENOUS at 01:04

## 2023-04-25 RX ADMIN — BICTEGRAVIR SODIUM, EMTRICITABINE, AND TENOFOVIR ALAFENAMIDE FUMARATE 1 TABLET: 50; 200; 25 TABLET ORAL at 09:04

## 2023-04-25 RX ADMIN — MENTHOL, METHYL SALICYLATE: 10; 15 CREAM TOPICAL at 11:04

## 2023-04-25 NOTE — PLAN OF CARE
Problem: Adult Inpatient Plan of Care  Goal: Plan of Care Review  Outcome: Ongoing, Progressing  Goal: Patient-Specific Goal (Individualized)  Outcome: Ongoing, Progressing  Goal: Absence of Hospital-Acquired Illness or Injury  Outcome: Ongoing, Progressing  Goal: Optimal Comfort and Wellbeing  Outcome: Ongoing, Progressing  Goal: Readiness for Transition of Care  Outcome: Ongoing, Progressing     Problem: Fall Injury Risk  Goal: Absence of Fall and Fall-Related Injury  Outcome: Ongoing, Progressing     Problem: Fluid Volume Excess  Goal: Fluid Balance  Outcome: Ongoing, Progressing

## 2023-04-25 NOTE — PROGRESS NOTES
"Inpatient Nutrition Evaluation    Admit Date: 4/24/2023   Total duration of encounter: 1 day    Nutrition Recommendation/Prescription     Heart Healthy diet  Monitor Weights daily     Nutrition Assessment     Chart Review    Reason Seen: continuous nutrition monitoring    Malnutrition Screening Tool Results   Have you recently lost weight without trying?: No  Have you been eating poorly because of a decreased appetite?: No   MST Score: 0     Diagnosis:  CHF exacerbation, Afib, NSTEMI, HOLLIS, Right sided pleural effusion, HIV, polysubstance abuse    Relevant Medical History: Afib, HIV, HFrEF, Severe Aortic Stenosis, HTN, HLD, Lymphoma, Polysubstance use    Nutrition-Related Medications: ASA, Atorvastatin, Bumex, Megace    Nutrition-Related Labs:  4/25/23 -- Glu 92, Na 134 L, K 4.5, BUN 46.9 H, Cr 2.28 H, Phos 4.1    Diet Order: Diet Low Sodium, 2gm  Oral Supplement Order: none  Appetite/Oral Intake: good/% of meals  Factors Affecting Nutritional Intake: early satiety  Food/Nondenominational/Cultural Preferences: none reported  Food Allergies: none reported    Skin Integrity: intact  Wound(s):   skin intact    Comments    4/25/23 -- Pt reports good appetite this am eating ~75% of breakfast, denies n/v; reports appetite up/down at times d/t early satiety, suggest small frequent meals/snacks, Megace ordered this admit noted; no indication of wt loss per EMR wt hx, pt confirms no wt loss; WT currently elevated related to edema    Anthropometrics    Height: 5' 11" (180.3 cm) Height Method: Stated  Last Weight: 102.3 kg (225 lb 8.5 oz) (04/25/23 0330) Weight Method: Bed Scale  BMI (Calculated): 31.5  BMI Classification: obese grade I (BMI 30-34.9)        Ideal Body Weight (IBW), Male: 172 lb     % Ideal Body Weight, Male (lb): 130.61 %                          Usual Weight Provided By: patient denies unintentional weight loss    Wt Readings from Last 5 Encounters:   04/25/23 102.3 kg (225 lb 8.5 oz)   04/22/23 100 kg (220 lb " 7.4 oz)   04/20/23 104.1 kg (229 lb 9.6 oz)   04/14/23 99.3 kg (218 lb 14.4 oz)   04/11/23 101.6 kg (223 lb 15.8 oz)     Weight Change(s) Since Admission:  Admit Weight: 101.9 kg (224 lb 10.4 oz) (04/25/23 0004)      Patient Education    Not applicable.    Monitoring & Evaluation     Dietitian will monitor food and beverage intake, weight change, electrolyte/renal panel, and gastrointestinal profile.  Nutrition Risk/Follow-Up: low (follow-up in 5-7 days)  Patients assigned 'low nutrition risk' status do not qualify for a full nutritional assessment but will be monitored and re-evaluated in a 5-7 day time period. Please consult if re-evaluation needed sooner.

## 2023-04-25 NOTE — CONSULTS
"[facility]  Nephrology Consultation  Patient Name: Jason Perdue  Age: 63 y.o.  : 1960  MRN: [id number]  Admission Date: 2023    Date of Consultation: 2023    Consultation Requested By: Dr. Campbell     Reason for Consultation: HOLLIS and potential cardiorenal syndrome     Chief Complaint: Abdominal Pain (Pt c/o midline upper abdominal pain and sob when ambulating. Hx of chf. Brought in by ems.b/p low in route. Pt reports pain to abdomen 8/10. Reports onset after large bm on this morning.)      History of Present Illness:  Mr. Jason Perdue is a 63 y.o. Black or  male with past medical history of HIV, HFrEF, polysubstance abuse. Admitted with SOB, lower extremity edema elevated BNP, suspected CHF exacerbation. Renal indices have worsened from baseline. Labs shows anemia , metabolic acidosis, HOLLIS. CXR with perihilar congestion. He was given Lasix 40mg in ED followed by 1.5mg IV bumex with little UOP.  Renal consulted for HOLLIS, diuretics management.     Patient is allergic to ace inhibitors and nitroglycerin.     Review of systems: Review of Systems    Past Medical History:  has a past medical history of Cancer, CHF (congestive heart failure), Human immunodeficiency virus (HIV) disease, and Hypertension.    Procedure History:  has no past surgical history on file.    Family History: family history is not on file.    Social History:  reports that he has been smoking cigarettes. He has been smoking an average of .5 packs per day. He has never used smokeless tobacco. He reports that he does not currently use alcohol. He reports current drug use. Drug: "Crack" cocaine.    Physical Exam:   BP 93/61   Pulse 80   Temp 97.6 °F (36.4 °C) (Oral)   Resp 18   Ht 5' 11" (1.803 m)   Wt 102.3 kg (225 lb 8.5 oz)   SpO2 98%   BMI 31.46 kg/m²  Body mass index is 31.46 kg/m².  General: In no acute distress, resting in bed   Neck: No JVD   Cardio: Distant heart sounds, RRR, 2/6 " systolic ejection murmur   Respiratory: Crackles in bilateral bases   Abdomen: Lower abdominal wall edema, +BS, non tender   Extremities: 3+ pitting edema bilaterally to the thigh   Neuro: AAOx3, no focal deficit observed     Inpatient Medications:     Current Facility-Administered Medications:     albumin human 25% bottle 25 g, 25 g, Intravenous, BID, Lizette Beal MD, Last Rate: 100 mL/hr at 04/25/23 1108, 25 g at 04/25/23 1108    apixaban tablet 5 mg, 5 mg, Oral, BID, Kike Chadwick MD, 5 mg at 04/25/23 0908    aspirin EC tablet 81 mg, 81 mg, Oral, Daily, Kike Chadwick MD, 81 mg at 04/25/23 0907    atorvastatin tablet 40 mg, 40 mg, Oral, QHS, Kike Chadwick MD, 40 mg at 04/25/23 0304    atovaquone 750 mg/5 mL oral liquid 1,500 mg, 1,500 mg, Oral, Daily, Kike Chadwick MD, 1,500 mg at 04/25/23 0907    pzdwzfpnq-iutjsfxv-atosjar ala -25 mg (25 kg or greater) 1 tablet, 1 tablet, Oral, Daily, Kike Chadwick MD, 1 tablet at 04/25/23 0908    bumetanide injection 2 mg, 2 mg, Intravenous, Q12H, Silvina Forbes MD    dextrose 10% bolus 125 mL 125 mL, 12.5 g, Intravenous, PRN, Kike Chadwick MD    dextrose 10% bolus 250 mL 250 mL, 25 g, Intravenous, PRN, Kike Chadwick MD    dextrose 40 % gel 15,000 mg, 15 g, Oral, PRN, Kike Chadwick MD    dextrose 40 % gel 30,000 mg, 30 g, Oral, PRN, Kike Chadwick MD    glucagon (human recombinant) injection 1 mg, 1 mg, Intramuscular, PRN, Kike Chadwick MD    megestroL 400 mg/10 mL (10 mL) suspension 200 mg, 200 mg, Oral, Daily, Kike Chadwick MD, 200 mg at 04/25/23 0907    melatonin tablet 6 mg, 6 mg, Oral, Nightly PRN, Kike Chadwick MD    metOLazone tablet 10 mg, 10 mg, Oral, Daily, Silvina Forbes MD    naloxone 0.4 mg/mL injection 0.02 mg, 0.02 mg, Intravenous, PRN, Kike Chadwick MD    nicotine 14 mg/24 hr 1 patch, 1 patch, Transdermal, Daily, Kike Chadwick MD, 1 patch at 04/25/23 0908    sodium chloride 0.9% flush 10 mL, 10 mL, Intravenous, Q12H PRN,  Kike Chadwick MD    sodium chloride 0.9% flush 10 mL, 10 mL, Intravenous, PRN, Kike Chadwick MD     Laboratory Data:   Serum  Lab Results   Component Value Date    WBC 5.9 04/25/2023    HGB 10.2 (L) 04/25/2023    HCT 32.6 (L) 04/25/2023     04/25/2023    IRON 25 (L) 12/26/2018    TIBC 359 12/26/2018    TRANS 282.0 12/26/2018    LABIRON 7.0 (L) 12/26/2018    FERRITIN 60.8 12/26/2018     (H) 03/14/2023     (L) 04/25/2023    K 4.5 04/25/2023    CHLORIDE 104 04/25/2023    CO2 17 (L) 04/25/2023    BUN 46.9 (H) 04/25/2023    CREATININE 2.28 (H) 04/25/2023    EGFRNORACEVR 32 04/25/2023    GLUCOSE 92 04/25/2023    CALCIUM 9.0 04/25/2023    ALKPHOS 138 04/25/2023    LABPROT 7.5 04/25/2023    ALBUMIN 2.8 (L) 04/25/2023    BILIDIR 0.3 03/15/2022    IBILI 0.30 03/15/2022    AST 43 (H) 04/25/2023    ALT 28 04/25/2023    MG 2.50 04/25/2023    PHOS 4.1 04/25/2023      Lab Results   Component Value Date    CANCA Negative 04/19/2023    PANCA Negative 04/19/2023    HEPBSURFAG Reactive (A) 11/29/2021    HEPBSAB Nonreactive 11/29/2021    MSPIKEPCT  04/19/2023      Comment:      NOT OBSERVED     Urine:  Lab Results   Component Value Date    COLORUA Yellow 04/22/2023    APPEARANCEUA Turbid (A) 04/22/2023    SGUA 1.018 04/22/2023    PHUA 5.0 04/22/2023    PROTEINUA 1+ (A) 04/22/2023    GLUCOSEUA Normal 04/22/2023    KETONESUA Negative 04/22/2023    BLOODUA 3+ (A) 04/22/2023    NITRITESUA Negative 04/22/2023    LEUKOCYTESUR 25 (A) 04/22/2023    RBCUA >100 (A) 04/22/2023    WBCUA 11-20 (A) 04/22/2023    BACTERIA Trace (A) 04/22/2023    SQEPUA None Seen 04/22/2023    HYALINECASTS 3-5 (A) 04/22/2023    CREATRANDUR 136.7 04/19/2023    PROTEINURINE 45.5 04/19/2023    UPROTCREA 0.3 04/19/2023      Microbiology Results (last 7 days)       ** No results found for the last 168 hours. **             Impression/Plan:   HOLLIS on CKD   Cardiorenal syndrome   Ischemic ATN   - Minimal UOP w/ diuretics in ED   - Increase Bumex to 2mg  BID   - Continue albumin 25g BID, need to give Bumex half way through albumin infusion. Will reach out to nursing/primary team   - Metolazone 10mg daily   - If no further response to diuretics likely will require HD in AM, continue to monitor     4. HAGMA   - Elevated AG when corrected for albumin   - Check LA, suspect secondary to hypoperfusion   - Continue to monitor, CMP in AM     5. Proteinuria   - Check Prt/Crt ratio     6. Microscopic hematuria  - Check JUAN, ANCA, urine cytology     Thank you very much for your consultation.     Anders Worley MD -III   [facility] Nephrology  4/25/2023 11:44 AM

## 2023-04-25 NOTE — PT/OT/SLP EVAL
Physical Therapy Evaluation    Patient Name:  Jason Perdue   MRN:  [id number]    Recommendations:     Discharge Recommendations:   SNF versus REHAB  Discharge Equipment Recommendations: none   Barriers to discharge: medical diagnosis    Assessment:     Jason Perdue is a 63 y.o. male admitted with a medical diagnosis of <principal problem not specified>.  He presents with the following impairments/functional limitations:  weakness, impaired endurance, impaired self care skills, impaired functional mobility, gait instability, decreased lower extremity function, impaired cardiopulmonary response to activity, edema.  1. HOLLIS (acute kidney injury)    2. CHF (congestive heart failure)    3. Chest pain    4. NSTEMI (non-ST elevated myocardial infarction)        Rehab Prognosis: Good.    Patient would benefit from continued skilled acute PT services to: address above listed impairments/functional limitations; receive patient/caregiver education; reduce fall risk; and maximize independency/safety with functional mobility.    Recent Surgery: * No surgery found *      Plan:     During this hospitalization, patient to be seen  (3-5xwk) to address the identified impairments/functional limitations via gait training, therapeutic activities, therapeutic exercises and progress toward the established goals.    Plan of Care Expires:  05/24/23    Subjective     Communicated with patient's nurse Janessa prior to session.    Patient agreeable to participate in evaluation.     Chief Complaint: feeling weak  Patient/Family Comments/goals: to walk better  Pain/Comfort:  Pain Rating 1: 0/10  Pain Rating Post-Intervention 1: 0/10    Patients cultural, spiritual, [employer]aritan conflicts given the current situation: no    Social History:  Living Environment: Patient lives alone in a single level home, with no steps, ramp, with walk-in shower.  Functional Level: Prior to admission patient was independent in ADL's and pt. Was working until 2 weeks  ago. He is now retired .  Equipment at Home :shower chair, grab bar, walker, rolling  Assistance Upon Discharge:  pt.'s niece, who lives on the property .    Objective:     Patient found supine in bed with PureWick, telemetry  upon PT entry to room.    General Precautions: Standard, fall   Orthopedic Precautions:N/A   Braces: N/A  Respiratory Status: room air    Vitals     O2 Sat %  99     Exams:  Orientation: Patient is oriented to Person, Place, Time, Situation  Commands: Patient follows commands consistently  Gross Motor Coordination:  WFL  RUE ROM: WFL  LUE ROM: WFL  RLE ROM: WFL  RLE Strength: WFL  LLE ROM: WFL  LLE Strength: WFL    Functional Mobility:    Bed Mobility:  Supine to Sit: modified independence    Transfers:  Sit to Stand: supervision with rolling walker    Gait:  Patient ambulated 60 ft with rolling walker and contact guard assistance.  Patient demonstrates no loss of balance, no mis-steps, decreased sylvain, and ambulates outside WILLIAM of RW.    Other Mobility:  not assessed  Pt. Was getting weak after 60 ft and had to rest. Pt. Sat down in a chair. He walked then about 20 more ft back to his room and was tired and felt SOB, but SAT's were 99%.  Balance:  Good during ambulation.  Patient left with HOB elevated with all lines intact, call button in reach, and nurse Janessa and Kalyani notified.    Education     Patient was instructed to utilize staff assistance for mobility/transfers.  White board updated regarding patient's safest level of mobility with staff assistance.    Goals     Multidisciplinary Problems       Physical Therapy Goals          Problem: Physical Therapy    Goal Priority Disciplines Outcome Goal Variances Interventions   Physical Therapy Goal     PT, PT/OT Ongoing, Progressing     Description: Goals to be met by: dc     Patient will increase functional independence with mobility by performin. Sit to stand transfer with Modified [place]  2. Gait  x 130 feet with Modified  [place] using Rolling Walker.                        History:     Past Medical History:   Diagnosis Date    Cancer     CHF (congestive heart failure)     Human immunodeficiency virus (HIV) disease     Hypertension      No past surgical history on file.  Time Tracking:     PT Received On: 04/25/23  PT Start Time: 0815     PT Stop Time: 0840  PT Total Time (min): 25 min     Billable Minutes: Evaluation 25 04/25/2023

## 2023-04-25 NOTE — PT/OT/SLP EVAL
Occupational Therapy   Evaluation    Name: Jason Perdue  MRN: [id number]  Admitting Diagnosis:   1. HOLLIS (acute kidney injury)    2. CHF (congestive heart failure)    3. Chest pain    4. NSTEMI (non-ST elevated myocardial infarction)      Recent Surgery: * No surgery found *      Recommendations:     Discharge Recommendations: nursing facility, skilled, rehabilitation facility  Discharge Equipment Recommendations:  none  Barriers to discharge:  Decreased caregiver support, Other (Comment) (level of skilled assist at this time)    Assessment:     Jason Perdue is a 63 y.o. male with a medical diagnosis of see above.  He presents with functional decline. Performance deficits affecting function: weakness, impaired endurance, impaired self care skills, impaired functional mobility, gait instability, impaired balance, decreased lower extremity function, impaired cardiopulmonary response to activity, edema.      Rehab Prognosis: Good; patient would benefit from acute skilled OT services to address these deficits and reach maximum level of function.       Plan:     Patient to be seen  (M-F 2-5 times per week) to address the above listed problems via self-care/home management, therapeutic activities, therapeutic exercises  Plan of Care Expires:  (discharge)  Plan of Care Reviewed with: patient    Subjective     Chief Complaint:   weakness and swelling   Patient/Family Comments/goals: get stronger    Occupational Profile:  Living Environment: Pt lives alone in SS house with no steps; and walk in shower with grab bars and shower chair; Pt frequents his niece's house next door ; she has a ramp   Previous level of function: independent basic self care tasks;; Pt retired ~ 1 m ago ; pt was walking without AD  ~ 2 miles to work everyday   Roles and Routines: pts niece performs I ADL'S and recently has been assisting as ;needed with basic self care tasks  Equipment Used at Home: grab bar, shower chair, walker, rolling  Assistance  upon Discharge: niece    Pain/Comfort:  Pain Rating 1: 0/10  Pain Rating Post-Intervention 1: 0/10    Patients cultural, spiritual, Congregation conflicts given the current situation: no    Objective:     Communicated with: Nurse Riddle prior to session.  Patient found HOB elevated with PureWick, peripheral IV, telemetry upon OT entry to room.    General Precautions: Standard, fall  Orthopedic Precautions: N/A  Braces: N/A  Respiratory Status: Room air      Occupational Performance:    Bed Mobility:    Patient completed Supine to Sit with stand by assistance  Patient completed Sit to Supine with stand by assistance    Functional Mobility/Transfers:  Patient completed Sit <> Stand Transfer with contact guard assistance  with  rolling walker   Patient completed Toilet Transfer Stand Pivot technique with minimum assistance with  grab bars  Functional Mobility: CGA with RW ~ 60 feet followed by seated rest and then 20 feet; Pt c/o of weakness and SOB ; O2 sats 99%    Activities of Daily Living:  Grooming: s/u EOB for brushing teeth and wiping face  ; pt too fatigued after ambulating to perform in standing at sink   Lower Body Dressing: stand by assistance R sock and max A L sock due to edema   Toileting: pt with purewick at this time and minimal voiding;       Cognitive/Visual Perceptual:  Cognitive/Psychosocial Skills:     -       Oriented to: Person, Place, Time, and Situation   -       Follows Commands/attention:Follows multistep  commands  -       Safety awareness/insight to disability: Fair     Physical Exam:  Edema:  B LE and R UE and hand  Sensation:    -       Intact  light/touch B UE and hands  Dominant hand: -       right  Upper Extremity Range of Motion:  -       Right Upper Extremity: grossly WFL  -       Left Upper Extremity: grossly WFL   Upper Extremity Strength: -       Right Upper Extremity: WFL  -       Left Upper Extremity: WFL   Strength:    -       Right Upper Extremity: WFL  -       Left Upper  Extremity: WFL  Fine Motor Coordination:    -       Intact  Left hand thumb/finger opposition skills and Right hand thumb/finger opposition skills      Treatment & Education:  Pt. educated on OT goals, POC, orientation to environment, use of call bell for assist with transfers OOB or for any other needs due to fall risk.     Patient left HOB elevated with all lines intact, call button in reach, and nurse kalpesh and Janessa notified    GOALS:   Multidisciplinary Problems       Occupational Therapy Goals          Problem: Occupational Therapy    Goal Priority Disciplines Outcome Interventions   Occupational Therapy Goal     OT, PT/OT Ongoing, Progressing    Description: Goals to be met by: discharge     Patient will increase functional independence with ADLs by performing:    LE Dressing with Modified [place].    Grooming while standing at sink with Modified [place].    Toileting from toilet with Modified [place] for hygiene and clothing management.     Toilet transfer to toilet with Modified [place].                         History:     Past Medical History:   Diagnosis Date    Cancer     CHF (congestive heart failure)     Human immunodeficiency virus (HIV) disease     Hypertension        No past surgical history on file.    Time Tracking:     OT Date of Treatment: 04/25/23  OT Start Time: 0814  OT Stop Time: 0838  OT Total Time (min): 24 min    Billable Minutes:Evaluation 24 min    4/25/2023

## 2023-04-25 NOTE — PLAN OF CARE
Problem: Occupational Therapy  Goal: Occupational Therapy Goal  Description: Goals to be met by: discharge     Patient will increase functional independence with ADLs by performing:    LE Dressing with Modified [place].    Grooming while standing at sink with Modified [place].    Toileting from toilet with Modified [place] for hygiene and clothing management.     Toilet transfer to toilet with Modified [place].    Outcome: Ongoing, Progressing

## 2023-04-25 NOTE — PLAN OF CARE
04/25/23 1457   Discharge Assessment   Assessment Type Discharge Planning Assessment   Confirmed/corrected address, phone number and insurance Yes   Confirmed Demographics Correct on Facesheet   Source of Information patient   When was your last doctors appointment?   (Yaritza Hubbard)   Reason For Admission CP   People in Home alone   Facility Arrived From: Home   Do you expect to return to your current living situation? Yes   Do you have help at home or someone to help you manage your care at home? Yes   Who are your caregiver(s) and their phone number(s)? Sister Stephani Perdue [phone]   Prior to hospitilization cognitive status: Alert/Oriented   Current cognitive status: Alert/Oriented   Walking or Climbing Stairs ambulation difficulty, requires equipment   Mobility Management Rollator   Equipment Currently Used at Home rollator   Readmission within 30 days? Yes   Patient currently being followed by outpatient case management? No   Do you take prescription medications? Yes   Do you have prescription coverage? Yes   Coverage MEDICAID - [employer] [place] [place]   Do you have any problems affording any of your prescribed medications? No   Is the patient taking medications as prescribed? yes   Who is going to help you get home at discharge? Sister   How do you get to doctors appointments? family or friend will provide   Are you on dialysis? No   Do you take coumadin? No   Discharge Plan A Home   DME Needed Upon Discharge  other (see comments)  (Life vest)   Discharge Plan discussed with: Patient   Discharge Barriers Identified None   Physical Activity   On average, how many days per week do you engage in moderate to strenuous exercise (like a brisk walk)? 0 days   On average, how many minutes do you engage in exercise at this level? 0 min   Financial Resource Strain   How hard is it for you to pay for the very basics like food, housing, medical care, and heating? Not hard   Housing Stability   In the last 12  months, was there a time when you were not able to pay the mortgage or rent on time? N   In the last 12 months, was there a time when you did not have a steady place to sleep or slept in a shelter (including now)? N   Transportation Needs   In the past 12 months, has lack of transportation kept you from medical appointments or from getting medications? no   In the past 12 months, has lack of transportation kept you from meetings, work, or from getting things needed for daily living? No   Food Insecurity   Within the past 12 months, you worried that your food would run out before you got the money to buy more. Never true   Within the past 12 months, the food you bought just didn't last and you didn't have money to get more. Never true   Stress   Do you feel stress - tense, restless, nervous, or anxious, or unable to sleep at night because your mind is troubled all the time - these days? Not at all   Social Connections   In a typical week, how many times do you talk on the phone with family, friends, or neighbors? Once a week   How often do you get together with friends or relatives? Once   How often do you attend Buddhist or Scientology services? Never   Do you belong to any clubs or organizations such as Buddhist groups, unions, fraternal or athletic groups, or school groups? No   How often do you attend meetings of the clubs or organizations you belong to? Never   Are you , , , , never , or living with a partner? Never marrie   Alcohol Use   Q1: How often do you have a drink containing alcohol? Never   Q2: How many drinks containing alcohol do you have on a typical day when you are drinking? None   Q3: How often do you have six or more drinks on one occasion? Never

## 2023-04-25 NOTE — ED PROVIDER NOTES
"ED PROVIDER NOTE  4/24/2023    CHIEF COMPLAINT:   Chief Complaint   Patient presents with    Abdominal Pain     Pt c/o midline upper abdominal pain and sob when ambulating. Hx of chf. Brought in by ems.b/p low in route. Pt reports pain to abdomen 8/10. Reports onset after large bm on this morning.       HISTORY OF PRESENT ILLNESS:   Jason Perdue is a 63 y.o. male who presents with chief complaint Abdominal pain.  Onset was today whenever he began having intermittent upper abdominal pain that he states would last for a couple of minutes and then spontaneously resolve, nothing specifically brought it on or aggravated it or made it go away.  States he did have diarrhea this morning but since it had normal stools.  Denies any nausea or vomiting.  He also reports having shortness of breath and cough that has been persistent over the past couple of days along with lower extremity swelling.  Denies chest pain or fever or hemoptysis.    The history is provided by the patient.       REVIEW OF SYSTEMS: as noted in the HPI.  NURSING NOTES REVIEWED      PAST MEDICAL/SURGICAL HISTORY:   Past Medical History:   Diagnosis Date    Cancer     CHF (congestive heart failure)     Human immunodeficiency virus (HIV) disease     Hypertension     No past surgical history on file.    FAMILY HISTORY: No family history on file.    SOCIAL HISTORY:   Social History     Tobacco Use    Smoking status: Every Day     Packs/day: 0.50     Types: Cigarettes    Smokeless tobacco: Never   Substance Use Topics    Alcohol use: Not Currently    Drug use: Yes     Types: "Crack" cocaine     Comment: last taken 1 month and a half       ALLERGIES:   Review of patient's allergies indicates:   Allergen Reactions    Ace inhibitors      Other reaction(s): Angioedema    Nitroglycerin Itching       PHYSICAL EXAM:  Initial Vitals [04/25/23 0004]   BP Pulse Resp Temp SpO2   (!) 82/66 84 (!) 23 97.7 °F (36.5 °C) 98 %      MAP       --         Physical Exam    Nursing " note and vitals reviewed.  Constitutional: He appears well-developed and well-nourished.   HENT:   Head: Normocephalic and atraumatic.   Nose: Nose normal.   Mouth/Throat: Oropharynx is clear and moist and mucous membranes are normal.   Eyes: Conjunctivae and EOM are normal. Pupils are equal, round, and reactive to light.   Neck: Neck supple. No tracheal deviation present.   Cardiovascular:  Normal rate, normal heart sounds, intact distal pulses and normal pulses. An irregular rhythm present.           Pulmonary/Chest: Effort normal. Tachypnea noted. He has decreased breath sounds (Dullness to percussion) in the right lower field. He has rales in the left lower field.   Abdominal: Abdomen is soft. There is abdominal tenderness in the right upper quadrant, epigastric area and left upper quadrant. There is no rebound and no guarding.   Musculoskeletal:         General: Normal range of motion.      Cervical back: Neck supple.      Right lower leg: 3+ Pitting Edema present.      Left lower leg: 3+ Pitting Edema present.     Neurological: He is alert and oriented to person, place, and time. GCS score is 15.   CN II-XII intact. Moves all extremities. No gross sensory or motor deficits.   Skin: Skin is warm, dry and intact.   Psychiatric: He has a normal mood and affect. His speech is normal and behavior is normal. Judgment and thought content normal. Cognition and memory are normal.       RESULTS:  Labs Reviewed   B-TYPE NATRIURETIC PEPTIDE - Abnormal; Notable for the following components:       Result Value    Natriuretic Peptide 14,971.8 (*)     All other components within normal limits   TROPONIN I - Abnormal; Notable for the following components:    Troponin-I 0.164 (*)     All other components within normal limits   CBC WITH DIFFERENTIAL - Abnormal; Notable for the following components:    RBC 4.07 (*)     Hgb 10.8 (*)     Hct 34.1 (*)     MCH 26.5 (*)     MCHC 31.7 (*)     RDW 18.9 (*)     MPV 12.6 (*)     All other  components within normal limits   COMPREHENSIVE METABOLIC PANEL - Abnormal; Notable for the following components:    Carbon Dioxide 21 (*)     Blood Urea Nitrogen 46.7 (*)     Creatinine 2.28 (*)     Protein Total 7.9 (*)     Albumin Level 3.2 (*)     Globulin 4.7 (*)     Albumin/Globulin Ratio 0.7 (*)     Bilirubin Total 3.2 (*)     Aspartate Aminotransferase 48 (*)     All other components within normal limits   COMPREHENSIVE METABOLIC PANEL - Abnormal; Notable for the following components:    Sodium Level 134 (*)     Carbon Dioxide 17 (*)     Blood Urea Nitrogen 46.9 (*)     Creatinine 2.28 (*)     Albumin Level 2.8 (*)     Globulin 4.7 (*)     Albumin/Globulin Ratio 0.6 (*)     Bilirubin Total 2.9 (*)     Aspartate Aminotransferase 43 (*)     All other components within normal limits   LIPASE - Normal   MAGNESIUM - Normal   PHOSPHORUS - Normal   MAGNESIUM - Normal   CBC W/ AUTO DIFFERENTIAL    Narrative:     The following orders were created for panel order CBC auto differential.  Procedure                               Abnormality         Status                     ---------                               -----------         ------                     CBC with Differential[[id number]]        Abnormal            Final result                 Please view results for these tests on the individual orders.   EXTRA TUBES    Narrative:     The following orders were created for panel order EXTRA TUBES.  Procedure                               Abnormality         Status                     ---------                               -----------         ------                     Light Blue Top Hold[[id number]]                                                         Gold Top Hold[[id number]]                                                                 Please view results for these tests on the individual orders.   LIGHT BLUE TOP HOLD   GOLD TOP HOLD   EXTRA TUBES    Narrative:     The following orders were created for panel  order EXTRA TUBES.  Procedure                               Abnormality         Status                     ---------                               -----------         ------                     Red Top Hold[[id number]]                                                                  Please view results for these tests on the individual orders.   RED TOP HOLD   URINALYSIS, REFLEX TO URINE CULTURE   DRUG SCREEN, URINE (BEAKER)     Imaging Results              CT Abdomen Pelvis  Without Contrast (Preliminary result)  Result time 04/25/23 02:27:52      Preliminary result by Saad Murguia MD (04/25/23 02:27:52)                   Narrative:    START OF REPORT:  Technique: CT of the abdomen and pelvis was performed with axial images as well as sagittal and coronal reconstruction images without intravenous contrast or oral contrast.    Comparison: Comparison is with study [id number] 14:58:26.    Clinical History: Epigastric pain.    Dosage Information: Automated Exposure Control was utilized 617.19 mGy.cm.    Findings:  Lines and Tubes: None.  Thorax:  Lungs: Again noted is a large right pleural effusion with compressive atelectasis of the right lower lobe. This is not significantly changed since the prior examination. There is interval decrease in the size of the left lower lobe pulmonary nodule, which now measures at 3 mm (series 4 image 13) with interval resolution of surrounding ground-glass opacity.  Heart: Stable mild cardiomegaly is seen.  Abdomen:  Abdominal Wall: There is extensive stranding of the subcutaneous fat as well as the intraabdominal and intrapelvic fat suggesting an element of anasarca edema of uncertain etiology.  Liver: The liver appears unremarkable.  Biliary System: No intrahepatic or extrahepatic biliary duct dilatation is seen.  Gallbladder: The gallbladder is non-distended and appears otherwise unremarkable.  Pancreas: The pancreas appears unremarkable.  Spleen: The spleen appears  unremarkable.  Adrenals: The adrenal glands appear unremarkable.  Kidneys: The left kidney appears unremarkable with no stones cysts masses or hydronephrosis. Nonspecific perinephric fat stranding is noted bilaterally. Again noted is an 18 mm cyst in the mid pole of the right kidney (series 2 image 41). The right kidney otherwise appear unremarkable with no stone or hydronephrosis identified.  Aorta: There is mild calcification of the abdominal aorta and its branches.  IVC: Unremarkable.  Bowel:  Esophagus: The visualized esophagus appears unremarkable.  Stomach: The stomach appears unremarkable.  Duodenum: Unremarkable appearing duodenum.  Small Bowel: The small bowel appears unremarkable.  Colon: Nondistended.  Appendix: The appendix appears unremarkable (series 2 image 62-67).  Peritoneum: There is mild ascites, not significantly changed since the prior examination. No free intraperitoneal gas is seen.    Pelvis:  Bladder: The bladder is nondistended and cannot be definitively evaluated.  Male:  Prostate gland: The prostate gland appears unremarkable.    Bony structures:  Dorsal Spine: There is spondylosis of the visualized dorsal spine. There is a stable appearing grade I retrolisthesis of L5 over S1.  Bony Pelvis: The visualized bony structures of the pelvis appear unremarkable.      Impression:  1. There is extensive stranding of the subcutaneous fat as well as the intraabdominal and intrapelvic fat suggesting an element of anasarca edema of uncertain etiology. Correlate with clinical and laboratory findings.  2. Again noted is a large right pleural effusion with compressive atelectasis of the right lower lobe. This is not significantly changed since the prior examination. There is interval decrease in the size of the left lower lobe pulmonary nodule, which now measures at 3 mm (series 4 image 13) with interval resolution of surrounding ground-glass opacity.  3. No acute intraabdominal or pelvic solid organ or  bowel pathology identified. Details and other findings as discussed above.                                         X-Ray Chest AP Portable (Preliminary result)  Result time 04/25/23 01:58:20      Wet Read by Ez Pena DO (04/25/23 01:58:20, [facility], Emergency Medicine)    Prominent interstitial markings.  Right pleural effusion persist appears slightly improved from previous chest x-ray.                                    PROCEDURES:  Procedures    ECG:       ED COURSE AND MEDICAL DECISION MAKING:  Medications   sodium chloride 0.9% flush 10 mL (has no administration in time range)   naloxone 0.4 mg/mL injection 0.02 mg (has no administration in time range)   glucagon (human recombinant) injection 1 mg (has no administration in time range)   dextrose 10% bolus 125 mL 125 mL (has no administration in time range)   dextrose 10% bolus 250 mL 250 mL (has no administration in time range)   dextrose 40 % gel 15,000 mg (has no administration in time range)   dextrose 40 % gel 30,000 mg (has no administration in time range)   megestroL 400 mg/10 mL (10 mL) suspension 200 mg (has no administration in time range)   aspirin EC tablet 81 mg (has no administration in time range)   atorvastatin tablet 40 mg (40 mg Oral Given 4/25/23 0304)   sacubitriL-valsartan 24-26 mg per tablet 1 tablet (has no administration in time range)   apixaban tablet 5 mg (has no administration in time range)   eoxnqdlxz-ewwthxrh-qekcbyh ala -25 mg (25 kg or greater) 1 tablet (has no administration in time range)   metoprolol succinate (TOPROL-XL) 24 hr split tablet 12.5 mg (has no administration in time range)   nicotine 14 mg/24 hr 1 patch (has no administration in time range)   sodium chloride 0.9% flush 10 mL (has no administration in time range)   melatonin tablet 6 mg (has no administration in time range)   atovaquone 750 mg/5 mL oral liquid 1,500 mg (has no administration in time range)   bumetanide  injection 1.5 mg (has no administration in time range)   furosemide injection 40 mg (40 mg Intravenous Given 4/25/23 0117)   aspirin tablet 325 mg (325 mg Oral Given 4/25/23 0303)     ED Course as of 04/25/23 0824   Tue Apr 25, 2023   0110 Reports having difficulty urinating states feels the urge but nothing is coming out.  We will straight cath. [IB]      ED Course User Index  [IB] Ez Pena, DO        Medical Decision Making  63-year-old male presents with upper abdominal pain intermittent today, did have 1 episode of diarrhea this morning but normal stool since.  Also the having cough and shortness of breath that has been persistent over the past week.  He was initially seen and evaluated by me 2 days ago for shortness of breath and cough and was subsequently admitted to the hospital however he left AMA.  He had received a dose of 80 mg Lasix which seems to have helped with his right pleural effusion as it appears slightly improved from his most recent x-ray.  He has severe aortic stenosis and apparently is still using cocaine although he denies this but his UDS is still been positive for this.  He was given 40 mg Lasix in the ED.  His blood pressure consistently runs low would seems to be normal for him.  Labs today show creatinine 2.28 much as increased from 1.562 days ago and 1.362 days prior to that.  BNP is 14,971 increased from 11,126 2 days ago.  Troponin 0.164.  CT abdomen and pelvis shows stranding of the subcutaneous fat as well as the intra-abdominal and intrapelvic fat suggesting an element of anasarca edema, again noted large right pleural effusion with compressive atelectasis of the right lower lobe not significantly changed from prior exam with interval decrease and size of left lower lobe pulmonary nodule and resolution of surrounding ground-glass opacity, otherwise no acute pathology.  Patient is agreeable to admission.  I have spoken with the patient and/or caregivers. I have explained the  patient's condition, diagnoses and treatment plan based on the information available to me at this time. I have answered the patient's and/or caregiver's questions and addressed any concerns. The patient and/or caregivers have as good an understanding of the patient's diagnosis, condition and treatment plan as can be expected at this point. The patient has been stabilized within the capability of the emergency department. The patient will be transported for further care and management or will be moved to an observation or inpatient service. I have communicated with the staff or medical practitioner taking over this patient's care.    I have personally provided 20 minutes of critical care time exclusive of time spent on separately billable procedures. Time includes review of laboratory data, radiology results, discussion with consultants, and monitoring for potential decompensation. Interventions were performed as documented above.     Amount and/or Complexity of Data Reviewed  External Data Reviewed: labs, radiology and notes.  Labs: ordered. Decision-making details documented in ED Course.  Radiology: ordered and independent interpretation performed. Decision-making details documented in ED Course.  ECG/medicine tests: ordered and independent interpretation performed. Decision-making details documented in ED Course.    Risk  Decision regarding hospitalization.    Critical Care  Total time providing critical care: 20 minutes      CLINICAL IMPRESSION:  1. HOLLIS (acute kidney injury)    2. CHF (congestive heart failure)    3. Chest pain    4. NSTEMI (non-ST elevated myocardial infarction)        DISPOSITION:   ED Disposition Condition    Observation                     Ez Pena DO  04/25/23 0824

## 2023-04-25 NOTE — PLAN OF CARE
Problem: Physical Therapy  Goal: Physical Therapy Goal  Description: Goals to be met by: dc     Patient will increase functional independence with mobility by performin. Sit to stand transfer with Modified [place]  2. Gait  x 130 feet with Modified [place] using Rolling Walker.     Outcome: Ongoing, Progressing

## 2023-04-25 NOTE — CARE UPDATE
"Patient seen and examined this morning.     Brief HPI:  As per HPI    "63 y.o. black or  male with a past medical history of HIV, HFrEF EF 15% without AICD or LifeVest, AFib on Eliquis, severe aortic stenosis, hypertension, hyperlipidemia, B-cell lymphoma, polysubstance use who presented to the emergency department via EMS evening of 04/24/2023 secondary to 3 days progressive shortness of breath, LOVING, orthopnea, lower extremity edema and abdominal swelling, 1 episode of loose bowel movement this morning with cramping 8/10 centralized abdominal pain, weakness with ground level fall yesterday without trauma.  He presented with similar symptoms on 04/22/2023 and left against medical advice after receiving 60 mg IV Lasix. Also admitted prior to that 4/16-4/20 for the CHF exacerbation .   In the ED, vitals noted to be hypotensive, oxygen saturation remain with some normal limits on room air.  Lab show stable normocytic anemia, mild metabolic acidosis, HOLLIS, significantly elevated BNP roughly 15,000, elevated troponin 0.164.  Chest x-ray shows vascular congestion and right-sided pleural effusion, slightly improved from previous.     Internal Medicine service was consulted for admission due to CHF exacerbation and HOLLIS."    On physical exam pt is actively participating in PT, however appears short of breath. Significant fluid overload in abdomen, chest and lower extremities.     Plan after rounds:  Labs are concerning for severely elevated BNP at 15,000 previous baseline around 11,000. Troponin also elevated at 0.164, likely demand ischemia. Creatinine elevation concerning from baseline of 1.36 to 2.28 and pt has been anuric, concern for cardiorenal syndrome.   It appears that patient does not have good response to diuresis with Lasix, therefor will switch to Bumex instead with addition of albumin to support intravascular volume.   Nephrology consulted, who recommended Bumex 2mg BID with addition of albumin " 25 q12h and it is imperative pt receive the bumex half way into the albumin infusion to decrease the risk for flash pulmonary edema. Nursing notified.   Case management consulted for lifevest evaluation. Pt's current insurance does not pay for it.   Troponin at 0.164 on 2 readings, next reading is pending. Cardiology consulted for persistent NSTEMI and re-evaluation of worsening heart failure.   Will be holding metoprolol and entresto in light of hypotension and the need to diurese.   Strict I's/O's. If patient does not make urine in the next 24 hours, will likely need dialysis as per nephrology. Appreciate the recommendations and assistance.       Unique Lozano M.D.  [facility] PGY-2

## 2023-04-25 NOTE — PLAN OF CARE
New consult for Lifevest, pt was referred on last admit for a Lifevest, pt not eligible for under current insurance. On last admission, pt stated he would switch his insurance. Spoke with pt, he did not switch yet, asked for number to [place] [facility], provided him with the phone cony. Stated he would call.

## 2023-04-25 NOTE — H&P
[facility] Medicine Wards History & Physical Note     Resident Team: [facility] Medicine List 1  Attending Physician: Dilcia Campbell MD    Date of Admit: 4/24/2023    Chief Complaint     Abdominal Pain (Pt c/o midline upper abdominal pain and sob when ambulating. Hx of chf. Brought in by ems.b/p low in route. Pt reports pain to abdomen 8/10. Reports onset after large bm on this morning.)      Subjective:      History of Present Illness:  Jason Perdue is a 63 y.o. black or  male with a past medical history of HIV, HFrEF EF 10% without AICD or LifeVest, AFib on Eliquis, severe aortic stenosis, hypertension, hyperlipidemia, B-cell lymphoma, polysubstance use who presented to the emergency department via EMS evening of 04/24/2023 secondary to 3 days progressive shortness of breath, LOVING, orthopnea, lower extremity edema and abdominal swelling, 1 episode of loose bowel movement this morning with cramping 8/10 centralized abdominal pain, weakness with ground level fall yesterday without trauma.  He presented with similar symptoms on 04/22/2023 and left against medical advice after receiving 60 mg IV Lasix.  In the ED, vitals noted to be hypotensive, oxygen saturation remain with some normal limits on room air.  Lab show stable normocytic anemia, mild metabolic acidosis, HOLLIS, significantly elevated BNP roughly 15,000, elevated troponin 0.164.  Chest x-ray shows vascular congestion and right-sided pleural effusion, slightly improved from previous.    Internal Medicine service was consulted for admission due to CHF exacerbation and HOLLIS.      Past Medical History:  Past Medical History:   Diagnosis Date    Cancer     CHF (congestive heart failure)     Human immunodeficiency virus (HIV) disease     Hypertension        Past Surgical History:  No past surgical history on file.    Family History:  No family history on file.    Social History:  Social History     Tobacco Use    Smoking status: Every Day     Packs/day: 0.50      "Types: Cigarettes    Smokeless tobacco: Never   Substance Use Topics    Alcohol use: Not Currently    Drug use: Yes     Types: "Crack" cocaine     Comment: last taken 1 month and a half       Allergies:  Review of patient's allergies indicates:   Allergen Reactions    Ace inhibitors      Other reaction(s): Angioedema    Nitroglycerin Itching       Home Medications:  Prior to Admission medications    Medication Sig Start Date End Date Taking? Authorizing Provider   apixaban (ELIQUIS) 5 mg Tab Take 1 tablet (5 mg total) by mouth 2 (two) times daily. 3/15/23 3/14/24  Darius Zhou MD   aspirin (ECOTRIN) 81 MG EC tablet Take 1 tablet (81 mg total) by mouth once daily. 1/27/23 1/27/24  David Posey DO   atorvastatin (LIPITOR) 40 MG tablet Take 1 tablet (40 mg total) by mouth every evening. 1/26/23 1/26/24  David Posey DO   atovaquone (MEPRON) 750 mg/5 mL Susp oral liquid Take 10 mLs (1,500 mg total) by mouth once daily. 3/23/23   SAVAGE Dockery   npdtqgybv-qksosqte-dpcmbgo ala (BIKTARVY) -25 mg (25 kg or greater) Take 1 tablet by mouth once daily. 3/23/23   SAVAGE Dockery   empagliflozin (JARDIANCE) 10 mg tablet Take 1 tablet (10 mg total) by mouth once daily. 4/11/23   Idalmis Sommers DO   food supplemt, lactose-reduced Liqd Take 1 Bottle by mouth.    Historical Provider   furosemide (LASIX) 20 MG tablet Take 3 tablets (60 mg total) by mouth once daily. 1/26/23 1/26/24  David Posey DO   megestroL (MEGACE) 400 mg/10 mL (40 mg/mL) Susp Take 200 mg by mouth.    Historical Provider   metoprolol succinate (TOPROL-XL) 25 MG 24 hr tablet Take 0.5 tablets (12.5 mg total) by mouth once daily. 4/11/23 4/10/24  Idalmis Sommers DO   nicotine (NICODERM CQ) 14 mg/24 hr Place 1 patch onto the skin once daily. 4/11/23   Idalmis Jindia, DO   polyethylene glycol 3350,bulk, Powd by Other route.    Historical Provider   sacubitriL-valsartan (ENTRESTO) 24-26 mg per tablet Take 1 tablet by mouth 2 (two) times " "daily. 3/15/23 3/14/24  Darius Zhou MD   syringe-needle,safety,disp unt 1 mL 25 gauge x 5/8" Syrg 100 each by Misc.(Non-Drug; Combo Route) route once daily. 23   Sreedhar Zuñiga MD   VITAMIN D2 1,250 mcg (50,000 unit) capsule Take 1 capsule (50,000 Units total) by mouth every 7 days. 3/23/23   Kiara Qureshi, SAVAGE         Review of Systems:  Constitutional:  No fevers, chills, sick contacts  HEENT:  No nose, sore throat, cough  Cardiac:  Lower extremity edemak PND, orthopnea, dyspnea on exertion.  No chest pain or palpitations  Resp:  Dyspnea on exertion.  No wheezes  GI:  Mild abdominal pain and distension.  Gu:  Denies dysuria  Musculoskeletal:  No muscle or joint pain  Neurologic:  No headache, dizziness, syncope, near syncope, seizure activity, focal weakness  Skin:  No wounds or rashes  Endocrine:  No hot or cold intolerance         Objective:   Last 24 Hour Vital Signs:  BP  Min: 80/67  Max: 87/67  Temp  Av.7 °F (36.5 °C)  Min: 97.7 °F (36.5 °C)  Max: 97.7 °F (36.5 °C)  Pulse  Av.8  Min: 81  Max: 100  Resp  Av  Min: 19  Max: 23  SpO2  Av.8 %  Min: 97 %  Max: 98 %  Height  Av' 11" (180.3 cm)  Min: 5' 11" (180.3 cm)  Max: 5' 11" (180.3 cm)  Weight  Av.9 kg (224 lb 10.4 oz)  Min: 101.9 kg (224 lb 10.4 oz)  Max: 101.9 kg (224 lb 10.4 oz)  Body mass index is 31.33 kg/m².  No intake/output data recorded.    Physical Examination:  Gen:  Lying comfortably in bed, no acute distress  HEENT:  Willard brown sclera, PERRLA, EOM intact.  Nares patent without rhinorrhea, oropharynx clear without oropharyngeal erythema or edema  Heart:  Regular rate and rhythm, 3/6 systolic ejection murmur left upper sternal border radiating to carotids.  3+ pitting edema bilateral lower extremities to the knees.   Lungs:  He can not full sentences, breathing is nonlabored, on room air, Bibasilar crackles  Abd:  Appears distended, mild diffuse tenderness, fluid wave  Extremities:  Moving all " extremities  MSK:  No eyes deformity  Neuro:  Alert and oriented to person place situation.  Answering questions and performing commands appropriately  Skin:  No wounds or rashes    Laboratory:  Most Recent Data:  CBC:   Lab Results   Component Value Date    WBC 6.0 04/24/2023    HGB 10.8 (L) 04/24/2023    HCT 34.1 (L) 04/24/2023     04/24/2023    MCV 83.8 04/24/2023    RDW 18.9 (H) 04/24/2023     WBC Differential:   Recent Labs   Lab 04/19/23  0421 04/20/23  0340 04/22/23  0634 04/24/23  2359   WBC 6.3 6.8 6.1 6.0   HGB 11.4* 10.8* 10.9* 10.8*   HCT 36.1* 34.2* 34.4* 34.1*    144 162 197   MCV 84.3 84.4 84.7 83.8     BMP:   Lab Results   Component Value Date     04/24/2023    K 4.9 04/24/2023    CO2 21 (L) 04/24/2023    BUN 46.7 (H) 04/24/2023    CREATININE 2.28 (H) 04/24/2023    CALCIUM 9.0 04/24/2023    MG 2.60 04/24/2023    PHOS 3.2 04/20/2023     LFTs:   Lab Results   Component Value Date    ALBUMIN 3.2 (L) 04/24/2023    BILITOT 3.2 (H) 04/24/2023    AST 48 (H) 04/24/2023    ALKPHOS 141 04/24/2023    ALT 33 04/24/2023     Coags:   Lab Results   Component Value Date    INR 2.45 (H) 04/13/2023    PROTIME 26.1 (H) 04/13/2023    PTT 54.3 (H) 04/13/2023     FLP:   Lab Results   Component Value Date    CHOL 168 01/24/2023    HDL 28 (L) 01/24/2023    TRIG 101 01/24/2023     DM:   Lab Results   Component Value Date    CREATININE 2.28 (H) 04/24/2023     Thyroid:   Lab Results   Component Value Date    TSH 2.893 03/13/2023      Anemia:   Lab Results   Component Value Date    IRON 25 (L) 12/26/2018    TIBC 359 12/26/2018    FERRITIN 60.8 12/26/2018     No results found for: [id number]  No results found for: FOLATE     Cardiac:   Lab Results   Component Value Date    TROPONINI 0.164 (H) 04/24/2023    BNP 14,971.8 (H) 04/24/2023     Urinalysis:   Lab Results   Component Value Date    LABURIN Less than 10,000 colonies/ml Enterococcus faecalis (A) 04/22/2023    COLORU LIGHT YELLOW 12/26/2018    PHUA 5.0  04/22/2023    NITRITE Negative 12/26/2018    KETONESU Negative 12/26/2018    UROBILINOGEN 1+ (A) 04/22/2023    WBCUA 11-20 (A) 04/22/2023       Trended Lab Data:  Recent Labs   Lab 04/19/23  0421 04/19/23  1512 04/20/23  0340 04/22/23  0634 04/24/23  2359   WBC 6.3  --  6.8 6.1 6.0   HGB 11.4*  --  10.8* 10.9* 10.8*   HCT 36.1*  --  34.2* 34.4* 34.1*     --  144 162 197   MCV 84.3  --  84.4 84.7 83.8   RDW 18.4*  --  18.4* 19.1* 18.9*   *  --  133* 136 136   K 4.1  --  3.8 4.3 4.9   CO2 19*  --  23 24 21*   BUN 35.7*  --  33.1* 29.0* 46.7*   CREATININE 1.57*  --  1.36* 1.56* 2.28*   ALBUMIN 3.0* 2.9* 2.8*  --  3.2*   BILITOT 2.4*  --  2.0*  --  3.2*   AST 43*  --  40*  --  48*   ALKPHOS 136  --  127  --  141   ALT 19  --  19  --  33       Trended Cardiac Data:  Recent Labs   Lab 04/22/23  0634 04/24/23  2359   TROPONINI 0.057* 0.164*   BNP 11,126.3* 14,971.8*       Microbiology Data:  Microbiology Results (last 7 days)       ** No results found for the last 168 hours. **             Other Results:  EKG (my interpretation):  Sinus rhythm with occasional PVCs, rate 78.  No significant changes from prior    Radiology:  Imaging Results              CT Abdomen Pelvis  Without Contrast (In process)                      X-Ray Chest AP Portable (Preliminary result)  Result time 04/25/23 01:58:20      Wet Read by Ez Pena DO (04/25/23 01:58:20, [facility], Emergency Medicine)    Prominent interstitial markings.  Right pleural effusion persist appears slightly improved from previous chest x-ray.                                     Assessment & Plan:     CHF exacerbation EF 15%  VHD  Atrial fibrillation       - BNP elevated nearly 15,000, above previous baseline 11,126       - Most recent echo 04/13/2023 the EF 15% with mild eccentric hypertrophy and severe systolic dysfunction, grade 3 LVDD, moderate to severe aortic   stenosis, moderate MR, moderate TR, pulmonary hypertension        - UDS pending, pos cocaine consistently though denies recent use       - Lasix 40 mg IV given in ED, will give additional 40 mg IV. Schedule 80 mg IV Lasix every 12 hours.       - Notable abdominal anasarca on CT abdomen       - strict I&Os, daily weights, elevate head of bed, fluid restriction low-sodium diet       - Continue home Entresto and Toprol. Hold home Jardiance       - Consult CM about pt's insurance and lifevest status    NSTEMI type 2       - Troponin elevated 0.164 likely NSTEMI type 2 secondary to volume overload in setting CHF exacerbation.       -  Denies chest pain, no significant EKG changes from previous       -  Will load with ASA, statin, continue home lopressor. Trend trop and EKG q 6 hours    HOLLIS       - BUN/creatinine 46.7/2.28 from 33.1/1.36 on 4/20       - Likely cardiorenal syndrome in setting CHF exacerbation    Right-sided pleural effusion       - Stable from previous    HIV  - Continue home Biktarvy and prophylactic Atovaquone dosing    Polysubstance use  Tobacco use       - UDS pending    CODE STATUS: full  Access: PIV  Antibiotics: Atovaquone prophylactic dose  Diet: Cardiac low sodium  DVT Prophylaxis: home eliquis    Disposition: Admit to IM service for CHF exacerbation, HOLLIS, NSTEMI          Kike Chadwick MD  [facility] HO-2

## 2023-04-26 PROBLEM — I50.40 COMBINED SYSTOLIC AND DIASTOLIC CONGESTIVE HEART FAILURE: Status: ACTIVE | Noted: 2023-04-26

## 2023-04-26 PROBLEM — N17.9 AKI (ACUTE KIDNEY INJURY): Status: ACTIVE | Noted: 2023-04-26

## 2023-04-26 LAB
ALBUMIN SERPL-MCNC: 3.3 G/DL (ref 3.4–4.8)
ALBUMIN/GLOB SERPL: 0.8 RATIO (ref 1.1–2)
ALP SERPL-CCNC: 137 UNIT/L (ref 40–150)
ALT SERPL-CCNC: 21 UNIT/L (ref 0–55)
AST SERPL-CCNC: 34 UNIT/L (ref 5–34)
BASOPHILS # BLD AUTO: 0.01 X10(3)/MCL (ref 0–0.2)
BASOPHILS NFR BLD AUTO: 0.2 %
BILIRUBIN DIRECT+TOT PNL SERPL-MCNC: 2.6 MG/DL
BUN SERPL-MCNC: 53.7 MG/DL (ref 8.4–25.7)
CALCIUM SERPL-MCNC: 9.1 MG/DL (ref 8.8–10)
CHLORIDE SERPL-SCNC: 102 MMOL/L (ref 98–107)
CO2 SERPL-SCNC: 19 MMOL/L (ref 23–31)
CREAT SERPL-MCNC: 2.77 MG/DL (ref 0.73–1.18)
EOSINOPHIL # BLD AUTO: 0.03 X10(3)/MCL (ref 0–0.9)
EOSINOPHIL NFR BLD AUTO: 0.5 %
ERYTHROCYTE [DISTWIDTH] IN BLOOD BY AUTOMATED COUNT: 18.9 % (ref 11.5–17)
GFR SERPLBLD CREATININE-BSD FMLA CKD-EPI: 25 MLS/MIN/1.73/M2
GLOBULIN SER-MCNC: 4.1 GM/DL (ref 2.4–3.5)
GLUCOSE SERPL-MCNC: 103 MG/DL (ref 82–115)
HBV SURFACE AB SER-ACNC: 0.01 MIU/ML
HBV SURFACE AB SERPL IA-ACNC: NONREACTIVE M[IU]/ML
HBV SURFACE AG SERPL QL IA: REACTIVE
HBV SURFACE AG SERPLBLD QL IA.RAPID: NORMAL
HCT VFR BLD AUTO: 30.2 % (ref 42–52)
HGB BLD-MCNC: 9.7 G/DL (ref 14–18)
IMM GRANULOCYTES # BLD AUTO: 0.01 X10(3)/MCL (ref 0–0.04)
IMM GRANULOCYTES NFR BLD AUTO: 0.2 %
LYMPHOCYTES # BLD AUTO: 0.91 X10(3)/MCL (ref 0.6–4.6)
LYMPHOCYTES NFR BLD AUTO: 15 %
MAGNESIUM SERPL-MCNC: 2.6 MG/DL (ref 1.6–2.6)
MCH RBC QN AUTO: 26 PG (ref 27–31)
MCHC RBC AUTO-ENTMCNC: 32.1 G/DL (ref 33–36)
MCV RBC AUTO: 81 FL (ref 80–94)
MONOCYTES # BLD AUTO: 0.74 X10(3)/MCL (ref 0.1–1.3)
MONOCYTES NFR BLD AUTO: 12.2 %
NEUTROPHILS # BLD AUTO: 4.36 X10(3)/MCL (ref 2.1–9.2)
NEUTROPHILS NFR BLD AUTO: 71.9 %
NRBC BLD AUTO-RTO: 0 %
PLATELET # BLD AUTO: 164 X10(3)/MCL (ref 130–400)
PMV BLD AUTO: 12.5 FL (ref 7.4–10.4)
POTASSIUM SERPL-SCNC: 4.2 MMOL/L (ref 3.5–5.1)
PROT SERPL-MCNC: 7.4 GM/DL (ref 5.8–7.6)
RBC # BLD AUTO: 3.73 X10(6)/MCL (ref 4.7–6.1)
SODIUM SERPL-SCNC: 134 MMOL/L (ref 136–145)
WBC # SPEC AUTO: 6.1 X10(3)/MCL (ref 4.5–11.5)

## 2023-04-26 PROCEDURE — 87341 HEP B SURFACE AG NEUTRLZJ IA: CPT | Performed by: INTERNAL MEDICINE

## 2023-04-26 PROCEDURE — 21400001 HC TELEMETRY ROOM

## 2023-04-26 PROCEDURE — 25000003 PHARM REV CODE 250: Performed by: STUDENT IN AN ORGANIZED HEALTH CARE EDUCATION/TRAINING PROGRAM

## 2023-04-26 PROCEDURE — 80100014 HC HEMODIALYSIS 1:1

## 2023-04-26 PROCEDURE — 25000003 PHARM REV CODE 250

## 2023-04-26 PROCEDURE — 27000221 HC OXYGEN, UP TO 24 HOURS

## 2023-04-26 PROCEDURE — 63600175 PHARM REV CODE 636 W HCPCS: Mod: JZ,JG | Performed by: INTERNAL MEDICINE

## 2023-04-26 PROCEDURE — 87340 HEPATITIS B SURFACE AG IA: CPT | Performed by: INTERNAL MEDICINE

## 2023-04-26 PROCEDURE — P9047 ALBUMIN (HUMAN), 25%, 50ML: HCPCS | Mod: JZ,JG | Performed by: INTERNAL MEDICINE

## 2023-04-26 PROCEDURE — P9047 ALBUMIN (HUMAN), 25%, 50ML: HCPCS | Mod: JZ,JG

## 2023-04-26 PROCEDURE — S4991 NICOTINE PATCH NONLEGEND: HCPCS | Performed by: STUDENT IN AN ORGANIZED HEALTH CARE EDUCATION/TRAINING PROGRAM

## 2023-04-26 PROCEDURE — 83735 ASSAY OF MAGNESIUM: CPT | Performed by: STUDENT IN AN ORGANIZED HEALTH CARE EDUCATION/TRAINING PROGRAM

## 2023-04-26 PROCEDURE — 85025 COMPLETE CBC W/AUTO DIFF WBC: CPT | Performed by: STUDENT IN AN ORGANIZED HEALTH CARE EDUCATION/TRAINING PROGRAM

## 2023-04-26 PROCEDURE — 63600175 PHARM REV CODE 636 W HCPCS: Mod: JZ,JG

## 2023-04-26 PROCEDURE — 94761 N-INVAS EAR/PLS OXIMETRY MLT: CPT

## 2023-04-26 PROCEDURE — 25000003 PHARM REV CODE 250: Performed by: INTERNAL MEDICINE

## 2023-04-26 PROCEDURE — 11000001 HC ACUTE MED/SURG PRIVATE ROOM

## 2023-04-26 PROCEDURE — 80053 COMPREHEN METABOLIC PANEL: CPT | Performed by: STUDENT IN AN ORGANIZED HEALTH CARE EDUCATION/TRAINING PROGRAM

## 2023-04-26 PROCEDURE — 63600175 PHARM REV CODE 636 W HCPCS: Performed by: STUDENT IN AN ORGANIZED HEALTH CARE EDUCATION/TRAINING PROGRAM

## 2023-04-26 RX ORDER — ACETAMINOPHEN 325 MG/1
650 TABLET ORAL EVERY 6 HOURS PRN
Status: DISCONTINUED | OUTPATIENT
Start: 2023-04-26 | End: 2023-05-05 | Stop reason: HOSPADM

## 2023-04-26 RX ORDER — ALBUMIN HUMAN 250 G/1000ML
25 SOLUTION INTRAVENOUS ONCE
Status: COMPLETED | OUTPATIENT
Start: 2023-04-26 | End: 2023-04-26

## 2023-04-26 RX ORDER — DOBUTAMINE HYDROCHLORIDE 400 MG/100ML
2.5 INJECTION INTRAVENOUS CONTINUOUS
Status: DISCONTINUED | OUTPATIENT
Start: 2023-04-26 | End: 2023-04-27

## 2023-04-26 RX ORDER — MUPIROCIN 20 MG/G
OINTMENT TOPICAL 2 TIMES DAILY
Status: DISPENSED | OUTPATIENT
Start: 2023-04-26 | End: 2023-05-01

## 2023-04-26 RX ADMIN — APIXABAN 5 MG: 2.5 TABLET, FILM COATED ORAL at 09:04

## 2023-04-26 RX ADMIN — ATOVAQUONE 1500 MG: 750 SUSPENSION ORAL at 09:04

## 2023-04-26 RX ADMIN — BUMETANIDE 2 MG: 0.25 INJECTION, SOLUTION INTRAMUSCULAR; INTRAVENOUS at 09:04

## 2023-04-26 RX ADMIN — MENTHOL, METHYL SALICYLATE: 10; 15 CREAM TOPICAL at 02:04

## 2023-04-26 RX ADMIN — METOLAZONE 10 MG: 5 TABLET ORAL at 09:04

## 2023-04-26 RX ADMIN — ALBUMIN (HUMAN) 25 G: 12.5 SOLUTION INTRAVENOUS at 10:04

## 2023-04-26 RX ADMIN — NICOTINE 1 PATCH: 14 PATCH, EXTENDED RELEASE TRANSDERMAL at 09:04

## 2023-04-26 RX ADMIN — MUPIROCIN: 20 OINTMENT TOPICAL at 09:04

## 2023-04-26 RX ADMIN — ALBUMIN HUMAN 25 G: 250 SOLUTION INTRAVENOUS at 05:04

## 2023-04-26 RX ADMIN — ALBUMIN (HUMAN) 25 G: 12.5 SOLUTION INTRAVENOUS at 09:04

## 2023-04-26 RX ADMIN — BICTEGRAVIR SODIUM, EMTRICITABINE, AND TENOFOVIR ALAFENAMIDE FUMARATE 1 TABLET: 50; 200; 25 TABLET ORAL at 09:04

## 2023-04-26 RX ADMIN — BUMETANIDE 2 MG: 0.25 INJECTION, SOLUTION INTRAMUSCULAR; INTRAVENOUS at 10:04

## 2023-04-26 RX ADMIN — ACETAMINOPHEN 650 MG: 325 TABLET ORAL at 02:04

## 2023-04-26 RX ADMIN — ASPIRIN 81 MG: 81 TABLET, COATED ORAL at 09:04

## 2023-04-26 RX ADMIN — ATORVASTATIN CALCIUM 40 MG: 40 TABLET, FILM COATED ORAL at 09:04

## 2023-04-26 RX ADMIN — MUPIROCIN: 20 OINTMENT TOPICAL at 10:04

## 2023-04-26 RX ADMIN — DOBUTAMINE HYDROCHLORIDE 2.5 MCG/KG/MIN: 400 INJECTION INTRAVENOUS at 12:04

## 2023-04-26 NOTE — CONSULTS
[facility] Cardiology  Consultation Note       Subjective:      HPI:  Mr. Perdue, 63  male with PMHx of HIV, HFrEF LVEF 15% on 4/13/23, and polysubstance abuse. Just recently discharged from the hospital for acute on chronic CHF exacerbation. Today, admitted to internal medicine for shortness of breath. Has history of cocaine usage, however, adamantly denies that he has not been using recently, but still tested positive on UDS. On admission, had BNP of 15,000, which is up from his baseline of around 11,000, with a troponin of 0.165, likely due to demand ischemia.     Cardiology consulted for assistance in diuresis.    Review of Systems:  Pertinent items are noted in HPI.     Objective:     Vital Signs (Most Recent):  Temp: 98.9 °F (37.2 °C) (04/25/23 1628)  Pulse: 78 (04/25/23 1628)  Resp: 18 (04/25/23 0330)  BP: (!) 74/53 (04/25/23 1628)  SpO2: 98 % (04/25/23 1628)   Vital Signs (24h Range):  Temp:  [97.6 °F (36.4 °C)-98.9 °F (37.2 °C)] 98.9 °F (37.2 °C)  Pulse:  [] 78  Resp:  [17-23] 18  SpO2:  [97 %-100 %] 98 %  BP: ()/(53-75) 74/53       Physical Examination:  Lungs: clear to auscultation bilaterally  Heart: regular rate and rhythm, S1, S2 normal, no murmur, click, rub or gallop  Extremities: edema bilaterally, up to mid thigh  Pulses: 2+ and symmetric  Skin: Skin color, texture, turgor normal. No rashes or lesions    Laboratory:  Most Recent Data:  CBC:   Recent Labs   Lab 04/24/23  2359 04/25/23  0240   WBC 6.0 5.9   HGB 10.8* 10.2*   HCT 34.1* 32.6*    175     CMP:   Recent Labs   Lab 04/25/23  0240   CALCIUM 9.0   ALBUMIN 2.8*   *   K 4.5   CO2 17*   BUN 46.9*   CREATININE 2.28*   ALKPHOS 138   ALT 28   AST 43*   BILITOT 2.9*           Other Results:      Radiology:  Imaging Results              CT Abdomen Pelvis  Without Contrast (Final result)  Result time 04/25/23 10:05:08      Final result by Sanjana Otero MD (04/25/23 10:05:08)                   Impression:     Impression:    1. There is extensive stranding of the subcutaneous fat as well as the intraabdominal and intrapelvic fat suggesting an element of anasarca edema of uncertain etiology. Correlate with clinical and laboratory findings.    2. Again noted is a large right pleural effusion with compressive atelectasis of the right lower lobe. This is not significantly changed since the prior examination. There is interval decrease in the size of the left lower lobe pulmonary nodule, which now measures at 3 mm (series 4 image 13) with interval resolution of surrounding ground-glass opacity.    3. No acute intraabdominal or pelvic solid organ or bowel pathology identified. Details and other findings as discussed above    There is general concurrence with the preliminary report above.  Additional finding of some urinary bladder wall thickening and mild inflammatory changes associated with the urinary bladder are appreciated..  Cystitis should be excluded.    The      Electronically signed by: Sanjana Otero  Date:    04/25/2023  Time:    10:05               Narrative:    EXAMINATION:  CT ABDOMEN PELVIS WITHOUT CONTRAST    Technique: CT of the abdomen and pelvis was performed with axial images as well as sagittal and coronal reconstruction images without intravenous contrast or oral contrast.    Comparison: Comparison is with study dated 2023-04-17 14:58:26.    Clinical History: Epigastric pain.    Dosage Information: Automated Exposure Control was utilized 617.19 mGy.cm.    Findings:    Lines and Tubes: None.    Thorax:    Lungs: Again noted is a large right pleural effusion with compressive atelectasis of the right lower lobe. This is not significantly changed since the prior examination. There is interval decrease in the size of the left lower lobe pulmonary nodule, which now measures at 3 mm (series 4 image 13) with interval resolution of surrounding ground-glass opacity.    Heart: Stable mild cardiomegaly is  seen.    Abdomen:    Abdominal Wall: There is extensive stranding of the subcutaneous fat as well as the intraabdominal and intrapelvic fat suggesting an element of anasarca edema of uncertain etiology.    Liver: The liver appears unremarkable.    Biliary System: No intrahepatic or extrahepatic biliary duct dilatation is seen.    Gallbladder: The gallbladder is non-distended and appears otherwise unremarkable.    Pancreas: The pancreas appears unremarkable.    Spleen: The spleen appears unremarkable.    Adrenals: The adrenal glands appear unremarkable.    Kidneys: The left kidney appears unremarkable with no stones cysts masses or hydronephrosis. Nonspecific perinephric fat stranding is noted bilaterally. Again noted is an 18 mm cyst in the mid pole of the right kidney (series 2 image 41). The right kidney otherwise appear unremarkable with no stone or hydronephrosis identified.    Aorta: There is mild calcification of the abdominal aorta and its branches.    IVC: Unremarkable.    Bowel:    Esophagus: The visualized esophagus appears unremarkable.    Stomach: The stomach appears unremarkable.    Duodenum: Unremarkable appearing duodenum.    Small Bowel: The small bowel appears unremarkable.    Colon: Nondistended.    Appendix: The appendix appears unremarkable (series 2 image 62-67).    Peritoneum: There is mild ascites, not significantly changed since the prior examination. No free intraperitoneal gas is seen.    Pelvis:    Bladder: The bladder is nondistended and cannot be definitively evaluated.    Male:    Prostate gland: The prostate gland appears unremarkable.    Bony structures:    Dorsal Spine: There is spondylosis of the visualized dorsal spine. There is a stable appearing grade I retrolisthesis of L5 over S1.    Bony Pelvis: The visualized bony structures of the pelvis appear unremarkable.                        Preliminary result by Sanjana Otero MD (04/25/23 02:27:52)                    Narrative:    START OF REPORT:  Technique: CT of the abdomen and pelvis was performed with axial images as well as sagittal and coronal reconstruction images without intravenous contrast or oral contrast.    Comparison: Comparison is with study [id number] 14:58:26.    Clinical History: Epigastric pain.    Dosage Information: Automated Exposure Control was utilized 617.19 mGy.cm.    Findings:  Lines and Tubes: None.  Thorax:  Lungs: Again noted is a large right pleural effusion with compressive atelectasis of the right lower lobe. This is not significantly changed since the prior examination. There is interval decrease in the size of the left lower lobe pulmonary nodule, which now measures at 3 mm (series 4 image 13) with interval resolution of surrounding ground-glass opacity.  Heart: Stable mild cardiomegaly is seen.  Abdomen:  Abdominal Wall: There is extensive stranding of the subcutaneous fat as well as the intraabdominal and intrapelvic fat suggesting an element of anasarca edema of uncertain etiology.  Liver: The liver appears unremarkable.  Biliary System: No intrahepatic or extrahepatic biliary duct dilatation is seen.  Gallbladder: The gallbladder is non-distended and appears otherwise unremarkable.  Pancreas: The pancreas appears unremarkable.  Spleen: The spleen appears unremarkable.  Adrenals: The adrenal glands appear unremarkable.  Kidneys: The left kidney appears unremarkable with no stones cysts masses or hydronephrosis. Nonspecific perinephric fat stranding is noted bilaterally. Again noted is an 18 mm cyst in the mid pole of the right kidney (series 2 image 41). The right kidney otherwise appear unremarkable with no stone or hydronephrosis identified.  Aorta: There is mild calcification of the abdominal aorta and its branches.  IVC: Unremarkable.  Bowel:  Esophagus: The visualized esophagus appears unremarkable.  Stomach: The stomach appears unremarkable.  Duodenum: Unremarkable appearing  duodenum.  Small Bowel: The small bowel appears unremarkable.  Colon: Nondistended.  Appendix: The appendix appears unremarkable (series 2 image 62-67).  Peritoneum: There is mild ascites, not significantly changed since the prior examination. No free intraperitoneal gas is seen.    Pelvis:  Bladder: The bladder is nondistended and cannot be definitively evaluated.  Male:  Prostate gland: The prostate gland appears unremarkable.    Bony structures:  Dorsal Spine: There is spondylosis of the visualized dorsal spine. There is a stable appearing grade I retrolisthesis of L5 over S1.  Bony Pelvis: The visualized bony structures of the pelvis appear unremarkable.      Impression:  1. There is extensive stranding of the subcutaneous fat as well as the intraabdominal and intrapelvic fat suggesting an element of anasarca edema of uncertain etiology. Correlate with clinical and laboratory findings.  2. Again noted is a large right pleural effusion with compressive atelectasis of the right lower lobe. This is not significantly changed since the prior examination. There is interval decrease in the size of the left lower lobe pulmonary nodule, which now measures at 3 mm (series 4 image 13) with interval resolution of surrounding ground-glass opacity.  3. No acute intraabdominal or pelvic solid organ or bowel pathology identified. Details and other findings as discussed above.                                         X-Ray Chest AP Portable (Final result)  Result time 04/25/23 08:53:38      Final result by Jordan Merlos MD (04/25/23 08:53:38)                   Impression:      Cardiomegaly.    Right-sided pleural effusion with compressive atelectatic changes and consolidative changes at the right base similar to previous exam.    Increase interstitial markings      Electronically signed by: Jordan Merlos  Date:    04/25/2023  Time:    08:53               Narrative:    EXAMINATION:  XR CHEST AP PORTABLE    CLINICAL  HISTORY:  cough;    TECHNIQUE:  Single frontal view of the chest was performed.    COMPARISON:  April 22, 2022.    FINDINGS:  Examination reveals mediastinal silhouette to be within normal limits cardiac silhouette is mildly enlarged.    There is some increase in interstitial markings similar to previous exam might still reflect a degree of pulmonary vascular congestion.    There is blunting of the right costophrenic angle indicating the presence of a right-sided pleural effusion with consolidative changes in compressive atelectatic changes at the right base.    No other focal consolidative changes no other change                        Wet Read by Ez Pena DO (04/25/23 01:58:20, [facility], Emergency Medicine)    Prominent interstitial markings.  Right pleural effusion persist appears slightly improved from previous chest x-ray.                                    Current Medications:     Infusions:       Scheduled:   albumin human 25%  25 g Intravenous BID    apixaban  5 mg Oral BID    aspirin  81 mg Oral Daily    atorvastatin  40 mg Oral QHS    atovaquone  1,500 mg Oral Daily    itddalqsf-csojmvjl-xjlznei ala  1 tablet Oral Daily    bumetanide  2 mg Intravenous Q12H    megestroL  200 mg Oral Daily    metOLazone  10 mg Oral Daily    nicotine  1 patch Transdermal Daily        PRN:  dextrose 10%, dextrose 10%, dextrose, dextrose, glucagon (human recombinant), melatonin, naloxone, sodium chloride 0.9%, sodium chloride 0.9%        Intake/Output Summary (Last 24 hours) at 4/25/2023 1904  Last data filed at 4/25/2023 1843  Gross per 24 hour   Intake 1460 ml   Output 215 ml   Net 1245 ml       Lines/Drains/Airways       Drain  Duration             Male External Urinary Catheter 04/25/23 0100 <1 day              Peripheral Intravenous Line  Duration                  Peripheral IV - Single Lumen 04/25/23 0147 18 G Anterior;Left Upper Arm <1 day                      Assessment & Plan:     1)  Acute on chronic CHF, LVEF 15%  - Frequently admitted for acute on chronic CHF  - Previously difficult to diurese with just lasix  - Nephrology on board with diuresis, appreciate assistance  - For cardiorenal syndrome, consider starting a dobutamine drip, should improve kidney perfusion which would hopefully improve diuresis.      David Posey DO  [facility] Internal [facility], PGY-1  [facility] Cardiology

## 2023-04-26 NOTE — PROCEDURES
[facility] Surg Telemetry  General Surgery  Procedure Note    SUMMARY     Date of Procedure:  4/26/23    Procedure: Insertion of right femoral dialysis line     Pre-Operative Diagnosis: Fluid overload, HOLLIS     Post-Operative Diagnosis: same     Anesthesia: 1% lidocaine local anesthetics     Description of Technical Procedures:   The right inguinal region was prepped using chlorhexidine scrub and draped in sterile fashion using a full drape and  sterile probe cover and sterile gel employed. The femoral pulse was  identified and the femoral vein was identified using ultrasound. Anesthesia was achieved using 1% lidocaine. Using ultrasound guidance, the access needle was inserted into the femoral vein. Venous blood was withdrawn. The syringe was  removed and a guidewire was advanced into the introducer needle. A small  incision was made at the skin surface with a scalpel and the introducer  needle was exchanged for a dilator over the guidewire. After  appropriate dilation was obtained, the dilator was exchanged over the  wire for a 23cm double lumen dialysis line. The wire was removed and the catheter was sutured in place. A sterile sorbaview shield was  placed over the catheter at the insertion site. The patient tolerated  the procedure without any hemodynamic compromise. At time of procedure  completion, all ports aspirated and flushed properly. Catheter is ok to use.     Estimated Blood Loss (EBL): minimal     Condition: Stable    Domitila Mcbride MD   [employer] General Surgery PGY 3

## 2023-04-26 NOTE — PROGRESS NOTES
[facility] Cardiology   Progress Note       Subjective:      Brief HPI:  Mr. Perdue, 63  male with PMHx of HIV, HFrEF LVEF 15% on 4/13/23, and polysubstance abuse. Just recently discharged from the hospital for acute on chronic CHF exacerbation. Today, admitted to internal medicine for shortness of breath. Has history of cocaine usage, however, adamantly denies that he has not been using recently, but still tested positive on UDS. On admission, had BNP of 15,000, which is up from his baseline of around 11,000, with a troponin of 0.165, likely due to demand ischemia.     Interval History: Has been receiving bumex 2 mg IV, along with metolazone 10 mg. UOP limited to 415, net fluid intake/output is +545. Unable to diurese. Nephrology planning on starting hemodialysis to remove fluid. Other wise, patient had no complaints over night, denies cp, sob, n/v/d.      Review of Systems:  Pertinent items are noted in HPI.     Objective:     Vital Signs (Most Recent):  Temp: 97.5 °F (36.4 °C) (04/26/23 1112)  Pulse: 83 (04/26/23 1112)  Resp: 20 (04/26/23 0800)  BP: (!) 80/54 (04/26/23 1112)  SpO2: 100 % (04/26/23 1112)   Vital Signs (24h Range):  Temp:  [97.5 °F (36.4 °C)-98.9 °F (37.2 °C)] 97.5 °F (36.4 °C)  Pulse:  [78-83] 83  Resp:  [20-22] 20  SpO2:  [98 %-100 %] 100 %  BP: ()/(53-71) 80/54       Physical Examination:  Lungs: clear to auscultation bilaterally  Heart: regular rate and rhythm, S1, S2 normal, no murmur, click, rub or gallop  Extremities: edema up to the mid thighs bilaterally, not improved  Pulses: 2+ and symmetric  Skin: Skin color, texture, turgor normal. No rashes or lesions    Laboratory:  Most Recent Data:  CBC:   Recent Labs   Lab 04/24/23  2359 04/25/23  0240 04/26/23  0335   WBC 6.0 5.9 6.1   HGB 10.8* 10.2* 9.7*   HCT 34.1* 32.6* 30.2*    175 164     CMP:   Recent Labs   Lab 04/26/23  0335   CALCIUM 9.1   ALBUMIN 3.3*   *   K 4.2   CO2 19*   BUN 53.7*   CREATININE 2.77*    ALKPHOS 137   ALT 21   AST 34   BILITOT 2.6*           Radiology:  Imaging Results              CT Abdomen Pelvis  Without Contrast (Final result)  Result time 04/25/23 10:05:08      Final result by Sanjana Otero MD (04/25/23 10:05:08)                   Impression:    Impression:    1. There is extensive stranding of the subcutaneous fat as well as the intraabdominal and intrapelvic fat suggesting an element of anasarca edema of uncertain etiology. Correlate with clinical and laboratory findings.    2. Again noted is a large right pleural effusion with compressive atelectasis of the right lower lobe. This is not significantly changed since the prior examination. There is interval decrease in the size of the left lower lobe pulmonary nodule, which now measures at 3 mm (series 4 image 13) with interval resolution of surrounding ground-glass opacity.    3. No acute intraabdominal or pelvic solid organ or bowel pathology identified. Details and other findings as discussed above    There is general concurrence with the preliminary report above.  Additional finding of some urinary bladder wall thickening and mild inflammatory changes associated with the urinary bladder are appreciated..  Cystitis should be excluded.    The      Electronically signed by: Sanjana Otero  Date:    04/25/2023  Time:    10:05               Narrative:    EXAMINATION:  CT ABDOMEN PELVIS WITHOUT CONTRAST    Technique: CT of the abdomen and pelvis was performed with axial images as well as sagittal and coronal reconstruction images without intravenous contrast or oral contrast.    Comparison: Comparison is with study dated 2023-04-17 14:58:26.    Clinical History: Epigastric pain.    Dosage Information: Automated Exposure Control was utilized 617.19 mGy.cm.    Findings:    Lines and Tubes: None.    Thorax:    Lungs: Again noted is a large right pleural effusion with compressive atelectasis of the right lower lobe. This is not  significantly changed since the prior examination. There is interval decrease in the size of the left lower lobe pulmonary nodule, which now measures at 3 mm (series 4 image 13) with interval resolution of surrounding ground-glass opacity.    Heart: Stable mild cardiomegaly is seen.    Abdomen:    Abdominal Wall: There is extensive stranding of the subcutaneous fat as well as the intraabdominal and intrapelvic fat suggesting an element of anasarca edema of uncertain etiology.    Liver: The liver appears unremarkable.    Biliary System: No intrahepatic or extrahepatic biliary duct dilatation is seen.    Gallbladder: The gallbladder is non-distended and appears otherwise unremarkable.    Pancreas: The pancreas appears unremarkable.    Spleen: The spleen appears unremarkable.    Adrenals: The adrenal glands appear unremarkable.    Kidneys: The left kidney appears unremarkable with no stones cysts masses or hydronephrosis. Nonspecific perinephric fat stranding is noted bilaterally. Again noted is an 18 mm cyst in the mid pole of the right kidney (series 2 image 41). The right kidney otherwise appear unremarkable with no stone or hydronephrosis identified.    Aorta: There is mild calcification of the abdominal aorta and its branches.    IVC: Unremarkable.    Bowel:    Esophagus: The visualized esophagus appears unremarkable.    Stomach: The stomach appears unremarkable.    Duodenum: Unremarkable appearing duodenum.    Small Bowel: The small bowel appears unremarkable.    Colon: Nondistended.    Appendix: The appendix appears unremarkable (series 2 image 62-67).    Peritoneum: There is mild ascites, not significantly changed since the prior examination. No free intraperitoneal gas is seen.    Pelvis:    Bladder: The bladder is nondistended and cannot be definitively evaluated.    Male:    Prostate gland: The prostate gland appears unremarkable.    Bony structures:    Dorsal Spine: There is spondylosis of the visualized  dorsal spine. There is a stable appearing grade I retrolisthesis of L5 over S1.    Bony Pelvis: The visualized bony structures of the pelvis appear unremarkable.                        Preliminary result by Sanjana Otero MD (04/25/23 02:27:52)                   Narrative:    START OF REPORT:  Technique: CT of the abdomen and pelvis was performed with axial images as well as sagittal and coronal reconstruction images without intravenous contrast or oral contrast.    Comparison: Comparison is with study [id number] 14:58:26.    Clinical History: Epigastric pain.    Dosage Information: Automated Exposure Control was utilized 617.19 mGy.cm.    Findings:  Lines and Tubes: None.  Thorax:  Lungs: Again noted is a large right pleural effusion with compressive atelectasis of the right lower lobe. This is not significantly changed since the prior examination. There is interval decrease in the size of the left lower lobe pulmonary nodule, which now measures at 3 mm (series 4 image 13) with interval resolution of surrounding ground-glass opacity.  Heart: Stable mild cardiomegaly is seen.  Abdomen:  Abdominal Wall: There is extensive stranding of the subcutaneous fat as well as the intraabdominal and intrapelvic fat suggesting an element of anasarca edema of uncertain etiology.  Liver: The liver appears unremarkable.  Biliary System: No intrahepatic or extrahepatic biliary duct dilatation is seen.  Gallbladder: The gallbladder is non-distended and appears otherwise unremarkable.  Pancreas: The pancreas appears unremarkable.  Spleen: The spleen appears unremarkable.  Adrenals: The adrenal glands appear unremarkable.  Kidneys: The left kidney appears unremarkable with no stones cysts masses or hydronephrosis. Nonspecific perinephric fat stranding is noted bilaterally. Again noted is an 18 mm cyst in the mid pole of the right kidney (series 2 image 41). The right kidney otherwise appear unremarkable with no stone or  hydronephrosis identified.  Aorta: There is mild calcification of the abdominal aorta and its branches.  IVC: Unremarkable.  Bowel:  Esophagus: The visualized esophagus appears unremarkable.  Stomach: The stomach appears unremarkable.  Duodenum: Unremarkable appearing duodenum.  Small Bowel: The small bowel appears unremarkable.  Colon: Nondistended.  Appendix: The appendix appears unremarkable (series 2 image 62-67).  Peritoneum: There is mild ascites, not significantly changed since the prior examination. No free intraperitoneal gas is seen.    Pelvis:  Bladder: The bladder is nondistended and cannot be definitively evaluated.  Male:  Prostate gland: The prostate gland appears unremarkable.    Bony structures:  Dorsal Spine: There is spondylosis of the visualized dorsal spine. There is a stable appearing grade I retrolisthesis of L5 over S1.  Bony Pelvis: The visualized bony structures of the pelvis appear unremarkable.      Impression:  1. There is extensive stranding of the subcutaneous fat as well as the intraabdominal and intrapelvic fat suggesting an element of anasarca edema of uncertain etiology. Correlate with clinical and laboratory findings.  2. Again noted is a large right pleural effusion with compressive atelectasis of the right lower lobe. This is not significantly changed since the prior examination. There is interval decrease in the size of the left lower lobe pulmonary nodule, which now measures at 3 mm (series 4 image 13) with interval resolution of surrounding ground-glass opacity.  3. No acute intraabdominal or pelvic solid organ or bowel pathology identified. Details and other findings as discussed above.                                         X-Ray Chest AP Portable (Final result)  Result time 04/25/23 08:53:38      Final result by Jordan Merlos MD (04/25/23 08:53:38)                   Impression:      Cardiomegaly.    Right-sided pleural effusion with compressive atelectatic changes  and consolidative changes at the right base similar to previous exam.    Increase interstitial markings      Electronically signed by: Jordan Merlos  Date:    04/25/2023  Time:    08:53               Narrative:    EXAMINATION:  XR CHEST AP PORTABLE    CLINICAL HISTORY:  cough;    TECHNIQUE:  Single frontal view of the chest was performed.    COMPARISON:  April 22, 2022.    FINDINGS:  Examination reveals mediastinal silhouette to be within normal limits cardiac silhouette is mildly enlarged.    There is some increase in interstitial markings similar to previous exam might still reflect a degree of pulmonary vascular congestion.    There is blunting of the right costophrenic angle indicating the presence of a right-sided pleural effusion with consolidative changes in compressive atelectatic changes at the right base.    No other focal consolidative changes no other change                        Wet Read by Ez Pena DO (04/25/23 01:58:20, [facility] - Emergency Dept, Emergency Medicine)    Prominent interstitial markings.  Right pleural effusion persist appears slightly improved from previous chest x-ray.                                    Current Medications:     Infusions:   DOBUTamine IV infusion (non-titrating) 2.5 mcg/kg/min (04/26/23 1251)        Scheduled:   albumin human 25%  25 g Intravenous BID    apixaban  5 mg Oral BID    aspirin  81 mg Oral Daily    atorvastatin  40 mg Oral QHS    atovaquone  1,500 mg Oral Daily    zncvmmqve-rvtkucop-doogxqj ala  1 tablet Oral Daily    bumetanide  2 mg Intravenous Q12H    metOLazone  10 mg Oral Daily    mupirocin   Nasal BID    nicotine  1 patch Transdermal Daily        PRN:  acetaminophen, dextrose 10%, dextrose 10%, dextrose, dextrose, glucagon (human recombinant), melatonin, methyl salicylate-menthol 15-10%, naloxone, sodium chloride 0.9%, sodium chloride 0.9%      Intake/Output Summary (Last 24 hours) at 4/26/2023 1556  Last data filed at 4/26/2023  0647  Gross per 24 hour   Intake 240 ml   Output 400 ml   Net -160 ml       Lines/Drains/Airways       Drain  Duration             Male External Urinary Catheter 04/25/23 0100 1 day              Peripheral Intravenous Line  Duration                  Peripheral IV - Single Lumen 04/26/23 1247 20 G Anterior;Distal;Left Forearm <1 day                      Assessment & Plan:     1) Acute on chronic CHF, LVEF 15%  - Frequently admitted for acute on chronic CHF  - Previously difficult to diurese with just lasix  - Nephrology on board with diuresis, appreciate assistance  - attempting to diurese with bumex+metolazone, limited output of 415 mL after 2x 2 mg doses of bumex and 2 x 10 mg metolazone.  - general surgery to place HD line for HD  - Can continue dobutamine while receiving hemodialysis, monitor for any arrhythmias during dialysis session. If any arrhythmias occur, stop dobutamine.  - Closely monitor electrolytes and replete as needed.      David Posey DO  [facility], PGY-1  [facility] Cardiology

## 2023-04-26 NOTE — PT/OT/SLP PROGRESS
Physical Therapy    Missed Treatment Session    Patient Name:  Jason Perdue   MRN:  [id number]      Patient not seen at this time secondary to Nurse/ RADHA hold. Talked to OT, who stated that nurse held tx due to BP and being on IV meds.    Will follow-up as patient is available to participate and as therapists' schedule allows.

## 2023-04-26 NOTE — PROGRESS NOTES
04/26/23 1345   Missed Time Reason   OT Attempted Eval Date 04/26/23   Missed Treatment Reason Patient unwilling to participate     Pt not feeling well. Nurse aware . Will attempt again tomorrow as schedule permits and as advised

## 2023-04-26 NOTE — PT/OT/SLP PROGRESS
Physical Therapy    Missed Treatment Session    Patient Name:  Jason Perdue   MRN:  [id number]      Patient not seen at this time secondary to Other (Comment) (Pt. is not feeling well at this time.).Nurse Leanna was notified and is aware.    Will follow-up as patient is available to participate and as therapists' schedule allows.

## 2023-04-26 NOTE — PLAN OF CARE
Problem: Adult Inpatient Plan of Care  Goal: Plan of Care Review  Outcome: Ongoing, Progressing  Goal: Patient-Specific Goal (Individualized)  Outcome: Ongoing, Progressing  Goal: Absence of Hospital-Acquired Illness or Injury  Outcome: Ongoing, Progressing  Goal: Optimal Comfort and Wellbeing  Outcome: Ongoing, Progressing  Goal: Readiness for Transition of Care  Outcome: Ongoing, Progressing     Problem: Fluid Imbalance (Pneumonia)  Goal: Fluid Balance  Outcome: Ongoing, Progressing     Problem: Infection (Pneumonia)  Goal: Resolution of Infection Signs and Symptoms  Outcome: Ongoing, Progressing     Problem: Respiratory Compromise (Pneumonia)  Goal: Effective Oxygenation and Ventilation  Outcome: Ongoing, Progressing     Problem: Fall Injury Risk  Goal: Absence of Fall and Fall-Related Injury  Outcome: Ongoing, Progressing     Problem: Fluid Volume Excess  Goal: Fluid Balance  Outcome: Ongoing, Progressing     Problem: Skin Injury Risk Increased  Goal: Skin Health and Integrity  Outcome: Ongoing, Progressing     Problem: Device-Related Complication Risk (Hemodialysis)  Goal: Safe, Effective Therapy Delivery  Outcome: Ongoing, Progressing     Problem: Hemodynamic Instability (Hemodialysis)  Goal: Effective Tissue Perfusion  Outcome: Ongoing, Progressing     Problem: Infection (Hemodialysis)  Goal: Absence of Infection Signs and Symptoms  Outcome: Ongoing, Progressing

## 2023-04-26 NOTE — NURSING
04/26/23 1811        Hemodialysis Catheter 04/26/23 1827 right femoral   Placement Date/Time: 04/26/23 1827   Present Prior to Hospital Arrival?: No  Location: right femoral   Line Necessity Review CRRT/HD   Site Assessment Bleeding;No redness;No swelling;No warmth   Line Securement Device Secured with sutures   Dressing Type Central line dressing  (removed first dressing because it was soiled. New dressing on.)   Dressing Status Clean;Dry;Intact   Dressing Intervention Sterile dressing change   Date on Dressing 04/26/23   Venous Patency/Care flushed w/o difficulty;blood return present;intermittent infusion cap applied;normal saline locked   Arterial Patency/Care flushed w/o difficulty;blood return present;intermittent infusion cap applied;normal saline locked   Post-Hemodialysis Assessment   Blood Volume Processed (Liters) 42.8 L   Dialyzer Clearance Lightly streaked   Duration of Treatment 120 minutes   Total UF (mL) 2000 mL   Patient Response to Treatment PT responded to tx well. No complaints or distress related to tx.   Post-Hemodialysis Comments 2hr tx, net 2L removed. PTs SBP was in the 80s, but asymptomatic. Notified MD Forbes and she ordered 25g of albumin. Dressing changed on femoral CVC, because it was soiled. Post tx VS at 1816: BP-97/78, HR-85, temp-97.8 (oral), resp-16.

## 2023-04-26 NOTE — PROGRESS NOTES
"  [facility] Medicine  Progress Note      Patient Name: Jason Perdue  : 1960  MRN: [id number]  Patient Class: IP- Inpatient   Admission Date: 2023   Length of Stay: 1  Admitting Service: Hospital Medicine  Attending Physician: Dilcia Campbell MD  PCP: SAVAGE Reddy    CHIEF COMPLAINT     Chief Complaint   Patient presents with    Abdominal Pain     Pt c/o midline upper abdominal pain and sob when ambulating. Hx of chf. Brought in by ems.b/p low in route. Pt reports pain to abdomen 8/10. Reports onset after large bm on this morning.       HOSPITAL COURSE     Brief HPI:  From H&P    "Jason Perdue is a 63 y.o. black or  male with a past medical history of HIV, HFrEF EF 10% without AICD or LifeVest, AFib on Eliquis, severe aortic stenosis, hypertension, hyperlipidemia, B-cell lymphoma, polysubstance use who presented to the emergency department via EMS evening of 2023 secondary to 3 days progressive shortness of breath, LOVING, orthopnea, lower extremity edema and abdominal swelling, 1 episode of loose bowel movement this morning with cramping 8/10 centralized abdominal pain, weakness with ground level fall yesterday without trauma.  He presented with similar symptoms on 2023 and left against medical advice after receiving 60 mg IV Lasix.  In the ED, vitals noted to be hypotensive, oxygen saturation remain with some normal limits on room air.  Lab show stable normocytic anemia, mild metabolic acidosis, HOLLIS, significantly elevated BNP roughly 15,000, elevated troponin 0.164.  Chest x-ray shows vascular congestion and right-sided pleural effusion, slightly improved from previous.     Internal Medicine service was consulted for admission due to CHF exacerbation and HOLLIS."        Interval History:  Overnight pt experiences chest pain followed by 20+ run of V-fib. Troponin and EKG negative.Pt only had 400 cc urine output despite Bumex 2mg bid and " metolazone 10. Otherwise doing okay, able to tolerate PO and has bowl movements.         OBJECTIVE/PHYSICAL EXAM     VITAL SIGNS (MOST RECENT):  Temp: 97.5 °F (36.4 °C) (04/26/23 1112)  Pulse: 83 (04/26/23 1112)  Resp: 20 (04/26/23 0800)  BP: (!) 80/54 (04/26/23 1112)  SpO2: 100 % (04/26/23 1112)   VITAL SIGNS (24 HOUR RANGE):  Temp:  [97.5 °F (36.4 °C)-98.1 °F (36.7 °C)]   Pulse:  [78-83]   Resp:  [20]   BP: ()/(54-70)   SpO2:  [98 %-100 %]      Physical Exam  Vitals reviewed.   Constitutional:       General: He is awake. He is not in acute distress.     Appearance: He is not ill-appearing or diaphoretic.   HENT:      Head: Atraumatic.   Cardiovascular:      Rate and Rhythm: Normal rate and regular rhythm.      Heart sounds: Normal heart sounds.   Pulmonary:      Effort: Pulmonary effort is normal.      Breath sounds: Decreased air movement (R lung fields) present.   Abdominal:      General: Abdomen is protuberant. Bowel sounds are normal. There is distension.      Tenderness: There is no abdominal tenderness.   Musculoskeletal:      Right lower leg: 3+ Edema present.      Left lower leg: 3+ Edema present.   Skin:     Comments: Skin shiny over BLE   Neurological:      General: No focal deficit present.      Mental Status: He is alert.       LABS/MICRO/MEDS/DIAGNOSTICS     LABS  CBC  Recent Labs     04/25/23  0240 04/26/23  0335   WBC 5.9 6.1   RBC 3.92* 3.73*   HGB 10.2* 9.7*   HCT 32.6* 30.2*   MCV 83.2 81.0   MCH 26.0* 26.0*   MCHC 31.3* 32.1*   RDW 18.8* 18.9*    164     Anemia  No results for input(s): HAPTOGLOBIN, FERRITIN, IRON, TIBC, [id number], FOLATE in the last 72 hours.  Coags  No results for input(s): INR, APTT, D-DIMER in the last 72 hours.  Cardiac  Recent Labs     04/24/23  2359 04/25/23  0549 04/25/23 2028   BNP 14,971.8*  --   --    TROPONINI 0.164* 0.165* 0.130*     ABG/Lactate  Recent Labs     04/25/23  1125   LACTIC 1.9       BMP  Recent Labs     04/25/23  0240 04/26/23  0335   NA  134* 134*   K 4.5 4.2   CHLORIDE 104 102   CO2 17* 19*   BUN 46.9* 53.7*   CREATININE 2.28* 2.77*   GLUCOSE 92 103   CALCIUM 9.0 9.1   MG 2.50 2.60   PHOS 4.1  --      LFTs  Recent Labs     04/24/23 2359 04/25/23  0240 04/26/23  0335   ALBUMIN 3.2* 2.8* 3.3*   GLOBULIN 4.7* 4.7* 4.1*   ALKPHOS 141 138 137   ALT 33 28 21   AST 48* 43* 34   BILITOT 3.2* 2.9* 2.6*   LIPASE 19  --   --      Inflammatory Markers  No results for input(s): CRP, LDH, ESR in the last 72 hours.  Lipid  No results for input(s): CHOL, TRIG, LDL, VLDL, HDL in the last 72 hours.  Diabetes  Recent Labs     04/24/23 2359 04/25/23  0240 04/26/23  0335   GLUCOSE 90 92 103     Thyroid  No results for input(s): TSH, FREET4 in the last 72 hours.     INTAKE/OUTPUT    Intake/Output Summary (Last 24 hours) at 4/26/2023 1118  Last data filed at 4/26/2023 0647  Gross per 24 hour   Intake 720 ml   Output 400 ml   Net 320 ml        MICROBIOLOGY  Microbiology Results (last 7 days)       Procedure Component Value Units Date/Time    Urine culture [[id number]] Collected: 04/25/23 1248    Order Status: Completed Specimen: Urine Updated: 04/26/23 0627     Urine Culture No Growth At 24 Hours             MEDICATIONS   albumin human 25%  25 g Intravenous BID    apixaban  5 mg Oral BID    aspirin  81 mg Oral Daily    atorvastatin  40 mg Oral QHS    atovaquone  1,500 mg Oral Daily    yaozcwfgx-vmvtiviu-wmupqgk ala  1 tablet Oral Daily    bumetanide  2 mg Intravenous Q12H    metOLazone  10 mg Oral Daily    mupirocin   Nasal BID    nicotine  1 patch Transdermal Daily         INFUSIONS   DOBUTamine IV infusion (non-titrating)          DIAGNOSTIC TESTS  CT Abdomen Pelvis  Without Contrast   Final Result   Impression:      1. There is extensive stranding of the subcutaneous fat as well as the intraabdominal and intrapelvic fat suggesting an element of anasarca edema of uncertain etiology. Correlate with clinical and laboratory findings.      2. Again noted is a large right  pleural effusion with compressive atelectasis of the right lower lobe. This is not significantly changed since the prior examination. There is interval decrease in the size of the left lower lobe pulmonary nodule, which now measures at 3 mm (series 4 image 13) with interval resolution of surrounding ground-glass opacity.      3. No acute intraabdominal or pelvic solid organ or bowel pathology identified. Details and other findings as discussed above      There is general concurrence with the preliminary report above.  Additional finding of some urinary bladder wall thickening and mild inflammatory changes associated with the urinary bladder are appreciated..  Cystitis should be excluded.      The         Electronically signed by: Sanjana Otero   Date:    04/25/2023   Time:    10:05      X-Ray Chest AP Portable   ED Interpretation   Prominent interstitial markings.  Right pleural effusion persist appears slightly improved from previous chest x-ray.      Final Result      Cardiomegaly.      Right-sided pleural effusion with compressive atelectatic changes and consolidative changes at the right base similar to previous exam.      Increase interstitial markings         Electronically signed by: Jordan Merlos   Date:    04/25/2023   Time:    08:53           EF   Date Value Ref Range Status   04/13/2023 15 % Final   01/24/2023 12 % Final          ASSESSMENT/PLAN     Acute exacerbation of HFrEF with EF 15% (No AICD)  Abdominal anasarca  Pleural effusion       - BNP elevated nearly 15,000, above previous baseline 11,126       - Most recent echo 04/13/2023 the EF 15% with mild eccentric hypertrophy and severe systolic dysfunction, grade 3 LVDD, moderate to severe aortic   stenosis, moderate MR, moderate TR, pulmonary hypertension       -diurese with Bumex 2mg bid as pt previously not diuresing well with lasiX       - strict I&Os (needs condom cath), daily weights, elevate head of bed, fluid restriction low-sodium  diet       - holding home Entresto and Toprol due to hypotension  - Hold home Jardiance       - CM consulted for evaluation for lifevest, pt insurance does not have coverage for this     Hypotension  -initiating non-titratable dobutamine 2.5 especially because pt will be undergoing dialysis later today and his MAPs have been 62-65     NSTEMI type 2       - Troponin elevated 0.164 likely NSTEMI type 2 secondary to volume overload in setting CHF exacerbation.  -troponin down-trended       -  no significant EKG changes from previous  -cardiology consulted         HOLLIS now likely due to cardiorenal syndrome       - Cr worsening this morning to 2.77  baseline 33.1/1.36 on 4/20       - nephrology consulted, given pt has had very minimal urine output despite aggressive diuresis, pt will need dialysis  -surgery consulted for placement of temporal HD line     Right-sided pleural effusion       - Stable from previous     HIV  - Continue home Biktarvy and prophylactic Atovaquone dosing     Polysubstance use  Tobacco use       - UDS continues to be positive for cocaine        Access: pIV  Antibiotics: none  Diet: low salt  DVT Prophylaxis: Eliquis  GI Prophylaxis: none  Fluids: none    Anticipated discharge and disposition: 4/28, 4/29 pending course, response to dialysis and once adequately diuresed.   __________________________________________________________________________    Unique Lozano M.D.  [employer] [facility] PGY-2

## 2023-04-26 NOTE — PROGRESS NOTES
"[facility]  Nephrology Consultation  Patient Name: Jason Perdue  Age: 63 y.o.  : 1960  MRN: [id number]  Admission Date: 2023    Date of Consultation: 2023      History of Present Illness:  Mr. Jason Perdue is a 63 y.o. Black or  male with past medical history of HIV, HFrEF, polysubstance abuse. Admitted with SOB, lower extremity edema elevated BNP, suspected CHF exacerbation. Renal indices have worsened from baseline. Labs shows anemia , metabolic acidosis, HOLLIS. CXR with perihilar congestion. He was given Lasix 40mg in ED followed by 1.5mg IV bumex with little UOP.  Renal consulted for HOLLIS, diuretics management.     Interval History 23: NAEON. Minimal response to increased diuretic regimen. UOP only 400cc recorded in prior 24h. Renal function has slightly worsened with significant hypervolemia. He reports feeling ok this AM. No worsening SOB, still with lower extremity edema. I discussed with him the likelihood that he will need HD for volume removal and he is in agreement.       Physical Exam:   BP 90/60   Pulse 82   Temp 98.1 °F (36.7 °C)   Resp 20   Ht 5' 11" (1.803 m)   Wt 109 kg (240 lb 4.8 oz)   SpO2 98%   BMI 33.52 kg/m²  Body mass index is 33.52 kg/m².  General: In no acute distress, resting in bed   Neck: No JVD   Cardio: Distant heart sounds, RRR, 2/6 systolic ejection murmur   Respiratory: Crackles in bilateral bases   Abdomen: Lower abdominal wall edema, +BS, non tender   Extremities: 3+ pitting edema bilaterally to the thigh   Neuro: AAOx3, no focal deficit observed     Inpatient Medications:     Current Facility-Administered Medications:     albumin human 25% bottle 25 g, 25 g, Intravenous, BID, Lizette Beal MD, Last Rate: 100 mL/hr at 23 0953, 25 g at 23 0953    apixaban tablet 5 mg, 5 mg, Oral, BID, Kike Chadwick MD, 5 mg at 23 0932    aspirin EC tablet 81 mg, 81 mg, Oral, Daily, Kike Chadwick MD, 81 mg " at 04/26/23 0932    atorvastatin tablet 40 mg, 40 mg, Oral, QHS, Kike Chadwick MD, 40 mg at 04/25/23 2103    atovaquone 750 mg/5 mL oral liquid 1,500 mg, 1,500 mg, Oral, Daily, Kike Chadwick MD, 1,500 mg at 04/26/23 0933    cfvszgfyi-bylrnqgx-nvikiku ala -25 mg (25 kg or greater) 1 tablet, 1 tablet, Oral, Daily, Kike Chadwick MD, 1 tablet at 04/26/23 0932    bumetanide injection 2 mg, 2 mg, Intravenous, Q12H, Silvina Forbes MD, 2 mg at 04/26/23 0952    dextrose 10% bolus 125 mL 125 mL, 12.5 g, Intravenous, PRN, Kike Chadwick MD    dextrose 10% bolus 250 mL 250 mL, 25 g, Intravenous, PRN, Kike Chadwick MD    dextrose 40 % gel 15,000 mg, 15 g, Oral, PRN, Kike Chadwick MD    dextrose 40 % gel 30,000 mg, 30 g, Oral, PRN, Kike Chadwick MD    glucagon (human recombinant) injection 1 mg, 1 mg, Intramuscular, PRN, Kike Chadwick MD    melatonin tablet 6 mg, 6 mg, Oral, Nightly PRN, Kike Chadwick MD, 6 mg at 04/25/23 2103    methyl salicylate-menthol 15-10% cream, , Topical (Top), TID PRN, Terry Barksdale MD, Given at 04/25/23 2337    metOLazone tablet 10 mg, 10 mg, Oral, Daily, Silvina Forbes MD, 10 mg at 04/26/23 0932    mupirocin 2 % ointment, , Nasal, BID, Silvina Forbes MD    naloxone 0.4 mg/mL injection 0.02 mg, 0.02 mg, Intravenous, PRN, Kike Chadwick MD    nicotine 14 mg/24 hr 1 patch, 1 patch, Transdermal, Daily, Kike Chadwick MD, 1 patch at 04/26/23 0932    sodium chloride 0.9% flush 10 mL, 10 mL, Intravenous, Q12H PRN, Kike Chadwick MD    sodium chloride 0.9% flush 10 mL, 10 mL, Intravenous, PRN, Kike Chadwick MD       Laboratory Data:   Serum  Lab Results   Component Value Date    WBC 6.1 04/26/2023    HGB 9.7 (L) 04/26/2023    HCT 30.2 (L) 04/26/2023     04/26/2023    IRON 25 (L) 12/26/2018    TIBC 359 12/26/2018    TRANS 282.0 12/26/2018    LABIRON 7.0 (L) 12/26/2018    FERRITIN 60.8 12/26/2018     (H) 03/14/2023     (L) 04/26/2023    K 4.2 04/26/2023     CHLORIDE 102 04/26/2023    CO2 19 (L) 04/26/2023    BUN 53.7 (H) 04/26/2023    CREATININE 2.77 (H) 04/26/2023    EGFRNORACEVR 25 04/26/2023    GLUCOSE 103 04/26/2023    CALCIUM 9.1 04/26/2023    ALKPHOS 137 04/26/2023    LABPROT 7.4 04/26/2023    ALBUMIN 3.3 (L) 04/26/2023    BILIDIR 0.3 03/15/2022    IBILI 0.30 03/15/2022    AST 34 04/26/2023    ALT 21 04/26/2023    MG 2.60 04/26/2023    PHOS 4.1 04/25/2023      Lab Results   Component Value Date    CANCA Negative 04/19/2023    PANCA Negative 04/19/2023    HEPBSURFAG Reactive (A) 11/29/2021    HEPBSAB Nonreactive 11/29/2021    MSPIKEPCT  04/19/2023      Comment:      NOT OBSERVED     Urine:  Lab Results   Component Value Date    COLORUA Yellow 04/25/2023    APPEARANCEUA Turbid (A) 04/25/2023    SGUA 1.018 04/25/2023    PHUA 5.5 04/25/2023    PROTEINUA 1+ (A) 04/25/2023    GLUCOSEUA Trace (A) 04/25/2023    KETONESUA Negative 04/25/2023    BLOODUA 1+ (A) 04/25/2023    NITRITESUA Negative 04/25/2023    LEUKOCYTESUR 250 (A) 04/25/2023    RBCUA 6-10 (A) 04/25/2023    WBCUA 21-50 (A) 04/25/2023    BACTERIA Trace (A) 04/25/2023    SQEPUA Trace (A) 04/25/2023    HYALINECASTS None Seen 04/25/2023    CREATRANDUR 98.7 04/25/2023    PROTEINURINE 41.3 04/25/2023    UPROTCREA 0.4 04/25/2023      Microbiology Results (last 7 days)       Procedure Component Value Units Date/Time    Urine culture [[id number]] Collected: 04/25/23 1248    Order Status: Completed Specimen: Urine Updated: 04/26/23 0627     Urine Culture No Growth At 24 Hours             Impression/Plan:   HOLLIS on CKD   Cardiorenal syndrome   Ischemic ATN   - Minimal response to increase in diuretic regimen in prior 24h   - At this point will need HD for volume removal   - Discussed this with him and he is in agreement   - Consult surgery for line placement, HD orders placed   - BP has been low, will attempt HD as tolerated   - Cardiology/primary service considering dobutamine infusion      4. HAGMA   - Elevated AG when  corrected for albumin   - Continue to monitor following HD   - CMP in AM     5. Proteinuria   - Prt/Crt ratio WNL      6. Microscopic hematuria  - JUAN, ANCA, urine cytology pending   - To follow up     Thank you very much for your consultation.     Anders Worley MD [facility] Nephrology  4/26/2023 10:26 AM

## 2023-04-26 NOTE — SIGNIFICANT EVENT
Notified by nursing staff patient is experiencing left sided chest pain/tightness 6/10 which lasted less than 10 minutes as well 20 plus beat run of Vfib at 1952 just prior to chest pain. Ordered stat trop and EKG. On exam, Mr Perdue appears uncomfortable lying back in bed. Chest pain is reproducible. Troponin reviewed and is down trending from AM draw, O2 requirements stable, and BP reads near his baseline. If becomes hypotensive, plan to start on dobutamine.

## 2023-04-27 LAB
ALBUMIN SERPL-MCNC: 3.9 G/DL (ref 3.4–4.8)
ALBUMIN/GLOB SERPL: 1.1 RATIO (ref 1.1–2)
ALP SERPL-CCNC: 117 UNIT/L (ref 40–150)
ALT SERPL-CCNC: 17 UNIT/L (ref 0–55)
AST SERPL-CCNC: 28 UNIT/L (ref 5–34)
BACTERIA UR CULT: ABNORMAL
BASOPHILS # BLD AUTO: 0.01 X10(3)/MCL (ref 0–0.2)
BASOPHILS NFR BLD AUTO: 0.2 %
BILIRUBIN DIRECT+TOT PNL SERPL-MCNC: 2.2 MG/DL
BUN SERPL-MCNC: 39 MG/DL (ref 8.4–25.7)
CALCIUM SERPL-MCNC: 9.1 MG/DL (ref 8.8–10)
CHLORIDE SERPL-SCNC: 104 MMOL/L (ref 98–107)
CO2 SERPL-SCNC: 24 MMOL/L (ref 23–31)
CREAT SERPL-MCNC: 2.31 MG/DL (ref 0.73–1.18)
EOSINOPHIL # BLD AUTO: 0.02 X10(3)/MCL (ref 0–0.9)
EOSINOPHIL NFR BLD AUTO: 0.4 %
ERYTHROCYTE [DISTWIDTH] IN BLOOD BY AUTOMATED COUNT: 19.1 % (ref 11.5–17)
GFR SERPLBLD CREATININE-BSD FMLA CKD-EPI: 31 MLS/MIN/1.73/M2
GLOBULIN SER-MCNC: 3.5 GM/DL (ref 2.4–3.5)
GLUCOSE SERPL-MCNC: 97 MG/DL (ref 82–115)
HCT VFR BLD AUTO: 26.1 % (ref 42–52)
HGB BLD-MCNC: 8.8 G/DL (ref 14–18)
IMM GRANULOCYTES # BLD AUTO: 0.01 X10(3)/MCL (ref 0–0.04)
IMM GRANULOCYTES NFR BLD AUTO: 0.2 %
LYMPHOCYTES # BLD AUTO: 0.67 X10(3)/MCL (ref 0.6–4.6)
LYMPHOCYTES NFR BLD AUTO: 12.9 %
MAGNESIUM SERPL-MCNC: 2.4 MG/DL (ref 1.6–2.6)
MCH RBC QN AUTO: 27.2 PG (ref 27–31)
MCHC RBC AUTO-ENTMCNC: 33.7 G/DL (ref 33–36)
MCV RBC AUTO: 80.8 FL (ref 80–94)
MONOCYTES # BLD AUTO: 0.7 X10(3)/MCL (ref 0.1–1.3)
MONOCYTES NFR BLD AUTO: 13.5 %
MYCOBACTERIUM SPEC QL CULT: NORMAL
NEUTROPHILS # BLD AUTO: 3.78 X10(3)/MCL (ref 2.1–9.2)
NEUTROPHILS NFR BLD AUTO: 72.8 %
NRBC BLD AUTO-RTO: 0 %
PLATELET # BLD AUTO: 146 X10(3)/MCL (ref 130–400)
PMV BLD AUTO: 11.8 FL (ref 7.4–10.4)
POTASSIUM SERPL-SCNC: 3.6 MMOL/L (ref 3.5–5.1)
PROT SERPL-MCNC: 7.4 GM/DL (ref 5.8–7.6)
RBC # BLD AUTO: 3.23 X10(6)/MCL (ref 4.7–6.1)
SODIUM SERPL-SCNC: 137 MMOL/L (ref 136–145)
TROPONIN I SERPL-MCNC: 0.12 NG/ML (ref 0–0.04)
WBC # SPEC AUTO: 5.2 X10(3)/MCL (ref 4.5–11.5)

## 2023-04-27 PROCEDURE — 25000003 PHARM REV CODE 250

## 2023-04-27 PROCEDURE — 80100014 HC HEMODIALYSIS 1:1

## 2023-04-27 PROCEDURE — 90935 HEMODIALYSIS ONE EVALUATION: CPT

## 2023-04-27 PROCEDURE — 80053 COMPREHEN METABOLIC PANEL: CPT | Performed by: STUDENT IN AN ORGANIZED HEALTH CARE EDUCATION/TRAINING PROGRAM

## 2023-04-27 PROCEDURE — 85025 COMPLETE CBC W/AUTO DIFF WBC: CPT | Performed by: STUDENT IN AN ORGANIZED HEALTH CARE EDUCATION/TRAINING PROGRAM

## 2023-04-27 PROCEDURE — 97530 THERAPEUTIC ACTIVITIES: CPT

## 2023-04-27 PROCEDURE — A4216 STERILE WATER/SALINE, 10 ML: HCPCS | Performed by: INTERNAL MEDICINE

## 2023-04-27 PROCEDURE — P9047 ALBUMIN (HUMAN), 25%, 50ML: HCPCS | Mod: JZ,JG

## 2023-04-27 PROCEDURE — 93005 ELECTROCARDIOGRAM TRACING: CPT

## 2023-04-27 PROCEDURE — C1751 CATH, INF, PER/CENT/MIDLINE: HCPCS

## 2023-04-27 PROCEDURE — 25000003 PHARM REV CODE 250: Performed by: STUDENT IN AN ORGANIZED HEALTH CARE EDUCATION/TRAINING PROGRAM

## 2023-04-27 PROCEDURE — 36410 VNPNXR 3YR/> PHY/QHP DX/THER: CPT

## 2023-04-27 PROCEDURE — 97116 GAIT TRAINING THERAPY: CPT

## 2023-04-27 PROCEDURE — 25000003 PHARM REV CODE 250: Performed by: INTERNAL MEDICINE

## 2023-04-27 PROCEDURE — 94760 N-INVAS EAR/PLS OXIMETRY 1: CPT

## 2023-04-27 PROCEDURE — 94761 N-INVAS EAR/PLS OXIMETRY MLT: CPT

## 2023-04-27 PROCEDURE — 83735 ASSAY OF MAGNESIUM: CPT | Performed by: STUDENT IN AN ORGANIZED HEALTH CARE EDUCATION/TRAINING PROGRAM

## 2023-04-27 PROCEDURE — 21400001 HC TELEMETRY ROOM

## 2023-04-27 PROCEDURE — S4991 NICOTINE PATCH NONLEGEND: HCPCS | Performed by: STUDENT IN AN ORGANIZED HEALTH CARE EDUCATION/TRAINING PROGRAM

## 2023-04-27 PROCEDURE — 63600175 PHARM REV CODE 636 W HCPCS: Mod: JZ,JG

## 2023-04-27 PROCEDURE — 84484 ASSAY OF TROPONIN QUANT: CPT | Performed by: STUDENT IN AN ORGANIZED HEALTH CARE EDUCATION/TRAINING PROGRAM

## 2023-04-27 PROCEDURE — 11000001 HC ACUTE MED/SURG PRIVATE ROOM

## 2023-04-27 RX ORDER — MIDODRINE HYDROCHLORIDE 5 MG/1
10 TABLET ORAL
Status: DISCONTINUED | OUTPATIENT
Start: 2023-04-27 | End: 2023-04-30

## 2023-04-27 RX ORDER — SODIUM CHLORIDE 0.9 % (FLUSH) 0.9 %
10 SYRINGE (ML) INJECTION
Status: DISCONTINUED | OUTPATIENT
Start: 2023-04-27 | End: 2023-05-05 | Stop reason: HOSPADM

## 2023-04-27 RX ORDER — SODIUM CHLORIDE 0.9 % (FLUSH) 0.9 %
10 SYRINGE (ML) INJECTION EVERY 6 HOURS
Status: DISCONTINUED | OUTPATIENT
Start: 2023-04-27 | End: 2023-05-05 | Stop reason: HOSPADM

## 2023-04-27 RX ORDER — ACETAMINOPHEN 500 MG
1000 TABLET ORAL ONCE
Status: CANCELLED | OUTPATIENT
Start: 2023-04-27

## 2023-04-27 RX ADMIN — METOLAZONE 10 MG: 5 TABLET ORAL at 08:04

## 2023-04-27 RX ADMIN — ATORVASTATIN CALCIUM 40 MG: 40 TABLET, FILM COATED ORAL at 08:04

## 2023-04-27 RX ADMIN — ATOVAQUONE 1500 MG: 750 SUSPENSION ORAL at 08:04

## 2023-04-27 RX ADMIN — MIDODRINE HYDROCHLORIDE 10 MG: 5 TABLET ORAL at 05:04

## 2023-04-27 RX ADMIN — MELATONIN TAB 3 MG 6 MG: 3 TAB at 09:04

## 2023-04-27 RX ADMIN — BUMETANIDE 2 MG: 0.25 INJECTION, SOLUTION INTRAMUSCULAR; INTRAVENOUS at 08:04

## 2023-04-27 RX ADMIN — SODIUM CHLORIDE, PRESERVATIVE FREE 10 ML: 5 INJECTION INTRAVENOUS at 06:04

## 2023-04-27 RX ADMIN — ALBUMIN (HUMAN) 25 G: 12.5 SOLUTION INTRAVENOUS at 10:04

## 2023-04-27 RX ADMIN — APIXABAN 5 MG: 2.5 TABLET, FILM COATED ORAL at 08:04

## 2023-04-27 RX ADMIN — ALBUMIN (HUMAN) 25 G: 12.5 SOLUTION INTRAVENOUS at 09:04

## 2023-04-27 RX ADMIN — BICTEGRAVIR SODIUM, EMTRICITABINE, AND TENOFOVIR ALAFENAMIDE FUMARATE 1 TABLET: 50; 200; 25 TABLET ORAL at 08:04

## 2023-04-27 RX ADMIN — ASPIRIN 81 MG: 81 TABLET, COATED ORAL at 08:04

## 2023-04-27 RX ADMIN — MENTHOL, METHYL SALICYLATE: 10; 15 CREAM TOPICAL at 09:04

## 2023-04-27 RX ADMIN — ACETAMINOPHEN 650 MG: 325 TABLET ORAL at 09:04

## 2023-04-27 RX ADMIN — BUMETANIDE 2 MG: 0.25 INJECTION, SOLUTION INTRAMUSCULAR; INTRAVENOUS at 10:04

## 2023-04-27 RX ADMIN — NICOTINE 1 PATCH: 14 PATCH, EXTENDED RELEASE TRANSDERMAL at 08:04

## 2023-04-27 RX ADMIN — MUPIROCIN: 20 OINTMENT TOPICAL at 08:04

## 2023-04-27 NOTE — PLAN OF CARE
Problem: Adult Inpatient Plan of Care  Goal: Plan of Care Review  Outcome: Ongoing, Progressing  Goal: Patient-Specific Goal (Individualized)  Outcome: Ongoing, Progressing  Goal: Absence of Hospital-Acquired Illness or Injury  Outcome: Ongoing, Progressing  Goal: Optimal Comfort and Wellbeing  Outcome: Ongoing, Progressing  Goal: Readiness for Transition of Care  Outcome: Ongoing, Progressing     Problem: Fluid Imbalance (Pneumonia)  Goal: Fluid Balance  Outcome: Ongoing, Progressing     Problem: Infection (Pneumonia)  Goal: Resolution of Infection Signs and Symptoms  Outcome: Ongoing, Progressing     Problem: Respiratory Compromise (Pneumonia)  Goal: Effective Oxygenation and Ventilation  Outcome: Ongoing, Progressing     Problem: Fall Injury Risk  Goal: Absence of Fall and Fall-Related Injury  Outcome: Ongoing, Progressing     Problem: Fluid Volume Excess  Goal: Fluid Balance  Outcome: Ongoing, Progressing     Problem: Skin Injury Risk Increased  Goal: Skin Health and Integrity  Outcome: Ongoing, Progressing     Problem: Device-Related Complication Risk (Hemodialysis)  Goal: Safe, Effective Therapy Delivery  Outcome: Ongoing, Progressing     Problem: Hemodynamic Instability (Hemodialysis)  Goal: Effective Tissue Perfusion  Outcome: Ongoing, Progressing     Problem: Infection (Hemodialysis)  Goal: Absence of Infection Signs and Symptoms  Outcome: Ongoing, Progressing     Problem: Fluid and Electrolyte Imbalance (Acute Kidney Injury/Impairment)  Goal: Fluid and Electrolyte Balance  Outcome: Ongoing, Progressing     Problem: Oral Intake Inadequate (Acute Kidney Injury/Impairment)  Goal: Optimal Nutrition Intake  Outcome: Ongoing, Progressing     Problem: Renal Function Impairment (Acute Kidney Injury/Impairment)  Goal: Effective Renal Function  Outcome: Ongoing, Progressing     Problem: Infection  Goal: Absence of Infection Signs and Symptoms  Outcome: Ongoing, Progressing

## 2023-04-27 NOTE — PROGRESS NOTES
"[facility]  Nephrology Consultation  Patient Name: Jason Perdue  Age: 63 y.o.  : 1960  MRN: [id number]  Admission Date: 2023    Date of Consultation: 2023      History of Present Illness:  Mr. Jason Perdue is a 63 y.o. Black or  male with past medical history of HIV, HFrEF, polysubstance abuse. Admitted with SOB, lower extremity edema elevated BNP, suspected CHF exacerbation. Renal indices have worsened from baseline. Labs shows anemia , metabolic acidosis, HOLLIS. CXR with perihilar congestion. He was given Lasix 40mg in ED followed by 1.5mg IV bumex with little UOP.  Renal consulted for HOLLIS, diuretics management.     Interval History 23: NAEON. Started on HD yesterday and tolerated well without issue. Started on Dobutamine infusion via cardiology. BP has been stable. Still with significant LE edema but respiratory status remains stable. No complaints today     Physical Exam:   BP 93/70   Pulse 93   Temp 97.7 °F (36.5 °C)   Resp 20   Ht 5' 11" (1.803 m)   Wt 103.9 kg (229 lb)   SpO2 99%   BMI 31.94 kg/m²  Body mass index is 31.94 kg/m².  General: In no acute distress, resting in bed   Neck: No JVD   Cardio: Distant heart sounds, RRR, 2/6 systolic ejection murmur   Respiratory: Lungs CTAB   Abdomen: Lower abdominal wall edema, +BS, non tender   Extremities: 3+ pitting edema bilaterally to the thigh   Neuro: AAOx3, no focal deficit observed     Inpatient Medications:     Current Facility-Administered Medications:     acetaminophen tablet 650 mg, 650 mg, Oral, Q6H PRN, Dileep Fonseca MD, 650 mg at 23 1401    albumin human 25% bottle 25 g, 25 g, Intravenous, BID, Lizette Beal MD, Stopped at 23 1105    apixaban tablet 5 mg, 5 mg, Oral, BID, Kike Chadwick MD, 5 mg at 23 0848    aspirin EC tablet 81 mg, 81 mg, Oral, Daily, Kike Chadwick MD, 81 mg at 23 0848    atorvastatin tablet 40 mg, 40 mg, Oral, QHS, Kike " NORMAN Chadwick MD, 40 mg at 04/26/23 2123    atovaquone 750 mg/5 mL oral liquid 1,500 mg, 1,500 mg, Oral, Daily, Kike Chadwick MD, 1,500 mg at 04/27/23 0849    aujtnkaqb-nazvqcad-ccpfyov ala -25 mg (25 kg or greater) 1 tablet, 1 tablet, Oral, Daily, Kike Chadwick MD, 1 tablet at 04/27/23 0848    bumetanide injection 2 mg, 2 mg, Intravenous, Q12H, Silvina Forbes MD, 2 mg at 04/27/23 1050    dextrose 10% bolus 125 mL 125 mL, 12.5 g, Intravenous, PRN, Kike Chadwick MD    dextrose 10% bolus 250 mL 250 mL, 25 g, Intravenous, PRN, Kike Chadwick MD    dextrose 40 % gel 15,000 mg, 15 g, Oral, PRN, Kike Chadwick MD    dextrose 40 % gel 30,000 mg, 30 g, Oral, PRN, Kike Chadwick MD    DOBUtamine 1000 mg in D5W 250 mL infusion, 2.5 mcg/kg/min, Intravenous, Continuous, Unique Lozano MD, Last Rate: 4.1 mL/hr at 04/26/23 1251, 2.5 mcg/kg/min at 04/26/23 1251    glucagon (human recombinant) injection 1 mg, 1 mg, Intramuscular, PRN, Kike Chadwick MD    melatonin tablet 6 mg, 6 mg, Oral, Nightly PRN, Kike Chadwick MD, 6 mg at 04/25/23 2103    methyl salicylate-menthol 15-10% cream, , Topical (Top), TID PRN, Terry Barksdale MD, Given at 04/26/23 1402    metOLazone tablet 10 mg, 10 mg, Oral, Daily, Silvina Forbes MD, 10 mg at 04/27/23 0848    mupirocin 2 % ointment, , Nasal, BID, Silvina Forbes MD, Given at 04/26/23 2100    naloxone 0.4 mg/mL injection 0.02 mg, 0.02 mg, Intravenous, PRN, Kike Chadwick MD    nicotine 14 mg/24 hr 1 patch, 1 patch, Transdermal, Daily, Kike Chadwick MD, 1 patch at 04/27/23 0848    sodium chloride 0.9% flush 10 mL, 10 mL, Intravenous, Q12H PRN, Kike Chadwick MD    sodium chloride 0.9% flush 10 mL, 10 mL, Intravenous, PRN, Kike Chadwick MD       Laboratory Data:   Serum  Lab Results   Component Value Date    WBC 5.2 04/27/2023    HGB 8.8 (L) 04/27/2023    HCT 26.1 (L) 04/27/2023     04/27/2023    IRON 25 (L) 12/26/2018    TIBC 359 12/26/2018    TRANS 282.0 12/26/2018     LABIRON 7.0 (L) 12/26/2018    FERRITIN 60.8 12/26/2018     (H) 03/14/2023     04/27/2023    K 3.6 04/27/2023    CHLORIDE 104 04/27/2023    CO2 24 04/27/2023    BUN 39.0 (H) 04/27/2023    CREATININE 2.31 (H) 04/27/2023    EGFRNORACEVR 31 04/27/2023    GLUCOSE 97 04/27/2023    CALCIUM 9.1 04/27/2023    ALKPHOS 117 04/27/2023    LABPROT 7.4 04/27/2023    ALBUMIN 3.9 04/27/2023    BILIDIR 0.3 03/15/2022    IBILI 0.30 03/15/2022    AST 28 04/27/2023    ALT 17 04/27/2023    MG 2.40 04/27/2023    PHOS 4.1 04/25/2023      Lab Results   Component Value Date    CANCA Negative 04/19/2023    PANCA Negative 04/19/2023    HEPBSURFAG Reactive (A) 04/26/2023    HEPBSAB Nonreactive 04/25/2023    MSPIKEPCT  04/19/2023      Comment:      NOT OBSERVED     Urine:  Lab Results   Component Value Date    COLORUA Yellow 04/25/2023    APPEARANCEUA Turbid (A) 04/25/2023    SGUA 1.018 04/25/2023    PHUA 5.5 04/25/2023    PROTEINUA 1+ (A) 04/25/2023    GLUCOSEUA Trace (A) 04/25/2023    KETONESUA Negative 04/25/2023    BLOODUA 1+ (A) 04/25/2023    NITRITESUA Negative 04/25/2023    LEUKOCYTESUR 250 (A) 04/25/2023    RBCUA 6-10 (A) 04/25/2023    WBCUA 21-50 (A) 04/25/2023    BACTERIA Trace (A) 04/25/2023    SQEPUA Trace (A) 04/25/2023    HYALINECASTS None Seen 04/25/2023    CREATRANDUR 98.7 04/25/2023    PROTEINURINE 41.3 04/25/2023    UPROTCREA 0.4 04/25/2023      Microbiology Results (last 7 days)       Procedure Component Value Units Date/Time    Urine culture [[id number]] Collected: 04/25/23 1248    Order Status: Completed Specimen: Urine Updated: 04/26/23 0627     Urine Culture No Growth At 24 Hours             Impression/Plan:   HOLLIS on CKD   Cardiorenal syndrome   Ischemic ATN   - Will dialyze again today for fluid removal   - Plan for 2L UF, can give albumin during HD for hypotension   - Continue dobutamine infusion per primary service      4. HAGMA   - Elevated AG when corrected for albumin   - Continue to monitor following HD    - CMP in AM     5. Proteinuria   - Prt/Crt ratio WNL      6. Microscopic hematuria  - JUAN, ANCA, urine cytology pending   - To follow up     Thank you very much for your consultation.     Anders Worley MD [facility] Nephrology  4/27/2023 10:26 AM

## 2023-04-27 NOTE — PROGRESS NOTES
04/27/23 1643   Post-Hemodialysis Assessment   Rinseback Volume (mL) 250 mL   Blood Volume Processed (Liters) 54 L   Dialyzer Clearance Clear   Duration of Treatment 180 minutes   Additional Fluid Intake (mL) 0 mL   Total UF (mL) 2500 mL   Net Fluid Removal 2000   Patient Response to Treatment tolerated well   Post-Hemodialysis Comments completed 3hr HD tx. Removed 2L, used Albumin at start of HD. Attempted 3L but BP dropped into 70sbp, Last bp 92/54 HR 90

## 2023-04-27 NOTE — PROCEDURES
"Jason Perdue is a 63 y.o. male patient.    Temp: 97.7 °F (36.5 °C) (04/27/23 1044)  Pulse: 99 (04/27/23 1620)  Resp: 20 (04/27/23 1044)  BP: (!) 86/58 (04/27/23 1620)  SpO2: 99 % (04/27/23 1044)  Weight: 103.9 kg (229 lb) (04/27/23 0500)  Height: 5' 11" (180.3 cm) (04/25/23 0004)    PICC  Date/Time: 4/27/2023 4:30 PM  Performed by: Noa Childers RN  Consent Done: Yes  Time out: Immediately prior to procedure a time out was called to verify the correct patient, procedure, equipment, support staff and site/side marked as required  Indications: vascular access and med administration  Anesthesia: local infiltration  Local anesthetic: lidocaine 1% without epinephrine  Anesthetic Total (mL): 3  Preparation: skin prepped with ChloraPrep  Skin prep agent dried: skin prep agent completely dried prior to procedure  Sterile barriers: all five maximum sterile barriers used - cap, mask, sterile gown, sterile gloves, and large sterile sheet  Hand hygiene: hand hygiene performed prior to central venous catheter insertion  Location details: right brachial  Catheter type: single lumen  Catheter size: 4 Fr  Catheter Length: 16cm    Ultrasound guidance: yes  Vessel Caliber: medium and patent, compressibility normal  Vascular Doppler: not done  Needle advanced into vessel with real time Ultrasound guidance.  Guidewire confirmed in vessel.  Sterile sheath used.  Number of attempts: 1  Post-procedure: blood return through all ports, sterile dressing applied and chlorhexidine patch    Assessment: successful placement  Complications: none  Comments: Pt on dialysis, Dr. Forbes approved midline insertion.        Noa childers rn  4/27/2023    "

## 2023-04-27 NOTE — PT/OT/SLP PROGRESS
Physical Therapy    Missed Treatment Session    Patient Name:  Jason Perdue   MRN:  [id number]      Patient not seen at this time secondary to Other (Comment) (Pt. is not feeling well, nurse in room stated that pt. has low BP in his 80's. OT also present.).    Will follow-up as patient is available to participate and as therapists' schedule allows.

## 2023-04-27 NOTE — PROGRESS NOTES
04/27/23 1030   Missed Time Reason   Missed Treatment Reason Other (Comment)  (Bedrest orders discontinued; Pt still not feeling well and will get 2nd round of dialysis today. Will attempt again as schedule permits.)

## 2023-04-27 NOTE — PROGRESS NOTES
04/27/23 0849   Missed Time Reason   Missed Treatment Reason Other (Comment)  (Pt with BR orders ; BP 72/54 and pt not feeling well. Notified staff of BR orders)

## 2023-04-27 NOTE — PT/OT/SLP PROGRESS
Physical Therapy Treatment    Patient Name:  Jason Perdue   MRN:  [id number]    Recommendations     Discharge Recommendations:  nursing facility, skilled, rehabilitation facility   Discharge Equipment Recommendations: none   Barriers to discharge: decreased endurance and medical diagnosis    Assessment     Jason Perdue is a 63 y.o. male admitted with a medical diagnosis of   1. HOLLIS (acute kidney injury)    2. CHF (congestive heart failure)    3. Chest pain    4. NSTEMI (non-ST elevated myocardial infarction)          He presents with the following impairments/functional limitations:  weakness, gait instability, impaired endurance, impaired balance, decreased lower extremity function, impaired self care skills, pain, impaired functional mobility, edema, impaired cardiopulmonary response to activity.    Rehab Prognosis: Fair.    Patient would benefit from continued skilled acute PT services to: address above listed impairments/functional limitations; receive patient/caregiver education; reduce fall risk; and maximize independency/safety with functional mobility.    Recent Surgery: * No surgery found *      Plan     During this hospitalization, patient to be seen  (3-5xwk) to address the identified impairments/functional limitations via gait training, therapeutic activities, therapeutic exercises and progress toward the established goals.    Plan of Care Expires:  05/24/23    Subjective     Communicated with patient's nurse (Idalmis) prior to session.    Patient agreeable to participate in treatment session.    Chief Complaint: Back pain with mobility.  Patient/Family Comments/goals: Get better.  Pain/Comfort:  Pain Rating 1: 0/10  Location 1: back  Pain Addressed 1: Distraction, Reposition  Pain Rating Post-Intervention 1: 5/10    Objective     Patient found supine in bed, with HOB elevated, and bed rails up bilateral HOB with PureWick, oxygen, peripheral IV  upon PT entry to room.    General Precautions: Standard,  fall   Orthopedic Precautions:N/A   Braces: N/A  Respiratory Status: room air    Vital (pre-activity):  BP: 96/64  HR: 92  O2: 94%    Functional Mobility:    Bed Mobility:  Rolling Left: modified independence  Rolling Right: modified independence  Supine to Sit: stand by assistance  Sit to Supine: stand by assistance  with no cues required    Transfers:  Sit to Stand: contact guard assistance with rolling walker    Gait:  Patient ambulated 25ft with rolling walker and contact guard assistance.  Pt. Ambulated on room air, exhibited SOB and required 2 L O2 with seated rest break; O2 stats at 99%  Patient demonstrates decreased step length, ambulates outside WILLIAM of RW, decreased foot/floor clearance, and flexed posture.    Other Mobility:  Pt. Required total A to change diaper in bed.    Patient left with HOB elevated and bed rails up bilateral HOB with all lines intact, call button in reach, nurse (Roshan) notified, and dialysis nurse present..    Goals     Multidisciplinary Problems       Physical Therapy Goals          Problem: Physical Therapy    Goal Priority Disciplines Outcome Goal Variances Interventions   Physical Therapy Goal     PT, PT/OT Ongoing, Progressing     Description: Goals to be met by: dc     Patient will increase functional independence with mobility by performin. Sit to stand transfer with Modified Antler-ONGOING  2. Gait  x 130 feet with Modified [place]ler using Rolling Walker. -ONGOING                       Time Tracking     PT Received On: 23  PT Start Time: 1231     PT Stop Time: 1256  PT Total Time (min): 25 min     Billable Minutes: Gait Training 15 minutes and Therapeutic Activity 10 minutes    2023

## 2023-04-27 NOTE — PROGRESS NOTES
"  [facility] Medicine  Progress Note      Patient Name: Jason Perdue  : 1960  MRN: [id number]  Patient Class: IP- Inpatient   Admission Date: 2023   Length of Stay: 2  Admitting Service: Hospital Medicine  Attending Physician: Dilcia Campbell MD  PCP: SAVAGE Reddy    CHIEF COMPLAINT     Chief Complaint   Patient presents with    Abdominal Pain     Pt c/o midline upper abdominal pain and sob when ambulating. Hx of chf. Brought in by ems.b/p low in route. Pt reports pain to abdomen 8/10. Reports onset after large bm on this morning.       HOSPITAL COURSE     Brief HPI:  From H&P    "Jason Perdue is a 63 y.o. black or  male with a past medical history of HIV, HFrEF EF 10% without AICD or LifeVest, AFib on Eliquis, severe aortic stenosis, hypertension, hyperlipidemia, B-cell lymphoma, polysubstance use who presented to the emergency department via EMS evening of 2023 secondary to 3 days progressive shortness of breath, LOVING, orthopnea, lower extremity edema and abdominal swelling, 1 episode of loose bowel movement this morning with cramping 8/10 centralized abdominal pain, weakness with ground level fall yesterday without trauma.  He presented with similar symptoms on 2023 and left against medical advice after receiving 60 mg IV Lasix.  In the ED, vitals noted to be hypotensive, oxygen saturation remain with some normal limits on room air.  Lab show stable normocytic anemia, mild metabolic acidosis, HOLLIS, significantly elevated BNP roughly 15,000, elevated troponin 0.164.  Chest x-ray shows vascular congestion and right-sided pleural effusion, slightly improved from previous.     Internal Medicine service was consulted for admission due to CHF exacerbation and HOLLIS."        Interval History:  NAEON. Pt got dialyzed yesterday with net removal of 2L; BP remained in the 80's despite being on dobutamine drip throughout the dialysis session. Pt states " he is feeling better, however clinically  still appears significantly fluid over loaded. Likely need another round of dialysis today. Remains with minimal urine output ( about 500 cc) despite IV diuresis with Bumex        OBJECTIVE/PHYSICAL EXAM     VITAL SIGNS (MOST RECENT):  Temp: 97.7 °F (36.5 °C) (04/27/23 1044)  Pulse: 99 (04/27/23 1620)  Resp: 20 (04/27/23 1044)  BP: (!) 86/58 (04/27/23 1620)  SpO2: 99 % (04/27/23 1044)   VITAL SIGNS (24 HOUR RANGE):  Temp:  [97.7 °F (36.5 °C)-97.9 °F (36.6 °C)]   Pulse:  []   Resp:  [20]   BP: ()/(53-70)   SpO2:  [95 %-99 %]      Physical Exam  Vitals reviewed.   Constitutional:       General: He is awake. He is not in acute distress.     Appearance: He is not ill-appearing or diaphoretic.   HENT:      Head: Atraumatic.   Cardiovascular:      Rate and Rhythm: Normal rate and regular rhythm.      Heart sounds: Normal heart sounds.   Pulmonary:      Effort: Pulmonary effort is normal.      Breath sounds: Decreased air movement (R lung fields) present.   Abdominal:      General: Abdomen is protuberant. Bowel sounds are normal. There is distension.      Tenderness: There is no abdominal tenderness.   Musculoskeletal:      Right lower leg: 3+ Edema present.      Left lower leg: 3+ Edema present.   Skin:     Comments: Skin shiny over BLE   Neurological:      General: No focal deficit present.      Mental Status: He is alert.       LABS/MICRO/MEDS/DIAGNOSTICS     LABS  CBC  Recent Labs     04/26/23  0335 04/27/23  0345   WBC 6.1 5.2   RBC 3.73* 3.23*   HGB 9.7* 8.8*   HCT 30.2* 26.1*   MCV 81.0 80.8   MCH 26.0* 27.2   MCHC 32.1* 33.7   RDW 18.9* 19.1*    146       Anemia  No results for input(s): HAPTOGLOBIN, FERRITIN, IRON, TIBC, SSRIQUMB74, FOLATE in the last 72 hours.  Coags  No results for input(s): INR, APTT, D-DIMER in the last 72 hours.  Cardiac  Recent Labs     04/24/23  2359 04/25/23  0549 04/25/23 2028   BNP 14,971.8*  --   --    TROPONINI 0.164* 0.165*  0.130*       ABG/Lactate  Recent Labs     04/25/23  1125   LACTIC 1.9         BMP  Recent Labs     04/25/23  0240 04/26/23  0335 04/27/23  0345   * 134* 137   K 4.5 4.2 3.6   CHLORIDE 104 102 104   CO2 17* 19* 24   BUN 46.9* 53.7* 39.0*   CREATININE 2.28* 2.77* 2.31*   GLUCOSE 92 103 97   CALCIUM 9.0 9.1 9.1   MG 2.50 2.60 2.40   PHOS 4.1  --   --        LFTs  Recent Labs     04/24/23  2359 04/25/23  0240 04/26/23  0335 04/27/23  0345   ALBUMIN 3.2*   < > 3.3* 3.9   GLOBULIN 4.7*   < > 4.1* 3.5   ALKPHOS 141   < > 137 117   ALT 33   < > 21 17   AST 48*   < > 34 28   BILITOT 3.2*   < > 2.6* 2.2*   LIPASE 19  --   --   --     < > = values in this interval not displayed.       Inflammatory Markers  No results for input(s): CRP, LDH, ESR in the last 72 hours.  Lipid  No results for input(s): CHOL, TRIG, LDL, VLDL, HDL in the last 72 hours.  Diabetes  Recent Labs     04/25/23  0240 04/26/23  0335 04/27/23  0345   GLUCOSE 92 103 97       Thyroid  No results for input(s): TSH, FREET4 in the last 72 hours.     INTAKE/OUTPUT    Intake/Output Summary (Last 24 hours) at 4/27/2023 1626  Last data filed at 4/27/2023 1056  Gross per 24 hour   Intake 630 ml   Output 3025 ml   Net -2395 ml          MICROBIOLOGY  Microbiology Results (last 7 days)       Procedure Component Value Units Date/Time    Urine culture [[id number]]  (Abnormal) Collected: 04/25/23 1248    Order Status: Completed Specimen: Urine Updated: 04/27/23 1316     Urine Culture Less than 10,000 colonies/ml Proteus mirabilis     Comment: [place] counts <10,000/ml are of questionable significance and may or may not indicate contamination.  Therefore organisms are identified only.  If further workup is desired please notify Microbiology                 MEDICATIONS   albumin human 25%  25 g Intravenous BID    apixaban  5 mg Oral BID    aspirin  81 mg Oral Daily    atorvastatin  40 mg Oral QHS    atovaquone  1,500 mg Oral Daily    aojfqsplw-fsqjigke-bpajxsr ala  1  tablet Oral Daily    bumetanide  2 mg Intravenous Q12H    metOLazone  10 mg Oral Daily    midodrine  10 mg Oral TID WM    mupirocin   Nasal BID    nicotine  1 patch Transdermal Daily         INFUSIONS         DIAGNOSTIC TESTS  CT Abdomen Pelvis  Without Contrast   Final Result   Impression:      1. There is extensive stranding of the subcutaneous fat as well as the intraabdominal and intrapelvic fat suggesting an element of anasarca edema of uncertain etiology. Correlate with clinical and laboratory findings.      2. Again noted is a large right pleural effusion with compressive atelectasis of the right lower lobe. This is not significantly changed since the prior examination. There is interval decrease in the size of the left lower lobe pulmonary nodule, which now measures at 3 mm (series 4 image 13) with interval resolution of surrounding ground-glass opacity.      3. No acute intraabdominal or pelvic solid organ or bowel pathology identified. Details and other findings as discussed above      There is general concurrence with the preliminary report above.  Additional finding of some urinary bladder wall thickening and mild inflammatory changes associated with the urinary bladder are appreciated..  Cystitis should be excluded.      The         Electronically signed by: Sanjana Otero   Date:    04/25/2023   Time:    10:05      X-Ray Chest AP Portable   ED Interpretation   Prominent interstitial markings.  Right pleural effusion persist appears slightly improved from previous chest x-ray.      Final Result      Cardiomegaly.      Right-sided pleural effusion with compressive atelectatic changes and consolidative changes at the right base similar to previous exam.      Increase interstitial markings         Electronically signed by: Jordan Merlos   Date:    04/25/2023   Time:    08:53           EF   Date Value Ref Range Status   04/13/2023 15 % Final   01/24/2023 12 % Final          ASSESSMENT/PLAN     Acute  exacerbation of HFrEF with EF 15% (No AICD)  Abdominal anasarca  Pleural effusion       - BNP elevated nearly 15,000, above previous baseline 11,126       - Most recent echo 04/13/2023 the EF 15% with mild eccentric hypertrophy and severe systolic dysfunction, grade 3 LVDD, moderate to severe aortic   stenosis, moderate MR, moderate TR, pulmonary hypertension       -diurese with Bumex 2mg bid as pt previously not diuresing well with lasiX       - strict I&Os (needs condom cath), daily weights, elevate head of bed, fluid restriction low-sodium diet       - holding home Entresto and Toprol due to hypotension  - Hold home Jardiance       - CM consulted for evaluation for lifevest, pt insurance does not have coverage for this     Hypotension  -BP remains low with MAP of 62-70 even on dobutamine drip  -improved as day has progressed, with most recent MAP of 78  -switching to midodrine 10mg tid for BP support, which pt can take at home as well  -appreciate cards and nephrology recs       NSTEMI type 2       - Troponin elevated 0.164 likely NSTEMI type 2 secondary to volume overload in setting CHF exacerbation.  -troponin down-trended       -  no significant EKG changes from previous  -cardiology consulted         HOLLIS now likely due to cardiorenal syndrome       - pt is s/p one dialysis session on 4/26 with net removal of 2L  -per nephrology recs, pt is to undergo another round today  -baseline BUN/Cr 33.1/1.36 on 4/20            Right-sided pleural effusion       - Stable from previous     HIV  - Continue home Biktarvy and prophylactic Atovaquone dosing     Polysubstance use  Tobacco use       - UDS continues to be positive for cocaine    Goals of care discussed with patient and he expressed his wishes to remain a FULL CODE at this time. Prognosis is guarded given end stage heart failure, persistent severe volume overload not responsive to oral or IV diuresis and persistent hypotension.     Access: pIV  Antibiotics:  none  Diet: low salt  DVT Prophylaxis: Eliquis  GI Prophylaxis: none  Fluids: none    Anticipated discharge and disposition: 4/29 pending course, response to dialysis and once adequately diuresed.   __________________________________________________________________________    GRACEILA RobledoU [facility] PGY-2

## 2023-04-28 LAB
ALBUMIN SERPL-MCNC: 4.3 G/DL (ref 3.4–4.8)
ALBUMIN/GLOB SERPL: 1.2 RATIO (ref 1.1–2)
ALP SERPL-CCNC: 120 UNIT/L (ref 40–150)
ALT SERPL-CCNC: 15 UNIT/L (ref 0–55)
AR ANA INTERPRETIVE COMMENT: NORMAL
AR ANTINUCLEAR ANTIBODY (ANA), HEP-2, IGG: NORMAL
AST SERPL-CCNC: 36 UNIT/L (ref 5–34)
BASOPHILS # BLD AUTO: 0.01 X10(3)/MCL (ref 0–0.2)
BASOPHILS NFR BLD AUTO: 0.2 %
BILIRUBIN DIRECT+TOT PNL SERPL-MCNC: 2.5 MG/DL
BUN SERPL-MCNC: 23.8 MG/DL (ref 8.4–25.7)
C-ANCA TITR SER IF: ABNORMAL {TITER}
CALCIUM SERPL-MCNC: 9.4 MG/DL (ref 8.8–10)
CHLORIDE SERPL-SCNC: 105 MMOL/L (ref 98–107)
CO2 SERPL-SCNC: 22 MMOL/L (ref 23–31)
CREAT SERPL-MCNC: 2.12 MG/DL (ref 0.73–1.18)
EOSINOPHIL # BLD AUTO: 0.04 X10(3)/MCL (ref 0–0.9)
EOSINOPHIL NFR BLD AUTO: 0.8 %
ERYTHROCYTE [DISTWIDTH] IN BLOOD BY AUTOMATED COUNT: 19.8 % (ref 11.5–17)
ESTROGEN SERPL-MCNC: NORMAL PG/ML
GFR SERPLBLD CREATININE-BSD FMLA CKD-EPI: 35 MLS/MIN/1.73/M2
GLOBULIN SER-MCNC: 3.5 GM/DL (ref 2.4–3.5)
GLUCOSE SERPL-MCNC: 91 MG/DL (ref 82–115)
HCT VFR BLD AUTO: 27.6 % (ref 42–52)
HGB BLD-MCNC: 9 G/DL (ref 14–18)
IMM GRANULOCYTES # BLD AUTO: 0.01 X10(3)/MCL (ref 0–0.04)
IMM GRANULOCYTES NFR BLD AUTO: 0.2 %
INSULIN SERPL-ACNC: NORMAL U[IU]/ML
LAB AP CLINICAL INFORMATION: NORMAL
LAB AP GROSS DESCRIPTION: NORMAL
LYMPHOCYTES # BLD AUTO: 0.67 X10(3)/MCL (ref 0.6–4.6)
LYMPHOCYTES NFR BLD AUTO: 13.2 %
MCH RBC QN AUTO: 26.7 PG (ref 27–31)
MCHC RBC AUTO-ENTMCNC: 32.6 G/DL (ref 33–36)
MCV RBC AUTO: 81.9 FL (ref 80–94)
MONOCYTES # BLD AUTO: 0.67 X10(3)/MCL (ref 0.1–1.3)
MONOCYTES NFR BLD AUTO: 13.2 %
NEUTROPHILS # BLD AUTO: 3.68 X10(3)/MCL (ref 2.1–9.2)
NEUTROPHILS NFR BLD AUTO: 72.4 %
NRBC BLD AUTO-RTO: 0 %
P-ANCA SER QL IF: NEGATIVE
PLATELET # BLD AUTO: 160 X10(3)/MCL (ref 130–400)
PMV BLD AUTO: 11.9 FL (ref 7.4–10.4)
POTASSIUM SERPL-SCNC: 4.2 MMOL/L (ref 3.5–5.1)
PROT SERPL-MCNC: 7.8 GM/DL (ref 5.8–7.6)
RBC # BLD AUTO: 3.37 X10(6)/MCL (ref 4.7–6.1)
SODIUM SERPL-SCNC: 135 MMOL/L (ref 136–145)
WBC # SPEC AUTO: 5.1 X10(3)/MCL (ref 4.5–11.5)

## 2023-04-28 PROCEDURE — 63600175 PHARM REV CODE 636 W HCPCS: Mod: JZ,JG

## 2023-04-28 PROCEDURE — P9047 ALBUMIN (HUMAN), 25%, 50ML: HCPCS | Mod: JZ,JG | Performed by: NURSE PRACTITIONER

## 2023-04-28 PROCEDURE — 63600175 PHARM REV CODE 636 W HCPCS: Mod: JZ,JG | Performed by: NURSE PRACTITIONER

## 2023-04-28 PROCEDURE — A4216 STERILE WATER/SALINE, 10 ML: HCPCS | Performed by: INTERNAL MEDICINE

## 2023-04-28 PROCEDURE — 11000001 HC ACUTE MED/SURG PRIVATE ROOM

## 2023-04-28 PROCEDURE — 94761 N-INVAS EAR/PLS OXIMETRY MLT: CPT

## 2023-04-28 PROCEDURE — 25000003 PHARM REV CODE 250: Performed by: INTERNAL MEDICINE

## 2023-04-28 PROCEDURE — S4991 NICOTINE PATCH NONLEGEND: HCPCS | Performed by: STUDENT IN AN ORGANIZED HEALTH CARE EDUCATION/TRAINING PROGRAM

## 2023-04-28 PROCEDURE — 85025 COMPLETE CBC W/AUTO DIFF WBC: CPT | Performed by: STUDENT IN AN ORGANIZED HEALTH CARE EDUCATION/TRAINING PROGRAM

## 2023-04-28 PROCEDURE — 25000003 PHARM REV CODE 250: Performed by: STUDENT IN AN ORGANIZED HEALTH CARE EDUCATION/TRAINING PROGRAM

## 2023-04-28 PROCEDURE — 80053 COMPREHEN METABOLIC PANEL: CPT | Performed by: STUDENT IN AN ORGANIZED HEALTH CARE EDUCATION/TRAINING PROGRAM

## 2023-04-28 PROCEDURE — P9047 ALBUMIN (HUMAN), 25%, 50ML: HCPCS | Mod: JZ,JG

## 2023-04-28 PROCEDURE — 21400001 HC TELEMETRY ROOM

## 2023-04-28 PROCEDURE — 90935 HEMODIALYSIS ONE EVALUATION: CPT

## 2023-04-28 RX ORDER — ALBUMIN HUMAN 250 G/1000ML
25 SOLUTION INTRAVENOUS ONCE
Status: COMPLETED | OUTPATIENT
Start: 2023-04-28 | End: 2023-04-28

## 2023-04-28 RX ADMIN — SODIUM CHLORIDE, PRESERVATIVE FREE 10 ML: 5 INJECTION INTRAVENOUS at 05:04

## 2023-04-28 RX ADMIN — ATORVASTATIN CALCIUM 40 MG: 40 TABLET, FILM COATED ORAL at 08:04

## 2023-04-28 RX ADMIN — ALBUMIN (HUMAN) 25 G: 12.5 SOLUTION INTRAVENOUS at 09:04

## 2023-04-28 RX ADMIN — ASPIRIN 81 MG: 81 TABLET, COATED ORAL at 12:04

## 2023-04-28 RX ADMIN — APIXABAN 5 MG: 2.5 TABLET, FILM COATED ORAL at 08:04

## 2023-04-28 RX ADMIN — MIDODRINE HYDROCHLORIDE 10 MG: 5 TABLET ORAL at 07:04

## 2023-04-28 RX ADMIN — BUMETANIDE 2 MG: 0.25 INJECTION, SOLUTION INTRAMUSCULAR; INTRAVENOUS at 11:04

## 2023-04-28 RX ADMIN — SODIUM CHLORIDE, PRESERVATIVE FREE 10 ML: 5 INJECTION INTRAVENOUS at 01:04

## 2023-04-28 RX ADMIN — MELATONIN TAB 3 MG 6 MG: 3 TAB at 08:04

## 2023-04-28 RX ADMIN — APIXABAN 5 MG: 2.5 TABLET, FILM COATED ORAL at 12:04

## 2023-04-28 RX ADMIN — NICOTINE 1 PATCH: 14 PATCH, EXTENDED RELEASE TRANSDERMAL at 12:04

## 2023-04-28 RX ADMIN — ALBUMIN (HUMAN) 25 G: 12.5 SOLUTION INTRAVENOUS at 08:04

## 2023-04-28 RX ADMIN — MIDODRINE HYDROCHLORIDE 10 MG: 5 TABLET ORAL at 12:04

## 2023-04-28 RX ADMIN — ATOVAQUONE 1500 MG: 750 SUSPENSION ORAL at 12:04

## 2023-04-28 RX ADMIN — BICTEGRAVIR SODIUM, EMTRICITABINE, AND TENOFOVIR ALAFENAMIDE FUMARATE 1 TABLET: 50; 200; 25 TABLET ORAL at 12:04

## 2023-04-28 RX ADMIN — MIDODRINE HYDROCHLORIDE 10 MG: 5 TABLET ORAL at 06:04

## 2023-04-28 RX ADMIN — SODIUM CHLORIDE, PRESERVATIVE FREE 10 ML: 5 INJECTION INTRAVENOUS at 08:04

## 2023-04-28 RX ADMIN — MIDODRINE HYDROCHLORIDE 10 MG: 5 TABLET ORAL at 05:04

## 2023-04-28 RX ADMIN — SODIUM CHLORIDE, PRESERVATIVE FREE 10 ML: 5 INJECTION INTRAVENOUS at 02:04

## 2023-04-28 RX ADMIN — MUPIROCIN: 20 OINTMENT TOPICAL at 08:04

## 2023-04-28 RX ADMIN — BUMETANIDE 2 MG: 0.25 INJECTION, SOLUTION INTRAMUSCULAR; INTRAVENOUS at 02:04

## 2023-04-28 NOTE — PROGRESS NOTES
[facility] Cardiology   Progress Note       Subjective:      Brief HPI:  Mr. Perdue, 63  male with PMHx of HIV, HFrEF LVEF 15% on 4/13/23, and polysubstance abuse. Just recently discharged from the hospital for acute on chronic CHF exacerbation. Today, admitted to internal medicine for shortness of breath. Has history of cocaine usage, however, adamantly denies that he has not been using recently, but still tested positive on UDS. On admission, had BNP of 15,000, which is up from his baseline of around 11,000, with a troponin of 0.165, likely due to demand ischemia.     Interval History: Has been tolerating HD well, has had 4.8L of fluid ultrafiltrated. Started to have increased urine output yesterday. Shortness of breath has improved, however, still has dyspnea on exertion. Orthopnea still present as well. Denies any chest pain.       Review of Systems:  Pertinent items are noted in HPI.     Objective:     Vital Signs (Most Recent):  Temp: 97.6 °F (36.4 °C) (04/28/23 0721)  Pulse: 89 (04/28/23 0721)  Resp: 18 (04/28/23 0102)  BP: (!) 87/68 (04/28/23 0721)  SpO2: 99 % (04/28/23 0749)   Vital Signs (24h Range):  Temp:  [97.5 °F (36.4 °C)-97.7 °F (36.5 °C)] 97.6 °F (36.4 °C)  Pulse:  [80-99] 89  Resp:  [16-20] 18  SpO2:  [98 %-100 %] 99 %  BP: ()/(46-68) 87/68       Physical Examination:  HEENT: No JVD noted  Lungs: clear to auscultation bilaterally  Heart: regular rate and rhythm, S1, S2 normal, no murmur, click, rub or gallop  Extremities: edema 2+ on ankles, 1+ at mid-tibia   Pulses: 2+ and symmetric  Skin: Skin color, texture, turgor normal. No rashes or lesions    Laboratory:  Most Recent Data:  CBC:   Recent Labs   Lab 04/27/23  0345 04/28/23  0348   WBC 5.2 5.1   HGB 8.8* 9.0*   HCT 26.1* 27.6*    160       CMP:   Recent Labs   Lab 04/28/23  0348   CALCIUM 9.4   ALBUMIN 4.3   *   K 4.2   CO2 22*   BUN 23.8   CREATININE 2.12*   ALKPHOS 120   ALT 15   AST 36*   BILITOT 2.5*              Radiology:  Imaging Results              CT Abdomen Pelvis  Without Contrast (Final result)  Result time 04/25/23 10:05:08      Final result by Sanjana Otero MD (04/25/23 10:05:08)                   Impression:    Impression:    1. There is extensive stranding of the subcutaneous fat as well as the intraabdominal and intrapelvic fat suggesting an element of anasarca edema of uncertain etiology. Correlate with clinical and laboratory findings.    2. Again noted is a large right pleural effusion with compressive atelectasis of the right lower lobe. This is not significantly changed since the prior examination. There is interval decrease in the size of the left lower lobe pulmonary nodule, which now measures at 3 mm (series 4 image 13) with interval resolution of surrounding ground-glass opacity.    3. No acute intraabdominal or pelvic solid organ or bowel pathology identified. Details and other findings as discussed above    There is general concurrence with the preliminary report above.  Additional finding of some urinary bladder wall thickening and mild inflammatory changes associated with the urinary bladder are appreciated..  Cystitis should be excluded.    The      Electronically signed by: Sanjana Otero  Date:    04/25/2023  Time:    10:05               Narrative:    EXAMINATION:  CT ABDOMEN PELVIS WITHOUT CONTRAST    Technique: CT of the abdomen and pelvis was performed with axial images as well as sagittal and coronal reconstruction images without intravenous contrast or oral contrast.    Comparison: Comparison is with study dated 2023-04-17 14:58:26.    Clinical History: Epigastric pain.    Dosage Information: Automated Exposure Control was utilized 617.19 mGy.cm.    Findings:    Lines and Tubes: None.    Thorax:    Lungs: Again noted is a large right pleural effusion with compressive atelectasis of the right lower lobe. This is not significantly changed since the prior examination. There  is interval decrease in the size of the left lower lobe pulmonary nodule, which now measures at 3 mm (series 4 image 13) with interval resolution of surrounding ground-glass opacity.    Heart: Stable mild cardiomegaly is seen.    Abdomen:    Abdominal Wall: There is extensive stranding of the subcutaneous fat as well as the intraabdominal and intrapelvic fat suggesting an element of anasarca edema of uncertain etiology.    Liver: The liver appears unremarkable.    Biliary System: No intrahepatic or extrahepatic biliary duct dilatation is seen.    Gallbladder: The gallbladder is non-distended and appears otherwise unremarkable.    Pancreas: The pancreas appears unremarkable.    Spleen: The spleen appears unremarkable.    Adrenals: The adrenal glands appear unremarkable.    Kidneys: The left kidney appears unremarkable with no stones cysts masses or hydronephrosis. Nonspecific perinephric fat stranding is noted bilaterally. Again noted is an 18 mm cyst in the mid pole of the right kidney (series 2 image 41). The right kidney otherwise appear unremarkable with no stone or hydronephrosis identified.    Aorta: There is mild calcification of the abdominal aorta and its branches.    IVC: Unremarkable.    Bowel:    Esophagus: The visualized esophagus appears unremarkable.    Stomach: The stomach appears unremarkable.    Duodenum: Unremarkable appearing duodenum.    Small Bowel: The small bowel appears unremarkable.    Colon: Nondistended.    Appendix: The appendix appears unremarkable (series 2 image 62-67).    Peritoneum: There is mild ascites, not significantly changed since the prior examination. No free intraperitoneal gas is seen.    Pelvis:    Bladder: The bladder is nondistended and cannot be definitively evaluated.    Male:    Prostate gland: The prostate gland appears unremarkable.    Bony structures:    Dorsal Spine: There is spondylosis of the visualized dorsal spine. There is a stable appearing grade I  retrolisthesis of L5 over S1.    Bony Pelvis: The visualized bony structures of the pelvis appear unremarkable.                        Preliminary result by Sanjana Otero MD (04/25/23 02:27:52)                   Narrative:    START OF REPORT:  Technique: CT of the abdomen and pelvis was performed with axial images as well as sagittal and coronal reconstruction images without intravenous contrast or oral contrast.    Comparison: Comparison is with study [id number] 14:58:26.    Clinical History: Epigastric pain.    Dosage Information: Automated Exposure Control was utilized 617.19 mGy.cm.    Findings:  Lines and Tubes: None.  Thorax:  Lungs: Again noted is a large right pleural effusion with compressive atelectasis of the right lower lobe. This is not significantly changed since the prior examination. There is interval decrease in the size of the left lower lobe pulmonary nodule, which now measures at 3 mm (series 4 image 13) with interval resolution of surrounding ground-glass opacity.  Heart: Stable mild cardiomegaly is seen.  Abdomen:  Abdominal Wall: There is extensive stranding of the subcutaneous fat as well as the intraabdominal and intrapelvic fat suggesting an element of anasarca edema of uncertain etiology.  Liver: The liver appears unremarkable.  Biliary System: No intrahepatic or extrahepatic biliary duct dilatation is seen.  Gallbladder: The gallbladder is non-distended and appears otherwise unremarkable.  Pancreas: The pancreas appears unremarkable.  Spleen: The spleen appears unremarkable.  Adrenals: The adrenal glands appear unremarkable.  Kidneys: The left kidney appears unremarkable with no stones cysts masses or hydronephrosis. Nonspecific perinephric fat stranding is noted bilaterally. Again noted is an 18 mm cyst in the mid pole of the right kidney (series 2 image 41). The right kidney otherwise appear unremarkable with no stone or hydronephrosis identified.  Aorta: There is mild  calcification of the abdominal aorta and its branches.  IVC: Unremarkable.  Bowel:  Esophagus: The visualized esophagus appears unremarkable.  Stomach: The stomach appears unremarkable.  Duodenum: Unremarkable appearing duodenum.  Small Bowel: The small bowel appears unremarkable.  Colon: Nondistended.  Appendix: The appendix appears unremarkable (series 2 image 62-67).  Peritoneum: There is mild ascites, not significantly changed since the prior examination. No free intraperitoneal gas is seen.    Pelvis:  Bladder: The bladder is nondistended and cannot be definitively evaluated.  Male:  Prostate gland: The prostate gland appears unremarkable.    Bony structures:  Dorsal Spine: There is spondylosis of the visualized dorsal spine. There is a stable appearing grade I retrolisthesis of L5 over S1.  Bony Pelvis: The visualized bony structures of the pelvis appear unremarkable.      Impression:  1. There is extensive stranding of the subcutaneous fat as well as the intraabdominal and intrapelvic fat suggesting an element of anasarca edema of uncertain etiology. Correlate with clinical and laboratory findings.  2. Again noted is a large right pleural effusion with compressive atelectasis of the right lower lobe. This is not significantly changed since the prior examination. There is interval decrease in the size of the left lower lobe pulmonary nodule, which now measures at 3 mm (series 4 image 13) with interval resolution of surrounding ground-glass opacity.  3. No acute intraabdominal or pelvic solid organ or bowel pathology identified. Details and other findings as discussed above.                                         X-Ray Chest AP Portable (Final result)  Result time 04/25/23 08:53:38      Final result by Jordan Merlos MD (04/25/23 08:53:38)                   Impression:      Cardiomegaly.    Right-sided pleural effusion with compressive atelectatic changes and consolidative changes at the right base similar  to previous exam.    Increase interstitial markings      Electronically signed by: Jordan Merlos  Date:    04/25/2023  Time:    08:53               Narrative:    EXAMINATION:  XR CHEST AP PORTABLE    CLINICAL HISTORY:  cough;    TECHNIQUE:  Single frontal view of the chest was performed.    COMPARISON:  April 22, 2022.    FINDINGS:  Examination reveals mediastinal silhouette to be within normal limits cardiac silhouette is mildly enlarged.    There is some increase in interstitial markings similar to previous exam might still reflect a degree of pulmonary vascular congestion.    There is blunting of the right costophrenic angle indicating the presence of a right-sided pleural effusion with consolidative changes in compressive atelectatic changes at the right base.    No other focal consolidative changes no other change                        Wet Read by Ez Pena DO (04/25/23 01:58:20, [facility], Emergency Medicine)    Prominent interstitial markings.  Right pleural effusion persist appears slightly improved from previous chest x-ray.                                    Current Medications:     Infusions:         Scheduled:   albumin human 25%  25 g Intravenous BID    apixaban  5 mg Oral BID    aspirin  81 mg Oral Daily    atorvastatin  40 mg Oral QHS    atovaquone  1,500 mg Oral Daily    xaonbxjvf-dihtdnue-fblozbw ala  1 tablet Oral Daily    bumetanide  2 mg Intravenous Q12H    metOLazone  10 mg Oral Daily    midodrine  10 mg Oral TID WM    mupirocin   Nasal BID    nicotine  1 patch Transdermal Daily    sodium chloride 0.9%  10 mL Intravenous Q6H        PRN:  acetaminophen, dextrose 10%, dextrose 10%, dextrose, dextrose, glucagon (human recombinant), melatonin, methyl salicylate-menthol 15-10%, naloxone, sodium chloride 0.9%, sodium chloride 0.9%, sodium chloride 0.9%, Flushing PICC Protocol **AND** sodium chloride 0.9% **AND** sodium chloride 0.9%      Intake/Output Summary (Last  24 hours) at 4/28/2023 1203  Last data filed at 4/28/2023 0638  Gross per 24 hour   Intake 312.82 ml   Output 3800 ml   Net -3487.18 ml         Lines/Drains/Airways       Central Venous Catheter Line  Duration                  Hemodialysis Catheter 04/26/23 1827 right femoral 1 day              Drain  Duration             Male External Urinary Catheter 04/25/23 0100 3 days              Peripheral Intravenous Line  Duration                  Peripheral IV - Single Lumen 04/27/23 0950 22 G Posterior;Right Hand 1 day         Peripheral IV - Single Lumen 04/27/23 1011 22 G Left;Posterior Hand 1 day         Midline Catheter Insertion/Assessment  - Single Lumen 04/27/23 1549 Right brachial vein other (see comments) <1 day                      Assessment & Plan:     1) Acute on chronic CHF, LVEF 15%  - Frequently admitted for acute on chronic CHF  - Nephrology on board with diuresis, appreciate assistance  - Started on HD, has had 4.8L ultrafiltrated  - Has had increased urine output since beginning HD and receiving midodrine  - Patient denied lifevest by insurance  - Long term dobutamine not recommended due to possible arrhythmias that could develop, such as vtach.   - From a cardiology perspective, no known contraindication to starting midodrine for blood pressure support. There are only case studies of using midodrine in cardiorenal failure. However, due to the patient's low blood pressure, this is likely the best option at this time.   - Ideally, GDMT would be preferred, however, due to his low blood pressures, he likely would not tolerate it.  - Recommend trialing different PO diuretics to assess for effectiveness prior to discharge.       David Posey DO  [facility] Internal Medicine, PGY-1  [facility] Cardiology

## 2023-04-28 NOTE — PT/OT/SLP PROGRESS
Physical Therapy    Missed Treatment Session    Patient Name:  Jason Perdue   MRN:  [id number]      Patient not seen at this time secondary to Patient unwilling to participate at this time.  He reports that he is fatigued from dialysis and got some bad news and is not wanting to perform therapy at this time.  Pt requested that PT follow up tomorrow.      Will follow-up as patient is available to participate and as therapists' schedule allows.

## 2023-04-28 NOTE — PROGRESS NOTES
"[facility]  Nephrology Progress Note  Patient Name: Jason Perdue  Age: 63 y.o.  : 1960  MRN: [id number]  Admission Date: 2023    Chief complaint: Abdominal Pain (Pt c/o midline upper abdominal pain and sob when ambulating. Hx of chf. Brought in by ems.b/p low in route. Pt reports pain to abdomen /10. Reports onset after large bm on this morning.)    Hospital course  Jason Perdue is a 63 y.o. Black or  male with past medical history of HIV, hepatitis-B, heart failure with severely reduced ejection fraction, chronic kidney disease, and polysubstance abuse.  Patient was admitted on 2023 with complaints of lower extremity edema, decreased urination, and shortness of breath.  He did not respond to diuretic therapy and was initiated on hemodialysis on 2023.  Nephrology is continuing to follow for assistance with management of HOLLIS.      Subjective  Reports mild improvement in shortness of breath, still complains of abdominal bloating.    Review of Systems  Cardiovascular:  Negative for chest pain   Respiratory:  Reports improvement in shortness of breath.    Objective  BP (!) 87/68   Pulse 89   Temp 97.6 °F (36.4 °C) (Oral)   Resp 18   Ht 5' 11" (1.803 m)   Wt 103.9 kg (229 lb)   SpO2 99%   BMI 31.94 kg/m²     Intake/Output Summary (Last 24 hours) at 2023 0941  Last data filed at 2023 0638  Gross per 24 hour   Intake 462.82 ml   Output 3800 ml   Net -3337.18 ml       Physical Exam  General appearance: Patient is in no acute distress.  Skin: No rashes or wounds.  HEENT: PERRLA, EOMI, no scleral icterus, no JVD. Neck is supple.  Chest: Respirations are unlabored. Lungs sounds are diminished to auscultation.   Heart: S1, S2.   Abdomen:  Obese, protuberant, nontender, bowel sounds audible to all quadrants.  : Deferred.  Extremities: BLE pitting 3+ edema, peripheral pulses are palpable.  Right femoral dialysis catheter is in place  Neuro: " No focal deficits.     Medications    Current Facility-Administered Medications:     acetaminophen tablet 650 mg, 650 mg, Oral, Q6H PRN, Dileep Fonseca MD, 650 mg at 04/27/23 2113    albumin human 25% bottle 25 g, 25 g, Intravenous, BID, Lizette Beal MD, Stopped at 04/27/23 2204    albumin human 25% bottle 25 g, 25 g, Intravenous, Once, LUCIO Richard, Stopped at 04/28/23 0943    apixaban tablet 5 mg, 5 mg, Oral, BID, Kike Chadwick MD, 5 mg at 04/27/23 2046    aspirin EC tablet 81 mg, 81 mg, Oral, Daily, Kike Chadwick MD, 81 mg at 04/27/23 0848    atorvastatin tablet 40 mg, 40 mg, Oral, QHS, Kike Chadwick MD, 40 mg at 04/27/23 2049    atovaquone 750 mg/5 mL oral liquid 1,500 mg, 1,500 mg, Oral, Daily, Kike Chadwick MD, 1,500 mg at 04/27/23 0849    awqarxshf-rsndzxda-igvkzgb ala -25 mg (25 kg or greater) 1 tablet, 1 tablet, Oral, Daily, Kike Chadwick MD, 1 tablet at 04/27/23 0848    bumetanide injection 2 mg, 2 mg, Intravenous, Q12H, Silvina Forbes MD, 2 mg at 04/27/23 2058    dextrose 10% bolus 125 mL 125 mL, 12.5 g, Intravenous, PRN, Kike Chadwick MD    dextrose 10% bolus 250 mL 250 mL, 25 g, Intravenous, PRN, Kike Chadwick MD    dextrose 40 % gel 15,000 mg, 15 g, Oral, PRN, Kike Chadwick MD    dextrose 40 % gel 30,000 mg, 30 g, Oral, PRN, Kike Chadwick MD    glucagon (human recombinant) injection 1 mg, 1 mg, Intramuscular, PRN, Kike Chadwick MD    melatonin tablet 6 mg, 6 mg, Oral, Nightly PRN, Kike Chadwick MD, 6 mg at 04/27/23 2139    methyl salicylate-menthol 15-10% cream, , Topical (Top), TID PRN, Terry Barksdale MD, Given at 04/27/23 2113    metOLazone tablet 10 mg, 10 mg, Oral, Daily, Silvina Forbes MD, 10 mg at 04/27/23 0848    midodrine tablet 10 mg, 10 mg, Oral, TID WM, Unique Lozano MD, 10 mg at 04/28/23 0700    mupirocin 2 % ointment, , Nasal, BID, Silvina Forbes MD, Given at 04/27/23 2049    naloxone 0.4 mg/mL injection 0.02 mg, 0.02 mg,  Intravenous, PRN, Kike Chadwick MD    nicotine 14 mg/24 hr 1 patch, 1 patch, Transdermal, Daily, Kike Chadwick MD, 1 patch at 04/27/23 0848    sodium chloride 0.9% bolus 250 mL 250 mL, 250 mL, Intravenous, PRN, Silvina Forbes MD    sodium chloride 0.9% flush 10 mL, 10 mL, Intravenous, Q12H PRN, Kike Chadwick MD    sodium chloride 0.9% flush 10 mL, 10 mL, Intravenous, PRN, Kike Chadwick MD    Flushing PICC Protocol, , , Until Discontinued **AND** sodium chloride 0.9% flush 10 mL, 10 mL, Intravenous, Q6H, 10 mL at 04/28/23 0512 **AND** sodium chloride 0.9% flush 10 mL, 10 mL, Intravenous, PRN, Dilcia Campbell MD     Imaging:    Reviewed    Laboratory Data:  Hematology  Lab Results   Component Value Date    WBC 5.1 04/28/2023    HGB 9.0 (L) 04/28/2023    HCT 27.6 (L) 04/28/2023     04/28/2023     (L) 04/28/2023    K 4.2 04/28/2023    CHLORIDE 105 04/28/2023    CO2 22 (L) 04/28/2023    BUN 23.8 04/28/2023    CREATININE 2.12 (H) 04/28/2023    EGFRNORACEVR 35 04/28/2023    GLUCOSE 91 04/28/2023    CALCIUM 9.4 04/28/2023    ALKPHOS 120 04/28/2023    LABPROT 7.8 (H) 04/28/2023    ALBUMIN 4.3 04/28/2023    BILIDIR 0.3 03/15/2022    IBILI 0.30 03/15/2022    AST 36 (H) 04/28/2023    ALT 15 04/28/2023    MG 2.40 04/27/2023    PHOS 4.1 04/25/2023     Lab Results   Component Value Date    IRON 25 (L) 12/26/2018    TIBC 359 12/26/2018    TRANS 282.0 12/26/2018    LABIRON 7.0 (L) 12/26/2018    FERRITIN 60.8 12/26/2018     (H) 03/14/2023       Lab Results   Component Value Date    HEPBSURFAG Reactive (A) 04/26/2023    HEPBSAB Nonreactive 04/25/2023         Impression  Acute kidney injury   Anasarca  Anemia  Metabolic acidosis  Heart failure with severely reduced ejection fraction  Hypotension  Polysubstance abuse   HIV   Hepatitis-B    Plan  Patient was dialyzed on 04/26/2023 and then again on 04/27/2023.  He remains massively volume overloaded on exam.  Will proceed with isolated ultrafiltration today,  will give albumin 25 g at the beginning of treatment to prevent development of intra dialytic hypotension.  Will continue to assess for functional renal recovery on a daily basis.  Prognosis appears to be guarded at best.    Radha Hall NP  [facility] Nephrology   4/28/2023     Addendum:  Patient with cardiorenal syndrome.  Patient failed diuretic therapy require dialysis both uremic toxin clearance and ultrafiltration.  He will undergo isolated ultrafiltration due to persistent volume overload.  Patient was placed on dobutamine for short term therapy.  Midodrine was started yesterday, blood pressure remains low. Patient currently asymptomatic.  We are currently awaiting recommendations per Cardiology.  We will continue to monitor renal function provide dialysis support for her toxin clearance and ultrafiltration as needed.  Will monitor urine output dobutamine did help in the short term with perfusion and urine output.  If urine output improves and renal indices are stable may consider discontinuing dialysis.  Will monitor patient closely this weekend.  Silvina Forbes M.D.  Nephrology

## 2023-04-28 NOTE — PROGRESS NOTES
"  [facility] Medicine  Progress Note      Patient Name: Jason Perdue  : 1960  MRN: [id number]  Patient Class: IP- Inpatient   Admission Date: 2023   Length of Stay: 3  Admitting Service: Hospital Medicine  Attending Physician: Dilcia Campbell MD  PCP: SAVAGE Reddy    CHIEF COMPLAINT     Chief Complaint   Patient presents with    Abdominal Pain     Pt c/o midline upper abdominal pain and sob when ambulating. Hx of chf. Brought in by ems.b/p low in route. Pt reports pain to abdomen 8/10. Reports onset after large bm on this morning.       HOSPITAL COURSE     Brief HPI:  From H&P    "Jason Perdue is a 63 y.o. black or  male with a past medical history of HIV, HFrEF EF 10% without AICD or LifeVest, AFib on Eliquis, severe aortic stenosis, hypertension, hyperlipidemia, B-cell lymphoma, polysubstance use who presented to the emergency department via EMS evening of 2023 secondary to 3 days progressive shortness of breath, LOVING, orthopnea, lower extremity edema and abdominal swelling, 1 episode of loose bowel movement this morning with cramping 8/10 centralized abdominal pain, weakness with ground level fall yesterday without trauma.  He presented with similar symptoms on 2023 and left against medical advice after receiving 60 mg IV Lasix.  In the ED, vitals noted to be hypotensive, oxygen saturation remain with some normal limits on room air.  Lab show stable normocytic anemia, mild metabolic acidosis, HOLLIS, significantly elevated BNP roughly 15,000, elevated troponin 0.164.  Chest x-ray shows vascular congestion and right-sided pleural effusion, slightly improved from previous.     Internal Medicine service was consulted for admission due to CHF exacerbation and HOLLIS."        Interval History:  NAEON. Pt got dialyzed today (today is day 3 in a row) as well with net removal of 2L; BP remained in the 80's despite being on dobutamine drip throughout " the dialysis session, supported with albumin drip during the session. Pt states he is feeling better, however clinically  still appears significantly fluid over loaded. Likely need another round of dialysis today. Good urine output with 1600 mL over night .    Goals of care discussed with patient in detail. Informed patient on his true prognosis, recommended the option of considering palliative care of hospice. Pt became tearful and asked about other options such as heart transplant or assistive devices. At this time pt is still would like to continue all treatments possible.         OBJECTIVE/PHYSICAL EXAM     VITAL SIGNS (MOST RECENT):  Temp: 97.6 °F (36.4 °C) (04/28/23 0721)  Pulse: 84 (04/28/23 1413)  Resp: 16 (04/28/23 0721)  BP: (!) 89/58 (04/28/23 1421)  SpO2: 99 % (04/28/23 0749)   VITAL SIGNS (24 HOUR RANGE):  Temp:  [97.6 °F (36.4 °C)]   Pulse:  [82-89]   Resp:  [16]   BP: (79-89)/(46-68)   SpO2:  [98 %-100 %]      Physical Exam  Vitals reviewed.   Constitutional:       General: He is awake. He is not in acute distress.     Appearance: He is not ill-appearing or diaphoretic.   HENT:      Head: Atraumatic.   Cardiovascular:      Rate and Rhythm: Normal rate and regular rhythm.      Heart sounds: Normal heart sounds.   Pulmonary:      Effort: Pulmonary effort is normal.      Breath sounds: Decreased air movement (R lung fields) present.   Abdominal:      General: Abdomen is protuberant. Bowel sounds are normal. There is distension.      Tenderness: There is no abdominal tenderness.   Musculoskeletal:      Right lower leg: 3+ Edema present.      Left lower leg: 3+ Edema present.   Skin:     Comments: Skin shiny over BLE   Neurological:      General: No focal deficit present.      Mental Status: He is alert.       LABS/MICRO/MEDS/DIAGNOSTICS     LABS  CBC  Recent Labs     04/27/23  0345 04/28/23  0348   WBC 5.2 5.1   RBC 3.23* 3.37*   HGB 8.8* 9.0*   HCT 26.1* 27.6*   MCV 80.8 81.9   MCH 27.2 26.7*   MCHC 33.7  32.6*   RDW 19.1* 19.8*    160       Anemia  No results for input(s): HAPTOGLOBIN, FERRITIN, IRON, TIBC, [id number], FOLATE in the last 72 hours.  Coags  No results for input(s): INR, APTT, D-DIMER in the last 72 hours.  Cardiac  Recent Labs     04/25/23 2028 04/27/23 2124   TROPONINI 0.130* 0.116*       ABG/Lactate  No results for input(s): PH, PCO2, PO2, HCO3, POCSATURATED, BE, LACTIC in the last 72 hours.      BMP  Recent Labs     04/26/23 0335 04/27/23 0345 04/28/23 0348   * 137 135*   K 4.2 3.6 4.2   CHLORIDE 102 104 105   CO2 19* 24 22*   BUN 53.7* 39.0* 23.8   CREATININE 2.77* 2.31* 2.12*   GLUCOSE 103 97 91   CALCIUM 9.1 9.1 9.4   MG 2.60 2.40  --        LFTs  Recent Labs     04/27/23 0345 04/28/23 0348   ALBUMIN 3.9 4.3   GLOBULIN 3.5 3.5   ALKPHOS 117 120   ALT 17 15   AST 28 36*   BILITOT 2.2* 2.5*       Inflammatory Markers  No results for input(s): CRP, LDH, ESR in the last 72 hours.  Lipid  No results for input(s): CHOL, TRIG, LDL, VLDL, HDL in the last 72 hours.  Diabetes  Recent Labs     04/26/23 0335 04/27/23 0345 04/28/23 0348   GLUCOSE 103 97 91       Thyroid  No results for input(s): TSH, FREET4 in the last 72 hours.     INTAKE/OUTPUT    Intake/Output Summary (Last 24 hours) at 4/28/2023 1649  Last data filed at 4/28/2023 1145  Gross per 24 hour   Intake 200 ml   Output 3800 ml   Net -3600 ml          MICROBIOLOGY  Microbiology Results (last 7 days)       Procedure Component Value Units Date/Time    Urine culture [[id number]]  (Abnormal) Collected: 04/25/23 1248    Order Status: Completed Specimen: Urine Updated: 04/27/23 1316     Urine Culture Less than 10,000 colonies/ml Proteus mirabilis     Comment: [place] counts <10,000/ml are of questionable significance and may or may not indicate contamination.  Therefore organisms are identified only.  If further workup is desired please notify Microbiology                 MEDICATIONS   albumin human 25%  25 g Intravenous BID     apixaban  5 mg Oral BID    aspirin  81 mg Oral Daily    atorvastatin  40 mg Oral QHS    atovaquone  1,500 mg Oral Daily    rpbeqtouj-xckztevx-apnsvcn ala  1 tablet Oral Daily    bumetanide  2 mg Intravenous Q12H    metOLazone  10 mg Oral Daily    midodrine  10 mg Oral TID WM    mupirocin   Nasal BID    nicotine  1 patch Transdermal Daily    sodium chloride 0.9%  10 mL Intravenous Q6H         INFUSIONS         DIAGNOSTIC TESTS  CT Abdomen Pelvis  Without Contrast   Final Result   Impression:      1. There is extensive stranding of the subcutaneous fat as well as the intraabdominal and intrapelvic fat suggesting an element of anasarca edema of uncertain etiology. Correlate with clinical and laboratory findings.      2. Again noted is a large right pleural effusion with compressive atelectasis of the right lower lobe. This is not significantly changed since the prior examination. There is interval decrease in the size of the left lower lobe pulmonary nodule, which now measures at 3 mm (series 4 image 13) with interval resolution of surrounding ground-glass opacity.      3. No acute intraabdominal or pelvic solid organ or bowel pathology identified. Details and other findings as discussed above      There is general concurrence with the preliminary report above.  Additional finding of some urinary bladder wall thickening and mild inflammatory changes associated with the urinary bladder are appreciated..  Cystitis should be excluded.      The         Electronically signed by: Sanjana Otero   Date:    04/25/2023   Time:    10:05      X-Ray Chest AP Portable   ED Interpretation   Prominent interstitial markings.  Right pleural effusion persist appears slightly improved from previous chest x-ray.      Final Result      Cardiomegaly.      Right-sided pleural effusion with compressive atelectatic changes and consolidative changes at the right base similar to previous exam.      Increase interstitial markings          Electronically signed by: Jordan Merlos   Date:    04/25/2023   Time:    08:53      RADIOLOGY REPORT   Final Result           EF   Date Value Ref Range Status   04/13/2023 15 % Final   01/24/2023 12 % Final          ASSESSMENT/PLAN     Acute exacerbation of HFrEF with EF 15% (No AICD)  Abdominal anasarca  Pleural effusion       - BNP elevated nearly 15,000, above previous baseline 11,126       - Most recent echo 04/13/2023 the EF 15% with mild eccentric hypertrophy and severe systolic dysfunction, grade 3 LVDD, moderate to severe aortic   stenosis, moderate MR, moderate TR, pulmonary hypertension       -continue to diurese with Bumex 2mg bid as pt previously not diuresing well with lasiX       - strict I&Os (needs condom cath), daily weights, elevate head of bed, fluid restriction low-sodium diet  -continue metolazone 10 daily        - holding home Entresto and Toprol due to hypotension  - Hold home Jardiance       - CM consulted for evaluation for lifevest, pt insurance does not have coverage for this     Hypotension  -Pt off of dobutamine drip in attempt to transition him to oral midodrine for BP suppoer  -improved as day has progressed, with most recent MAP of 78  -continue midodrine 10mg tid for BP support, which pt can take at home as well  -appreciate cards and nephrology recs       NSTEMI type 2       - Troponin elevated 0.164 likely NSTEMI type 2 secondary to volume overload in setting CHF exacerbation.  -troponin down-trended       -  no significant EKG changes from previous  -cardiology consulted         HOLLIS now likely due to cardiorenal syndrome       - pt is s/p 3 dialysis session with net removal of 2L each day  -Cr 2.12 this morning  -baseline BUN/Cr 33.1/1.36 on 4/20            Right-sided pleural effusion       - Stable from previous     HIV  - Continue home Biktarvy and prophylactic Atovaquone dosing     Polysubstance use  Tobacco use       - UDS continues to be positive for cocaine    Goals  of care discussed with patient and he expressed his wishes to remain a FULL CODE at this time. Prognosis is guarded given end stage heart failure, persistent severe volume overload not responsive to oral or IV diuresis and persistent hypotension.     Access: pIV  Antibiotics: none  Diet: low salt  DVT Prophylaxis: Eliquis  GI Prophylaxis: none  Fluids: none    Anticipated discharge is hard to predict given guarded prognosis. Outpatient dialysis might not be an option as pt needs the BP support with albumin drip during sessions, which is not feasible as outpatient. BP remains difficult to increase with dobutamine and midodrone, heart function remains poor. Poor candidate of heart transplant given history of substance abuse.  Disposition is pending course, response to dialysis and once adequately diuresed.   __________________________________________________________________________    Unique Lozano M.D.  [facility] PGY-2

## 2023-04-28 NOTE — NURSING
04/28/23 1145        Hemodialysis Catheter 04/26/23 1827 right femoral   Placement Date/Time: 04/26/23 1827   Present Prior to Hospital Arrival?: No  Location: right femoral   Line Necessity Review CRRT/HD   Site Assessment No swelling;No warmth;No drainage;No redness   Line Securement Device Secured with sutures   Dressing Type CHG impregnated dressing/sponge;Central line dressing;Transparent (Tegaderm)   Dressing Status Clean;Dry;Intact   Dressing Intervention Sterile dressing change   Date on Dressing 04/28/23   Dressing Due to be Changed 04/26/23   Venous Patency/Care normal saline locked   Arterial Patency/Care normal saline locked   Post-Hemodialysis Assessment   Blood Volume Processed (Liters) 0 L   Dialyzer Clearance Lightly streaked   Duration of Treatment 180 minutes   Total UF (mL) 2500 mL   Net Fluid Removal 2000   Patient Response to Treatment Dialzyed x 3 hours UF only.  Tolerated well.  Net fluid removal of 2 liters.  Vitals stable.  No issues or complaints.   Post-Hemodialysis Comments Deaccessed per p and p.  Tolerated well.  Clearguards applied.  Sterile dressing change.

## 2023-04-29 LAB
ALBUMIN SERPL-MCNC: 4.6 G/DL (ref 3.4–4.8)
ALBUMIN/GLOB SERPL: 1.4 RATIO (ref 1.1–2)
ALP SERPL-CCNC: 113 UNIT/L (ref 40–150)
ALT SERPL-CCNC: 13 UNIT/L (ref 0–55)
AST SERPL-CCNC: 27 UNIT/L (ref 5–34)
BASOPHILS # BLD AUTO: 0.01 X10(3)/MCL (ref 0–0.2)
BASOPHILS NFR BLD AUTO: 0.2 %
BILIRUBIN DIRECT+TOT PNL SERPL-MCNC: 3.1 MG/DL
BUN SERPL-MCNC: 33.6 MG/DL (ref 8.4–25.7)
CALCIUM SERPL-MCNC: 9.6 MG/DL (ref 8.8–10)
CHLORIDE SERPL-SCNC: 102 MMOL/L (ref 98–107)
CO2 SERPL-SCNC: 20 MMOL/L (ref 23–31)
CREAT SERPL-MCNC: 2.63 MG/DL (ref 0.73–1.18)
EOSINOPHIL # BLD AUTO: 0.04 X10(3)/MCL (ref 0–0.9)
EOSINOPHIL NFR BLD AUTO: 0.8 %
ERYTHROCYTE [DISTWIDTH] IN BLOOD BY AUTOMATED COUNT: 20 % (ref 11.5–17)
GFR SERPLBLD CREATININE-BSD FMLA CKD-EPI: 27 MLS/MIN/1.73/M2
GLOBULIN SER-MCNC: 3.4 GM/DL (ref 2.4–3.5)
GLUCOSE SERPL-MCNC: 95 MG/DL (ref 82–115)
HCT VFR BLD AUTO: 29.9 % (ref 42–52)
HGB BLD-MCNC: 9.6 G/DL (ref 14–18)
IMM GRANULOCYTES # BLD AUTO: 0.01 X10(3)/MCL (ref 0–0.04)
IMM GRANULOCYTES NFR BLD AUTO: 0.2 %
LYMPHOCYTES # BLD AUTO: 0.88 X10(3)/MCL (ref 0.6–4.6)
LYMPHOCYTES NFR BLD AUTO: 17.2 %
MCH RBC QN AUTO: 26.4 PG (ref 27–31)
MCHC RBC AUTO-ENTMCNC: 32.1 G/DL (ref 33–36)
MCV RBC AUTO: 82.1 FL (ref 80–94)
MONOCYTES # BLD AUTO: 0.57 X10(3)/MCL (ref 0.1–1.3)
MONOCYTES NFR BLD AUTO: 11.2 %
NEUTROPHILS # BLD AUTO: 3.6 X10(3)/MCL (ref 2.1–9.2)
NEUTROPHILS NFR BLD AUTO: 70.4 %
NRBC BLD AUTO-RTO: 0 %
PLATELET # BLD AUTO: 139 X10(3)/MCL (ref 130–400)
PMV BLD AUTO: 11.5 FL (ref 7.4–10.4)
POTASSIUM SERPL-SCNC: 4.5 MMOL/L (ref 3.5–5.1)
PROT SERPL-MCNC: 8 GM/DL (ref 5.8–7.6)
RBC # BLD AUTO: 3.64 X10(6)/MCL (ref 4.7–6.1)
SODIUM SERPL-SCNC: 134 MMOL/L (ref 136–145)
WBC # SPEC AUTO: 5.1 X10(3)/MCL (ref 4.5–11.5)

## 2023-04-29 PROCEDURE — 90935 HEMODIALYSIS ONE EVALUATION: CPT

## 2023-04-29 PROCEDURE — 25000003 PHARM REV CODE 250: Performed by: STUDENT IN AN ORGANIZED HEALTH CARE EDUCATION/TRAINING PROGRAM

## 2023-04-29 PROCEDURE — 25000003 PHARM REV CODE 250: Performed by: INTERNAL MEDICINE

## 2023-04-29 PROCEDURE — P9047 ALBUMIN (HUMAN), 25%, 50ML: HCPCS | Mod: JZ,JG

## 2023-04-29 PROCEDURE — 27000221 HC OXYGEN, UP TO 24 HOURS

## 2023-04-29 PROCEDURE — 94761 N-INVAS EAR/PLS OXIMETRY MLT: CPT

## 2023-04-29 PROCEDURE — A4216 STERILE WATER/SALINE, 10 ML: HCPCS | Performed by: INTERNAL MEDICINE

## 2023-04-29 PROCEDURE — 11000001 HC ACUTE MED/SURG PRIVATE ROOM

## 2023-04-29 PROCEDURE — 63600175 PHARM REV CODE 636 W HCPCS

## 2023-04-29 PROCEDURE — S4991 NICOTINE PATCH NONLEGEND: HCPCS | Performed by: STUDENT IN AN ORGANIZED HEALTH CARE EDUCATION/TRAINING PROGRAM

## 2023-04-29 PROCEDURE — 21400001 HC TELEMETRY ROOM

## 2023-04-29 PROCEDURE — 80053 COMPREHEN METABOLIC PANEL: CPT | Performed by: STUDENT IN AN ORGANIZED HEALTH CARE EDUCATION/TRAINING PROGRAM

## 2023-04-29 PROCEDURE — 25000003 PHARM REV CODE 250

## 2023-04-29 PROCEDURE — 85025 COMPLETE CBC W/AUTO DIFF WBC: CPT | Performed by: STUDENT IN AN ORGANIZED HEALTH CARE EDUCATION/TRAINING PROGRAM

## 2023-04-29 RX ORDER — DOBUTAMINE HYDROCHLORIDE 400 MG/100ML
5 INJECTION INTRAVENOUS CONTINUOUS
Status: DISCONTINUED | OUTPATIENT
Start: 2023-04-29 | End: 2023-05-05 | Stop reason: HOSPADM

## 2023-04-29 RX ADMIN — ALBUMIN (HUMAN) 25 G: 12.5 SOLUTION INTRAVENOUS at 09:04

## 2023-04-29 RX ADMIN — BICTEGRAVIR SODIUM, EMTRICITABINE, AND TENOFOVIR ALAFENAMIDE FUMARATE 1 TABLET: 50; 200; 25 TABLET ORAL at 08:04

## 2023-04-29 RX ADMIN — MELATONIN TAB 3 MG 6 MG: 3 TAB at 11:04

## 2023-04-29 RX ADMIN — BUMETANIDE 2 MG: 0.25 INJECTION, SOLUTION INTRAMUSCULAR; INTRAVENOUS at 09:04

## 2023-04-29 RX ADMIN — ATORVASTATIN CALCIUM 40 MG: 40 TABLET, FILM COATED ORAL at 09:04

## 2023-04-29 RX ADMIN — ACETAMINOPHEN 650 MG: 325 TABLET ORAL at 02:04

## 2023-04-29 RX ADMIN — MIDODRINE HYDROCHLORIDE 10 MG: 5 TABLET ORAL at 12:04

## 2023-04-29 RX ADMIN — BUMETANIDE 2 MG: 0.25 INJECTION, SOLUTION INTRAMUSCULAR; INTRAVENOUS at 10:04

## 2023-04-29 RX ADMIN — MUPIROCIN: 20 OINTMENT TOPICAL at 09:04

## 2023-04-29 RX ADMIN — NICOTINE 1 PATCH: 14 PATCH, EXTENDED RELEASE TRANSDERMAL at 08:04

## 2023-04-29 RX ADMIN — ATOVAQUONE 1500 MG: 750 SUSPENSION ORAL at 08:04

## 2023-04-29 RX ADMIN — MIDODRINE HYDROCHLORIDE 10 MG: 5 TABLET ORAL at 06:04

## 2023-04-29 RX ADMIN — ALBUMIN (HUMAN) 25 G: 12.5 SOLUTION INTRAVENOUS at 08:04

## 2023-04-29 RX ADMIN — MUPIROCIN: 20 OINTMENT TOPICAL at 10:04

## 2023-04-29 RX ADMIN — MIDODRINE HYDROCHLORIDE 10 MG: 5 TABLET ORAL at 05:04

## 2023-04-29 RX ADMIN — DOBUTAMINE IN DEXTROSE 5 MCG/KG/MIN: 400 INJECTION, SOLUTION INTRAVENOUS at 10:04

## 2023-04-29 RX ADMIN — SODIUM CHLORIDE, PRESERVATIVE FREE 10 ML: 5 INJECTION INTRAVENOUS at 12:04

## 2023-04-29 RX ADMIN — APIXABAN 5 MG: 2.5 TABLET, FILM COATED ORAL at 08:04

## 2023-04-29 RX ADMIN — ACETAMINOPHEN 650 MG: 325 TABLET ORAL at 06:04

## 2023-04-29 RX ADMIN — SODIUM CHLORIDE, PRESERVATIVE FREE 10 ML: 5 INJECTION INTRAVENOUS at 01:04

## 2023-04-29 RX ADMIN — APIXABAN 5 MG: 2.5 TABLET, FILM COATED ORAL at 09:04

## 2023-04-29 RX ADMIN — ACETAMINOPHEN 650 MG: 325 TABLET ORAL at 12:04

## 2023-04-29 RX ADMIN — ASPIRIN 81 MG: 81 TABLET, COATED ORAL at 08:04

## 2023-04-29 RX ADMIN — SODIUM CHLORIDE, PRESERVATIVE FREE 10 ML: 5 INJECTION INTRAVENOUS at 05:04

## 2023-04-29 NOTE — PROGRESS NOTES
"  [facility] Medicine  Progress Note      Patient Name: Jason Perdue  : 1960  MRN: [id number]  Patient Class: IP- Inpatient   Admission Date: 2023   Length of Stay: 4  Admitting Service: Hospital Medicine  Attending Physician: Dilcia Campbell MD  PCP: SAVAGE Reddy    CHIEF COMPLAINT     Chief Complaint   Patient presents with    Abdominal Pain     Pt c/o midline upper abdominal pain and sob when ambulating. Hx of chf. Brought in by ems.b/p low in route. Pt reports pain to abdomen 8/10. Reports onset after large bm on this morning.       HOSPITAL COURSE     Brief HPI:  From H&P    "Jason Perdue is a 63 y.o. black or  male with a past medical history of HIV, HFrEF EF 10% without AICD or LifeVest, AFib on Eliquis, severe aortic stenosis, hypertension, hyperlipidemia, B-cell lymphoma, polysubstance use who presented to the emergency department via EMS evening of 2023 secondary to 3 days progressive shortness of breath, LOVING, orthopnea, lower extremity edema and abdominal swelling, 1 episode of loose bowel movement this morning with cramping 8/10 centralized abdominal pain, weakness with ground level fall yesterday without trauma.  He presented with similar symptoms on 2023 and left against medical advice after receiving 60 mg IV Lasix.  In the ED, vitals noted to be hypotensive, oxygen saturation remain with some normal limits on room air.  Lab show stable normocytic anemia, mild metabolic acidosis, HOLLIS, significantly elevated BNP roughly 15,000, elevated troponin 0.164.  Chest x-ray shows vascular congestion and right-sided pleural effusion, slightly improved from previous.     Internal Medicine service was consulted for admission due to CHF exacerbation and HOLLIS."    Interval History:  NAEON. Pt got dialyzed yesterday with net removal of 2L, tolerated well.  Dobutamine was discontinued yesterday with attempt to control bp with midodrine alone " as this is a medication the patient could take outpatient.  However, blood pressure remained very low overnight and decision was made to restart dobutamine today.  Nephrology following, will perform HD again today.  Patient remains fluid overloaded although does appear to be mildly improved since admission.  Complaining of hiccups throughout the night and was unable to fall asleep.  Has no other complaints at this time.  Denies any N/V, F/C, diarrhea, constipation, abdominal pain, or dysuria.     Goals of care discussed with patient in detail. Informed patient on his true prognosis, recommended the option of considering palliative care of hospice. Pt became tearful and asked about other options such as heart transplant or assistive devices. At this time pt still would like to continue all treatments possible.     OBJECTIVE/PHYSICAL EXAM     VITAL SIGNS (MOST RECENT):  Temp: 97.7 °F (36.5 °C) (04/29/23 0736)  Pulse: 87 (04/29/23 0736)  Resp: 18 (04/29/23 0355)  BP: 97/62 (04/29/23 0736)  SpO2: 98 % (04/29/23 0807)   VITAL SIGNS (24 HOUR RANGE):  Temp:  [97.7 °F (36.5 °C)-97.8 °F (36.6 °C)]   Pulse:  [56-89]   Resp:  [18]   BP: (80-97)/(59-62)   SpO2:  [95 %-100 %]      Physical Exam  Vitals reviewed.   Constitutional:       General: He is awake. He is not in acute distress.     Appearance: He is not ill-appearing or diaphoretic.   HENT:      Head: Atraumatic.   Cardiovascular:      Rate and Rhythm: Normal rate and regular rhythm.      Heart sounds: Normal heart sounds.   Pulmonary:      Effort: Pulmonary effort is normal.      Breath sounds: Decreased air movement (R lung fields) present.   Abdominal:      General: Abdomen is protuberant. Bowel sounds are normal. There is distension.      Tenderness: There is no abdominal tenderness.   Musculoskeletal:         General: Normal range of motion.      Right lower leg: Edema present.      Left lower leg: Edema present.   Skin:     Comments: Skin shiny over BLE    Neurological:      General: No focal deficit present.      Mental Status: He is alert.       LABS/MICRO/MEDS/DIAGNOSTICS     LABS  CBC  Recent Labs     04/28/23 0348 04/29/23 0324   WBC 5.1 5.1   RBC 3.37* 3.64*   HGB 9.0* 9.6*   HCT 27.6* 29.9*   MCV 81.9 82.1   MCH 26.7* 26.4*   MCHC 32.6* 32.1*   RDW 19.8* 20.0*    139       Anemia  No results for input(s): HAPTOGLOBIN, FERRITIN, IRON, TIBC, [id number], FOLATE in the last 72 hours.  Coags  No results for input(s): INR, APTT, D-DIMER in the last 72 hours.  Cardiac  Recent Labs     04/27/23 2124   TROPONINI 0.116*       ABG/Lactate  No results for input(s): PH, PCO2, PO2, HCO3, POCSATURATED, BE, LACTIC in the last 72 hours.      BMP  Recent Labs     04/27/23 0345 04/28/23 0348 04/29/23 0324    135* 134*   K 3.6 4.2 4.5   CHLORIDE 104 105 102   CO2 24 22* 20*   BUN 39.0* 23.8 33.6*   CREATININE 2.31* 2.12* 2.63*   GLUCOSE 97 91 95   CALCIUM 9.1 9.4 9.6   MG 2.40  --   --        LFTs  Recent Labs     04/28/23 0348 04/29/23 0324   ALBUMIN 4.3 4.6   GLOBULIN 3.5 3.4   ALKPHOS 120 113   ALT 15 13   AST 36* 27   BILITOT 2.5* 3.1*       Inflammatory Markers  No results for input(s): CRP, LDH, ESR in the last 72 hours.  Lipid  No results for input(s): CHOL, TRIG, LDL, VLDL, HDL in the last 72 hours.  Diabetes  Recent Labs     04/27/23 0345 04/28/23 0348 04/29/23 0324   GLUCOSE 97 91 95       Thyroid  No results for input(s): TSH, FREET4 in the last 72 hours.     INTAKE/OUTPUT    Intake/Output Summary (Last 24 hours) at 4/29/2023 0849  Last data filed at 4/28/2023 2000  Gross per 24 hour   Intake --   Output 2900 ml   Net -2900 ml          MICROBIOLOGY  Microbiology Results (last 7 days)       Procedure Component Value Units Date/Time    Urine culture [[id number]]  (Abnormal) Collected: 04/25/23 1248    Order Status: Completed Specimen: Urine Updated: 04/27/23 1316     Urine Culture Less than 10,000 colonies/ml Proteus mirabilis     Comment: Arjay  counts <10,000/ml are of questionable significance and may or may not indicate contamination.  Therefore organisms are identified only.  If further workup is desired please notify Microbiology                 MEDICATIONS   albumin human 25%  25 g Intravenous BID    apixaban  5 mg Oral BID    aspirin  81 mg Oral Daily    atorvastatin  40 mg Oral QHS    atovaquone  1,500 mg Oral Daily    hhrekregg-odjcnkru-nhwxzuy ala  1 tablet Oral Daily    bumetanide  2 mg Intravenous Q12H    midodrine  10 mg Oral TID WM    mupirocin   Nasal BID    nicotine  1 patch Transdermal Daily    sodium chloride 0.9%  10 mL Intravenous Q6H         INFUSIONS   DOBUTamine IV infusion (non-titrating)            DIAGNOSTIC TESTS  CT Abdomen Pelvis  Without Contrast   Final Result   Impression:      1. There is extensive stranding of the subcutaneous fat as well as the intraabdominal and intrapelvic fat suggesting an element of anasarca edema of uncertain etiology. Correlate with clinical and laboratory findings.      2. Again noted is a large right pleural effusion with compressive atelectasis of the right lower lobe. This is not significantly changed since the prior examination. There is interval decrease in the size of the left lower lobe pulmonary nodule, which now measures at 3 mm (series 4 image 13) with interval resolution of surrounding ground-glass opacity.      3. No acute intraabdominal or pelvic solid organ or bowel pathology identified. Details and other findings as discussed above      There is general concurrence with the preliminary report above.  Additional finding of some urinary bladder wall thickening and mild inflammatory changes associated with the urinary bladder are appreciated..  Cystitis should be excluded.      The         Electronically signed by: Sanjana Otero   Date:    04/25/2023   Time:    10:05      X-Ray Chest AP Portable   ED Interpretation   Prominent interstitial markings.  Right pleural effusion persist  appears slightly improved from previous chest x-ray.      Final Result      Cardiomegaly.      Right-sided pleural effusion with compressive atelectatic changes and consolidative changes at the right base similar to previous exam.      Increase interstitial markings         Electronically signed by: Jordan Merlos   Date:    04/25/2023   Time:    08:53      RADIOLOGY REPORT   Final Result           EF   Date Value Ref Range Status   04/13/2023 15 % Final   01/24/2023 12 % Final          ASSESSMENT/PLAN     Acute decompensated heart failure with hx of HFrEF with EF 15% (No AICD)  Abdominal anasarca  Pleural effusion  - BNP elevated nearly 15,000 on admission, significantly elevated above previous baseline 11,126  - Most recent echo 04/13/2023 w/ EF 15% also showing mild eccentric hypertrophy and severe systolic dysfunction, grade 3 LVDD, moderate to severe aortic   stenosis, moderate MR, moderate TR, pulmonary hypertension  -Patient has essentially failed diuretic therapy w/ very minimal urine output (400ml documented yesterday 4/28).  Will continue to attempt diureses with Bumex 2mg bid and monitor urine output closely.   -Nephrology following, will continue ultrafiltration in setting of anasarca.  Net output yesterday was 2L, will continue today.   - strict I&Os (needs condom cath), daily weights, elevate head of bed, fluid restriction low-sodium diet  -continue metolazone 10 daily   -holding home Entresto and Toprol due to hypotension, more details below  - CM consulted for evaluation for lifevest, pt insurance does not have coverage for this.      Hypotension   -Pt previously placed on dobutamine gtt for pressure support.  Medication was stopped yesterday and trial of midodrine was initiated to see if patient could tolerate oral medication management.  Blood pressure remained low overnight.  Decision was made to restart dobutamine today for better pressure support as patient will also be receiving dialysis  again today.   -will continue midodrine 10mg tid   -per cardiology, long term dobutamine not recommended due to possible arrhythmias.  Attempted midodrine therapy alone which was not sufficient.  Patients course is complicated and he would likely benefit from a more specialized advanced heart failure service which we cannot offer here.    -Attempting to transfer patient in setting of decompensated heart failure requiring dobutamine and midodrine now w/ cardiorenal syndrome with minimal urine output refractory to diuretic therapy requiring HD and ultrafiltration.     HOLLIS w/ suspected cardiorenal syndrome  -BUN/Cr initially 46.7/2.28 on admission - has remained elevated throughout admission, 33.6/2.63 today.    -baseline BUN/Cr 33.1/1.36 on 4/20  -patient has failed diuretic therapy requiring dialysis with both uremic toxin clearance and ultrafiltration now s/p 3 days.  Removed 2L yesterday with plan to continue dialysis again today per nephrology.    -albumin started yesterday with no improvement in kidney function.  Will continue today.  -avoid nephrotoxic medications   -will continue to monitor renal indices and adjust management accordingly.  Nephrology following, appreciate recommendations.  Continuing midodrine and dobutamine for blood pressure management and assistance with kidney perfusion.      NSTEMI type 2  - Troponin elevated 0.164 on admission likely NSTEMI type 2 secondary to volume overload in setting CHF exacerbation.  -troponin down-trended  -no significant EKG changes from previous  -cardiology consulted         Right-sided pleural effusion  - Stable from previous admissions   - patient currently satting well on 2L NC, not complaining of chest pain or SOB.   - will continue to monitor      HIV  - Continue home Biktarvy and prophylactic Atovaquone dosing     Polysubstance use  Tobacco use  - UDS continues to be positive for cocaine  -have had extensive discussions with patient regarding substance  abuse.  It is important that he discontinue use as it could lead to severe consequences including death.     Goals of care discussed with patient and he expressed his wishes to remain a FULL CODE at this time. Prognosis is guarded given end stage heart failure, persistent severe volume overload not responsive to oral or IV diuresis and persistent hypotension. Recommending transfer for higher level of care at this time.     Access: pIV  Antibiotics: none  Diet: low salt  DVT Prophylaxis: Eliquis  GI Prophylaxis: none  Fluids: none    Anticipated discharge is hard to predict given guarded prognosis. Outpatient dialysis might not be an option as pt needs the BP support with albumin drip during sessions, which is not feasible as outpatient. BP remains difficult to increase with dobutamine and midodrone, heart function remains poor. Poor candidate for heart transplant given history of substance abuse. Attempting to transfer patient today given complicated course.  He would benefit from advance heart failure specialty services as our options are becoming limited at this facility.      Lizette Beal MD  [facility] Internal Medicine, HO-1

## 2023-04-29 NOTE — CONSULTS
Inpatient Nutrition Evaluation    Admit Date: 4/24/2023   Total duration of encounter: 5 days    Nutrition Recommendation/Prescription     Heart Healthy diet  Monitor Weights daily  Kyle (provides 90 kcal, 2.5 g protein per serving) BID for wound heling    Nutrition Assessment     Chart Review    Reason Seen: physician consult for wound    Malnutrition Screening Tool Results   Have you recently lost weight without trying?: No  Have you been eating poorly because of a decreased appetite?: No   MST Score: 0     Diagnosis:  CHF exacerbation, Afib, NSTEMI, HOLLIS, Right sided pleural effusion, HIV, polysubstance abuse    Relevant Medical History: Afib, HIV, HFrEF, Severe Aortic Stenosis, HTN, HLD, Lymphoma, Polysubstance use    Nutrition-Related Medications: ASA, Atorvastatin, Bumex    Nutrition-Related Labs:  4/25/23 -- Glu 92, Na 134 L, K 4.5, BUN 46.9 H, Cr 2.28 H, Phos 4.1  4/29/23 --  Glu 95, Na 134 L, K 4.5, BUN 33.6 H, Cr 2.63 H    Diet Order: Diet Low Sodium, 2gm  Oral Supplement Order: none  Appetite/Oral Intake: good/% of meals  Factors Affecting Nutritional Intake: early satiety  Food/Jainism/Cultural Preferences: none reported  Food Allergies: none reported    Skin Integrity: drain/device(s), wound, other (see comments) (right buttocks)  Wound(s):      Altered Skin Integrity 04/28/23 1330 Right medial Buttocks #1 Ulceration Partial thickness tissue loss. Shallow open ulcer with a red or pink wound bed, without slough. Intact or Open/Ruptured Serum-filled blister.-Tissue loss description: Partial thickness    Comments    4/29/23 -- Consult received for altered skin, pt with wound to rt buttocks, will order Juvn BID, wound care consulted as well; pt continues with good appetite, 100% meal intake documented; BUN/Cr (H) - receiving HD, continue Low Na diet    4/25/23 -- Pt reports good appetite this am eating ~75% of breakfast, denies n/v; reports appetite up/down at times d/t early satiety, suggest small  "frequent meals/snacks, Megace ordered this admit noted; no indication of wt loss per EMR wt hx, pt confirms no wt loss; WT currently elevated related to edema    Anthropometrics    Height: 5' 11" (180.3 cm) Height Method: Stated  Last Weight: 100.9 kg (222 lb 8 oz) (04/29/23 0614) Weight Method: Bed Scale  BMI (Calculated): 31  BMI Classification: obese grade I (BMI 30-34.9)        Ideal Body Weight (IBW), Male: 172 lb     % Ideal Body Weight, Male (lb): 130.61 %                          Usual Weight Provided By: patient denies unintentional weight loss    Wt Readings from Last 5 Encounters:   04/29/23 100.9 kg (222 lb 8 oz)   04/22/23 100 kg (220 lb 7.4 oz)   04/20/23 104.1 kg (229 lb 9.6 oz)   04/14/23 99.3 kg (218 lb 14.4 oz)   04/11/23 101.6 kg (223 lb 15.8 oz)     Weight Change(s) Since Admission:  Admit Weight: 101.9 kg (224 lb 10.4 oz) (04/25/23 0004)  Wt fluctuations since admit related to fluid, + edema, on diuretic, receiving HD      Patient Education    Not applicable.    Monitoring & Evaluation     Dietitian will monitor food and beverage intake, weight change, electrolyte/renal panel, and gastrointestinal profile.  Nutrition Risk/Follow-Up: low (follow-up in 5-7 days)  Patients assigned 'low nutrition risk' status do not qualify for a full nutritional assessment but will be monitored and re-evaluated in a 5-7 day time period. Please consult if re-evaluation needed sooner.   "

## 2023-04-29 NOTE — NURSING
04/29/23 1603   Post-Hemodialysis Assessment   Blood Volume Processed (Liters) 60.6 L   Dialyzer Clearance Lightly streaked   Duration of Treatment 180 minutes   Total UF (mL) 2000 mL   Patient Response to Treatment Tolerated well   Post-Hemodialysis Comments Treatment completed. NET fluid removed = 2 liters. No issues during tx. No complaints.

## 2023-04-29 NOTE — PROGRESS NOTES
"[facility]  Nephrology Progress Note  Patient Name: Jason Perdue  Age: 63 y.o.  : 1960  MRN: [id number]  Admission Date: 2023    Chief complaint: Abdominal Pain (Pt c/o midline upper abdominal pain and sob when ambulating. Hx of chf. Brought in by ems.b/p low in route. Pt reports pain to abdomen /10. Reports onset after large bm on this morning.)    Hospital course  Jason Perdue is a 63 y.o. Black or  male with past medical history of HIV, hepatitis-B, heart failure with severely reduced ejection fraction, chronic kidney disease, and polysubstance abuse.  Patient was admitted on 2023 with complaints of lower extremity edema, decreased urination, and shortness of breath.  He did not respond to diuretic therapy and was initiated on hemodialysis on 2023.  Nephrology is continuing to follow for assistance with management of HOLLIS.      Subjective  Reports improvement in lower extremity swelling and abdominal distention    Review of Systems  Cardiovascular:  Negative for chest pain   Respiratory:  Reports improvement in shortness of breath.    Objective  BP 97/62   Pulse 87   Temp 97.7 °F (36.5 °C) (Oral)   Resp 18   Ht 5' 11" (1.803 m)   Wt 100.9 kg (222 lb 8 oz)   SpO2 100%   BMI 31.03 kg/m²     Intake/Output Summary (Last 24 hours) at 2023 0758  Last data filed at 2023  Gross per 24 hour   Intake --   Output 2900 ml   Net -2900 ml         Physical Exam  General appearance: Patient is in no acute distress.  Skin: No rashes or wounds.  HEENT: PERRLA, EOMI, no scleral icterus, no JVD. Neck is supple.  Chest: Respirations are unlabored. Lungs sounds are diminished to auscultation.   Heart: S1, S2.   Abdomen:  Obese, protuberant, nontender, soft, bowel sounds audible to all quadrants.  : Deferred.  Extremities: BLE pitting 2+ edema, peripheral pulses are palpable.  Right femoral dialysis catheter is in place  Neuro: No focal " deficits.     Medications    Current Facility-Administered Medications:     acetaminophen tablet 650 mg, 650 mg, Oral, Q6H PRN, Dileep Fonseca MD, 650 mg at 04/29/23 0229    albumin human 25% bottle 25 g, 25 g, Intravenous, BID, Lizette Beal MD, Stopped at 04/28/23 2232    apixaban tablet 5 mg, 5 mg, Oral, BID, Kike Chadwick MD, 5 mg at 04/28/23 2055    aspirin EC tablet 81 mg, 81 mg, Oral, Daily, Kike Chadwick MD, 81 mg at 04/28/23 1215    atorvastatin tablet 40 mg, 40 mg, Oral, QHS, Kike Chadwick MD, 40 mg at 04/28/23 2055    atovaquone 750 mg/5 mL oral liquid 1,500 mg, 1,500 mg, Oral, Daily, Kike Chadwick MD, 1,500 mg at 04/28/23 1215    dlkuasxca-ddqyutiu-wfcmynn ala -25 mg (25 kg or greater) 1 tablet, 1 tablet, Oral, Daily, Kike Chadwick MD, 1 tablet at 04/28/23 1214    bumetanide injection 2 mg, 2 mg, Intravenous, Q12H, Silvina Forbes MD, 2 mg at 04/28/23 2300    dextrose 10% bolus 125 mL 125 mL, 12.5 g, Intravenous, PRN, Kike Chadwick MD    dextrose 10% bolus 250 mL 250 mL, 25 g, Intravenous, PRN, Kike Chadwick MD    dextrose 40 % gel 15,000 mg, 15 g, Oral, PRN, Kike Chadwick MD    dextrose 40 % gel 30,000 mg, 30 g, Oral, PRN, Kike Chadwick MD    glucagon (human recombinant) injection 1 mg, 1 mg, Intramuscular, PRN, Kike Chadwick MD    melatonin tablet 6 mg, 6 mg, Oral, Nightly PRN, Kike Chadwick MD, 6 mg at 04/28/23 2055    methyl salicylate-menthol 15-10% cream, , Topical (Top), TID PRN, Terry Barksdale MD, Given at 04/27/23 2113    midodrine tablet 10 mg, 10 mg, Oral, TID WM, Unique Lozano MD, 10 mg at 04/29/23 0646    mupirocin 2 % ointment, , Nasal, BID, Silvina Forbes MD, Given at 04/28/23 2056    naloxone 0.4 mg/mL injection 0.02 mg, 0.02 mg, Intravenous, PRN, Kike Chadwick MD    nicotine 14 mg/24 hr 1 patch, 1 patch, Transdermal, Daily, Kike Chadwick MD, 1 patch at 04/28/23 1215    sodium chloride 0.9% bolus 250 mL 250 mL, 250 mL, Intravenous, PRN, Silvina  TATIANA Forbes MD    sodium chloride 0.9% flush 10 mL, 10 mL, Intravenous, Q12H PRN, Kike Chadwick MD    sodium chloride 0.9% flush 10 mL, 10 mL, Intravenous, PRN, Kike Chadwick MD    Flushing PICC Protocol, , , Until Discontinued **AND** sodium chloride 0.9% flush 10 mL, 10 mL, Intravenous, Q6H, 10 mL at 04/29/23 0543 **AND** sodium chloride 0.9% flush 10 mL, 10 mL, Intravenous, PRN, Dilcia Campbell MD     Imaging:    Reviewed    Laboratory Data:  Hematology  Lab Results   Component Value Date    WBC 5.1 04/29/2023    HGB 9.6 (L) 04/29/2023    HCT 29.9 (L) 04/29/2023     04/29/2023     (L) 04/29/2023    K 4.5 04/29/2023    CHLORIDE 102 04/29/2023    CO2 20 (L) 04/29/2023    BUN 33.6 (H) 04/29/2023    CREATININE 2.63 (H) 04/29/2023    EGFRNORACEVR 27 04/29/2023    GLUCOSE 95 04/29/2023    CALCIUM 9.6 04/29/2023    ALKPHOS 113 04/29/2023    LABPROT 8.0 (H) 04/29/2023    ALBUMIN 4.6 04/29/2023    BILIDIR 0.3 03/15/2022    IBILI 0.30 03/15/2022    AST 27 04/29/2023    ALT 13 04/29/2023    MG 2.40 04/27/2023    PHOS 4.1 04/25/2023     Lab Results   Component Value Date    IRON 25 (L) 12/26/2018    TIBC 359 12/26/2018    TRANS 282.0 12/26/2018    LABIRON 7.0 (L) 12/26/2018    FERRITIN 60.8 12/26/2018     (H) 03/14/2023       Lab Results   Component Value Date    HEPBSURFAG Reactive (A) 04/26/2023    HEPBSAB Nonreactive 04/25/2023         Impression  Acute kidney injury secondary to CRS  Anasarca  Anemia  Metabolic acidosis  Heart failure with severely reduced ejection fraction  Hypotension  Polysubstance abuse   HIV   Hepatitis-B    Plan  No spontaneous improvement renal function has been observed yet.  Patient remains oliguric.  Will proceed with hemodialysis treatment today, goal UF 2 L as tolerated.  Prognosis is guarded due to multiorgan dysfunction.    Radha Hall NP  [facility] Nephrology   4/29/2023

## 2023-04-30 LAB
ALBUMIN SERPL-MCNC: 4.6 G/DL (ref 3.4–4.8)
ALBUMIN/GLOB SERPL: 1.4 RATIO (ref 1.1–2)
ALP SERPL-CCNC: 96 UNIT/L (ref 40–150)
ALT SERPL-CCNC: 13 UNIT/L (ref 0–55)
AST SERPL-CCNC: 25 UNIT/L (ref 5–34)
BASOPHILS # BLD AUTO: 0.01 X10(3)/MCL (ref 0–0.2)
BASOPHILS NFR BLD AUTO: 0.2 %
BILIRUBIN DIRECT+TOT PNL SERPL-MCNC: 3.2 MG/DL
BUN SERPL-MCNC: 23.8 MG/DL (ref 8.4–25.7)
CALCIUM SERPL-MCNC: 9.6 MG/DL (ref 8.8–10)
CHLORIDE SERPL-SCNC: 104 MMOL/L (ref 98–107)
CO2 SERPL-SCNC: 20 MMOL/L (ref 23–31)
CREAT SERPL-MCNC: 2.3 MG/DL (ref 0.73–1.18)
EOSINOPHIL # BLD AUTO: 0.04 X10(3)/MCL (ref 0–0.9)
EOSINOPHIL NFR BLD AUTO: 0.8 %
ERYTHROCYTE [DISTWIDTH] IN BLOOD BY AUTOMATED COUNT: 19.7 % (ref 11.5–17)
GFR SERPLBLD CREATININE-BSD FMLA CKD-EPI: 31 MLS/MIN/1.73/M2
GLOBULIN SER-MCNC: 3.3 GM/DL (ref 2.4–3.5)
GLUCOSE SERPL-MCNC: 104 MG/DL (ref 82–115)
HCT VFR BLD AUTO: 27 % (ref 42–52)
HGB BLD-MCNC: 8.7 G/DL (ref 14–18)
IMM GRANULOCYTES # BLD AUTO: 0.02 X10(3)/MCL (ref 0–0.04)
IMM GRANULOCYTES NFR BLD AUTO: 0.4 %
LYMPHOCYTES # BLD AUTO: 0.57 X10(3)/MCL (ref 0.6–4.6)
LYMPHOCYTES NFR BLD AUTO: 11.6 %
MAGNESIUM SERPL-MCNC: 2.2 MG/DL (ref 1.6–2.6)
MCH RBC QN AUTO: 26.7 PG (ref 27–31)
MCHC RBC AUTO-ENTMCNC: 32.2 G/DL (ref 33–36)
MCV RBC AUTO: 82.8 FL (ref 80–94)
MONOCYTES # BLD AUTO: 0.52 X10(3)/MCL (ref 0.1–1.3)
MONOCYTES NFR BLD AUTO: 10.6 %
NEUTROPHILS # BLD AUTO: 3.74 X10(3)/MCL (ref 2.1–9.2)
NEUTROPHILS NFR BLD AUTO: 76.4 %
NRBC BLD AUTO-RTO: 0 %
PHOSPHATE SERPL-MCNC: 3.4 MG/DL (ref 2.3–4.7)
PLATELET # BLD AUTO: 144 X10(3)/MCL (ref 130–400)
PMV BLD AUTO: 12.3 FL (ref 7.4–10.4)
POTASSIUM SERPL-SCNC: 3.9 MMOL/L (ref 3.5–5.1)
PROT SERPL-MCNC: 7.9 GM/DL (ref 5.8–7.6)
RBC # BLD AUTO: 3.26 X10(6)/MCL (ref 4.7–6.1)
SODIUM SERPL-SCNC: 135 MMOL/L (ref 136–145)
WBC # SPEC AUTO: 4.9 X10(3)/MCL (ref 4.5–11.5)

## 2023-04-30 PROCEDURE — 25000003 PHARM REV CODE 250: Performed by: STUDENT IN AN ORGANIZED HEALTH CARE EDUCATION/TRAINING PROGRAM

## 2023-04-30 PROCEDURE — 63600175 PHARM REV CODE 636 W HCPCS

## 2023-04-30 PROCEDURE — 25000003 PHARM REV CODE 250: Performed by: INTERNAL MEDICINE

## 2023-04-30 PROCEDURE — 11000001 HC ACUTE MED/SURG PRIVATE ROOM

## 2023-04-30 PROCEDURE — 97530 THERAPEUTIC ACTIVITIES: CPT

## 2023-04-30 PROCEDURE — A4216 STERILE WATER/SALINE, 10 ML: HCPCS | Performed by: INTERNAL MEDICINE

## 2023-04-30 PROCEDURE — P9047 ALBUMIN (HUMAN), 25%, 50ML: HCPCS | Mod: JZ,JG

## 2023-04-30 PROCEDURE — 84100 ASSAY OF PHOSPHORUS: CPT | Performed by: STUDENT IN AN ORGANIZED HEALTH CARE EDUCATION/TRAINING PROGRAM

## 2023-04-30 PROCEDURE — 94761 N-INVAS EAR/PLS OXIMETRY MLT: CPT

## 2023-04-30 PROCEDURE — 80053 COMPREHEN METABOLIC PANEL: CPT | Performed by: STUDENT IN AN ORGANIZED HEALTH CARE EDUCATION/TRAINING PROGRAM

## 2023-04-30 PROCEDURE — S4991 NICOTINE PATCH NONLEGEND: HCPCS | Performed by: STUDENT IN AN ORGANIZED HEALTH CARE EDUCATION/TRAINING PROGRAM

## 2023-04-30 PROCEDURE — 83735 ASSAY OF MAGNESIUM: CPT | Performed by: STUDENT IN AN ORGANIZED HEALTH CARE EDUCATION/TRAINING PROGRAM

## 2023-04-30 PROCEDURE — 21400001 HC TELEMETRY ROOM

## 2023-04-30 PROCEDURE — 97116 GAIT TRAINING THERAPY: CPT

## 2023-04-30 PROCEDURE — 85025 COMPLETE CBC W/AUTO DIFF WBC: CPT | Performed by: STUDENT IN AN ORGANIZED HEALTH CARE EDUCATION/TRAINING PROGRAM

## 2023-04-30 RX ORDER — MIDODRINE HYDROCHLORIDE 5 MG/1
15 TABLET ORAL
Status: DISCONTINUED | OUTPATIENT
Start: 2023-04-30 | End: 2023-05-05 | Stop reason: HOSPADM

## 2023-04-30 RX ORDER — POTASSIUM CHLORIDE 20 MEQ/1
20 TABLET, EXTENDED RELEASE ORAL ONCE
Status: COMPLETED | OUTPATIENT
Start: 2023-04-30 | End: 2023-04-30

## 2023-04-30 RX ADMIN — BUMETANIDE 2 MG: 0.25 INJECTION, SOLUTION INTRAMUSCULAR; INTRAVENOUS at 09:04

## 2023-04-30 RX ADMIN — MIDODRINE HYDROCHLORIDE 10 MG: 5 TABLET ORAL at 12:04

## 2023-04-30 RX ADMIN — MELATONIN TAB 3 MG 6 MG: 3 TAB at 08:04

## 2023-04-30 RX ADMIN — BICTEGRAVIR SODIUM, EMTRICITABINE, AND TENOFOVIR ALAFENAMIDE FUMARATE 1 TABLET: 50; 200; 25 TABLET ORAL at 08:04

## 2023-04-30 RX ADMIN — POTASSIUM CHLORIDE 20 MEQ: 1500 TABLET, EXTENDED RELEASE ORAL at 11:04

## 2023-04-30 RX ADMIN — APIXABAN 5 MG: 2.5 TABLET, FILM COATED ORAL at 08:04

## 2023-04-30 RX ADMIN — SODIUM CHLORIDE, PRESERVATIVE FREE 10 ML: 5 INJECTION INTRAVENOUS at 11:04

## 2023-04-30 RX ADMIN — NICOTINE 1 PATCH: 14 PATCH, EXTENDED RELEASE TRANSDERMAL at 08:04

## 2023-04-30 RX ADMIN — SODIUM CHLORIDE, PRESERVATIVE FREE 10 ML: 5 INJECTION INTRAVENOUS at 12:04

## 2023-04-30 RX ADMIN — ALBUMIN (HUMAN) 25 G: 12.5 SOLUTION INTRAVENOUS at 08:04

## 2023-04-30 RX ADMIN — ATOVAQUONE 1500 MG: 750 SUSPENSION ORAL at 08:04

## 2023-04-30 RX ADMIN — MUPIROCIN: 20 OINTMENT TOPICAL at 08:04

## 2023-04-30 RX ADMIN — SODIUM CHLORIDE, PRESERVATIVE FREE 10 ML: 5 INJECTION INTRAVENOUS at 06:04

## 2023-04-30 RX ADMIN — ATORVASTATIN CALCIUM 40 MG: 40 TABLET, FILM COATED ORAL at 08:04

## 2023-04-30 RX ADMIN — MIDODRINE HYDROCHLORIDE 10 MG: 5 TABLET ORAL at 08:04

## 2023-04-30 RX ADMIN — DOBUTAMINE IN DEXTROSE 5 MCG/KG/MIN: 400 INJECTION, SOLUTION INTRAVENOUS at 07:04

## 2023-04-30 RX ADMIN — MIDODRINE HYDROCHLORIDE 15 MG: 5 TABLET ORAL at 05:04

## 2023-04-30 RX ADMIN — ASPIRIN 81 MG: 81 TABLET, COATED ORAL at 08:04

## 2023-04-30 RX ADMIN — BUMETANIDE 2 MG: 0.25 INJECTION, SOLUTION INTRAMUSCULAR; INTRAVENOUS at 08:04

## 2023-04-30 NOTE — PLAN OF CARE
Problem: Adult Inpatient Plan of Care  Goal: Plan of Care Review  Outcome: Ongoing, Progressing  Goal: Patient-Specific Goal (Individualized)  Outcome: Ongoing, Progressing  Goal: Absence of Hospital-Acquired Illness or Injury  Outcome: Ongoing, Progressing  Goal: Optimal Comfort and Wellbeing  Outcome: Ongoing, Progressing  Goal: Readiness for Transition of Care  Outcome: Ongoing, Progressing     Problem: Fluid Imbalance (Pneumonia)  Goal: Fluid Balance  Outcome: Ongoing, Progressing     Problem: Respiratory Compromise (Pneumonia)  Goal: Effective Oxygenation and Ventilation  Outcome: Ongoing, Progressing     Problem: Fall Injury Risk  Goal: Absence of Fall and Fall-Related Injury  Outcome: Ongoing, Progressing     Problem: Fluid Volume Excess  Goal: Fluid Balance  Outcome: Ongoing, Progressing     Problem: Skin Injury Risk Increased  Goal: Skin Health and Integrity  Outcome: Ongoing, Progressing     Problem: Device-Related Complication Risk (Hemodialysis)  Goal: Safe, Effective Therapy Delivery  Outcome: Ongoing, Progressing     Problem: Hemodynamic Instability (Hemodialysis)  Goal: Effective Tissue Perfusion  Outcome: Ongoing, Progressing     Problem: Infection (Hemodialysis)  Goal: Absence of Infection Signs and Symptoms  Outcome: Ongoing, Progressing     Problem: Fluid and Electrolyte Imbalance (Acute Kidney Injury/Impairment)  Goal: Fluid and Electrolyte Balance  Outcome: Ongoing, Progressing     Problem: Oral Intake Inadequate (Acute Kidney Injury/Impairment)  Goal: Optimal Nutrition Intake  Outcome: Ongoing, Progressing     Problem: Renal Function Impairment (Acute Kidney Injury/Impairment)  Goal: Effective Renal Function  Outcome: Ongoing, Progressing     Problem: Infection  Goal: Absence of Infection Signs and Symptoms  Outcome: Ongoing, Progressing     Problem: Impaired Wound Healing  Goal: Optimal Wound Healing  Outcome: Ongoing, Progressing

## 2023-04-30 NOTE — PROGRESS NOTES
"  [facility] Medicine  Progress Note      Patient Name: Jason Perdue  : 1960  MRN: [id number]  Patient Class: IP- Inpatient   Admission Date: 2023   Length of Stay: 5  Admitting Service: Hospital Medicine  Attending Physician: Dilcia Campbell MD  PCP: SAVAGE Reddy    CHIEF COMPLAINT     Chief Complaint   Patient presents with    Abdominal Pain     Pt c/o midline upper abdominal pain and sob when ambulating. Hx of chf. Brought in by ems.b/p low in route. Pt reports pain to abdomen 8/10. Reports onset after large bm on this morning.       HOSPITAL COURSE     Brief HPI:  From H&P    "Jason Perdue is a 63 y.o. black or  male with a past medical history of HIV, HFrEF EF 10% without AICD or LifeVest, AFib on Eliquis, severe aortic stenosis, hypertension, hyperlipidemia, B-cell lymphoma, polysubstance use who presented to the emergency department via EMS evening of 2023 secondary to 3 days progressive shortness of breath, LOVING, orthopnea, lower extremity edema and abdominal swelling, 1 episode of loose bowel movement this morning with cramping 8/10 centralized abdominal pain, weakness with ground level fall yesterday without trauma.  He presented with similar symptoms on 2023 and left against medical advice after receiving 60 mg IV Lasix.  In the ED, vitals noted to be hypotensive, oxygen saturation remain with some normal limits on room air.  Lab show stable normocytic anemia, mild metabolic acidosis, HOLLIS, significantly elevated BNP roughly 15,000, elevated troponin 0.164.  Chest x-ray shows vascular congestion and right-sided pleural effusion, slightly improved from previous.     Internal Medicine service was consulted for admission due to CHF exacerbation and HOLLIS."    Interval History:  NAEON. Pt got dialyzed yesterday with net removal of 2L, tolerated well.  Continues on Dobutamine drip and Midodrine 10mg tid. Fluid overload clinically " improved, although there is still some abdominal distention.   Complains of R knee pain for 2 days, which he states keeps him up at night. Has no other complaints at this time.  Denies any N/V, F/C, diarrhea, constipation, abdominal pain, or dysuria.     Goals of care discussed with patient in detail. Informed patient on his true prognosis, recommended the option of considering palliative care of hospice. Pt became tearful and asked about other options such as heart transplant or assistive devices. At this time pt still would like to continue all treatments possible.     OBJECTIVE/PHYSICAL EXAM     VITAL SIGNS (MOST RECENT):  Temp: 97.9 °F (36.6 °C) (04/30/23 0732)  Pulse: 91 (04/30/23 0800)  Resp: 20 (04/30/23 0800)  BP: (!) 87/56 (04/30/23 0732)  SpO2: 97 % (04/30/23 0800)   VITAL SIGNS (24 HOUR RANGE):  Temp:  [97.5 °F (36.4 °C)-97.9 °F (36.6 °C)]   Pulse:  []   Resp:  [20]   BP: (85-95)/(55-62)   SpO2:  [97 %-100 %]      Physical Exam  Vitals reviewed.   Constitutional:       General: He is awake. He is not in acute distress.     Appearance: He is not ill-appearing or diaphoretic.   HENT:      Head: Atraumatic.   Cardiovascular:      Rate and Rhythm: Normal rate and regular rhythm.      Heart sounds: Normal heart sounds.   Pulmonary:      Effort: Pulmonary effort is normal.      Breath sounds: Decreased air movement (R lung fields) present.   Abdominal:      General: Abdomen is protuberant. Bowel sounds are normal. There is distension.      Tenderness: There is no abdominal tenderness.   Musculoskeletal:         General: Normal range of motion.      Right lower leg: Edema present.      Left lower leg: Edema present.   Skin:     Comments: Skin shiny over BLE   Neurological:      General: No focal deficit present.      Mental Status: He is alert.       LABS/MICRO/MEDS/DIAGNOSTICS     LABS  CBC  Recent Labs     04/29/23  0324 04/30/23  0447   WBC 5.1 4.9   RBC 3.64* 3.26*   HGB 9.6* 8.7*   HCT 29.9* 27.0*    MCV 82.1 82.8   MCH 26.4* 26.7*   MCHC 32.1* 32.2*   RDW 20.0* 19.7*    144       Anemia  No results for input(s): HAPTOGLOBIN, FERRITIN, IRON, TIBC, [id number], FOLATE in the last 72 hours.  Coags  No results for input(s): INR, APTT, D-DIMER in the last 72 hours.  Cardiac  Recent Labs     04/27/23 2124   TROPONINI 0.116*       ABG/Lactate  No results for input(s): PH, PCO2, PO2, HCO3, POCSATURATED, BE, LACTIC in the last 72 hours.      BMP  Recent Labs     04/29/23  0324 04/30/23 0447   * 135*   K 4.5 3.9   CHLORIDE 102 104   CO2 20* 20*   BUN 33.6* 23.8   CREATININE 2.63* 2.30*   GLUCOSE 95 104   CALCIUM 9.6 9.6   MG  --  2.20   PHOS  --  3.4       LFTs  Recent Labs     04/29/23 0324 04/30/23 0447   ALBUMIN 4.6 4.6   GLOBULIN 3.4 3.3   ALKPHOS 113 96   ALT 13 13   AST 27 25   BILITOT 3.1* 3.2*       Inflammatory Markers  No results for input(s): CRP, LDH, ESR in the last 72 hours.  Lipid  No results for input(s): CHOL, TRIG, LDL, VLDL, HDL in the last 72 hours.  Diabetes  Recent Labs     04/28/23  0348 04/29/23 0324 04/30/23 0447   GLUCOSE 91 95 104       Thyroid  No results for input(s): TSH, FREET4 in the last 72 hours.     INTAKE/OUTPUT    Intake/Output Summary (Last 24 hours) at 4/30/2023 0921  Last data filed at 4/30/2023 0629  Gross per 24 hour   Intake 411.43 ml   Output 3200 ml   Net -2788.57 ml          MICROBIOLOGY  Microbiology Results (last 7 days)       Procedure Component Value Units Date/Time    Urine culture [[id number]]  (Abnormal) Collected: 04/25/23 1248    Order Status: Completed Specimen: Urine Updated: 04/27/23 1316     Urine Culture Less than 10,000 colonies/ml Proteus mirabilis     Comment: [place] counts <10,000/ml are of questionable significance and may or may not indicate contamination.  Therefore organisms are identified only.  If further workup is desired please notify Microbiology                 MEDICATIONS   albumin human 25%  25 g Intravenous BID    apixaban  5  mg Oral BID    aspirin  81 mg Oral Daily    atorvastatin  40 mg Oral QHS    atovaquone  1,500 mg Oral Daily    flvuwlwhh-zvrictvy-slvglal ala  1 tablet Oral Daily    bumetanide  2 mg Intravenous Q12H    midodrine  10 mg Oral TID WM    mupirocin   Nasal BID    nicotine  1 patch Transdermal Daily    sodium chloride 0.9%  10 mL Intravenous Q6H         INFUSIONS   DOBUTamine IV infusion (non-titrating) 5 mcg/kg/min (04/29/23 1040)          DIAGNOSTIC TESTS  CT Abdomen Pelvis  Without Contrast   Final Result   Impression:      1. There is extensive stranding of the subcutaneous fat as well as the intraabdominal and intrapelvic fat suggesting an element of anasarca edema of uncertain etiology. Correlate with clinical and laboratory findings.      2. Again noted is a large right pleural effusion with compressive atelectasis of the right lower lobe. This is not significantly changed since the prior examination. There is interval decrease in the size of the left lower lobe pulmonary nodule, which now measures at 3 mm (series 4 image 13) with interval resolution of surrounding ground-glass opacity.      3. No acute intraabdominal or pelvic solid organ or bowel pathology identified. Details and other findings as discussed above      There is general concurrence with the preliminary report above.  Additional finding of some urinary bladder wall thickening and mild inflammatory changes associated with the urinary bladder are appreciated..  Cystitis should be excluded.      The         Electronically signed by: Sanjana Otero   Date:    04/25/2023   Time:    10:05      X-Ray Chest AP Portable   ED Interpretation   Prominent interstitial markings.  Right pleural effusion persist appears slightly improved from previous chest x-ray.      Final Result      Cardiomegaly.      Right-sided pleural effusion with compressive atelectatic changes and consolidative changes at the right base similar to previous exam.      Increase  interstitial markings         Electronically signed by: Jordan Merlos   Date:    04/25/2023   Time:    08:53      RADIOLOGY REPORT   Final Result           EF   Date Value Ref Range Status   04/13/2023 15 % Final   01/24/2023 12 % Final          ASSESSMENT/PLAN     Acute decompensated heart failure with hx of HFrEF with EF 15% (No AICD)  Abdominal anasarca  Pleural effusion  - BNP elevated nearly 15,000 on admission, significantly elevated above previous baseline 11,126  - Most recent echo 04/13/2023 w/ EF 15% also showing mild eccentric hypertrophy and severe systolic dysfunction, grade 3 LVDD, moderate to severe aortic   stenosis, moderate MR, moderate TR, pulmonary hypertension  -Patient failed oral and IV diuretic therapy w/ very minimal urine output.  -Continue to attempt diureses with Bumex 2mg bid and monitor urine output closely.   -Nephrology following, dialyzed yesterday with net output of 2L  -no dialysis today  - strict I&Os (needs condom cath), daily weights, elevate head of bed, fluid restriction low-sodium diet    - CM consulted for evaluation for lifevest, pt insurance does not have coverage for this.      Hypotension   -continue dobutamine drip as BP did not seem significantly improved on midodrine alone  -increasing midodrine to 15 tid, as MAP has been in mid 60's on current regimen  -holding home Entresto and Toprol due to hypotension  -per cardiology, long term dobutamine not recommended due to possible arrhythmias.   -Patients course is complicated and he would likely benefit from a more specialized advanced heart failure service which we cannot offer at this facility.    -Attempted to transfer patient in setting of decompensated heart failure requiring dobutamine and midodrine now w/ cardiorenal syndrome with minimal urine output refractory to diuretic therapy requiring HD and ultrafiltration, however due to h/o substance abuse patient has been denies      HOLLIS w/ suspected cardiorenal  syndrome  -BUN/Cr initially 46.7/2.28 on admission - has remained elevated throughout admission, 23.8/2.3 today.    -baseline BUN/Cr 33.1/1.36 on 4/20  -patient has failed diuretic therapy requiring dialysis with both uremic toxin clearance and ultrafiltration now s/p 4 days in a row.  Removed 2L each time  -will hold dialysis today as per nephrology  -continue albumin, although we will need to de-escalate and evaluate response as pt cannot be discharged with this medication  -avoid nephrotoxic medications   -will continue to monitor renal indices and adjust management accordingly.  Nephrology following, appreciate recommendations.      NSTEMI type 2  - Troponin elevated 0.164 on admission likely NSTEMI type 2 secondary to volume overload in setting CHF exacerbation.  -troponin down-trended  -no significant EKG changes from previous  -cardiology consulted         Right-sided pleural effusion  - Stable from previous admissions   - patient currently satting well on 2L NC, not complaining of chest pain or SOB.   - will continue to monitor      HIV  - Continue home Biktarvy and prophylactic Atovaquone dosing     Polysubstance use  Tobacco use  - UDS continues to be positive for cocaine  -have had extensive discussions with patient regarding substance abuse.  It is important that he discontinue use as it could lead to severe consequences including death.     Goals of care discussed with patient and he expressed his wishes to remain a FULL CODE at this time. Prognosis is guarded given end stage heart failure, persistent severe volume overload not responsive to oral or IV diuresis and persistent hypotension. Recommending transfer for higher level of care at this time.     Access: pIV  Antibiotics: none  Diet: low salt  DVT Prophylaxis: Eliquis  GI Prophylaxis: none  Fluids: none    Anticipated discharge is hard to predict given guarded prognosis. Outpatient dialysis might not be an option as pt needs the BP support with  albumin drip during sessions, which is not feasible as outpatient. BP remains difficult to increase with dobutamine and midodrone, heart function remains poor. Poor candidate for heart transplant given history of substance abuse.   He would benefit from advance heart failure specialty services as our options are becoming limited at this facility.      Unique Lozano M.D.  [facility] PGY-2

## 2023-04-30 NOTE — PT/OT/SLP PROGRESS
"Physical Therapy Treatment    Patient Name:  Jason Perdue   MRN:  [id number]    Recommendations     Discharge Recommendations:  nursing facility, skilled, rehabilitation facility   Discharge Equipment Recommendations: none   Barriers to discharge: fall risk, severity of deficits, level of skilled assistance required, decreased endurance, past medical history, and complicated medical history    Assessment     Jason Perdue is a 63 y.o. male admitted with a medical diagnosis of <principal problem not specified>.    He presents with the following impairments/functional limitations:  weakness, impaired endurance, impaired self care skills, impaired functional mobility, gait instability, impaired balance, decreased safety awareness, impaired cardiopulmonary response to activity, edema.    Rehab Prognosis: Fair.    Patient would benefit from continued skilled acute PT services to: address above listed impairments/functional limitations; receive patient/caregiver education; reduce fall risk; and maximize independency/safety with functional mobility.    Recent Surgery: * No surgery found *      Pt displayed increased SOB with functional transfers and during gait and required 02 after gait, however pt's SP02 was 100% with no 02 donned. Pt continues to display increased B LE edema. Pt required increased time during seated rest breaks throughout session due to increased fatigue.     Plan     During this hospitalization, patient to be seen  (3-5xwk) to address the identified impairments/functional limitations via gait training, therapeutic activities, therapeutic exercises and progress toward the established goals.    Plan of Care Expires:  05/24/23    Subjective     Communicated with patient's nurse prior to session.    Patient agreeable to participate in treatment session.    Chief Complaint: "I am feeling tired and a little short of breath but I know I need to walk."  Patient/Family Comments/goals: none " noted  Pain/Comfort:  Pain Rating 1: 610  Location - Side 1: Right  Location - Orientation 1: lower  Location 1: leg  Pain Addressed 1: Reposition, Distraction, Nurse notified  Pain Rating Post-Intervention 1: 6/10    Objective     Patient found right sidelying with oxygen, peripheral IV, telemetry  upon PT entry to room.    General Precautions: Standard, fall   Orthopedic Precautions:N/A   Braces:    Respiratory Status: room air    Functional Mobility:    Bed Mobility:  Supine to Sit: stand by assistance (increased time to perform, increased SOB and fatigue noted)  Sit to Supine: stand by assistance (increased time to perform, increased SOB and fatigue noted)    Transfers:  Sit to Stand: contact guard assistance with rolling walker    Gait:  Patient ambulated 130ft with rolling walker and contact guard assistance. (Pt displayed increased SOB and increased fatigue at end of gait, required increased seated rest break and 02 donned at 3L with Sp02 at 100%)   Patient demonstrates occasional unsteady gait, decreased sylvain, decreased step length, narrow base of support, ambulates outside WILLIAM of RW, and decreased foot/floor clearance.    Other Mobility:  Therapeutic Activities performed:        -2 set of 5 reps of sit<>stand from EOB with RW at CGA with seated rest break between sets of increased time due to increased fatigue and SOB.     Patient left supine in bed and with HOB elevated with all lines intact, call button in reach, RN notified, and RN present..    Goals     Multidisciplinary Problems       Physical Therapy Goals          Problem: Physical Therapy    Goal Priority Disciplines Outcome Goal Variances Interventions   Physical Therapy Goal     PT, PT/OT Ongoing, Progressing     Description: Goals to be met by: dc     Patient will increase functional independence with mobility by performin. Sit to stand transfer with Modified [place]-ONGOING  2. Gait  x 130 feet with Modified [place] using  Rolling Walker. -ONGOING                       Time Tracking     PT Received On: 04/30/23  PT Start Time: 0751     PT Stop Time: 0814  PT Total Time (min): 23 min     Billable Minutes: Gait Training 10 and Therapeutic Activity 13    04/30/2023

## 2023-04-30 NOTE — PLAN OF CARE
Problem: Adult Inpatient Plan of Care  Goal: Plan of Care Review  Outcome: Ongoing, Progressing  Goal: Patient-Specific Goal (Individualized)  Outcome: Ongoing, Progressing  Goal: Absence of Hospital-Acquired Illness or Injury  Outcome: Ongoing, Progressing  Goal: Optimal Comfort and Wellbeing  Outcome: Ongoing, Progressing  Goal: Readiness for Transition of Care  Outcome: Ongoing, Progressing     Problem: Fluid Imbalance (Pneumonia)  Goal: Fluid Balance  4/29/2023 2224 by Carolyn Cheng RN  Outcome: Ongoing, Progressing  4/29/2023 2223 by Carolyn Cheng RN  Outcome: Ongoing, Progressing     Problem: Infection (Pneumonia)  Goal: Resolution of Infection Signs and Symptoms  4/29/2023 2224 by Carolyn Cheng RN  Outcome: Ongoing, Progressing  4/29/2023 2223 by Carolyn Cheng RN  Outcome: Ongoing, Progressing     Problem: Respiratory Compromise (Pneumonia)  Goal: Effective Oxygenation and Ventilation  4/29/2023 2224 by Carolyn Cheng RN  Outcome: Ongoing, Progressing  4/29/2023 2223 by Carolyn Cheng RN  Outcome: Ongoing, Progressing     Problem: Fall Injury Risk  Goal: Absence of Fall and Fall-Related Injury  4/29/2023 2224 by Carolyn Cheng RN  Outcome: Ongoing, Progressing  4/29/2023 2223 by Carolyn Cheng RN  Outcome: Ongoing, Progressing     Problem: Fluid Volume Excess  Goal: Fluid Balance  4/29/2023 2224 by Carolyn Cheng RN  Outcome: Ongoing, Progressing  4/29/2023 2223 by Carolyn Cheng RN  Outcome: Ongoing, Progressing     Problem: Skin Injury Risk Increased  Goal: Skin Health and Integrity  4/29/2023 2224 by Carolyn Cheng RN  Outcome: Ongoing, Progressing  4/29/2023 2223 by Carolyn Cheng RN  Outcome: Ongoing, Progressing     Problem: Device-Related Complication Risk (Hemodialysis)  Goal: Safe, Effective Therapy Delivery  4/29/2023 2224 by Carolyn Cheng RN  Outcome: Ongoing, Progressing  4/29/2023 2223 by Carolyn Cheng RN  Outcome: Ongoing, Progressing     Problem: Hemodynamic Instability  (Hemodialysis)  Goal: Effective Tissue Perfusion  4/29/2023 2224 by Carolyn Cheng RN  Outcome: Ongoing, Progressing  4/29/2023 2223 by Carolyn Cheng RN  Outcome: Ongoing, Progressing     Problem: Infection (Hemodialysis)  Goal: Absence of Infection Signs and Symptoms  4/29/2023 2224 by Carolyn Cheng RN  Outcome: Ongoing, Progressing  4/29/2023 2223 by Carolyn Cheng RN  Outcome: Ongoing, Progressing     Problem: Fluid and Electrolyte Imbalance (Acute Kidney Injury/Impairment)  Goal: Fluid and Electrolyte Balance  4/29/2023 2224 by Carolyn Cheng RN  Outcome: Ongoing, Progressing  4/29/2023 2223 by Carolyn Cheng RN  Outcome: Ongoing, Progressing     Problem: Oral Intake Inadequate (Acute Kidney Injury/Impairment)  Goal: Optimal Nutrition Intake  4/29/2023 2224 by Carolyn Cheng RN  Outcome: Ongoing, Progressing  4/29/2023 2223 by Carolyn Cheng RN  Outcome: Ongoing, Progressing     Problem: Renal Function Impairment (Acute Kidney Injury/Impairment)  Goal: Effective Renal Function  4/29/2023 2224 by Carolyn Cheng RN  Outcome: Ongoing, Progressing  4/29/2023 2223 by Carolyn Cheng RN  Outcome: Ongoing, Progressing     Problem: Infection  Goal: Absence of Infection Signs and Symptoms  4/29/2023 2224 by Carolyn Cheng RN  Outcome: Ongoing, Progressing  4/29/2023 2223 by Carolyn Cheng RN  Outcome: Ongoing, Progressing     Problem: Impaired Wound Healing  Goal: Optimal Wound Healing  4/29/2023 2224 by Carolyn Cheng RN  Outcome: Ongoing, Progressing  4/29/2023 2223 by Carolyn Cheng RN  Outcome: Ongoing, Progressing

## 2023-04-30 NOTE — PROGRESS NOTES
"[facility]  Nephrology Progress Note  Patient Name: Jason Perdue  Age: 63 y.o.  : 1960  MRN: [id number]  Admission Date: 2023    Chief complaint: Abdominal Pain (Pt c/o midline upper abdominal pain and sob when ambulating. Hx of chf. Brought in by ems.b/p low in route. Pt reports pain to abdomen 8/10. Reports onset after large bm on this morning.)    Hospital course  Jason Perdue is a 63 y.o. Black or  male with past medical history of HIV, hepatitis-B, heart failure with severely reduced ejection fraction, chronic kidney disease, and polysubstance abuse.  Patient was admitted on 2023 with complaints of lower extremity edema, decreased urination, and shortness of breath.  He did not respond to diuretic therapy and was initiated on hemodialysis on 2023.  Nephrology is continuing to follow for assistance with management of HOLLIS.      Subjective  HD treatment was uneventful yesterday. Patient reports improvement in abdominal distention.     Review of Systems  Cardiovascular:  Negative for chest pain   Respiratory:  Reports improvement in shortness of breath.    Objective  BP (!) 87/56   Pulse 91   Temp 97.9 °F (36.6 °C)   Resp 20   Ht 5' 11" (1.803 m)   Wt 100.9 kg (222 lb 8 oz)   SpO2 97%   BMI 31.03 kg/m²     Intake/Output Summary (Last 24 hours) at 2023 1027  Last data filed at 2023 0629  Gross per 24 hour   Intake 411.43 ml   Output 3200 ml   Net -2788.57 ml         Physical Exam  General appearance: Patient is in no acute distress.  Skin: No rashes or wounds.  HEENT: PERRLA, EOMI, no scleral icterus, no JVD. Neck is supple.  Chest: Respirations are unlabored. Lungs sounds are diminished to auscultation.   Heart: S1, S2.   Abdomen:  Obese, protuberant, nontender, soft, bowel sounds audible to all quadrants.  : Deferred.  Extremities: BLE pitting 1+ edema, peripheral pulses are palpable.  Right femoral dialysis catheter is in " place  Neuro: No focal deficits.     Medications    Current Facility-Administered Medications:     acetaminophen tablet 650 mg, 650 mg, Oral, Q6H PRN, Dileep Fonseca MD, 650 mg at 04/29/23 1842    albumin human 25% bottle 25 g, 25 g, Intravenous, BID, Lizette Beal MD, Stopped at 04/30/23 0955    apixaban tablet 5 mg, 5 mg, Oral, BID, Kike Chadwick MD, 5 mg at 04/30/23 0838    aspirin EC tablet 81 mg, 81 mg, Oral, Daily, Kike Chadwick MD, 81 mg at 04/30/23 0838    atorvastatin tablet 40 mg, 40 mg, Oral, QHS, Kike Chadwick MD, 40 mg at 04/29/23 2133    atovaquone 750 mg/5 mL oral liquid 1,500 mg, 1,500 mg, Oral, Daily, Kike Chadwick MD, 1,500 mg at 04/30/23 0839    jzwrulsnv-mupbkjol-fcmhprb ala -25 mg (25 kg or greater) 1 tablet, 1 tablet, Oral, Daily, Kike Chadwick MD, 1 tablet at 04/30/23 0838    bumetanide injection 2 mg, 2 mg, Intravenous, Q12H, Silvina Forbes MD, 2 mg at 04/30/23 0936    dextrose 10% bolus 125 mL 125 mL, 12.5 g, Intravenous, PRN, Kike Chadwick MD    dextrose 10% bolus 250 mL 250 mL, 25 g, Intravenous, PRN, Kike Chadwick MD    dextrose 40 % gel 15,000 mg, 15 g, Oral, PRN, Kike Chadwick MD    dextrose 40 % gel 30,000 mg, 30 g, Oral, PRN, Kike Chadwick MD    DOBUtamine 1000 mg in D5W 250 mL infusion, 5 mcg/kg/min, Intravenous, Continuous, Lizette Beal MD, Last Rate: 7.6 mL/hr at 04/29/23 1040, 5 mcg/kg/min at 04/29/23 1040    glucagon (human recombinant) injection 1 mg, 1 mg, Intramuscular, PRN, Kike Chadwick MD    melatonin tablet 6 mg, 6 mg, Oral, Nightly PRN, Kike Chadwick MD, 6 mg at 04/29/23 2353    methyl salicylate-menthol 15-10% cream, , Topical (Top), TID PRN, Terry Barksdale MD, Given at 04/27/23 2113    midodrine tablet 10 mg, 10 mg, Oral, TID WM, Unique Lozano MD, 10 mg at 04/30/23 0806    mupirocin 2 % ointment, , Nasal, BID, Silvina Forbes MD, Given at 04/30/23 0839    naloxone 0.4 mg/mL injection 0.02 mg, 0.02 mg, Intravenous, PRN,  Kike Chadwick MD    nicotine 14 mg/24 hr 1 patch, 1 patch, Transdermal, Daily, Kike Chadwick MD, 1 patch at 04/30/23 0839    sodium chloride 0.9% bolus 250 mL 250 mL, 250 mL, Intravenous, PRN, Silvina Forbes MD    sodium chloride 0.9% flush 10 mL, 10 mL, Intravenous, Q12H PRN, Kike Chadwick MD    sodium chloride 0.9% flush 10 mL, 10 mL, Intravenous, PRN, Kike Chadwick MD    Flushing PICC Protocol, , , Until Discontinued **AND** sodium chloride 0.9% flush 10 mL, 10 mL, Intravenous, Q6H, 10 mL at 04/30/23 0651 **AND** sodium chloride 0.9% flush 10 mL, 10 mL, Intravenous, PRN, Dilcia Campbell MD     Imaging:    Reviewed    Laboratory Data:  Hematology  Lab Results   Component Value Date    WBC 4.9 04/30/2023    HGB 8.7 (L) 04/30/2023    HCT 27.0 (L) 04/30/2023     04/30/2023     (L) 04/30/2023    K 3.9 04/30/2023    CHLORIDE 104 04/30/2023    CO2 20 (L) 04/30/2023    BUN 23.8 04/30/2023    CREATININE 2.30 (H) 04/30/2023    EGFRNORACEVR 31 04/30/2023    GLUCOSE 104 04/30/2023    CALCIUM 9.6 04/30/2023    ALKPHOS 96 04/30/2023    LABPROT 7.9 (H) 04/30/2023    ALBUMIN 4.6 04/30/2023    BILIDIR 0.3 03/15/2022    IBILI 0.30 03/15/2022    AST 25 04/30/2023    ALT 13 04/30/2023    MG 2.20 04/30/2023    PHOS 3.4 04/30/2023     Lab Results   Component Value Date    IRON 25 (L) 12/26/2018    TIBC 359 12/26/2018    TRANS 282.0 12/26/2018    LABIRON 7.0 (L) 12/26/2018    FERRITIN 60.8 12/26/2018     (H) 03/14/2023       Lab Results   Component Value Date    HEPBSURFAG Reactive (A) 04/26/2023    HEPBSAB Nonreactive 04/25/2023         Impression  Acute kidney injury secondary to CRS  Anasarca  Anemia  Metabolic acidosis  Heart failure with severely reduced ejection fraction  Hypotension  Polysubstance abuse   HIV   Hepatitis-B    Plan  No indications for RRT today. Continue supportive care and monitoring for functional renal recovery.     Radha Hall NP  [facility] Nephrology    4/30/2023    Addendum:  Discussed case with Dr Lozano. Agree with blood pressure support with Midodrine as long as patient tolerating. Patient also on Dobutamine today. Will need to decrease albumin and eventually discontinue and will discuss long term plan for outpatient with patient, cardiology and primary team. Appreciate cardiology recommendations and looking forward to their note discussing best way forward with this degree of heart failure.  Silvina Forbes M.D.  Nephrology

## 2023-05-01 LAB
ALBUMIN SERPL-MCNC: 4.6 G/DL (ref 3.4–4.8)
ALBUMIN/GLOB SERPL: 1.4 RATIO (ref 1.1–2)
ALP SERPL-CCNC: 89 UNIT/L (ref 40–150)
ALT SERPL-CCNC: 11 UNIT/L (ref 0–55)
AST SERPL-CCNC: 23 UNIT/L (ref 5–34)
BASOPHILS # BLD AUTO: 0.01 X10(3)/MCL (ref 0–0.2)
BASOPHILS NFR BLD AUTO: 0.2 %
BILIRUBIN DIRECT+TOT PNL SERPL-MCNC: 3.4 MG/DL
BUN SERPL-MCNC: 32 MG/DL (ref 8.4–25.7)
CALCIUM SERPL-MCNC: 9.5 MG/DL (ref 8.8–10)
CHLORIDE SERPL-SCNC: 102 MMOL/L (ref 98–107)
CO2 SERPL-SCNC: 19 MMOL/L (ref 23–31)
CREAT SERPL-MCNC: 2.74 MG/DL (ref 0.73–1.18)
EOSINOPHIL # BLD AUTO: 0.07 X10(3)/MCL (ref 0–0.9)
EOSINOPHIL NFR BLD AUTO: 1.2 %
ERYTHROCYTE [DISTWIDTH] IN BLOOD BY AUTOMATED COUNT: 20.1 % (ref 11.5–17)
FERRITIN SERPL-MCNC: 209.11 NG/ML (ref 21.81–274.66)
GFR SERPLBLD CREATININE-BSD FMLA CKD-EPI: 25 MLS/MIN/1.73/M2
GLOBULIN SER-MCNC: 3.2 GM/DL (ref 2.4–3.5)
GLUCOSE SERPL-MCNC: 85 MG/DL (ref 82–115)
HCT VFR BLD AUTO: 25.8 % (ref 42–52)
HGB BLD-MCNC: 8.5 G/DL (ref 14–18)
IMM GRANULOCYTES # BLD AUTO: 0.02 X10(3)/MCL (ref 0–0.04)
IMM GRANULOCYTES NFR BLD AUTO: 0.3 %
IRON SATN MFR SERPL: 17 % (ref 20–50)
IRON SERPL-MCNC: 45 UG/DL (ref 65–175)
LYMPHOCYTES # BLD AUTO: 0.68 X10(3)/MCL (ref 0.6–4.6)
LYMPHOCYTES NFR BLD AUTO: 11.6 %
MAGNESIUM SERPL-MCNC: 2.1 MG/DL (ref 1.6–2.6)
MCH RBC QN AUTO: 26.8 PG (ref 27–31)
MCHC RBC AUTO-ENTMCNC: 32.9 G/DL (ref 33–36)
MCV RBC AUTO: 81.4 FL (ref 80–94)
MONOCYTES # BLD AUTO: 0.57 X10(3)/MCL (ref 0.1–1.3)
MONOCYTES NFR BLD AUTO: 9.7 %
NEUTROPHILS # BLD AUTO: 4.52 X10(3)/MCL (ref 2.1–9.2)
NEUTROPHILS NFR BLD AUTO: 77 %
NRBC BLD AUTO-RTO: 0 %
PHOSPHATE SERPL-MCNC: 3.6 MG/DL (ref 2.3–4.7)
PLATELET # BLD AUTO: 135 X10(3)/MCL (ref 130–400)
PMV BLD AUTO: 12.3 FL (ref 7.4–10.4)
POTASSIUM SERPL-SCNC: 3.8 MMOL/L (ref 3.5–5.1)
PROT SERPL-MCNC: 7.8 GM/DL (ref 5.8–7.6)
RBC # BLD AUTO: 3.17 X10(6)/MCL (ref 4.7–6.1)
SODIUM SERPL-SCNC: 135 MMOL/L (ref 136–145)
TIBC SERPL-MCNC: 214 UG/DL (ref 69–240)
TIBC SERPL-MCNC: 259 UG/DL (ref 250–450)
TRANSFERRIN SERPL-MCNC: 235 MG/DL (ref 163–344)
TSH SERPL-ACNC: 1.01 UIU/ML (ref 0.35–4.94)
WBC # SPEC AUTO: 5.9 X10(3)/MCL (ref 4.5–11.5)

## 2023-05-01 PROCEDURE — S4991 NICOTINE PATCH NONLEGEND: HCPCS | Performed by: STUDENT IN AN ORGANIZED HEALTH CARE EDUCATION/TRAINING PROGRAM

## 2023-05-01 PROCEDURE — 21400001 HC TELEMETRY ROOM

## 2023-05-01 PROCEDURE — P9045 ALBUMIN (HUMAN), 5%, 250 ML: HCPCS | Mod: JZ,JG | Performed by: INTERNAL MEDICINE

## 2023-05-01 PROCEDURE — 25000003 PHARM REV CODE 250: Performed by: INTERNAL MEDICINE

## 2023-05-01 PROCEDURE — 84100 ASSAY OF PHOSPHORUS: CPT | Performed by: STUDENT IN AN ORGANIZED HEALTH CARE EDUCATION/TRAINING PROGRAM

## 2023-05-01 PROCEDURE — 25000003 PHARM REV CODE 250

## 2023-05-01 PROCEDURE — 83550 IRON BINDING TEST: CPT | Performed by: NURSE PRACTITIONER

## 2023-05-01 PROCEDURE — 83735 ASSAY OF MAGNESIUM: CPT | Performed by: STUDENT IN AN ORGANIZED HEALTH CARE EDUCATION/TRAINING PROGRAM

## 2023-05-01 PROCEDURE — 11000001 HC ACUTE MED/SURG PRIVATE ROOM

## 2023-05-01 PROCEDURE — 80053 COMPREHEN METABOLIC PANEL: CPT | Performed by: STUDENT IN AN ORGANIZED HEALTH CARE EDUCATION/TRAINING PROGRAM

## 2023-05-01 PROCEDURE — 97535 SELF CARE MNGMENT TRAINING: CPT

## 2023-05-01 PROCEDURE — 63600175 PHARM REV CODE 636 W HCPCS: Mod: JZ,JG

## 2023-05-01 PROCEDURE — 25000003 PHARM REV CODE 250: Performed by: NURSE PRACTITIONER

## 2023-05-01 PROCEDURE — 82728 ASSAY OF FERRITIN: CPT | Performed by: NURSE PRACTITIONER

## 2023-05-01 PROCEDURE — 85025 COMPLETE CBC W/AUTO DIFF WBC: CPT | Performed by: STUDENT IN AN ORGANIZED HEALTH CARE EDUCATION/TRAINING PROGRAM

## 2023-05-01 PROCEDURE — 94761 N-INVAS EAR/PLS OXIMETRY MLT: CPT

## 2023-05-01 PROCEDURE — 63600175 PHARM REV CODE 636 W HCPCS: Mod: JZ,JG | Performed by: INTERNAL MEDICINE

## 2023-05-01 PROCEDURE — 83520 IMMUNOASSAY QUANT NOS NONAB: CPT | Performed by: INTERNAL MEDICINE

## 2023-05-01 PROCEDURE — 25000003 PHARM REV CODE 250: Performed by: STUDENT IN AN ORGANIZED HEALTH CARE EDUCATION/TRAINING PROGRAM

## 2023-05-01 PROCEDURE — P9047 ALBUMIN (HUMAN), 25%, 50ML: HCPCS | Mod: JZ,JG

## 2023-05-01 PROCEDURE — 90935 HEMODIALYSIS ONE EVALUATION: CPT

## 2023-05-01 PROCEDURE — A4216 STERILE WATER/SALINE, 10 ML: HCPCS | Performed by: INTERNAL MEDICINE

## 2023-05-01 PROCEDURE — 84443 ASSAY THYROID STIM HORMONE: CPT | Performed by: INTERNAL MEDICINE

## 2023-05-01 RX ORDER — ALBUMIN HUMAN 50 G/1000ML
25 SOLUTION INTRAVENOUS ONCE
Status: COMPLETED | OUTPATIENT
Start: 2023-05-01 | End: 2023-05-01

## 2023-05-01 RX ORDER — SODIUM BICARBONATE 650 MG/1
1300 TABLET ORAL 2 TIMES DAILY
Status: DISCONTINUED | OUTPATIENT
Start: 2023-05-01 | End: 2023-05-01

## 2023-05-01 RX ADMIN — MIDODRINE HYDROCHLORIDE 15 MG: 5 TABLET ORAL at 04:05

## 2023-05-01 RX ADMIN — SODIUM BICARBONATE 650 MG TABLET 1300 MG: at 11:05

## 2023-05-01 RX ADMIN — SODIUM CHLORIDE, PRESERVATIVE FREE 10 ML: 5 INJECTION INTRAVENOUS at 11:05

## 2023-05-01 RX ADMIN — ALBUMIN (HUMAN) 25 G: 12.5 SOLUTION INTRAVENOUS at 05:05

## 2023-05-01 RX ADMIN — ATORVASTATIN CALCIUM 40 MG: 40 TABLET, FILM COATED ORAL at 09:05

## 2023-05-01 RX ADMIN — APIXABAN 5 MG: 2.5 TABLET, FILM COATED ORAL at 09:05

## 2023-05-01 RX ADMIN — MIDODRINE HYDROCHLORIDE 15 MG: 5 TABLET ORAL at 11:05

## 2023-05-01 RX ADMIN — APIXABAN 5 MG: 2.5 TABLET, FILM COATED ORAL at 08:05

## 2023-05-01 RX ADMIN — ACETAMINOPHEN 650 MG: 325 TABLET ORAL at 10:05

## 2023-05-01 RX ADMIN — MELATONIN TAB 3 MG 6 MG: 3 TAB at 09:05

## 2023-05-01 RX ADMIN — MIDODRINE HYDROCHLORIDE 15 MG: 5 TABLET ORAL at 08:05

## 2023-05-01 RX ADMIN — ASPIRIN 81 MG: 81 TABLET, COATED ORAL at 08:05

## 2023-05-01 RX ADMIN — BUMETANIDE 2 MG: 0.25 INJECTION, SOLUTION INTRAMUSCULAR; INTRAVENOUS at 08:05

## 2023-05-01 RX ADMIN — ATOVAQUONE 1500 MG: 750 SUSPENSION ORAL at 08:05

## 2023-05-01 RX ADMIN — SODIUM CHLORIDE, PRESERVATIVE FREE 10 ML: 5 INJECTION INTRAVENOUS at 12:05

## 2023-05-01 RX ADMIN — SODIUM CHLORIDE, PRESERVATIVE FREE 10 ML: 5 INJECTION INTRAVENOUS at 06:05

## 2023-05-01 RX ADMIN — BICTEGRAVIR SODIUM, EMTRICITABINE, AND TENOFOVIR ALAFENAMIDE FUMARATE 1 TABLET: 50; 200; 25 TABLET ORAL at 08:05

## 2023-05-01 RX ADMIN — NICOTINE 1 PATCH: 14 PATCH, EXTENDED RELEASE TRANSDERMAL at 08:05

## 2023-05-01 RX ADMIN — BUMETANIDE 2 MG: 0.25 INJECTION, SOLUTION INTRAMUSCULAR; INTRAVENOUS at 09:05

## 2023-05-01 RX ADMIN — SODIUM CHLORIDE, PRESERVATIVE FREE 10 ML: 5 INJECTION INTRAVENOUS at 04:05

## 2023-05-01 RX ADMIN — ALBUMIN (HUMAN) 25 G: 12.5 SOLUTION INTRAVENOUS at 08:05

## 2023-05-01 NOTE — PROGRESS NOTES
[facility] Cardiology   Progress Note       Subjective:      Brief HPI:  Mr. Perdue, 63  male with PMHx of HIV, HFrEF LVEF 15% on 4/13/23, and polysubstance abuse. Just recently discharged from the hospital for acute on chronic CHF exacerbation. Today, admitted to internal medicine for shortness of breath. Has history of cocaine usage, however, adamantly denies that he has not been using recently, but still tested positive on UDS. On admission, had BNP of 15,000, which is up from his baseline of around 11,000, with a troponin of 0.165, likely due to demand ischemia.     Interval History: Was on dobutamine drip over the weekend, had a 9 beat run of Vtach, but was asymptomatic at the time. Continues to require both dobutamine and midodrine for pressure support. Not short of breath, has not been ambulatory. Has been titrated up to max dose of midodrine.       Review of Systems:  Pertinent items are noted in HPI.     Objective:     Vital Signs (Most Recent):  Temp: 97.9 °F (36.6 °C) (05/01/23 1141)  Pulse: 106 (05/01/23 1141)  Resp: 18 (05/01/23 0805)  BP: (!) 83/59 (05/01/23 1141)  SpO2: (!) 94 % (05/01/23 0805)   Vital Signs (24h Range):  Temp:  [97.6 °F (36.4 °C)-98.2 °F (36.8 °C)] 97.9 °F (36.6 °C)  Pulse:  [] 106  Resp:  [16-20] 18  SpO2:  [93 %-100 %] 94 %  BP: (83-94)/(59-65) 83/59       Physical Examination:  HEENT: No JVD noted  Lungs: clear to auscultation bilaterally  Heart: regular rate and rhythm, S1, S2 normal, no murmur, click, rub or gallop  Extremities: edema improved, no longer present mid tibia  Pulses: 2+ and symmetric  Skin: Skin color, texture, turgor normal. No rashes or lesions    Laboratory:  Most Recent Data:  CBC:   Recent Labs   Lab 04/30/23  0447 05/01/23  0344   WBC 4.9 5.9   HGB 8.7* 8.5*   HCT 27.0* 25.8*    135       CMP:   Recent Labs   Lab 05/01/23  0344   CALCIUM 9.5   ALBUMIN 4.6   *   K 3.8   CO2 19*   BUN 32.0*   CREATININE 2.74*   ALKPHOS 89   ALT 11    AST 23   BILITOT 3.4*             Radiology:  Imaging Results              CT Abdomen Pelvis  Without Contrast (Final result)  Result time 04/25/23 10:05:08      Final result by Sanjana Otero MD (04/25/23 10:05:08)                   Impression:    Impression:    1. There is extensive stranding of the subcutaneous fat as well as the intraabdominal and intrapelvic fat suggesting an element of anasarca edema of uncertain etiology. Correlate with clinical and laboratory findings.    2. Again noted is a large right pleural effusion with compressive atelectasis of the right lower lobe. This is not significantly changed since the prior examination. There is interval decrease in the size of the left lower lobe pulmonary nodule, which now measures at 3 mm (series 4 image 13) with interval resolution of surrounding ground-glass opacity.    3. No acute intraabdominal or pelvic solid organ or bowel pathology identified. Details and other findings as discussed above    There is general concurrence with the preliminary report above.  Additional finding of some urinary bladder wall thickening and mild inflammatory changes associated with the urinary bladder are appreciated..  Cystitis should be excluded.    The      Electronically signed by: Sanjana Otero  Date:    04/25/2023  Time:    10:05               Narrative:    EXAMINATION:  CT ABDOMEN PELVIS WITHOUT CONTRAST    Technique: CT of the abdomen and pelvis was performed with axial images as well as sagittal and coronal reconstruction images without intravenous contrast or oral contrast.    Comparison: Comparison is with study dated 2023-04-17 14:58:26.    Clinical History: Epigastric pain.    Dosage Information: Automated Exposure Control was utilized 617.19 mGy.cm.    Findings:    Lines and Tubes: None.    Thorax:    Lungs: Again noted is a large right pleural effusion with compressive atelectasis of the right lower lobe. This is not significantly changed since  the prior examination. There is interval decrease in the size of the left lower lobe pulmonary nodule, which now measures at 3 mm (series 4 image 13) with interval resolution of surrounding ground-glass opacity.    Heart: Stable mild cardiomegaly is seen.    Abdomen:    Abdominal Wall: There is extensive stranding of the subcutaneous fat as well as the intraabdominal and intrapelvic fat suggesting an element of anasarca edema of uncertain etiology.    Liver: The liver appears unremarkable.    Biliary System: No intrahepatic or extrahepatic biliary duct dilatation is seen.    Gallbladder: The gallbladder is non-distended and appears otherwise unremarkable.    Pancreas: The pancreas appears unremarkable.    Spleen: The spleen appears unremarkable.    Adrenals: The adrenal glands appear unremarkable.    Kidneys: The left kidney appears unremarkable with no stones cysts masses or hydronephrosis. Nonspecific perinephric fat stranding is noted bilaterally. Again noted is an 18 mm cyst in the mid pole of the right kidney (series 2 image 41). The right kidney otherwise appear unremarkable with no stone or hydronephrosis identified.    Aorta: There is mild calcification of the abdominal aorta and its branches.    IVC: Unremarkable.    Bowel:    Esophagus: The visualized esophagus appears unremarkable.    Stomach: The stomach appears unremarkable.    Duodenum: Unremarkable appearing duodenum.    Small Bowel: The small bowel appears unremarkable.    Colon: Nondistended.    Appendix: The appendix appears unremarkable (series 2 image 62-67).    Peritoneum: There is mild ascites, not significantly changed since the prior examination. No free intraperitoneal gas is seen.    Pelvis:    Bladder: The bladder is nondistended and cannot be definitively evaluated.    Male:    Prostate gland: The prostate gland appears unremarkable.    Bony structures:    Dorsal Spine: There is spondylosis of the visualized dorsal spine. There is a  stable appearing grade I retrolisthesis of L5 over S1.    Bony Pelvis: The visualized bony structures of the pelvis appear unremarkable.                        Preliminary result by Sanjana Otero MD (04/25/23 02:27:52)                   Narrative:    START OF REPORT:  Technique: CT of the abdomen and pelvis was performed with axial images as well as sagittal and coronal reconstruction images without intravenous contrast or oral contrast.    Comparison: Comparison is with study [id number] 14:58:26.    Clinical History: Epigastric pain.    Dosage Information: Automated Exposure Control was utilized 617.19 mGy.cm.    Findings:  Lines and Tubes: None.  Thorax:  Lungs: Again noted is a large right pleural effusion with compressive atelectasis of the right lower lobe. This is not significantly changed since the prior examination. There is interval decrease in the size of the left lower lobe pulmonary nodule, which now measures at 3 mm (series 4 image 13) with interval resolution of surrounding ground-glass opacity.  Heart: Stable mild cardiomegaly is seen.  Abdomen:  Abdominal Wall: There is extensive stranding of the subcutaneous fat as well as the intraabdominal and intrapelvic fat suggesting an element of anasarca edema of uncertain etiology.  Liver: The liver appears unremarkable.  Biliary System: No intrahepatic or extrahepatic biliary duct dilatation is seen.  Gallbladder: The gallbladder is non-distended and appears otherwise unremarkable.  Pancreas: The pancreas appears unremarkable.  Spleen: The spleen appears unremarkable.  Adrenals: The adrenal glands appear unremarkable.  Kidneys: The left kidney appears unremarkable with no stones cysts masses or hydronephrosis. Nonspecific perinephric fat stranding is noted bilaterally. Again noted is an 18 mm cyst in the mid pole of the right kidney (series 2 image 41). The right kidney otherwise appear unremarkable with no stone or hydronephrosis  identified.  Aorta: There is mild calcification of the abdominal aorta and its branches.  IVC: Unremarkable.  Bowel:  Esophagus: The visualized esophagus appears unremarkable.  Stomach: The stomach appears unremarkable.  Duodenum: Unremarkable appearing duodenum.  Small Bowel: The small bowel appears unremarkable.  Colon: Nondistended.  Appendix: The appendix appears unremarkable (series 2 image 62-67).  Peritoneum: There is mild ascites, not significantly changed since the prior examination. No free intraperitoneal gas is seen.    Pelvis:  Bladder: The bladder is nondistended and cannot be definitively evaluated.  Male:  Prostate gland: The prostate gland appears unremarkable.    Bony structures:  Dorsal Spine: There is spondylosis of the visualized dorsal spine. There is a stable appearing grade I retrolisthesis of L5 over S1.  Bony Pelvis: The visualized bony structures of the pelvis appear unremarkable.      Impression:  1. There is extensive stranding of the subcutaneous fat as well as the intraabdominal and intrapelvic fat suggesting an element of anasarca edema of uncertain etiology. Correlate with clinical and laboratory findings.  2. Again noted is a large right pleural effusion with compressive atelectasis of the right lower lobe. This is not significantly changed since the prior examination. There is interval decrease in the size of the left lower lobe pulmonary nodule, which now measures at 3 mm (series 4 image 13) with interval resolution of surrounding ground-glass opacity.  3. No acute intraabdominal or pelvic solid organ or bowel pathology identified. Details and other findings as discussed above.                                         X-Ray Chest AP Portable (Final result)  Result time 04/25/23 08:53:38      Final result by Jordan Merlos MD (04/25/23 08:53:38)                   Impression:      Cardiomegaly.    Right-sided pleural effusion with compressive atelectatic changes and consolidative  changes at the right base similar to previous exam.    Increase interstitial markings      Electronically signed by: Jordan Merlos  Date:    04/25/2023  Time:    08:53               Narrative:    EXAMINATION:  XR CHEST AP PORTABLE    CLINICAL HISTORY:  cough;    TECHNIQUE:  Single frontal view of the chest was performed.    COMPARISON:  April 22, 2022.    FINDINGS:  Examination reveals mediastinal silhouette to be within normal limits cardiac silhouette is mildly enlarged.    There is some increase in interstitial markings similar to previous exam might still reflect a degree of pulmonary vascular congestion.    There is blunting of the right costophrenic angle indicating the presence of a right-sided pleural effusion with consolidative changes in compressive atelectatic changes at the right base.    No other focal consolidative changes no other change                        Wet Read by Ez Pena DO (04/25/23 01:58:20, [facility], Emergency Medicine)    Prominent interstitial markings.  Right pleural effusion persist appears slightly improved from previous chest x-ray.                                     RADIOLOGY REPORT (Final result)  Result time 04/28/23 12:20:35      Final result by Unknown User (04/28/23 12:20:35)                                        Current Medications:     Infusions:   DOBUTamine IV infusion (non-titrating) 5 mcg/kg/min (04/30/23 1927)          Scheduled:   apixaban  5 mg Oral BID    aspirin  81 mg Oral Daily    atorvastatin  40 mg Oral QHS    atovaquone  1,500 mg Oral Daily    gnzekemmq-pviyrynh-xwciqxi ala  1 tablet Oral Daily    bumetanide  2 mg Intravenous Q12H    midodrine  15 mg Oral TID WM    nicotine  1 patch Transdermal Daily    sodium bicarbonate  1,300 mg Oral BID    sodium chloride 0.9%  10 mL Intravenous Q6H        PRN:  acetaminophen, dextrose 10%, dextrose 10%, dextrose, dextrose, glucagon (human recombinant), melatonin, methyl  salicylate-menthol 15-10%, naloxone, sodium chloride 0.9%, sodium chloride 0.9%, sodium chloride 0.9%, Flushing PICC Protocol **AND** sodium chloride 0.9% **AND** sodium chloride 0.9%      Intake/Output Summary (Last 24 hours) at 5/1/2023 1428  Last data filed at 5/1/2023 1045  Gross per 24 hour   Intake 1028.35 ml   Output 1400 ml   Net -371.65 ml         Lines/Drains/Airways       Central Venous Catheter Line  Duration                  Hemodialysis Catheter 04/26/23 1827 right femoral 4 days              Peripheral Intravenous Line  Duration                  Peripheral IV - Single Lumen 04/27/23 0950 22 G Posterior;Right Hand 4 days         Midline Catheter Insertion/Assessment  - Single Lumen 04/27/23 1549 Right brachial vein other (see comments) 3 days                      Assessment & Plan:     1) Acute on chronic CHF, LVEF 15%  - Frequently admitted for acute on chronic CHF  - Nephrology on board with diuresis, appreciate assistance  - Patient received dialysis last week, had 4,500 mL removed. Now blood pressure is low and has continued to need blood pressure support with dobutamine and midodrine.  - Patient denied lifevest by insurance  - Long term dobutamine not recommended due to possibility of developing arrhythmias, such as vtach.  - Ideally, GDMT would be preferred, however, due to his low blood pressures, he will not tolerate it.  - For blood pressure support, patient is currently on dobutamine and midodrine. Can consider midodrine for outpatient. From a cardiology perspective, no known contraindication to starting midodrine for blood pressure support. There are only case studies of using midodrine in cardiorenal failure. However, due to the patient's low blood pressure, this is likely the best option at this time.    - Can consider PO diuretics as needed, recommend trialing different PO diuretics prior to discharge to determine which one is most effective      David Posey DO  [facility] Internal Medicine,  PGY-1  [facility]

## 2023-05-01 NOTE — PT/OT/SLP PROGRESS
[facility]  Ophthalmology Operative Note    PREOPERATIVE DIAGNOSIS: Cataract, Right eye.   POSTOPERATIVE DIAGNOSIS: Cataract, Right eye.   PROCEDURE: Kelman phacoemulsification with posterior chamber lens implant, Right eye.   ANESTHESIA: Topical.   COMPLICATIONS: None.   INDICATIONS FOR PROCEDURE: Audrey Ortega is a 85 year old female who was evaluated preoperatively in the eye clinic and found to have a visually significant cataract of the Right eye which decreased her vision to the 20/80 level. This decreased vision was interfering with activities of daily living such as reading and driving. The patient was deemed a suitable candidate for cataract extraction. The risks, benefits and alternatives were explained to the patient. All questions were answered and the patient elected to proceed with cataract extraction and lens implant.   DESCRIPTION OF PROCEDURE: After informed consent was obtained, the patient was given topical lidocaine gel to the Right eye and it was prepped with Betadine and draped in the usual sterile manner. A paracentesis was made at the 7 o'clock position and the anterior chamber was inflated with Endocoat. A clear corneal incision using a 2.5 mm metal keratome was made at the 9 o'clock position. A continuous tear anterior capsulorrhexis was started with the cystotome and completed with the Utrata forceps. The nucleus was hydrodissected and found to rotate freely. The nucleus was removed with the phacoemulsification handpiece and the residual cortex with the irrigation-aspiration handpiece. The capsular bag was inspected and found to be free of any tears or breaks and was inflated with Healon. A model ZCBOO 17.5 diopter posterior chamber lens was inserted into the capsular bag without complications. It was found to center well and rotate freely. The residual Healon was removed with the irrigation-aspiration handpiece. The anterior chamber was inflated with balanced salt solution.  Physical Therapy    Missed Treatment Session    Patient Name:  Jason Perdue   MRN:  [id number]      Patient not seen at this time secondary to Pain. Pt. Stated that his leg just started to hurt. Pt. Was moaning in the room due to pain. Notified nurse Roshan.    Will follow-up as patient is available to participate and as therapists' schedule allows.   The eye was palpated and found to be of normal physiologic pressure. The wound was inspected and found to be free of any leaks. One drop each of Betadine, Vigamox, and Omnipred were instilled in the Right eye and a shield was placed over the eye. The patient was transferred to the postanesthesia care unit in stable condition.     Fortino Ford M.D.

## 2023-05-01 NOTE — PROGRESS NOTES
[facility] Medicine  Progress Note      Patient Name: Jason Perdue  : 1960  MRN: [id number]  Patient Class: IP- Inpatient   Admission Date: 2023   Length of Stay: 7  Admitting Service: Hospital Medicine  Attending Physician: Dilcia Campbell MD  PCP: SAVAGE Reddy    CHIEF COMPLAINT     Chief Complaint   Patient presents with    Abdominal Pain     Pt c/o midline upper abdominal pain and sob when ambulating. Hx of chf. Brought in by ems.b/p low in route. Pt reports pain to abdomen /10. Reports onset after large bm on this morning.       HOSPITAL COURSE   HPI:    64yo AAM PMH HIV, HFrEF [EF 15%, 2023 without AICD or LifeVest], AFib on Eliquis, severe aortic stenosis, HTN, HLD, B-cell lymphoma, polysubstance use. Broughtin by ambulance by EMS 2023 C/C progressive shortness of breath onset 3d previous. Associated LOVING, orthopnea, lower extremity edema and abdominal swelling, 1 episode of loose bowel movement this morning with cramping 8/10 centralized abdominal pain, weakness with ground level fall yesterday without trauma.  Of not, he presented with similar symptoms on 2023 and left AMA after receiving 60 mg IV Lasix.      In the ER, VS significant for hypotension, saturation WNL on RA  Lab significant for stable normocytic anemia, mild metabolic acidosis, HOLLIS, BNP roughly 15,000,  troponin 0.164.  CXR with vascular congestion, RT pleural effusion, slightly improved from previous. Internal Medicine service was consulted and patient admitted for CHF exacerbation and HOLLIS.     2023: Overnight,  patient experienced 9b eat run of Vtach but was aSx. Labs were reviewed w/K 3.9 thus given 20mEq for goal > 4.  Otherwise, patient with no complaints other than SOB with eating which he reports happens every morning. Continues to require midodrine and dobutamine for BP support. Albumin DC at am labs show normalization. Holding HD today  but will continue diuretic  therapy as well as NaHCO3 with the assistance of nephrology. Patient also reported RIGHT leg pain; US DVT RT LE pending, on Eliquis and ASA.     Goals of care discussed previously with patient with discussion of prognosis. Pt declined hospice/palliative care, expressing desire for heart transplant or assistive device.     OBJECTIVE/PHYSICAL EXAM     VITAL SIGNS (MOST RECENT):  Temp: 97.9 °F (36.6 °C) (05/01/23 1141)  Pulse: 106 (05/01/23 1141)  Resp: 18 (05/01/23 0805)  BP: (!) 83/59 (05/01/23 1141)  SpO2: (!) 94 % (05/01/23 0805)   VITAL SIGNS (24 HOUR RANGE):  Temp:  [97.6 °F (36.4 °C)-98.2 °F (36.8 °C)]   Pulse:  [102-106]   Resp:  [16-18]   BP: (83-89)/(59-64)   SpO2:  [93 %-99 %]    IV bag hanging with dobutamine     General: eating breakfast slowly as SOB  CVS: regular rhythm but tachycardic, radial and dorsalis pedis pulses 2+ BL with 2-3+ BL LE edema  Resp: CTA  GI: normoactive BS, nonTTP  Neuro: awake and alert    LABS/MICRO/MEDS/DIAGNOSTICS     LABS  CBC  Recent Labs     04/30/23 0447 05/01/23 0344   WBC 4.9 5.9   RBC 3.26* 3.17*   HGB 8.7* 8.5*   HCT 27.0* 25.8*   MCV 82.8 81.4   MCH 26.7* 26.8*   MCHC 32.2* 32.9*   RDW 19.7* 20.1*    135       Anemia  Recent Labs     05/01/23 0344   FERRITIN 209.11   IRON 45*   TIBC 259     Coags  No results for input(s): INR, APTT, D-DIMER in the last 72 hours.  Cardiac  No results for input(s): BNP, CPK, TROPONINI in the last 72 hours.    ABG/Lactate  No results for input(s): PH, PCO2, PO2, HCO3, POCSATURATED, BE, LACTIC in the last 72 hours.      BMP  Recent Labs     04/30/23  0447 05/01/23  0344   * 135*   K 3.9 3.8   CHLORIDE 104 102   CO2 20* 19*   BUN 23.8 32.0*   CREATININE 2.30* 2.74*   GLUCOSE 104 85   CALCIUM 9.6 9.5   MG 2.20 2.10   PHOS 3.4 3.6       LFTs  Recent Labs     04/30/23  0447 05/01/23  0344   ALBUMIN 4.6 4.6   GLOBULIN 3.3 3.2   ALKPHOS 96 89   ALT 13 11   AST 25 23   BILITOT 3.2* 3.4*       Inflammatory Markers  No results for  input(s): CRP, LDH, ESR in the last 72 hours.  Lipid  No results for input(s): CHOL, TRIG, LDL, VLDL, HDL in the last 72 hours.  Diabetes  Recent Labs     04/29/23  0324 04/30/23  0447 05/01/23  0344   GLUCOSE 95 104 85       Thyroid  No results for input(s): TSH, FREET4 in the last 72 hours.     INTAKE/OUTPUT    Intake/Output Summary (Last 24 hours) at 5/1/2023 1213  Last data filed at 5/1/2023 1045  Gross per 24 hour   Intake 1028.35 ml   Output 1400 ml   Net -371.65 ml          MICROBIOLOGY  Microbiology Results (last 7 days)       Procedure Component Value Units Date/Time    Urine culture [[id number]]  (Abnormal) Collected: 04/25/23 1248    Order Status: Completed Specimen: Urine Updated: 04/27/23 1316     Urine Culture Less than 10,000 colonies/ml Proteus mirabilis     Comment: [place] counts <10,000/ml are of questionable significance and may or may not indicate contamination.  Therefore organisms are identified only.  If further workup is desired please notify Microbiology                 MEDICATIONS   apixaban  5 mg Oral BID    aspirin  81 mg Oral Daily    atorvastatin  40 mg Oral QHS    atovaquone  1,500 mg Oral Daily    aolqouyvv-tifiorbk-luteybk ala  1 tablet Oral Daily    bumetanide  2 mg Intravenous Q12H    midodrine  15 mg Oral TID WM    nicotine  1 patch Transdermal Daily    sodium bicarbonate  1,300 mg Oral BID    sodium chloride 0.9%  10 mL Intravenous Q6H         INFUSIONS   DOBUTamine IV infusion (non-titrating) 5 mcg/kg/min (04/30/23 1927)          DIAGNOSTIC TESTS  X-Ray Knee 1 or 2 View Right   Final Result      1. No evidence of acute injury to the right knee.         Electronically signed by: Francis Farrell MD   Date:    04/30/2023   Time:    15:54      CT Abdomen Pelvis  Without Contrast   Final Result   Impression:      1. There is extensive stranding of the subcutaneous fat as well as the intraabdominal and intrapelvic fat suggesting an element of anasarca edema of uncertain etiology.  Correlate with clinical and laboratory findings.      2. Again noted is a large right pleural effusion with compressive atelectasis of the right lower lobe. This is not significantly changed since the prior examination. There is interval decrease in the size of the left lower lobe pulmonary nodule, which now measures at 3 mm (series 4 image 13) with interval resolution of surrounding ground-glass opacity.      3. No acute intraabdominal or pelvic solid organ or bowel pathology identified. Details and other findings as discussed above      There is general concurrence with the preliminary report above.  Additional finding of some urinary bladder wall thickening and mild inflammatory changes associated with the urinary bladder are appreciated..  Cystitis should be excluded.      The         Electronically signed by: Sanjana Otero   Date:    04/25/2023   Time:    10:05      X-Ray Chest AP Portable   ED Interpretation   Prominent interstitial markings.  Right pleural effusion persist appears slightly improved from previous chest x-ray.      Final Result      Cardiomegaly.      Right-sided pleural effusion with compressive atelectatic changes and consolidative changes at the right base similar to previous exam.      Increase interstitial markings         Electronically signed by: Jordan Merlos   Date:    04/25/2023   Time:    08:53      RADIOLOGY REPORT   Final Result           EF   Date Value Ref Range Status   04/13/2023 15 % Final   01/24/2023 12 % Final          ASSESSMENT/PLAN     Acute decompensated heart failure with hx of HFrEF with EF 15% (No AICD)  Abdominal anasarca  Pleural effusion  - BNP approx 15,000 on admission,  baseline 11,126  - echo 04/2023: EF 15%, mild eccentric hypertrophy, severe systolic dysfunction, grade 3 LVDD, moderate to severe AS, moderate MR, moderate TR, pHTN  - continue diuresis,  nephrology following with last HD 4/29 output of 2L  -no dialysis today  - strict I&Os,  daily  weights, elevate head of bed, fluid restriction low-sodium diet  - CM consulted for evaluation for lifevest, pt insurance does not have coverage for this  - cardiology consulted, pending recs       Hypotension   -currently on dobutamine drip, midodrine 15 tid  -holding home Entresto and Toprol   -per cardiology, long term dobutamine not recommended due to possible arrhythmias  - course is complicated and he would likely benefit from a more specialized advanced heart failure service which we cannot offer at this facility.    -Attempted to transfer patient in setting of decompensated heart failure requiring dobutamine and midodrine now w/ cardiorenal syndrome with minimal urine output refractory to diuretic therapy requiring HD and ultrafiltration, however due to h/o substance abuse patient has been denied      HOLLIS w/ suspected cardiorenal syndrome  -BUN/Cr initially 46.7/2.28 on admit, continues to be elevated 5/1/2023 32/2.7 with baseline 33.1/1.36   -will hold dialysis and albumin  today as per nephrology  - continue to monitor renal indices  - avoid nephrotoxic medications     NSTEMI type 2  - Troponin 0.164 on admit, likely NSTEMI type 2 secondary to volume overload in setting CHFe  -troponin down-trended  -no significant EKG changes from previous  -cardiology consulted, pending recs         Right-sided pleural effusion  - Stable in comparison to previous admissions  - satting well on 2L NC  - continue to monitor      HIV  - Continue home Biktarvy and prophylactic Atovaquone dosing     Polysubstance use  Tobacco use  - UDS positive for cocaine  - extensive discussions with patient regarding substance abuse, high likelihood of severe consequences including death.     Access: pIV  Antibiotics: none  Diet: low salt  DVT Prophylaxis: Eliquis  GI Prophylaxis: none  Fluids: none    Outpatient dialysis not an option if pt needs albumin drip during sessions. BP remains difficult to increase with dobutamine and midodrone,  heart function remains poor. Poor candidate for heart transplant given history of substance abuse.   He would benefit from advance heart failure specialty services as our options are becoming limited at this facility.      KAREN Aguilar MD [facility]

## 2023-05-01 NOTE — CONSULTS
Patient is seen at bedside to eval and treat reported buttocks wound. Pt is noted with a foam dressing to his gluteal cleft. Skin is noted to be intact beneath. No open wound or pressure injury noted. Pt has what appears like some moisture related changes. Pt encouraged to use his urinal since external catheter was leaking and discontinued. Barrier cream applied and ordered BID and PRN per nursing. Pt educated to turn and offload as well. Understanding voiced. Wound care available as needed but no need to follow at this time.

## 2023-05-01 NOTE — PT/OT/SLP PROGRESS
Occupational Therapy   Treatment    Name: Jason Perdue  MRN: [id number]  Admitting Diagnosis:       1. HOLLIS (acute kidney injury)    2. CHF (congestive heart failure)    3. Chest pain    4. NSTEMI (non-ST elevated myocardial infarction     Patient Active Problem List   Diagnosis    Diffuse large B-cell lymphoma    HIV (human immunodeficiency virus infection)    Hepatitis B    B12 deficiency    Polysubstance abuse    Severe aortic stenosis    HFrEF (heart failure with reduced ejection fraction)    A-fib    Dyspnea    Community acquired pneumonia of right lower lobe of lung    Acute on chronic congestive heart failure    Tobacco user    CAD (coronary artery disease)    HLD (hyperlipidemia)    Anxiety    Cocaine abuse    HOLLIS (acute kidney injury)    Combined systolic and diastolic congestive heart failure        Recommendations:     Discharge Recommendations: nursing facility, skilled  Discharge Equipment Recommendations:  bedside commode  Barriers to discharge:  Decreased caregiver support, Other (Comment) (medical condition at this time; severity of deficits)    Assessment:     Jason Perdue is a 63 y.o. male with a medical diagnosis of see above.  He presents with functional decline. Performance deficits affecting function are weakness, impaired endurance, impaired self care skills, impaired functional mobility, gait instability, impaired balance, decreased lower extremity function, decreased safety awareness, impaired cardiopulmonary response to activity, edema.     Pt motivated to assist in basic self care tasks however increased SOB and fatigue with min UB ADL's . Pt required frequent rest breaks while assisting with sponge bath UB and jessica area. Pt mod fatigued end of session.     Rehab Prognosis:  Fair; patient would benefit from acute skilled OT services to address these deficits and reach maximum level of function.       Plan:     Patient to be seen  (M-F 2-5 times per week) to address the above listed problems  "via self-care/home management, therapeutic activities, therapeutic exercises  Plan of Care Expires:  (discharge)  Plan of Care Reviewed with: patient    Subjective     Chief Complaint: SOB  Patient/Family Comments/goals: "I dont want to go home on hospice"; I want to get stronger and go home   Pain/Comfort:  Pain Rating 1: 4/10  Location - Side 1: Right  Location - Orientation 1: lower  Location 1: leg  Pain Rating Post-Intervention 1: 4/10    Objective:     Communicated with: nurse Islas  prior to session.  Patient found sitting on BSC with oxygen, peripheral IV, telemetry upon OT entry to room.    General Precautions: Standard, fall    Orthopedic Precautions:N/A  Braces: N/A  Respiratory Status: Nasal cannula, flow 2 L/min     Occupational Performance:     Bed Mobility:    Patient completed Sit to Supine with minimum assistance     Functional Mobility/Transfers:  Patient completed Sit <> Stand Transfer with contact guard assistance  with  rolling walker   Patient completed Toilet Transfer Stand Pivot technique with contact guard assistance with  rolling walker and bedside commode  Functional Mobility: Pt ambulated ~ 6 feet with RW to lavatory with CGA and ~ 6 feet to bed with RW and min A due to fatigue after sponge bath     Activities of Daily Living:  Grooming: stand by assistance seated at sink on BSC for washing face and brushing teeth and oral rinse ; mod A don deodorant at end of session while seated bedside due to fatigue with activities at sink   Bathing: minimum assistance sponge bath UB and mod /max A sponge bath LB while seated on BSC at sink   Upper Body Dressing: minimum assistance doff and jose pullover  Lower Body Dressing: moderate assistance don slippers and mod/max A don pants   Toileting: minimum assistance clothing mgt and SBA hygiene after BM while seated on BSC     Treatment & Education:  ADL retraining with ongoing education on modified techniques due to SOB and fatigue level .     Patient " left HOB elevated with all lines intact, call button in reach, and nurse Roshan  notified    GOALS:   Multidisciplinary Problems       Occupational Therapy Goals          Problem: Occupational Therapy    Goal Priority Disciplines Outcome Interventions   Occupational Therapy Goal     OT, PT/OT Ongoing, Progressing    Description: Goals to be met by: discharge     Patient will increase functional independence with ADLs by performing:    LE Dressing with Modified [place].    Grooming while standing at sink with Modified [place].    Toileting from toilet with Modified [place] for hygiene and clothing management.     Toilet transfer to toilet with Modified [place].                         Time Tracking:     OT Date of Treatment: 05/01/23  OT Start Time: 1259  OT Stop Time: 1331  OT Total Time (min): 32 min    Billable Minutes:Self Care/Home Management 32 min     OT/MARIANN: OT          5/1/2023

## 2023-05-01 NOTE — NURSING
Received report from Piper Raygoza RN and was told that renal is aware that patient's femoral tunnel cath dressing has blood clotted and or leaking at the site with medipore tape placed for reinforcement,  and redressing tunnel cath at this time is unnecessary.

## 2023-05-01 NOTE — PT/OT/SLP PROGRESS
Physical Therapy    Missed Treatment Session    Patient Name:  Jason Perdue   MRN:  [id number]      Patient not seen at this time secondary to Nursing care.    Will follow-up as patient is available to participate and as therapists' schedule allows.

## 2023-05-01 NOTE — CARE UPDATE
Called regarding 9 beat of Vtach, patient asymptomatic.   Labs reviewed K 3.9, one time dose of 20 meq to K to keep it above 4 given his extensive cardiac history.     Per chart review occurs daily. Patient remains asymptomatic. Will continue to monitor.    Olya Garza MD  [employer]  Resident, HO-3

## 2023-05-01 NOTE — PLAN OF CARE
Problem: Adult Inpatient Plan of Care  Goal: Plan of Care Review  Outcome: Ongoing, Progressing  Goal: Patient-Specific Goal (Individualized)  Outcome: Ongoing, Progressing  Goal: Absence of Hospital-Acquired Illness or Injury  Outcome: Ongoing, Progressing  Goal: Optimal Comfort and Wellbeing  Outcome: Ongoing, Progressing  Goal: Readiness for Transition of Care  Outcome: Ongoing, Progressing     Problem: Fluid Imbalance (Pneumonia)  Goal: Fluid Balance  Outcome: Ongoing, Progressing     Problem: Infection (Pneumonia)  Goal: Resolution of Infection Signs and Symptoms  Outcome: Ongoing, Progressing     Problem: Respiratory Compromise (Pneumonia)  Goal: Effective Oxygenation and Ventilation  Outcome: Ongoing, Progressing     Problem: Fall Injury Risk  Goal: Absence of Fall and Fall-Related Injury  Outcome: Ongoing, Progressing     Problem: Fluid Volume Excess  Goal: Fluid Balance  Outcome: Ongoing, Progressing     Problem: Skin Injury Risk Increased  Goal: Skin Health and Integrity  Outcome: Ongoing, Progressing     Problem: Device-Related Complication Risk (Hemodialysis)  Goal: Safe, Effective Therapy Delivery  Outcome: Ongoing, Progressing     Problem: Hemodynamic Instability (Hemodialysis)  Goal: Effective Tissue Perfusion  Outcome: Ongoing, Progressing     Problem: Infection (Hemodialysis)  Goal: Absence of Infection Signs and Symptoms  Outcome: Ongoing, Progressing     Problem: Fluid and Electrolyte Imbalance (Acute Kidney Injury/Impairment)  Goal: Fluid and Electrolyte Balance  Outcome: Ongoing, Progressing     Problem: Oral Intake Inadequate (Acute Kidney Injury/Impairment)  Goal: Optimal Nutrition Intake  Outcome: Ongoing, Progressing     Problem: Renal Function Impairment (Acute Kidney Injury/Impairment)  Goal: Effective Renal Function  Outcome: Ongoing, Progressing     Problem: Infection  Goal: Absence of Infection Signs and Symptoms  Outcome: Ongoing, Progressing     Problem: Impaired Wound  Healing  Goal: Optimal Wound Healing  Outcome: Ongoing, Progressing

## 2023-05-01 NOTE — NURSING
Spoke with Dr. Salas about low blood pressure, we looked at past blood pressures, and blood pressure seems to be his normal. Will continue to monitor.

## 2023-05-01 NOTE — PROGRESS NOTES
"[facility]  Nephrology Progress Note  Patient Name: Jason Perdue  Age: 63 y.o.  : 1960  MRN: [id number]  Admission Date: 2023    Chief complaint: Abdominal Pain (Pt c/o midline upper abdominal pain and sob when ambulating. Hx of chf. Brought in by ems.b/p low in route. Pt reports pain to abdomen /10. Reports onset after large bm on this morning.)    Hospital course  Jason Perdue is a 63 y.o. Black or  male with past medical history of HIV, hepatitis-B, heart failure with severely reduced ejection fraction, chronic kidney disease, and polysubstance abuse.  Patient was admitted on 2023 with complaints of lower extremity edema, decreased urination, and shortness of breath.  He did not respond to diuretic therapy and was initiated on hemodialysis on 2023.  Nephrology is continuing to follow for assistance with management of HOLLIS.      Subjective  Patient is resting in bed, appears in no acute distress.  Remains relatively hypotensive.    Review of Systems  Cardiovascular:  Negative for chest pain   Respiratory:  Reports improvement in shortness of breath.    Objective  BP (!) 86/64   Pulse 105   Temp 97.6 °F (36.4 °C) (Oral)   Resp 18   Ht 5' 11" (1.803 m)   Wt 100.7 kg (222 lb)   SpO2 (!) 94%   BMI 30.96 kg/m²     Intake/Output Summary (Last 24 hours) at 2023 1016  Last data filed at 2023 0415  Gross per 24 hour   Intake 1028.35 ml   Output 600 ml   Net 428.35 ml         Physical Exam  General appearance: Patient is in no acute distress.  Skin: No rashes or wounds.  HEENT: PERRLA, EOMI, no scleral icterus, no JVD. Neck is supple.  Chest: Respirations are unlabored. Lungs sounds are diminished to auscultation.   Heart: S1, S2.   Abdomen:  Obese, protuberant, nontender, soft, bowel sounds audible to all quadrants.  : Deferred.  Extremities: BLE pitting 1+ edema, peripheral pulses are palpable.  Right femoral dialysis catheter is in " place  Neuro: No focal deficits.     Medications    Current Facility-Administered Medications:     acetaminophen tablet 650 mg, 650 mg, Oral, Q6H PRN, Dileep Fonseca MD, 650 mg at 04/29/23 1842    albumin human 25% bottle 25 g, 25 g, Intravenous, BID, Lizette Beal MD, Stopped at 05/01/23 0929    apixaban tablet 5 mg, 5 mg, Oral, BID, Kike Chadwick MD, 5 mg at 05/01/23 0829    aspirin EC tablet 81 mg, 81 mg, Oral, Daily, Kkie Chadwick MD, 81 mg at 05/01/23 0829    atorvastatin tablet 40 mg, 40 mg, Oral, QHS, Kike Chadwick MD, 40 mg at 04/30/23 2055    atovaquone 750 mg/5 mL oral liquid 1,500 mg, 1,500 mg, Oral, Daily, Kike Chadwick MD, 1,500 mg at 05/01/23 0829    hxhrspcio-kxpmthte-svvjrsu ala -25 mg (25 kg or greater) 1 tablet, 1 tablet, Oral, Daily, Kike Chadwick MD, 1 tablet at 05/01/23 0829    bumetanide injection 2 mg, 2 mg, Intravenous, Q12H, Silvina Forbes MD, 2 mg at 05/01/23 0847    dextrose 10% bolus 125 mL 125 mL, 12.5 g, Intravenous, PRN, Kike Chadwick MD    dextrose 10% bolus 250 mL 250 mL, 25 g, Intravenous, PRN, Kike Chadwick MD    dextrose 40 % gel 15,000 mg, 15 g, Oral, PRN, Kike Chadwick MD    dextrose 40 % gel 30,000 mg, 30 g, Oral, PRN, Kike Chadwick MD    DOBUtamine 1000 mg in D5W 250 mL infusion, 5 mcg/kg/min, Intravenous, Continuous, Lizette Beal MD, Last Rate: 7.6 mL/hr at 04/30/23 1927, 5 mcg/kg/min at 04/30/23 1927    glucagon (human recombinant) injection 1 mg, 1 mg, Intramuscular, PRN, Kike Chadwick MD    melatonin tablet 6 mg, 6 mg, Oral, Nightly PRN, Kike Chadwick MD, 6 mg at 04/30/23 2054    methyl salicylate-menthol 15-10% cream, , Topical (Top), TID PRN, Terry Barksdale MD, Given at 04/27/23 2113    midodrine tablet 15 mg, 15 mg, Oral, TID WM, Unique Lozano MD, 15 mg at 05/01/23 0828    naloxone 0.4 mg/mL injection 0.02 mg, 0.02 mg, Intravenous, PRN, Kike Chadwick MD    nicotine 14 mg/24 hr 1 patch, 1 patch, Transdermal, Daily, Kike Chadwick,  MD, 1 patch at 05/01/23 0829    sodium chloride 0.9% bolus 250 mL 250 mL, 250 mL, Intravenous, PRN, Silvina Forbes MD    sodium chloride 0.9% flush 10 mL, 10 mL, Intravenous, Q12H PRN, Kike Chadwick MD    sodium chloride 0.9% flush 10 mL, 10 mL, Intravenous, PRN, Kike Chadwick MD    Flushing PICC Protocol, , , Until Discontinued **AND** sodium chloride 0.9% flush 10 mL, 10 mL, Intravenous, Q6H, 10 mL at 05/01/23 0638 **AND** sodium chloride 0.9% flush 10 mL, 10 mL, Intravenous, PRN, Dilcia Campbell MD     Imaging:    Reviewed    Laboratory Data:  Hematology  Lab Results   Component Value Date    WBC 5.9 05/01/2023    HGB 8.5 (L) 05/01/2023    HCT 25.8 (L) 05/01/2023     05/01/2023     (L) 05/01/2023    K 3.8 05/01/2023    CHLORIDE 102 05/01/2023    CO2 19 (L) 05/01/2023    BUN 32.0 (H) 05/01/2023    CREATININE 2.74 (H) 05/01/2023    EGFRNORACEVR 25 05/01/2023    GLUCOSE 85 05/01/2023    CALCIUM 9.5 05/01/2023    ALKPHOS 89 05/01/2023    LABPROT 7.8 (H) 05/01/2023    ALBUMIN 4.6 05/01/2023    BILIDIR 0.3 03/15/2022    IBILI 0.30 03/15/2022    AST 23 05/01/2023    ALT 11 05/01/2023    MG 2.10 05/01/2023    PHOS 3.6 05/01/2023     Lab Results   Component Value Date    IRON 25 (L) 12/26/2018    TIBC 359 12/26/2018    TRANS 282.0 12/26/2018    LABIRON 7.0 (L) 12/26/2018    FERRITIN 60.8 12/26/2018     (H) 03/14/2023       Lab Results   Component Value Date    HEPBSURFAG Reactive (A) 04/26/2023    HEPBSAB Nonreactive 04/25/2023         Impression  Acute kidney injury secondary to CRS  Anasarca, improving  Anemia  Metabolic acidosis  Heart failure with severely reduced ejection fraction  Hypotension  Polysubstance abuse   HIV   Hepatitis-B    Plan  Patient is nonoliguric, there are no acute electrolyte abnormalities.   Will hold dialysis today, continue diuretic therapy.  Will check iron studies with a.m. labs and start sodium bicarbonate replacement for metabolic acidosis.  Prognosis is guarded at  topher.    Radha Hall, CONNER  [facility]ph[facility]   5/1/2023     Addendum:  Discontinue IV albumin, albumin on labs 4.6.  Cr 2.7, will monitor urine output and renal function for further HD needs. Positive C ANCA: with check anti PR3 antibodies.    Silvnia Forbes M.D.  Nephrology    Addendum:  Reassessed patient and with volume overload. Hemodialysis today for 3 hours, UF 2 liters as tolerated. Give 25 gm albumin IV x 1 with HD to increase oncotic pressure.  Silvina Forbes M.D.  Nephrology  M

## 2023-05-02 PROBLEM — I50.9 CONGESTIVE HEART FAILURE: Status: ACTIVE | Noted: 2023-04-26

## 2023-05-02 PROBLEM — E87.20 METABOLIC ACIDOSIS: Status: ACTIVE | Noted: 2023-05-02

## 2023-05-02 PROBLEM — R60.1 ANASARCA: Status: ACTIVE | Noted: 2023-05-02

## 2023-05-02 LAB
ALBUMIN SERPL-MCNC: 4.8 G/DL (ref 3.4–4.8)
ALBUMIN/GLOB SERPL: 1.4 RATIO (ref 1.1–2)
ALP SERPL-CCNC: 85 UNIT/L (ref 40–150)
ALT SERPL-CCNC: 10 UNIT/L (ref 0–55)
AST SERPL-CCNC: 38 UNIT/L (ref 5–34)
BASOPHILS # BLD AUTO: 0.01 X10(3)/MCL
BASOPHILS NFR BLD AUTO: 0.2 %
BILIRUBIN DIRECT+TOT PNL SERPL-MCNC: 4.1 MG/DL
BUN SERPL-MCNC: 20.4 MG/DL (ref 8.4–25.7)
CALCIUM SERPL-MCNC: 10.1 MG/DL (ref 8.8–10)
CHLORIDE SERPL-SCNC: 105 MMOL/L (ref 98–107)
CO2 SERPL-SCNC: 17 MMOL/L (ref 23–31)
CREAT SERPL-MCNC: 2.23 MG/DL (ref 0.73–1.18)
EOSINOPHIL # BLD AUTO: 0.03 X10(3)/MCL (ref 0–0.9)
EOSINOPHIL NFR BLD AUTO: 0.6 %
ERYTHROCYTE [DISTWIDTH] IN BLOOD BY AUTOMATED COUNT: 19.9 % (ref 11.5–17)
GFR SERPLBLD CREATININE-BSD FMLA CKD-EPI: 33 MLS/MIN/1.73/M2
GLOBULIN SER-MCNC: 3.5 GM/DL (ref 2.4–3.5)
GLUCOSE SERPL-MCNC: 102 MG/DL (ref 82–115)
HCT VFR BLD AUTO: 25.9 % (ref 42–52)
HGB BLD-MCNC: 8.5 G/DL (ref 14–18)
IMM GRANULOCYTES # BLD AUTO: 0.01 X10(3)/MCL (ref 0–0.04)
IMM GRANULOCYTES NFR BLD AUTO: 0.2 %
LACTATE SERPL-SCNC: 1.4 MMOL/L (ref 0.5–2.2)
LYMPHOCYTES # BLD AUTO: 0.59 X10(3)/MCL (ref 0.6–4.6)
LYMPHOCYTES NFR BLD AUTO: 10.9 %
MAGNESIUM SERPL-MCNC: 2.3 MG/DL (ref 1.6–2.6)
MCH RBC QN AUTO: 26.6 PG (ref 27–31)
MCHC RBC AUTO-ENTMCNC: 32.8 G/DL (ref 33–36)
MCV RBC AUTO: 81.2 FL (ref 80–94)
MONOCYTES # BLD AUTO: 0.52 X10(3)/MCL (ref 0.1–1.3)
MONOCYTES NFR BLD AUTO: 9.6 %
NEUTROPHILS # BLD AUTO: 4.26 X10(3)/MCL (ref 2.1–9.2)
NEUTROPHILS NFR BLD AUTO: 78.5 %
NRBC BLD AUTO-RTO: 0 %
OHS CV RIGHT ABI LOWER EXTREMITY (NO CALC): 1.04
PHOSPHATE SERPL-MCNC: 3.2 MG/DL (ref 2.3–4.7)
PLATELET # BLD AUTO: 119 X10(3)/MCL (ref 130–400)
PMV BLD AUTO: 11.2 FL (ref 7.4–10.4)
POTASSIUM SERPL-SCNC: 4.7 MMOL/L (ref 3.5–5.1)
PROT SERPL-MCNC: 8.3 GM/DL (ref 5.8–7.6)
RBC # BLD AUTO: 3.19 X10(6)/MCL (ref 4.7–6.1)
RIGHT ANT TIBIAL SYS PSV: 12 CM/S
RIGHT DORSALIS PEDIS PSV: 15 CM/S
RIGHT EXTERNAL ILLIAC PSV: 62 CM/S
RIGHT POPLITEAL PSV: 36 CM/S
RIGHT POST TIBIAL SYS PSV: 42 CM/S
RIGHT PROFUNDA SYS PSV: 42 CM/S
RIGHT SUPER FEMORAL DIST SYS PSV: 22 CM/S
RIGHT SUPER FEMORAL MID SYS PSV: 56 CM/S
RIGHT SUPER FEMORAL PROX SYS PSV: 29 CM/S
SODIUM SERPL-SCNC: 135 MMOL/L (ref 136–145)
WBC # SPEC AUTO: 5.42 X10(3)/MCL (ref 4.5–11.5)

## 2023-05-02 PROCEDURE — 36406 VNPNXR<3YRS PHY/QHP OTHER VN: CPT

## 2023-05-02 PROCEDURE — A4216 STERILE WATER/SALINE, 10 ML: HCPCS | Performed by: INTERNAL MEDICINE

## 2023-05-02 PROCEDURE — 80053 COMPREHEN METABOLIC PANEL: CPT

## 2023-05-02 PROCEDURE — S4991 NICOTINE PATCH NONLEGEND: HCPCS | Performed by: STUDENT IN AN ORGANIZED HEALTH CARE EDUCATION/TRAINING PROGRAM

## 2023-05-02 PROCEDURE — 25000003 PHARM REV CODE 250: Performed by: STUDENT IN AN ORGANIZED HEALTH CARE EDUCATION/TRAINING PROGRAM

## 2023-05-02 PROCEDURE — 25000003 PHARM REV CODE 250: Performed by: INTERNAL MEDICINE

## 2023-05-02 PROCEDURE — 94761 N-INVAS EAR/PLS OXIMETRY MLT: CPT

## 2023-05-02 PROCEDURE — 11000001 HC ACUTE MED/SURG PRIVATE ROOM

## 2023-05-02 PROCEDURE — 97164 PT RE-EVAL EST PLAN CARE: CPT

## 2023-05-02 PROCEDURE — 83735 ASSAY OF MAGNESIUM: CPT

## 2023-05-02 PROCEDURE — 63600175 PHARM REV CODE 636 W HCPCS

## 2023-05-02 PROCEDURE — 83605 ASSAY OF LACTIC ACID: CPT

## 2023-05-02 PROCEDURE — 27000221 HC OXYGEN, UP TO 24 HOURS

## 2023-05-02 PROCEDURE — 85025 COMPLETE CBC W/AUTO DIFF WBC: CPT

## 2023-05-02 PROCEDURE — 84100 ASSAY OF PHOSPHORUS: CPT | Performed by: STUDENT IN AN ORGANIZED HEALTH CARE EDUCATION/TRAINING PROGRAM

## 2023-05-02 PROCEDURE — 21400001 HC TELEMETRY ROOM

## 2023-05-02 RX ORDER — SODIUM BICARBONATE 650 MG/1
1300 TABLET ORAL 2 TIMES DAILY
Status: DISCONTINUED | OUTPATIENT
Start: 2023-05-02 | End: 2023-05-05 | Stop reason: HOSPADM

## 2023-05-02 RX ADMIN — SODIUM BICARBONATE 650 MG TABLET 1300 MG: at 08:05

## 2023-05-02 RX ADMIN — MIDODRINE HYDROCHLORIDE 15 MG: 5 TABLET ORAL at 08:05

## 2023-05-02 RX ADMIN — APIXABAN 5 MG: 2.5 TABLET, FILM COATED ORAL at 08:05

## 2023-05-02 RX ADMIN — DOBUTAMINE IN DEXTROSE 5 MCG/KG/MIN: 400 INJECTION, SOLUTION INTRAVENOUS at 03:05

## 2023-05-02 RX ADMIN — SODIUM CHLORIDE, PRESERVATIVE FREE 10 ML: 5 INJECTION INTRAVENOUS at 12:05

## 2023-05-02 RX ADMIN — BUMETANIDE 2 MG: 0.25 INJECTION, SOLUTION INTRAMUSCULAR; INTRAVENOUS at 08:05

## 2023-05-02 RX ADMIN — BICTEGRAVIR SODIUM, EMTRICITABINE, AND TENOFOVIR ALAFENAMIDE FUMARATE 1 TABLET: 50; 200; 25 TABLET ORAL at 08:05

## 2023-05-02 RX ADMIN — NICOTINE 1 PATCH: 14 PATCH, EXTENDED RELEASE TRANSDERMAL at 08:05

## 2023-05-02 RX ADMIN — ATOVAQUONE 1500 MG: 750 SUSPENSION ORAL at 08:05

## 2023-05-02 RX ADMIN — SODIUM CHLORIDE, PRESERVATIVE FREE 10 ML: 5 INJECTION INTRAVENOUS at 05:05

## 2023-05-02 RX ADMIN — MIDODRINE HYDROCHLORIDE 15 MG: 5 TABLET ORAL at 04:05

## 2023-05-02 RX ADMIN — ATORVASTATIN CALCIUM 40 MG: 40 TABLET, FILM COATED ORAL at 08:05

## 2023-05-02 RX ADMIN — MELATONIN TAB 3 MG 6 MG: 3 TAB at 08:05

## 2023-05-02 RX ADMIN — MIDODRINE HYDROCHLORIDE 15 MG: 5 TABLET ORAL at 11:05

## 2023-05-02 RX ADMIN — ASPIRIN 81 MG: 81 TABLET, COATED ORAL at 08:05

## 2023-05-02 NOTE — PT/OT/SLP RE-EVAL
Physical Therapy Re-Evaluation    Patient Name:  Jason Perdue   MRN:  [id number]    Recommendations     Discharge Recommendations:  nursing facility, skilled, other (see comments) (acute care in [place] for sx?)   Discharge Equipment Recommendations: none   Barriers to discharge: fall risk, severity of deficits, level of skilled assistance required, decreased endurance, and medical diagnosis    Assessment     Jason Perdue is a 63 y.o. male admitted with a medical diagnosis of <principal problem not specified>.  He presents with the following impairments/functional limitations:  weakness, impaired endurance, impaired self care skills, impaired functional mobility, gait instability, decreased lower extremity function, decreased upper extremity function, pain.    Rehab Prognosis: Fair.    Patient would benefit from continued skilled acute PT services to: address above listed impairments/functional limitations; receive patient/caregiver education; reduce fall risk; and maximize independency/safety with functional mobility.    Recent Surgery: * No surgery found *      Plan     During this hospitalization, patient to be seen  (3-5xwk) to address the identified impairments/functional limitations via gait training, therapeutic activities and progress toward the established goals.    Plan of Care Expires:  05/24/23  Plan of Care reviewed with: patient    Subjective     Communicated with patient's nurse Roshan prior to session.    Patient agreeable to participate in re-evaluation.    Chief Complaint: flank pain  Patient/Family Comments/goals: get better  Pain/Comfort:  Pain Rating 1: 0/10  Location 1: flank  Pain Addressed 1: Nurse notified  Pain Rating Post-Intervention 1: 6/10    Patients cultural, spiritual, Congregation conflicts given the current situation: no    Objective     Patient found right sidelying with telemetry, peripheral IV  upon PT entry to room.    General Precautions: Standard, fall   Orthopedic Precautions:N/A    Braces: N/A  Respiratory Status: room air    Exams:  Orientation: Patient is oriented to Person, Place, Time, Situation  Commands: Patient follows commands consistently  Gross Motor Coordination:  WFL  RLE ROM: WFL  RLE Strength: WFL  LLE ROM: WFL  LLE Strength: WFL    Functional Mobility:    Bed Mobility:  Supine to Sit: supervision    Transfers:  Sit to Stand: minimum assistance with rolling walker    Gait:  Patient ambulated 40 ft with rolling walker and minimum assistance.  Patient demonstrates decreased sylvain, decreased step length, and flexed posture.    Other Mobility:  not assessed    Balance:  Sit  good  Stand : fair    Patient left with HOB elevated with all lines intact, call button in reach, and Roshan notified.    Goals     Multidisciplinary Problems       Physical Therapy Goals          Problem: Physical Therapy    Goal Priority Disciplines Outcome Goal Variances Interventions   Physical Therapy Goal     PT, PT/OT Ongoing, Progressing     Description: Goals to be met by: dc     Patient will increase functional independence with mobility by performin. Sit to stand transfer with Modified [place]-ONGOING  2. Gait  x 130 feet with Modified [place] using Rolling Walker. -ONGOING  Reviewed 2023                       History     Past Medical History:   Diagnosis Date    Cancer     CHF (congestive heart failure)     Human immunodeficiency virus (HIV) disease     Hypertension      No past surgical history on file.  Time Tracking     PT Received On: 23  PT Start Time: 1131     PT Stop Time: 1150  PT Total Time (min): 19 min     Billable Minutes: Alen 2023

## 2023-05-02 NOTE — PLAN OF CARE
Problem: Adult Inpatient Plan of Care  Goal: Plan of Care Review  Outcome: Ongoing, Progressing  Goal: Patient-Specific Goal (Individualized)  Outcome: Ongoing, Progressing  Goal: Absence of Hospital-Acquired Illness or Injury  Outcome: Ongoing, Progressing  Goal: Optimal Comfort and Wellbeing  Outcome: Ongoing, Progressing  Goal: Readiness for Transition of Care  Outcome: Ongoing, Progressing     Problem: Fluid Imbalance (Pneumonia)  Goal: Fluid Balance  Outcome: Ongoing, Progressing     Problem: Infection (Pneumonia)  Goal: Resolution of Infection Signs and Symptoms  Outcome: Ongoing, Progressing     Problem: Infection (Pneumonia)  Goal: Resolution of Infection Signs and Symptoms  Outcome: Ongoing, Progressing     Problem: Respiratory Compromise (Pneumonia)  Goal: Effective Oxygenation and Ventilation  Outcome: Ongoing, Progressing     Problem: Fluid Volume Excess  Goal: Fluid Balance  Outcome: Ongoing, Progressing     Problem: Skin Injury Risk Increased  Goal: Skin Health and Integrity  Outcome: Ongoing, Progressing     Problem: Device-Related Complication Risk (Hemodialysis)  Goal: Safe, Effective Therapy Delivery  Outcome: Ongoing, Progressing     Problem: Hemodynamic Instability (Hemodialysis)  Goal: Effective Tissue Perfusion  Outcome: Ongoing, Progressing     Problem: Infection (Hemodialysis)  Goal: Absence of Infection Signs and Symptoms  Outcome: Ongoing, Progressing     Problem: Fluid and Electrolyte Imbalance (Acute Kidney Injury/Impairment)  Goal: Fluid and Electrolyte Balance  Outcome: Ongoing, Progressing     Problem: Oral Intake Inadequate (Acute Kidney Injury/Impairment)  Goal: Optimal Nutrition Intake  Outcome: Ongoing, Progressing     Problem: Renal Function Impairment (Acute Kidney Injury/Impairment)  Goal: Effective Renal Function  Outcome: Ongoing, Progressing     Problem: Infection  Goal: Absence of Infection Signs and Symptoms  Outcome: Ongoing, Progressing

## 2023-05-02 NOTE — PROGRESS NOTES
[facility] Medicine  Progress Note      Patient Name: Jason Perdue  : 1960  MRN: [id number]  Patient Class: IP- Inpatient   Admission Date: 2023   Length of Stay: 8  Admitting Service: Hospital Medicine  Attending Physician: Dilcia Campbell MD  PCP: SAVAGE Reddy    CHIEF COMPLAINT     Chief Complaint   Patient presents with    Abdominal Pain     Pt c/o midline upper abdominal pain and sob when ambulating. Hx of chf. Brought in by ems.b/p low in route. Pt reports pain to abdomen 10. Reports onset after large bm on this morning.       HOSPITAL COURSE   HPI:    62yo AAM PMH HIV, HFrEF [EF 15%, 2023 without AICD or LifeVest], AFib on Eliquis, severe aortic stenosis, HTN, HLD, B-cell lymphoma, polysubstance use. Broughtin by ambulance by EMS 2023 C/C progressive shortness of breath onset 3d previous. Associated LOVING, orthopnea, lower extremity edema and abdominal swelling, 1 episode of loose bowel movement this morning with cramping /10 centralized abdominal pain, weakness with ground level fall yesterday without trauma.  Of not, he presented with similar symptoms on 2023 and left AMA after receiving 60 mg IV Lasix.      In the ER, VS significant for hypotension, saturation WNL on RA  Lab significant for stable normocytic anemia, mild metabolic acidosis, HOLLIS, BNP roughly 15,000,  troponin 0.164.  CXR with vascular congestion, RT pleural effusion, slightly improved from previous. Internal Medicine service was consulted and patient admitted for CHF exacerbation and HOLLIS.     2023: Overnight,  patient experienced 9b eat run of Vtach but was aSx. Labs were reviewed w/K 3.9 thus given 20mEq for goal > 4.  Otherwise, patient with no complaints other than SOB with eating which he reports happens every morning. Continues to require midodrine and dobutamine for BP support. Albumin DC at am labs show normalization. Holding HD today  but will continue diuretic  therapy as well as NaHCO3 with the assistance of nephrology. Patient also reported RIGHT leg pain; US DVT RT LE pending, on Eliquis and ASA.     Goals of care discussed previously with patient with discussion of prognosis. Pt declined hospice/palliative care, expressing desire for heart transplant or assistive device.     5/2/2023: Patient reported continued leg pain overnight and was evaluated by night team. LE US DVT RT leg neg 5/1. He has no complaints of leg pain this morning. Continues to need dobutamine drip as well as midodrine. HD 5/1 with removal of 2L, tolerated without issue.     OBJECTIVE/PHYSICAL EXAM     VITAL SIGNS (MOST RECENT):  Temp: 97.6 °F (36.4 °C) (05/02/23 0311)  Pulse: 108 (05/02/23 0339)  Resp: 18 (05/02/23 0311)  BP: (!) 93/59 (05/02/23 0339)  SpO2: 97 % (05/02/23 0311)   VITAL SIGNS (24 HOUR RANGE):  Temp:  [97.4 °F (36.3 °C)-98 °F (36.7 °C)]   Pulse:  []   Resp:  [18-20]   BP: (71-97)/(50-70)   SpO2:  [97 %-99 %]    IV bag hanging with dobutamine     General: no acute distress  CVS: regular rhythm but tachycardic, radial and dorsalis pedis pulses 2+ BL, 3+ BL LE edema  Resp: CTA  GI: normoactive BS, nonTTP  Neuro: awake and alert    LABS/MICRO/MEDS/DIAGNOSTICS     LABS  CBC  Recent Labs     05/01/23 0344 05/02/23  0457   WBC 5.9 5.42   RBC 3.17* 3.19*   HGB 8.5* 8.5*   HCT 25.8* 25.9*   MCV 81.4 81.2   MCH 26.8* 26.6*   MCHC 32.9* 32.8*   RDW 20.1* 19.9*    119*       Anemia  Recent Labs     05/01/23  0344   FERRITIN 209.11   IRON 45*   TIBC 259       Coags  No results for input(s): INR, APTT, D-DIMER in the last 72 hours.  Cardiac  No results for input(s): BNP, CPK, TROPONINI in the last 72 hours.    ABG/Lactate  Recent Labs     05/02/23  0114   LACTIC 1.4         BMP  Recent Labs     05/01/23  0344 05/02/23  0114   * 135*   K 3.8 4.7   CHLORIDE 102 105   CO2 19* 17*   BUN 32.0* 20.4   CREATININE 2.74* 2.23*   GLUCOSE 85 102   CALCIUM 9.5 10.1*   MG 2.10 2.30   PHOS  3.6 3.2       LFTs  Recent Labs     05/01/23  0344 05/02/23  0114   ALBUMIN 4.6 4.8   GLOBULIN 3.2 3.5   ALKPHOS 89 85   ALT 11 10   AST 23 38*   BILITOT 3.4* 4.1*       Inflammatory Markers  No results for input(s): CRP, LDH, ESR in the last 72 hours.  Lipid  No results for input(s): CHOL, TRIG, LDL, VLDL, HDL in the last 72 hours.  Diabetes  Recent Labs     04/30/23  0447 05/01/23  0344 05/02/23  0114   GLUCOSE 104 85 102       Thyroid  Recent Labs     05/01/23  0344   TSH 1.009        INTAKE/OUTPUT    Intake/Output Summary (Last 24 hours) at 5/2/2023 0658  Last data filed at 5/2/2023 0405  Gross per 24 hour   Intake --   Output 3401 ml   Net -3401 ml          MICROBIOLOGY  Microbiology Results (last 7 days)       Procedure Component Value Units Date/Time    Urine culture [[id number]]  (Abnormal) Collected: 04/25/23 1248    Order Status: Completed Specimen: Urine Updated: 04/27/23 1316     Urine Culture Less than 10,000 colonies/ml Proteus mirabilis     Comment: Downey counts <10,000/ml are of questionable significance and may or may not indicate contamination.  Therefore organisms are identified only.  If further workup is desired please notify Microbiology                 MEDICATIONS   apixaban  5 mg Oral BID    aspirin  81 mg Oral Daily    atorvastatin  40 mg Oral QHS    atovaquone  1,500 mg Oral Daily    dlxaddvby-njyfrnzq-alreoto ala  1 tablet Oral Daily    bumetanide  2 mg Intravenous Q12H    midodrine  15 mg Oral TID WM    nicotine  1 patch Transdermal Daily    sodium bicarbonate  1,300 mg Oral BID    sodium chloride 0.9%  10 mL Intravenous Q6H         INFUSIONS   DOBUTamine IV infusion (non-titrating) 5 mcg/kg/min (05/02/23 0331)          DIAGNOSTIC TESTS  X-Ray Knee 1 or 2 View Right   Final Result      1. No evidence of acute injury to the right knee.         Electronically signed by: Francis Farrell MD   Date:    04/30/2023   Time:    15:54      CT Abdomen Pelvis  Without Contrast   Final Result    Impression:      1. There is extensive stranding of the subcutaneous fat as well as the intraabdominal and intrapelvic fat suggesting an element of anasarca edema of uncertain etiology. Correlate with clinical and laboratory findings.      2. Again noted is a large right pleural effusion with compressive atelectasis of the right lower lobe. This is not significantly changed since the prior examination. There is interval decrease in the size of the left lower lobe pulmonary nodule, which now measures at 3 mm (series 4 image 13) with interval resolution of surrounding ground-glass opacity.      3. No acute intraabdominal or pelvic solid organ or bowel pathology identified. Details and other findings as discussed above      There is general concurrence with the preliminary report above.  Additional finding of some urinary bladder wall thickening and mild inflammatory changes associated with the urinary bladder are appreciated..  Cystitis should be excluded.      The         Electronically signed by: Sanjana Otero   Date:    04/25/2023   Time:    10:05      X-Ray Chest AP Portable   ED Interpretation   Prominent interstitial markings.  Right pleural effusion persist appears slightly improved from previous chest x-ray.      Final Result      Cardiomegaly.      Right-sided pleural effusion with compressive atelectatic changes and consolidative changes at the right base similar to previous exam.      Increase interstitial markings         Electronically signed by: Jordan Merlos   Date:    04/25/2023   Time:    08:53      RADIOLOGY REPORT   Final Result           EF   Date Value Ref Range Status   04/13/2023 15 % Final   01/24/2023 12 % Final          ASSESSMENT/PLAN     Acute decompensated heart failure with hx of HFrEF with EF 15% (No AICD)  Abdominal anasarca  Pleural effusion  - BNP approx 15,000 on admission,  baseline 11,126  - echo 04/2023: EF 15%, mild eccentric hypertrophy, severe systolic dysfunction,  grade 3 LVDD, moderate to severe AS, moderate MR, moderate TR, pHTN  - continue diuresis,  nephrology following with last HD 5/1 output of 2L  - strict I&Os,  daily weights, elevate head of bed, fluid restriction low-sodium diet  - CM consulted for evaluation for lifevest, pt insurance does not have coverage for this  - cardiology consulted, pending further recs       Hypotension   -currently on dobutamine drip, midodrine 15 tid  -holding home Entresto and Toprol   -per cardiology, long term dobutamine not recommended due to possible arrhythmias  - course is complicated and he would likely benefit from a more specialized advanced heart failure service which we cannot offer at this facility.    -Attempted to transfer patient in setting of decompensated heart failure requiring dobutamine and midodrine now w/ cardiorenal syndrome with minimal urine output refractory to diuretic therapy requiring HD and ultrafiltration, however due to h/o substance abuse patient has been denied    HOLLIS w/ suspected cardiorenal syndrome  -BUN/Cr initially 46.7/2.28 on admit, continues to be elevated 5/1/2023 32/2.7 with baseline 33.1/1.36   -will hold dialysis and albumin  today as per nephrology  - continue to monitor renal indices  - avoid nephrotoxic medications     NSTEMI type 2  - Troponin 0.164 on admit, likely NSTEMI type 2 secondary to volume overload in setting CHFe  -troponin down-trended  -no significant EKG changes from previous  -cardiology consulted, pending recs         Right-sided pleural effusion  - Stable in comparison to previous admissions  - satting well on 2L NC  - continue to monitor      HIV  - Continue home Biktarvy and prophylactic Atovaquone dosing     Polysubstance use  Tobacco use  - UDS positive for cocaine  - extensive discussions with patient regarding substance abuse, high likelihood of severe consequences including death.     Access: pIV  Antibiotics: none  Diet: low salt  DVT Prophylaxis: Eliquis  GI  Prophylaxis: none  Fluids: none    Outpatient dialysis not an option if pt needs albumin drip during sessions. BP remains difficult to increase with dobutamine and midodrone, heart function remains poor. Poor candidate for heart transplant given history of substance abuse.   He would benefit from advance heart failure specialty services as our options are becoming limited at this facility.      KAREN Aguilar MD III  [facility]

## 2023-05-02 NOTE — PLAN OF CARE
General Surgery called in regards to patient need for tunneled dialysis line. Currently has functional right femoral vein HD line, however there is a need for long term dialysis. Patient discussed with Dr. Forbes who confirmed the need for a tunneled dialysis line. Plan will be for patient to received HD on Thursday 5/4/23 and afterwards, surgery will remove the temporary HD line. He will be scheduled for a subsequent tunneled internal jugular line on Friday 5/5/23.   Nephrology agreeable to this plan.   Case discussed with Dr. Iglesias.   Will need to be NPO at midnight on Thursday prior to procedure.   Hold Eliquis 48h prior to procedure (5/3/23 start holding).     Domitila Mcbride MD   [employer] General Surgery PGY 3

## 2023-05-02 NOTE — PLAN OF CARE
Was called to evaluate right leg pain, low BP. Bedside exam shows increased right thigh swelling, subtle erythema, and tenderness to palpation. No fever. Patient states pain worsens on extension of the knee, and relieved on flexion and abduction. Bedside echo performed with non collapsing IVC, no evidence of pericardial effusion.    Ordered X-ray of right knee, STAT Doppler arterial of right leg, CBC, CMP, Mg, Phos, Lactic Acid.    Will CTM. Recycling BP 97/68.    Sky Mulligan MD  [facility] Internal Medicine PGY-1

## 2023-05-02 NOTE — NURSING
IDALIA Sanchez Paged about patient requesting pain medication for severe R leg pain after receiving PO tylenol; however,  MD informed that patient's current BP is 71/50 with MAP of 57. Awaiting bedside evaluation by Daniel at this time.

## 2023-05-02 NOTE — CONSULTS
Call placed to patient's insurance ([phone]) regarding denial for Life Vest; spoke with representative who stated that she will have Dallin Palacios return my call; await return call to explain medical reasons why patient needs Life Vest to go home.

## 2023-05-02 NOTE — NURSING
05/01/23 2033   Post-Hemodialysis Assessment   Total UF (mL) 2000 mL   Post-Hemodialysis Comments tolerated hd fair. bp stable although ran 90's systolic majority of tx. pt denied c/o's throughout. cvc fx well and was able to meet ordered bfr.

## 2023-05-02 NOTE — PROGRESS NOTES
"Nephrology Progress Note    Hospital course:   63-year-old male with HIV and severe heart failure with an ejection fraction left end 15%.  Patient has been on dobutamine intermittently during hospitalization as well as IV albumin and now on midodrine.  Patient has acute renal failure requiring hemodialysis both for clearance and ultrafiltration purposes.  Patient did receive albumin with hemodialysis yesterday.  Patient tolerated hemodialysis well and tolerated 2.6 L fluid removal.  Renal indices have improved this morning although metabolic acidosis persist.  Patient is on sodium bicarbonate tablets 1300 mg p.o. b.i.d. patient has indicated this morning he may leave AMA in his cousin is picking him up to go to [place].  He is frustrated that he was not accepted at [facility] for transfer.    Subjective:   Patient is sitting up eating breakfast in bed and feels well.  He relates that his cousin is on the way to pick him up.    Twelve point review of systems:  Positive for lower extremity edema.  Appetite is improved significantly.  Otherwise negative.    Objective:   BP 96/69   Pulse 108   Temp 97.6 °F (36.4 °C) (Oral)   Resp 18   Ht 5' 11" (1.803 m)   Wt 100.6 kg (221 lb 12.5 oz)   SpO2 98%   BMI 30.93 kg/m²     Intake/Output Summary (Last 24 hours) at 5/2/2023 0815  Last data filed at 5/2/2023 0405  Gross per 24 hour   Intake --   Output 3401 ml   Net -3401 ml        Physical exam:   General:  Patient is alert and oriented person place time no acute distress  HEENT:  Normocephalic atraumatic pupils equal round reactive to light  Neck: Mild JVD no bruit thyromegaly  Lungs: Clear to auscultation bilaterally all fields  Cardiovascular regular rate and rhythm no murmur rub gallop appreciated heart rate 98 at bedside  Abdomen: Positive bowel sounds all 4 quadrants soft nontender nondistended  Extremities:  1+ pitting edema of lower extremities bilaterally up to sacrum  Neurologic:  Cranial nerves " 2-12 grossly intact no clonus asterixis appreciated    Laboratory data:   Recent Results (from the past 24 hour(s))   Comprehensive Metabolic Panel    Collection Time: 05/02/23  1:14 AM   Result Value Ref Range    Sodium Level 135 (L) 136 - 145 mmol/L    Potassium Level 4.7 3.5 - 5.1 mmol/L    Chloride 105 98 - 107 mmol/L    Carbon Dioxide 17 (L) 23 - 31 mmol/L    Glucose Level 102 82 - 115 mg/dL    Blood Urea Nitrogen 20.4 8.4 - 25.7 mg/dL    Creatinine 2.23 (H) 0.73 - 1.18 mg/dL    Calcium Level Total 10.1 (H) 8.8 - 10.0 mg/dL    Protein Total 8.3 (H) 5.8 - 7.6 gm/dL    Albumin Level 4.8 3.4 - 4.8 g/dL    Globulin 3.5 2.4 - 3.5 gm/dL    Albumin/Globulin Ratio 1.4 1.1 - 2.0 ratio    Bilirubin Total 4.1 (H) <=1.5 mg/dL    Alkaline Phosphatase 85 40 - 150 unit/L    Alanine Aminotransferase 10 0 - 55 unit/L    Aspartate Aminotransferase 38 (H) 5 - 34 unit/L    eGFR 33 mls/min/1.73/m2   Magnesium    Collection Time: 05/02/23  1:14 AM   Result Value Ref Range    Magnesium Level 2.30 1.60 - 2.60 mg/dL   Lactic Acid, Plasma    Collection Time: 05/02/23  1:14 AM   Result Value Ref Range    Lactic Acid Level 1.4 0.5 - 2.2 mmol/L   Phosphorus    Collection Time: 05/02/23  1:14 AM   Result Value Ref Range    Phosphorus Level 3.2 2.3 - 4.7 mg/dL   CBC with Differential    Collection Time: 05/02/23  4:57 AM   Result Value Ref Range    WBC 5.42 4.50 - 11.50 x10(3)/mcL    RBC 3.19 (L) 4.70 - 6.10 x10(6)/mcL    Hgb 8.5 (L) 14.0 - 18.0 g/dL    Hct 25.9 (L) 42.0 - 52.0 %    MCV 81.2 80.0 - 94.0 fL    MCH 26.6 (L) 27.0 - 31.0 pg    MCHC 32.8 (L) 33.0 - 36.0 g/dL    RDW 19.9 (H) 11.5 - 17.0 %    Platelet 119 (L) 130 - 400 x10(3)/mcL    MPV 11.2 (H) 7.4 - 10.4 fL    Neut % 78.5 %    Lymph % 10.9 %    Mono % 9.6 %    Eos % 0.6 %    Basophil % 0.2 %    Lymph # 0.59 (L) 0.6 - 4.6 x10(3)/mcL    Neut # 4.26 2.1 - 9.2 x10(3)/mcL    Mono # 0.52 0.1 - 1.3 x10(3)/mcL    Eos # 0.03 0 - 0.9 x10(3)/mcL    Baso # 0.01 <=0.2 x10(3)/mcL    IG# 0.01 0  - 0.04 x10(3)/mcL    IG% 0.2 %    NRBC% 0.0 %       Imaging:   Imaging Results              CT Abdomen Pelvis  Without Contrast (Final result)  Result time 04/25/23 10:05:08      Final result by Sanjana Otero MD (04/25/23 10:05:08)                   Impression:    Impression:    1. There is extensive stranding of the subcutaneous fat as well as the intraabdominal and intrapelvic fat suggesting an element of anasarca edema of uncertain etiology. Correlate with clinical and laboratory findings.    2. Again noted is a large right pleural effusion with compressive atelectasis of the right lower lobe. This is not significantly changed since the prior examination. There is interval decrease in the size of the left lower lobe pulmonary nodule, which now measures at 3 mm (series 4 image 13) with interval resolution of surrounding ground-glass opacity.    3. No acute intraabdominal or pelvic solid organ or bowel pathology identified. Details and other findings as discussed above    There is general concurrence with the preliminary report above.  Additional finding of some urinary bladder wall thickening and mild inflammatory changes associated with the urinary bladder are appreciated..  Cystitis should be excluded.    The      Electronically signed by: Sanjana Otero  Date:    04/25/2023  Time:    10:05               Narrative:    EXAMINATION:  CT ABDOMEN PELVIS WITHOUT CONTRAST    Technique: CT of the abdomen and pelvis was performed with axial images as well as sagittal and coronal reconstruction images without intravenous contrast or oral contrast.    Comparison: Comparison is with study dated 2023-04-17 14:58:26.    Clinical History: Epigastric pain.    Dosage Information: Automated Exposure Control was utilized 617.19 mGy.cm.    Findings:    Lines and Tubes: None.    Thorax:    Lungs: Again noted is a large right pleural effusion with compressive atelectasis of the right lower lobe. This is not  significantly changed since the prior examination. There is interval decrease in the size of the left lower lobe pulmonary nodule, which now measures at 3 mm (series 4 image 13) with interval resolution of surrounding ground-glass opacity.    Heart: Stable mild cardiomegaly is seen.    Abdomen:    Abdominal Wall: There is extensive stranding of the subcutaneous fat as well as the intraabdominal and intrapelvic fat suggesting an element of anasarca edema of uncertain etiology.    Liver: The liver appears unremarkable.    Biliary System: No intrahepatic or extrahepatic biliary duct dilatation is seen.    Gallbladder: The gallbladder is non-distended and appears otherwise unremarkable.    Pancreas: The pancreas appears unremarkable.    Spleen: The spleen appears unremarkable.    Adrenals: The adrenal glands appear unremarkable.    Kidneys: The left kidney appears unremarkable with no stones cysts masses or hydronephrosis. Nonspecific perinephric fat stranding is noted bilaterally. Again noted is an 18 mm cyst in the mid pole of the right kidney (series 2 image 41). The right kidney otherwise appear unremarkable with no stone or hydronephrosis identified.    Aorta: There is mild calcification of the abdominal aorta and its branches.    IVC: Unremarkable.    Bowel:    Esophagus: The visualized esophagus appears unremarkable.    Stomach: The stomach appears unremarkable.    Duodenum: Unremarkable appearing duodenum.    Small Bowel: The small bowel appears unremarkable.    Colon: Nondistended.    Appendix: The appendix appears unremarkable (series 2 image 62-67).    Peritoneum: There is mild ascites, not significantly changed since the prior examination. No free intraperitoneal gas is seen.    Pelvis:    Bladder: The bladder is nondistended and cannot be definitively evaluated.    Male:    Prostate gland: The prostate gland appears unremarkable.    Bony structures:    Dorsal Spine: There is spondylosis of the visualized  dorsal spine. There is a stable appearing grade I retrolisthesis of L5 over S1.    Bony Pelvis: The visualized bony structures of the pelvis appear unremarkable.                        Preliminary result by Sanjana Otero MD (04/25/23 02:27:52)                   Narrative:    START OF REPORT:  Technique: CT of the abdomen and pelvis was performed with axial images as well as sagittal and coronal reconstruction images without intravenous contrast or oral contrast.    Comparison: Comparison is with study [id number] 14:58:26.    Clinical History: Epigastric pain.    Dosage Information: Automated Exposure Control was utilized 617.19 mGy.cm.    Findings:  Lines and Tubes: None.  Thorax:  Lungs: Again noted is a large right pleural effusion with compressive atelectasis of the right lower lobe. This is not significantly changed since the prior examination. There is interval decrease in the size of the left lower lobe pulmonary nodule, which now measures at 3 mm (series 4 image 13) with interval resolution of surrounding ground-glass opacity.  Heart: Stable mild cardiomegaly is seen.  Abdomen:  Abdominal Wall: There is extensive stranding of the subcutaneous fat as well as the intraabdominal and intrapelvic fat suggesting an element of anasarca edema of uncertain etiology.  Liver: The liver appears unremarkable.  Biliary System: No intrahepatic or extrahepatic biliary duct dilatation is seen.  Gallbladder: The gallbladder is non-distended and appears otherwise unremarkable.  Pancreas: The pancreas appears unremarkable.  Spleen: The spleen appears unremarkable.  Adrenals: The adrenal glands appear unremarkable.  Kidneys: The left kidney appears unremarkable with no stones cysts masses or hydronephrosis. Nonspecific perinephric fat stranding is noted bilaterally. Again noted is an 18 mm cyst in the mid pole of the right kidney (series 2 image 41). The right kidney otherwise appear unremarkable with no stone or  hydronephrosis identified.  Aorta: There is mild calcification of the abdominal aorta and its branches.  IVC: Unremarkable.  Bowel:  Esophagus: The visualized esophagus appears unremarkable.  Stomach: The stomach appears unremarkable.  Duodenum: Unremarkable appearing duodenum.  Small Bowel: The small bowel appears unremarkable.  Colon: Nondistended.  Appendix: The appendix appears unremarkable (series 2 image 62-67).  Peritoneum: There is mild ascites, not significantly changed since the prior examination. No free intraperitoneal gas is seen.    Pelvis:  Bladder: The bladder is nondistended and cannot be definitively evaluated.  Male:  Prostate gland: The prostate gland appears unremarkable.    Bony structures:  Dorsal Spine: There is spondylosis of the visualized dorsal spine. There is a stable appearing grade I retrolisthesis of L5 over S1.  Bony Pelvis: The visualized bony structures of the pelvis appear unremarkable.      Impression:  1. There is extensive stranding of the subcutaneous fat as well as the intraabdominal and intrapelvic fat suggesting an element of anasarca edema of uncertain etiology. Correlate with clinical and laboratory findings.  2. Again noted is a large right pleural effusion with compressive atelectasis of the right lower lobe. This is not significantly changed since the prior examination. There is interval decrease in the size of the left lower lobe pulmonary nodule, which now measures at 3 mm (series 4 image 13) with interval resolution of surrounding ground-glass opacity.  3. No acute intraabdominal or pelvic solid organ or bowel pathology identified. Details and other findings as discussed above.                                         X-Ray Chest AP Portable (Final result)  Result time 04/25/23 08:53:38      Final result by Jordan Merlos MD (04/25/23 08:53:38)                   Impression:      Cardiomegaly.    Right-sided pleural effusion with compressive atelectatic changes  and consolidative changes at the right base similar to previous exam.    Increase interstitial markings      Electronically signed by: Jordan Merlos  Date:    04/25/2023  Time:    08:53               Narrative:    EXAMINATION:  XR CHEST AP PORTABLE    CLINICAL HISTORY:  cough;    TECHNIQUE:  Single frontal view of the chest was performed.    COMPARISON:  April 22, 2022.    FINDINGS:  Examination reveals mediastinal silhouette to be within normal limits cardiac silhouette is mildly enlarged.    There is some increase in interstitial markings similar to previous exam might still reflect a degree of pulmonary vascular congestion.    There is blunting of the right costophrenic angle indicating the presence of a right-sided pleural effusion with consolidative changes in compressive atelectatic changes at the right base.    No other focal consolidative changes no other change                        Wet Read by Ez Pena DO (04/25/23 01:58:20, [facility], Emergency Medicine)    Prominent interstitial markings.  Right pleural effusion persist appears slightly improved from previous chest x-ray.                                     RADIOLOGY REPORT (Final result)  Result time 04/28/23 12:20:35      Final result by Unknown User (04/28/23 12:20:35)                                         Medications:     Current Facility-Administered Medications:     acetaminophen tablet 650 mg, 650 mg, Oral, Q6H PRN, Dileep Fonseca MD, 650 mg at 05/01/23 2257    apixaban tablet 5 mg, 5 mg, Oral, BID, Kike Chadwick MD, 5 mg at 05/01/23 2119    aspirin EC tablet 81 mg, 81 mg, Oral, Daily, Kike Chadwick MD, 81 mg at 05/01/23 0829    atorvastatin tablet 40 mg, 40 mg, Oral, QHS, Kike Chadwick MD, 40 mg at 05/01/23 2119    atovaquone 750 mg/5 mL oral liquid 1,500 mg, 1,500 mg, Oral, Daily, Kike Chadwick MD, 1,500 mg at 05/02/23 0813    wkgtmxxjw-bkdwkxoq-bvokyda ala -25 mg (25 kg or greater) 1  tablet, 1 tablet, Oral, Daily, Kike Chadwick MD, 1 tablet at 05/01/23 0829    bumetanide injection 2 mg, 2 mg, Intravenous, Q12H, Silvina Forbes MD, 2 mg at 05/01/23 2119    dextrose 10% bolus 125 mL 125 mL, 12.5 g, Intravenous, PRN, Kike Chadwick MD    dextrose 10% bolus 250 mL 250 mL, 25 g, Intravenous, PRN, Kike Chadwick MD    dextrose 40 % gel 15,000 mg, 15 g, Oral, PRN, Kike Chadwick MD    dextrose 40 % gel 30,000 mg, 30 g, Oral, PRN, Kike Chadwick MD    DOBUtamine 1000 mg in D5W 250 mL infusion, 5 mcg/kg/min, Intravenous, Continuous, Lizette Beal MD, Last Rate: 7.6 mL/hr at 05/02/23 0331, 5 mcg/kg/min at 05/02/23 0331    glucagon (human recombinant) injection 1 mg, 1 mg, Intramuscular, PRN, Kike Chadwick MD    melatonin tablet 6 mg, 6 mg, Oral, Nightly PRN, Kike Chadwick MD, 6 mg at 05/01/23 2119    methyl salicylate-menthol 15-10% cream, , Topical (Top), TID PRN, Terry Barksdale MD, Given at 04/27/23 2113    midodrine tablet 15 mg, 15 mg, Oral, TID WM, Unique Lozano MD, 15 mg at 05/01/23 1630    naloxone 0.4 mg/mL injection 0.02 mg, 0.02 mg, Intravenous, PRN, Kike Chadwick MD    nicotine 14 mg/24 hr 1 patch, 1 patch, Transdermal, Daily, Kike Chadwick MD, 1 patch at 05/02/23 0812    sodium bicarbonate tablet 1,300 mg, 1,300 mg, Oral, BID, Silvina Forbes MD    sodium chloride 0.9% bolus 250 mL 250 mL, 250 mL, Intravenous, PRN, Silvina Forbes MD    sodium chloride 0.9% flush 10 mL, 10 mL, Intravenous, Q12H PRN, Kike Chadwick MD    sodium chloride 0.9% flush 10 mL, 10 mL, Intravenous, PRN, Kike Chadwick MD    Flushing PICC Protocol, , , Until Discontinued **AND** sodium chloride 0.9% flush 10 mL, 10 mL, Intravenous, Q6H, 10 mL at 05/02/23 0544 **AND** sodium chloride 0.9% flush 10 mL, 10 mL, Intravenous, PRN, Dilcia Campbell MD     Impression/Plan:  1. Acute renal failure nonoliguric:  Likely due to cardiorenal syndrome.  Patient tolerated hemodialysis well both for clearance and  ultrafiltration purposes.  Renal indices are significantly improved this morning.  I recommend removing temporary hemodialysis catheter if patient is leaving AMA  2. Generalized edema with congestive heart failure:  Continue all diuresis Bumex or torsemide.  3. Metabolic acidosis:  1300 mg sodium bicarbonate b.i.d. continue.  4. Hypotension stable on midodrine at this time continue.  Silvina Forbes M.D.  Nephrology

## 2023-05-03 PROBLEM — I21.4 NSTEMI (NON-ST ELEVATED MYOCARDIAL INFARCTION): Status: ACTIVE | Noted: 2023-05-03

## 2023-05-03 LAB
ALBUMIN SERPL-MCNC: 4.5 G/DL (ref 3.4–4.8)
ALBUMIN/GLOB SERPL: 1.4 RATIO (ref 1.1–2)
ALP SERPL-CCNC: 87 UNIT/L (ref 40–150)
ALT SERPL-CCNC: 10 UNIT/L (ref 0–55)
AST SERPL-CCNC: 22 UNIT/L (ref 5–34)
BASOPHILS # BLD AUTO: 0.02 X10(3)/MCL
BASOPHILS NFR BLD AUTO: 0.3 %
BILIRUBIN DIRECT+TOT PNL SERPL-MCNC: 4.2 MG/DL
BUN SERPL-MCNC: 29.8 MG/DL (ref 8.4–25.7)
CALCIUM SERPL-MCNC: 9.9 MG/DL (ref 8.8–10)
CHLORIDE SERPL-SCNC: 103 MMOL/L (ref 98–107)
CO2 SERPL-SCNC: 20 MMOL/L (ref 23–31)
CREAT SERPL-MCNC: 2.9 MG/DL (ref 0.73–1.18)
EOSINOPHIL # BLD AUTO: 0.04 X10(3)/MCL (ref 0–0.9)
EOSINOPHIL NFR BLD AUTO: 0.7 %
ERYTHROCYTE [DISTWIDTH] IN BLOOD BY AUTOMATED COUNT: 20.5 % (ref 11.5–17)
GBM AB (OHS): NEGATIVE
GFR SERPLBLD CREATININE-BSD FMLA CKD-EPI: 24 MLS/MIN/1.73/M2
GLOBULIN SER-MCNC: 3.3 GM/DL (ref 2.4–3.5)
GLUCOSE SERPL-MCNC: 103 MG/DL (ref 82–115)
HCT VFR BLD AUTO: 26.4 % (ref 42–52)
HGB BLD-MCNC: 8.7 G/DL (ref 14–18)
IMM GRANULOCYTES # BLD AUTO: 0.02 X10(3)/MCL (ref 0–0.04)
IMM GRANULOCYTES NFR BLD AUTO: 0.3 %
LYMPHOCYTES # BLD AUTO: 0.71 X10(3)/MCL (ref 0.6–4.6)
LYMPHOCYTES NFR BLD AUTO: 12.1 %
MAGNESIUM SERPL-MCNC: 2.1 MG/DL (ref 1.6–2.6)
MCH RBC QN AUTO: 26.8 PG (ref 27–31)
MCHC RBC AUTO-ENTMCNC: 33 G/DL (ref 33–36)
MCV RBC AUTO: 81.2 FL (ref 80–94)
MONOCYTES # BLD AUTO: 0.7 X10(3)/MCL (ref 0.1–1.3)
MONOCYTES NFR BLD AUTO: 11.9 %
MPO AB IGG (OHS): NEGATIVE
NEUTROPHILS # BLD AUTO: 4.39 X10(3)/MCL (ref 2.1–9.2)
NEUTROPHILS NFR BLD AUTO: 74.7 %
NRBC BLD AUTO-RTO: 0 %
PHOSPHATE SERPL-MCNC: 3.7 MG/DL (ref 2.3–4.7)
PLATELET # BLD AUTO: 127 X10(3)/MCL (ref 130–400)
PLATELETS.RETICULATED NFR BLD AUTO: 7.7 % (ref 0.9–11.2)
PMV BLD AUTO: 12.5 FL (ref 7.4–10.4)
POTASSIUM SERPL-SCNC: 3.9 MMOL/L (ref 3.5–5.1)
PR3 AB IGG (OHS): NEGATIVE
PROT SERPL-MCNC: 7.8 GM/DL (ref 5.8–7.6)
RBC # BLD AUTO: 3.25 X10(6)/MCL (ref 4.7–6.1)
SODIUM SERPL-SCNC: 135 MMOL/L (ref 136–145)
WBC # SPEC AUTO: 5.88 X10(3)/MCL (ref 4.5–11.5)

## 2023-05-03 PROCEDURE — 25000003 PHARM REV CODE 250: Performed by: INTERNAL MEDICINE

## 2023-05-03 PROCEDURE — 11000001 HC ACUTE MED/SURG PRIVATE ROOM

## 2023-05-03 PROCEDURE — 63600175 PHARM REV CODE 636 W HCPCS

## 2023-05-03 PROCEDURE — 25000003 PHARM REV CODE 250: Performed by: STUDENT IN AN ORGANIZED HEALTH CARE EDUCATION/TRAINING PROGRAM

## 2023-05-03 PROCEDURE — 21400001 HC TELEMETRY ROOM

## 2023-05-03 PROCEDURE — S4991 NICOTINE PATCH NONLEGEND: HCPCS | Performed by: STUDENT IN AN ORGANIZED HEALTH CARE EDUCATION/TRAINING PROGRAM

## 2023-05-03 PROCEDURE — 97168 OT RE-EVAL EST PLAN CARE: CPT

## 2023-05-03 PROCEDURE — 90935 HEMODIALYSIS ONE EVALUATION: CPT

## 2023-05-03 PROCEDURE — 80053 COMPREHEN METABOLIC PANEL: CPT | Performed by: STUDENT IN AN ORGANIZED HEALTH CARE EDUCATION/TRAINING PROGRAM

## 2023-05-03 PROCEDURE — 97116 GAIT TRAINING THERAPY: CPT

## 2023-05-03 PROCEDURE — 84100 ASSAY OF PHOSPHORUS: CPT | Performed by: STUDENT IN AN ORGANIZED HEALTH CARE EDUCATION/TRAINING PROGRAM

## 2023-05-03 PROCEDURE — 85025 COMPLETE CBC W/AUTO DIFF WBC: CPT | Performed by: STUDENT IN AN ORGANIZED HEALTH CARE EDUCATION/TRAINING PROGRAM

## 2023-05-03 PROCEDURE — 97535 SELF CARE MNGMENT TRAINING: CPT

## 2023-05-03 PROCEDURE — 25000003 PHARM REV CODE 250

## 2023-05-03 PROCEDURE — 27000221 HC OXYGEN, UP TO 24 HOURS

## 2023-05-03 PROCEDURE — A4216 STERILE WATER/SALINE, 10 ML: HCPCS | Performed by: INTERNAL MEDICINE

## 2023-05-03 PROCEDURE — 83735 ASSAY OF MAGNESIUM: CPT | Performed by: STUDENT IN AN ORGANIZED HEALTH CARE EDUCATION/TRAINING PROGRAM

## 2023-05-03 PROCEDURE — 94761 N-INVAS EAR/PLS OXIMETRY MLT: CPT

## 2023-05-03 RX ORDER — LIDOCAINE HYDROCHLORIDE 20 MG/ML
15 SOLUTION OROPHARYNGEAL ONCE
Status: COMPLETED | OUTPATIENT
Start: 2023-05-03 | End: 2023-05-03

## 2023-05-03 RX ORDER — MAG HYDROX/ALUMINUM HYD/SIMETH 200-200-20
30 SUSPENSION, ORAL (FINAL DOSE FORM) ORAL ONCE
Status: COMPLETED | OUTPATIENT
Start: 2023-05-04 | End: 2023-05-03

## 2023-05-03 RX ORDER — POLYETHYLENE GLYCOL 3350 17 G/17G
17 POWDER, FOR SOLUTION ORAL 2 TIMES DAILY
Status: DISPENSED | OUTPATIENT
Start: 2023-05-03 | End: 2023-05-05

## 2023-05-03 RX ORDER — MAG HYDROX/ALUMINUM HYD/SIMETH 200-200-20
30 SUSPENSION, ORAL (FINAL DOSE FORM) ORAL ONCE
Status: COMPLETED | OUTPATIENT
Start: 2023-05-03 | End: 2023-05-03

## 2023-05-03 RX ORDER — HEPARIN SODIUM 5000 [USP'U]/ML
5000 INJECTION, SOLUTION INTRAVENOUS; SUBCUTANEOUS EVERY 12 HOURS
Status: DISCONTINUED | OUTPATIENT
Start: 2023-05-03 | End: 2023-05-05 | Stop reason: HOSPADM

## 2023-05-03 RX ORDER — LIDOCAINE HYDROCHLORIDE 20 MG/ML
15 SOLUTION OROPHARYNGEAL ONCE
Status: COMPLETED | OUTPATIENT
Start: 2023-05-04 | End: 2023-05-03

## 2023-05-03 RX ORDER — POLYETHYLENE GLYCOL 3350 17 G/17G
17 POWDER, FOR SOLUTION ORAL DAILY
Status: DISCONTINUED | OUTPATIENT
Start: 2023-05-05 | End: 2023-05-05 | Stop reason: HOSPADM

## 2023-05-03 RX ADMIN — MIDODRINE HYDROCHLORIDE 15 MG: 5 TABLET ORAL at 12:05

## 2023-05-03 RX ADMIN — SODIUM CHLORIDE, PRESERVATIVE FREE 10 ML: 5 INJECTION INTRAVENOUS at 12:05

## 2023-05-03 RX ADMIN — BICTEGRAVIR SODIUM, EMTRICITABINE, AND TENOFOVIR ALAFENAMIDE FUMARATE 1 TABLET: 50; 200; 25 TABLET ORAL at 09:05

## 2023-05-03 RX ADMIN — POLYETHYLENE GLYCOL 3350 17 G: 17 POWDER, FOR SOLUTION ORAL at 08:05

## 2023-05-03 RX ADMIN — POTASSIUM BICARBONATE 10 MEQ: 391 TABLET, EFFERVESCENT ORAL at 08:05

## 2023-05-03 RX ADMIN — ALUMINUM HYDROXIDE, MAGNESIUM HYDROXIDE, AND DIMETHICONE 30 ML: 200; 20; 200 SUSPENSION ORAL at 08:05

## 2023-05-03 RX ADMIN — MIDODRINE HYDROCHLORIDE 15 MG: 5 TABLET ORAL at 04:05

## 2023-05-03 RX ADMIN — MIDODRINE HYDROCHLORIDE 15 MG: 5 TABLET ORAL at 08:05

## 2023-05-03 RX ADMIN — SODIUM CHLORIDE, PRESERVATIVE FREE 10 ML: 5 INJECTION INTRAVENOUS at 06:05

## 2023-05-03 RX ADMIN — SODIUM BICARBONATE 650 MG TABLET 1300 MG: at 09:05

## 2023-05-03 RX ADMIN — MELATONIN TAB 3 MG 6 MG: 3 TAB at 08:05

## 2023-05-03 RX ADMIN — NICOTINE 1 PATCH: 14 PATCH, EXTENDED RELEASE TRANSDERMAL at 09:05

## 2023-05-03 RX ADMIN — LIDOCAINE HYDROCHLORIDE 15 ML: 20 SOLUTION ORAL at 11:05

## 2023-05-03 RX ADMIN — ATORVASTATIN CALCIUM 40 MG: 40 TABLET, FILM COATED ORAL at 08:05

## 2023-05-03 RX ADMIN — ALUMINUM HYDROXIDE, MAGNESIUM HYDROXIDE, AND DIMETHICONE 30 ML: 200; 20; 200 SUSPENSION ORAL at 11:05

## 2023-05-03 RX ADMIN — ASPIRIN 81 MG: 81 TABLET, COATED ORAL at 09:05

## 2023-05-03 RX ADMIN — ATOVAQUONE 1500 MG: 750 SUSPENSION ORAL at 09:05

## 2023-05-03 RX ADMIN — LIDOCAINE HYDROCHLORIDE 15 ML: 20 SOLUTION ORAL at 08:05

## 2023-05-03 RX ADMIN — POLYETHYLENE GLYCOL 3350 17 G: 17 POWDER, FOR SOLUTION ORAL at 09:05

## 2023-05-03 RX ADMIN — BUMETANIDE 2 MG: 0.25 INJECTION, SOLUTION INTRAMUSCULAR; INTRAVENOUS at 08:05

## 2023-05-03 RX ADMIN — SODIUM BICARBONATE 650 MG TABLET 1300 MG: at 08:05

## 2023-05-03 RX ADMIN — HEPARIN SODIUM 5000 UNITS: 5000 INJECTION, SOLUTION INTRAVENOUS; SUBCUTANEOUS at 08:05

## 2023-05-03 RX ADMIN — DOBUTAMINE IN DEXTROSE 5 MCG/KG/MIN: 400 INJECTION, SOLUTION INTRAVENOUS at 02:05

## 2023-05-03 NOTE — CONSULTS
Call placed to Team 1, spoke with Dr. Campbell who expressed her concerns regarding patient transfer and dc plan; patient attempted to leave AMA yesterday, but he has a dialysis line and is on IV medications; she requests an update from [facility]; will call  to follow up.

## 2023-05-03 NOTE — PT/OT/SLP PROGRESS
Physical Therapy Treatment    Patient Name:  Jason Perdue   MRN:  [id number]    Recommendations     Discharge Recommendations:  nursing facility, skilled, other (see comments) (acute care in [place] for [place]?)   Discharge Equipment Recommendations: none   Barriers to discharge: severity of deficits and medical diagnosis    Assessment     Jason Perdue is a 63 y.o. male admitted with a medical diagnosis of <principal problem not specified>.    He presents with the following impairments/functional limitations:  weakness, impaired cardiopulmonary response to activity, impaired endurance, impaired self care skills, impaired functional mobility, gait instability, decreased lower extremity function.    Rehab Prognosis: Fair.    Patient would benefit from continued skilled acute PT services to: address above listed impairments/functional limitations; receive patient/caregiver education; reduce fall risk; and maximize independency/safety with functional mobility.    Recent Surgery: Procedure(s) (LRB):  Insertion, Catheter, Central Venous, Hemodialysis (N/A)      Plan     During this hospitalization, patient to be seen  (3-5xwk) to address the identified impairments/functional limitations via gait training, therapeutic activities and progress toward the established goals.    Plan of Care Expires:  05/24/23    Subjective     Communicated with patient's nurse Raygoza prior to session.    Patient agreeable to participate in treatment session.    Chief Complaint: being tired post dialysis  Patient/Family Comments/goals: get better  Pain/Comfort:  Pain Rating 1: 0/10  Pain Rating Post-Intervention 1: 0/10    Objective     Patient found sitting in chair with telemetry, peripheral IV  upon PT entry to room.    General Precautions: Standard, fall   Orthopedic Precautions:N/A   Braces:    Respiratory Status: 2 liters/min O2 via nasal cannula    Functional Mobility:    Bed Mobility:  Sit to Supine: modified independence    Transfers:  Sit to  Stand: supervision with no assistive device    Gait:  Patient ambulated 30 ft x 2 with rolling walker and stand by assistance.  Patient demonstrates no loss of balance and decreased sylvain. Talked to nurse.    Other Mobility:  not assessed    Patient left supine in bed with call bell in reach and lines intact.    Goals     Multidisciplinary Problems       Physical Therapy Goals          Problem: Physical Therapy    Goal Priority Disciplines Outcome Goal Variances Interventions   Physical Therapy Goal     PT, PT/OT Ongoing, Progressing     Description: Goals to be met by: dc     Patient will increase functional independence with mobility by performin. Sit to stand transfer with Modified Perkins-ONGOING  2. Gait  x 130 feet with Modified Perkins using Rolling Walker. -ONGOING  Reviewed 5/3/2023                       Time Tracking     PT Received On: 23  PT Start Time: 1408     PT Stop Time: 1432  PT Total Time (min): 24 min     Billable Minutes: Gait Training 24    2023

## 2023-05-03 NOTE — CONSULTS
Call received from Joanna Palacios RN UM Reviewer for patient's insurance; informed of the issues that we are having related to obtaining a life vest for patient; explained his medical history, including the reasons why he can't get an internal defibrillator or a heart transplant.      Requested assistance with the Appeal ([id number]) for the Life Vest; Ms. Marshall stated she will speak with her  and call me back.

## 2023-05-03 NOTE — PT/OT/SLP RE-EVAL
Occupational Therapy   Re-evaluation and treatment note    Name: Jason Perdue  MRN: [id number]  Admitting Diagnosis:    Patient Active Problem List   Diagnosis    Diffuse large B-cell lymphoma    HIV (human immunodeficiency virus infection)    Hepatitis B    B12 deficiency    Polysubstance abuse    Severe aortic stenosis    HFrEF (heart failure with reduced ejection fraction)    A-fib    Dyspnea    Community acquired pneumonia of right lower lobe of lung    Acute on chronic congestive heart failure    Tobacco user    CAD (coronary artery disease)    HLD (hyperlipidemia)    Anxiety    Cocaine abuse    HOLLIS (acute kidney injury)    Congestive heart failure    Anasarca    Metabolic acidosis    NSTEMI (non-ST elevated myocardial infarction)      Recent Surgery: Procedure(s) (LRB):  Insertion, Catheter, Central Venous, Hemodialysis (N/A)      Recommendations:     Discharge Recommendations: nursing facility, skilled, other (see comments) (vs acute care hospital for sx ?)  Discharge Equipment Recommendations: bedside commode  Barriers to discharge:  Decreased caregiver support, Other (Comment) (severity of deficits, fall risk, endurance, medical condition at this time)    Assessment:     Jason Perdue is a 63 y.o. male with a medical diagnosis of see above.  He presents with functional decline.  Performance deficits affecting function are weakness, impaired endurance, impaired self care skills, impaired functional mobility, gait instability, impaired balance, decreased lower extremity function, impaired cardiopulmonary response to activity, edema.      Pt cooperative during session. Increased fatigue with min activity impacting performance in self care and mobility tasks     Rehab Prognosis:  Fair ; patient would benefit from acute skilled OT services to address these deficits and reach maximum level of function.       Plan:     Patient to be seen  (M-F 2-5 times per week) to address the above listed problems via  self-care/home management, therapeutic activities, therapeutic exercises  Plan of Care Expires:  (discharge)  Plan of Care Reviewed with: patient    Subjective     Chief Complaint: SOB with min activity  Patient/Family stated goals: get stronger and medical care I need  Communicated with: Nurse Raygoza prior to session.  Pain/Comfort:  Pain Rating 1: 0/10  Pain Rating Post-Intervention 1: 0/10    Objective:     Communicated with: Nurse Raygoza prior to session.  Patient found HOB elevated with: telemetry, peripheral IV, oxygen upon OT entry to room.    General Precautions: Standard, fall  Orthopedic Precautions: N/A  Braces: N/A  Respiratory Status: Nasal cannula, flow 2 L/min    Occupational Performance:    Bed Mobility:    Patient completed Supine to Sit with stand by assistance    Functional Mobility/Transfers:  Patient completed Sit <> Stand Transfer with minimum assistance  with  rolling walker x 3 trials during self care tasks   Patient completed Toilet Transfer Stand Pivot technique with contact guard assistance with  rolling walker  Patient completed stand pivot transfer with RW BSC to bedside chair with CGA       Activities of Daily Living:  Grooming: stand by assistance and   seated EOB   Bathing: moderate assistance UB and mod A LB for sponge bath  Upper Body Dressing: stand by assistance don and doff pullover  Lower Body Dressing: moderate assistance pants and max A slippers; no socks at this time due to edema B  ankles  and feet   Toileting: minimum assistance from BSC     Cognitive/Visual Perceptual:  Cognitive/Psychosocial Skills:     -       Oriented to: Person, Place, Time, and Situation   -       Follows Commands/attention:Follows multistep  commands  -       Safety awareness/insight to disability: Fair     Physical Exam:  Edema:  Moderate B LE  Dominant hand: -       right  Upper Extremity Range of Motion:     -       Right Upper Extremity: WFL  -       Left Upper Extremity: WFL  Upper Extremity  Strength:    -       Right Upper Extremity: 3+/5 proximal and 4/5 distal  -       Left Upper Extremity: 3+/5 proximal and 4/5 distal   Strength: -       Right Upper Extremity: WFL  -       Left Upper Extremity: WFL  Fine Motor Coordination:    -       Intact  Left hand thumb/finger opposition skills, Right hand thumb/finger opposition skills, Left hand, manipulation of objects, and Right hand, manipulation of objects      Treatment & Education:  Ongoing education on modified techniques during self care tasks .    Patient left up in chair with all lines intact, call button in reach, and nurse Kalyani  notified    GOALS:   Multidisciplinary Problems       Occupational Therapy Goals          Problem: Occupational Therapy    Goal Priority Disciplines Outcome Interventions   Occupational Therapy Goal     OT, PT/OT Ongoing, Progressing    Description: Goals to be met by: discharge     Patient will increase functional independence with ADLs by performing:    LE Dressing with Modified [place]- revised 5/3/23 LE dressing with min A .    Grooming while standing at sink with Modified [place]- discontinued 5/3/23.    Toileting from toilet with Modified [place] for hygiene and clothing management.- revised 5/3/23 toileting from toilet with SBA for hygiene and clothing mgt.      Toilet transfer to toilet with Modified [place] - revised 5/3/23  stand pivot Transfer to C with RW and with supervision .                         History:     Past Medical History:   Diagnosis Date    Cancer     CHF (congestive heart failure)     Human immunodeficiency virus (HIV) disease     Hypertension        No past surgical history on file.    Time Tracking:     OT Date of Treatment: 05/03/23  OT Start Time: 1305  OT Stop Time: 1330  OT Total Time (min): 25 min    Billable Minutes:Re-eval 15 min  Self Care/Home Management 10 min    5/3/2023

## 2023-05-03 NOTE — PROGRESS NOTES
"[facility]  Nephrology Progress Note  Patient Name: Jason Perdue  Age: 63 y.o.  : 1960  MRN: [id number]  Admission Date: 2023    Chief complaint: Abdominal Pain (Pt c/o midline upper abdominal pain and sob when ambulating. Hx of chf. Brought in by ems.b/p low in route. Pt reports pain to abdomen 8/10. Reports onset after large bm on this morning.)    Hospital course  Jason Perdue is a 63 y.o. Black or  male with past medical history of HIV, hepatitis-B, heart failure with severely reduced ejection fraction, chronic kidney disease, and polysubstance abuse.  Patient was admitted on 2023 with complaints of lower extremity edema, decreased urination, and shortness of breath.  He did not respond to diuretic therapy and was initiated on hemodialysis on 2023.  Nephrology is continuing to follow for assistance with management of HOLLIS.      Subjective  Patient is resting in bed, appears in no acute distress.      Review of Systems  Cardiovascular:  Negative for chest pain   Respiratory:  Reports improvement in shortness of breath.    Objective  BP 91/63   Pulse 102   Temp 97.6 °F (36.4 °C) (Oral)   Resp 17   Ht 5' 11" (1.803 m)   Wt 100.6 kg (221 lb 12.5 oz)   SpO2 99%   BMI 30.93 kg/m²     Intake/Output Summary (Last 24 hours) at 5/3/2023 0834  Last data filed at 5/3/2023 0420  Gross per 24 hour   Intake --   Output 500 ml   Net -500 ml         Physical Exam  General appearance: Patient is in no acute distress.  Skin: No rashes or wounds.  HEENT: PERRLA, EOMI, no scleral icterus, no JVD. Neck is supple.  Chest: Respirations are unlabored. Lungs sounds are diminished to auscultation.   Heart: S1, S2.   Abdomen:  Obese, protuberant, nontender, soft, bowel sounds audible to all quadrants.  : Deferred.  Extremities: BLE pitting 2+ edema, peripheral pulses are palpable.  Right femoral dialysis catheter is in place  Neuro: No focal deficits. "     Medications    Current Facility-Administered Medications:     acetaminophen tablet 650 mg, 650 mg, Oral, Q6H PRN, Dileep Fonseca MD, 650 mg at 05/01/23 2257    apixaban tablet 5 mg, 5 mg, Oral, BID, Kike Chadwick MD, 5 mg at 05/02/23 2054    aspirin EC tablet 81 mg, 81 mg, Oral, Daily, Kike Chadwick MD, 81 mg at 05/02/23 0815    atorvastatin tablet 40 mg, 40 mg, Oral, QHS, Kike Chadwick MD, 40 mg at 05/02/23 2054    atovaquone 750 mg/5 mL oral liquid 1,500 mg, 1,500 mg, Oral, Daily, Kike Chadwick MD, 1,500 mg at 05/02/23 0813    uzwwvanze-bcohgtcq-tvwzjit ala -25 mg (25 kg or greater) 1 tablet, 1 tablet, Oral, Daily, Kike Chadwick MD, 1 tablet at 05/02/23 0816    bumetanide injection 2 mg, 2 mg, Intravenous, Q12H, Silvina Forbes MD, 2 mg at 05/02/23 2008    dextrose 10% bolus 125 mL 125 mL, 12.5 g, Intravenous, PRN, Kike Chadwick MD    dextrose 10% bolus 250 mL 250 mL, 25 g, Intravenous, PRN, Kike Chadwick MD    dextrose 40 % gel 15,000 mg, 15 g, Oral, PRN, Kike Chadwick MD    dextrose 40 % gel 30,000 mg, 30 g, Oral, PRN, Kike Chadwick MD    DOBUtamine 1000 mg in D5W 250 mL infusion, 5 mcg/kg/min, Intravenous, Continuous, Lizette Beal MD, Last Rate: 7.6 mL/hr at 05/02/23 0331, 5 mcg/kg/min at 05/02/23 0331    glucagon (human recombinant) injection 1 mg, 1 mg, Intramuscular, PRN, Kike Chadwick MD    melatonin tablet 6 mg, 6 mg, Oral, Nightly PRN, Kike Chadwick MD, 6 mg at 05/02/23 2054    methyl salicylate-menthol 15-10% cream, , Topical (Top), TID PRN, Terry Barksdale MD, Given at 04/27/23 2113    midodrine tablet 15 mg, 15 mg, Oral, TID WM, Unique Lozano MD, 15 mg at 05/03/23 0812    naloxone 0.4 mg/mL injection 0.02 mg, 0.02 mg, Intravenous, PRN, Kike Chadwick MD    nicotine 14 mg/24 hr 1 patch, 1 patch, Transdermal, Daily, Kike Chadwick MD, 1 patch at 05/02/23 0812    sodium bicarbonate tablet 1,300 mg, 1,300 mg, Oral, BID, Silvina Forbes MD, 1,300 mg at 05/02/23  2054    sodium chloride 0.9% bolus 250 mL 250 mL, 250 mL, Intravenous, PRN, Silvina Forbes MD    sodium chloride 0.9% flush 10 mL, 10 mL, Intravenous, Q12H PRN, Kike Chadwick MD    sodium chloride 0.9% flush 10 mL, 10 mL, Intravenous, PRN, Kike Chadwick MD    Flushing PICC Protocol, , , Until Discontinued **AND** sodium chloride 0.9% flush 10 mL, 10 mL, Intravenous, Q6H, 10 mL at 05/03/23 0600 **AND** sodium chloride 0.9% flush 10 mL, 10 mL, Intravenous, PRN, Dilcia Campbell MD     Imaging:    Reviewed    Laboratory Data:  Hematology  Lab Results   Component Value Date    WBC 5.88 05/03/2023    HGB 8.7 (L) 05/03/2023    HCT 26.4 (L) 05/03/2023     (L) 05/03/2023     (L) 05/03/2023    K 3.9 05/03/2023    CHLORIDE 103 05/03/2023    CO2 20 (L) 05/03/2023    BUN 29.8 (H) 05/03/2023    CREATININE 2.90 (H) 05/03/2023    EGFRNORACEVR 24 05/03/2023    GLUCOSE 103 05/03/2023    CALCIUM 9.9 05/03/2023    ALKPHOS 87 05/03/2023    LABPROT 7.8 (H) 05/03/2023    ALBUMIN 4.5 05/03/2023    BILIDIR 0.3 03/15/2022    IBILI 0.30 03/15/2022    AST 22 05/03/2023    ALT 10 05/03/2023    MG 2.10 05/03/2023    PHOS 3.7 05/03/2023     Lab Results   Component Value Date    IRON 45 (L) 05/01/2023    TIBC 259 05/01/2023    TRANS 235 05/01/2023    LABIRON 17 (L) 05/01/2023    FERRITIN 209.11 05/01/2023     (H) 03/14/2023       Lab Results   Component Value Date    HEPBSURFAG Reactive (A) 04/26/2023    HEPBSAB Nonreactive 04/25/2023         Impression  Acute kidney injury secondary to CRS  Anasarca, improving  Anemia of chronic disease  Metabolic acidosis  Heart failure with severely reduced ejection fraction  Hypotension  Polysubstance abuse   HIV   Hepatitis-B  C-ANCA positive    Plan  There has been no evidence of functional renal recovery as of yet.  Will proceed with hemodialysis today, goal UF 1.5 L as tolerated.  Patient is scheduled for tunneled dialysis catheter placement on 05/05/2023.  Proteinuria evaluation  so far revealed negative JUAN, negative SPEP, positive c-ANCA. PR3 and MPO Ab titers have been ordered, results are pending.      Radha Hall NP  [facility]   5/3/2023    Addendum:  No signs of renal function recovery.patient has cardiorenal syndrome. Hemodialysis for uremic toxin cleLrance and ultrafiltration as tolerated. Spoke to Dr Mcbride  (who discussed case with Dr wilkins) and patient scheduled for tunneled HD catheter placement on Friday.    Silvina Forbes M.D.  Nephrology

## 2023-05-03 NOTE — CONSULTS
Call from Jaleel with patient's insurance; explained issues with authorization for [employer] Vest; she reviewed the notes and stated the Appeal was upheld but she would speak with her superior to review; also asked if patient has a  with the insurance who could help facilitate DC plans; she said that he did have one but she will check to see if she is still following; await return call.

## 2023-05-03 NOTE — PROGRESS NOTES
05/03/23 0919   Missed Time Reason   Missed Treatment Reason Dialysis     Will attempt again as schedule allows

## 2023-05-03 NOTE — PLAN OF CARE
Informed by Idalmis Rosas, infection preventionist, Dr. Campbell would like a meeting with pt's providers and CM tiesha (Cadence Mckeon) to discuss pt's options. Spoke with Cadence regarding meeting.

## 2023-05-03 NOTE — CONSULTS
10:27 spoke with  and discussed AMA guidelines, along with setting up a meeting between cardiology, nephrology, IM and legal; advised to contact legal to check availability and recommendations.    10:38 call placed to [facility], spoke with Cindy to review why the transfer did not go through and she recommended to re-enter the order for transfer and they would review; informed Dr. Campbell and they entered the order.    11:25 call from Cindy, after reviewing the cardiology note, which states that not a candidate for any implantable device, she is not sure who to contact or what [facility] can do for the patient; she recommended that we speak with cardiology to find out why and if he agrees, have him update his note.    11:38 call placed to Dr. Campbell and requested that she speak with Dr. Barakat to discuss the potential interventions for this patient to determine next steps; she stated she will have the resident speak with him and let me know. Next steps would be to hold a meeting and include legal to discuss.

## 2023-05-03 NOTE — NURSING
05/03/23 1155        Hemodialysis Catheter 04/26/23 1827 right femoral   Placement Date/Time: 04/26/23 1827   Present Prior to Hospital Arrival?: No  Location: right femoral   Line Necessity Review CRRT/HD   Site Assessment No redness;No swelling;No warmth   Line Securement Device Secured with sutures   Dressing Type Central line dressing   Dressing Status Clean;Dry;Intact   Dressing Intervention Sterile dressing change   Date on Dressing 05/03/23   Dressing Due to be Changed 05/10/23   Venous Patency/Care flushed w/o difficulty;blood return present;intermittent infusion cap applied;normal saline locked   Arterial Patency/Care flushed w/o difficulty;blood return present;intermittent infusion cap applied;normal saline locked   Post-Hemodialysis Assessment   Blood Volume Processed (Liters) 56.7 L   Dialyzer Clearance Lightly streaked   Duration of Treatment 180 minutes   Total UF (mL) 222 mL   Patient Response to Treatment PT responded to tx OK. PT usually hypotensive. CONNER Hall gave the order to stop the UF if SBP<85. At 0945, PTs bp was 82/65, asymptomatic and UF was turned off at 0953.   Post-Hemodialysis Comments 3hr tx, net removed 222mL. UF turned off 45mins into tx d/t SBP <85; PT asymptomatic. No complaints or distress related to tx. Post tx VS at 1207: BP-100/70, HR-114, temp-97.4 (axillary), resp-20.

## 2023-05-03 NOTE — PT/OT/SLP PROGRESS
Physical Therapy    Missed Treatment Session    Patient Name:  Jason Perdue   MRN:  [id number]      Patient not seen at this time secondary to Dialysis.  Will follow-up as patient is available to participate and as therapists' schedule allows.

## 2023-05-03 NOTE — CARE UPDATE
Spoke with transfer center, transfer order in    Requesting transfer to facility with higher level of care for patient with Dx of HF, EF 10-15%  Requiring dobutamine and midodrine for pressure support, likely needs implantable device which this facility cannot provide     Upper level call phone     KAREN Aguilar MD [facility]

## 2023-05-03 NOTE — PROGRESS NOTES
[facility]  Progress Note      Patient Name: Jason Perdue  : 1960  MRN: [id number]  Patient Class: IP- Inpatient   Admission Date: 2023   Length of Stay: 8  Admitting Service: Hospital Medicine  Attending Physician: Dilcia Campbell MD  PCP: SAVAGE Reddy    CHIEF COMPLAINT     Chief Complaint   Patient presents with    Abdominal Pain     Pt c/o midline upper abdominal pain and sob when ambulating. Hx of chf. Brought in by ems.b/p low in route. Pt reports pain to abdomen 10. Reports onset after large bm on this morning.       HOSPITAL COURSE   HPI:    62yo AAM PMH HIV, HFrEF [EF 15%, 2023 without AICD or LifeVest], AFib on Eliquis, severe aortic stenosis, HTN, HLD, B-cell lymphoma, polysubstance use. Broughtin by ambulance by EMS 2023 C/C progressive shortness of breath onset 3d previous. Associated LOVING, orthopnea, lower extremity edema and abdominal swelling, 1 episode of loose bowel movement this morning with cramping /10 centralized abdominal pain, weakness with ground level fall yesterday without trauma.  Of not, he presented with similar symptoms on 2023 and left AMA after receiving 60 mg IV Lasix.      In the ER, VS significant for hypotension, saturation WNL on RA  Lab significant for stable normocytic anemia, mild metabolic acidosis, HOLLIS, BNP roughly 15,000,  troponin 0.164.  CXR with vascular congestion, RT pleural effusion, slightly improved from previous. Internal Medicine service was consulted and patient admitted for CHF exacerbation and HOLLIS.     2023: Overnight,  patient experienced 9b eat run of Vtach but was aSx. Labs were reviewed w/K 3.9 thus given 20mEq for goal > 4.  Otherwise, patient with no complaints other than SOB with eating which he reports happens every morning. Continues to require midodrine and dobutamine for BP support. Albumin DC at am labs show normalization. Holding HD today  but will continue diuretic  therapy as well as NaHCO3 with the assistance of nephrology. Patient also reported RIGHT leg pain; US DVT RT LE pending, on Eliquis and ASA.     Goals of care discussed previously with patient with discussion of prognosis. Pt declined hospice/palliative care, expressing desire for heart transplant or assistive device.     5/2/2023: Patient reported continued leg pain overnight and was evaluated by night team. LE US DVT RT leg neg 5/1. He has no complaints of leg pain this morning. Continues to need dobutamine drip as well as midodrine. HD 5/1 with removal of 2L, tolerated without issue.     5/3/2023: No acute issues overnight. Complains of intermittent hiccups for the last couple of days. Also states he had some intermittent LEFT sided chest pain, nonradiating, denies diaphoresis, n/v, SOB, wheezing. Subsided spontaneously, did not report to the nursing staff, and has not returned. He reports similar occasional chest pain chronically at home. Denies leg pain but continues to report chronic BL LE which is slightly improved since admission. No abdominal pain but reports chronic intermittent constipation for which he strains but no diarrhea, bloody stools. Nephrology plans to dialyze today. Surgery has plans to take the patient to OR 5/5 to remove RT femoral line and place IJ catheter    OBJECTIVE/PHYSICAL EXAM     VITAL SIGNS (MOST RECENT):  Temp: 98.1 °F (36.7 °C) (05/03/23 0350)  Pulse: 102 (05/03/23 0350)  Resp: 18 (05/02/23 2342)  BP: 92/63 (05/03/23 0350)  SpO2: 99 % (05/03/23 0350)   VITAL SIGNS (24 HOUR RANGE):  Temp:  [97.5 °F (36.4 °C)-98.1 °F (36.7 °C)]   Pulse:  [101-104]   Resp:  [18]   BP: (92-96)/(60-63)   SpO2:  [99 %-100 %]    IV bag hanging with dobutamine     General: no acute distress, hiccups  CVS: regular rhythm but slightly tachycardic, radial pulses 2+ BL, 2+ BL LE pitting edema to distal calves   Resp: distant BS but CTA  GI: normoactive BS, nonTTP  Neuro: awake and  alert    LABS/MICRO/MEDS/DIAGNOSTICS     LABS  CBC  Recent Labs     05/02/23  0457 05/03/23 0418   WBC 5.42 5.88   RBC 3.19* 3.25*   HGB 8.5* 8.7*   HCT 25.9* 26.4*   MCV 81.2 81.2   MCH 26.6* 26.8*   MCHC 32.8* 33.0   RDW 19.9* 20.5*   * 127*       Anemia  Recent Labs     05/01/23  0344   FERRITIN 209.11   IRON 45*   TIBC 259       Coags  No results for input(s): INR, APTT, D-DIMER in the last 72 hours.  Cardiac  No results for input(s): BNP, CPK, TROPONINI in the last 72 hours.    ABG/Lactate  Recent Labs     05/02/23 0114   LACTIC 1.4         BMP  Recent Labs     05/02/23 0114 05/03/23 0418   * 135*   K 4.7 3.9   CHLORIDE 105 103   CO2 17* 20*   BUN 20.4 29.8*   CREATININE 2.23* 2.90*   GLUCOSE 102 103   CALCIUM 10.1* 9.9   MG 2.30 2.10   PHOS 3.2 3.7       LFTs  Recent Labs     05/02/23  0114 05/03/23 0418   ALBUMIN 4.8 4.5   GLOBULIN 3.5 3.3   ALKPHOS 85 87   ALT 10 10   AST 38* 22   BILITOT 4.1* 4.2*       Inflammatory Markers  No results for input(s): CRP, LDH, ESR in the last 72 hours.  Lipid  No results for input(s): CHOL, TRIG, LDL, VLDL, HDL in the last 72 hours.  Diabetes  Recent Labs     05/01/23 0344 05/02/23 0114 05/03/23 0418   GLUCOSE 85 102 103       Thyroid  Recent Labs     05/01/23  0344   TSH 1.009          INTAKE/OUTPUT    Intake/Output Summary (Last 24 hours) at 5/3/2023 0641  Last data filed at 5/3/2023 0420  Gross per 24 hour   Intake --   Output 500 ml   Net -500 ml          MICROBIOLOGY  Microbiology Results (last 7 days)       Procedure Component Value Units Date/Time    Urine culture [[id number]]  (Abnormal) Collected: 04/25/23 1248    Order Status: Completed Specimen: Urine Updated: 04/27/23 1316     Urine Culture Less than 10,000 colonies/ml Proteus mirabilis     Comment: [place] counts <10,000/ml are of questionable significance and may or may not indicate contamination.  Therefore organisms are identified only.  If further workup is desired please notify  Microbiology                 MEDICATIONS   apixaban  5 mg Oral BID    aspirin  81 mg Oral Daily    atorvastatin  40 mg Oral QHS    atovaquone  1,500 mg Oral Daily    hcrxvosrm-dqmvvjom-bffyqnm ala  1 tablet Oral Daily    bumetanide  2 mg Intravenous Q12H    midodrine  15 mg Oral TID WM    nicotine  1 patch Transdermal Daily    potassium bicarbonate  10 mEq Oral Once    sodium bicarbonate  1,300 mg Oral BID    sodium chloride 0.9%  10 mL Intravenous Q6H         INFUSIONS   DOBUTamine IV infusion (non-titrating) 5 mcg/kg/min (05/02/23 0331)          DIAGNOSTIC TESTS  X-Ray Knee 1 or 2 View Right   Final Result      1. No evidence of acute injury to the right knee.         Electronically signed by: Francis Farrell MD   Date:    04/30/2023   Time:    15:54      CT Abdomen Pelvis  Without Contrast   Final Result   Impression:      1. There is extensive stranding of the subcutaneous fat as well as the intraabdominal and intrapelvic fat suggesting an element of anasarca edema of uncertain etiology. Correlate with clinical and laboratory findings.      2. Again noted is a large right pleural effusion with compressive atelectasis of the right lower lobe. This is not significantly changed since the prior examination. There is interval decrease in the size of the left lower lobe pulmonary nodule, which now measures at 3 mm (series 4 image 13) with interval resolution of surrounding ground-glass opacity.      3. No acute intraabdominal or pelvic solid organ or bowel pathology identified. Details and other findings as discussed above      There is general concurrence with the preliminary report above.  Additional finding of some urinary bladder wall thickening and mild inflammatory changes associated with the urinary bladder are appreciated..  Cystitis should be excluded.      The         Electronically signed by: Sanjana Otero   Date:    04/25/2023   Time:    10:05      X-Ray Chest AP Portable   ED Interpretation    Prominent interstitial markings.  Right pleural effusion persist appears slightly improved from previous chest x-ray.      Final Result      Cardiomegaly.      Right-sided pleural effusion with compressive atelectatic changes and consolidative changes at the right base similar to previous exam.      Increase interstitial markings         Electronically signed by: Jordan Merlos   Date:    04/25/2023   Time:    08:53      RADIOLOGY REPORT   Final Result           EF   Date Value Ref Range Status   04/13/2023 15 % Final   01/24/2023 12 % Final          ASSESSMENT/PLAN     Acute decompensated heart failure with hx of HFrEF with EF 15% (No AICD)  Abdominal anasarca  Pleural effusion  - BNP approx 15,000 on admission,  baseline 11,126  - echo 04/2023: EF 15%, mild eccentric hypertrophy, severe systolic dysfunction, grade 3 LVDD, moderate to severe AS, moderate MR, moderate TR, pHTN  - continue diuresis,  nephrology following with last HD 5/1 output of 2L and plans to dialyze today  - Surg plans to remove RT femoral line and replace with IJ for HD on 5/5  - strict I&Os,  daily weights, elevate head of bed, fluid restriction low-sodium diet  - CM consulted for evaluation for lifevest, pt insurance does not have coverage for this; CM has again reached out regarding life vest   - cardiology consulted, pending further recs       Hypotension   -currently on dobutamine drip, midodrine 15 tid  -holding home Entresto and Toprol   -per cardiology, long term dobutamine not recommended due to possible arrhythmias  - course is complicated and he would likely benefit from a more specialized advanced heart failure service which we cannot offer at this facility.    -Attempted to transfer patient in setting of decompensated heart failure requiring dobutamine and midodrine now w/ cardiorenal syndrome with minimal urine output refractory to diuretic therapy requiring HD and ultrafiltration, however due to h/o substance abuse patient  has been denied    HOLLIS w/ suspected cardiorenal syndrome  -BUN/Cr initially 46.7/2.28 on admit, continues to be elevated 5/3 30/2.9 with baseline 33.1/1.36   - plans for HD per nephrology  - continue to monitor renal indices  - avoid nephrotoxic medications     NSTEMI type 2  - Troponin 0.164 on admit, likely NSTEMI type 2 secondary to volume overload in setting CHFe  -troponin down-trended  -no significant EKG changes from previous  -cardiology consulted, pending recs         Right-sided pleural effusion  - Stable in comparison to previous admissions  - satting well on 2L NC  - continue to monitor      HIV  - Continue home Biktarvy and prophylactic Atovaquone dosing     Polysubstance use  Tobacco use  - UDS positive for cocaine  - extensive discussions with patient regarding substance abuse, high likelihood of severe consequences including death.     Access: pIV  Antibiotics: none  Diet: low salt  DVT Prophylaxis: Eliquis  GI Prophylaxis: none  Fluids: none    Outpatient dialysis not an option if pt needs albumin drip during sessions. BP remains difficult to increase with dobutamine and midodrone, heart function remains poor. Poor candidate for heart transplant given history of substance abuse.   He would benefit from advance heart failure specialty services as our options are becoming limited at this facility.      KAREN Aguilar MD HOIII  [facility]

## 2023-05-04 LAB
ALBUMIN SERPL-MCNC: 4.4 G/DL (ref 3.4–4.8)
ALBUMIN/GLOB SERPL: 1.3 RATIO (ref 1.1–2)
ALP SERPL-CCNC: 86 UNIT/L (ref 40–150)
ALT SERPL-CCNC: 10 UNIT/L (ref 0–55)
AST SERPL-CCNC: 26 UNIT/L (ref 5–34)
BASOPHILS # BLD AUTO: 0.02 X10(3)/MCL
BASOPHILS NFR BLD AUTO: 0.4 %
BILIRUBIN DIRECT+TOT PNL SERPL-MCNC: 4.9 MG/DL
BUN SERPL-MCNC: 22.8 MG/DL (ref 8.4–25.7)
CALCIUM SERPL-MCNC: 9.5 MG/DL (ref 8.8–10)
CHLORIDE SERPL-SCNC: 99 MMOL/L (ref 98–107)
CO2 SERPL-SCNC: 21 MMOL/L (ref 23–31)
CREAT SERPL-MCNC: 2.88 MG/DL (ref 0.73–1.18)
EOSINOPHIL # BLD AUTO: 0.03 X10(3)/MCL (ref 0–0.9)
EOSINOPHIL NFR BLD AUTO: 0.5 %
ERYTHROCYTE [DISTWIDTH] IN BLOOD BY AUTOMATED COUNT: 21.2 % (ref 11.5–17)
GFR SERPLBLD CREATININE-BSD FMLA CKD-EPI: 24 MLS/MIN/1.73/M2
GLOBULIN SER-MCNC: 3.4 GM/DL (ref 2.4–3.5)
GLUCOSE SERPL-MCNC: 93 MG/DL (ref 82–115)
HCT VFR BLD AUTO: 27.5 % (ref 42–52)
HGB BLD-MCNC: 8.9 G/DL (ref 14–18)
IMM GRANULOCYTES # BLD AUTO: 0.02 X10(3)/MCL (ref 0–0.04)
IMM GRANULOCYTES NFR BLD AUTO: 0.4 %
LYMPHOCYTES # BLD AUTO: 0.66 X10(3)/MCL (ref 0.6–4.6)
LYMPHOCYTES NFR BLD AUTO: 11.6 %
MAGNESIUM SERPL-MCNC: 2 MG/DL (ref 1.6–2.6)
MCH RBC QN AUTO: 26.8 PG (ref 27–31)
MCHC RBC AUTO-ENTMCNC: 32.4 G/DL (ref 33–36)
MCV RBC AUTO: 82.8 FL (ref 80–94)
MONOCYTES # BLD AUTO: 0.71 X10(3)/MCL (ref 0.1–1.3)
MONOCYTES NFR BLD AUTO: 12.5 %
NEUTROPHILS # BLD AUTO: 4.23 X10(3)/MCL (ref 2.1–9.2)
NEUTROPHILS NFR BLD AUTO: 74.6 %
NRBC BLD AUTO-RTO: 0 %
PHOSPHATE SERPL-MCNC: 3 MG/DL (ref 2.3–4.7)
PLATELET # BLD AUTO: 129 X10(3)/MCL (ref 130–400)
PMV BLD AUTO: 12.4 FL (ref 7.4–10.4)
POTASSIUM SERPL-SCNC: 4.4 MMOL/L (ref 3.5–5.1)
PROT SERPL-MCNC: 7.8 GM/DL (ref 5.8–7.6)
RBC # BLD AUTO: 3.32 X10(6)/MCL (ref 4.7–6.1)
SODIUM SERPL-SCNC: 132 MMOL/L (ref 136–145)
WBC # SPEC AUTO: 5.67 X10(3)/MCL (ref 4.5–11.5)

## 2023-05-04 PROCEDURE — 25000003 PHARM REV CODE 250: Performed by: STUDENT IN AN ORGANIZED HEALTH CARE EDUCATION/TRAINING PROGRAM

## 2023-05-04 PROCEDURE — A4216 STERILE WATER/SALINE, 10 ML: HCPCS | Performed by: INTERNAL MEDICINE

## 2023-05-04 PROCEDURE — 27000221 HC OXYGEN, UP TO 24 HOURS

## 2023-05-04 PROCEDURE — 63600175 PHARM REV CODE 636 W HCPCS

## 2023-05-04 PROCEDURE — 84100 ASSAY OF PHOSPHORUS: CPT | Performed by: STUDENT IN AN ORGANIZED HEALTH CARE EDUCATION/TRAINING PROGRAM

## 2023-05-04 PROCEDURE — 80053 COMPREHEN METABOLIC PANEL: CPT | Performed by: STUDENT IN AN ORGANIZED HEALTH CARE EDUCATION/TRAINING PROGRAM

## 2023-05-04 PROCEDURE — 25000003 PHARM REV CODE 250: Performed by: INTERNAL MEDICINE

## 2023-05-04 PROCEDURE — 83735 ASSAY OF MAGNESIUM: CPT | Performed by: STUDENT IN AN ORGANIZED HEALTH CARE EDUCATION/TRAINING PROGRAM

## 2023-05-04 PROCEDURE — S4991 NICOTINE PATCH NONLEGEND: HCPCS | Performed by: STUDENT IN AN ORGANIZED HEALTH CARE EDUCATION/TRAINING PROGRAM

## 2023-05-04 PROCEDURE — 11000001 HC ACUTE MED/SURG PRIVATE ROOM

## 2023-05-04 PROCEDURE — 25000003 PHARM REV CODE 250

## 2023-05-04 PROCEDURE — 85025 COMPLETE CBC W/AUTO DIFF WBC: CPT | Performed by: STUDENT IN AN ORGANIZED HEALTH CARE EDUCATION/TRAINING PROGRAM

## 2023-05-04 PROCEDURE — 94761 N-INVAS EAR/PLS OXIMETRY MLT: CPT

## 2023-05-04 RX ADMIN — ATOVAQUONE 1500 MG: 750 SUSPENSION ORAL at 08:05

## 2023-05-04 RX ADMIN — HEPARIN SODIUM 5000 UNITS: 5000 INJECTION, SOLUTION INTRAVENOUS; SUBCUTANEOUS at 08:05

## 2023-05-04 RX ADMIN — SODIUM CHLORIDE, PRESERVATIVE FREE 10 ML: 5 INJECTION INTRAVENOUS at 12:05

## 2023-05-04 RX ADMIN — SODIUM BICARBONATE 650 MG TABLET 1300 MG: at 08:05

## 2023-05-04 RX ADMIN — POLYETHYLENE GLYCOL 3350 17 G: 17 POWDER, FOR SOLUTION ORAL at 08:05

## 2023-05-04 RX ADMIN — SODIUM CHLORIDE, PRESERVATIVE FREE 10 ML: 5 INJECTION INTRAVENOUS at 07:05

## 2023-05-04 RX ADMIN — BUMETANIDE 2 MG: 0.25 INJECTION, SOLUTION INTRAMUSCULAR; INTRAVENOUS at 10:05

## 2023-05-04 RX ADMIN — ASPIRIN 81 MG: 81 TABLET, COATED ORAL at 08:05

## 2023-05-04 RX ADMIN — BICTEGRAVIR SODIUM, EMTRICITABINE, AND TENOFOVIR ALAFENAMIDE FUMARATE 1 TABLET: 50; 200; 25 TABLET ORAL at 08:05

## 2023-05-04 RX ADMIN — MIDODRINE HYDROCHLORIDE 15 MG: 5 TABLET ORAL at 11:05

## 2023-05-04 RX ADMIN — ACETAMINOPHEN 650 MG: 325 TABLET ORAL at 08:05

## 2023-05-04 RX ADMIN — SODIUM CHLORIDE, PRESERVATIVE FREE 10 ML: 5 INJECTION INTRAVENOUS at 06:05

## 2023-05-04 RX ADMIN — NICOTINE 1 PATCH: 14 PATCH, EXTENDED RELEASE TRANSDERMAL at 08:05

## 2023-05-04 RX ADMIN — ATORVASTATIN CALCIUM 40 MG: 40 TABLET, FILM COATED ORAL at 08:05

## 2023-05-04 RX ADMIN — MIDODRINE HYDROCHLORIDE 15 MG: 5 TABLET ORAL at 06:05

## 2023-05-04 RX ADMIN — MELATONIN TAB 3 MG 6 MG: 3 TAB at 08:05

## 2023-05-04 RX ADMIN — MIDODRINE HYDROCHLORIDE 15 MG: 5 TABLET ORAL at 08:05

## 2023-05-04 RX ADMIN — DOBUTAMINE IN DEXTROSE 5 MCG/KG/MIN: 400 INJECTION, SOLUTION INTRAVENOUS at 08:05

## 2023-05-04 NOTE — PLAN OF CARE
05/04/23 1243   Discharge Reassessment   Assessment Type Discharge Planning Reassessment   Did the patient's condition or plan change since previous assessment? Yes   Discharge Plan discussed with: Patient   Discharge Plan A Hospice/home     New hospice consult, pt has no preference, referral submitted to [facility] via careport.

## 2023-05-04 NOTE — PROGRESS NOTES
Inpatient Nutrition Evaluation    Admit Date: 4/24/2023   Total duration of encounter: 10 days    Nutrition Recommendation/Prescription     Heart Healthy diet  Monitor Weights daily  Novasource Renal (provides 475 kcal, 22 g protein per serving) BID  Discontinue Kyle as no wound as previous reported, skin intact per wound care nurse    Nutrition Assessment     Chart Review    Reason Seen: follow-up    Malnutrition Screening Tool Results   Have you recently lost weight without trying?: No  Have you been eating poorly because of a decreased appetite?: No   MST Score: 0     Diagnosis:  CHF exacerbation, Afib, NSTEMI, HOLLIS, Anasarca, Right sided pleural effusion, HIV, polysubstance abuse    Relevant Medical History: Afib, HIV, HFrEF, Severe Aortic Stenosis, HTN, HLD, Lymphoma, Polysubstance use    Nutrition-Related Medications: ASA, Atorvastatin, Bumex    Nutrition-Related Labs:  4/29/23 --  Glu 95, Na 134 L, K 4.5, BUN 33.6 H, Cr 2.63 H  5/4/23 -- H/H 8.9/27.5 L, Na 132 L, K 4.4, BUN 22.8, Cr 2.8 H, Phos 3    Diet Order: Diet Low Sodium, 2gm  Oral Supplement Order: none  Appetite/Oral Intake: fair/50-75% of meals  Factors Affecting Nutritional Intake: early satiety  Food/Gnosticist/Cultural Preferences: none reported  Food Allergies: none reported    Wound(s): Rt buttock intact per wound care, no open wound or pressure ulcer present    Comments    5/4/23 -- Pt reports fair appetite today, reports feeling full & bloated; good po intake up until 2 days ago; denies n/v; LBM 5/2; willing to try Nov Renal BID to supplement diet; will d/c Kyle as no wound present as previous reported; Pt with 3-4 + edema present, HOLLIS -- Pt now receiving HD, last HD 5/3 -- continue Low Na diet as ordered    4/29/23 -- Consult received for altered skin, pt with wound to rt buttocks, will order Juvn BID, wound care consulted as well; pt continues with good appetite, 100% meal intake documented; BUN/Cr (H) - receiving HD, continue Low Na  "diet    4/25/23 -- Pt reports good appetite this am eating ~75% of breakfast, denies n/v; reports appetite up/down at times d/t early satiety, suggest small frequent meals/snacks, Megace ordered this admit noted; no indication of wt loss per EMR wt hx, pt confirms no wt loss; WT currently elevated related to edema    Anthropometrics    Height: 5' 11" (180.3 cm) Height Method: Stated  Last Weight: 100.6 kg (221 lb 12.5 oz) (05/04/23 0419) Weight Method: Bed Scale  BMI (Calculated): 30.9  BMI Classification: obese grade I (BMI 30-34.9)        Ideal Body Weight (IBW), Male: 172 lb     % Ideal Body Weight, Male (lb): 130.61 %                          Usual Weight Provided By: patient denies unintentional weight loss    Wt Readings from Last 5 Encounters:   05/04/23 100.6 kg (221 lb 12.5 oz)   04/22/23 100 kg (220 lb 7.4 oz)   04/20/23 104.1 kg (229 lb 9.6 oz)   04/14/23 99.3 kg (218 lb 14.4 oz)   04/11/23 101.6 kg (223 lb 15.8 oz)     Weight Change(s) Since Admission:  Admit Weight: 101.9 kg (224 lb 10.4 oz) (04/25/23 0004)  Wt fluctuations since admit related to fluid, + edema, on diuretic, receiving HD  Overall stable       Patient Education    Not applicable.    Monitoring & Evaluation     Dietitian will monitor food and beverage intake, weight change, electrolyte/renal panel, and gastrointestinal profile.  Nutrition Risk/Follow-Up: low (follow-up in 5-7 days)  Patients assigned 'low nutrition risk' status do not qualify for a full nutritional assessment but will be monitored and re-evaluated in a 5-7 day time period. Please consult if re-evaluation needed sooner.   "

## 2023-05-04 NOTE — MEDICAL/APP STUDENT
[facility]  PROGRESS NOTE    Patient Name: Jason Perdue  : 1960   MRN: [id number]  Admission Date: 2023   HD#: 9  Procedure Date: N/A  POD#: * No surgery date entered *    SUBJECTIVE   NAEON, AF, VSS  Reports no pain.  Passing gas, last BM yesterday with soft stools.  Voiding without difficulty.  Tolerating ambulation.  Denies fever, chills, constipation, and diarrhea.    OBJECTIVE   Vitals  Temp:  [97.7 °F (36.5 °C)-97.9 °F (36.6 °C)] 97.8 °F (36.6 °C)  Pulse:  [] 100  Resp:  [18-20] 18  SpO2:  [94 %-100 %] 96 %  BP: (75-95)/(40-72) 78/64    Intake / Output  Intake: No measured intake.  Output: 222 mL HD UF.  Diet: Diet Low Sodium, 2gm  Diet NPO Except for: Sips with Medication    Physical Exam  Vitals reviewed.   Constitutional:       General: He is awake. He is not in acute distress.     Appearance: Normal appearance. He is well-developed.   HENT:      Head: Normocephalic and atraumatic.   Eyes:      Extraocular Movements: Extraocular movements intact.      Conjunctiva/sclera: Conjunctivae normal.   Cardiovascular:      Rate and Rhythm: Normal rate and regular rhythm.      Pulses: Normal pulses.      Comments: Right femoral HD line in pace.  Pulmonary:      Effort: Pulmonary effort is normal. No accessory muscle usage or respiratory distress.   Abdominal:      General: Abdomen is flat. There is no distension.      Palpations: Abdomen is soft. There is no mass.      Tenderness: There is no abdominal tenderness. There is no guarding or rebound.   Skin:     General: Skin is warm and dry.      Findings: No erythema, lesion or rash.   Neurological:      General: No focal deficit present.      Mental Status: He is alert and oriented to person, place, and time.   Psychiatric:         Behavior: Behavior normal. Behavior is cooperative.     Labs:  Recent Labs     23  0114 23  0457 23  0418 23  0435   WBC  --  5.42 5.88 5.67   HGB  --  8.5* 8.7* 8.9*   HCT  --  25.9* 26.4*  27.5*   PLT  --  119* 127* 129*   MCV  --  81.2 81.2 82.8   *  --  135* 132*   K 4.7  --  3.9 4.4   CO2 17*  --  20* 21*   BUN 20.4  --  29.8* 22.8   CREATININE 2.23*  --  2.90* 2.88*   CALCIUM 10.1*  --  9.9 9.5   MG 2.30  --  2.10 2.00   PHOS 3.2  --  3.7 3.0   ALBUMIN 4.8  --  4.5 4.4   BILITOT 4.1*  --  4.2* 4.9*   AST 38*  --  22 26   ALT 10  --  10 10   ALKPHOS 85  --  87 86   LACTIC 1.4  --   --   --         Imaging  No new imaging.    Microbiology  Microbiology Results (last 7 days)       Procedure Component Value Units Date/Time    Urine culture [[id number]]  (Abnormal) Collected: 04/25/23 1248    Order Status: Completed Specimen: Urine Updated: 04/27/23 1316     Urine Culture Less than 10,000 colonies/ml Proteus mirabilis     Comment: [place] counts <10,000/ml are of questionable significance and may or may not indicate contamination.  Therefore organisms are identified only.  If further workup is desired please notify Microbiology              Antibiotics  None    ASSESSMENT   Jason Perdue is a 63 y.o. male with a PMHx of HIV (last CD4 234 1/24/2023 and HIV  3/23/2023), HBV antigen (+), HFrEF, CKD, and polysubstance abuse here for complaints of LE edema, SOB, and decreased urination. General Surgery consulted for assistance with tunneled HD catheter placement.    PLAN   - Scheduled internal jugular tunneled catheter placement for tomorrow, 5/4.  - NPO starting at midnight.  - Continue holding Eliquis.  - Will order pre-op Ancef.    Andrew Luong, MS3  [facility] [employer]  5/4/2023      Tunneled HD catheter tomorrow.    MU Correa MD PGY5

## 2023-05-04 NOTE — PROGRESS NOTES
[facility]    [address] [address]    Phone:  [phone]       Thank You for choosing us for your health care visit. We are glad to serve you and happy to provide you with this summary of your visit. Please help us to ensure we have accurate records. If you find anything that needs to be changed, please let our staff know as soon as possible.          Your Demographic Information     Patient Name Sex Danyel Alxe Male 2002       Ethnic Group Patient Race    Not of  or  Origin White      Your Visit Details     Date & Time Provider Department    2017 4:00 PM Elizabeth Persaud DO [facility]      Your Upcoming Appointment*(Max 10)     2017  9:00 AM CST   Office Visit with Elizabeth Persaud DO   [facility] ([facility])    [address] [facility] [address]   [phone]            2017 10:00 AM CDT   Behavioral Health Extended Follow-Up with Chencho Cruz MD   [facility] ([facility])    [address] [facility] [id number] [phone]              Your To Do List     Follow-Up    Return in about 4 weeks (around 3/8/2017) for acne.      Conditions Discussed Today or Order-Related Diagnoses        Comments    Cystic acne    -  Primary       Your Vitals Were     BP Pulse Height Weight SpO2 BMI    108/64 (25 %/ 45 %)* 82 5' 9.5\" (1.765 m) (94 %, Z= 1.54)† 162 lb (73.5 kg) (95 %, Z= 1.67)† 97% 23.58 kg/m2 (89 %, Z= 1.22)†    Smoking Status                   Never Smoker         *BP percentiles are based on NHBPEP's 4th Report    †Growth percentiles are based on CDC 2-20 Years data.      Medications Prescribed or Re-Ordered Today     minocycline (MINOCIN,DYNACIN) 100 MG capsule    Sig - Route: Take 1 capsule  "Nephrology Progress Note    Hospital course:  63-year-old male with HIV (currently receiving medications for same)  and severe cardiomyopathy congestive heart failure with documented ejection fraction between 10 and 15%.  Patient has cardiorenal syndrome acute renal failure which failed diuretic therapy.  Renal indices began to worsen patient required hemodialysis both for uremic toxin clearance and ultrafiltration.  Patient did not tolerate ultrafiltration well yesterday due to low blood pressure.  There are no indications for acute hemodialysis today.  Patient is scheduled for tunneled hemodialysis catheter tomorrow.    Subjective:   Patient with no complaints today.    Twelve point review of systems:  Patient does have some weakness but appetite is improving per patient does have generalized edema especially lower extremity edema today.  Otherwise negative    Objective:   BP (!) 78/64   Pulse 100   Temp 97.8 °F (36.6 °C) (Oral)   Resp 18   Ht 5' 11" (1.803 m)   Wt 100.6 kg (221 lb 12.5 oz)   SpO2 96%   BMI 30.93 kg/m²     Intake/Output Summary (Last 24 hours) at 5/4/2023 0941  Last data filed at 5/3/2023 1155  Gross per 24 hour   Intake --   Output 222 ml   Net -222 ml        Physical exam:   Vitals noted  .  General:  Patient is alert oriented person place time no acute distress  HEENT normocephalic atraumatic pupils equal round reactive to light  Neck:  Mild JVD no bruit thyromegaly noted  Lungs: Clear to auscultation bilaterally all fields  Cardiovascular regular rate and rhythm no murmur rub gallop appreciated  Abdomen: Positive bowel sounds all 4 quadrants soft nontender nondistended  Extremities:  1+ pitting edema of lower extremities bilaterally  Neurologic:  Cranial nerves 2-12 grossly intact no clonus asterixis appreciated  Laboratory data:   Recent Results (from the past 24 hour(s))   Comprehensive Metabolic Panel    Collection Time: 05/04/23  4:35 AM   Result Value Ref Range    Sodium Level 132 " (L) 136 - 145 mmol/L    Potassium Level 4.4 3.5 - 5.1 mmol/L    Chloride 99 98 - 107 mmol/L    Carbon Dioxide 21 (L) 23 - 31 mmol/L    Glucose Level 93 82 - 115 mg/dL    Blood Urea Nitrogen 22.8 8.4 - 25.7 mg/dL    Creatinine 2.88 (H) 0.73 - 1.18 mg/dL    Calcium Level Total 9.5 8.8 - 10.0 mg/dL    Protein Total 7.8 (H) 5.8 - 7.6 gm/dL    Albumin Level 4.4 3.4 - 4.8 g/dL    Globulin 3.4 2.4 - 3.5 gm/dL    Albumin/Globulin Ratio 1.3 1.1 - 2.0 ratio    Bilirubin Total 4.9 (H) <=1.5 mg/dL    Alkaline Phosphatase 86 40 - 150 unit/L    Alanine Aminotransferase 10 0 - 55 unit/L    Aspartate Aminotransferase 26 5 - 34 unit/L    eGFR 24 mls/min/1.73/m2   Phosphorus    Collection Time: 05/04/23  4:35 AM   Result Value Ref Range    Phosphorus Level 3.0 2.3 - 4.7 mg/dL   Magnesium    Collection Time: 05/04/23  4:35 AM   Result Value Ref Range    Magnesium Level 2.00 1.60 - 2.60 mg/dL   CBC with Differential    Collection Time: 05/04/23  4:35 AM   Result Value Ref Range    WBC 5.67 4.50 - 11.50 x10(3)/mcL    RBC 3.32 (L) 4.70 - 6.10 x10(6)/mcL    Hgb 8.9 (L) 14.0 - 18.0 g/dL    Hct 27.5 (L) 42.0 - 52.0 %    MCV 82.8 80.0 - 94.0 fL    MCH 26.8 (L) 27.0 - 31.0 pg    MCHC 32.4 (L) 33.0 - 36.0 g/dL    RDW 21.2 (H) 11.5 - 17.0 %    Platelet 129 (L) 130 - 400 x10(3)/mcL    MPV 12.4 (H) 7.4 - 10.4 fL    Neut % 74.6 %    Lymph % 11.6 %    Mono % 12.5 %    Eos % 0.5 %    Basophil % 0.4 %    Lymph # 0.66 0.6 - 4.6 x10(3)/mcL    Neut # 4.23 2.1 - 9.2 x10(3)/mcL    Mono # 0.71 0.1 - 1.3 x10(3)/mcL    Eos # 0.03 0 - 0.9 x10(3)/mcL    Baso # 0.02 <=0.2 x10(3)/mcL    IG# 0.02 0 - 0.04 x10(3)/mcL    IG% 0.4 %    NRBC% 0.0 %       Imaging:   Imaging Results              CT Abdomen Pelvis  Without Contrast (Final result)  Result time 04/25/23 10:05:08      Final result by Sanjana Otero MD (04/25/23 10:05:08)                   Impression:    Impression:    1. There is extensive stranding of the subcutaneous fat as well as the  by mouth 2 times daily. - Oral    Class: Eprescribe    Pharmacy: [id number] [address] #: [phone]    Notes to Pharmacy: Please give whichever formulation of minocycline that is best covered by insurance      Your Current Medications Are        Disp Refills Start End    minocycline (MINOCIN,DYNACIN) 100 MG capsule 28 capsule 1 2/8/2017     Sig - Route: Take 1 capsule by mouth 2 times daily. - Oral    Class: Eprescribe    Notes to Pharmacy: Please give whichever formulation of minocycline that is best covered by insurance    ARIPiprazole (ABILIFY) 15 MG tablet 30 tablet 5 1/19/2017     Sig - Route: Take 1 tablet by mouth daily. - Oral    Class: Eprescribe    guanFACINE (INTUNIV) 2 MG TABLET SR 24 HR 30 tablet 5 1/19/2017     Sig - Route: Take 1 tablet by mouth daily. - Oral    Class: Eprescribe    albuterol 108 (90 BASE) MCG/ACT inhaler 1 Inhaler 0 10/30/2015     Sig - Route: Inhale 2 puffs into the lungs every 4 hours as needed for Shortness of Breath or Wheezing. 10 minutes before physical activity like running. - Inhalation    Class: Eprescribe    Multiple Vitamins-Minerals (MULTIVITAMIN PO)        Sig - Route: Take  by mouth. - Oral    Class: Historical Med      Allergies     No Known Allergies      Immunizations History as of 2/8/2017     Name Date    DTaP 12/14/2007, 6/9/2004, 2003, 2003, 2003    HEPATITIS A CHILD 12/16/2016, 10/30/2015    HIB 6/9/2004, 2003, 2003, 2003    HPV 9-VALENT 12/16/2016, 10/30/2015    Hepatitis B Child 2003, 2003, 2003, 2003    Influenza 10/21/2013, 10/24/2012, 11/18/2011    MMR 5/19/2008, 6/9/2004    Meningococcal Conjugate MCV4P (Menactra) 10/30/2015    Pneumococcal Conjugate 7 Valent 9/28/2004, 2003, 2003, 2003    Polio, INACTIVATED 12/14/2007, 2003, 2003, 2003    Tdap 1/28/2014  2:38 PM    Varicella 5/19/2008, 9/28/2004      Problem List as of  2/8/2017     Nocturnal enuresis    Autism spectrum disorder    DMDD (disruptive mood dysregulation disorder)            Patient Instructions     None       intraabdominal and intrapelvic fat suggesting an element of anasarca edema of uncertain etiology. Correlate with clinical and laboratory findings.    2. Again noted is a large right pleural effusion with compressive atelectasis of the right lower lobe. This is not significantly changed since the prior examination. There is interval decrease in the size of the left lower lobe pulmonary nodule, which now measures at 3 mm (series 4 image 13) with interval resolution of surrounding ground-glass opacity.    3. No acute intraabdominal or pelvic solid organ or bowel pathology identified. Details and other findings as discussed above    There is general concurrence with the preliminary report above.  Additional finding of some urinary bladder wall thickening and mild inflammatory changes associated with the urinary bladder are appreciated..  Cystitis should be excluded.    The      Electronically signed by: Sanjana Otero  Date:    04/25/2023  Time:    10:05               Narrative:    EXAMINATION:  CT ABDOMEN PELVIS WITHOUT CONTRAST    Technique: CT of the abdomen and pelvis was performed with axial images as well as sagittal and coronal reconstruction images without intravenous contrast or oral contrast.    Comparison: Comparison is with study dated 2023-04-17 14:58:26.    Clinical History: Epigastric pain.    Dosage Information: Automated Exposure Control was utilized 617.19 mGy.cm.    Findings:    Lines and Tubes: None.    Thorax:    Lungs: Again noted is a large right pleural effusion with compressive atelectasis of the right lower lobe. This is not significantly changed since the prior examination. There is interval decrease in the size of the left lower lobe pulmonary nodule, which now measures at 3 mm (series 4 image 13) with interval resolution of surrounding ground-glass opacity.    Heart: Stable mild cardiomegaly is seen.    Abdomen:    Abdominal Wall: There is extensive stranding of the subcutaneous fat as  well as the intraabdominal and intrapelvic fat suggesting an element of anasarca edema of uncertain etiology.    Liver: The liver appears unremarkable.    Biliary System: No intrahepatic or extrahepatic biliary duct dilatation is seen.    Gallbladder: The gallbladder is non-distended and appears otherwise unremarkable.    Pancreas: The pancreas appears unremarkable.    Spleen: The spleen appears unremarkable.    Adrenals: The adrenal glands appear unremarkable.    Kidneys: The left kidney appears unremarkable with no stones cysts masses or hydronephrosis. Nonspecific perinephric fat stranding is noted bilaterally. Again noted is an 18 mm cyst in the mid pole of the right kidney (series 2 image 41). The right kidney otherwise appear unremarkable with no stone or hydronephrosis identified.    Aorta: There is mild calcification of the abdominal aorta and its branches.    IVC: Unremarkable.    Bowel:    Esophagus: The visualized esophagus appears unremarkable.    Stomach: The stomach appears unremarkable.    Duodenum: Unremarkable appearing duodenum.    Small Bowel: The small bowel appears unremarkable.    Colon: Nondistended.    Appendix: The appendix appears unremarkable (series 2 image 62-67).    Peritoneum: There is mild ascites, not significantly changed since the prior examination. No free intraperitoneal gas is seen.    Pelvis:    Bladder: The bladder is nondistended and cannot be definitively evaluated.    Male:    Prostate gland: The prostate gland appears unremarkable.    Bony structures:    Dorsal Spine: There is spondylosis of the visualized dorsal spine. There is a stable appearing grade I retrolisthesis of L5 over S1.    Bony Pelvis: The visualized bony structures of the pelvis appear unremarkable.                        Preliminary result by Sanjana Otero MD (04/25/23 02:27:52)                   Narrative:    START OF REPORT:  Technique: CT of the abdomen and pelvis was performed with axial  images as well as sagittal and coronal reconstruction images without intravenous contrast or oral contrast.    Comparison: Comparison is with study [id number] 14:58:26.    Clinical History: Epigastric pain.    Dosage Information: Automated Exposure Control was utilized 617.19 mGy.cm.    Findings:  Lines and Tubes: None.  Thorax:  Lungs: Again noted is a large right pleural effusion with compressive atelectasis of the right lower lobe. This is not significantly changed since the prior examination. There is interval decrease in the size of the left lower lobe pulmonary nodule, which now measures at 3 mm (series 4 image 13) with interval resolution of surrounding ground-glass opacity.  Heart: Stable mild cardiomegaly is seen.  Abdomen:  Abdominal Wall: There is extensive stranding of the subcutaneous fat as well as the intraabdominal and intrapelvic fat suggesting an element of anasarca edema of uncertain etiology.  Liver: The liver appears unremarkable.  Biliary System: No intrahepatic or extrahepatic biliary duct dilatation is seen.  Gallbladder: The gallbladder is non-distended and appears otherwise unremarkable.  Pancreas: The pancreas appears unremarkable.  Spleen: The spleen appears unremarkable.  Adrenals: The adrenal glands appear unremarkable.  Kidneys: The left kidney appears unremarkable with no stones cysts masses or hydronephrosis. Nonspecific perinephric fat stranding is noted bilaterally. Again noted is an 18 mm cyst in the mid pole of the right kidney (series 2 image 41). The right kidney otherwise appear unremarkable with no stone or hydronephrosis identified.  Aorta: There is mild calcification of the abdominal aorta and its branches.  IVC: Unremarkable.  Bowel:  Esophagus: The visualized esophagus appears unremarkable.  Stomach: The stomach appears unremarkable.  Duodenum: Unremarkable appearing duodenum.  Small Bowel: The small bowel appears unremarkable.  Colon: Nondistended.  Appendix: The  appendix appears unremarkable (series 2 image 62-67).  Peritoneum: There is mild ascites, not significantly changed since the prior examination. No free intraperitoneal gas is seen.    Pelvis:  Bladder: The bladder is nondistended and cannot be definitively evaluated.  Male:  Prostate gland: The prostate gland appears unremarkable.    Bony structures:  Dorsal Spine: There is spondylosis of the visualized dorsal spine. There is a stable appearing grade I retrolisthesis of L5 over S1.  Bony Pelvis: The visualized bony structures of the pelvis appear unremarkable.      Impression:  1. There is extensive stranding of the subcutaneous fat as well as the intraabdominal and intrapelvic fat suggesting an element of anasarca edema of uncertain etiology. Correlate with clinical and laboratory findings.  2. Again noted is a large right pleural effusion with compressive atelectasis of the right lower lobe. This is not significantly changed since the prior examination. There is interval decrease in the size of the left lower lobe pulmonary nodule, which now measures at 3 mm (series 4 image 13) with interval resolution of surrounding ground-glass opacity.  3. No acute intraabdominal or pelvic solid organ or bowel pathology identified. Details and other findings as discussed above.                                         X-Ray Chest AP Portable (Final result)  Result time 04/25/23 08:53:38      Final result by Jordan Merlos MD (04/25/23 08:53:38)                   Impression:      Cardiomegaly.    Right-sided pleural effusion with compressive atelectatic changes and consolidative changes at the right base similar to previous exam.    Increase interstitial markings      Electronically signed by: Jordan Merlos  Date:    04/25/2023  Time:    08:53               Narrative:    EXAMINATION:  XR CHEST AP PORTABLE    CLINICAL HISTORY:  cough;    TECHNIQUE:  Single frontal view of the chest was performed.    COMPARISON:  April 22,  2022.    FINDINGS:  Examination reveals mediastinal silhouette to be within normal limits cardiac silhouette is mildly enlarged.    There is some increase in interstitial markings similar to previous exam might still reflect a degree of pulmonary vascular congestion.    There is blunting of the right costophrenic angle indicating the presence of a right-sided pleural effusion with consolidative changes in compressive atelectatic changes at the right base.    No other focal consolidative changes no other change                        Wet Read by Ez Pena DO (04/25/23 01:58:20, [facility], Emergency Medicine)    Prominent interstitial markings.  Right pleural effusion persist appears slightly improved from previous chest x-ray.                                     RADIOLOGY REPORT (Final result)  Result time 04/28/23 12:20:35      Final result by Unknown User (04/28/23 12:20:35)                                         Medications:     Current Facility-Administered Medications:     acetaminophen tablet 650 mg, 650 mg, Oral, Q6H PRN, Dileep Fonseca MD, 650 mg at 05/01/23 2257    aspirin EC tablet 81 mg, 81 mg, Oral, Daily, Kike Chadwick MD, 81 mg at 05/04/23 0836    atorvastatin tablet 40 mg, 40 mg, Oral, QHS, Kike Chadwick MD, 40 mg at 05/03/23 2036    atovaquone 750 mg/5 mL oral liquid 1,500 mg, 1,500 mg, Oral, Daily, Kike Chadwick MD, 1,500 mg at 05/04/23 0836    mivhrnrrh-egvimnrc-ubkpnds ala -25 mg (25 kg or greater) 1 tablet, 1 tablet, Oral, Daily, Kike Chadwick MD, 1 tablet at 05/04/23 0836    bumetanide injection 2 mg, 2 mg, Intravenous, Q12H, Silvina Forbes MD, 2 mg at 05/03/23 2028    dextrose 10% bolus 125 mL 125 mL, 12.5 g, Intravenous, PRN, Kike Chadwick MD    dextrose 10% bolus 250 mL 250 mL, 25 g, Intravenous, PRN, Kike Chadwick MD    dextrose 40 % gel 15,000 mg, 15 g, Oral, PRN, Kike Chadwick MD    dextrose 40 % gel 30,000 mg, 30 g, Oral, PRN,  Kike Chadwick MD    DOBUtamine 1000 mg in D5W 250 mL infusion, 5 mcg/kg/min, Intravenous, Continuous, Lizette Beal MD, Last Rate: 7.6 mL/hr at 05/03/23 1449, 5 mcg/kg/min at 05/03/23 1449    glucagon (human recombinant) injection 1 mg, 1 mg, Intramuscular, PRN, Kike Chadwick MD    heparin (porcine) injection 5,000 Units, 5,000 Units, Subcutaneous, Q12H, Alex March MD, 5,000 Units at 05/04/23 0836    melatonin tablet 6 mg, 6 mg, Oral, Nightly PRN, Kike Chadwick MD, 6 mg at 05/03/23 2036    methyl salicylate-menthol 15-10% cream, , Topical (Top), TID PRN, Terry Barksdale MD, Given at 04/27/23 2113    midodrine tablet 15 mg, 15 mg, Oral, TID WM, Unique Lozano MD, 15 mg at 05/04/23 0805    naloxone 0.4 mg/mL injection 0.02 mg, 0.02 mg, Intravenous, PRN, Kike Chadwick MD    nicotine 14 mg/24 hr 1 patch, 1 patch, Transdermal, Daily, Kike Chadwick MD, 1 patch at 05/04/23 0836    polyethylene glycol packet 17 g, 17 g, Oral, BID, Kylie Aguilar MD, 17 g at 05/04/23 0836    [START ON 5/5/2023] polyethylene glycol packet 17 g, 17 g, Oral, Daily, Kylie Aguilar MD    sodium bicarbonate tablet 1,300 mg, 1,300 mg, Oral, BID, Silvina Forbes MD, 1,300 mg at 05/04/23 0836    sodium chloride 0.9% bolus 250 mL 250 mL, 250 mL, Intravenous, PRN, Silvina Forbes MD    sodium chloride 0.9% flush 10 mL, 10 mL, Intravenous, Q12H PRN, Kike Chadwick MD    sodium chloride 0.9% flush 10 mL, 10 mL, Intravenous, PRN, Kike Chadwick MD    Flushing PICC Protocol, , , Until Discontinued **AND** sodium chloride 0.9% flush 10 mL, 10 mL, Intravenous, Q6H, 10 mL at 05/04/23 0600 **AND** sodium chloride 0.9% flush 10 mL, 10 mL, Intravenous, PRN, Dilcia Campbell MD     Impression/Plan:  Acute renal failure: Likely due to cardiorenal syndrome previously nonoliguric.  No urine output recorded yesterday taking output may not be exactly accurate.  Patient is scheduled to have temporary catheter removed today and tunneled  catheter placed tomorrow we will attempt dialysis tomorrow.  There are no acute indications for hemodialysis today.  Metabolic acidosis: Continue sodium bicarbonate tablets.  Will recheck CMP in a.m. further assess acid-base status.  Hypotension:  Patient is on midodrine and albumin will be given with dialysis tomorrow.  Consider 1 additional dose of albumin today if blood pressure remains low and patient becomes symptomatic.  Patient on dobutamine as well currently.  Silvina Forbes M.D.  Nephrology

## 2023-05-04 NOTE — PT/OT/SLP PROGRESS
Physical Therapy    Missed Treatment Session    Patient Name:  Jason Perdue   MRN:  [id number]      Patient not seen at this time secondary to Patient unwilling to participate. Pt. Was short of breath, stated that he is not feeling well. Pt. Is swollen. Notified CNA, who will give message to his nurse Idalmis, who wasn't available at this time.     Will follow-up as patient is available to participate and as therapists' schedule allows.

## 2023-05-04 NOTE — PROGRESS NOTES
[facility] Medicine  Progress Note      Patient Name: Jason Perdue  : 1960  MRN: [id number]  Patient Class: IP- Inpatient   Admission Date: 2023   Length of Stay: 9  Admitting Service: Hospital Medicine  Attending Physician: Dilcia Campbell MD  PCP: SAVAGE Reddy    CHIEF COMPLAINT     Chief Complaint   Patient presents with    Abdominal Pain     Pt c/o midline upper abdominal pain and sob when ambulating. Hx of chf. Brought in by ems.b/p low in route. Pt reports pain to abdomen 10. Reports onset after large bm on this morning.       HOSPITAL COURSE   HPI:    64yo AAM PMH HIV, HFrEF [EF 15%, 2023 without AICD or LifeVest], AFib on Eliquis, severe aortic stenosis, HTN, HLD, B-cell lymphoma, polysubstance use. Broughtin by ambulance by EMS 2023 C/C progressive shortness of breath onset 3d previous. Associated LOVING, orthopnea, lower extremity edema and abdominal swelling, 1 episode of loose bowel movement this morning with cramping /10 centralized abdominal pain, weakness with ground level fall yesterday without trauma.  Of not, he presented with similar symptoms on 2023 and left AMA after receiving 60 mg IV Lasix.      In the ER, VS significant for hypotension, saturation WNL on RA  Lab significant for stable normocytic anemia, mild metabolic acidosis, HOLLIS, BNP roughly 15,000,  troponin 0.164.  CXR with vascular congestion, RT pleural effusion, slightly improved from previous. Internal Medicine service was consulted and patient admitted for CHF exacerbation and HOLLIS.     2023: Overnight,  patient experienced 9b eat run of Vtach but was aSx. Labs were reviewed w/K 3.9 thus given 20mEq for goal > 4.  Otherwise, patient with no complaints other than SOB with eating which he reports happens every morning. Continues to require midodrine and dobutamine for BP support. Albumin DC at am labs show normalization. Holding HD today  but will continue diuretic  therapy as well as NaHCO3 with the assistance of nephrology. Patient also reported RIGHT leg pain; US DVT RT LE pending, on Eliquis and ASA.     Goals of care discussed previously with patient with discussion of prognosis. Pt declined hospice/palliative care, expressing desire for heart transplant or assistive device.     5/2/2023: Patient reported continued leg pain overnight and was evaluated by night team. LE US DVT RT leg neg 5/1. He has no complaints of leg pain this morning. Continues to need dobutamine drip as well as midodrine. HD 5/1 with removal of 2L, tolerated without issue.     5/3/2023: No acute issues overnight. Complains of intermittent hiccups for the last couple of days. Also states he had some intermittent LEFT sided chest pain, nonradiating, denies diaphoresis, n/v, SOB, wheezing. Subsided spontaneously, did not report to the nursing staff, and has not returned. He reports similar occasional chest pain chronically at home. Denies leg pain but continues to report chronic BL LE which is slightly improved since admission. No abdominal pain but reports chronic intermittent constipation for which he strains but no diarrhea, bloody stools. Nephrology plans to dialyze today. Surgery has plans to take the patient to OR 5/5 to remove RT femoral line and place IJ catheter    5/4/2023: No acute issues overnight. HD attempted yesterday with approx 200mL removed before having to abort for low SBP. Still requiring dobutamine drip, on tele monitoring for arrhythmia with no incidence since episode of Vtach earlier this week. MAP 52-78 in the last 24h. Patient not likely a candidate for cardiac intervention, discussion ongoing. Previously denied transfer as patient with pos UDS. Discussed prognosis and long term plan of care with patient. He has opted for hospice care at home of sister and is now DNR. Consult to case management placed, spoke with [facility] who will come by later today.      OBJECTIVE/PHYSICAL EXAM     VITAL SIGNS (MOST RECENT):  Temp: 97.8 °F (36.6 °C) (05/04/23 0344)  Pulse: 105 (05/04/23 0344)  Resp: 20 (05/04/23 0344)  BP: (!) 88/62 (05/04/23 0344)  SpO2: 99 % (05/04/23 0344)   VITAL SIGNS (24 HOUR RANGE):  Temp:  [97.7 °F (36.5 °C)-97.8 °F (36.6 °C)]   Pulse:  []   Resp:  [20]   BP: (75-89)/(40-62)   SpO2:  [94 %-99 %]    IV bag hanging with dobutamine     General: no acute distress, sitting at bedside with legs on floor   CVS: distant HS, regular rhythm but tachycardic, radial pulses 2+ BL, 3+ BL LE pitting edema to distal calves   Resp: distant BS but CTA, coughs when laying flat  GI:  nonTTP  Neuro: awake and alert    LABS/MICRO/MEDS/DIAGNOSTICS     LABS  CBC  Recent Labs     05/03/23 0418 05/04/23 0435   WBC 5.88 5.67   RBC 3.25* 3.32*   HGB 8.7* 8.9*   HCT 26.4* 27.5*   MCV 81.2 82.8   MCH 26.8* 26.8*   MCHC 33.0 32.4*   RDW 20.5* 21.2*   * 129*       Anemia  No results for input(s): HAPTOGLOBIN, FERRITIN, IRON, TIBC, EAACZXPC21, FOLATE in the last 72 hours.    Coags  No results for input(s): INR, APTT, D-DIMER in the last 72 hours.  Cardiac  No results for input(s): BNP, CPK, TROPONINI in the last 72 hours.    ABG/Lactate  Recent Labs     05/02/23  0114   LACTIC 1.4         BMP  Recent Labs     05/03/23 0418 05/04/23 0435   * 132*   K 3.9 4.4   CHLORIDE 103 99   CO2 20* 21*   BUN 29.8* 22.8   CREATININE 2.90* 2.88*   GLUCOSE 103 93   CALCIUM 9.9 9.5   MG 2.10 2.00   PHOS 3.7 3.0       LFTs  Recent Labs     05/03/23  0418 05/04/23  0435   ALBUMIN 4.5 4.4   GLOBULIN 3.3 3.4   ALKPHOS 87 86   ALT 10 10   AST 22 26   BILITOT 4.2* 4.9*       Inflammatory Markers  No results for input(s): CRP, LDH, ESR in the last 72 hours.  Lipid  No results for input(s): CHOL, TRIG, LDL, VLDL, HDL in the last 72 hours.  Diabetes  Recent Labs     05/02/23  0114 05/03/23  0418 05/04/23  0435   GLUCOSE 102 103 93       Thyroid  No results for input(s): TSH, FREET4 in the  last 72 hours.       INTAKE/OUTPUT    Intake/Output Summary (Last 24 hours) at 5/4/2023 0702  Last data filed at 5/3/2023 1155  Gross per 24 hour   Intake --   Output 222 ml   Net -222 ml          MICROBIOLOGY  Microbiology Results (last 7 days)       Procedure Component Value Units Date/Time    Urine culture [[id number]]  (Abnormal) Collected: 04/25/23 1248    Order Status: Completed Specimen: Urine Updated: 04/27/23 1316     Urine Culture Less than 10,000 colonies/ml Proteus mirabilis     Comment: [place] counts <10,000/ml are of questionable significance and may or may not indicate contamination.  Therefore organisms are identified only.  If further workup is desired please notify Microbiology                 MEDICATIONS   aspirin  81 mg Oral Daily    atorvastatin  40 mg Oral QHS    atovaquone  1,500 mg Oral Daily    stglykeps-dhdnowfq-xkohrrn ala  1 tablet Oral Daily    bumetanide  2 mg Intravenous Q12H    heparin (porcine)  5,000 Units Subcutaneous Q12H    midodrine  15 mg Oral TID WM    nicotine  1 patch Transdermal Daily    polyethylene glycol  17 g Oral BID    [START ON 5/5/2023] polyethylene glycol  17 g Oral Daily    sodium bicarbonate  1,300 mg Oral BID    sodium chloride 0.9%  10 mL Intravenous Q6H         INFUSIONS   DOBUTamine IV infusion (non-titrating) 5 mcg/kg/min (05/03/23 1449)          DIAGNOSTIC TESTS  X-Ray Knee 1 or 2 View Right   Final Result      1. No evidence of acute injury to the right knee.         Electronically signed by: Francis Farrell MD   Date:    04/30/2023   Time:    15:54      CT Abdomen Pelvis  Without Contrast   Final Result   Impression:      1. There is extensive stranding of the subcutaneous fat as well as the intraabdominal and intrapelvic fat suggesting an element of anasarca edema of uncertain etiology. Correlate with clinical and laboratory findings.      2. Again noted is a large right pleural effusion with compressive atelectasis of the right lower lobe. This is not  significantly changed since the prior examination. There is interval decrease in the size of the left lower lobe pulmonary nodule, which now measures at 3 mm (series 4 image 13) with interval resolution of surrounding ground-glass opacity.      3. No acute intraabdominal or pelvic solid organ or bowel pathology identified. Details and other findings as discussed above      There is general concurrence with the preliminary report above.  Additional finding of some urinary bladder wall thickening and mild inflammatory changes associated with the urinary bladder are appreciated..  Cystitis should be excluded.      The         Electronically signed by: Sanjana Otero   Date:    04/25/2023   Time:    10:05      X-Ray Chest AP Portable   ED Interpretation   Prominent interstitial markings.  Right pleural effusion persist appears slightly improved from previous chest x-ray.      Final Result      Cardiomegaly.      Right-sided pleural effusion with compressive atelectatic changes and consolidative changes at the right base similar to previous exam.      Increase interstitial markings         Electronically signed by: Jordan Merlos   Date:    04/25/2023   Time:    08:53      RADIOLOGY REPORT   Final Result           EF   Date Value Ref Range Status   04/13/2023 15 % Final   01/24/2023 12 % Final          ASSESSMENT/PLAN     Acute decompensated heart failure with hx of HFrEF with EF 15% (No AICD)  Abdominal anasarca  Pleural effusion  - BNP approx 15,000 on admission,  baseline 11,126  - echo 04/2023: EF 15%, mild eccentric hypertrophy, severe systolic dysfunction, grade 3 LVDD, moderate to severe AS, moderate MR, moderate TR, pHTN  - continue diuresis,   HD attempted 5/4 with removal of approx 200mL before cessation 2/2 to low SBP  - Surg plans to remove RT femoral line and replace with IJ for HD on 5/5  - strict I&Os,  daily weights, elevate head of bed, fluid restriction low-sodium diet  - cardiology consulted     - CM consulted for evaluation for lifevest, pt insurance does not have coverage  - CM consulted for hospice care   - DNR     Hypotension   -currently on dobutamine drip, midodrine 15 tid  -holding home Entresto and Toprol   -per cardiology, long term dobutamine not recommended due to possible arrhythmias  -Attempted transfer patient in setting of decompensated heart failure requiring dobutamine and midodrine now w/ cardiorenal syndrome with minimal urine output refractory to diuretic therapy requiring HD and ultrafiltration, however due to h/o substance abuse patient has been denied    HOLLIS w/ suspected cardiorenal syndrome  -BUN/Cr initially 46.7/2.28 on admit, continues to be elevated  with baseline 33.1/1.36   - nephrology following   - continue to monitor renal indices  - avoid nephrotoxic medications     NSTEMI type 2  - Troponin 0.164 on admit, likely NSTEMI type 2 secondary to volume overload in setting CHFe  -troponin down-trended  -no significant EKG changes from previous  -cardiology consulted         Right-sided pleural effusion  - Stable in comparison to previous admissions  - satting well on 2L NC  - continue to monitor      HIV  - Continue home Biktarvy and prophylactic Atovaquone dosing     Polysubstance use  Tobacco use  - UDS positive for cocaine  - extensive discussions with patient regarding substance abuse, high likelihood of severe consequences including death.     Access: pIV  Antibiotics: none  Diet: low salt  DVT Prophylaxis: Eliquis  GI Prophylaxis: none  Fluids: none    Outpatient dialysis not an option if pt needs albumin drip during sessions. BP remains difficult to increase with dobutamine and midodrone, heart function remains poor. Poor candidate for heart transplant given history of substance abuse.  Case management consulted for hospice care, patient DNR    KAREN Aguilar MD \[facility]\"" Family Medicine

## 2023-05-04 NOTE — PROGRESS NOTES
05/04/23 0945   Missed Time Reason   Missed Treatment Reason Patient unwilling to participate     Pt reported not feeling well and requested no therapy today.

## 2023-05-04 NOTE — NURSING
5/3/23 2021 patient c/o hiccups. Spoke with Dr. Mulligan new order noted.    5/3/2023 2307 patient c/o hiccups coming back.Dr. Armendariz new order noted

## 2023-05-04 NOTE — MEDICAL/APP STUDENT
[facility] General Surgery  PROGRESS NOTE    Patient Name: Jason Perdue  : 1960   MRN: [id number]  Admission Date: 2023   HD#: 9  Procedure Date: N/A  POD#: * No surgery date entered *    SUBJECTIVE   NAEON, AF, VSS  Reports no pain.  Passing gas, last BM yesterday with soft stools.  Voiding without difficulty.  Tolerating ambulation.  Denies fever, chills, constipation, and diarrhea.    OBJECTIVE   Vitals  Temp:  [97.6 °F (36.4 °C)-97.9 °F (36.6 °C)] 97.8 °F (36.6 °C)  Pulse:  [] 105  Resp:  [17-20] 20  SpO2:  [94 %-100 %] 99 %  BP: (75-95)/(40-72) 88/62    Intake / Output  Intake: No measured intake.  Output: 222 mL HD UF.  Diet: Diet Low Sodium, 2gm  Diet NPO Except for: Sips with Medication    Physical Exam  Vitals reviewed.   Constitutional:       General: He is awake. He is not in acute distress.     Appearance: Normal appearance. He is well-developed.   HENT:      Head: Normocephalic and atraumatic.   Eyes:      Extraocular Movements: Extraocular movements intact.      Conjunctiva/sclera: Conjunctivae normal.   Cardiovascular:      Rate and Rhythm: Normal rate and regular rhythm.      Pulses: Normal pulses.      Comments: Right femoral HD line in pace.  Pulmonary:      Effort: Pulmonary effort is normal. No accessory muscle usage or respiratory distress.   Abdominal:      General: Abdomen is flat. There is no distension.      Palpations: Abdomen is soft. There is no mass.      Tenderness: There is no abdominal tenderness. There is no guarding or rebound.   Skin:     General: Skin is warm and dry.      Findings: No erythema, lesion or rash.   Neurological:      General: No focal deficit present.      Mental Status: He is alert and oriented to person, place, and time.   Psychiatric:         Behavior: Behavior normal. Behavior is cooperative.     Labs:  Recent Labs     23  0114 23  0457 23  0418 23  0435   WBC  --  5.42 5.88 5.67   HGB  --  8.5* 8.7* 8.9*   HCT  --  25.9* 26.4*  27.5*   PLT  --  119* 127* 129*   MCV  --  81.2 81.2 82.8   *  --  135* 132*   K 4.7  --  3.9 4.4   CO2 17*  --  20* 21*   BUN 20.4  --  29.8* 22.8   CREATININE 2.23*  --  2.90* 2.88*   CALCIUM 10.1*  --  9.9 9.5   MG 2.30  --  2.10 2.00   PHOS 3.2  --  3.7 3.0   ALBUMIN 4.8  --  4.5 4.4   BILITOT 4.1*  --  4.2* 4.9*   AST 38*  --  22 26   ALT 10  --  10 10   ALKPHOS 85  --  87 86   LACTIC 1.4  --   --   --       Imaging  No new imaging.    Microbiology  Microbiology Results (last 7 days)       Procedure Component Value Units Date/Time    Urine culture [[id number]]  (Abnormal) Collected: 04/25/23 1248    Order Status: Completed Specimen: Urine Updated: 04/27/23 1316     Urine Culture Less than 10,000 colonies/ml Proteus mirabilis     Comment: [place] counts <10,000/ml are of questionable significance and may or may not indicate contamination.  Therefore organisms are identified only.  If further workup is desired please notify Microbiology              Antibiotics  None    ASSESSMENT   Jason Perdue is a 63 y.o. male with a PMHx of HIV (last CD4 234 1/24/2023 and HIV  3/23/2023), HBV antigen (+), HFrEF, CKD, and polysubstance abuse here for complaints of LE edema, SOB, and decreased urination. General Surgery consulted for assistance with tunneled HD catheter placement.    PLAN   - Scheduled internal jugular tunneled catheter placement for tomorrow, 5/4.  - NPO starting at midnight.  - Continue holding Eliquis.  - Will order pre-op Ancef.    Andrew Luong, MS3  [facility] [employer]  5/4/2023

## 2023-05-05 VITALS
WEIGHT: 215 LBS | RESPIRATION RATE: 20 BRPM | HEIGHT: 71 IN | TEMPERATURE: 98 F | SYSTOLIC BLOOD PRESSURE: 108 MMHG | DIASTOLIC BLOOD PRESSURE: 70 MMHG | BODY MASS INDEX: 30.1 KG/M2 | OXYGEN SATURATION: 99 % | HEART RATE: 104 BPM

## 2023-05-05 DIAGNOSIS — N18.9 CHRONIC KIDNEY DISEASE, UNSPECIFIED CKD STAGE: Primary | ICD-10-CM

## 2023-05-05 LAB
ALBUMIN SERPL-MCNC: 4.2 G/DL (ref 3.4–4.8)
ALBUMIN/GLOB SERPL: 1.2 RATIO (ref 1.1–2)
ALP SERPL-CCNC: 83 UNIT/L (ref 40–150)
ALT SERPL-CCNC: 8 UNIT/L (ref 0–55)
AST SERPL-CCNC: 27 UNIT/L (ref 5–34)
BASOPHILS # BLD AUTO: 0 X10(3)/MCL
BASOPHILS NFR BLD AUTO: 0 %
BILIRUBIN DIRECT+TOT PNL SERPL-MCNC: 4.4 MG/DL
BUN SERPL-MCNC: 33.1 MG/DL (ref 8.4–25.7)
CALCIUM SERPL-MCNC: 9.7 MG/DL (ref 8.8–10)
CHLORIDE SERPL-SCNC: 98 MMOL/L (ref 98–107)
CK SERPL-CCNC: 65 U/L (ref 30–200)
CO2 SERPL-SCNC: 20 MMOL/L (ref 23–31)
CREAT SERPL-MCNC: 3.87 MG/DL (ref 0.73–1.18)
EOSINOPHIL # BLD AUTO: 0.02 X10(3)/MCL (ref 0–0.9)
EOSINOPHIL NFR BLD AUTO: 0.4 %
ERYTHROCYTE [DISTWIDTH] IN BLOOD BY AUTOMATED COUNT: 21.2 % (ref 11.5–17)
GFR SERPLBLD CREATININE-BSD FMLA CKD-EPI: 17 MLS/MIN/1.73/M2
GLOBULIN SER-MCNC: 3.4 GM/DL (ref 2.4–3.5)
GLUCOSE SERPL-MCNC: 105 MG/DL (ref 82–115)
HCT VFR BLD AUTO: 25.5 % (ref 42–52)
HGB BLD-MCNC: 8.5 G/DL (ref 14–18)
IMM GRANULOCYTES # BLD AUTO: 0.01 X10(3)/MCL (ref 0–0.04)
IMM GRANULOCYTES NFR BLD AUTO: 0.2 %
LACTATE SERPL-SCNC: 1.3 MMOL/L (ref 0.5–2.2)
LYMPHOCYTES # BLD AUTO: 0.61 X10(3)/MCL (ref 0.6–4.6)
LYMPHOCYTES NFR BLD AUTO: 11.8 %
MAGNESIUM SERPL-MCNC: 2.3 MG/DL (ref 1.6–2.6)
MCH RBC QN AUTO: 27 PG (ref 27–31)
MCHC RBC AUTO-ENTMCNC: 33.3 G/DL (ref 33–36)
MCV RBC AUTO: 81 FL (ref 80–94)
MONOCYTES # BLD AUTO: 0.59 X10(3)/MCL (ref 0.1–1.3)
MONOCYTES NFR BLD AUTO: 11.4 %
NEUTROPHILS # BLD AUTO: 3.93 X10(3)/MCL (ref 2.1–9.2)
NEUTROPHILS NFR BLD AUTO: 76.2 %
NRBC BLD AUTO-RTO: 0 %
PHOSPHATE SERPL-MCNC: 3.7 MG/DL (ref 2.3–4.7)
PLATELET # BLD AUTO: 117 X10(3)/MCL (ref 130–400)
PLATELETS.RETICULATED NFR BLD AUTO: 8.4 % (ref 0.9–11.2)
PMV BLD AUTO: 11.6 FL (ref 7.4–10.4)
POTASSIUM SERPL-SCNC: 4.4 MMOL/L (ref 3.5–5.1)
PROT SERPL-MCNC: 7.6 GM/DL (ref 5.8–7.6)
RBC # BLD AUTO: 3.15 X10(6)/MCL (ref 4.7–6.1)
SODIUM SERPL-SCNC: 131 MMOL/L (ref 136–145)
WBC # SPEC AUTO: 5.16 X10(3)/MCL (ref 4.5–11.5)

## 2023-05-05 PROCEDURE — 25000003 PHARM REV CODE 250: Performed by: INTERNAL MEDICINE

## 2023-05-05 PROCEDURE — 63600175 PHARM REV CODE 636 W HCPCS

## 2023-05-05 PROCEDURE — S4991 NICOTINE PATCH NONLEGEND: HCPCS | Performed by: STUDENT IN AN ORGANIZED HEALTH CARE EDUCATION/TRAINING PROGRAM

## 2023-05-05 PROCEDURE — 85025 COMPLETE CBC W/AUTO DIFF WBC: CPT | Performed by: STUDENT IN AN ORGANIZED HEALTH CARE EDUCATION/TRAINING PROGRAM

## 2023-05-05 PROCEDURE — 80053 COMPREHEN METABOLIC PANEL: CPT | Performed by: STUDENT IN AN ORGANIZED HEALTH CARE EDUCATION/TRAINING PROGRAM

## 2023-05-05 PROCEDURE — 27000221 HC OXYGEN, UP TO 24 HOURS

## 2023-05-05 PROCEDURE — 25000003 PHARM REV CODE 250: Performed by: STUDENT IN AN ORGANIZED HEALTH CARE EDUCATION/TRAINING PROGRAM

## 2023-05-05 PROCEDURE — 84100 ASSAY OF PHOSPHORUS: CPT | Performed by: STUDENT IN AN ORGANIZED HEALTH CARE EDUCATION/TRAINING PROGRAM

## 2023-05-05 PROCEDURE — 83735 ASSAY OF MAGNESIUM: CPT | Performed by: STUDENT IN AN ORGANIZED HEALTH CARE EDUCATION/TRAINING PROGRAM

## 2023-05-05 PROCEDURE — 83605 ASSAY OF LACTIC ACID: CPT | Performed by: STUDENT IN AN ORGANIZED HEALTH CARE EDUCATION/TRAINING PROGRAM

## 2023-05-05 PROCEDURE — A4216 STERILE WATER/SALINE, 10 ML: HCPCS | Performed by: INTERNAL MEDICINE

## 2023-05-05 PROCEDURE — 94761 N-INVAS EAR/PLS OXIMETRY MLT: CPT

## 2023-05-05 PROCEDURE — 97530 THERAPEUTIC ACTIVITIES: CPT

## 2023-05-05 PROCEDURE — 82550 ASSAY OF CK (CPK): CPT | Performed by: STUDENT IN AN ORGANIZED HEALTH CARE EDUCATION/TRAINING PROGRAM

## 2023-05-05 RX ORDER — SODIUM BICARBONATE 650 MG/1
1300 TABLET ORAL 2 TIMES DAILY
Qty: 120 TABLET | Refills: 11 | Status: SHIPPED | OUTPATIENT
Start: 2023-05-05 | End: 2024-05-04

## 2023-05-05 RX ORDER — MIDODRINE HYDROCHLORIDE 5 MG/1
15 TABLET ORAL
Qty: 270 TABLET | Refills: 11 | Status: SHIPPED | OUTPATIENT
Start: 2023-05-05 | End: 2024-05-04

## 2023-05-05 RX ORDER — FENTANYL CITRATE 50 UG/ML
25 INJECTION, SOLUTION INTRAMUSCULAR; INTRAVENOUS ONCE
Status: COMPLETED | OUTPATIENT
Start: 2023-05-05 | End: 2023-05-05

## 2023-05-05 RX ORDER — BUMETANIDE 1 MG/1
1 TABLET ORAL DAILY
Qty: 30 TABLET | Refills: 11 | Status: SHIPPED | OUTPATIENT
Start: 2023-05-05 | End: 2024-05-04

## 2023-05-05 RX ADMIN — SODIUM CHLORIDE, PRESERVATIVE FREE 10 ML: 5 INJECTION INTRAVENOUS at 12:05

## 2023-05-05 RX ADMIN — FENTANYL CITRATE 25 MCG: 50 INJECTION, SOLUTION INTRAMUSCULAR; INTRAVENOUS at 04:05

## 2023-05-05 RX ADMIN — MIDODRINE HYDROCHLORIDE 15 MG: 5 TABLET ORAL at 12:05

## 2023-05-05 RX ADMIN — BICTEGRAVIR SODIUM, EMTRICITABINE, AND TENOFOVIR ALAFENAMIDE FUMARATE 1 TABLET: 50; 200; 25 TABLET ORAL at 09:05

## 2023-05-05 RX ADMIN — ATOVAQUONE 1500 MG: 750 SUSPENSION ORAL at 09:05

## 2023-05-05 RX ADMIN — SODIUM BICARBONATE 650 MG TABLET 1300 MG: at 09:05

## 2023-05-05 RX ADMIN — SODIUM CHLORIDE, PRESERVATIVE FREE 10 ML: 5 INJECTION INTRAVENOUS at 05:05

## 2023-05-05 RX ADMIN — POLYETHYLENE GLYCOL 3350 17 G: 17 POWDER, FOR SOLUTION ORAL at 09:05

## 2023-05-05 RX ADMIN — NICOTINE 1 PATCH: 14 PATCH, EXTENDED RELEASE TRANSDERMAL at 09:05

## 2023-05-05 RX ADMIN — HEPARIN SODIUM 5000 UNITS: 5000 INJECTION, SOLUTION INTRAVENOUS; SUBCUTANEOUS at 09:05

## 2023-05-05 RX ADMIN — MIDODRINE HYDROCHLORIDE 15 MG: 5 TABLET ORAL at 07:05

## 2023-05-05 RX ADMIN — ASPIRIN 81 MG: 81 TABLET, COATED ORAL at 09:05

## 2023-05-05 NOTE — PT/OT/SLP PROGRESS
Physical Therapy Treatment    Patient Name:  Jason Perdue   MRN:  [id number]    Recommendations     Discharge Recommendations:  nursing facility, skilled   Discharge Equipment Recommendations: walker, rolling, bedside commode   Barriers to discharge: fall risk, severity of deficits, level of skilled assistance required, and decreased endurance    Assessment     Jason Perdue is a 63 y.o. male admitted with a medical diagnosis of HFrEF (heart failure with reduced ejection fraction).    Acute decompensated heart failure with hx of HFrEF with EF 15% (No AICD)  Abdominal anasarca  Pleural effusion  Hypotension   HOLLIS w/ suspected cardiorenal syndrome  NSTEMI type 2    Right-sided pleural effusion  HIV  Polysubstance use  Tobacco use  Patient Active Problem List   Diagnosis    Diffuse large B-cell lymphoma    HIV (human immunodeficiency virus infection)    Hepatitis B    B12 deficiency    Polysubstance abuse    Severe aortic stenosis    HFrEF (heart failure with reduced ejection fraction)    A-fib    Dyspnea    Community acquired pneumonia of right lower lobe of lung    Acute on chronic congestive heart failure    Tobacco user    CAD (coronary artery disease)    HLD (hyperlipidemia)    Anxiety    Cocaine abuse    HOLLIS (acute kidney injury)    Congestive heart failure    Anasarca    Metabolic acidosis    NSTEMI (non-ST elevated myocardial infarction)     He presents with the following impairments/functional limitations:  weakness, impaired endurance, impaired self care skills, gait instability, impaired balance, edema, impaired skin, impaired cardiopulmonary response to activity.    Rehab Prognosis: Fair.    Patient would benefit from continued skilled acute PT services to: address above listed impairments/functional limitations; receive patient/caregiver education; reduce fall risk; and maximize independency/safety with functional mobility.    -continued: up-to-chair, standing edge of bed, side steps at edge of bed,  ambulation, with progression of gait distance/frequency/duration and speed-as tolerated/appropriate, w/ supervision/assistance    Recent Surgery: Procedure(s) (LRB):  Insertion, Catheter, Central Venous, Hemodialysis (N/A)      Plan     During this hospitalization, patient to be seen 5 x/week to address the identified impairments/functional limitations via gait training, therapeutic activities, therapeutic exercises and progress toward the established goals.    Plan of Care Expires:  05/24/23    Subjective     Communicated with patient's nurse Mayte prior to session.    Patient agreeable to participate in treatment session. (Initially patient declined therapy session attempt but then agreed to therapist suggestion to sit/stand/side step to HOB to achieve an improved positioning in bed)    Chief Complaint: weakness  Patient/Family Comments/goals: home w/ hospice today  Pain/Comfort:  Pain Rating 1: 0/10  Pain Addressed 1: Nurse notified  Pain Rating Post-Intervention 1: 0/10    Objective     Patient found supine in bed, with HOB elevated, and bed rails up bilateral HOB and right FOB with oxygen (MIDLINE IV)  upon PT entry to room.    General Precautions: Standard, fall   Orthopedic Precautions:N/A   Braces: N/A  Respiratory Status: O2 via nasal cannula    Functional Mobility:    Bed Mobility:  Rolling Left: modified independence  Rolling Right: modified independence  Scooting: modified independence  Supine to Sit: supervision  Sit to Supine: supervision  with no cues required    Therapeutic Activities performed:             donned bilat slippers            donned front gown       -sitting balance activities:              weight shifting:                   -forward                 -backward                 -laterally (left)                 -laterally (right)            scooting:                   -forward            repositioning at edge of bed    Transfers:  Sit to Stand: contact guard assistance with rolling  walker  with cues for hand placement, foot placement, and posture    Gait:  Patient ambulated 5ft (side steps to HOB - patients Lt) with rolling walker and contact guard assistance.  Patient demonstrates unsteady gait, decreased step length, wide base of support, shortened/quickened step on bilateral, and flexed posture.    Balance:  Sit  Patient demonstrated static balance on level surface with independence with no verbal cues.  Patient demonstrated dynamic balance on level surface with modified independence with minimal verbal cues during moderate excursions.  Stand  Patient demonstrated static balance on level surface  using rolling walker with contact guard assistance with minimal verbal cues.    Patient left supine in bed, with HOB elevated, and bed rails up bilateral HOB and right FOB with oxygen, (MIDLINE IV), all lines intact, call button in reach, pt's nurse Mayte notified, and tray table at bedside .    Goals     Multidisciplinary Problems       Physical Therapy Goals       Problem: Physical Therapy    Goal Priority Disciplines Outcome Goal Variances Interventions   Physical Therapy Goal     PT, PT/OT Ongoing, Progressing     Description: Goals to be met by: DISCHARGE    Patient will increase functional independence with mobility by performin. Sit to stand transfer with Modified [place] - ONGOING  -REVIEWED 2023  2. Gait  x 130 feet with Modified [place] using Rolling Walker - ONGOING  -REVIEWED 2023           Time Tracking     PT Received On: 23  PT Start Time: 0944     PT Stop Time: 1005  PT Total Time (min): 21 min     Billable Minutes: Therapeutic Activity 2023

## 2023-05-05 NOTE — CARE UPDATE
Was called to bedside with increasing pain in right leg.  Previous studies have shown that there is peripheral artery disease.  Evaluated right distal extremity to have pulses on Doppler.  We will give 1 dose of fentanyl 25 mcg for pain.  Would consider evaluation via vascular surgery should symptoms worsen/persist.  At this time pulses intact, sensation, warmth still present in his right leg.  Low suspicion for compartment syndrome at this time.    Sky Mulligan MD  [facility] PGY-1

## 2023-05-05 NOTE — PROGRESS NOTES
"[facility]  Nephrology Progress Note  Patient Name: Jason Perdue  Age: 63 y.o.  : 1960  MRN: [id number]  Admission Date: 2023    Chief complaint: Abdominal Pain (Pt c/o midline upper abdominal pain and sob when ambulating. Hx of chf. Brought in by ems.b/p low in route. Pt reports pain to abdomen 8/10. Reports onset after large bm on this morning.)    Hospital course  Jason Perdue is a 63 y.o. Black or  male with past medical history of HIV, hepatitis-B, heart failure with severely reduced ejection fraction, chronic kidney disease, and polysubstance abuse.  Patient was admitted on 2023 with complaints of lower extremity edema, decreased urination, and shortness of breath.  He did not respond to diuretic therapy and was initiated on hemodialysis on 2023.  Nephrology is continuing to follow for assistance with management of HOLLIS.      Subjective  Patient tells me he is ready to go home. Denies chest pain.     Review of Systems  Cardiovascular:  Negative for chest pain   Respiratory:  + mild SOB    Objective  /70   Pulse 104   Temp 97.7 °F (36.5 °C) (Oral)   Resp 20   Ht 5' 11" (1.803 m)   Wt 97.5 kg (215 lb)   SpO2 99%   BMI 29.99 kg/m²   No intake or output data in the 24 hours ending 23 0915      Physical Exam  General appearance: Patient is in no acute distress.  Skin: No rashes or wounds.  HEENT: PERRLA, EOMI, no scleral icterus, no JVD. Neck is supple.  Chest: Respirations are unlabored. Lungs sounds are diminished to auscultation.   Heart: S1, S2.   Abdomen:  Obese, protuberant, nontender, soft, bowel sounds audible to all quadrants.  : Deferred.  Extremities: BLE pitting 2+ edema, peripheral pulses are palpable.  Right femoral dialysis catheter is in place  Neuro: No focal deficits.     Medications    Current Facility-Administered Medications:     acetaminophen tablet 650 mg, 650 mg, Oral, Q6H PRN, Dileep Fonseca " MD, 650 mg at 05/04/23 2019    aspirin EC tablet 81 mg, 81 mg, Oral, Daily, Kike Chadwick MD, 81 mg at 05/04/23 0836    atorvastatin tablet 40 mg, 40 mg, Oral, QHS, Kike Chadwick MD, 40 mg at 05/04/23 2020    atovaquone 750 mg/5 mL oral liquid 1,500 mg, 1,500 mg, Oral, Daily, Kike Chadwick MD, 1,500 mg at 05/04/23 0836    ebydpsxln-qxcxkpas-ngprjhj ala -25 mg (25 kg or greater) 1 tablet, 1 tablet, Oral, Daily, Kike Chadwick MD, 1 tablet at 05/04/23 0836    dextrose 10% bolus 125 mL 125 mL, 12.5 g, Intravenous, PRN, Kike Chadwick MD    dextrose 10% bolus 250 mL 250 mL, 25 g, Intravenous, PRN, Kike Chadwick MD    dextrose 40 % gel 15,000 mg, 15 g, Oral, PRN, Kike Chadwick MD    dextrose 40 % gel 30,000 mg, 30 g, Oral, PRN, Kike Chadwick MD    DOBUtamine 1000 mg in D5W 250 mL infusion, 5 mcg/kg/min, Intravenous, Continuous, Lizette Beal MD, Last Rate: 7.6 mL/hr at 05/04/23 2020, 5 mcg/kg/min at 05/04/23 2020    glucagon (human recombinant) injection 1 mg, 1 mg, Intramuscular, PRN, Kike Chadwick MD    heparin (porcine) injection 5,000 Units, 5,000 Units, Subcutaneous, Q12H, Alex March MD, 5,000 Units at 05/04/23 2019    melatonin tablet 6 mg, 6 mg, Oral, Nightly PRN, Kike Chadwick MD, 6 mg at 05/04/23 2019    methyl salicylate-menthol 15-10% cream, , Topical (Top), TID PRN, Terry Barksdale MD, Given at 04/27/23 2113    midodrine tablet 15 mg, 15 mg, Oral, TID WM, Unique Lozano MD, 15 mg at 05/05/23 0731    naloxone 0.4 mg/mL injection 0.02 mg, 0.02 mg, Intravenous, PRN, Kike Chadwick MD    nicotine 14 mg/24 hr 1 patch, 1 patch, Transdermal, Daily, Kike Chadwick MD, 1 patch at 05/04/23 0836    polyethylene glycol packet 17 g, 17 g, Oral, Daily, Kylie Aguilar MD    sodium bicarbonate tablet 1,300 mg, 1,300 mg, Oral, BID, Silvina Forbes MD, 1,300 mg at 05/04/23 2020    sodium chloride 0.9% bolus 250 mL 250 mL, 250 mL, Intravenous, PRN, Silvina Forbes MD    sodium chloride 0.9%  flush 10 mL, 10 mL, Intravenous, Q12H PRN, Kike Chadwick MD    sodium chloride 0.9% flush 10 mL, 10 mL, Intravenous, PRN, Kike Chadwick MD    Flushing PICC Protocol, , , Until Discontinued **AND** sodium chloride 0.9% flush 10 mL, 10 mL, Intravenous, Q6H, 10 mL at 05/05/23 0547 **AND** sodium chloride 0.9% flush 10 mL, 10 mL, Intravenous, PRN, Dilcia Campbell MD     Imaging:    Reviewed    Laboratory Data:  Hematology  Lab Results   Component Value Date    WBC 5.16 05/05/2023    HGB 8.5 (L) 05/05/2023    HCT 25.5 (L) 05/05/2023     (L) 05/05/2023     (L) 05/05/2023    K 4.4 05/05/2023    CHLORIDE 98 05/05/2023    CO2 20 (L) 05/05/2023    BUN 33.1 (H) 05/05/2023    CREATININE 3.87 (H) 05/05/2023    EGFRNORACEVR 17 05/05/2023    GLUCOSE 105 05/05/2023    CALCIUM 9.7 05/05/2023    ALKPHOS 83 05/05/2023    LABPROT 7.6 05/05/2023    ALBUMIN 4.2 05/05/2023    BILIDIR 0.3 03/15/2022    IBILI 0.30 03/15/2022    AST 27 05/05/2023    ALT 8 05/05/2023    MG 2.30 05/05/2023    PHOS 3.7 05/05/2023     Lab Results   Component Value Date    IRON 45 (L) 05/01/2023    TIBC 259 05/01/2023    TRANS 235 05/01/2023    LABIRON 17 (L) 05/01/2023    FERRITIN 209.11 05/01/2023     (H) 03/14/2023       Lab Results   Component Value Date    HEPBSURFAG Reactive (A) 04/26/2023    HEPBSAB Nonreactive 04/25/2023         Impression  Acute kidney injury   Anasarca, improved  Anemia of chronic disease  Metabolic acidosis  Heart failure with severely reduced ejection fraction  Hypotension  Polysubstance abuse   HIV   Hepatitis-B  C-ANCA positive, [place]-3 and MPO Ab (-)    Plan  Unfortunately, patient has not been able to tolerate dialysis due to persistent hypotension. Patient has elected to proceed with palliative care, awaiting discharge home with hospice today. Hemodialysis catheter may now be removed.   Nephrology will now sign off, as there is not much more that we can contribute to the plan.  Thank you very much for allowing us  to participate in the care of this patient.

## 2023-05-05 NOTE — DISCHARGE SUMMARY
[facility] Internal Medicine Discharge Summary    Admitting Physician: Dilcia Campbell MD  Attending Physician: Dilcia Campbell MD  Date of Admit: 4/24/2023  Date of Discharge: 5/5/2023    Condition: Fair  Outcome: Condition has improved and patient is now ready for discharge.  DISPOSITION: Hospice/Home    Discharge Diagnoses     Patient Active Problem List   Diagnosis    Diffuse large B-cell lymphoma    HIV (human immunodeficiency virus infection)    Hepatitis B    B12 deficiency    Polysubstance abuse    Severe aortic stenosis    HFrEF (heart failure with reduced ejection fraction)    A-fib    Dyspnea    Community acquired pneumonia of right lower lobe of lung    Acute on chronic congestive heart failure    Tobacco user    CAD (coronary artery disease)    HLD (hyperlipidemia)    Anxiety    Cocaine abuse    HOLLIS (acute kidney injury)    Congestive heart failure    Anasarca    Metabolic acidosis    NSTEMI (non-ST elevated myocardial infarction)       Principal Problem:  HFrEF (heart failure with reduced ejection fraction)    Consultants and Procedures     Consultants:  IP CONSULT TO HOSPITAL MEDICINE  IP CONSULT TO SOCIAL WORK/CASE MANAGEMENT  IP CONSULT TO CARDIOLOGY  IP CONSULT TO NEPHROLOGY  IP CONSULT TO GENERAL SURGERY  IP CONSULT TO MIDLINE TEAM  IP CONSULT TO REGISTERED DIETITIAN/NUTRITIONIST  WOUND CARE CONSULT  IP CONSULT TO GENERAL SURGERY  IP CONSULT TO SOCIAL WORK/CASE MANAGEMENT    Procedures:   Procedure(s) (LRB):  Insertion, Catheter, Central Venous, Hemodialysis (N/A)     Brief Admission History      62yo AAM PMH HIV, HFrEF [EF 15%, 4/2023 without AICD or LifeVest], AFib on Eliquis, severe aortic stenosis, HTN, HLD, B-cell lymphoma, polysubstance use. Broughtin by ambulance by EMS 4/24/2023 C/C progressive shortness of breath onset 3d previous. Associated LOVING, orthopnea, lower extremity edema and abdominal swelling, 1 episode of loose bowel movement this morning with cramping 8/10 centralized abdominal pain,  weakness with ground level fall yesterday without trauma.  Of not, he presented with similar symptoms on 04/22/2023 and left AMA after receiving 60 mg IV Lasix.       In the ER, VS significant for hypotension, saturation WNL on RA  Lab significant for stable normocytic anemia, mild metabolic acidosis, HOLLIS, BNP roughly 15,000,  troponin 0.164.  CXR with vascular congestion, RT pleural effusion, slightly improved from previous. Internal Medicine service was consulted and patient admitted for CHF exacerbation and HOLLIS.     Hospital Course with Pertinent Findings     Patient initially admitted for decompensated CHF exacerbation w/ HOLLIS.  He was also noted to be significantly hypotensive on admission and initially started on dobutamine at a non-titratable dose.  Lasix and Bumex were attempted for diuresis with very minimal urine output and decision was made per nephrology to perform HD and ultrafiltration for optimized fluid removal.  However, patients blood pressure remained low with MAPS ranging from 50-70. Patient was started on midodrine and dobutamine was discontinued for one day as an attempt to transition to oral medication but patient not able to tolerate dialysis without proper pressure support.  Pts kidney function continued to remain elevated above baseline and there was very high suspicion for cardiorenal syndrome.  Nephrology and cardiology both following throughout stay with recommendations for continued pressor support and ultrafiltration.  We attempted to transfer patient in setting of decompensated heart failure requiring dobutamine and midodrine now w/ cardiorenal syndrome with minimal urine output refractory to diuretic therapy requiring HD and ultrafiltration but patient was denied by both [facility] in [place] and [facility] secondary to hx of substance abuse, HIV, and hx of non-compliance.  Patients options for continued care at our facility were extremely limited and multiple discussions were  "had with patient regarding treatment options moving forward. Patient not able to tolerate HD yesterday secondary to low blood pressure with continued dobutamine and midodrine therapy.  Patient also noted to have several runs of Vtach on tele and per cardiology recommendations dobutamine was not an option for long term therapy.  Patient made the ultimate decision that he would like to return home with Hospice care in order to remain comfortable during this time.      Discharge physical exam:  Vitals  BP: 108/70  Temp: 97.7 °F (36.5 °C)  Temp Source: Oral  Pulse: 104  Resp: 20  SpO2: 99 %  Height: 5' 11" (180.3 cm)  Weight: 97.5 kg (215 lb)    Physical Exam:   General: no acute distress, sitting at bedside with legs on floor   CVS: distant HS, regular rhythm but tachycardic, radial pulses 2+ BL, 3+ BL LE pitting edema to distal calves   Resp: distant BS but CTA, coughs when laying flat  GI:  nonTTP  Neuro: awake and alert    TIME SPENT ON DISCHARGE: 60 minutes    Discharge Medications        Medication List        START taking these medications      bumetanide 1 MG tablet  Commonly known as: BUMEX  Take 1 tablet (1 mg total) by mouth once daily.     midodrine 5 MG Tab  Commonly known as: PROAMATINE  Take 3 tablets (15 mg total) by mouth 3 (three) times daily with meals.     sodium bicarbonate 650 MG tablet  Take 2 tablets (1,300 mg total) by mouth 2 (two) times daily.            CONTINUE taking these medications      aspirin 81 MG EC tablet  Commonly known as: ECOTRIN  Take 1 tablet (81 mg total) by mouth once daily.     atorvastatin 40 MG tablet  Commonly known as: LIPITOR  Take 1 tablet (40 mg total) by mouth every evening.     atovaquone 750 mg/5 mL Susp oral liquid  Commonly known as: MEPRON  Take 10 mLs (1,500 mg total) by mouth once daily.     BIKTARVY -25 mg (25 kg or greater)  Generic drug: ijcrraotv-gpdfectm-jtmwyek ala  Take 1 tablet by mouth once daily.     empagliflozin 10 mg tablet  Commonly known " "as: JARDIANCE  Take 1 tablet (10 mg total) by mouth once daily.     food supplemt, lactose-reduced Liqd     megestroL 400 mg/10 mL (40 mg/mL) Susp  Commonly known as: MEGACE     nicotine 14 mg/24 hr  Commonly known as: NICODERM CQ  Place 1 patch onto the skin once daily.            STOP taking these medications      apixaban 5 mg Tab  Commonly known as: ELIQUIS     furosemide 20 MG tablet  Commonly known as: LASIX     metoprolol succinate 25 MG 24 hr tablet  Commonly known as: TOPROL-XL     polyethylene glycol 3350(bulk) Powd     sacubitriL-valsartan 24-26 mg per tablet  Commonly known as: ENTRESTO     syringe-needle,safety,disp unt 1 mL 25 gauge x 5/8" Syrg     VITAMIN D2 50,000 unit Cap  Generic drug: ergocalciferol               Where to Get Your Medications        These medications were sent to [facility] - [facility] [address]  [address], [facility] [address]      Phone: [phone]   bumetanide 1 MG tablet  midodrine 5 MG Tab  sodium bicarbonate 650 MG tablet         Discharge Information:     Discharge plan discussed with attending physician.  Patient to return home on hospice care for further outpatient management.  Can continue midodrine outpatient for blood pressure support as well as bumex for diuresis.  Holding all GDMT in setting of severe hypotension as patient is not able to tolerate.      Lizette Beal MD  [facility] Internal Medicine, HO-1           "

## 2023-05-05 NOTE — PLAN OF CARE
D/C order noted. [facility] notified, after HD cath is removed, pt can go home. [facility] will transport, pt is in will call status. Nurse to call [facility] when ready [phone].

## 2023-05-06 NOTE — PLAN OF CARE
Problem: Adult Inpatient Plan of Care  Goal: Plan of Care Review  Outcome: Met  Goal: Patient-Specific Goal (Individualized)  Outcome: Met  Goal: Absence of Hospital-Acquired Illness or Injury  Outcome: Met  Goal: Optimal Comfort and Wellbeing  Outcome: Met  Goal: Readiness for Transition of Care  Outcome: Met     Problem: Fluid Imbalance (Pneumonia)  Goal: Fluid Balance  Outcome: Met     Problem: Infection (Pneumonia)  Goal: Resolution of Infection Signs and Symptoms  Outcome: Met     Problem: Respiratory Compromise (Pneumonia)  Goal: Effective Oxygenation and Ventilation  Outcome: Met     Problem: Fall Injury Risk  Goal: Absence of Fall and Fall-Related Injury  Outcome: Met     Problem: Fluid Volume Excess  Goal: Fluid Balance  Outcome: Met     Problem: Skin Injury Risk Increased  Goal: Skin Health and Integrity  Outcome: Met     Problem: Physical Therapy  Goal: Physical Therapy Goal  Description: Goals to be met by: DISCHARGE    Patient will increase functional independence with mobility by performin. Sit to stand transfer with Modified [place] - ONGOING  -REVIEWED 2023  2. Gait  x 130 feet with Modified [place] using Rolling Walker - ONGOING  -REVIEWED 2023  Outcome: Met     Problem: Occupational Therapy  Goal: Occupational Therapy Goal  Description: Goals to be met by: discharge     Patient will increase functional independence with ADLs by performing:    LE Dressing with Modified [place]- revised 5/3/23 LE dressing with min A .    Grooming while standing at sink with Modified [place]- discontinued 5/3/23.    Toileting from toilet with Modified [place] for hygiene and clothing management.- revised 5/3/23 toileting from toilet with SBA for hygiene and clothing mgt.      Toilet transfer to toilet with Modified [place] - revised 5/3/23  stand pivot Transfer to [facility] with RW and with supervision .    Outcome: Met     Problem: Device-Related Complication Risk  (Hemodialysis)  Goal: Safe, Effective Therapy Delivery  Outcome: Met     Problem: Hemodynamic Instability (Hemodialysis)  Goal: Effective Tissue Perfusion  Outcome: Met     Problem: Infection (Hemodialysis)  Goal: Absence of Infection Signs and Symptoms  Outcome: Met     Problem: Fluid and Electrolyte Imbalance (Acute Kidney Injury/Impairment)  Goal: Fluid and Electrolyte Balance  Outcome: Met     Problem: Oral Intake Inadequate (Acute Kidney Injury/Impairment)  Goal: Optimal Nutrition Intake  Outcome: Met     Problem: Renal Function Impairment (Acute Kidney Injury/Impairment)  Goal: Effective Renal Function  Outcome: Met     Problem: Infection  Goal: Absence of Infection Signs and Symptoms  Outcome: Met     Problem: Impaired Wound Healing  Goal: Optimal Wound Healing  Outcome: Met

## 2023-05-08 NOTE — PT/OT/SLP DISCHARGE
POST DISCHARGE DOCUMENTATION - 05/08/2023 9:37 AM    Physical Therapy Discharge Summary    Name: Jason Perdue  MRN: [id number]   Principal Problem: HFrEF (heart failure with reduced ejection fraction)   Acute decompensated heart failure with hx of HFrEF with EF 15% (No AICD)  Abdominal anasarca  Pleural effusion  Hypotension   HOLLIS w/ suspected cardiorenal syndrome  NSTEMI type 2    Right-sided pleural effusion  HIV  Polysubstance use  Tobacco use      Patient Active Problem List   Diagnosis    Diffuse large B-cell lymphoma    HIV (human immunodeficiency virus infection)    Hepatitis B    B12 deficiency    Polysubstance abuse    Severe aortic stenosis    HFrEF (heart failure with reduced ejection fraction)    A-fib    Dyspnea    Community acquired pneumonia of right lower lobe of lung    Acute on chronic congestive heart failure    Tobacco user    CAD (coronary artery disease)    HLD (hyperlipidemia)    Anxiety    Cocaine abuse    HOLLIS (acute kidney injury)    Congestive heart failure    Anasarca    Metabolic acidosis    NSTEMI (non-ST elevated myocardial infarction)     Recommendations - per last treatment session     Discharge Recommendations:  nursing facility, skilled   Discharge Equipment Recommendations: walker, rolling, bedside commode     Assessment:     Refer to prior Physical Therapy note dated 05/05/2023 for last known functional status of patient.    Patient was unexpectedly discharged from hospital.  Refer to therapy's initial evaluation or last treatment note for patient's most recent functional status and goal achievement and therapists' recommendations.    -continued: up-to-chair, standing edge of bed, side steps at edge of bed, ambulation, with progression of gait distance/frequency/duration and speed-as tolerated/appropriate, w/ supervision/assistance (AS ORDERED BY M.D.)    Objective     GOALS: 0 OUT OF 2 STG's met by patient    Multidisciplinary Problems       Physical Therapy Goals       Not on  file              Multidisciplinary Problems (Resolved)          Problem: Physical Therapy    Goal Priority Disciplines Outcome Goal Variances Interventions   Physical Therapy Goal   (Resolved)     PT, PT/OT Met     Description: Goals to be met by: DISCHARGE    Patient will increase functional independence with mobility by performin. Sit to stand transfer with Modified [place] - ONGOING  -REVIEWED 2023  - NOT MET  2. Gait  x 130 feet with Modified [place] using Rolling Walker - ONGOING  -REVIEWED 2023  - NOT MET                       Plan     Patient Discharged to: home or self care and palliative care/hospice per chart.    2023

## 2023-05-08 NOTE — PT/OT/SLP DISCHARGE
Occupational Therapy Discharge Summary    Jason Perdue  MRN: [id number]   Principal Problem: HFrEF (heart failure with reduced ejection fraction)      Patient Discharged from acute Occupational Therapy on 5/5/23.  Please refer to prior OT note dated 5/3/23 for functional status.    Assessment:      Patient has not met goals due to medical complications during stay .     Objective:     GOALS: 0/4 goals met  Multidisciplinary Problems       Occupational Therapy Goals       Not on file              Multidisciplinary Problems (Resolved)          Problem: Occupational Therapy    Goal Priority Disciplines Outcome Interventions   Occupational Therapy Goal   (Resolved)      Goals not met    Description: Goals to be met by: discharge     Patient will increase functional independence with ADLs by performing:    LE Dressing with Modified [place]- revised 5/3/23 LE dressing with min A .    Grooming while standing at sink with Modified [place]- discontinued 5/3/23.    Toileting from toilet with Modified [place] for hygiene and clothing management.- revised 5/3/23 toileting from toilet with SBA for hygiene and clothing mgt.      Toilet transfer to toilet with Modified [place] - revised 5/3/23  stand pivot Transfer to [facility] with RW and with supervision .                         Reasons for Discontinuation of Therapy Services  Transfer to alternate level of care.      Plan:     Patient Discharged to: Hospice/medical facility    5/8/2023

## 2023-05-12 LAB — MYCOBACTERIUM SPEC QL CULT: ABNORMAL

## 2023-06-19 PROBLEM — J18.9 COMMUNITY ACQUIRED PNEUMONIA OF RIGHT LOWER LOBE OF LUNG: Status: RESOLVED | Noted: 2023-03-15 | Resolved: 2023-06-19

## 2023-07-31 PROBLEM — N17.9 AKI (ACUTE KIDNEY INJURY): Status: RESOLVED | Noted: 2023-04-26 | Resolved: 2023-07-31

## 2023-08-07 ENCOUNTER — TELEPHONE (OUTPATIENT)
Dept: INFECTIOUS DISEASES | Facility: CLINIC | Age: 63
End: 2023-08-07
Payer: MEDICAID

## 2023-08-07 PROBLEM — I21.4 NSTEMI (NON-ST ELEVATED MYOCARDIAL INFARCTION): Status: RESOLVED | Noted: 2023-05-03 | Resolved: 2023-08-07

## 2023-08-07 NOTE — TELEPHONE ENCOUNTER
----- Message from Thelma Kim LPN sent at 8/7/2023  8:08 AM CDT -----  Regarding: FW: pt call    ----- Message -----  From: Gala Sanon  Sent: 8/7/2023   7:39 AM CDT  To: #  Subject: pt call                                          Kiara    Pt passed away, msg left with answering service.

## 2024-02-01 NOTE — Clinical Note
"Jason"Francis Perdue was seen and treated in our emergency department on 9/11/2022.  He may return to work on 09/13/2022.       If you have any questions or concerns, please don't hesitate to call.      Zana Tatum MD" Detail Level: Zone Photo Preface (Leave Blank If You Do Not Want): Photographs were obtained today